# Patient Record
Sex: FEMALE | Race: WHITE | Employment: OTHER | URBAN - METROPOLITAN AREA
[De-identification: names, ages, dates, MRNs, and addresses within clinical notes are randomized per-mention and may not be internally consistent; named-entity substitution may affect disease eponyms.]

---

## 2017-01-03 ENCOUNTER — ANTI-COAG VISIT (OUTPATIENT)
Dept: PHARMACY | Facility: CLINIC | Age: 72
End: 2017-01-03

## 2017-01-03 DIAGNOSIS — I48.91 ATRIAL FIBRILLATION, UNSPECIFIED TYPE (HCC): ICD-10-CM

## 2017-01-03 LAB — INR BLD: 2.7

## 2017-01-09 ENCOUNTER — TELEPHONE (OUTPATIENT)
Dept: PHARMACY | Age: 72
End: 2017-01-09

## 2017-01-19 ENCOUNTER — HOSPITAL ENCOUNTER (OUTPATIENT)
Dept: CT IMAGING | Age: 72
Discharge: HOME | End: 2017-01-19
Attending: ORTHOPAEDIC SURGERY | Admitting: ORTHOPAEDIC SURGERY

## 2017-01-19 DIAGNOSIS — M19.071 PRIMARY OSTEOARTHRITIS, RIGHT ANKLE AND FOOT: ICD-10-CM

## 2017-01-19 DIAGNOSIS — M19.071 OSTEOARTHRITIS OF RIGHT ANKLE, UNSPECIFIED OSTEOARTHRITIS TYPE: ICD-10-CM

## 2017-01-26 ENCOUNTER — TELEPHONE (OUTPATIENT)
Dept: INTERNAL MEDICINE CLINIC | Age: 72
End: 2017-01-26

## 2017-01-27 ENCOUNTER — OFFICE VISIT (OUTPATIENT)
Dept: CARDIOLOGY CLINIC | Age: 72
End: 2017-01-27

## 2017-01-27 VITALS
OXYGEN SATURATION: 93 % | HEIGHT: 64 IN | BODY MASS INDEX: 45.43 KG/M2 | DIASTOLIC BLOOD PRESSURE: 68 MMHG | HEART RATE: 88 BPM | SYSTOLIC BLOOD PRESSURE: 114 MMHG | WEIGHT: 266.12 LBS

## 2017-01-27 DIAGNOSIS — I10 ESSENTIAL HYPERTENSION: ICD-10-CM

## 2017-01-27 DIAGNOSIS — I25.10 CORONARY ARTERY DISEASE INVOLVING NATIVE CORONARY ARTERY OF NATIVE HEART WITHOUT ANGINA PECTORIS: ICD-10-CM

## 2017-01-27 DIAGNOSIS — I48.91 ATRIAL FIBRILLATION, UNSPECIFIED TYPE (HCC): Primary | ICD-10-CM

## 2017-01-27 PROCEDURE — 99214 OFFICE O/P EST MOD 30 MIN: CPT | Performed by: INTERNAL MEDICINE

## 2017-01-27 PROCEDURE — 93000 ELECTROCARDIOGRAM COMPLETE: CPT | Performed by: INTERNAL MEDICINE

## 2017-02-02 ENCOUNTER — OFFICE VISIT (OUTPATIENT)
Dept: INTERNAL MEDICINE CLINIC | Age: 72
End: 2017-02-02

## 2017-02-02 VITALS
HEIGHT: 64 IN | SYSTOLIC BLOOD PRESSURE: 136 MMHG | WEIGHT: 266.8 LBS | OXYGEN SATURATION: 96 % | BODY MASS INDEX: 45.55 KG/M2 | HEART RATE: 84 BPM | DIASTOLIC BLOOD PRESSURE: 72 MMHG

## 2017-02-02 DIAGNOSIS — Z23 NEED FOR TDAP VACCINATION: ICD-10-CM

## 2017-02-02 DIAGNOSIS — J45.40 MODERATE PERSISTENT ASTHMA WITHOUT COMPLICATION: ICD-10-CM

## 2017-02-02 DIAGNOSIS — I10 ESSENTIAL HYPERTENSION: Primary | ICD-10-CM

## 2017-02-02 DIAGNOSIS — F41.9 ANXIETY: ICD-10-CM

## 2017-02-02 DIAGNOSIS — M19.90 ARTHRITIS: ICD-10-CM

## 2017-02-02 PROCEDURE — 99214 OFFICE O/P EST MOD 30 MIN: CPT | Performed by: INTERNAL MEDICINE

## 2017-02-02 PROCEDURE — 90715 TDAP VACCINE 7 YRS/> IM: CPT | Performed by: INTERNAL MEDICINE

## 2017-02-02 PROCEDURE — 90471 IMMUNIZATION ADMIN: CPT | Performed by: INTERNAL MEDICINE

## 2017-02-02 RX ORDER — TRAMADOL HYDROCHLORIDE 50 MG/1
50 TABLET ORAL 2 TIMES DAILY PRN
Qty: 60 TABLET | Refills: 0 | Status: SHIPPED | OUTPATIENT
Start: 2017-02-02 | End: 2017-05-04 | Stop reason: SDUPTHER

## 2017-02-02 RX ORDER — CLONAZEPAM 1 MG/1
1 TABLET ORAL EVERY EVENING
Qty: 90 TABLET | Refills: 0 | Status: SHIPPED | OUTPATIENT
Start: 2017-02-02 | End: 2017-05-04 | Stop reason: SDUPTHER

## 2017-02-09 ENCOUNTER — ANTI-COAG VISIT (OUTPATIENT)
Dept: PHARMACY | Facility: CLINIC | Age: 72
End: 2017-02-09

## 2017-02-09 DIAGNOSIS — I48.91 ATRIAL FIBRILLATION, UNSPECIFIED TYPE (HCC): ICD-10-CM

## 2017-02-09 LAB — INR BLD: 2.7

## 2017-03-01 ENCOUNTER — TELEPHONE (OUTPATIENT)
Dept: INTERNAL MEDICINE CLINIC | Age: 72
End: 2017-03-01

## 2017-03-23 ENCOUNTER — ANTI-COAG VISIT (OUTPATIENT)
Dept: PHARMACY | Facility: CLINIC | Age: 72
End: 2017-03-23

## 2017-03-23 DIAGNOSIS — I48.91 ATRIAL FIBRILLATION, UNSPECIFIED TYPE (HCC): ICD-10-CM

## 2017-03-23 LAB — INR BLD: 2.6

## 2017-03-27 ENCOUNTER — TELEPHONE (OUTPATIENT)
Dept: PHARMACY | Age: 72
End: 2017-03-27

## 2017-03-30 ENCOUNTER — OFFICE VISIT (OUTPATIENT)
Dept: INTERNAL MEDICINE CLINIC | Age: 72
End: 2017-03-30

## 2017-03-30 VITALS
OXYGEN SATURATION: 97 % | HEIGHT: 64 IN | HEART RATE: 72 BPM | WEIGHT: 269.4 LBS | SYSTOLIC BLOOD PRESSURE: 124 MMHG | BODY MASS INDEX: 45.99 KG/M2 | DIASTOLIC BLOOD PRESSURE: 84 MMHG | TEMPERATURE: 98.4 F

## 2017-03-30 DIAGNOSIS — J31.0 RHINOSINUSITIS: Primary | ICD-10-CM

## 2017-03-30 DIAGNOSIS — J32.9 RHINOSINUSITIS: Primary | ICD-10-CM

## 2017-03-30 PROCEDURE — 99213 OFFICE O/P EST LOW 20 MIN: CPT | Performed by: INTERNAL MEDICINE

## 2017-04-20 RX ORDER — MONTELUKAST SODIUM 10 MG/1
TABLET ORAL
Qty: 90 TABLET | Refills: 1 | Status: SHIPPED | OUTPATIENT
Start: 2017-04-20 | End: 2017-10-12 | Stop reason: SDUPTHER

## 2017-04-20 RX ORDER — INDAPAMIDE 1.25 MG/1
TABLET, FILM COATED ORAL
Qty: 90 TABLET | Refills: 1 | Status: SHIPPED | OUTPATIENT
Start: 2017-04-20 | End: 2017-10-12 | Stop reason: SDUPTHER

## 2017-04-20 RX ORDER — GABAPENTIN 300 MG/1
CAPSULE ORAL
Qty: 540 CAPSULE | Refills: 1 | Status: SHIPPED | OUTPATIENT
Start: 2017-04-20 | End: 2017-10-12 | Stop reason: SDUPTHER

## 2017-04-20 RX ORDER — CITALOPRAM 40 MG/1
TABLET ORAL
Qty: 90 TABLET | Refills: 1 | Status: SHIPPED | OUTPATIENT
Start: 2017-04-20 | End: 2018-01-04 | Stop reason: SDUPTHER

## 2017-04-20 RX ORDER — VERAPAMIL HYDROCHLORIDE 240 MG/1
240 TABLET, FILM COATED, EXTENDED RELEASE ORAL NIGHTLY
Qty: 90 TABLET | Refills: 1 | Status: SHIPPED | OUTPATIENT
Start: 2017-04-20 | End: 2017-07-06 | Stop reason: SDUPTHER

## 2017-04-20 RX ORDER — ALLOPURINOL 300 MG/1
TABLET ORAL
Qty: 90 TABLET | Refills: 1 | Status: SHIPPED | OUTPATIENT
Start: 2017-04-20 | End: 2017-10-12 | Stop reason: SDUPTHER

## 2017-05-01 ENCOUNTER — ANTI-COAG VISIT (OUTPATIENT)
Dept: PHARMACY | Facility: CLINIC | Age: 72
End: 2017-05-01

## 2017-05-01 DIAGNOSIS — I48.91 ATRIAL FIBRILLATION, UNSPECIFIED TYPE (HCC): ICD-10-CM

## 2017-05-01 LAB — INR BLD: 2.5

## 2017-05-04 ENCOUNTER — OFFICE VISIT (OUTPATIENT)
Dept: INTERNAL MEDICINE CLINIC | Age: 72
End: 2017-05-04

## 2017-05-04 VITALS
SYSTOLIC BLOOD PRESSURE: 120 MMHG | BODY MASS INDEX: 45.38 KG/M2 | WEIGHT: 265.8 LBS | HEART RATE: 64 BPM | HEIGHT: 64 IN | DIASTOLIC BLOOD PRESSURE: 70 MMHG

## 2017-05-04 DIAGNOSIS — F41.9 ANXIETY: ICD-10-CM

## 2017-05-04 DIAGNOSIS — R82.90 CLOUDY URINE: ICD-10-CM

## 2017-05-04 DIAGNOSIS — J45.40 MODERATE PERSISTENT ASTHMA WITHOUT COMPLICATION: ICD-10-CM

## 2017-05-04 DIAGNOSIS — M1A.0710 CHRONIC IDIOPATHIC GOUT INVOLVING TOE OF RIGHT FOOT WITHOUT TOPHUS: ICD-10-CM

## 2017-05-04 DIAGNOSIS — M19.90 ARTHRITIS: ICD-10-CM

## 2017-05-04 DIAGNOSIS — R30.0 BURNING WITH URINATION: Primary | ICD-10-CM

## 2017-05-04 DIAGNOSIS — I10 ESSENTIAL HYPERTENSION: ICD-10-CM

## 2017-05-04 LAB
ALBUMIN SERPL-MCNC: 4.4 G/DL (ref 3.4–5)
ANION GAP SERPL CALCULATED.3IONS-SCNC: 15 MMOL/L (ref 3–16)
BILIRUBIN, POC: ABNORMAL
BLOOD URINE, POC: ABNORMAL
BUN BLDV-MCNC: 12 MG/DL (ref 7–20)
CALCIUM SERPL-MCNC: 10.3 MG/DL (ref 8.3–10.6)
CHLORIDE BLD-SCNC: 98 MMOL/L (ref 99–110)
CHOLESTEROL, TOTAL: 172 MG/DL (ref 0–199)
CLARITY, POC: ABNORMAL
CO2: 29 MMOL/L (ref 21–32)
COLOR, POC: ABNORMAL
CREAT SERPL-MCNC: 0.8 MG/DL (ref 0.6–1.2)
GFR AFRICAN AMERICAN: >60
GFR NON-AFRICAN AMERICAN: >60
GLUCOSE BLD-MCNC: 81 MG/DL (ref 70–99)
GLUCOSE URINE, POC: ABNORMAL
HDLC SERPL-MCNC: 74 MG/DL (ref 40–60)
KETONES, POC: ABNORMAL
LDL CHOLESTEROL CALCULATED: 77 MG/DL
LEUKOCYTE EST, POC: ABNORMAL
NITRITE, POC: POSITIVE
PH, POC: 7
PHOSPHORUS: 3 MG/DL (ref 2.5–4.9)
POTASSIUM SERPL-SCNC: 4 MMOL/L (ref 3.5–5.1)
PROTEIN, POC: ABNORMAL
SODIUM BLD-SCNC: 142 MMOL/L (ref 136–145)
SPECIFIC GRAVITY, POC: 1.02
TRIGL SERPL-MCNC: 106 MG/DL (ref 0–150)
URIC ACID, SERUM: 4.1 MG/DL (ref 2.6–6)
UROBILINOGEN, POC: 0.2
VLDLC SERPL CALC-MCNC: 21 MG/DL

## 2017-05-04 PROCEDURE — 81002 URINALYSIS NONAUTO W/O SCOPE: CPT | Performed by: INTERNAL MEDICINE

## 2017-05-04 PROCEDURE — 99214 OFFICE O/P EST MOD 30 MIN: CPT | Performed by: INTERNAL MEDICINE

## 2017-05-04 RX ORDER — TRAMADOL HYDROCHLORIDE 50 MG/1
50 TABLET ORAL 2 TIMES DAILY PRN
Qty: 60 TABLET | Refills: 0 | Status: SHIPPED | OUTPATIENT
Start: 2017-05-04 | End: 2017-08-10 | Stop reason: ALTCHOICE

## 2017-05-04 RX ORDER — NITROFURANTOIN 25; 75 MG/1; MG/1
100 CAPSULE ORAL 2 TIMES DAILY
Qty: 10 CAPSULE | Refills: 0 | Status: SHIPPED | OUTPATIENT
Start: 2017-05-04 | End: 2017-05-09

## 2017-05-04 RX ORDER — CLONAZEPAM 1 MG/1
1 TABLET ORAL EVERY EVENING
Qty: 90 TABLET | Refills: 0 | Status: SHIPPED | OUTPATIENT
Start: 2017-05-04 | End: 2017-08-10 | Stop reason: SDUPTHER

## 2017-05-07 LAB
6-ACETYLMORPHINE: NOT DETECTED
7-AMINOCLONAZEPAM: PRESENT
ALPHA-OH-ALPRAZOLAM: NOT DETECTED
ALPRAZOLAM: NOT DETECTED
AMPHETAMINE: NOT DETECTED
BARBITURATES: NOT DETECTED
BENZOYLECGONINE: NOT DETECTED
BUPRENORPHINE: NOT DETECTED
CARISOPRODOL: NOT DETECTED
CLONAZEPAM: NOT DETECTED
CODEINE: NOT DETECTED
CREATININE URINE: 91.3 MG/DL (ref 20–400)
DIAZEPAM: NOT DETECTED
DRUGS EXPECTED: NORMAL
EER PAIN MGT DRUG PANEL, HIGH RES/EMIT U: NORMAL
ETHYL GLUCURONIDE: NOT DETECTED
FENTANYL: NOT DETECTED
HYDROCODONE: NOT DETECTED
HYDROMORPHONE: NOT DETECTED
LORAZEPAM: NOT DETECTED
MARIJUANA METABOLITE: NOT DETECTED
MDA: NOT DETECTED
MDEA: NOT DETECTED
MDMA URINE: NOT DETECTED
MEPERIDINE: NOT DETECTED
METHADONE: NOT DETECTED
METHAMPHETAMINE: NOT DETECTED
METHYLPHENIDATE: NOT DETECTED
MIDAZOLAM: NOT DETECTED
MORPHINE: NOT DETECTED
NORBUPRENORPHINE, FREE: NOT DETECTED
NORDIAZEPAM: NOT DETECTED
NORFENTANYL: NOT DETECTED
NORHYDROCODONE, URINE: NOT DETECTED
NOROXYCODONE: NOT DETECTED
NOROXYMORPHONE, URINE: NOT DETECTED
OXAZEPAM: NOT DETECTED
OXYCODONE: NOT DETECTED
OXYMORPHONE: NOT DETECTED
PAIN MANAGEMENT DRUG PANEL: NORMAL
PAIN MANAGEMENT DRUG PANEL: NORMAL
PCP: NOT DETECTED
PHENTERMINE: NOT DETECTED
PROPOXYPHENE: NOT DETECTED
TAPENTADOL, URINE: NOT DETECTED
TAPENTADOL-O-SULFATE, URINE: NOT DETECTED
TEMAZEPAM: NOT DETECTED
TRAMADOL: PRESENT
ZOLPIDEM: NOT DETECTED

## 2017-05-18 ENCOUNTER — TELEPHONE (OUTPATIENT)
Dept: INTERNAL MEDICINE CLINIC | Age: 72
End: 2017-05-18

## 2017-05-18 RX ORDER — ATORVASTATIN CALCIUM 80 MG/1
80 TABLET, FILM COATED ORAL DAILY
Qty: 30 TABLET | Refills: 5 | Status: SHIPPED | OUTPATIENT
Start: 2017-05-18 | End: 2017-11-16 | Stop reason: SDUPTHER

## 2017-06-02 ENCOUNTER — TELEPHONE (OUTPATIENT)
Dept: INTERNAL MEDICINE CLINIC | Age: 72
End: 2017-06-02

## 2017-06-12 ENCOUNTER — HOSPITAL ENCOUNTER (OUTPATIENT)
Dept: OTHER | Age: 72
Discharge: HOME | End: 2017-06-30
Attending: INTERNAL MEDICINE | Admitting: INTERNAL MEDICINE

## 2017-06-12 ENCOUNTER — ANTI-COAG VISIT (OUTPATIENT)
Dept: PHARMACY | Facility: CLINIC | Age: 72
End: 2017-06-12

## 2017-06-12 DIAGNOSIS — I48.91 ATRIAL FIBRILLATION, UNSPECIFIED TYPE (HCC): ICD-10-CM

## 2017-06-12 LAB — INR BLD: 2.8

## 2017-06-26 ENCOUNTER — OFFICE VISIT (OUTPATIENT)
Dept: BARIATRICS/WEIGHT MGMT | Age: 72
End: 2017-06-26

## 2017-06-26 VITALS
BODY MASS INDEX: 44.39 KG/M2 | HEIGHT: 64 IN | DIASTOLIC BLOOD PRESSURE: 78 MMHG | HEART RATE: 69 BPM | SYSTOLIC BLOOD PRESSURE: 126 MMHG | WEIGHT: 260 LBS

## 2017-06-26 DIAGNOSIS — E78.5 HYPERLIPIDEMIA, UNSPECIFIED: ICD-10-CM

## 2017-06-26 DIAGNOSIS — E66.01 MORBID OBESITY WITH BMI OF 40.0-44.9, ADULT (HCC): Primary | ICD-10-CM

## 2017-06-26 DIAGNOSIS — Z98.84 STATUS POST GASTRIC BANDING: ICD-10-CM

## 2017-06-26 DIAGNOSIS — I10 ESSENTIAL HYPERTENSION: ICD-10-CM

## 2017-06-26 PROCEDURE — 99213 OFFICE O/P EST LOW 20 MIN: CPT | Performed by: NURSE PRACTITIONER

## 2017-06-26 ASSESSMENT — ENCOUNTER SYMPTOMS
ALLERGIC/IMMUNOLOGIC NEGATIVE: 1
GASTROINTESTINAL NEGATIVE: 1
EYES NEGATIVE: 1
RESPIRATORY NEGATIVE: 1

## 2017-07-06 RX ORDER — VERAPAMIL HYDROCHLORIDE 240 MG/1
TABLET, FILM COATED, EXTENDED RELEASE ORAL
Qty: 90 TABLET | Refills: 1 | Status: SHIPPED | OUTPATIENT
Start: 2017-07-06 | End: 2018-01-04 | Stop reason: SDUPTHER

## 2017-07-24 ENCOUNTER — ANTI-COAG VISIT (OUTPATIENT)
Dept: PHARMACY | Facility: CLINIC | Age: 72
End: 2017-07-24

## 2017-07-24 DIAGNOSIS — I48.91 ATRIAL FIBRILLATION, UNSPECIFIED TYPE (HCC): ICD-10-CM

## 2017-07-24 LAB — INR BLD: 3.5

## 2017-08-10 ENCOUNTER — OFFICE VISIT (OUTPATIENT)
Dept: INTERNAL MEDICINE CLINIC | Age: 72
End: 2017-08-10

## 2017-08-10 VITALS
SYSTOLIC BLOOD PRESSURE: 124 MMHG | HEART RATE: 68 BPM | WEIGHT: 258.8 LBS | DIASTOLIC BLOOD PRESSURE: 82 MMHG | HEIGHT: 64 IN | BODY MASS INDEX: 44.18 KG/M2

## 2017-08-10 DIAGNOSIS — J45.40 MODERATE PERSISTENT ASTHMA WITHOUT COMPLICATION: ICD-10-CM

## 2017-08-10 DIAGNOSIS — I10 ESSENTIAL HYPERTENSION: Primary | ICD-10-CM

## 2017-08-10 DIAGNOSIS — M19.90 ARTHRITIS: ICD-10-CM

## 2017-08-10 DIAGNOSIS — F41.9 ANXIETY: ICD-10-CM

## 2017-08-10 PROCEDURE — 99214 OFFICE O/P EST MOD 30 MIN: CPT | Performed by: INTERNAL MEDICINE

## 2017-08-10 RX ORDER — CLONAZEPAM 1 MG/1
1 TABLET ORAL EVERY EVENING
Qty: 90 TABLET | Refills: 0 | Status: SHIPPED | OUTPATIENT
Start: 2017-08-10 | End: 2017-11-03 | Stop reason: SDUPTHER

## 2017-08-10 RX ORDER — CLONAZEPAM 1 MG/1
1 TABLET ORAL EVERY EVENING
Qty: 90 TABLET | Refills: 0 | Status: CANCELLED | OUTPATIENT
Start: 2017-08-10

## 2017-08-10 RX ORDER — TRAMADOL HYDROCHLORIDE 50 MG/1
50 TABLET ORAL 2 TIMES DAILY PRN
Qty: 60 TABLET | Refills: 0 | Status: CANCELLED | OUTPATIENT
Start: 2017-08-10

## 2017-08-10 ASSESSMENT — PATIENT HEALTH QUESTIONNAIRE - PHQ9
1. LITTLE INTEREST OR PLEASURE IN DOING THINGS: 0
SUM OF ALL RESPONSES TO PHQ QUESTIONS 1-9: 0
2. FEELING DOWN, DEPRESSED OR HOPELESS: 0
SUM OF ALL RESPONSES TO PHQ9 QUESTIONS 1 & 2: 0

## 2017-09-07 ENCOUNTER — ANTI-COAG VISIT (OUTPATIENT)
Dept: PHARMACY | Age: 72
End: 2017-09-07

## 2017-09-07 DIAGNOSIS — I48.91 ATRIAL FIBRILLATION, UNSPECIFIED TYPE (HCC): ICD-10-CM

## 2017-09-07 LAB
INR BLD: 4.2
PROTIME: 50.7 SECONDS

## 2017-09-28 RX ORDER — WARFARIN SODIUM 5 MG/1
TABLET ORAL
Qty: 156 TABLET | Refills: 3 | Status: SHIPPED | OUTPATIENT
Start: 2017-09-28 | End: 2018-09-12 | Stop reason: SDUPTHER

## 2017-10-05 ENCOUNTER — TELEPHONE (OUTPATIENT)
Dept: INTERNAL MEDICINE CLINIC | Age: 72
End: 2017-10-05

## 2017-10-05 NOTE — TELEPHONE ENCOUNTER
Pt [facility] to [facility].   [address]      [facility] phn#[phone]

## 2017-10-12 RX ORDER — MONTELUKAST SODIUM 10 MG/1
10 TABLET ORAL NIGHTLY
Qty: 90 TABLET | Refills: 3 | Status: SHIPPED | OUTPATIENT
Start: 2017-10-12 | End: 2018-09-12 | Stop reason: SDUPTHER

## 2017-10-12 RX ORDER — ALLOPURINOL 300 MG/1
TABLET ORAL
Qty: 90 TABLET | Refills: 3 | Status: SHIPPED | OUTPATIENT
Start: 2017-10-12 | End: 2018-12-04 | Stop reason: SDUPTHER

## 2017-10-12 RX ORDER — GABAPENTIN 300 MG/1
CAPSULE ORAL
Qty: 540 CAPSULE | Refills: 3 | Status: SHIPPED | OUTPATIENT
Start: 2017-10-12 | End: 2018-10-31 | Stop reason: SDUPTHER

## 2017-10-12 RX ORDER — TRIMETHOPRIM 100 MG/1
TABLET ORAL
Qty: 45 TABLET | Refills: 3 | Status: SHIPPED | OUTPATIENT
Start: 2017-10-12 | End: 2018-09-12 | Stop reason: SDUPTHER

## 2017-10-12 RX ORDER — INDAPAMIDE 1.25 MG/1
TABLET, FILM COATED ORAL
Qty: 90 TABLET | Refills: 3 | Status: SHIPPED | OUTPATIENT
Start: 2017-10-12 | End: 2018-09-12 | Stop reason: SDUPTHER

## 2017-10-19 ENCOUNTER — ANTI-COAG VISIT (OUTPATIENT)
Dept: PHARMACY | Age: 72
End: 2017-10-19

## 2017-10-19 DIAGNOSIS — I48.91 ATRIAL FIBRILLATION, UNSPECIFIED TYPE (HCC): ICD-10-CM

## 2017-10-19 LAB
INR BLD: 4.3
PROTIME: 51.4 SECONDS

## 2017-10-19 NOTE — PROGRESS NOTES
Ms. Sondra Elder is a 71 y.o. y/o female with history of DVT, PE, Afib who presents today for anticoagulation monitoring and adjustment. Pertinent PMH: had a diskectomy with an artifical spinal disk implant in September 2012. Patient Reported Findings:  Yes     No  [x]   []       Patient verifies current dosing regimen as listed  []   [x]       S/S bleeding/bruising/swelling/SOB  []   [x]       Blood in urine or stool  []   [x]       Procedures scheduled in the future at this time  []   [x]       Missed Dose  []   [x]       Extra Dose  []   [x]       Change in medications  []   [x]       Change in health/diet/appetite- has not had any vit k in the past few weeks. States that she normally eats spinach, kale, broccoli, cabbage 2x/week. Discussed returning to that vegetable intake.   []   [x]       Change in alcohol use  []   [x]       Change in activity  []   [x]       Hospital admission  []   [x]       Emergency department visit  []   [x]       Other complaints    Clinical Outcomes:  Yes     No  []   [x]       Major bleeding event  []   [x]       Thromboembolic event    Duration of warfarin Therapy: indefinite  INR Range:  2.0-3.0    INR is 4.3 today. D/t no vit k intake recently  Hold dose tonight only then take 7.5 mg tomorrow then continue same weekly of 10mg Mon, Wed & Fri and 7.5mg on all other days.    Encouraged to return to eating vit k vegetables 2x/week  Recheck INR in 2 weeks before md appt, 11/3  Return to 6 week appt when appropriate    Referring PCP is Kelly Villanueva  INR (no units)   Date Value   10/19/2017 4.3   09/07/2017 4.2   07/24/2017 3.5

## 2017-11-01 ENCOUNTER — HOSPITAL ENCOUNTER (OUTPATIENT)
Dept: OTHER | Age: 72
Discharge: HOME | End: 2017-11-30
Attending: INTERNAL MEDICINE | Admitting: INTERNAL MEDICINE

## 2017-11-03 ENCOUNTER — OFFICE VISIT (OUTPATIENT)
Dept: INTERNAL MEDICINE CLINIC | Age: 72
End: 2017-11-03

## 2017-11-03 ENCOUNTER — TELEPHONE (OUTPATIENT)
Dept: INTERNAL MEDICINE CLINIC | Age: 72
End: 2017-11-03

## 2017-11-03 ENCOUNTER — ANTI-COAG VISIT (OUTPATIENT)
Dept: PHARMACY | Age: 72
End: 2017-11-03

## 2017-11-03 VITALS
HEART RATE: 72 BPM | WEIGHT: 269 LBS | BODY MASS INDEX: 45.93 KG/M2 | SYSTOLIC BLOOD PRESSURE: 128 MMHG | HEIGHT: 64 IN | DIASTOLIC BLOOD PRESSURE: 86 MMHG

## 2017-11-03 DIAGNOSIS — M19.90 ARTHRITIS: ICD-10-CM

## 2017-11-03 DIAGNOSIS — J45.40 MODERATE PERSISTENT ASTHMA WITHOUT COMPLICATION: ICD-10-CM

## 2017-11-03 DIAGNOSIS — I48.91 ATRIAL FIBRILLATION, UNSPECIFIED TYPE (HCC): ICD-10-CM

## 2017-11-03 DIAGNOSIS — F41.9 ANXIETY: ICD-10-CM

## 2017-11-03 DIAGNOSIS — I10 ESSENTIAL HYPERTENSION: Primary | ICD-10-CM

## 2017-11-03 LAB
INR BLD: 4.4
PROTIME: 52.8 SECONDS

## 2017-11-03 PROCEDURE — 1036F TOBACCO NON-USER: CPT | Performed by: INTERNAL MEDICINE

## 2017-11-03 PROCEDURE — 1090F PRES/ABSN URINE INCON ASSESS: CPT | Performed by: INTERNAL MEDICINE

## 2017-11-03 PROCEDURE — G8399 PT W/DXA RESULTS DOCUMENT: HCPCS | Performed by: INTERNAL MEDICINE

## 2017-11-03 PROCEDURE — G8598 ASA/ANTIPLAT THER USED: HCPCS | Performed by: INTERNAL MEDICINE

## 2017-11-03 PROCEDURE — 3017F COLORECTAL CA SCREEN DOC REV: CPT | Performed by: INTERNAL MEDICINE

## 2017-11-03 PROCEDURE — G8427 DOCREV CUR MEDS BY ELIG CLIN: HCPCS | Performed by: INTERNAL MEDICINE

## 2017-11-03 PROCEDURE — 3014F SCREEN MAMMO DOC REV: CPT | Performed by: INTERNAL MEDICINE

## 2017-11-03 PROCEDURE — G8484 FLU IMMUNIZE NO ADMIN: HCPCS | Performed by: INTERNAL MEDICINE

## 2017-11-03 PROCEDURE — G8417 CALC BMI ABV UP PARAM F/U: HCPCS | Performed by: INTERNAL MEDICINE

## 2017-11-03 PROCEDURE — 99214 OFFICE O/P EST MOD 30 MIN: CPT | Performed by: INTERNAL MEDICINE

## 2017-11-03 PROCEDURE — 1123F ACP DISCUSS/DSCN MKR DOCD: CPT | Performed by: INTERNAL MEDICINE

## 2017-11-03 PROCEDURE — 4040F PNEUMOC VAC/ADMIN/RCVD: CPT | Performed by: INTERNAL MEDICINE

## 2017-11-03 RX ORDER — BUDESONIDE AND FORMOTEROL FUMARATE DIHYDRATE 160; 4.5 UG/1; UG/1
2 AEROSOL RESPIRATORY (INHALATION) 2 TIMES DAILY
Qty: 1 INHALER | Refills: 0 | Status: SHIPPED | OUTPATIENT
Start: 2017-11-03 | End: 2018-02-02 | Stop reason: SDUPTHER

## 2017-11-03 RX ORDER — CLONAZEPAM 1 MG/1
1 TABLET ORAL EVERY EVENING
Qty: 90 TABLET | Refills: 0 | Status: SHIPPED | OUTPATIENT
Start: 2017-11-07 | End: 2018-02-02 | Stop reason: SDUPTHER

## 2017-11-03 RX ORDER — BENZONATATE 100 MG/1
100 CAPSULE ORAL 3 TIMES DAILY PRN
Qty: 30 CAPSULE | Refills: 0 | Status: SHIPPED | OUTPATIENT
Start: 2017-11-03 | End: 2017-11-10

## 2017-11-03 RX ORDER — ALBUTEROL SULFATE 90 UG/1
2 AEROSOL, METERED RESPIRATORY (INHALATION) EVERY 4 HOURS PRN
Qty: 1 INHALER | Refills: 3 | Status: SHIPPED | OUTPATIENT
Start: 2017-11-03 | End: 2018-08-03 | Stop reason: SDUPTHER

## 2017-11-03 ASSESSMENT — ENCOUNTER SYMPTOMS: COUGH: 1

## 2017-11-03 NOTE — PROGRESS NOTES
Subjective:      Patient ID: Ryan Shelton is a 71 y.o. female. HPI  The patient is presenting for management of hypertension, asthma, arthritis, and anxiety. HTN: Tolerating medications well and taking them as directed. Does not check BP at home. No symptoms concerning for end organ damage are present. Asthma: She reports her symptoms are under fair control. She reports that dyspnea is provoked by mold and after it rains. She takes Symbicort as directed.       She has arthritis with associated pain located in the lower back in the bilateral knees. Aggravating factors include standing and walking. She gets relief with Tylenol and gabapentin. She tolerates this without any known side effects. Since her last visit she reports that her knees are doing well. She has occasional low back pain.       She also has chronic generalized anxiety. She uses clonazepam nightly. This provides effective relief of her symptoms. Social History   Substance Use Topics    Smoking status: Former Smoker     Packs/day: 0.50     Years: 4.00     Types: Cigarettes     Quit date: 1/1/1965    Smokeless tobacco: Never Used    Alcohol use Yes      Comment: 4 t imes a year      Review of Systems   ENT: +rhinorrhea, cough X 3 weeks  No known fevers  CV: Neg for chest pain  RESP: mild dyspnea  MSK: Occasional back pain    Objective:   Physical Exam  /86 (Site: Left Arm, Position: Sitting, Cuff Size: Large Adult)   Pulse 72   Ht 5' 4.25\" (1.632 m)   Wt 269 lb (122 kg)   BMI 45.82 kg/m²    GEN: WN/WD, NAD  ENT: mild posterior OP erythema  NECK: no palpable LAD  CV: irregular rhythm, 2/6 early systolic murmur  Resp: normal effort, clear auscultation bilaterally  No peripheral edema   Abd: soft, nontender to palpation.        Lab Results   Component Value Date    CREATININE 0.8 05/04/2017    BUN 12 05/04/2017     05/04/2017    K 4.0 05/04/2017    CL 98 (L) 05/04/2017    CO2 29 05/04/2017      Lab Results

## 2017-11-03 NOTE — TELEPHONE ENCOUNTER
Patient states that she forgot to ask Steven Green to do a referral to Dr. Moi Graf, dermatologist. She has an appointment with him on 11/14/17

## 2017-11-16 RX ORDER — ATORVASTATIN CALCIUM 80 MG/1
TABLET, FILM COATED ORAL
Qty: 60 TABLET | Refills: 5 | Status: SHIPPED | OUTPATIENT
Start: 2017-11-16 | End: 2018-01-26 | Stop reason: SDUPTHER

## 2017-11-17 ENCOUNTER — ANTI-COAG VISIT (OUTPATIENT)
Dept: PHARMACY | Age: 72
End: 2017-11-17

## 2017-11-17 DIAGNOSIS — I48.91 ATRIAL FIBRILLATION, UNSPECIFIED TYPE (HCC): ICD-10-CM

## 2017-11-17 LAB
INR BLD: 2.4
PROTIME: 28.7 SECONDS

## 2017-12-01 ENCOUNTER — HOSPITAL ENCOUNTER (OUTPATIENT)
Dept: OTHER | Age: 72
Discharge: HOME | End: 2017-12-31
Attending: INTERNAL MEDICINE | Admitting: INTERNAL MEDICINE

## 2017-12-04 ENCOUNTER — TELEPHONE (OUTPATIENT)
Dept: INTERNAL MEDICINE CLINIC | Age: 72
End: 2017-12-04

## 2017-12-04 NOTE — TELEPHONE ENCOUNTER
Called pt to advise. She is having pain under her shoulder blade. She thinks it is a pulled muscle and she may stop into the office if she is in the area. She will continue to monitor her BP and the pain at home.

## 2017-12-15 ENCOUNTER — ANTI-COAG VISIT (OUTPATIENT)
Dept: PHARMACY | Age: 72
End: 2017-12-15

## 2017-12-15 DIAGNOSIS — I48.91 ATRIAL FIBRILLATION, UNSPECIFIED TYPE (HCC): ICD-10-CM

## 2017-12-15 LAB
INR BLD: 2.6
PROTIME: 31.1 SECONDS

## 2018-01-01 ENCOUNTER — HOSPITAL ENCOUNTER (OUTPATIENT)
Dept: OTHER | Age: 73
Discharge: HOME | End: 2018-01-31
Attending: INTERNAL MEDICINE | Admitting: INTERNAL MEDICINE

## 2018-01-04 RX ORDER — CITALOPRAM 40 MG/1
TABLET ORAL
Qty: 90 TABLET | Refills: 1 | Status: SHIPPED | OUTPATIENT
Start: 2018-01-04 | End: 2018-08-16 | Stop reason: SDUPTHER

## 2018-01-04 RX ORDER — VERAPAMIL HYDROCHLORIDE 240 MG/1
TABLET, FILM COATED, EXTENDED RELEASE ORAL
Qty: 90 TABLET | Refills: 1 | Status: SHIPPED | OUTPATIENT
Start: 2018-01-04 | End: 2018-08-16 | Stop reason: SDUPTHER

## 2018-01-26 ENCOUNTER — TELEPHONE (OUTPATIENT)
Dept: INTERNAL MEDICINE CLINIC | Age: 73
End: 2018-01-26

## 2018-01-26 ENCOUNTER — ANTI-COAG VISIT (OUTPATIENT)
Dept: PHARMACY | Age: 73
End: 2018-01-26

## 2018-01-26 DIAGNOSIS — I48.91 ATRIAL FIBRILLATION, UNSPECIFIED TYPE (HCC): ICD-10-CM

## 2018-01-26 DIAGNOSIS — E78.5 HYPERLIPIDEMIA, UNSPECIFIED HYPERLIPIDEMIA TYPE: Primary | ICD-10-CM

## 2018-01-26 LAB
INR BLD: 2.2
PROTIME: 26.9 SECONDS

## 2018-01-26 RX ORDER — ATORVASTATIN CALCIUM 80 MG/1
TABLET, FILM COATED ORAL
Qty: 90 TABLET | Refills: 1 | Status: SHIPPED | OUTPATIENT
Start: 2018-01-26 | End: 2018-05-03 | Stop reason: SDUPTHER

## 2018-01-26 NOTE — TELEPHONE ENCOUNTER
Dr. Lucas Wilson: This patient has an upcoming appointment with you for Hyperlipidemia. In planning for that visit I have completed the following pre-visit planning:     Pre-Visit Planning Checklist:  Patient contacted: yes  Verified patient by name and date of birth: yes    Health Maintenance items reviewed:    Pneumonia Vaccination:  patient would like to discuss at next visit. Labs and procedures pended:     Labs and procedures discussed with patient:   Reminded patient to check with their insurance company about coverage for lab tests and lab location:     Preliminary Medication Reconciliation: was performed. Reminded patient to bring medications to appointment: no    Reminded patient to arrive early: yes    Other notes: Patient is requesting a refill of Atorvastatin to be sent to [address] [place] [address]- medication pended    Please complete the med-reconciliation and sign the appropriate labs as soon as possible.       Alix Soriano  Pre-Services Specialist

## 2018-01-26 NOTE — TELEPHONE ENCOUNTER
Attempted to contact patient on 1/26/2018. Result: left message on the patient's voicemail asking patient to return my call. Pre-Visit planning not completed.        Cristiano Ashley  Patient Services Specialist  [phone]

## 2018-02-01 ENCOUNTER — HOSPITAL ENCOUNTER (OUTPATIENT)
Dept: OTHER | Age: 73
Discharge: HOME | End: 2018-02-28
Attending: INTERNAL MEDICINE | Admitting: INTERNAL MEDICINE

## 2018-02-02 ENCOUNTER — OFFICE VISIT (OUTPATIENT)
Dept: INTERNAL MEDICINE CLINIC | Age: 73
End: 2018-02-02

## 2018-02-02 VITALS
OXYGEN SATURATION: 95 % | DIASTOLIC BLOOD PRESSURE: 80 MMHG | HEART RATE: 68 BPM | SYSTOLIC BLOOD PRESSURE: 130 MMHG | WEIGHT: 275.6 LBS | TEMPERATURE: 98.1 F | HEIGHT: 64 IN | BODY MASS INDEX: 47.05 KG/M2

## 2018-02-02 DIAGNOSIS — I10 ESSENTIAL HYPERTENSION: Primary | ICD-10-CM

## 2018-02-02 DIAGNOSIS — J45.40 MODERATE PERSISTENT ASTHMA WITHOUT COMPLICATION: ICD-10-CM

## 2018-02-02 DIAGNOSIS — I48.20 CHRONIC ATRIAL FIBRILLATION (HCC): ICD-10-CM

## 2018-02-02 DIAGNOSIS — F41.9 ANXIETY: ICD-10-CM

## 2018-02-02 DIAGNOSIS — R35.0 URINARY FREQUENCY: ICD-10-CM

## 2018-02-02 DIAGNOSIS — Z23 NEED FOR PNEUMOCOCCAL VACCINATION: ICD-10-CM

## 2018-02-02 LAB
BILIRUBIN, POC: NORMAL
BLOOD URINE, POC: NORMAL
CLARITY, POC: NORMAL
COLOR, POC: NORMAL
GLUCOSE URINE, POC: NORMAL
KETONES, POC: NORMAL
LEUKOCYTE EST, POC: NORMAL
NITRITE, POC: NORMAL
PH, POC: 6.5
PROTEIN, POC: NORMAL
SPECIFIC GRAVITY, POC: 1.01
UROBILINOGEN, POC: 0.2

## 2018-02-02 PROCEDURE — G8399 PT W/DXA RESULTS DOCUMENT: HCPCS | Performed by: INTERNAL MEDICINE

## 2018-02-02 PROCEDURE — 81002 URINALYSIS NONAUTO W/O SCOPE: CPT | Performed by: INTERNAL MEDICINE

## 2018-02-02 PROCEDURE — G8484 FLU IMMUNIZE NO ADMIN: HCPCS | Performed by: INTERNAL MEDICINE

## 2018-02-02 PROCEDURE — 3017F COLORECTAL CA SCREEN DOC REV: CPT | Performed by: INTERNAL MEDICINE

## 2018-02-02 PROCEDURE — G8427 DOCREV CUR MEDS BY ELIG CLIN: HCPCS | Performed by: INTERNAL MEDICINE

## 2018-02-02 PROCEDURE — 1123F ACP DISCUSS/DSCN MKR DOCD: CPT | Performed by: INTERNAL MEDICINE

## 2018-02-02 PROCEDURE — G8417 CALC BMI ABV UP PARAM F/U: HCPCS | Performed by: INTERNAL MEDICINE

## 2018-02-02 PROCEDURE — 3014F SCREEN MAMMO DOC REV: CPT | Performed by: INTERNAL MEDICINE

## 2018-02-02 PROCEDURE — G8598 ASA/ANTIPLAT THER USED: HCPCS | Performed by: INTERNAL MEDICINE

## 2018-02-02 PROCEDURE — 1090F PRES/ABSN URINE INCON ASSESS: CPT | Performed by: INTERNAL MEDICINE

## 2018-02-02 PROCEDURE — 1036F TOBACCO NON-USER: CPT | Performed by: INTERNAL MEDICINE

## 2018-02-02 PROCEDURE — 90732 PPSV23 VACC 2 YRS+ SUBQ/IM: CPT | Performed by: INTERNAL MEDICINE

## 2018-02-02 PROCEDURE — 99214 OFFICE O/P EST MOD 30 MIN: CPT | Performed by: INTERNAL MEDICINE

## 2018-02-02 PROCEDURE — G0009 ADMIN PNEUMOCOCCAL VACCINE: HCPCS | Performed by: INTERNAL MEDICINE

## 2018-02-02 PROCEDURE — 4040F PNEUMOC VAC/ADMIN/RCVD: CPT | Performed by: INTERNAL MEDICINE

## 2018-02-02 RX ORDER — CLONAZEPAM 1 MG/1
1 TABLET ORAL EVERY EVENING
Qty: 90 TABLET | Refills: 0 | Status: SHIPPED | OUTPATIENT
Start: 2018-02-02 | End: 2018-05-07 | Stop reason: SDUPTHER

## 2018-02-02 RX ORDER — BUDESONIDE AND FORMOTEROL FUMARATE DIHYDRATE 160; 4.5 UG/1; UG/1
2 AEROSOL RESPIRATORY (INHALATION) 2 TIMES DAILY
Qty: 1 INHALER | Refills: 2 | Status: SHIPPED | OUTPATIENT
Start: 2018-02-02 | End: 2018-06-17 | Stop reason: SDUPTHER

## 2018-02-02 RX ORDER — PREDNISONE 20 MG/1
40 TABLET ORAL DAILY
Qty: 10 TABLET | Refills: 0 | Status: SHIPPED | OUTPATIENT
Start: 2018-02-02 | End: 2018-02-07

## 2018-02-02 NOTE — PROGRESS NOTES
Chief Complaint   Patient presents with    Hypertension    Asthma    Anxiety       HPI:  Cade Gary is a 71 y.o. (: 1945) here today for routine follow up for hypertension, asthma and anxiety. Also c/o urinary frequency, dysuria  Also has occasional palpitations. HTN: Home blood pressure checks are not performed. Feels medications are working well. Asthma: She reports her symptoms are under fair control. She takes Symbicort once daily because of cost. C/O dry cough with wheezing that comes and goes and SOB since October. Anxiety: She uses Clonazepam nightly. This provides effective relief of her symptoms. Urinary frequency: Started a couple weeks ago. She tried taking trimethoprim consecutively for 4 days with mild relief. She has noticed it is malodorous. A fib: She has occasional palpitations often aggravated by walking. They are located in her neck. She takes Coumadin as directed as well as verapamil. She is also followed by EP. PFSH:    Social History   Substance Use Topics    Smoking status: Former Smoker     Packs/day: 0.50     Years: 4.00     Types: Cigarettes     Quit date: 1965    Smokeless tobacco: Never Used    Alcohol use Yes      Comment: 4 t imes a year        ROS:  CV: Neg for chest pain  RESP: Positive for dyspnea   GI: Neg for constipation or diarrhea   +Dysuria and frequency      OBJECTIVE:    /80   Pulse 68   Temp 98.1 °F (36.7 °C) (Oral)   Ht 5' 4.25\" (1.632 m)   Wt 275 lb 9.6 oz (125 kg)   SpO2 95%   BMI 46.94 kg/m²   BP Readings from Last 2 Encounters:   18 130/80   17 128/86     Wt Readings from Last 3 Encounters:   18 275 lb 9.6 oz (125 kg)   17 269 lb (122 kg)   08/10/17 258 lb 12.8 oz (117.4 kg)       GEN: WN/WD, NAD  ENT: OP pink and moist  CV: irregular rate and rhythm, 2/6 systolic murmur  Resp: normal effort, clear auscultation bilaterally  No peripheral edema   Abd: soft, nontender to palpation.

## 2018-02-05 ENCOUNTER — OFFICE VISIT (OUTPATIENT)
Dept: DERMATOLOGY | Age: 73
End: 2018-02-05

## 2018-02-05 DIAGNOSIS — L82.1 SK (SEBORRHEIC KERATOSIS): ICD-10-CM

## 2018-02-05 DIAGNOSIS — L81.4 SOLAR LENTIGO: Primary | ICD-10-CM

## 2018-02-05 DIAGNOSIS — Q82.8 KERATODERMA: ICD-10-CM

## 2018-02-05 DIAGNOSIS — L73.8 SEBACEOUS GLAND HYPERPLASIA OF FACE: ICD-10-CM

## 2018-02-05 PROCEDURE — 1090F PRES/ABSN URINE INCON ASSESS: CPT | Performed by: DERMATOLOGY

## 2018-02-05 PROCEDURE — G8598 ASA/ANTIPLAT THER USED: HCPCS | Performed by: DERMATOLOGY

## 2018-02-05 PROCEDURE — 1123F ACP DISCUSS/DSCN MKR DOCD: CPT | Performed by: DERMATOLOGY

## 2018-02-05 PROCEDURE — 3017F COLORECTAL CA SCREEN DOC REV: CPT | Performed by: DERMATOLOGY

## 2018-02-05 PROCEDURE — G8399 PT W/DXA RESULTS DOCUMENT: HCPCS | Performed by: DERMATOLOGY

## 2018-02-05 PROCEDURE — 99202 OFFICE O/P NEW SF 15 MIN: CPT | Performed by: DERMATOLOGY

## 2018-02-05 PROCEDURE — 1036F TOBACCO NON-USER: CPT | Performed by: DERMATOLOGY

## 2018-02-05 PROCEDURE — G8427 DOCREV CUR MEDS BY ELIG CLIN: HCPCS | Performed by: DERMATOLOGY

## 2018-02-05 PROCEDURE — G8484 FLU IMMUNIZE NO ADMIN: HCPCS | Performed by: DERMATOLOGY

## 2018-02-05 PROCEDURE — 3014F SCREEN MAMMO DOC REV: CPT | Performed by: DERMATOLOGY

## 2018-02-05 PROCEDURE — 4040F PNEUMOC VAC/ADMIN/RCVD: CPT | Performed by: DERMATOLOGY

## 2018-02-05 PROCEDURE — G8417 CALC BMI ABV UP PARAM F/U: HCPCS | Performed by: DERMATOLOGY

## 2018-02-05 NOTE — PROGRESS NOTES
 Docusate Sodium 100 MG TABS Take 100 mg by mouth Daily       Calcium Carbonate-Vitamin D (CALCIUM + D PO) Take 650 mg by mouth daily.  therapeutic multivitamin-minerals (THERAGRAN-M) tablet Take 1 tablet by mouth daily.  Cranberry 500 MG CAPS Take 1,000 mg by mouth daily       fluticasone (FLONASE) 50 MCG/ACT nasal spray 1 spray by Nasal route daily. Physical Examination       The following were examined and determined to be normal: Psych/Neuro, Scalp/hair, Head/face, Conjunctivae/eyelids, Gums/teeth/lips, Neck, Breast/axilla/chest and Back. The following were examined and determined to be abnormal: RUE, LUE and Nails/digits. Well-appearing. 1.  Right lateral cheek with 2 well-defined round smooth brown patches. 2.  Right upper arm with a stuck on appearing brown papule. Left elbow with a stuck on appearing slightly verrucous pink brown plaque. 3.  Forehead with 2 umbilicated yellowish pink papules. 4.  Palms with multiple pinpoint spiny hyperkeratotic projections. Assessment and Plan     1. Solar lentigines -     Continue sun protective behaviors. Consider laser to remove. 2. SK (seborrheic keratosis)     Reassurance . 3. Sebaceous gland hyperplasia of face - few, small and asymptomatic    Consider fine needle electrodesiccation. 4. Spiny keratoderma     Moisturize regularly. Can pare down with pumice stone. Discussed that some cases have been associated with malignancy. Make sure up to date on mammogram and colonoscopy due to potential malignancy risk.

## 2018-03-01 ENCOUNTER — HOSPITAL ENCOUNTER (OUTPATIENT)
Dept: OTHER | Age: 73
Discharge: HOME | End: 2018-03-31
Attending: INTERNAL MEDICINE | Admitting: INTERNAL MEDICINE

## 2018-03-08 ENCOUNTER — OFFICE VISIT (OUTPATIENT)
Dept: INTERNAL MEDICINE CLINIC | Age: 73
End: 2018-03-08

## 2018-03-08 VITALS
HEART RATE: 96 BPM | OXYGEN SATURATION: 98 % | HEIGHT: 64 IN | DIASTOLIC BLOOD PRESSURE: 76 MMHG | WEIGHT: 276 LBS | SYSTOLIC BLOOD PRESSURE: 136 MMHG | BODY MASS INDEX: 47.12 KG/M2

## 2018-03-08 DIAGNOSIS — E66.01 MORBID OBESITY WITH BMI OF 45.0-49.9, ADULT (HCC): ICD-10-CM

## 2018-03-08 DIAGNOSIS — R06.09 DOE (DYSPNEA ON EXERTION): Primary | ICD-10-CM

## 2018-03-08 LAB
A/G RATIO: 1.7 (ref 1.1–2.2)
ALBUMIN SERPL-MCNC: 4.3 G/DL (ref 3.4–5)
ALP BLD-CCNC: 72 U/L (ref 40–129)
ALT SERPL-CCNC: 22 U/L (ref 10–40)
ANION GAP SERPL CALCULATED.3IONS-SCNC: 19 MMOL/L (ref 3–16)
AST SERPL-CCNC: 23 U/L (ref 15–37)
BILIRUB SERPL-MCNC: 0.5 MG/DL (ref 0–1)
BUN BLDV-MCNC: 9 MG/DL (ref 7–20)
CALCIUM SERPL-MCNC: 9.4 MG/DL (ref 8.3–10.6)
CHLORIDE BLD-SCNC: 96 MMOL/L (ref 99–110)
CO2: 26 MMOL/L (ref 21–32)
CREAT SERPL-MCNC: 0.7 MG/DL (ref 0.6–1.2)
GFR AFRICAN AMERICAN: >60
GFR NON-AFRICAN AMERICAN: >60
GLOBULIN: 2.6 G/DL
GLUCOSE BLD-MCNC: 116 MG/DL (ref 70–99)
HCT VFR BLD CALC: 42.4 % (ref 36–48)
HEMOGLOBIN: 14.8 G/DL (ref 12–16)
MCH RBC QN AUTO: 32.3 PG (ref 26–34)
MCHC RBC AUTO-ENTMCNC: 34.8 G/DL (ref 31–36)
MCV RBC AUTO: 92.8 FL (ref 80–100)
PDW BLD-RTO: 14.5 % (ref 12.4–15.4)
PLATELET # BLD: 328 K/UL (ref 135–450)
PMV BLD AUTO: 7.4 FL (ref 5–10.5)
POTASSIUM SERPL-SCNC: 3.7 MMOL/L (ref 3.5–5.1)
RBC # BLD: 4.57 M/UL (ref 4–5.2)
SODIUM BLD-SCNC: 141 MMOL/L (ref 136–145)
TOTAL PROTEIN: 6.9 G/DL (ref 6.4–8.2)
WBC # BLD: 8.5 K/UL (ref 4–11)

## 2018-03-08 PROCEDURE — 3014F SCREEN MAMMO DOC REV: CPT | Performed by: INTERNAL MEDICINE

## 2018-03-08 PROCEDURE — 1036F TOBACCO NON-USER: CPT | Performed by: INTERNAL MEDICINE

## 2018-03-08 PROCEDURE — 99214 OFFICE O/P EST MOD 30 MIN: CPT | Performed by: INTERNAL MEDICINE

## 2018-03-08 PROCEDURE — 1090F PRES/ABSN URINE INCON ASSESS: CPT | Performed by: INTERNAL MEDICINE

## 2018-03-08 PROCEDURE — G8427 DOCREV CUR MEDS BY ELIG CLIN: HCPCS | Performed by: INTERNAL MEDICINE

## 2018-03-08 PROCEDURE — G8484 FLU IMMUNIZE NO ADMIN: HCPCS | Performed by: INTERNAL MEDICINE

## 2018-03-08 PROCEDURE — 3017F COLORECTAL CA SCREEN DOC REV: CPT | Performed by: INTERNAL MEDICINE

## 2018-03-08 PROCEDURE — G8598 ASA/ANTIPLAT THER USED: HCPCS | Performed by: INTERNAL MEDICINE

## 2018-03-08 PROCEDURE — G8399 PT W/DXA RESULTS DOCUMENT: HCPCS | Performed by: INTERNAL MEDICINE

## 2018-03-08 PROCEDURE — 1123F ACP DISCUSS/DSCN MKR DOCD: CPT | Performed by: INTERNAL MEDICINE

## 2018-03-08 PROCEDURE — G8417 CALC BMI ABV UP PARAM F/U: HCPCS | Performed by: INTERNAL MEDICINE

## 2018-03-08 PROCEDURE — 4040F PNEUMOC VAC/ADMIN/RCVD: CPT | Performed by: INTERNAL MEDICINE

## 2018-03-09 ENCOUNTER — ANTI-COAG VISIT (OUTPATIENT)
Dept: PHARMACY | Age: 73
End: 2018-03-09

## 2018-03-09 ENCOUNTER — HOSPITAL ENCOUNTER (OUTPATIENT)
Dept: OTHER | Age: 73
Discharge: HOME | End: 2018-03-09
Attending: INTERNAL MEDICINE | Admitting: INTERNAL MEDICINE

## 2018-03-09 DIAGNOSIS — R06.09 DOE (DYSPNEA ON EXERTION): ICD-10-CM

## 2018-03-09 DIAGNOSIS — I48.20 CHRONIC ATRIAL FIBRILLATION (HCC): ICD-10-CM

## 2018-03-09 LAB
INR BLD: 2.9
INR BLD: 3.9
PRO-BNP: 471 PG/ML (ref 0–124)
PROTIME: 34.8 SECONDS

## 2018-03-13 ENCOUNTER — HOSPITAL ENCOUNTER (OUTPATIENT)
Dept: NON INVASIVE DIAGNOSTICS | Age: 73
Discharge: HOME | End: 2018-03-13
Attending: INTERNAL MEDICINE | Admitting: INTERNAL MEDICINE

## 2018-03-13 DIAGNOSIS — R06.09 OTHER FORMS OF DYSPNEA: ICD-10-CM

## 2018-03-13 LAB
LEFT VENTRICULAR EJECTION FRACTION HIGH VALUE: 60 %
LEFT VENTRICULAR EJECTION FRACTION MODE: NORMAL
LV EF: 60 %
LVEF MODALITY: NORMAL

## 2018-04-01 ENCOUNTER — HOSPITAL ENCOUNTER (OUTPATIENT)
Dept: OTHER | Age: 73
Discharge: HOME | End: 2018-04-30
Attending: INTERNAL MEDICINE | Admitting: INTERNAL MEDICINE

## 2018-04-10 ENCOUNTER — OFFICE VISIT (OUTPATIENT)
Dept: INTERNAL MEDICINE CLINIC | Age: 73
End: 2018-04-10

## 2018-04-10 VITALS
WEIGHT: 280 LBS | BODY MASS INDEX: 47.8 KG/M2 | SYSTOLIC BLOOD PRESSURE: 136 MMHG | DIASTOLIC BLOOD PRESSURE: 84 MMHG | HEART RATE: 72 BPM | OXYGEN SATURATION: 96 % | HEIGHT: 64 IN

## 2018-04-10 DIAGNOSIS — R05.3 CHRONIC COUGH: ICD-10-CM

## 2018-04-10 DIAGNOSIS — R06.09 DOE (DYSPNEA ON EXERTION): Primary | ICD-10-CM

## 2018-04-10 PROCEDURE — 1123F ACP DISCUSS/DSCN MKR DOCD: CPT | Performed by: INTERNAL MEDICINE

## 2018-04-10 PROCEDURE — 99214 OFFICE O/P EST MOD 30 MIN: CPT | Performed by: INTERNAL MEDICINE

## 2018-04-10 PROCEDURE — G8427 DOCREV CUR MEDS BY ELIG CLIN: HCPCS | Performed by: INTERNAL MEDICINE

## 2018-04-10 PROCEDURE — 1090F PRES/ABSN URINE INCON ASSESS: CPT | Performed by: INTERNAL MEDICINE

## 2018-04-10 PROCEDURE — G8598 ASA/ANTIPLAT THER USED: HCPCS | Performed by: INTERNAL MEDICINE

## 2018-04-10 PROCEDURE — 3014F SCREEN MAMMO DOC REV: CPT | Performed by: INTERNAL MEDICINE

## 2018-04-10 PROCEDURE — 4040F PNEUMOC VAC/ADMIN/RCVD: CPT | Performed by: INTERNAL MEDICINE

## 2018-04-10 PROCEDURE — 1036F TOBACCO NON-USER: CPT | Performed by: INTERNAL MEDICINE

## 2018-04-10 PROCEDURE — G8417 CALC BMI ABV UP PARAM F/U: HCPCS | Performed by: INTERNAL MEDICINE

## 2018-04-10 PROCEDURE — G8399 PT W/DXA RESULTS DOCUMENT: HCPCS | Performed by: INTERNAL MEDICINE

## 2018-04-10 PROCEDURE — 3017F COLORECTAL CA SCREEN DOC REV: CPT | Performed by: INTERNAL MEDICINE

## 2018-04-10 RX ORDER — OMEPRAZOLE 20 MG/1
20 CAPSULE, DELAYED RELEASE ORAL DAILY
Qty: 30 CAPSULE | Refills: 3 | Status: SHIPPED | OUTPATIENT
Start: 2018-04-10 | End: 2018-05-07 | Stop reason: SDUPTHER

## 2018-04-19 ENCOUNTER — HOSPITAL ENCOUNTER (OUTPATIENT)
Dept: NON INVASIVE DIAGNOSTICS | Age: 73
Discharge: HOME | End: 2018-04-19
Attending: INTERNAL MEDICINE | Admitting: INTERNAL MEDICINE

## 2018-04-19 ENCOUNTER — TELEPHONE (OUTPATIENT)
Dept: CARDIOLOGY CLINIC | Age: 73
End: 2018-04-19

## 2018-04-19 DIAGNOSIS — R06.09 OTHER FORMS OF DYSPNEA: ICD-10-CM

## 2018-04-19 DIAGNOSIS — R94.39 ABNORMAL CARDIOVASCULAR STRESS TEST: Primary | ICD-10-CM

## 2018-04-19 LAB
LV EF: 69 %
LVEF MODALITY: NORMAL

## 2018-04-20 ENCOUNTER — ANTI-COAG VISIT (OUTPATIENT)
Dept: PHARMACY | Age: 73
End: 2018-04-20

## 2018-04-20 DIAGNOSIS — I48.20 CHRONIC ATRIAL FIBRILLATION (HCC): ICD-10-CM

## 2018-04-20 LAB
INR BLD: 2.5
PROTIME: 30.4 SECONDS

## 2018-05-01 ENCOUNTER — HOSPITAL ENCOUNTER (OUTPATIENT)
Dept: OTHER | Age: 73
Discharge: HOME | End: 2018-05-31
Attending: INTERNAL MEDICINE | Admitting: INTERNAL MEDICINE

## 2018-05-03 RX ORDER — ATORVASTATIN CALCIUM 80 MG/1
TABLET, FILM COATED ORAL
Qty: 90 TABLET | Refills: 1 | Status: SHIPPED | OUTPATIENT
Start: 2018-05-03 | End: 2018-12-25 | Stop reason: SDUPTHER

## 2018-05-07 ENCOUNTER — OFFICE VISIT (OUTPATIENT)
Dept: INTERNAL MEDICINE CLINIC | Age: 73
End: 2018-05-07

## 2018-05-07 ENCOUNTER — ANTI-COAG VISIT (OUTPATIENT)
Dept: PHARMACY | Age: 73
End: 2018-05-07

## 2018-05-07 VITALS
DIASTOLIC BLOOD PRESSURE: 82 MMHG | HEIGHT: 64 IN | WEIGHT: 279.8 LBS | BODY MASS INDEX: 47.77 KG/M2 | SYSTOLIC BLOOD PRESSURE: 132 MMHG | HEART RATE: 68 BPM

## 2018-05-07 DIAGNOSIS — E66.01 MORBID OBESITY WITH BMI OF 45.0-49.9, ADULT (HCC): ICD-10-CM

## 2018-05-07 DIAGNOSIS — R05.3 CHRONIC COUGH: ICD-10-CM

## 2018-05-07 DIAGNOSIS — I48.20 CHRONIC ATRIAL FIBRILLATION (HCC): ICD-10-CM

## 2018-05-07 DIAGNOSIS — J45.40 MODERATE PERSISTENT ASTHMA WITHOUT COMPLICATION: ICD-10-CM

## 2018-05-07 DIAGNOSIS — R06.09 DOE (DYSPNEA ON EXERTION): Primary | ICD-10-CM

## 2018-05-07 DIAGNOSIS — F41.9 ANXIETY: ICD-10-CM

## 2018-05-07 DIAGNOSIS — Z98.84 STATUS POST GASTRIC BANDING: ICD-10-CM

## 2018-05-07 LAB
INR BLD: 1.5
PROTIME: 18.4 SECONDS

## 2018-05-07 PROCEDURE — G8428 CUR MEDS NOT DOCUMENT: HCPCS | Performed by: INTERNAL MEDICINE

## 2018-05-07 PROCEDURE — 99214 OFFICE O/P EST MOD 30 MIN: CPT | Performed by: INTERNAL MEDICINE

## 2018-05-07 PROCEDURE — G8598 ASA/ANTIPLAT THER USED: HCPCS | Performed by: INTERNAL MEDICINE

## 2018-05-07 PROCEDURE — 4040F PNEUMOC VAC/ADMIN/RCVD: CPT | Performed by: INTERNAL MEDICINE

## 2018-05-07 PROCEDURE — 1123F ACP DISCUSS/DSCN MKR DOCD: CPT | Performed by: INTERNAL MEDICINE

## 2018-05-07 PROCEDURE — 1090F PRES/ABSN URINE INCON ASSESS: CPT | Performed by: INTERNAL MEDICINE

## 2018-05-07 PROCEDURE — 1036F TOBACCO NON-USER: CPT | Performed by: INTERNAL MEDICINE

## 2018-05-07 PROCEDURE — 3017F COLORECTAL CA SCREEN DOC REV: CPT | Performed by: INTERNAL MEDICINE

## 2018-05-07 PROCEDURE — G8417 CALC BMI ABV UP PARAM F/U: HCPCS | Performed by: INTERNAL MEDICINE

## 2018-05-07 PROCEDURE — G8399 PT W/DXA RESULTS DOCUMENT: HCPCS | Performed by: INTERNAL MEDICINE

## 2018-05-07 RX ORDER — OMEPRAZOLE 20 MG/1
20 CAPSULE, DELAYED RELEASE ORAL DAILY
Qty: 90 CAPSULE | Refills: 1 | Status: SHIPPED | OUTPATIENT
Start: 2018-05-07 | End: 2018-05-08 | Stop reason: CLARIF

## 2018-05-07 RX ORDER — CLONAZEPAM 1 MG/1
1 TABLET ORAL EVERY EVENING
Qty: 90 TABLET | Refills: 0 | Status: SHIPPED | OUTPATIENT
Start: 2018-05-07 | End: 2018-08-03 | Stop reason: SDUPTHER

## 2018-05-08 RX ORDER — PANTOPRAZOLE SODIUM 40 MG/1
40 TABLET, DELAYED RELEASE ORAL DAILY
COMMUNITY
End: 2018-05-08 | Stop reason: SDUPTHER

## 2018-05-08 RX ORDER — PANTOPRAZOLE SODIUM 40 MG/1
40 TABLET, DELAYED RELEASE ORAL DAILY
Qty: 90 TABLET | Refills: 1 | Status: SHIPPED | OUTPATIENT
Start: 2018-05-08 | End: 2018-09-12 | Stop reason: SDUPTHER

## 2018-05-10 LAB
6-ACETYLMORPHINE: NOT DETECTED
7-AMINOCLONAZEPAM: PRESENT
ALPHA-OH-ALPRAZOLAM: NOT DETECTED
ALPRAZOLAM: NOT DETECTED
AMPHETAMINE: NOT DETECTED
BARBITURATES: NOT DETECTED
BENZOYLECGONINE: NOT DETECTED
BUPRENORPHINE: NOT DETECTED
CARISOPRODOL: NOT DETECTED
CLONAZEPAM: NOT DETECTED
CODEINE: NOT DETECTED
CREATININE URINE: 126.2 MG/DL (ref 20–400)
DIAZEPAM: NOT DETECTED
DRUGS EXPECTED: NORMAL
EER PAIN MGT DRUG PANEL, HIGH RES/EMIT U: NORMAL
ETHYL GLUCURONIDE: NOT DETECTED
FENTANYL: NOT DETECTED
HYDROCODONE: NOT DETECTED
HYDROMORPHONE: NOT DETECTED
LORAZEPAM: NOT DETECTED
MARIJUANA METABOLITE: NOT DETECTED
MDA: NOT DETECTED
MDEA: NOT DETECTED
MDMA URINE: NOT DETECTED
MEPERIDINE: NOT DETECTED
METHADONE: NOT DETECTED
METHAMPHETAMINE: NOT DETECTED
METHYLPHENIDATE: NOT DETECTED
MIDAZOLAM: NOT DETECTED
MORPHINE: NOT DETECTED
NORBUPRENORPHINE, FREE: NOT DETECTED
NORDIAZEPAM: NOT DETECTED
NORFENTANYL: NOT DETECTED
NORHYDROCODONE, URINE: NOT DETECTED
NOROXYCODONE: NOT DETECTED
NOROXYMORPHONE, URINE: NOT DETECTED
OXAZEPAM: NOT DETECTED
OXYCODONE: NOT DETECTED
OXYMORPHONE: NOT DETECTED
PAIN MANAGEMENT DRUG PANEL: NORMAL
PAIN MANAGEMENT DRUG PANEL: NORMAL
PCP: NOT DETECTED
PHENTERMINE: NOT DETECTED
PROPOXYPHENE: NOT DETECTED
TAPENTADOL, URINE: NOT DETECTED
TAPENTADOL-O-SULFATE, URINE: NOT DETECTED
TEMAZEPAM: NOT DETECTED
TRAMADOL: NOT DETECTED
ZOLPIDEM: NOT DETECTED

## 2018-05-21 ENCOUNTER — ANTI-COAG VISIT (OUTPATIENT)
Dept: PHARMACY | Age: 73
End: 2018-05-21

## 2018-05-21 DIAGNOSIS — I48.20 CHRONIC ATRIAL FIBRILLATION (HCC): ICD-10-CM

## 2018-05-21 LAB
INR BLD: 2.8
PROTIME: 33.2 SECONDS

## 2018-06-01 ENCOUNTER — HOSPITAL ENCOUNTER (OUTPATIENT)
Dept: OTHER | Age: 73
Discharge: HOME | End: 2018-06-30
Attending: INTERNAL MEDICINE | Admitting: INTERNAL MEDICINE

## 2018-06-05 ENCOUNTER — HOSPITAL ENCOUNTER (OUTPATIENT)
Dept: CARDIAC REHAB | Age: 73
Discharge: HOME | End: 2018-06-08
Attending: INTERNAL MEDICINE | Admitting: INTERNAL MEDICINE

## 2018-06-18 RX ORDER — BUDESONIDE AND FORMOTEROL FUMARATE DIHYDRATE 160; 4.5 UG/1; UG/1
AEROSOL RESPIRATORY (INHALATION)
Qty: 1 INHALER | Refills: 2 | Status: SHIPPED | OUTPATIENT
Start: 2018-06-18 | End: 2019-03-15 | Stop reason: SDUPTHER

## 2018-06-21 ENCOUNTER — OFFICE VISIT (OUTPATIENT)
Dept: BARIATRICS/WEIGHT MGMT | Age: 73
End: 2018-06-21

## 2018-06-21 VITALS
BODY MASS INDEX: 48.06 KG/M2 | WEIGHT: 281.5 LBS | HEART RATE: 73 BPM | SYSTOLIC BLOOD PRESSURE: 132 MMHG | HEIGHT: 64 IN | DIASTOLIC BLOOD PRESSURE: 84 MMHG

## 2018-06-21 DIAGNOSIS — E66.01 MORBID OBESITY WITH BMI OF 45.0-49.9, ADULT (HCC): Primary | ICD-10-CM

## 2018-06-21 DIAGNOSIS — G47.33 OBSTRUCTIVE SLEEP APNEA: ICD-10-CM

## 2018-06-21 DIAGNOSIS — Z98.84 STATUS POST GASTRIC BANDING: ICD-10-CM

## 2018-06-21 DIAGNOSIS — E78.2 MIXED HYPERLIPIDEMIA: ICD-10-CM

## 2018-06-21 DIAGNOSIS — I10 ESSENTIAL HYPERTENSION: ICD-10-CM

## 2018-06-21 DIAGNOSIS — Z71.3 DIETARY COUNSELING AND SURVEILLANCE: ICD-10-CM

## 2018-06-21 PROCEDURE — 99204 OFFICE O/P NEW MOD 45 MIN: CPT | Performed by: FAMILY MEDICINE

## 2018-06-21 PROCEDURE — G8598 ASA/ANTIPLAT THER USED: HCPCS | Performed by: FAMILY MEDICINE

## 2018-06-21 PROCEDURE — G8417 CALC BMI ABV UP PARAM F/U: HCPCS | Performed by: FAMILY MEDICINE

## 2018-06-21 PROCEDURE — 3017F COLORECTAL CA SCREEN DOC REV: CPT | Performed by: FAMILY MEDICINE

## 2018-06-21 PROCEDURE — G8427 DOCREV CUR MEDS BY ELIG CLIN: HCPCS | Performed by: FAMILY MEDICINE

## 2018-06-21 PROCEDURE — 1036F TOBACCO NON-USER: CPT | Performed by: FAMILY MEDICINE

## 2018-06-21 PROCEDURE — 4040F PNEUMOC VAC/ADMIN/RCVD: CPT | Performed by: FAMILY MEDICINE

## 2018-06-21 PROCEDURE — G8399 PT W/DXA RESULTS DOCUMENT: HCPCS | Performed by: FAMILY MEDICINE

## 2018-06-21 PROCEDURE — 1090F PRES/ABSN URINE INCON ASSESS: CPT | Performed by: FAMILY MEDICINE

## 2018-06-21 PROCEDURE — 1123F ACP DISCUSS/DSCN MKR DOCD: CPT | Performed by: FAMILY MEDICINE

## 2018-06-21 ASSESSMENT — ENCOUNTER SYMPTOMS
EYES NEGATIVE: 1
RESPIRATORY NEGATIVE: 1
GASTROINTESTINAL NEGATIVE: 1

## 2018-06-27 ENCOUNTER — TELEPHONE (OUTPATIENT)
Dept: INTERNAL MEDICINE CLINIC | Age: 73
End: 2018-06-27

## 2018-06-27 DIAGNOSIS — I48.20 CHRONIC ATRIAL FIBRILLATION (HCC): Primary | ICD-10-CM

## 2018-07-01 ENCOUNTER — HOSPITAL ENCOUNTER (OUTPATIENT)
Dept: OTHER | Age: 73
Discharge: HOME | End: 2018-07-31
Attending: INTERNAL MEDICINE | Admitting: INTERNAL MEDICINE

## 2018-07-02 ENCOUNTER — OFFICE VISIT (OUTPATIENT)
Dept: CARDIOLOGY CLINIC | Age: 73
End: 2018-07-02

## 2018-07-02 VITALS
SYSTOLIC BLOOD PRESSURE: 126 MMHG | DIASTOLIC BLOOD PRESSURE: 80 MMHG | OXYGEN SATURATION: 95 % | BODY MASS INDEX: 47.34 KG/M2 | WEIGHT: 277.3 LBS | HEART RATE: 63 BPM | HEIGHT: 64 IN

## 2018-07-02 DIAGNOSIS — I10 ESSENTIAL HYPERTENSION: ICD-10-CM

## 2018-07-02 DIAGNOSIS — I48.91 ATRIAL FIBRILLATION, UNSPECIFIED TYPE (HCC): Primary | ICD-10-CM

## 2018-07-02 DIAGNOSIS — E78.2 MIXED HYPERLIPIDEMIA: ICD-10-CM

## 2018-07-02 DIAGNOSIS — Z86.711 HISTORY OF PULMONARY EMBOLISM: Chronic | ICD-10-CM

## 2018-07-02 PROCEDURE — 1036F TOBACCO NON-USER: CPT | Performed by: NURSE PRACTITIONER

## 2018-07-02 PROCEDURE — 4040F PNEUMOC VAC/ADMIN/RCVD: CPT | Performed by: NURSE PRACTITIONER

## 2018-07-02 PROCEDURE — 93000 ELECTROCARDIOGRAM COMPLETE: CPT | Performed by: NURSE PRACTITIONER

## 2018-07-02 PROCEDURE — 3017F COLORECTAL CA SCREEN DOC REV: CPT | Performed by: NURSE PRACTITIONER

## 2018-07-02 PROCEDURE — G8417 CALC BMI ABV UP PARAM F/U: HCPCS | Performed by: NURSE PRACTITIONER

## 2018-07-02 PROCEDURE — 99214 OFFICE O/P EST MOD 30 MIN: CPT | Performed by: NURSE PRACTITIONER

## 2018-07-02 PROCEDURE — G8427 DOCREV CUR MEDS BY ELIG CLIN: HCPCS | Performed by: NURSE PRACTITIONER

## 2018-07-02 PROCEDURE — 1123F ACP DISCUSS/DSCN MKR DOCD: CPT | Performed by: NURSE PRACTITIONER

## 2018-07-02 PROCEDURE — G8598 ASA/ANTIPLAT THER USED: HCPCS | Performed by: NURSE PRACTITIONER

## 2018-07-02 PROCEDURE — G8399 PT W/DXA RESULTS DOCUMENT: HCPCS | Performed by: NURSE PRACTITIONER

## 2018-07-02 PROCEDURE — 1090F PRES/ABSN URINE INCON ASSESS: CPT | Performed by: NURSE PRACTITIONER

## 2018-07-03 ENCOUNTER — ANTI-COAG VISIT (OUTPATIENT)
Dept: PHARMACY | Age: 73
End: 2018-07-03

## 2018-07-03 DIAGNOSIS — I48.91 ATRIAL FIBRILLATION, UNSPECIFIED TYPE (HCC): ICD-10-CM

## 2018-07-03 LAB
INR BLD: 2.5
PROTIME: 29.5 SECONDS

## 2018-07-03 NOTE — PROGRESS NOTES
[address]     Outpatient Follow Up Note    CHIEF COMPLAINT / HPI: Follow Up secondary to Atrial Fibrillation/ PE/ DVT/ and hyperlipidemia     Jeramy Yo is 68 y.o. female who presents today for a routine follow up  related to the above mentioned issues. Subjective:   Since the time of last office visit, the patient admits their symptoms have not changed. Jeramy Yo is 68 y.o. female who is here for routine follow up. She has a history of atrial fibrillation, hypertension, CAD, PE/DVT, and hyperlipidemia. She was originally seen by EP while in the hospital in December of 2011 after she was found to have atrial fibrillation post- lap band surgery. She states she wore a monitor years ago due to a fluttering sensation in her chest which did not show any abnormalities. CT cardiac angiography showed minimal stenosis. She is on Coumadin for her history of pulmonary emboli (INR monitored at [facility]) which was not associated with any reversible cause. She also has a [employer] filter. Cardionet recorder from 10/15/14-11/17/14 showed only 1% AF burden with average HR in AF 70 bpm (range  bpm). The longest episode was 1 hour 39 minutes, but most of the episodes lasted less than 1 minute. At today's office visit patient is doing well. Patient complains of one episode of fast heart rate during pulmonary rehab. Today's EKG atrial fibrillation, rate controlled 90. She denies any chest pain, palpitations, SOB, dizziness, or edema. With regard to medication therapy the patient has been compliant with prescribed regimen. They have tolerated therapy to date.      Past Medical History:   Diagnosis Date    Allergic     Anxiety 2/2/2017    Arthritis     Asthma     Back pain     Blood circulation, collateral     legs    Depression     GERD (gastroesophageal reflux disease)     Gout     Hypercholesteremia     Hypertension     Movement disorder     7 Calcium Carbonate-Vitamin D (CALCIUM + D PO) Take 650 mg by mouth daily.  therapeutic multivitamin-minerals (THERAGRAN-M) tablet Take 1 tablet by mouth daily.  Cranberry 500 MG CAPS Take 1,000 mg by mouth daily       fluticasone (FLONASE) 50 MCG/ACT nasal spray 1 spray by Nasal route daily. No current facility-administered medications for this visit. Facility-Administered Medications Ordered in Other Visits   Medication Dose Route Frequency Provider Last Rate Last Dose    magnesium hydroxide (MILK OF MAGNESIA) 400 MG/5ML suspension 30 mL  30 mL Oral Daily PRN Hermann Shelton MD        ondansetron [facility]) injection 4 mg  4 mg Intravenous Q6H PRN Hermann Shelton MD        acetaminophen (TYLENOL) tablet 650 mg  650 mg Oral Q4H PRN Hermann Shelton MD         REVIEW OF SYSTEMS:   CONSTITUTIONAL: No major weight gain or loss, fatigue, weakness, night sweats or fever. There's been no change in energy level, sleep pattern, or activity level. HEENT: No new vision difficulties or ringing in the ears. RESPIRATORY: No new SOB, PND, orthopnea or cough. CARDIOVASCULAR: See HPI  GI: No nausea, vomiting, diarrhea, constipation, abdominal pain or changes in bowel habits. : No urinary frequency, urgency, incontinence hematuria or dysuria. SKIN: No cyanosis or skin lesions. MUSCULOSKELETAL: No new muscle or joint pain. NEUROLOGICAL: No syncope or TIA-like symptoms. PSYCHIATRIC: No anxiety, pain, insomnia or depression    Objective:   PHYSICAL EXAM:        VITALS:  /80   Pulse 63   Ht 5' 4\" (1.626 m)   Wt 277 lb 4.8 oz (125.8 kg)   SpO2 95%   BMI 47.60 kg/m²     CONSTITUTIONAL: Cooperative, no apparent distress, and appears well nourished / developed  NEUROLOGIC:  Awake and orientated to person, place and time. PSYCH: Calm affect. SKIN: Warm and dry.   HEENT: Sclera non-icteric, normocephalic, neck supple, no elevation of JVP, normal carotid pulses with no Normal              LAD     Normal              Cx        Normal              RCA     Normal              LVG     Not performed - aorta very tortuous              EDP     Not obtained - aorta very tortuous  Intervention:  None  Recs:              Continued aggressive medical tx and risk factor modification    Last stress test: 18  Myocardial perfusion imaging with small area of decreased uptake in the  anteroapical wall with stress with redistribution at rest consistent with  ischemia. Last ECHO: 3/13/18   Normal left ventricle size, wall thickness and systolic function with an   estimated ejection fraction of 60%. Last EC18 atrial fibrillation heart rate 90- reviewed by me     Assessment:     Assessment:   1. Atrial Fibrillation:  Onset . Event Monitor from 10/2014> Afib for 1% of the monitoring period. Longest period 1 hr 39 minutes. Average HR 70 bpm ( bpm)  18:EKG:  atrial fibrillation, rate controlled   on Verapamil and Coumadin  Plan: continue current medications    2. Hypertension        Today's B/P- 126/80         Stable, continue current medications    3. Coronary Artery Disease      18 Last cardiac angiogram showed normal coronaries and normal LV function       Patient denies any anginal symptoms      Plan: continue treatment plan of care     4. History of DVT/ PE:       Has IVC filter, on coumadin     5. Hyperlipidemia      On Lipitor 80 mg daily      17: HDL: 74, LDL: 77    Patient is stable since office visit. Plan:   Continue current medications  Follow up in three months     I have addressed the patient's cardiac risk factors and adjusted pharmacologic treatment as needed. In addition, I have reinforced the need for patient directed risk factor modification. Further evaluation will be based upon the patient's clinical course and testing results. All questions and concerns were addressed to the patient/family.  Alternatives to  treatment were

## 2018-07-19 ENCOUNTER — TELEPHONE (OUTPATIENT)
Dept: INTERNAL MEDICINE CLINIC | Age: 73
End: 2018-07-19

## 2018-07-19 ENCOUNTER — OFFICE VISIT (OUTPATIENT)
Dept: BARIATRICS/WEIGHT MGMT | Age: 73
End: 2018-07-19

## 2018-07-19 VITALS
HEART RATE: 68 BPM | RESPIRATION RATE: 16 BRPM | DIASTOLIC BLOOD PRESSURE: 70 MMHG | HEIGHT: 64 IN | BODY MASS INDEX: 46.95 KG/M2 | SYSTOLIC BLOOD PRESSURE: 120 MMHG | WEIGHT: 275 LBS

## 2018-07-19 DIAGNOSIS — E78.00 HYPERCHOLESTEROLEMIA: ICD-10-CM

## 2018-07-19 DIAGNOSIS — I48.91 ATRIAL FIBRILLATION, UNSPECIFIED TYPE (HCC): ICD-10-CM

## 2018-07-19 DIAGNOSIS — Z12.39 SCREENING FOR BREAST CANCER: ICD-10-CM

## 2018-07-19 DIAGNOSIS — I10 ESSENTIAL HYPERTENSION: Primary | ICD-10-CM

## 2018-07-19 DIAGNOSIS — Z98.84 H/O LAPAROSCOPIC ADJUSTABLE GASTRIC BANDING: ICD-10-CM

## 2018-07-19 DIAGNOSIS — E66.01 MORBID OBESITY WITH BMI OF 45.0-49.9, ADULT (HCC): Primary | ICD-10-CM

## 2018-07-19 PROCEDURE — G8399 PT W/DXA RESULTS DOCUMENT: HCPCS | Performed by: NURSE PRACTITIONER

## 2018-07-19 PROCEDURE — 1123F ACP DISCUSS/DSCN MKR DOCD: CPT | Performed by: NURSE PRACTITIONER

## 2018-07-19 PROCEDURE — G8427 DOCREV CUR MEDS BY ELIG CLIN: HCPCS | Performed by: NURSE PRACTITIONER

## 2018-07-19 PROCEDURE — 1101F PT FALLS ASSESS-DOCD LE1/YR: CPT | Performed by: NURSE PRACTITIONER

## 2018-07-19 PROCEDURE — 99213 OFFICE O/P EST LOW 20 MIN: CPT | Performed by: NURSE PRACTITIONER

## 2018-07-19 PROCEDURE — G8417 CALC BMI ABV UP PARAM F/U: HCPCS | Performed by: NURSE PRACTITIONER

## 2018-07-19 PROCEDURE — G8598 ASA/ANTIPLAT THER USED: HCPCS | Performed by: NURSE PRACTITIONER

## 2018-07-19 PROCEDURE — 1090F PRES/ABSN URINE INCON ASSESS: CPT | Performed by: NURSE PRACTITIONER

## 2018-07-19 PROCEDURE — 4040F PNEUMOC VAC/ADMIN/RCVD: CPT | Performed by: NURSE PRACTITIONER

## 2018-07-19 PROCEDURE — 3017F COLORECTAL CA SCREEN DOC REV: CPT | Performed by: NURSE PRACTITIONER

## 2018-07-19 PROCEDURE — 1036F TOBACCO NON-USER: CPT | Performed by: NURSE PRACTITIONER

## 2018-07-19 ASSESSMENT — ENCOUNTER SYMPTOMS
RESPIRATORY NEGATIVE: 1
EYES NEGATIVE: 1
GASTROINTESTINAL NEGATIVE: 1

## 2018-07-19 NOTE — PROGRESS NOTES
Linnette Loss lost 6.5 lbs over 1 month    Current treatment plan:   Patient is not on medication. Negative side effects from medications? no  Patient is not on Optifast.   Patient is  on diet and exercise only plan. Treatment plan details: 1200 calorie, low carb    Is patient adhering to diet/meal plan: Mostly    Carbohydrate consumption: Eating 5115-3763 calories/day not tracking carbs specifically     Breakfast: 1 egg + 2 sausage links + 1 cup cantaloupe + coffee w/ 4 T creamer + 2 oz OJ     Lunch: 2 slices turkey bologna + cheese cube     Snack: Outshine fruit bar (60 calories)    Dinner: Spaghetti squash + [place] chili w/ 1/4 cup cheese + 2 T onion    Snack: Cantaloupe   Snack: When has leftover calories - 1/2 cup peanuts + NV protein bar     Amount per meal: ~1 cup    Following 30/30/30 rule: Eating in 20 minutes. Will take sips during meals      Supplements: MVI + Calcium + Vit C + cranberry capsule     Consuming at least 64oz of calorie free fluids? Yes water + decaf tea + crystal light - occasionally diet caffeine free pepsi     Participating in intentional exercise?  Yes Details: Pulmonary rehab 3 days/week for 60 minutes (has about 6-8 weeks left)    Plan/Goals:   - Continue current eating patterns including protein with all meals and snacks  - Continue tracking and measuring portions  - Continue current exercise routine and increase as able    Handouts: none    [employer]

## 2018-07-19 NOTE — PATIENT INSTRUCTIONS
Key Low Carb, High Healthy Fat Dietary Points:    - Meats (preferably organic or grass fed) are great sources of protein and are low in carbohydrates. Vonita Mao with coconut, olive, avocado, or almond oils. - When buying dairy, choose regular or full fat options. - Choose vegetables that grow above ground as they are generally lower in carbohydrates. - Avoid bread, rice, potatoes, pasta and all sources of simple sugars (desserts, soda, breakfast cereals). - Choose beverages that are calorie and sugar free, such as water or flavored buitrago. - When eating fruit, choose berries as a snack option a few times a week. Patient received dietary handouts and education.     Plan/Goals:   - Continue current eating patterns including protein with all meals and snacks  - Continue tracking and measuring portions  - Continue current exercise routine and increase as able

## 2018-07-19 NOTE — PROGRESS NOTES
[facility]    Patient: Madhavi Altamirano                      Encounter Date: 7/19/2018    YOB: 1945               Age: 68 y.o. Chief Complaint   Patient presents with    Obesity     2nd MWM, Diet Only, s/p band 12/28/11       /70   Pulse 68   Resp 16   Ht 5' 4.25\" (1.632 m)   Wt 275 lb (124.7 kg)   BMI 46.84 kg/m²     Body mass index is 46.84 kg/m². HPI: 68 y.o. female with a long-standing history of obesity presents today for follow-up. she has lost 6.5 pounds since her last visit . Current treatment includes 1200 calorie LCHF diet and exercise. Met with the dietitian today. Plan reviewed in detail with the patient. She is doing very well tracking her calories, staying around 1220/day. Reports she tracked before starting the meal plan and was only getting in around 500 calories/day. Not tracking carbs specifically but being mindful of lower carb food choices. She is exercising with pulmonary rehab. She would like to wait to start the [facility] wellness program until after she completes pulmonary rehab. She denies any current issues with her band. She does report drinking a soda a few times a week, she will work to eliminate this. Diet: []LCHF/Ketogenic   [x]Modified low-calorie/low carb diet  []Low-calorie diet          []Maintenance       []Other:         Adherent? [x]Yes     []No     Side effects?  [] Yes   [x] No        Exercise: [x]Cardio     []Resistance/strength training     []Other: No intentional exercise, but trying to be physically active     Allergies   Allergen Reactions    Belsomra [Suvorexant]      Nightmares      Avelox [Moxifloxacin Hcl In Nacl] Rash    Levofloxacin Rash    Norco  [Hydrocodone-Acetaminophen] Rash    Sulfa Antibiotics Rash         Current Outpatient Prescriptions:     SYMBICORT 160-4.5 MCG/ACT AERO, INHALE TWO PUFFS BY MOUTH TWICE A DAY, Disp: 1 Inhaler, Rfl: 2    pantoprazole (PROTONIX) 40 MG tablet, Take 1 tablet by mouth daily, Disp: 90 tablet, Rfl: 1    clonazePAM (KLONOPIN) 1 MG tablet, Take 1 tablet by mouth every evening for 90 days. ., Disp: 90 tablet, Rfl: 0    atorvastatin (LIPITOR) 80 MG tablet, TAKE 1 TABLET BY MOUTH ONCE A DAY, Disp: 90 tablet, Rfl: 1    verapamil (CALAN SR) 240 MG extended release tablet, TAKE 1 TABLET BY MOUTH  NIGHTLY, Disp: 90 tablet, Rfl: 1    citalopram (CELEXA) 40 MG tablet, TAKE 1 TABLET BY MOUTH  DAILY, Disp: 90 tablet, Rfl: 1    albuterol sulfate  (90 Base) MCG/ACT inhaler, Inhale 2 puffs into the lungs every 4 hours as needed for Shortness of Breath, Disp: 1 Inhaler, Rfl: 3    montelukast (SINGULAIR) 10 MG tablet, Take 1 tablet by mouth nightly, Disp: 90 tablet, Rfl: 3    gabapentin (NEURONTIN) 300 MG capsule, TAKE 2 CAPSULES BY MOUTH 3  TIMES DAILY, Disp: 540 capsule, Rfl: 3    indapamide (LOZOL) 1.25 MG tablet, TAKE 1 TABLET BY MOUTH  EVERY MORNING, Disp: 90 tablet, Rfl: 3    allopurinol (ZYLOPRIM) 300 MG tablet, TAKE 1 TABLET BY MOUTH  DAILY, Disp: 90 tablet, Rfl: 3    trimethoprim (TRIMPEX) 100 MG tablet, TAKE 1 TABLET BY MOUTH  EVERY 48 HOURS, Disp: 45 tablet, Rfl: 3    warfarin (COUMADIN) 5 MG tablet, TAKE 2 TABLETS BY MOUTH ON  MONDAY, WEDNESDAY, AND  FRIDAY; TAKE 1 AND 1/2  TABLETS ALL OTHER DAYS OF  THE WEEK, Disp: 156 tablet, Rfl: 3    albuterol (PROVENTIL) (2.5 MG/3ML) 0.083% nebulizer solution, Inhale 2.5 mg into the lungs as needed , Disp: , Rfl:     Docusate Sodium 100 MG TABS, Take 100 mg by mouth Daily , Disp: , Rfl:     Calcium Carbonate-Vitamin D (CALCIUM + D PO), Take 650 mg by mouth daily. , Disp: , Rfl:     therapeutic multivitamin-minerals (THERAGRAN-M) tablet, Take 1 tablet by mouth daily. , Disp: , Rfl:     Cranberry 500 MG CAPS, Take 1,000 mg by mouth daily , Disp: , Rfl:     fluticasone (FLONASE) 50 MCG/ACT nasal spray, 1 spray by Nasal route daily. , Disp: , Rfl:     Patient Active Problem List   Diagnosis    Essential hypertension    History of pulmonary embolism    Osteoarthritis of knee    PAULA (obstructive sleep apnea)    Asthma    Atrial fibrillation (HCC)    Status post gastric banding    Coronary artery disease    Palpitations    Morbid obesity with BMI of 40.0-44.9, adult (HCC)    Pulmonary embolism without acute cor pulmonale (HCC)    Hyperlipidemia, unspecified    Arthritis    Anxiety    ODELL (dyspnea on exertion)    Chronic cough       Review of Systems   Constitutional: Negative. HENT: Negative. Eyes: Negative. Respiratory: Negative. Cardiovascular: Negative. Gastrointestinal: Negative. Skin: Negative. Neurological: Negative. Physical Exam   Constitutional: She is oriented to person, place, and time. She appears well-developed and well-nourished. HENT:   Head: Normocephalic and atraumatic. Eyes: Pupils are equal, round, and reactive to light. Neck: Normal range of motion. Cardiovascular: Normal rate. Pulmonary/Chest: Effort normal.   Musculoskeletal: Normal range of motion. Neurological: She is alert and oriented to person, place, and time. Psychiatric: She has a normal mood and affect. Her behavior is normal. Judgment and thought content normal.       Anti-coag visit on 07/03/2018   Component Date Value Ref Range Status    INR 07/03/2018 2.5   Final    Protime 07/03/2018 29.5  seconds Final         Assessment and Plan:    ICD-10-CM ICD-9-CM    1. Morbid obesity with BMI of 45.0-49.9, adult [facility] E66.01 278.01 Improving. Reinforced meal plan and dietician's plan. Work on eliminating soda. Plan to start [facility] after completion of pulmonary rehab. Continue tracking intake. Follow up in 4-6 weeks    Z68.42 V85.42    2. H/O laparoscopic adjustable gastric banding Z98.84 V45.86 Avoid all carbonated drinks. Choose foods that are sugar free.  Eat small, but frequent meals including a protein source with each meal. Eat meals over 30 minutes, taking small bites and chewing

## 2018-07-31 ENCOUNTER — TELEPHONE (OUTPATIENT)
Dept: INTERNAL MEDICINE CLINIC | Age: 73
End: 2018-07-31

## 2018-07-31 DIAGNOSIS — R92.8 ABNORMAL MAMMOGRAM: Primary | ICD-10-CM

## 2018-07-31 NOTE — TELEPHONE ENCOUNTER
Patient will need follow up imaging of the right breast pain son her mammogram findings. I have placed orders, and I will notify her via [facility].

## 2018-08-01 ENCOUNTER — HOSPITAL ENCOUNTER (OUTPATIENT)
Dept: OTHER | Age: 73
Discharge: HOME | End: 2018-08-31
Attending: INTERNAL MEDICINE | Admitting: INTERNAL MEDICINE

## 2018-08-01 ENCOUNTER — TELEPHONE (OUTPATIENT)
Dept: CARDIOLOGY CLINIC | Age: 73
End: 2018-08-01

## 2018-08-01 NOTE — LETTER
[address] [place] [address] Cardiology [facility]  [address]  [address] [address]  Phone: [phone]  Fax: [phone]    Ritchie Parks MD        August 2, 2018     Patient: France Powers   YOB: 1945   Date of Visit: 8/1/2018       To Whom It May Concern: It is my medical opinion that Ruma Lai, may hold the Coumadin,  for no more than three days. If you have any questions or concerns, please don't hesitate to call.     Sincerely,      Ritchie Parks MD

## 2018-08-03 ENCOUNTER — ANTI-COAG VISIT (OUTPATIENT)
Dept: PHARMACY | Age: 73
End: 2018-08-03

## 2018-08-03 ENCOUNTER — OFFICE VISIT (OUTPATIENT)
Dept: INTERNAL MEDICINE CLINIC | Age: 73
End: 2018-08-03

## 2018-08-03 VITALS
BODY MASS INDEX: 46.61 KG/M2 | WEIGHT: 273 LBS | DIASTOLIC BLOOD PRESSURE: 84 MMHG | HEIGHT: 64 IN | HEART RATE: 92 BPM | OXYGEN SATURATION: 96 % | SYSTOLIC BLOOD PRESSURE: 130 MMHG

## 2018-08-03 DIAGNOSIS — I48.20 CHRONIC ATRIAL FIBRILLATION (HCC): Primary | ICD-10-CM

## 2018-08-03 DIAGNOSIS — I10 ESSENTIAL HYPERTENSION: ICD-10-CM

## 2018-08-03 DIAGNOSIS — I48.91 ATRIAL FIBRILLATION, UNSPECIFIED TYPE (HCC): ICD-10-CM

## 2018-08-03 DIAGNOSIS — F41.9 ANXIETY: ICD-10-CM

## 2018-08-03 DIAGNOSIS — J45.40 MODERATE PERSISTENT ASTHMA WITHOUT COMPLICATION: ICD-10-CM

## 2018-08-03 DIAGNOSIS — E78.00 HYPERCHOLESTEROLEMIA: ICD-10-CM

## 2018-08-03 LAB
ALBUMIN SERPL-MCNC: 4.1 G/DL (ref 3.4–5)
ANION GAP SERPL CALCULATED.3IONS-SCNC: 14 MMOL/L (ref 3–16)
BUN BLDV-MCNC: 13 MG/DL (ref 7–20)
CALCIUM SERPL-MCNC: 10 MG/DL (ref 8.3–10.6)
CHLORIDE BLD-SCNC: 98 MMOL/L (ref 99–110)
CHOLESTEROL, FASTING: 157 MG/DL (ref 0–199)
CO2: 27 MMOL/L (ref 21–32)
CREAT SERPL-MCNC: 0.8 MG/DL (ref 0.6–1.2)
GFR AFRICAN AMERICAN: >60
GFR NON-AFRICAN AMERICAN: >60
GLUCOSE BLD-MCNC: 97 MG/DL (ref 70–99)
HCT VFR BLD CALC: 42.9 % (ref 36–48)
HDLC SERPL-MCNC: 65 MG/DL (ref 40–60)
HEMOGLOBIN: 14.5 G/DL (ref 12–16)
INR BLD: 2.6
LDL CHOLESTEROL CALCULATED: 77 MG/DL
MCH RBC QN AUTO: 32.1 PG (ref 26–34)
MCHC RBC AUTO-ENTMCNC: 33.7 G/DL (ref 31–36)
MCV RBC AUTO: 95.1 FL (ref 80–100)
PDW BLD-RTO: 15.2 % (ref 12.4–15.4)
PHOSPHORUS: 4 MG/DL (ref 2.5–4.9)
PLATELET # BLD: 281 K/UL (ref 135–450)
PMV BLD AUTO: 7.6 FL (ref 5–10.5)
POTASSIUM SERPL-SCNC: 3.9 MMOL/L (ref 3.5–5.1)
PROTIME: 31 SECONDS
RBC # BLD: 4.52 M/UL (ref 4–5.2)
SODIUM BLD-SCNC: 139 MMOL/L (ref 136–145)
TRIGLYCERIDE, FASTING: 76 MG/DL (ref 0–150)
VLDLC SERPL CALC-MCNC: 15 MG/DL
WBC # BLD: 6.3 K/UL (ref 4–11)

## 2018-08-03 PROCEDURE — G8598 ASA/ANTIPLAT THER USED: HCPCS | Performed by: INTERNAL MEDICINE

## 2018-08-03 PROCEDURE — 3017F COLORECTAL CA SCREEN DOC REV: CPT | Performed by: INTERNAL MEDICINE

## 2018-08-03 PROCEDURE — G8427 DOCREV CUR MEDS BY ELIG CLIN: HCPCS | Performed by: INTERNAL MEDICINE

## 2018-08-03 PROCEDURE — 4040F PNEUMOC VAC/ADMIN/RCVD: CPT | Performed by: INTERNAL MEDICINE

## 2018-08-03 PROCEDURE — 1101F PT FALLS ASSESS-DOCD LE1/YR: CPT | Performed by: INTERNAL MEDICINE

## 2018-08-03 PROCEDURE — 99214 OFFICE O/P EST MOD 30 MIN: CPT | Performed by: INTERNAL MEDICINE

## 2018-08-03 PROCEDURE — G8399 PT W/DXA RESULTS DOCUMENT: HCPCS | Performed by: INTERNAL MEDICINE

## 2018-08-03 PROCEDURE — 1036F TOBACCO NON-USER: CPT | Performed by: INTERNAL MEDICINE

## 2018-08-03 PROCEDURE — 1090F PRES/ABSN URINE INCON ASSESS: CPT | Performed by: INTERNAL MEDICINE

## 2018-08-03 PROCEDURE — G8417 CALC BMI ABV UP PARAM F/U: HCPCS | Performed by: INTERNAL MEDICINE

## 2018-08-03 PROCEDURE — 1123F ACP DISCUSS/DSCN MKR DOCD: CPT | Performed by: INTERNAL MEDICINE

## 2018-08-03 RX ORDER — ALBUTEROL SULFATE 90 UG/1
2 AEROSOL, METERED RESPIRATORY (INHALATION) EVERY 4 HOURS PRN
Qty: 1 INHALER | Refills: 3 | Status: SHIPPED | OUTPATIENT
Start: 2018-08-03 | End: 2020-02-10

## 2018-08-03 RX ORDER — CLONAZEPAM 1 MG/1
1 TABLET ORAL EVERY EVENING
Qty: 90 TABLET | Refills: 0 | Status: SHIPPED | OUTPATIENT
Start: 2018-08-07 | End: 2018-11-06 | Stop reason: SDUPTHER

## 2018-08-03 NOTE — PROGRESS NOTES
and Past Histories independently gathered by the clinical support staff and the remaining scribed note accurately describes my personal service to the patient.     Deena Melchor MD   8/3/18   11:22 AM

## 2018-08-16 RX ORDER — CITALOPRAM 40 MG/1
TABLET ORAL
Qty: 90 TABLET | Refills: 1 | Status: SHIPPED | OUTPATIENT
Start: 2018-08-16 | End: 2019-01-22 | Stop reason: SDUPTHER

## 2018-08-16 RX ORDER — VERAPAMIL HYDROCHLORIDE 240 MG/1
TABLET, FILM COATED, EXTENDED RELEASE ORAL
Qty: 90 TABLET | Refills: 1 | Status: SHIPPED | OUTPATIENT
Start: 2018-08-16 | End: 2019-01-22 | Stop reason: SDUPTHER

## 2018-08-17 ENCOUNTER — ANTI-COAG VISIT (OUTPATIENT)
Dept: PHARMACY | Age: 73
End: 2018-08-17

## 2018-08-17 DIAGNOSIS — I48.20 CHRONIC ATRIAL FIBRILLATION (HCC): ICD-10-CM

## 2018-08-17 LAB
INR BLD: 1.8
PROTIME: 22 SECONDS

## 2018-08-24 ENCOUNTER — OFFICE VISIT (OUTPATIENT)
Dept: BARIATRICS/WEIGHT MGMT | Age: 73
End: 2018-08-24

## 2018-08-24 VITALS
SYSTOLIC BLOOD PRESSURE: 136 MMHG | DIASTOLIC BLOOD PRESSURE: 89 MMHG | HEIGHT: 64 IN | HEART RATE: 83 BPM | WEIGHT: 270 LBS | BODY MASS INDEX: 46.1 KG/M2

## 2018-08-24 DIAGNOSIS — E66.01 MORBID OBESITY WITH BMI OF 45.0-49.9, ADULT (HCC): Primary | ICD-10-CM

## 2018-08-24 DIAGNOSIS — Z98.84 STATUS POST GASTRIC BANDING: ICD-10-CM

## 2018-08-24 PROCEDURE — 1101F PT FALLS ASSESS-DOCD LE1/YR: CPT | Performed by: NURSE PRACTITIONER

## 2018-08-24 PROCEDURE — 99213 OFFICE O/P EST LOW 20 MIN: CPT | Performed by: NURSE PRACTITIONER

## 2018-08-24 PROCEDURE — 4040F PNEUMOC VAC/ADMIN/RCVD: CPT | Performed by: NURSE PRACTITIONER

## 2018-08-24 PROCEDURE — G8417 CALC BMI ABV UP PARAM F/U: HCPCS | Performed by: NURSE PRACTITIONER

## 2018-08-24 PROCEDURE — 1036F TOBACCO NON-USER: CPT | Performed by: NURSE PRACTITIONER

## 2018-08-24 PROCEDURE — 3017F COLORECTAL CA SCREEN DOC REV: CPT | Performed by: NURSE PRACTITIONER

## 2018-08-24 PROCEDURE — G8598 ASA/ANTIPLAT THER USED: HCPCS | Performed by: NURSE PRACTITIONER

## 2018-08-24 PROCEDURE — 1090F PRES/ABSN URINE INCON ASSESS: CPT | Performed by: NURSE PRACTITIONER

## 2018-08-24 PROCEDURE — 1123F ACP DISCUSS/DSCN MKR DOCD: CPT | Performed by: NURSE PRACTITIONER

## 2018-08-24 PROCEDURE — G8399 PT W/DXA RESULTS DOCUMENT: HCPCS | Performed by: NURSE PRACTITIONER

## 2018-08-24 PROCEDURE — G8427 DOCREV CUR MEDS BY ELIG CLIN: HCPCS | Performed by: NURSE PRACTITIONER

## 2018-08-24 ASSESSMENT — ENCOUNTER SYMPTOMS
EYES NEGATIVE: 1
RESPIRATORY NEGATIVE: 1
GASTROINTESTINAL NEGATIVE: 1

## 2018-08-24 NOTE — PROGRESS NOTES
Willa Deutsch lost 5 lbs over past 5 weeks. Pt is s/p band 12/28/11. Current treatment plan:   Patient is not on medication. Negative side effects from medications? N/A  Patient is not on Optifast.   Patient is  on diet and exercise only plan. Treatment plan details: 1200 kcal LC     Is patient adhering to diet/meal plan?: mostly     Carbohydrate consumption:     Breakfast: perez, an egg, some fruit (canteloupe/watermelon) with small glass of juice (2-3 oz) and coffee    Snack: none    Lunch: 2 slices of turkey bologna and slice of cheese with fruit     Snack: none    Dinner: boneless skinless chicken breast with broccoli     Snack: 1/4 c peanuts and KIND bar     Fluids: juice, coffee with creamer, water, minute maid light lemonade (5 calorie), iced tea with sweetener, sometimes she will have 4 oz diet caffeine free pepsi    Consuming at least 64oz of calorie free fluids? 16 oz/water/day, drinking 96 oz total     Amount able to eat at a time? 1 cup/sitting    Following 30-30-30 Rule? Eats in 20 minutes, does not eat and drink at the same time    Intolerances: bread     Taking supplementation: Vit C, Calcium, MVI, cranberry capsule     Participating in intentional exercise?  Yes Details: pulmonary rehab 3 x week (today is the last day)     Plan/Goals: switch KIND bar to NV protein bar, [facility] at [facility], increase water intake and decrease liquid calories    Handouts: none     Beckie Pretty
embolism without acute cor pulmonale (HCC)    Hyperlipidemia, unspecified    Arthritis    Anxiety    ODELL (dyspnea on exertion)    Chronic cough       Review of Systems   Constitutional: Negative. HENT: Negative. Eyes: Negative. Respiratory: Negative. Cardiovascular: Negative. Gastrointestinal: Negative. Skin: Negative. Neurological: Negative. Physical Exam   Constitutional: She is oriented to person, place, and time. She appears well-developed and well-nourished. HENT:   Head: Normocephalic and atraumatic. Eyes: Pupils are equal, round, and reactive to light. Neck: Normal range of motion. Cardiovascular: Normal rate. Pulmonary/Chest: Effort normal.   Musculoskeletal: Normal range of motion. Neurological: She is alert and oriented to person, place, and time. Psychiatric: She has a normal mood and affect. Her behavior is normal. Judgment and thought content normal.       Anti-coag visit on 08/17/2018   Component Date Value Ref Range Status    INR 08/17/2018 1.8   Final    Protime 08/17/2018 22  seconds Final         Assessment and Plan:    ICD-10-CM ICD-9-CM    1. Morbid obesity with BMI of 45.0-49.9, adult ([facility] E66.01 278.01 Improving. Reinforced meal plan and dietician's plan. Eliminate liquid calories. Access will be provided today to the [facility] program.    Continue 1200 calorie LCHF meal plan    Follow up in 4-6 weeks    Z68.42 V85.42    2. Status post gastric banding Z98.84 V45.86 Avoid all carbonated drinks. Choose foods that are sugar free. Eat small, but frequent meals including a protein source with each meal. Eat meals over 30 minutes, taking small bites and chewing thoroughly. Take vitamins as directed.          Nutrition plan: [] LCHF/Ketogenic   [x] Modified low-calorie diet (low carb/low-jad)               [] Low-calorie diet    []Maintenance       []Other    Exercise: [x]Cardio     [x]Resistance/strength training                       []ACSM

## 2018-09-01 ENCOUNTER — HOSPITAL ENCOUNTER (OUTPATIENT)
Dept: OTHER | Age: 73
Discharge: HOME | End: 2018-09-02
Attending: INTERNAL MEDICINE | Admitting: INTERNAL MEDICINE

## 2018-09-01 ENCOUNTER — HOSPITAL ENCOUNTER (OUTPATIENT)
Dept: OTHER | Age: 73
Discharge: HOME | End: 2018-09-01
Attending: INTERNAL MEDICINE | Admitting: INTERNAL MEDICINE

## 2018-09-01 ENCOUNTER — TELEPHONE (OUTPATIENT)
Dept: INTERNAL MEDICINE CLINIC | Age: 73
End: 2018-09-01

## 2018-09-04 ENCOUNTER — TELEPHONE (OUTPATIENT)
Dept: PHARMACY | Age: 73
End: 2018-09-04

## 2018-09-07 ENCOUNTER — OFFICE VISIT (OUTPATIENT)
Dept: CARDIOLOGY CLINIC | Age: 73
End: 2018-09-07

## 2018-09-07 VITALS
WEIGHT: 269.12 LBS | DIASTOLIC BLOOD PRESSURE: 76 MMHG | HEIGHT: 64 IN | HEART RATE: 100 BPM | BODY MASS INDEX: 45.94 KG/M2 | SYSTOLIC BLOOD PRESSURE: 130 MMHG

## 2018-09-07 DIAGNOSIS — I48.0 PAROXYSMAL ATRIAL FIBRILLATION (HCC): Primary | ICD-10-CM

## 2018-09-07 DIAGNOSIS — I25.10 CORONARY ARTERY DISEASE INVOLVING NATIVE CORONARY ARTERY OF NATIVE HEART WITHOUT ANGINA PECTORIS: ICD-10-CM

## 2018-09-07 DIAGNOSIS — Z86.711 HISTORY OF PULMONARY EMBOLISM: Chronic | ICD-10-CM

## 2018-09-07 DIAGNOSIS — J45.40 MODERATE PERSISTENT ASTHMA WITHOUT COMPLICATION: ICD-10-CM

## 2018-09-07 DIAGNOSIS — I10 ESSENTIAL HYPERTENSION: ICD-10-CM

## 2018-09-07 LAB
INR BLD: 3.31 (ref 0.86–1.14)
PROTHROMBIN TIME: 37.7 SEC (ref 9.8–13)

## 2018-09-07 PROCEDURE — 93000 ELECTROCARDIOGRAM COMPLETE: CPT | Performed by: INTERNAL MEDICINE

## 2018-09-07 PROCEDURE — 1101F PT FALLS ASSESS-DOCD LE1/YR: CPT | Performed by: INTERNAL MEDICINE

## 2018-09-07 PROCEDURE — G8399 PT W/DXA RESULTS DOCUMENT: HCPCS | Performed by: INTERNAL MEDICINE

## 2018-09-07 PROCEDURE — G8417 CALC BMI ABV UP PARAM F/U: HCPCS | Performed by: INTERNAL MEDICINE

## 2018-09-07 PROCEDURE — 99214 OFFICE O/P EST MOD 30 MIN: CPT | Performed by: INTERNAL MEDICINE

## 2018-09-07 PROCEDURE — 1036F TOBACCO NON-USER: CPT | Performed by: INTERNAL MEDICINE

## 2018-09-07 PROCEDURE — 3017F COLORECTAL CA SCREEN DOC REV: CPT | Performed by: INTERNAL MEDICINE

## 2018-09-07 PROCEDURE — 1123F ACP DISCUSS/DSCN MKR DOCD: CPT | Performed by: INTERNAL MEDICINE

## 2018-09-07 PROCEDURE — 1090F PRES/ABSN URINE INCON ASSESS: CPT | Performed by: INTERNAL MEDICINE

## 2018-09-07 PROCEDURE — 4040F PNEUMOC VAC/ADMIN/RCVD: CPT | Performed by: INTERNAL MEDICINE

## 2018-09-07 PROCEDURE — G8427 DOCREV CUR MEDS BY ELIG CLIN: HCPCS | Performed by: INTERNAL MEDICINE

## 2018-09-07 PROCEDURE — G8598 ASA/ANTIPLAT THER USED: HCPCS | Performed by: INTERNAL MEDICINE

## 2018-09-07 NOTE — PROGRESS NOTES
[facility] ([facility]   Electrophysiology Follow up        Chief Complaint   Patient presents with    Atrial Fibrillation    Irregular Heart Beat      History of Present Illness:    Kristina Brennan is 68 y.o. female who is here for routine follow up. She has a history of paroxysmal atrial fibrillation, hypertension, CAD, PE/DVT, and hyperlipidemia. She was originally seen by EP while in the hospital in December of 2011 after she was found to have atrial fibrillation post- lap band surgery. She states she wore a monitor years ago due to a fluttering sensation in her chest which did not show any abnormalities. CT cardiac angiography showed minimal stenosis. She is on Coumadin for her history of pulmonary emboli ([facility]) which was not associated with any reversible cause. She also has a Gavin filter. Cardionet recorder from 10/15/14-11/17/14 showed only 1% AF burden with average HR in AF 70 bpm (range  bpm). The longest episode was 1 hour 39 minutes, but most of the episodes lasted less than 1 minute. Today, she states she has been short of breath for a long time. She went through pulmonary rehab and was seen by Hannah Jones NP. She underwent angiogram 5/1/18 which was negative for myocardial ischemia. She is in chronic atrial fib and states she is unaware of palpitations or irregular heartbeat but she does notice increased SOB now. She continues taking Verapamil  mg daily and Coumadin. Most recent INR 1.8 (8/2018). Will check INR today. She denies chest discomfort, dizziness, or syncope. Past Medical History:   has a past medical history of Allergic; Anxiety; Arthritis; Asthma; Back pain; Blood circulation, collateral; Depression; GERD (gastroesophageal reflux disease); Gout; Hypercholesteremia; Hypertension; Movement disorder; Movement disorder; Obesity; Pulmonary emboli ([address]  Unspecified sleep apnea; Urinary tract infection, chronic; and UTI gabapentin (NEURONTIN) 300 MG capsule TAKE 2 CAPSULES BY MOUTH 3  TIMES DAILY 540 capsule 3    indapamide (LOZOL) 1.25 MG tablet TAKE 1 TABLET BY MOUTH  EVERY MORNING 90 tablet 3    allopurinol (ZYLOPRIM) 300 MG tablet TAKE 1 TABLET BY MOUTH  DAILY 90 tablet 3    trimethoprim (TRIMPEX) 100 MG tablet TAKE 1 TABLET BY MOUTH  EVERY 48 HOURS 45 tablet 3    warfarin (COUMADIN) 5 MG tablet TAKE 2 TABLETS BY MOUTH ON  MONDAY, WEDNESDAY, AND  FRIDAY; TAKE 1 AND 1/2  TABLETS ALL OTHER DAYS OF  THE WEEK 156 tablet 3    albuterol (PROVENTIL) (2.5 MG/3ML) 0.083% nebulizer solution Inhale 2.5 mg into the lungs as needed       Docusate Sodium 100 MG TABS Take 100 mg by mouth Daily       Calcium Carbonate-Vitamin D (CALCIUM + D PO) Take 650 mg by mouth daily.  therapeutic multivitamin-minerals (THERAGRAN-M) tablet Take 1 tablet by mouth daily.  Cranberry 500 MG CAPS Take 1,000 mg by mouth daily       fluticasone (FLONASE) 50 MCG/ACT nasal spray 1 spray by Nasal route daily. Facility-Administered Encounter Medications as of 9/7/2018   Medication Dose Route Frequency Provider Last Rate Last Dose    magnesium hydroxide (MILK OF MAGNESIA) 400 MG/5ML suspension 30 mL  30 mL Oral Daily PRN Liz Santizo MD        ondansetron [facility]) injection 4 mg  4 mg Intravenous Q6H PRN Liz Santizo MD        acetaminophen (TYLENOL) tablet 650 mg  650 mg Oral Q4H PRN Liz Santizo MD         Allergies:  Belsomra [suvorexant]; Avelox [moxifloxacin hcl in nacl];  Levofloxacin; Norco  [hydrocodone-acetaminophen]; and Sulfa antibiotics     DATA:  Labs reviewed  Lab Results   Component Value Date    ALT 22 03/08/2018    AST 23 03/08/2018    ALKPHOS 72 03/08/2018    BILITOT 0.5 03/08/2018     Lab Results   Component Value Date    CREATININE 0.8 08/03/2018    BUN 13 08/03/2018     08/03/2018    K 3.9 08/03/2018    CL 98 (L) 08/03/2018    CO2 27 08/03/2018     Lab Results   Component Value

## 2018-09-07 NOTE — PATIENT INSTRUCTIONS
· You have symptoms of a stroke. These may include:  ¨ Sudden numbness, tingling, weakness, or loss of movement in your face, arm, or leg, especially on only one side of your body. ¨ Sudden vision changes. ¨ Sudden trouble speaking. ¨ Sudden confusion or trouble understanding simple statements. ¨ Sudden problems with walking or balance. ¨ A sudden, severe headache that is different from past headaches.     · You passed out (lost consciousness).    Call your doctor now or seek immediate medical care if:    · You have new or increased shortness of breath.     · You feel dizzy or lightheaded, or you feel like you may faint.     · Your heart rate becomes irregular.     · You can feel your heart flutter in your chest or skip heartbeats. Tell your doctor if these symptoms are new or worse.    Watch closely for changes in your health, and be sure to contact your doctor if you have any problems. Where can you learn more? Go to [email or web address] and sign in to your [employer] account. Enter U020 in the theScore box to learn more about \"Atrial Fibrillation: Care Instructions. \"     If you do not have an account, please click on the \"Sign Up Now\" link. Current as of: December 6, 2017  Content Version: 11.7  © 7258-8939 [employer]. Care instructions adapted under license by [facility] ([facility]). If you have questions about a medical condition or this instruction, always ask your healthcare professional. [place] [address] any warranty or liability for your use of this information.

## 2018-09-11 ENCOUNTER — TELEPHONE (OUTPATIENT)
Dept: PHARMACY | Age: 73
End: 2018-09-11

## 2018-09-11 NOTE — TELEPHONE ENCOUNTER
----- Message from Keira Angel sent at 9/11/2018 11:14 AM EDT -----  Contact: Alfredo Deshpande   Pt is having a cardioversion , Dr Sona Ely would like her INR to be 2.5 for 4 weeks in a row. She has an appt scheduled in the [facility] on 9/14. Pt stated MD had her go to lab for INR check on 9/7 since [facility] was booked and could not fit her in that day, INR was 3.31 ( was also just started on an abx at that time -see chart ) . Pt would like to know if warfarin dose should be adjusted , MD did not change dose on 9/7. Please all Alfredo Deshpande at .

## 2018-09-11 NOTE — TELEPHONE ENCOUNTER
Since INR checked last fri, and today Tuesday, and rechecking INR on Fri, will not adjust warfarin dose. Pt finished with augmentin last week (cause for inc INR). Still taking amoxicillin, finishing in 2-3 days. Returned call to patient, instructed to continue weekly dose. Will make adjustments on Friday if needed. Since plans to have CV, do not want to adjust and cause subtherapeutic INR.  Will adjust slowly

## 2018-09-14 ENCOUNTER — ANTI-COAG VISIT (OUTPATIENT)
Dept: PHARMACY | Age: 73
End: 2018-09-14

## 2018-09-14 DIAGNOSIS — I48.0 PAROXYSMAL ATRIAL FIBRILLATION (HCC): ICD-10-CM

## 2018-09-14 LAB
INR BLD: 3.4
PROTIME: 40.7 SECONDS

## 2018-09-14 NOTE — PROGRESS NOTES
Ms. Dariel Campbell is a 68 y.o. y/o female with history of DVT, PE, Afib   She presents today for anticoagulation monitoring and adjustment. Pertinent PMH: HTN, Gastric Banding,CAD, diskectomy with an artifical spinal disk implant in September 2012. Patient Reported Findings:  Yes     No  [x]   []       Patient verifies current dosing regimen as listed   []   [x]       S/S bleeding/bruising/swelling/SOB  []   [x]       Blood in urine or stool  [x]   []       Procedures scheduled in the future at this time Had a breast biopsy 8/9, results were good, no cancer  Is planning to have CV once 4 weeks of therapeutic INR (2/4 today)  []   [x]       Missed Dose  []   [x]       Extra Dose  [x]   []       Change in medications finished abx today, was on augmentin then switched to amoxicillin. Likely contributing to slight inc in INR   []   [x]       Change in health/diet/appetite    []   [x]       Change in alcohol use  []   [x]       Change in activity  []   [x]       Hospital admission  []   [x]       Emergency department visit  []   [x]       Other complaints    Clinical Outcomes:  Yes     No  []   [x]       Major bleeding event  []   [x]       Thromboembolic event    Duration of warfarin Therapy: indefinite  INR Range:  2.0-3.0     INR is 3.4 today   Take 7.5 mg tonight then continue same weekly dose of 10mg Fri and 7.5mg on all other days. Encouraged to maintain a consistency of vegetables/salads.   Recheck INR 1 week on 9/21    Send to Dr. Kirstin dAams     Referring PCP is Keren Jain  INR (no units)   Date Value   09/14/2018 3.4   09/07/2018 3.31 (H)   08/17/2018 1.8

## 2018-09-21 ENCOUNTER — ANTI-COAG VISIT (OUTPATIENT)
Dept: PHARMACY | Age: 73
End: 2018-09-21

## 2018-09-21 DIAGNOSIS — I48.0 PAROXYSMAL ATRIAL FIBRILLATION (HCC): ICD-10-CM

## 2018-09-21 LAB
INR BLD: 2.8
PROTIME: 33 SECONDS

## 2018-09-28 ENCOUNTER — APPOINTMENT (OUTPATIENT)
Dept: PHARMACY | Age: 73
End: 2018-09-28
Payer: MEDICARE

## 2018-09-28 ENCOUNTER — OFFICE VISIT (OUTPATIENT)
Dept: BARIATRICS/WEIGHT MGMT | Age: 73
End: 2018-09-28
Payer: MEDICARE

## 2018-09-28 ENCOUNTER — ANTI-COAG VISIT (OUTPATIENT)
Dept: PHARMACY | Age: 73
End: 2018-09-28
Payer: MEDICARE

## 2018-09-28 VITALS
HEART RATE: 68 BPM | DIASTOLIC BLOOD PRESSURE: 85 MMHG | HEIGHT: 64 IN | BODY MASS INDEX: 45.79 KG/M2 | OXYGEN SATURATION: 97 % | WEIGHT: 268.2 LBS | SYSTOLIC BLOOD PRESSURE: 135 MMHG

## 2018-09-28 DIAGNOSIS — I48.0 PAROXYSMAL ATRIAL FIBRILLATION (HCC): ICD-10-CM

## 2018-09-28 DIAGNOSIS — E66.01 MORBID OBESITY WITH BMI OF 45.0-49.9, ADULT (HCC): Primary | ICD-10-CM

## 2018-09-28 DIAGNOSIS — Z98.84 STATUS POST GASTRIC BANDING: ICD-10-CM

## 2018-09-28 LAB — INTERNATIONAL NORMALIZATION RATIO, POC: 2.7

## 2018-09-28 PROCEDURE — 85610 PROTHROMBIN TIME: CPT

## 2018-09-28 PROCEDURE — 3017F COLORECTAL CA SCREEN DOC REV: CPT | Performed by: NURSE PRACTITIONER

## 2018-09-28 PROCEDURE — 4040F PNEUMOC VAC/ADMIN/RCVD: CPT | Performed by: NURSE PRACTITIONER

## 2018-09-28 PROCEDURE — G8417 CALC BMI ABV UP PARAM F/U: HCPCS | Performed by: NURSE PRACTITIONER

## 2018-09-28 PROCEDURE — 1101F PT FALLS ASSESS-DOCD LE1/YR: CPT | Performed by: NURSE PRACTITIONER

## 2018-09-28 PROCEDURE — 99213 OFFICE O/P EST LOW 20 MIN: CPT | Performed by: NURSE PRACTITIONER

## 2018-09-28 PROCEDURE — 1036F TOBACCO NON-USER: CPT | Performed by: NURSE PRACTITIONER

## 2018-09-28 PROCEDURE — 1090F PRES/ABSN URINE INCON ASSESS: CPT | Performed by: NURSE PRACTITIONER

## 2018-09-28 PROCEDURE — 1123F ACP DISCUSS/DSCN MKR DOCD: CPT | Performed by: NURSE PRACTITIONER

## 2018-09-28 PROCEDURE — G8399 PT W/DXA RESULTS DOCUMENT: HCPCS | Performed by: NURSE PRACTITIONER

## 2018-09-28 PROCEDURE — 99211 OFF/OP EST MAY X REQ PHY/QHP: CPT

## 2018-09-28 PROCEDURE — G8598 ASA/ANTIPLAT THER USED: HCPCS | Performed by: NURSE PRACTITIONER

## 2018-09-28 PROCEDURE — G8427 DOCREV CUR MEDS BY ELIG CLIN: HCPCS | Performed by: NURSE PRACTITIONER

## 2018-09-28 ASSESSMENT — ENCOUNTER SYMPTOMS
EYES NEGATIVE: 1
RESPIRATORY NEGATIVE: 1
GASTROINTESTINAL NEGATIVE: 1

## 2018-09-28 NOTE — PROGRESS NOTES
Preston Weeks lost 1.8 lbs over 1 month. Pt is s/p band 12/28/11. Current treatment plan:   Patient is not on medication. Negative side effects from medications? no  Patient is not on Optifast.   Patient is  on diet and exercise only plan. Treatment plan details: 1200 calorie, LCHF    Is patient adhering to diet/meal plan: Mostly    Carbohydrate consumption: Tracking using [employer] santiago: usually 1200 calories but up to 1500 not tracking  grams Carbs    Breakfast: [employer] Toast w/ butter + egg     Lunch: 2 HB eggs OR egg salad on [employer] bread    Snack: Sometimes orange/peach     Dinner: Tomato soup + 3 crackers    Snack: NV protein bar + HB egg or fruit or just Protein bar    Amount per meal: up to 1 cup    Following 30/30/30 rule: Eating in 20-30 minutes. Avoids eating and drinking at the same time. Supplements: Vit C, Calcium, MVI, cranberry capsule     Consuming at least 64oz of calorie free fluids? 2 cups Coffee w/ creamer + 32 oz decaf iced tea + 1 bottle 15 calorie lemonade    Participating in intentional exercise?  Nothing structured since pulmonary rehab ended - possibly starting cardiac rehab soon    Plan/Goals:   - Decrease CHO to <90 grams/day  - Include protein w/ fruit  - Walk 30 minutes/day as able    Handouts: none    [employer]

## 2018-09-28 NOTE — PROGRESS NOTES
[facility] [employer]    Patient: Dariel Campbell                      Encounter Date: 9/28/2018    YOB: 1945               Age: 68 y.o. Chief Complaint   Patient presents with    Obesity     4th mwm, s/p band 12/28/11       /85   Pulse 68   Ht 5' 4.25\" (1.632 m)   Wt 268 lb 3.2 oz (121.7 kg)   SpO2 97%   Breastfeeding? No   BMI 45.68 kg/m²     Body mass index is 45.68 kg/m². HPI: 68 y.o. female with a long-standing history of obesity, s/p band, presents today for follow-up. she has lost 1.8 pounds since her last visit . Current treatment includes 1200 calorie LCHF meal plan and exercise. Met with the dietitian today. Plan reviewed in detail with the patient. She is tracking her calories to 1926-6173 calories a day, not monitoring carbs specifically. She has not yet started at the [facility]. She had follow up with cardiology, they are planning cardioversion for her a-fibb. Diet: []LCHF/Ketogenic   [x]Modified low-calorie/low carb diet  []Low-calorie diet          []Maintenance       []Other:         Adherent? [x]Yes- For the most part     []No     Side effects?  [] Yes   [x] No      Exercise: []Cardio     []Resistance/strength training     [x]Other: No intentional exercise, but trying to be physically active     Allergies   Allergen Reactions    Belsomra [Suvorexant]      Nightmares      Avelox [Moxifloxacin Hcl In Nacl] Rash    Levofloxacin Rash    Norco  [Hydrocodone-Acetaminophen] Rash    Sulfa Antibiotics Rash         Current Outpatient Prescriptions:     pantoprazole (PROTONIX) 40 MG tablet, TAKE 1 TABLET BY MOUTH  DAILY, Disp: 90 tablet, Rfl: 1    warfarin (COUMADIN) 5 MG tablet, TAKE 2 TABLETS BY MOUTH ON  FRIDAY ONLY THEN 1 AND 1/2  TABLETS BY MOUTH ALL OTHER  DAYS, Disp: 143 tablet, Rfl: 1    indapamide (LOZOL) 1.25 MG tablet, TAKE 1 TABLET BY MOUTH  EVERY MORNING, Disp: 90 tablet, Rfl: 1    trimethoprim (TRIMPEX) 100 MG tablet, TAKE 1 TABLET BY MOUTH

## 2018-09-28 NOTE — PROGRESS NOTES
Ms. Kandy Alonzo is a 68 y.o. y/o female with history of DVT, PE, Afib   She presents today for anticoagulation monitoring and adjustment. Pertinent PMH: HTN, Gastric Banding,CAD, diskectomy with an artifical spinal disk implant in September 2012. Patient Reported Findings:  Yes     No  [x]   []       Patient verifies current dosing regimen as listed   []   [x]       S/S bleeding/bruising/swelling/SOB  []   [x]       Blood in urine or stool  [x]   []       Procedures scheduled in the future at this time  Is planning to have CV once 4 weeks of therapeutic INR (4/4 today)  []   [x]       Missed Dose  []   [x]       Extra Dose  []   [x]       Change in medications   []   [x]       Change in health/diet/appetite    []   [x]       Change in alcohol use  []   [x]       Change in activity  []   [x]       Hospital admission  []   [x]       Emergency department visit  []   [x]       Other complaints    Clinical Outcomes:  Yes     No  []   [x]       Major bleeding event  []   [x]       Thromboembolic event    Duration of warfarin Therapy: indefinite  INR Range:  2.0-3.0     INR is 2.7 today   Continue same weekly dose of 10mg Fri and 7.5mg on all other days. 4/4 for CV. Advised for her to contact cardiology to schedule  Encouraged to maintain a consistency of vegetables/salads.   Recheck INR 1 week on 10/5    Send to Dr. Dennison     Referring PCP is Veronica Hoffmann  INR (no units)   Date Value   09/21/2018 2.8   09/14/2018 3.4   09/07/2018 3.31 (H)

## 2018-10-01 ENCOUNTER — TELEPHONE (OUTPATIENT)
Dept: CARDIOLOGY CLINIC | Age: 73
End: 2018-10-01

## 2018-10-01 NOTE — TELEPHONE ENCOUNTER
Spoke with pt- sts has had her 4 weeks of therapeutic INR -  sts as per LES either he or SIL would do a DC CV. SIL is not in FF until 12/7/18. When shall she be scheduled?   9/7/18  INR 3.31  9/14/18       3.4  9/21/18       2.8  9/28/18       2.7

## 2018-10-05 ENCOUNTER — APPOINTMENT (OUTPATIENT)
Dept: PHARMACY | Age: 73
End: 2018-10-05
Payer: MEDICARE

## 2018-10-05 ENCOUNTER — ANTI-COAG VISIT (OUTPATIENT)
Dept: PHARMACY | Age: 73
End: 2018-10-05
Payer: MEDICARE

## 2018-10-05 DIAGNOSIS — I48.0 PAROXYSMAL ATRIAL FIBRILLATION (HCC): ICD-10-CM

## 2018-10-05 LAB — INTERNATIONAL NORMALIZATION RATIO, POC: 3

## 2018-10-05 PROCEDURE — 85610 PROTHROMBIN TIME: CPT

## 2018-10-05 PROCEDURE — 99211 OFF/OP EST MAY X REQ PHY/QHP: CPT

## 2018-10-10 ENCOUNTER — HOSPITAL ENCOUNTER (OUTPATIENT)
Dept: CARDIAC CATH/INVASIVE PROCEDURES | Age: 73
Setting detail: OUTPATIENT SURGERY
Discharge: HOME | End: 2018-10-12
Admitting: INTERNAL MEDICINE
Payer: MEDICARE

## 2018-10-10 VITALS — TEMPERATURE: 97 F | BODY MASS INDEX: 45.24 KG/M2 | HEIGHT: 64 IN | WEIGHT: 265 LBS

## 2018-10-10 LAB
ANION GAP SERPL CALCULATED.3IONS-SCNC: 15 MMOL/L (ref 3–16)
BUN BLDV-MCNC: 13 MG/DL (ref 7–20)
CALCIUM SERPL-MCNC: 10 MG/DL (ref 8.3–10.6)
CHLORIDE BLD-SCNC: 97 MMOL/L (ref 99–110)
CO2: 26 MMOL/L (ref 21–32)
CREAT SERPL-MCNC: 0.7 MG/DL (ref 0.6–1.2)
EKG ATRIAL RATE: 357 BPM
EKG ATRIAL RATE: 62 BPM
EKG DIAGNOSIS: NORMAL
EKG DIAGNOSIS: NORMAL
EKG P AXIS: 31 DEGREES
EKG P-R INTERVAL: 186 MS
EKG Q-T INTERVAL: 382 MS
EKG Q-T INTERVAL: 416 MS
EKG QRS DURATION: 92 MS
EKG QRS DURATION: 94 MS
EKG QTC CALCULATION (BAZETT): 422 MS
EKG QTC CALCULATION (BAZETT): 467 MS
EKG R AXIS: -6 DEGREES
EKG R AXIS: 2 DEGREES
EKG T AXIS: 14 DEGREES
EKG T AXIS: 41 DEGREES
EKG VENTRICULAR RATE: 62 BPM
EKG VENTRICULAR RATE: 90 BPM
GFR AFRICAN AMERICAN: >60
GFR NON-AFRICAN AMERICAN: >60
GLUCOSE BLD-MCNC: 106 MG/DL (ref 70–99)
INR BLD: 3.11 (ref 0.86–1.14)
MAGNESIUM: 1.7 MG/DL (ref 1.8–2.4)
POTASSIUM REFLEX MAGNESIUM: 3.3 MMOL/L (ref 3.5–5.1)
PROTHROMBIN TIME: 35.5 SEC (ref 9.8–13)
SODIUM BLD-SCNC: 138 MMOL/L (ref 136–145)

## 2018-10-10 PROCEDURE — 80048 BASIC METABOLIC PNL TOTAL CA: CPT

## 2018-10-10 PROCEDURE — 94761 N-INVAS EAR/PLS OXIMETRY MLT: CPT

## 2018-10-10 PROCEDURE — 83735 ASSAY OF MAGNESIUM: CPT

## 2018-10-10 PROCEDURE — 93005 ELECTROCARDIOGRAM TRACING: CPT | Performed by: INTERNAL MEDICINE

## 2018-10-10 PROCEDURE — 94770 HC ETCO2 MONITOR DAILY: CPT

## 2018-10-10 PROCEDURE — 85610 PROTHROMBIN TIME: CPT

## 2018-10-10 PROCEDURE — 94664 DEMO&/EVAL PT USE INHALER: CPT

## 2018-10-10 PROCEDURE — 2700000000 HC OXYGEN THERAPY PER DAY

## 2018-10-10 PROCEDURE — 92960 CARDIOVERSION ELECTRIC EXT: CPT | Performed by: INTERNAL MEDICINE

## 2018-10-10 PROCEDURE — 93010 ELECTROCARDIOGRAM REPORT: CPT | Performed by: INTERNAL MEDICINE

## 2018-10-10 PROCEDURE — 7100000010 HC PHASE II RECOVERY - FIRST 15 MIN

## 2018-10-10 RX ORDER — SODIUM CHLORIDE 0.9 % (FLUSH) 0.9 %
10 SYRINGE (ML) INJECTION PRN
Status: DISCONTINUED | OUTPATIENT
Start: 2018-10-10 | End: 2018-10-13 | Stop reason: HOSPADM

## 2018-10-10 RX ORDER — MIDAZOLAM HYDROCHLORIDE 1 MG/ML
1 INJECTION INTRAMUSCULAR; INTRAVENOUS ONCE
Status: DISCONTINUED | OUTPATIENT
Start: 2018-10-10 | End: 2018-10-13 | Stop reason: HOSPADM

## 2018-10-10 RX ORDER — SODIUM CHLORIDE 0.9 % (FLUSH) 0.9 %
10 SYRINGE (ML) INJECTION EVERY 12 HOURS SCHEDULED
Status: DISCONTINUED | OUTPATIENT
Start: 2018-10-10 | End: 2018-10-13 | Stop reason: HOSPADM

## 2018-10-10 RX ORDER — SODIUM CHLORIDE 9 MG/ML
INJECTION, SOLUTION INTRAVENOUS CONTINUOUS
Status: DISCONTINUED | OUTPATIENT
Start: 2018-10-10 | End: 2018-10-13 | Stop reason: HOSPADM

## 2018-10-10 NOTE — H&P
Component Value Date     WBC 6.3 08/03/2018     HGB 14.5 08/03/2018     HCT 42.9 08/03/2018     MCV 95.1 08/03/2018      08/03/2018      No components found for: CHLPL        Lab Results   Component Value Date     TRIG 106 05/04/2017     TRIG 104 05/27/2016     TRIG 161 (H) 10/26/2012            Lab Results   Component Value Date     HDL 65 (H) 08/03/2018     HDL 74 (H) 05/04/2017     HDL 71 (H) 05/27/2016            Lab Results   Component Value Date     LDLCALC 77 08/03/2018     LDLCALC 77 05/04/2017     LDLCALC 86 05/27/2016            Lab Results   Component Value Date     LABVLDL 15 08/03/2018     LABVLDL 21 05/04/2017     LABVLDL 21 05/27/2016      Diagnostic testing:  Pertinent reports were reviewed as a part of this visit. Last Echo: 3/2018  Summary   Normal left ventricle size, wall thickness and systolic function with an   estimated ejection fraction of 60%.     Mild mitral regurgitation.     LHC: 5/1/18  Findings:                      LM       Normal  Chriss Sanchez              LVG     Not performed - aorta very tortuous              EDP     Not obtained - aorta very tortuous  Intervention:  None     Today's ECG shows atrial fibrillation 101 bpm  I personally reviewed ECG and interpretation as above.     Review of Systems:   · Constitutional: there has been no unanticipated weight loss. There's been no change in energy level, sleep pattern, or activity level. · Eyes: No visual changes or diplopia. No scleral icterus. · ENT: No Headaches, hearing loss or vertigo. No mouth sores or sore throat. · Cardiovascular: Reviewed in HPI    · Respiratory: No cough or wheezing, no sputum production. No hemoptysis. · Gastrointestinal: No abdominal pain, appetite loss, blood in stools. No change in bowel or bladder habits. No hematemesis. · Genitourinary: No dysuria, trouble voiding, or hematuria.   · Musculoskeletal:  No gait disturbance, weakness or joint complaints. · Integumentary: No rash or pruritis. · Neurological: No headache, diplopia, change in muscle strength, numbness or tingling. No change in gait, balance, coordination, mood, affect, memory, mentation, behavior. · Psychiatric: No anxiety, no depression. · Endocrine: No malaise, fatigue or temperature intolerance. No excessive thirst, fluid intake, or urination. No tremor. · Hematologic/Lymphatic: No abnormal bruising or bleeding, blood clots or swollen lymph nodes. · Allergic/Immunologic: No nasal congestion or hives.     Physical Examination:    VITALS:    /76 (Site: Left Upper Arm, Position: Sitting, Cuff Size: Large Adult)   Pulse 100   Ht 5' 4.25\" (1.632 m)   Wt 269 lb 1.9 oz (122.1 kg)   BMI 45.84 kg/m²      CONSTITUTIONAL: Cooperative, no apparent distress, patient is Obese  NEUROLOGIC:  Awake and orientated to person, place and time. PSYCH: Calm affect. SKIN: Warm and dry. HEENT: Sclera non-icteric, normocephalic  NECK:  neck supple, no elevation of JVP, normal carotid pulses with no bruits and thyroid normal size. LUNGS:  No increased work of breathing and clear to auscultation, no crackles or wheezing  CARDIOVASCULAR:  Irregular rate, rapid rhythm. no murmurs, gallops or rubs. Normal PMI. ABDOMEN:  Normal bowel sounds, non-distended and non-tender to palpation  EXT: No edema, no cyanosis     Assessment:   1. PAF:  Onset 2011. Recurrence by EKG 5/1/18. EKG today shows atrial fib. Its likely that she has transitioned to persistent AF at this time. its not clear if this is responsible for any symptoms. Event Monitor from 10/2014> Afib for 1% of the monitoring period. Longest period 1 hr 39 minutes. Average HR 70 bpm ( bpm)  Plan for cardioversion after 4 weeks of therapeutic INR 2.0-3.0  2. HTN: Blood pressure 130/76, pulse 100, height 5' 4.25\" (1.632 m), weight 269 lb 1.9 oz (122.1 kg), not currently breastfeeding. Stable.     3. CAD:

## 2018-10-15 ENCOUNTER — TELEPHONE (OUTPATIENT)
Dept: PHARMACY | Age: 73
End: 2018-10-15

## 2018-10-16 ENCOUNTER — OFFICE VISIT (OUTPATIENT)
Dept: INTERNAL MEDICINE CLINIC | Age: 73
End: 2018-10-16
Payer: MEDICARE

## 2018-10-16 VITALS
WEIGHT: 269.4 LBS | DIASTOLIC BLOOD PRESSURE: 82 MMHG | HEART RATE: 80 BPM | SYSTOLIC BLOOD PRESSURE: 128 MMHG | BODY MASS INDEX: 45.99 KG/M2 | TEMPERATURE: 98 F | OXYGEN SATURATION: 97 % | HEIGHT: 64 IN

## 2018-10-16 DIAGNOSIS — B96.89 BACTERIAL SINUSITIS: ICD-10-CM

## 2018-10-16 DIAGNOSIS — J32.9 BACTERIAL SINUSITIS: ICD-10-CM

## 2018-10-16 DIAGNOSIS — R06.2 WHEEZING: Primary | ICD-10-CM

## 2018-10-16 PROCEDURE — G8510 SCR DEP NEG, NO PLAN REQD: HCPCS | Performed by: NURSE PRACTITIONER

## 2018-10-16 PROCEDURE — 3017F COLORECTAL CA SCREEN DOC REV: CPT | Performed by: NURSE PRACTITIONER

## 2018-10-16 PROCEDURE — G8417 CALC BMI ABV UP PARAM F/U: HCPCS | Performed by: NURSE PRACTITIONER

## 2018-10-16 PROCEDURE — G8427 DOCREV CUR MEDS BY ELIG CLIN: HCPCS | Performed by: NURSE PRACTITIONER

## 2018-10-16 PROCEDURE — 4040F PNEUMOC VAC/ADMIN/RCVD: CPT | Performed by: NURSE PRACTITIONER

## 2018-10-16 PROCEDURE — 99213 OFFICE O/P EST LOW 20 MIN: CPT | Performed by: NURSE PRACTITIONER

## 2018-10-16 PROCEDURE — 1090F PRES/ABSN URINE INCON ASSESS: CPT | Performed by: NURSE PRACTITIONER

## 2018-10-16 PROCEDURE — G8484 FLU IMMUNIZE NO ADMIN: HCPCS | Performed by: NURSE PRACTITIONER

## 2018-10-16 PROCEDURE — 1101F PT FALLS ASSESS-DOCD LE1/YR: CPT | Performed by: NURSE PRACTITIONER

## 2018-10-16 PROCEDURE — G8399 PT W/DXA RESULTS DOCUMENT: HCPCS | Performed by: NURSE PRACTITIONER

## 2018-10-16 PROCEDURE — G8598 ASA/ANTIPLAT THER USED: HCPCS | Performed by: NURSE PRACTITIONER

## 2018-10-16 PROCEDURE — 1036F TOBACCO NON-USER: CPT | Performed by: NURSE PRACTITIONER

## 2018-10-16 PROCEDURE — 1123F ACP DISCUSS/DSCN MKR DOCD: CPT | Performed by: NURSE PRACTITIONER

## 2018-10-16 RX ORDER — AMOXICILLIN AND CLAVULANATE POTASSIUM 875; 125 MG/1; MG/1
1 TABLET, FILM COATED ORAL 2 TIMES DAILY
Refills: 0 | Status: CANCELLED | OUTPATIENT
Start: 2018-10-16 | End: 2018-10-26

## 2018-10-16 RX ORDER — ALBUTEROL SULFATE 2.5 MG/3ML
2.5 SOLUTION RESPIRATORY (INHALATION) ONCE
Status: COMPLETED | OUTPATIENT
Start: 2018-10-16 | End: 2018-10-16

## 2018-10-16 RX ORDER — AMOXICILLIN AND CLAVULANATE POTASSIUM 875; 125 MG/1; MG/1
1 TABLET, FILM COATED ORAL 2 TIMES DAILY
Qty: 10 TABLET | Refills: 0 | Status: SHIPPED | OUTPATIENT
Start: 2018-10-16 | End: 2018-10-21

## 2018-10-16 RX ORDER — METHYLPREDNISOLONE 4 MG/1
TABLET ORAL
Qty: 1 KIT | Refills: 0 | Status: SHIPPED | OUTPATIENT
Start: 2018-10-16 | End: 2018-10-29

## 2018-10-16 RX ADMIN — ALBUTEROL SULFATE 2.5 MG: 2.5 SOLUTION RESPIRATORY (INHALATION) at 16:13

## 2018-10-16 ASSESSMENT — PATIENT HEALTH QUESTIONNAIRE - PHQ9
2. FEELING DOWN, DEPRESSED OR HOPELESS: 0
1. LITTLE INTEREST OR PLEASURE IN DOING THINGS: 0
SUM OF ALL RESPONSES TO PHQ QUESTIONS 1-9: 0
SUM OF ALL RESPONSES TO PHQ QUESTIONS 1-9: 0
SUM OF ALL RESPONSES TO PHQ9 QUESTIONS 1 & 2: 0

## 2018-10-16 ASSESSMENT — ENCOUNTER SYMPTOMS
EYE ITCHING: 0
EYE REDNESS: 0
SINUS PRESSURE: 1
TROUBLE SWALLOWING: 0
COUGH: 1
DIARRHEA: 0
ABDOMINAL PAIN: 0
CONSTIPATION: 0
EYE DISCHARGE: 0
SINUS PAIN: 0
RHINORRHEA: 1
NAUSEA: 0
PHOTOPHOBIA: 0
SORE THROAT: 1
SHORTNESS OF BREATH: 0
EYE PAIN: 0
VOMITING: 0
WHEEZING: 1

## 2018-10-16 NOTE — PROGRESS NOTES
daily for 5 days 10 tablet 0    methylPREDNISolone (MEDROL DOSEPACK) 4 MG tablet Take as directed 1 kit 0    pantoprazole (PROTONIX) 40 MG tablet TAKE 1 TABLET BY MOUTH  DAILY 90 tablet 1    warfarin (COUMADIN) 5 MG tablet TAKE 2 TABLETS BY MOUTH ON  FRIDAY ONLY THEN 1 AND 1/2  TABLETS BY MOUTH ALL OTHER  DAYS 143 tablet 1    indapamide (LOZOL) 1.25 MG tablet TAKE 1 TABLET BY MOUTH  EVERY MORNING 90 tablet 1    trimethoprim (TRIMPEX) 100 MG tablet TAKE 1 TABLET BY MOUTH  EVERY 48 HOURS 45 tablet 1    montelukast (SINGULAIR) 10 MG tablet TAKE 1 TABLET BY MOUTH  NIGHTLY 90 tablet 1    citalopram (CELEXA) 40 MG tablet TAKE 1 TABLET BY MOUTH  DAILY 90 tablet 1    verapamil (CALAN SR) 240 MG extended release tablet TAKE 1 TABLET BY MOUTH  NIGHTLY 90 tablet 1    clonazePAM (KLONOPIN) 1 MG tablet Take 1 tablet by mouth every evening for 90 days. . 90 tablet 0    albuterol sulfate  (90 Base) MCG/ACT inhaler Inhale 2 puffs into the lungs every 4 hours as needed for Shortness of Breath 1 Inhaler 3    Ascorbic Acid (VITAMIN C PO) 1 tablet daily      SYMBICORT 160-4.5 MCG/ACT AERO INHALE TWO PUFFS BY MOUTH TWICE A DAY 1 Inhaler 2    atorvastatin (LIPITOR) 80 MG tablet TAKE 1 TABLET BY MOUTH ONCE A DAY 90 tablet 1    gabapentin (NEURONTIN) 300 MG capsule TAKE 2 CAPSULES BY MOUTH 3  TIMES DAILY 540 capsule 3    allopurinol (ZYLOPRIM) 300 MG tablet TAKE 1 TABLET BY MOUTH  DAILY 90 tablet 3    albuterol (PROVENTIL) (2.5 MG/3ML) 0.083% nebulizer solution Inhale 2.5 mg into the lungs as needed       Docusate Sodium 100 MG TABS Take 100 mg by mouth Daily       therapeutic multivitamin-minerals (THERAGRAN-M) tablet Take 1 tablet by mouth daily.  Cranberry 500 MG CAPS Take 1,000 mg by mouth daily       fluticasone (FLONASE) 50 MCG/ACT nasal spray 1 spray by Nasal route daily. No current facility-administered medications for this visit.       Facility-Administered Medications Ordered in Other Visits Medication Dose Route Frequency Provider Last Rate Last Dose    magnesium hydroxide (MILK OF MAGNESIA) 400 MG/5ML suspension 30 mL  30 mL Oral Daily PRN Chavo Jonas MD        ondansetron [facility]) injection 4 mg  4 mg Intravenous Q6H PRN Chavo Jonas MD        acetaminophen (TYLENOL) tablet 650 mg  650 mg Oral Q4H PRN Chavo Jonas MD          Review of Systems   Constitutional: Positive for chills. Negative for appetite change, diaphoresis, fatigue, fever (99.8) and unexpected weight change. HENT: Positive for ear pain, rhinorrhea, sinus pressure, sneezing and sore throat. Negative for congestion, drooling, ear discharge, hearing loss, postnasal drip, sinus pain and trouble swallowing. Eyes: Negative for photophobia, pain, discharge, redness, itching and visual disturbance. Respiratory: Positive for cough and wheezing. Negative for shortness of breath. Cardiovascular: Negative for chest pain, palpitations and leg swelling. Gastrointestinal: Negative for abdominal pain, constipation, diarrhea, nausea and vomiting. Skin: Negative for pallor. Neurological: Negative for dizziness, weakness and light-headedness. OBJECTIVE:  /82   Pulse 80   Temp 98 °F (36.7 °C) (Oral)   Ht 5' 4\" (1.626 m)   Wt 269 lb 6.4 oz (122.2 kg)   SpO2 97%   BMI 46.24 kg/m²      Physical Exam   Constitutional: She is oriented to person, place, and time. She appears well-developed and well-nourished. No distress. HENT:   Head: Normocephalic and atraumatic. Right Ear: Hearing and external ear normal.   Left Ear: Hearing and external ear normal.   Nose: Nose normal.   Mouth/Throat: Oropharynx is clear and moist and mucous membranes are normal. No oropharyngeal exudate. Tenderness with palpation of the frontal sinuses    Eyes: Pupils are equal, round, and reactive to light. Conjunctivae and EOM are normal. Right eye exhibits no discharge. Left eye exhibits no discharge.  No scleral icterus. Neck: Normal range of motion. Neck supple. No thyromegaly present. Cardiovascular: Normal rate, regular rhythm and normal heart sounds. Exam reveals no gallop and no friction rub. No murmur heard. Pulmonary/Chest: Effort normal. No stridor. No respiratory distress. She has wheezes. She has rales. She exhibits no tenderness. Abdominal: Soft. Bowel sounds are normal. There is no tenderness. Musculoskeletal: Normal range of motion. Lymphadenopathy:     She has no cervical adenopathy. Neurological: She is alert and oriented to person, place, and time. Skin: Skin is warm and dry. She is not diaphoretic. Psychiatric: She has a normal mood and affect. Vitals reviewed. ASSESSMENT/PLAN:  Dolores Yung was seen today for cough. Diagnoses and all orders for this visit:    Wheezing  -     albuterol (PROVENTIL) nebulizer solution 2.5 mg; Take 3 mLs by nebulization once - in office today. I informed the patient to dispose of her albuterol nebulizers that are . She reports that she never takes this medication except when she is sick, and she will not take them. I offered to refill them for her, but the patient states she does not take them often and does not know the dose either. She will call if she needs these refilled. - The patient had wheezing and crackles throughout her lung fields prior to the nebulizer in the office today. The patient reports that her chest was not as tight and she was breathing much better after the treatment, however she still had minor wheezes. -     methylPREDNISolone (MEDROL DOSEPACK) 4 MG tablet; Take as directed- patient education handout provided and reviewed with the patient. Bacterial sinusitis  -    The patient has crackles in her lungs. She is an asthma patient whose symptoms of sinusitis are worsening after 5 days. This is likely no longer viral or allergic rhinitis. I will treat the patient for acute bacterial sinusitis.   The patient

## 2018-10-16 NOTE — PATIENT INSTRUCTIONS
levels in your blood or urine. You may also need to adjust the dose of your diabetes medications. It is not known whether this medicine will harm an unborn baby. Tell your doctor if you are pregnant or plan to become pregnant. It is not known whether methylprednisolone passes into breast milk or if it could affect the nursing baby. Tell your doctor if you are breast-feeding. How should I take methylprednisolone? Follow all directions on your prescription label. Your doctor may occasionally change your dose. Do not use this medicine in larger or smaller amounts or for longer than recommended. Methylprednisolone is sometimes taken every other day. Follow your doctor's dosing instructions very carefully. Your dose needs may change if you have unusual stress such as a serious illness, fever or infection, or if you have surgery or a medical emergency. Tell your doctor about any such situation that affects you. This medicine can cause unusual results with certain medical tests. Tell any doctor who treats you that you are using methylprednisolone. You should not stop using methylprednisolone suddenly. Follow your doctor's instructions about tapering your dose. Wear a medical alert tag or carry an ID card stating that you take methylprednisolone. Any medical care provider who treats you should know that you take steroid medication. If you need surgery, tell the surgeon ahead of time that you are using methylprednisolone. You may need to stop using the medicine for a short time. Store at room temperature away from moisture and heat. What happens if I miss a dose? Call your doctor for instructions if you miss a dose of methylprednisolone. What happens if I overdose? Seek emergency medical attention or call the Poison Help line at 1-476.654.2531. An overdose of methylprednisolone is not expected to produce life threatening symptoms.  However, long term use of high steroid doses can lead to symptoms such as difficult breathing; swelling of your face, lips, tongue, or throat. Call your doctor at once if you have:  · severe stomach pain, diarrhea that is watery or bloody;  · pale or yellowed skin, dark colored urine, fever, confusion or weakness;  · loss of appetite, upper stomach pain, jaundice (yellowing of the skin or eyes);  · easy bruising or bleeding;  · little or no urination; or  · severe skin reaction --fever, sore throat, swelling in your face or tongue, burning in your eyes, skin pain followed by a red or purple skin rash that spreads (especially in the face or upper body) and causes blistering and peeling. Common side effects may include:  · nausea, diarrhea; or  · vaginal itching or discharge; This is not a complete list of side effects and others may occur. Call your doctor for medical advice about side effects. You may report side effects to FDA at 7-985-FDA-9677. What other drugs will affect amoxicillin and clavulanate potassium? Tell your doctor about all your current medicines and any you start or stop using, especially:  · allopurinol;  · probenecid; or  · a blood thinner --warfarin, Coumadin, Jantoven. This list is not complete. Other drugs may interact with amoxicillin and clavulanate potassium, including prescription and over-the-counter medicines, vitamins, and herbal products. Not all possible interactions are listed in this medication guide. Where can I get more information? Your pharmacist can provide more information about amoxicillin and clavulanate potassium. Remember, keep this and all other medicines out of the reach of children, never share your medicines with others, and use this medication only for the indication prescribed. Every effort has been made to ensure that the information provided by [address] is accurate, up-to-date, and complete, but no guarantee is made to that effect. Drug information contained herein may be time sensitive.  Jack

## 2018-10-17 ENCOUNTER — TELEPHONE (OUTPATIENT)
Dept: PHARMACY | Age: 73
End: 2018-10-17

## 2018-10-17 NOTE — TELEPHONE ENCOUNTER
----- Message from Priscila Timmons sent at 10/17/2018  2:02 PM EDT -----  Contact: Celester Alexx   Pt left v/m stating she went the her doctor today , she was prescribed Amox/Clauv 875/125 and medrol dose pack. Would like a pharmacist to call her back with any warfarin dosing instructions needed.  [phone]

## 2018-10-22 ENCOUNTER — ANTI-COAG VISIT (OUTPATIENT)
Dept: PHARMACY | Age: 73
End: 2018-10-22
Payer: MEDICARE

## 2018-10-22 DIAGNOSIS — I48.0 PAROXYSMAL ATRIAL FIBRILLATION (HCC): ICD-10-CM

## 2018-10-22 LAB — INTERNATIONAL NORMALIZATION RATIO, POC: 3.9

## 2018-10-22 PROCEDURE — 85610 PROTHROMBIN TIME: CPT

## 2018-10-22 PROCEDURE — 99211 OFF/OP EST MAY X REQ PHY/QHP: CPT

## 2018-10-29 ENCOUNTER — OFFICE VISIT (OUTPATIENT)
Dept: INTERNAL MEDICINE CLINIC | Age: 73
End: 2018-10-29
Payer: MEDICARE

## 2018-10-29 VITALS
HEART RATE: 76 BPM | WEIGHT: 268 LBS | SYSTOLIC BLOOD PRESSURE: 134 MMHG | BODY MASS INDEX: 45.75 KG/M2 | DIASTOLIC BLOOD PRESSURE: 86 MMHG | HEIGHT: 64 IN

## 2018-10-29 DIAGNOSIS — R82.90 ABNORMAL URINALYSIS: ICD-10-CM

## 2018-10-29 DIAGNOSIS — R73.9 HYPERGLYCEMIA: ICD-10-CM

## 2018-10-29 DIAGNOSIS — R05.9 COUGH: ICD-10-CM

## 2018-10-29 DIAGNOSIS — R30.0 BURNING WITH URINATION: ICD-10-CM

## 2018-10-29 DIAGNOSIS — N39.0 RECURRENT UTI: Primary | ICD-10-CM

## 2018-10-29 DIAGNOSIS — R06.2 WHEEZING: ICD-10-CM

## 2018-10-29 LAB
BASOPHILS ABSOLUTE: 0 K/UL (ref 0–0.2)
BASOPHILS RELATIVE PERCENT: 0.5 %
BILIRUBIN, POC: NORMAL
BLOOD URINE, POC: NORMAL
CLARITY, POC: NORMAL
COLOR, POC: NORMAL
EOSINOPHILS ABSOLUTE: 0.1 K/UL (ref 0–0.6)
EOSINOPHILS RELATIVE PERCENT: 1.4 %
GLUCOSE URINE, POC: NORMAL
HBA1C MFR BLD: 5.7 %
HCT VFR BLD CALC: 41.8 % (ref 36–48)
HEMOGLOBIN: 14.5 G/DL (ref 12–16)
KETONES, POC: NORMAL
LEUKOCYTE EST, POC: NORMAL
LYMPHOCYTES ABSOLUTE: 1.7 K/UL (ref 1–5.1)
LYMPHOCYTES RELATIVE PERCENT: 17.2 %
MCH RBC QN AUTO: 32.8 PG (ref 26–34)
MCHC RBC AUTO-ENTMCNC: 34.7 G/DL (ref 31–36)
MCV RBC AUTO: 94.5 FL (ref 80–100)
MONOCYTES ABSOLUTE: 0.6 K/UL (ref 0–1.3)
MONOCYTES RELATIVE PERCENT: 6 %
NEUTROPHILS ABSOLUTE: 7.4 K/UL (ref 1.7–7.7)
NEUTROPHILS RELATIVE PERCENT: 74.9 %
NITRITE, POC: NORMAL
PDW BLD-RTO: 14.6 % (ref 12.4–15.4)
PH, POC: 7.5
PLATELET # BLD: 291 K/UL (ref 135–450)
PMV BLD AUTO: 7.3 FL (ref 5–10.5)
PROTEIN, POC: NORMAL
RBC # BLD: 4.42 M/UL (ref 4–5.2)
SPECIFIC GRAVITY, POC: 1.02
UROBILINOGEN, POC: >=8
WBC # BLD: 9.9 K/UL (ref 4–11)

## 2018-10-29 PROCEDURE — 81002 URINALYSIS NONAUTO W/O SCOPE: CPT | Performed by: NURSE PRACTITIONER

## 2018-10-29 PROCEDURE — G8399 PT W/DXA RESULTS DOCUMENT: HCPCS | Performed by: NURSE PRACTITIONER

## 2018-10-29 PROCEDURE — 99213 OFFICE O/P EST LOW 20 MIN: CPT | Performed by: NURSE PRACTITIONER

## 2018-10-29 PROCEDURE — 1123F ACP DISCUSS/DSCN MKR DOCD: CPT | Performed by: NURSE PRACTITIONER

## 2018-10-29 PROCEDURE — G8482 FLU IMMUNIZE ORDER/ADMIN: HCPCS | Performed by: NURSE PRACTITIONER

## 2018-10-29 PROCEDURE — 4040F PNEUMOC VAC/ADMIN/RCVD: CPT | Performed by: NURSE PRACTITIONER

## 2018-10-29 PROCEDURE — G8417 CALC BMI ABV UP PARAM F/U: HCPCS | Performed by: NURSE PRACTITIONER

## 2018-10-29 PROCEDURE — 1090F PRES/ABSN URINE INCON ASSESS: CPT | Performed by: NURSE PRACTITIONER

## 2018-10-29 PROCEDURE — 83036 HEMOGLOBIN GLYCOSYLATED A1C: CPT | Performed by: NURSE PRACTITIONER

## 2018-10-29 PROCEDURE — 1036F TOBACCO NON-USER: CPT | Performed by: NURSE PRACTITIONER

## 2018-10-29 PROCEDURE — G8427 DOCREV CUR MEDS BY ELIG CLIN: HCPCS | Performed by: NURSE PRACTITIONER

## 2018-10-29 PROCEDURE — 1101F PT FALLS ASSESS-DOCD LE1/YR: CPT | Performed by: NURSE PRACTITIONER

## 2018-10-29 PROCEDURE — 3017F COLORECTAL CA SCREEN DOC REV: CPT | Performed by: NURSE PRACTITIONER

## 2018-10-29 PROCEDURE — G8598 ASA/ANTIPLAT THER USED: HCPCS | Performed by: NURSE PRACTITIONER

## 2018-10-29 ASSESSMENT — ENCOUNTER SYMPTOMS
COUGH: 1
BACK PAIN: 1

## 2018-10-29 NOTE — PROGRESS NOTES
SUBJECTIVE:    Patient ID: Sofi Price is a 68 y.o. female. CC: Cough, UTI    HPI: The patient presents to the office for an acute visit. Nine days ago she developed burning with urination. She has associated urgency and frequency. She has history of chronic UTI's and is on trimethoprim every 48 hours. She was told by her urology to increase this for UTI symptoms which she did for 5 days without improvement. She then took OTZ Azo twice which seemed to help but she read that UA would be affected by Azo so she quit it. She has right back pain. No fevers. No hematuria. She was recently treated for respiratory infection with antibiotics and steroids. She continues to have acute on chronic cough and wheezing. Current Outpatient Prescriptions   Medication Sig Dispense Refill    pantoprazole (PROTONIX) 40 MG tablet TAKE 1 TABLET BY MOUTH  DAILY 90 tablet 1    warfarin (COUMADIN) 5 MG tablet TAKE 2 TABLETS BY MOUTH ON  FRIDAY ONLY THEN 1 AND 1/2  TABLETS BY MOUTH ALL OTHER  DAYS 143 tablet 1    indapamide (LOZOL) 1.25 MG tablet TAKE 1 TABLET BY MOUTH  EVERY MORNING 90 tablet 1    trimethoprim (TRIMPEX) 100 MG tablet TAKE 1 TABLET BY MOUTH  EVERY 48 HOURS 45 tablet 1    montelukast (SINGULAIR) 10 MG tablet TAKE 1 TABLET BY MOUTH  NIGHTLY 90 tablet 1    citalopram (CELEXA) 40 MG tablet TAKE 1 TABLET BY MOUTH  DAILY 90 tablet 1    verapamil (CALAN SR) 240 MG extended release tablet TAKE 1 TABLET BY MOUTH  NIGHTLY 90 tablet 1    clonazePAM (KLONOPIN) 1 MG tablet Take 1 tablet by mouth every evening for 90 days. . 90 tablet 0    albuterol sulfate  (90 Base) MCG/ACT inhaler Inhale 2 puffs into the lungs every 4 hours as needed for Shortness of Breath 1 Inhaler 3    Ascorbic Acid (VITAMIN C PO) 1 tablet daily      SYMBICORT 160-4.5 MCG/ACT AERO INHALE TWO PUFFS BY MOUTH TWICE A DAY 1 Inhaler 2    atorvastatin (LIPITOR) 80 MG tablet TAKE 1 TABLET BY MOUTH ONCE A DAY 90 tablet 1   

## 2018-10-30 LAB
A/G RATIO: 1.9 (ref 1.1–2.2)
ALBUMIN SERPL-MCNC: 4.3 G/DL (ref 3.4–5)
ALP BLD-CCNC: 70 U/L (ref 40–129)
ALT SERPL-CCNC: 26 U/L (ref 10–40)
ANION GAP SERPL CALCULATED.3IONS-SCNC: 18 MMOL/L (ref 3–16)
AST SERPL-CCNC: 22 U/L (ref 15–37)
BILIRUB SERPL-MCNC: 0.6 MG/DL (ref 0–1)
BILIRUBIN DIRECT: <0.2 MG/DL (ref 0–0.3)
BILIRUBIN, INDIRECT: NORMAL MG/DL (ref 0–1)
BUN BLDV-MCNC: 10 MG/DL (ref 7–20)
CALCIUM SERPL-MCNC: 9.5 MG/DL (ref 8.3–10.6)
CHLORIDE BLD-SCNC: 94 MMOL/L (ref 99–110)
CO2: 26 MMOL/L (ref 21–32)
CREAT SERPL-MCNC: 0.9 MG/DL (ref 0.6–1.2)
GFR AFRICAN AMERICAN: >60
GFR NON-AFRICAN AMERICAN: >60
GLOBULIN: 2.3 G/DL
GLUCOSE BLD-MCNC: 92 MG/DL (ref 70–99)
HAPTOGLOBIN: 171 MG/DL (ref 30–200)
LACTATE DEHYDROGENASE: 190 U/L (ref 100–190)
POTASSIUM SERPL-SCNC: 3.7 MMOL/L (ref 3.5–5.1)
SODIUM BLD-SCNC: 138 MMOL/L (ref 136–145)
TOTAL PROTEIN: 6.6 G/DL (ref 6.4–8.2)

## 2018-10-30 ASSESSMENT — ENCOUNTER SYMPTOMS: WHEEZING: 1

## 2018-10-31 ENCOUNTER — TELEPHONE (OUTPATIENT)
Dept: INTERNAL MEDICINE CLINIC | Age: 73
End: 2018-10-31

## 2018-11-01 LAB
ORGANISM: ABNORMAL
URINE CULTURE, ROUTINE: ABNORMAL
URINE CULTURE, ROUTINE: ABNORMAL

## 2018-11-01 RX ORDER — CIPROFLOXACIN 500 MG/1
500 TABLET, FILM COATED ORAL 2 TIMES DAILY
Qty: 6 TABLET | Refills: 0 | Status: SHIPPED | OUTPATIENT
Start: 2018-11-01 | End: 2018-11-04

## 2018-11-02 ENCOUNTER — ANTI-COAG VISIT (OUTPATIENT)
Dept: PHARMACY | Age: 73
End: 2018-11-02
Payer: MEDICARE

## 2018-11-02 ENCOUNTER — TELEPHONE (OUTPATIENT)
Dept: CARDIOLOGY CLINIC | Age: 73
End: 2018-11-02

## 2018-11-02 ENCOUNTER — OFFICE VISIT (OUTPATIENT)
Dept: BARIATRICS/WEIGHT MGMT | Age: 73
End: 2018-11-02
Payer: MEDICARE

## 2018-11-02 VITALS
WEIGHT: 266 LBS | BODY MASS INDEX: 45.41 KG/M2 | HEIGHT: 64 IN | HEART RATE: 127 BPM | SYSTOLIC BLOOD PRESSURE: 135 MMHG | DIASTOLIC BLOOD PRESSURE: 89 MMHG

## 2018-11-02 DIAGNOSIS — I48.0 PAROXYSMAL ATRIAL FIBRILLATION (HCC): ICD-10-CM

## 2018-11-02 DIAGNOSIS — E66.01 MORBID OBESITY WITH BMI OF 45.0-49.9, ADULT (HCC): Primary | ICD-10-CM

## 2018-11-02 LAB — INTERNATIONAL NORMALIZATION RATIO, POC: 2.6

## 2018-11-02 PROCEDURE — G8482 FLU IMMUNIZE ORDER/ADMIN: HCPCS | Performed by: NURSE PRACTITIONER

## 2018-11-02 PROCEDURE — 99211 OFF/OP EST MAY X REQ PHY/QHP: CPT

## 2018-11-02 PROCEDURE — 85610 PROTHROMBIN TIME: CPT

## 2018-11-02 PROCEDURE — 1123F ACP DISCUSS/DSCN MKR DOCD: CPT | Performed by: NURSE PRACTITIONER

## 2018-11-02 PROCEDURE — 1036F TOBACCO NON-USER: CPT | Performed by: NURSE PRACTITIONER

## 2018-11-02 PROCEDURE — 1101F PT FALLS ASSESS-DOCD LE1/YR: CPT | Performed by: NURSE PRACTITIONER

## 2018-11-02 PROCEDURE — 3017F COLORECTAL CA SCREEN DOC REV: CPT | Performed by: NURSE PRACTITIONER

## 2018-11-02 PROCEDURE — G8427 DOCREV CUR MEDS BY ELIG CLIN: HCPCS | Performed by: NURSE PRACTITIONER

## 2018-11-02 PROCEDURE — G8598 ASA/ANTIPLAT THER USED: HCPCS | Performed by: NURSE PRACTITIONER

## 2018-11-02 PROCEDURE — 99213 OFFICE O/P EST LOW 20 MIN: CPT | Performed by: NURSE PRACTITIONER

## 2018-11-02 PROCEDURE — 1090F PRES/ABSN URINE INCON ASSESS: CPT | Performed by: NURSE PRACTITIONER

## 2018-11-02 PROCEDURE — G8417 CALC BMI ABV UP PARAM F/U: HCPCS | Performed by: NURSE PRACTITIONER

## 2018-11-02 PROCEDURE — G8399 PT W/DXA RESULTS DOCUMENT: HCPCS | Performed by: NURSE PRACTITIONER

## 2018-11-02 PROCEDURE — 4040F PNEUMOC VAC/ADMIN/RCVD: CPT | Performed by: NURSE PRACTITIONER

## 2018-11-02 ASSESSMENT — ENCOUNTER SYMPTOMS
GASTROINTESTINAL NEGATIVE: 1
EYES NEGATIVE: 1
RESPIRATORY NEGATIVE: 1

## 2018-11-02 NOTE — PATIENT INSTRUCTIONS
Plan/Goals: Decrease carbs at meals, Increase veggies, Aim for 10 min walks 3 x week    Patient received dietary handouts and education. Key Low Carb, High Healthy Fat Dietary Points:    - Meats (preferably organic or grass fed) are great sources of protein and are low in carbohydrates. Maggy All with coconut, olive, avocado, or almond oils. - When buying dairy, choose regular or full fat options. - Choose vegetables that grow above ground as they are generally lower in carbohydrates. - Avoid bread, rice, potatoes, pasta and all sources of simple sugars (desserts, soda, breakfast cereals). - Choose beverages that are calorie and sugar free, such as water or flavored buitrago. - When eating fruit, choose berries as a snack option a few times a week.

## 2018-11-06 ENCOUNTER — OFFICE VISIT (OUTPATIENT)
Dept: INTERNAL MEDICINE CLINIC | Age: 73
End: 2018-11-06
Payer: MEDICARE

## 2018-11-06 VITALS
HEART RATE: 92 BPM | OXYGEN SATURATION: 93 % | BODY MASS INDEX: 45.93 KG/M2 | SYSTOLIC BLOOD PRESSURE: 126 MMHG | DIASTOLIC BLOOD PRESSURE: 84 MMHG | WEIGHT: 269 LBS | HEIGHT: 64 IN

## 2018-11-06 DIAGNOSIS — I48.20 CHRONIC ATRIAL FIBRILLATION (HCC): Primary | ICD-10-CM

## 2018-11-06 DIAGNOSIS — I10 ESSENTIAL HYPERTENSION: ICD-10-CM

## 2018-11-06 DIAGNOSIS — F41.9 ANXIETY: ICD-10-CM

## 2018-11-06 DIAGNOSIS — J45.40 MODERATE PERSISTENT ASTHMA WITHOUT COMPLICATION: ICD-10-CM

## 2018-11-06 PROCEDURE — 99214 OFFICE O/P EST MOD 30 MIN: CPT | Performed by: INTERNAL MEDICINE

## 2018-11-06 PROCEDURE — 1123F ACP DISCUSS/DSCN MKR DOCD: CPT | Performed by: INTERNAL MEDICINE

## 2018-11-06 PROCEDURE — 1036F TOBACCO NON-USER: CPT | Performed by: INTERNAL MEDICINE

## 2018-11-06 PROCEDURE — G8482 FLU IMMUNIZE ORDER/ADMIN: HCPCS | Performed by: INTERNAL MEDICINE

## 2018-11-06 PROCEDURE — G8427 DOCREV CUR MEDS BY ELIG CLIN: HCPCS | Performed by: INTERNAL MEDICINE

## 2018-11-06 PROCEDURE — 3017F COLORECTAL CA SCREEN DOC REV: CPT | Performed by: INTERNAL MEDICINE

## 2018-11-06 PROCEDURE — G8399 PT W/DXA RESULTS DOCUMENT: HCPCS | Performed by: INTERNAL MEDICINE

## 2018-11-06 PROCEDURE — G8598 ASA/ANTIPLAT THER USED: HCPCS | Performed by: INTERNAL MEDICINE

## 2018-11-06 PROCEDURE — 4040F PNEUMOC VAC/ADMIN/RCVD: CPT | Performed by: INTERNAL MEDICINE

## 2018-11-06 PROCEDURE — 1101F PT FALLS ASSESS-DOCD LE1/YR: CPT | Performed by: INTERNAL MEDICINE

## 2018-11-06 PROCEDURE — 1090F PRES/ABSN URINE INCON ASSESS: CPT | Performed by: INTERNAL MEDICINE

## 2018-11-06 PROCEDURE — G8417 CALC BMI ABV UP PARAM F/U: HCPCS | Performed by: INTERNAL MEDICINE

## 2018-11-06 RX ORDER — CLONAZEPAM 1 MG/1
1 TABLET ORAL EVERY EVENING
Qty: 90 TABLET | Refills: 0 | Status: SHIPPED | OUTPATIENT
Start: 2018-11-06 | End: 2019-01-31 | Stop reason: SDUPTHER

## 2018-11-06 RX ORDER — PANTOPRAZOLE SODIUM 40 MG/1
40 TABLET, DELAYED RELEASE ORAL DAILY
Qty: 90 TABLET | Refills: 1 | Status: SHIPPED | OUTPATIENT
Start: 2018-11-06 | End: 2019-04-14 | Stop reason: SDUPTHER

## 2018-11-06 NOTE — LETTER
[facility]  [address] [place] [address]  Phone: [phone]  Fax: [phone]    Scott Salas MD        November 6, 2018     Patient: Yessenia Barone   YOB: 1945   Date of Visit: 11/6/2018       To Whom It May Concern: It is my medical opinion that Willaim Frankel requires a disability parking placard for the following reasons:  She cannot walk 200 feet without stopping to rest.  Duration of need: permanent    If you have any questions or concerns, please don't hesitate to call.     Sincerely,        Scott Salas MD

## 2018-11-16 ENCOUNTER — ANTI-COAG VISIT (OUTPATIENT)
Dept: PHARMACY | Age: 73
End: 2018-11-16
Payer: MEDICARE

## 2018-11-16 DIAGNOSIS — I48.20 CHRONIC ATRIAL FIBRILLATION (HCC): ICD-10-CM

## 2018-11-16 LAB — INTERNATIONAL NORMALIZATION RATIO, POC: 3.4

## 2018-11-16 PROCEDURE — 99211 OFF/OP EST MAY X REQ PHY/QHP: CPT

## 2018-11-16 PROCEDURE — 85610 PROTHROMBIN TIME: CPT

## 2018-12-04 RX ORDER — ALLOPURINOL 300 MG/1
300 TABLET ORAL DAILY
Qty: 90 TABLET | Refills: 3 | Status: SHIPPED | OUTPATIENT
Start: 2018-12-04 | End: 2019-10-27 | Stop reason: SDUPTHER

## 2018-12-06 ENCOUNTER — OFFICE VISIT (OUTPATIENT)
Dept: CARDIOLOGY CLINIC | Age: 73
End: 2018-12-06
Payer: MEDICARE

## 2018-12-06 ENCOUNTER — TELEPHONE (OUTPATIENT)
Dept: CARDIOLOGY CLINIC | Age: 73
End: 2018-12-06

## 2018-12-06 ENCOUNTER — ANTI-COAG VISIT (OUTPATIENT)
Dept: PHARMACY | Age: 73
End: 2018-12-06
Payer: MEDICARE

## 2018-12-06 VITALS
WEIGHT: 270 LBS | HEIGHT: 64 IN | SYSTOLIC BLOOD PRESSURE: 108 MMHG | BODY MASS INDEX: 46.1 KG/M2 | OXYGEN SATURATION: 97 % | DIASTOLIC BLOOD PRESSURE: 75 MMHG | HEART RATE: 100 BPM

## 2018-12-06 DIAGNOSIS — I48.91 ATRIAL FIBRILLATION, UNSPECIFIED TYPE (HCC): Primary | ICD-10-CM

## 2018-12-06 DIAGNOSIS — I48.20 CHRONIC ATRIAL FIBRILLATION (HCC): ICD-10-CM

## 2018-12-06 LAB — INTERNATIONAL NORMALIZATION RATIO, POC: 2.9

## 2018-12-06 PROCEDURE — 1090F PRES/ABSN URINE INCON ASSESS: CPT | Performed by: NURSE PRACTITIONER

## 2018-12-06 PROCEDURE — 99211 OFF/OP EST MAY X REQ PHY/QHP: CPT

## 2018-12-06 PROCEDURE — G8399 PT W/DXA RESULTS DOCUMENT: HCPCS | Performed by: NURSE PRACTITIONER

## 2018-12-06 PROCEDURE — G8417 CALC BMI ABV UP PARAM F/U: HCPCS | Performed by: NURSE PRACTITIONER

## 2018-12-06 PROCEDURE — 85610 PROTHROMBIN TIME: CPT

## 2018-12-06 PROCEDURE — 93000 ELECTROCARDIOGRAM COMPLETE: CPT | Performed by: NURSE PRACTITIONER

## 2018-12-06 PROCEDURE — 99214 OFFICE O/P EST MOD 30 MIN: CPT | Performed by: NURSE PRACTITIONER

## 2018-12-06 PROCEDURE — 1123F ACP DISCUSS/DSCN MKR DOCD: CPT | Performed by: NURSE PRACTITIONER

## 2018-12-06 PROCEDURE — G8427 DOCREV CUR MEDS BY ELIG CLIN: HCPCS | Performed by: NURSE PRACTITIONER

## 2018-12-06 PROCEDURE — 3017F COLORECTAL CA SCREEN DOC REV: CPT | Performed by: NURSE PRACTITIONER

## 2018-12-06 PROCEDURE — 1101F PT FALLS ASSESS-DOCD LE1/YR: CPT | Performed by: NURSE PRACTITIONER

## 2018-12-06 PROCEDURE — G8598 ASA/ANTIPLAT THER USED: HCPCS | Performed by: NURSE PRACTITIONER

## 2018-12-06 PROCEDURE — G8482 FLU IMMUNIZE ORDER/ADMIN: HCPCS | Performed by: NURSE PRACTITIONER

## 2018-12-06 PROCEDURE — 4040F PNEUMOC VAC/ADMIN/RCVD: CPT | Performed by: NURSE PRACTITIONER

## 2018-12-06 PROCEDURE — 1036F TOBACCO NON-USER: CPT | Performed by: NURSE PRACTITIONER

## 2018-12-07 RX ORDER — PROPAFENONE HYDROCHLORIDE 225 MG/1
225 TABLET, FILM COATED ORAL EVERY 8 HOURS
COMMUNITY
End: 2018-12-07 | Stop reason: SDUPTHER

## 2018-12-07 RX ORDER — PROPAFENONE HYDROCHLORIDE 225 MG/1
225 TABLET, FILM COATED ORAL EVERY 8 HOURS
Qty: 90 TABLET | Refills: 3 | Status: ON HOLD | OUTPATIENT
Start: 2018-12-07 | End: 2018-12-14 | Stop reason: HOSPADM

## 2018-12-07 NOTE — TELEPHONE ENCOUNTER
Spoke with QUOC personally and she advised me to have the pt take Rythmol 225 mg q8hrs. When QUOC discussed this case with SIL he agreed and advised the pt to only take 5-6 doses of the Rythmol and then come in for an EKG. So pt will take the medication for 2 days prior to the EKG. SIL would also like to do a Cardioversion on this pt in 2-3 weeks. The pt prefers to come to [facility] for the procedure but would agree to also go to [address] [place] [address] if she had to. Per MR RN she informed me that the pt should get weekly INR's checked until her DCCV. Pt verbalized understanding and agreed. She will call the anti-coag clinic on Monday to let them know about the weekly checks. E prescribed Rythmol 225 mg #90 with 3 refills to [address] in [address] [facility]]  Call center: Pt is coming in for an EKG at 11 on Thursday 12/13/13. Pt is aware that she needs to start taking the new medication on Tuesday. Please put the EKG on the schedule. Syl:Can you please schedule the pt for a DCCV with SIL in 2-3 wks.

## 2018-12-10 ENCOUNTER — TELEPHONE (OUTPATIENT)
Dept: PHARMACY | Age: 73
End: 2018-12-10

## 2018-12-11 ENCOUNTER — TELEPHONE (OUTPATIENT)
Dept: RHEUMATOLOGY | Age: 73
End: 2018-12-11
Payer: MEDICARE

## 2018-12-11 DIAGNOSIS — R35.0 URINARY FREQUENCY: Primary | ICD-10-CM

## 2018-12-11 DIAGNOSIS — R30.0 BURNING WITH URINATION: ICD-10-CM

## 2018-12-11 LAB
BILIRUBIN, POC: ABNORMAL
BLOOD URINE, POC: ABNORMAL
CLARITY, POC: ABNORMAL
COLOR, POC: ABNORMAL
GLUCOSE URINE, POC: ABNORMAL
KETONES, POC: ABNORMAL
LEUKOCYTE EST, POC: ABNORMAL
NITRITE, POC: ABNORMAL
PH, POC: 6.5
PROTEIN, POC: ABNORMAL
SPECIFIC GRAVITY, POC: 1.02
UROBILINOGEN, POC: 2

## 2018-12-11 PROCEDURE — 81002 URINALYSIS NONAUTO W/O SCOPE: CPT | Performed by: INTERNAL MEDICINE

## 2018-12-13 ENCOUNTER — NURSE ONLY (OUTPATIENT)
Dept: CARDIOLOGY CLINIC | Age: 73
End: 2018-12-13
Payer: MEDICARE

## 2018-12-13 ENCOUNTER — ANTI-COAG VISIT (OUTPATIENT)
Dept: PHARMACY | Age: 73
End: 2018-12-13
Payer: MEDICARE

## 2018-12-13 DIAGNOSIS — I48.91 ATRIAL FIBRILLATION, UNSPECIFIED TYPE (HCC): Primary | ICD-10-CM

## 2018-12-13 DIAGNOSIS — I10 ESSENTIAL HYPERTENSION: ICD-10-CM

## 2018-12-13 DIAGNOSIS — I48.91 ATRIAL FIBRILLATION, UNSPECIFIED TYPE (HCC): ICD-10-CM

## 2018-12-13 LAB
INTERNATIONAL NORMALIZATION RATIO, POC: 3.6
URINE CULTURE, ROUTINE: NORMAL

## 2018-12-13 PROCEDURE — 99211 OFF/OP EST MAY X REQ PHY/QHP: CPT

## 2018-12-13 PROCEDURE — 85610 PROTHROMBIN TIME: CPT

## 2018-12-13 PROCEDURE — 93000 ELECTROCARDIOGRAM COMPLETE: CPT | Performed by: NURSE PRACTITIONER

## 2018-12-14 ENCOUNTER — TELEPHONE (OUTPATIENT)
Dept: PHARMACY | Age: 73
End: 2018-12-14

## 2018-12-14 ENCOUNTER — HOSPITAL ENCOUNTER (OUTPATIENT)
Dept: CARDIAC CATH/INVASIVE PROCEDURES | Age: 73
Discharge: HOME | End: 2018-12-14
Attending: INTERNAL MEDICINE | Admitting: INTERNAL MEDICINE
Payer: MEDICARE

## 2018-12-14 VITALS — WEIGHT: 265 LBS | HEIGHT: 64 IN | BODY MASS INDEX: 45.24 KG/M2

## 2018-12-14 LAB
EKG ATRIAL RATE: 63 BPM
EKG ATRIAL RATE: 91 BPM
EKG DIAGNOSIS: NORMAL
EKG DIAGNOSIS: NORMAL
EKG P AXIS: 48 DEGREES
EKG P-R INTERVAL: 170 MS
EKG Q-T INTERVAL: 350 MS
EKG Q-T INTERVAL: 424 MS
EKG QRS DURATION: 94 MS
EKG QRS DURATION: 96 MS
EKG QTC CALCULATION (BAZETT): 433 MS
EKG QTC CALCULATION (BAZETT): 444 MS
EKG R AXIS: 2 DEGREES
EKG R AXIS: 6 DEGREES
EKG T AXIS: 35 DEGREES
EKG T AXIS: 42 DEGREES
EKG VENTRICULAR RATE: 63 BPM
EKG VENTRICULAR RATE: 97 BPM
GFR AFRICAN AMERICAN: >60
GFR NON-AFRICAN AMERICAN: >60
GLUCOSE BLD-MCNC: 97 MG/DL (ref 70–99)
INR BLD: 2.9 (ref 0.85–1.15)
PERFORMED ON: ABNORMAL
POC BUN: 13 MG/DL (ref 7–18)
POC CREATININE: 0.9 MG/DL (ref 0.6–1.2)
POC HEMATOCRIT: 42 % (ref 36–48)
POC POTASSIUM: 3.2 MMOL/L (ref 3.5–5.1)
POC SAMPLE TYPE: ABNORMAL
POC SODIUM: 140 MMOL/L (ref 136–145)

## 2018-12-14 PROCEDURE — 92960 CARDIOVERSION ELECTRIC EXT: CPT | Performed by: INTERNAL MEDICINE

## 2018-12-14 PROCEDURE — 82947 ASSAY GLUCOSE BLOOD QUANT: CPT

## 2018-12-14 PROCEDURE — 2500000003 HC RX 250 WO HCPCS

## 2018-12-14 PROCEDURE — 85610 PROTHROMBIN TIME: CPT

## 2018-12-14 PROCEDURE — 82565 ASSAY OF CREATININE: CPT

## 2018-12-14 PROCEDURE — 93010 ELECTROCARDIOGRAM REPORT: CPT | Performed by: INTERNAL MEDICINE

## 2018-12-14 PROCEDURE — 93005 ELECTROCARDIOGRAM TRACING: CPT | Performed by: INTERNAL MEDICINE

## 2018-12-14 PROCEDURE — 6370000000 HC RX 637 (ALT 250 FOR IP)

## 2018-12-14 PROCEDURE — 84132 ASSAY OF SERUM POTASSIUM: CPT

## 2018-12-14 PROCEDURE — 84520 ASSAY OF UREA NITROGEN: CPT

## 2018-12-14 PROCEDURE — 84295 ASSAY OF SERUM SODIUM: CPT

## 2018-12-14 PROCEDURE — 7100000010 HC PHASE II RECOVERY - FIRST 15 MIN

## 2018-12-14 PROCEDURE — 85014 HEMATOCRIT: CPT

## 2018-12-14 RX ORDER — PROPAFENONE HYDROCHLORIDE 225 MG/1
325 CAPSULE, EXTENDED RELEASE ORAL 3 TIMES DAILY
Qty: 90 CAPSULE | Refills: 5 | Status: SHIPPED | OUTPATIENT
Start: 2018-12-14 | End: 2019-02-05 | Stop reason: SDUPTHER

## 2018-12-14 NOTE — H&P
smoking about 53 years ago. Her smoking use included Cigarettes. She has a 2.00 pack-year smoking history. She has never used smokeless tobacco. She reports that she drinks alcohol. She reports that she does not use drugs.      Family History:  family history includes Arthritis in her father; Asthma in her father; Breast Cancer in her sister; Depression in her mother; Heart Disease in her father; High Blood Pressure in her father and mother; Stroke in her father.      Home Medications:     Encounter Medications               Outpatient Encounter Prescriptions as of 9/7/2018   Medication Sig Dispense Refill    citalopram (CELEXA) 40 MG tablet TAKE 1 TABLET BY MOUTH  DAILY 90 tablet 1    verapamil (CALAN SR) 240 MG extended release tablet TAKE 1 TABLET BY MOUTH  NIGHTLY 90 tablet 1    clonazePAM (KLONOPIN) 1 MG tablet Take 1 tablet by mouth every evening for 90 days. . 90 tablet 0    albuterol sulfate  (90 Base) MCG/ACT inhaler Inhale 2 puffs into the lungs every 4 hours as needed for Shortness of Breath 1 Inhaler 3    Ascorbic Acid (VITAMIN C PO) 1 tablet daily        SYMBICORT 160-4.5 MCG/ACT AERO INHALE TWO PUFFS BY MOUTH TWICE A DAY 1 Inhaler 2    pantoprazole (PROTONIX) 40 MG tablet Take 1 tablet by mouth daily 90 tablet 1    atorvastatin (LIPITOR) 80 MG tablet TAKE 1 TABLET BY MOUTH ONCE A DAY 90 tablet 1    montelukast (SINGULAIR) 10 MG tablet Take 1 tablet by mouth nightly 90 tablet 3    gabapentin (NEURONTIN) 300 MG capsule TAKE 2 CAPSULES BY MOUTH 3  TIMES DAILY 540 capsule 3    indapamide (LOZOL) 1.25 MG tablet TAKE 1 TABLET BY MOUTH  EVERY MORNING 90 tablet 3    allopurinol (ZYLOPRIM) 300 MG tablet TAKE 1 TABLET BY MOUTH  DAILY 90 tablet 3    trimethoprim (TRIMPEX) 100 MG tablet TAKE 1 TABLET BY MOUTH  EVERY 48 HOURS 45 tablet 3    warfarin (COUMADIN) 5 MG tablet TAKE 2 TABLETS BY MOUTH ON  MONDAY, WEDNESDAY, AND  FRIDAY; TAKE 1 AND 1/2  TABLETS ALL OTHER DAYS OF  THE WEEK 156 tablet 3

## 2018-12-14 NOTE — OP NOTE
Cardioversion Report:    Mallampati: 3  ASA: 2    afib to afib under brevital anesthesia( total of 100 mg given(but one time dose probably around 45-50 mg ) despite two syn shocks at 200 j without any apparent complications. The brevital 45 mg and 20 mg chaser with 360 j ant/post shock to sinus without apparent complications.

## 2018-12-20 ENCOUNTER — ANTI-COAG VISIT (OUTPATIENT)
Dept: PHARMACY | Age: 73
End: 2018-12-20
Payer: MEDICARE

## 2018-12-20 DIAGNOSIS — I48.0 PAROXYSMAL ATRIAL FIBRILLATION (HCC): ICD-10-CM

## 2018-12-20 LAB — INTERNATIONAL NORMALIZATION RATIO, POC: 3.9

## 2018-12-20 PROCEDURE — 99212 OFFICE O/P EST SF 10 MIN: CPT

## 2018-12-20 PROCEDURE — 85610 PROTHROMBIN TIME: CPT

## 2018-12-24 ENCOUNTER — TELEPHONE (OUTPATIENT)
Dept: CARDIOLOGY CLINIC | Age: 73
End: 2018-12-24

## 2018-12-24 RX ORDER — POTASSIUM CHLORIDE 1500 MG/1
20 TABLET, FILM COATED, EXTENDED RELEASE ORAL
COMMUNITY
End: 2018-12-24 | Stop reason: SDUPTHER

## 2018-12-24 RX ORDER — POTASSIUM CHLORIDE 1500 MG/1
20 TABLET, FILM COATED, EXTENDED RELEASE ORAL
Qty: 30 TABLET | Refills: 3 | Status: SHIPPED | OUTPATIENT
Start: 2018-12-24 | End: 2019-02-05 | Stop reason: SDUPTHER

## 2018-12-27 RX ORDER — ATORVASTATIN CALCIUM 80 MG/1
TABLET, FILM COATED ORAL
Qty: 90 TABLET | Refills: 3 | Status: SHIPPED | OUTPATIENT
Start: 2018-12-27 | End: 2019-12-18 | Stop reason: SDUPTHER

## 2019-01-03 ENCOUNTER — ANTI-COAG VISIT (OUTPATIENT)
Dept: PHARMACY | Age: 74
End: 2019-01-03
Payer: MEDICARE

## 2019-01-03 DIAGNOSIS — I48.0 PAROXYSMAL ATRIAL FIBRILLATION (HCC): ICD-10-CM

## 2019-01-03 LAB — INTERNATIONAL NORMALIZATION RATIO, POC: 2.1

## 2019-01-03 PROCEDURE — 85610 PROTHROMBIN TIME: CPT

## 2019-01-03 PROCEDURE — 99211 OFF/OP EST MAY X REQ PHY/QHP: CPT

## 2019-01-17 ENCOUNTER — OFFICE VISIT (OUTPATIENT)
Dept: BARIATRICS/WEIGHT MGMT | Age: 74
End: 2019-01-17
Payer: MEDICARE

## 2019-01-17 VITALS
DIASTOLIC BLOOD PRESSURE: 76 MMHG | WEIGHT: 268 LBS | HEART RATE: 82 BPM | HEIGHT: 64 IN | BODY MASS INDEX: 45.75 KG/M2 | SYSTOLIC BLOOD PRESSURE: 116 MMHG | RESPIRATION RATE: 16 BRPM

## 2019-01-17 DIAGNOSIS — Z98.84 HISTORY OF ADJUSTABLE GASTRIC BANDING: ICD-10-CM

## 2019-01-17 DIAGNOSIS — E66.01 MORBID OBESITY WITH BMI OF 45.0-49.9, ADULT (HCC): Primary | ICD-10-CM

## 2019-01-17 PROCEDURE — 1036F TOBACCO NON-USER: CPT | Performed by: NURSE PRACTITIONER

## 2019-01-17 PROCEDURE — G8417 CALC BMI ABV UP PARAM F/U: HCPCS | Performed by: NURSE PRACTITIONER

## 2019-01-17 PROCEDURE — 4040F PNEUMOC VAC/ADMIN/RCVD: CPT | Performed by: NURSE PRACTITIONER

## 2019-01-17 PROCEDURE — G8598 ASA/ANTIPLAT THER USED: HCPCS | Performed by: NURSE PRACTITIONER

## 2019-01-17 PROCEDURE — G8482 FLU IMMUNIZE ORDER/ADMIN: HCPCS | Performed by: NURSE PRACTITIONER

## 2019-01-17 PROCEDURE — 3017F COLORECTAL CA SCREEN DOC REV: CPT | Performed by: NURSE PRACTITIONER

## 2019-01-17 PROCEDURE — 1123F ACP DISCUSS/DSCN MKR DOCD: CPT | Performed by: NURSE PRACTITIONER

## 2019-01-17 PROCEDURE — 1090F PRES/ABSN URINE INCON ASSESS: CPT | Performed by: NURSE PRACTITIONER

## 2019-01-17 PROCEDURE — 99213 OFFICE O/P EST LOW 20 MIN: CPT | Performed by: NURSE PRACTITIONER

## 2019-01-17 PROCEDURE — G8399 PT W/DXA RESULTS DOCUMENT: HCPCS | Performed by: NURSE PRACTITIONER

## 2019-01-17 PROCEDURE — G8427 DOCREV CUR MEDS BY ELIG CLIN: HCPCS | Performed by: NURSE PRACTITIONER

## 2019-01-17 PROCEDURE — 1101F PT FALLS ASSESS-DOCD LE1/YR: CPT | Performed by: NURSE PRACTITIONER

## 2019-01-17 ASSESSMENT — ENCOUNTER SYMPTOMS
GASTROINTESTINAL NEGATIVE: 1
RESPIRATORY NEGATIVE: 1
EYES NEGATIVE: 1

## 2019-01-22 RX ORDER — CITALOPRAM 40 MG/1
TABLET ORAL
Qty: 90 TABLET | Refills: 3 | Status: SHIPPED | OUTPATIENT
Start: 2019-01-22 | End: 2019-12-18 | Stop reason: SDUPTHER

## 2019-01-22 RX ORDER — VERAPAMIL HYDROCHLORIDE 240 MG/1
TABLET, FILM COATED, EXTENDED RELEASE ORAL
Qty: 90 TABLET | Refills: 3 | Status: SHIPPED | OUTPATIENT
Start: 2019-01-22 | End: 2019-07-05

## 2019-01-24 ENCOUNTER — ANTI-COAG VISIT (OUTPATIENT)
Dept: PHARMACY | Age: 74
End: 2019-01-24
Payer: MEDICARE

## 2019-01-24 DIAGNOSIS — I48.0 PAROXYSMAL ATRIAL FIBRILLATION (HCC): ICD-10-CM

## 2019-01-24 LAB — INTERNATIONAL NORMALIZATION RATIO, POC: 2.5

## 2019-01-24 PROCEDURE — 85610 PROTHROMBIN TIME: CPT

## 2019-01-24 PROCEDURE — 99211 OFF/OP EST MAY X REQ PHY/QHP: CPT

## 2019-01-30 RX ORDER — WARFARIN SODIUM 5 MG/1
TABLET ORAL
Qty: 143 TABLET | Refills: 1 | Status: SHIPPED | OUTPATIENT
Start: 2019-01-30 | End: 2019-05-26 | Stop reason: SDUPTHER

## 2019-01-31 DIAGNOSIS — F41.9 ANXIETY: ICD-10-CM

## 2019-01-31 RX ORDER — CLONAZEPAM 1 MG/1
1 TABLET ORAL EVERY EVENING
Qty: 90 TABLET | Refills: 0 | Status: SHIPPED | OUTPATIENT
Start: 2019-01-31 | End: 2019-04-24 | Stop reason: SDUPTHER

## 2019-02-05 RX ORDER — PROPAFENONE HYDROCHLORIDE 225 MG/1
225 CAPSULE, EXTENDED RELEASE ORAL 3 TIMES DAILY
Qty: 270 CAPSULE | Refills: 1 | Status: SHIPPED | OUTPATIENT
Start: 2019-02-05 | End: 2019-03-19

## 2019-02-05 RX ORDER — POTASSIUM CHLORIDE 1500 MG/1
20 TABLET, FILM COATED, EXTENDED RELEASE ORAL
Qty: 90 TABLET | Refills: 1 | Status: SHIPPED | OUTPATIENT
Start: 2019-02-05 | End: 2019-04-14 | Stop reason: SDUPTHER

## 2019-02-07 ENCOUNTER — OFFICE VISIT (OUTPATIENT)
Dept: INTERNAL MEDICINE CLINIC | Age: 74
End: 2019-02-07
Payer: MEDICARE

## 2019-02-07 VITALS
BODY MASS INDEX: 46.78 KG/M2 | WEIGHT: 274 LBS | SYSTOLIC BLOOD PRESSURE: 132 MMHG | HEART RATE: 64 BPM | DIASTOLIC BLOOD PRESSURE: 78 MMHG | HEIGHT: 64 IN

## 2019-02-07 DIAGNOSIS — F41.9 ANXIETY: ICD-10-CM

## 2019-02-07 DIAGNOSIS — J45.40 MODERATE PERSISTENT ASTHMA WITHOUT COMPLICATION: ICD-10-CM

## 2019-02-07 DIAGNOSIS — I48.0 PAROXYSMAL ATRIAL FIBRILLATION (HCC): Primary | ICD-10-CM

## 2019-02-07 PROBLEM — R06.09 DOE (DYSPNEA ON EXERTION): Status: RESOLVED | Noted: 2018-04-10 | Resolved: 2019-02-07

## 2019-02-07 LAB
AMPHETAMINE SCREEN, URINE: NORMAL
BARBITURATE SCREEN URINE: NORMAL
BENZODIAZEPINE SCREEN, URINE: NORMAL
CANNABINOID SCREEN URINE: NORMAL
COCAINE METABOLITE SCREEN URINE: NORMAL
Lab: NORMAL
METHADONE SCREEN, URINE: NORMAL
OPIATE SCREEN URINE: NORMAL
OXYCODONE URINE: NORMAL
PH UA: 6
PHENCYCLIDINE SCREEN URINE: NORMAL
PROPOXYPHENE SCREEN: NORMAL

## 2019-02-07 PROCEDURE — G8482 FLU IMMUNIZE ORDER/ADMIN: HCPCS | Performed by: INTERNAL MEDICINE

## 2019-02-07 PROCEDURE — G8427 DOCREV CUR MEDS BY ELIG CLIN: HCPCS | Performed by: INTERNAL MEDICINE

## 2019-02-07 PROCEDURE — G8510 SCR DEP NEG, NO PLAN REQD: HCPCS | Performed by: INTERNAL MEDICINE

## 2019-02-07 PROCEDURE — 1123F ACP DISCUSS/DSCN MKR DOCD: CPT | Performed by: INTERNAL MEDICINE

## 2019-02-07 PROCEDURE — G8399 PT W/DXA RESULTS DOCUMENT: HCPCS | Performed by: INTERNAL MEDICINE

## 2019-02-07 PROCEDURE — G8417 CALC BMI ABV UP PARAM F/U: HCPCS | Performed by: INTERNAL MEDICINE

## 2019-02-07 PROCEDURE — 1036F TOBACCO NON-USER: CPT | Performed by: INTERNAL MEDICINE

## 2019-02-07 PROCEDURE — 1101F PT FALLS ASSESS-DOCD LE1/YR: CPT | Performed by: INTERNAL MEDICINE

## 2019-02-07 PROCEDURE — 3017F COLORECTAL CA SCREEN DOC REV: CPT | Performed by: INTERNAL MEDICINE

## 2019-02-07 PROCEDURE — G8598 ASA/ANTIPLAT THER USED: HCPCS | Performed by: INTERNAL MEDICINE

## 2019-02-07 PROCEDURE — 4040F PNEUMOC VAC/ADMIN/RCVD: CPT | Performed by: INTERNAL MEDICINE

## 2019-02-07 PROCEDURE — 1090F PRES/ABSN URINE INCON ASSESS: CPT | Performed by: INTERNAL MEDICINE

## 2019-02-07 PROCEDURE — 99214 OFFICE O/P EST MOD 30 MIN: CPT | Performed by: INTERNAL MEDICINE

## 2019-02-07 ASSESSMENT — PATIENT HEALTH QUESTIONNAIRE - PHQ9
SUM OF ALL RESPONSES TO PHQ QUESTIONS 1-9: 1
SUM OF ALL RESPONSES TO PHQ9 QUESTIONS 1 & 2: 1
1. LITTLE INTEREST OR PLEASURE IN DOING THINGS: 0
SUM OF ALL RESPONSES TO PHQ QUESTIONS 1-9: 1
2. FEELING DOWN, DEPRESSED OR HOPELESS: 1

## 2019-02-11 ENCOUNTER — TELEPHONE (OUTPATIENT)
Dept: CARDIOLOGY CLINIC | Age: 74
End: 2019-02-11

## 2019-02-21 ENCOUNTER — ANTI-COAG VISIT (OUTPATIENT)
Dept: PHARMACY | Age: 74
End: 2019-02-21
Payer: MEDICARE

## 2019-02-21 DIAGNOSIS — I48.0 PAROXYSMAL ATRIAL FIBRILLATION (HCC): ICD-10-CM

## 2019-02-21 LAB — INTERNATIONAL NORMALIZATION RATIO, POC: 2.2

## 2019-02-21 PROCEDURE — 99211 OFF/OP EST MAY X REQ PHY/QHP: CPT

## 2019-02-21 PROCEDURE — 85610 PROTHROMBIN TIME: CPT

## 2019-03-01 ENCOUNTER — OFFICE VISIT (OUTPATIENT)
Dept: CARDIOLOGY CLINIC | Age: 74
End: 2019-03-01
Payer: MEDICARE

## 2019-03-01 VITALS
OXYGEN SATURATION: 94 % | DIASTOLIC BLOOD PRESSURE: 81 MMHG | BODY MASS INDEX: 46.95 KG/M2 | HEIGHT: 64 IN | SYSTOLIC BLOOD PRESSURE: 128 MMHG | HEART RATE: 73 BPM | WEIGHT: 275 LBS

## 2019-03-01 DIAGNOSIS — I48.0 PAROXYSMAL ATRIAL FIBRILLATION (HCC): Primary | ICD-10-CM

## 2019-03-01 PROCEDURE — 4040F PNEUMOC VAC/ADMIN/RCVD: CPT | Performed by: INTERNAL MEDICINE

## 2019-03-01 PROCEDURE — 3017F COLORECTAL CA SCREEN DOC REV: CPT | Performed by: INTERNAL MEDICINE

## 2019-03-01 PROCEDURE — 3014F SCREEN MAMMO DOC REV: CPT | Performed by: INTERNAL MEDICINE

## 2019-03-01 PROCEDURE — G8482 FLU IMMUNIZE ORDER/ADMIN: HCPCS | Performed by: INTERNAL MEDICINE

## 2019-03-01 PROCEDURE — G8399 PT W/DXA RESULTS DOCUMENT: HCPCS | Performed by: INTERNAL MEDICINE

## 2019-03-01 PROCEDURE — 1090F PRES/ABSN URINE INCON ASSESS: CPT | Performed by: INTERNAL MEDICINE

## 2019-03-01 PROCEDURE — 1101F PT FALLS ASSESS-DOCD LE1/YR: CPT | Performed by: INTERNAL MEDICINE

## 2019-03-01 PROCEDURE — G8427 DOCREV CUR MEDS BY ELIG CLIN: HCPCS | Performed by: INTERNAL MEDICINE

## 2019-03-01 PROCEDURE — 99214 OFFICE O/P EST MOD 30 MIN: CPT | Performed by: INTERNAL MEDICINE

## 2019-03-01 PROCEDURE — 1036F TOBACCO NON-USER: CPT | Performed by: INTERNAL MEDICINE

## 2019-03-01 PROCEDURE — 93000 ELECTROCARDIOGRAM COMPLETE: CPT | Performed by: INTERNAL MEDICINE

## 2019-03-01 PROCEDURE — G8598 ASA/ANTIPLAT THER USED: HCPCS | Performed by: INTERNAL MEDICINE

## 2019-03-01 PROCEDURE — G8417 CALC BMI ABV UP PARAM F/U: HCPCS | Performed by: INTERNAL MEDICINE

## 2019-03-01 PROCEDURE — 1123F ACP DISCUSS/DSCN MKR DOCD: CPT | Performed by: INTERNAL MEDICINE

## 2019-03-11 RX ORDER — TRIMETHOPRIM 100 MG/1
TABLET ORAL
Qty: 45 TABLET | Refills: 3 | Status: SHIPPED | OUTPATIENT
Start: 2019-03-11 | End: 2020-06-29

## 2019-03-11 RX ORDER — GABAPENTIN 300 MG/1
CAPSULE ORAL
Qty: 540 CAPSULE | Refills: 3 | Status: SHIPPED | OUTPATIENT
Start: 2019-03-11 | End: 2020-06-29

## 2019-03-15 RX ORDER — PROPAFENONE HYDROCHLORIDE 225 MG/1
225 TABLET, FILM COATED ORAL EVERY 8 HOURS
Qty: 270 TABLET | Refills: 3 | Status: SHIPPED | OUTPATIENT
Start: 2019-03-15 | End: 2019-08-13 | Stop reason: SDUPTHER

## 2019-03-19 ENCOUNTER — ANTI-COAG VISIT (OUTPATIENT)
Dept: PHARMACY | Age: 74
End: 2019-03-19
Payer: MEDICARE

## 2019-03-19 DIAGNOSIS — I48.0 PAROXYSMAL ATRIAL FIBRILLATION (HCC): ICD-10-CM

## 2019-03-19 LAB — INTERNATIONAL NORMALIZATION RATIO, POC: 2.6

## 2019-03-19 PROCEDURE — 85610 PROTHROMBIN TIME: CPT

## 2019-03-19 PROCEDURE — 99211 OFF/OP EST MAY X REQ PHY/QHP: CPT

## 2019-03-20 ENCOUNTER — TELEPHONE (OUTPATIENT)
Dept: CARDIOLOGY CLINIC | Age: 74
End: 2019-03-20

## 2019-04-15 RX ORDER — PANTOPRAZOLE SODIUM 40 MG/1
40 TABLET, DELAYED RELEASE ORAL
Qty: 90 TABLET | Refills: 3 | Status: SHIPPED | OUTPATIENT
Start: 2019-04-15 | End: 2020-04-27

## 2019-04-15 RX ORDER — INDAPAMIDE 1.25 MG/1
TABLET, FILM COATED ORAL
Qty: 90 TABLET | Refills: 3 | Status: SHIPPED | OUTPATIENT
Start: 2019-04-15 | End: 2020-06-01

## 2019-04-15 RX ORDER — MONTELUKAST SODIUM 10 MG/1
10 TABLET ORAL NIGHTLY
Qty: 90 TABLET | Refills: 3 | Status: SHIPPED | OUTPATIENT
Start: 2019-04-15 | End: 2020-04-27

## 2019-04-16 RX ORDER — POTASSIUM CHLORIDE 20 MEQ/1
TABLET, EXTENDED RELEASE ORAL
Qty: 90 TABLET | Refills: 0 | Status: SHIPPED | OUTPATIENT
Start: 2019-04-16 | End: 2019-10-01 | Stop reason: SDUPTHER

## 2019-04-17 ENCOUNTER — ANTI-COAG VISIT (OUTPATIENT)
Dept: PHARMACY | Age: 74
End: 2019-04-17
Payer: MEDICARE

## 2019-04-17 ENCOUNTER — OFFICE VISIT (OUTPATIENT)
Dept: BARIATRICS/WEIGHT MGMT | Age: 74
End: 2019-04-17

## 2019-04-17 VITALS — HEIGHT: 64 IN | BODY MASS INDEX: 46.61 KG/M2 | WEIGHT: 273 LBS

## 2019-04-17 DIAGNOSIS — I48.0 PAROXYSMAL ATRIAL FIBRILLATION (HCC): ICD-10-CM

## 2019-04-17 DIAGNOSIS — E66.01 MORBID OBESITY WITH BMI OF 40.0-44.9, ADULT (HCC): Primary | ICD-10-CM

## 2019-04-17 LAB — INTERNATIONAL NORMALIZATION RATIO, POC: 2.6

## 2019-04-17 PROCEDURE — 85610 PROTHROMBIN TIME: CPT

## 2019-04-17 PROCEDURE — 99211 OFF/OP EST MAY X REQ PHY/QHP: CPT

## 2019-04-17 PROCEDURE — 99999 PR OFFICE/OUTPT VISIT,PROCEDURE ONLY: CPT | Performed by: NURSE PRACTITIONER

## 2019-04-17 NOTE — PROGRESS NOTES
Jas Vegas gained 5 lbs over past 3 months. Pt reports she is on a medication that causes her to retain fluid which has been on ongoing issue. Pt is s/p lap band in 2011. Current treatment plan:   Patient is not on medication. Negative side effects from medications? N/A  Patient is not on Optifast.   Patient is  on diet and exercise only plan. Treatment plan details: 1200 kcal LC     Is patient adhering to diet/meal plan?: loosely    Carbohydrate consumption: not tracking     *Her  does the cooking. Breakfast: egg and slice of pepe crazy bread with butter sometimes with small sausage marv     Snack: nothing    Lunch: slice of turkey salami and 1/2 slice of cheese with light ordaz and 2 pickle slices (1/2 sandwich) made with pepe killer bread     Snack: nothing    Dinner: chicken leg and vegetable (green beans/peas/occasionally corn)     Snack: wheat thins and cubes of cheese     Fluids: 3 cups of regular coffee with creamer and sweetener, caffeine free iced tea made with sweetener (32 oz glasses/day), diet caffeine free diet pepsi occasionally, water (2-3 glasses)    Consuming at least 64oz of calorie free fluids? At least 48 oz water, can get up to 96 oz/day with all drinks     Participating in intentional exercise? Yes Details: Going to [employer] 2 x week (works with  and does upper body). Amount per meal: 1.5 cups      Following 30/30/30 rule: Eating in 20 minutes.  Avoids eating and drinking at the same time, just takes sips prn.     Supplements: Vit C, Calcium (no longer taking following cardioversion), generic MVI 1 x day, cranberry capsule      Food Intolerances: greasy foods (but tolerates sausage above okay)    Plan/Goals:   Avoid diet pepsi  Take MVI bid   Get back to tracking intakes and discussed goal of 1200 kcals/day  Get back to measuring portions   Discussed decreasing CHO intakes (breads/wheat thins) and increasing non-starchy      vegetables  Increase intentional exercise to 3 x week.      Handouts: none    Jorge Shelton

## 2019-04-24 ENCOUNTER — OFFICE VISIT (OUTPATIENT)
Dept: INTERNAL MEDICINE CLINIC | Age: 74
End: 2019-04-24
Payer: MEDICARE

## 2019-04-24 VITALS
SYSTOLIC BLOOD PRESSURE: 132 MMHG | HEIGHT: 62 IN | WEIGHT: 277 LBS | DIASTOLIC BLOOD PRESSURE: 80 MMHG | HEART RATE: 64 BPM | BODY MASS INDEX: 50.97 KG/M2

## 2019-04-24 DIAGNOSIS — F41.9 ANXIETY: ICD-10-CM

## 2019-04-24 DIAGNOSIS — M25.552 LEFT HIP PAIN: Primary | ICD-10-CM

## 2019-04-24 DIAGNOSIS — R10.32 LEFT GROIN PAIN: ICD-10-CM

## 2019-04-24 PROCEDURE — 1036F TOBACCO NON-USER: CPT | Performed by: NURSE PRACTITIONER

## 2019-04-24 PROCEDURE — 99213 OFFICE O/P EST LOW 20 MIN: CPT | Performed by: NURSE PRACTITIONER

## 2019-04-24 PROCEDURE — G8427 DOCREV CUR MEDS BY ELIG CLIN: HCPCS | Performed by: NURSE PRACTITIONER

## 2019-04-24 PROCEDURE — G8598 ASA/ANTIPLAT THER USED: HCPCS | Performed by: NURSE PRACTITIONER

## 2019-04-24 PROCEDURE — 1090F PRES/ABSN URINE INCON ASSESS: CPT | Performed by: NURSE PRACTITIONER

## 2019-04-24 PROCEDURE — G8399 PT W/DXA RESULTS DOCUMENT: HCPCS | Performed by: NURSE PRACTITIONER

## 2019-04-24 PROCEDURE — G8417 CALC BMI ABV UP PARAM F/U: HCPCS | Performed by: NURSE PRACTITIONER

## 2019-04-24 PROCEDURE — 3017F COLORECTAL CA SCREEN DOC REV: CPT | Performed by: NURSE PRACTITIONER

## 2019-04-24 PROCEDURE — 3014F SCREEN MAMMO DOC REV: CPT | Performed by: NURSE PRACTITIONER

## 2019-04-24 PROCEDURE — 4040F PNEUMOC VAC/ADMIN/RCVD: CPT | Performed by: NURSE PRACTITIONER

## 2019-04-24 PROCEDURE — 1123F ACP DISCUSS/DSCN MKR DOCD: CPT | Performed by: NURSE PRACTITIONER

## 2019-04-24 RX ORDER — CLONAZEPAM 1 MG/1
1 TABLET ORAL EVERY EVENING
Qty: 90 TABLET | Refills: 0 | Status: SHIPPED | OUTPATIENT
Start: 2019-04-24 | End: 2019-07-29 | Stop reason: SDUPTHER

## 2019-04-24 RX ORDER — PREDNISONE 20 MG/1
20 TABLET ORAL DAILY
Qty: 5 TABLET | Refills: 0 | Status: SHIPPED | OUTPATIENT
Start: 2019-04-24 | End: 2019-04-29

## 2019-04-24 NOTE — PROGRESS NOTES
80 MG tablet TAKE 1 TABLET BY MOUTH ONCE A DAY 90 tablet 3    allopurinol (ZYLOPRIM) 300 MG tablet Take 1 tablet by mouth daily 90 tablet 3    albuterol sulfate  (90 Base) MCG/ACT inhaler Inhale 2 puffs into the lungs every 4 hours as needed for Shortness of Breath 1 Inhaler 3    Ascorbic Acid (VITAMIN C PO) 1 tablet daily      therapeutic multivitamin-minerals (THERAGRAN-M) tablet Take 1 tablet by mouth nightly       Cranberry 500 MG CAPS Take 1,000 mg by mouth nightly        No current facility-administered medications for this visit. Facility-Administered Medications Ordered in Other Visits   Medication Dose Route Frequency Provider Last Rate Last Dose    magnesium hydroxide (MILK OF MAGNESIA) 400 MG/5ML suspension 30 mL  30 mL Oral Daily PRN Gail Hendrix MD        ondansetron [facility]) injection 4 mg  4 mg Intravenous Q6H PRN Gail Hendrix MD        acetaminophen (TYLENOL) tablet 650 mg  650 mg Oral Q4H PRN Gail Hendrix MD              Review of Systems   Musculoskeletal: Positive for arthralgias. OBJECTIVE:  Physical Exam   Constitutional: She is oriented to person, place, and time. She appears well-developed and well-nourished. No distress. HENT:   Head: Normocephalic and atraumatic. Musculoskeletal:        Left hip: She exhibits tenderness. She exhibits normal range of motion, no crepitus and no deformity. Neurological: She is alert and oriented to person, place, and time. Skin: Skin is warm and dry. She is not diaphoretic.       /80   Pulse 64   Ht 5' 2\" (1.575 m)   Wt 277 lb (125.6 kg)   BMI 50.66 kg/m²      PHQ Scores 2/7/2019 10/16/2018 8/10/2017   PHQ2 Score 1 0 0   PHQ9 Score 1 0 0     Interpretation of Total Score Depression Severity: 1-4 = Minimal depression, 5-9 = Mild depression, 10-14 = Moderate depression, 15-19 = Moderately severe depression, 20-27 =Severe depression        ASSESSMENT/PLAN:  Nicolas Carrasco was seen today for

## 2019-04-26 ENCOUNTER — TELEPHONE (OUTPATIENT)
Dept: PHARMACY | Age: 74
End: 2019-04-26

## 2019-04-26 NOTE — TELEPHONE ENCOUNTER
S/w patient who reports starting prednisone 20mg x 5 days for hip pain. Instructed patient to continue same warfarin dose since the steroid therapy is short term. Thank you for checking with us.

## 2019-05-16 ENCOUNTER — ANTI-COAG VISIT (OUTPATIENT)
Dept: PHARMACY | Age: 74
End: 2019-05-16
Payer: MEDICARE

## 2019-05-16 DIAGNOSIS — I48.0 PAROXYSMAL ATRIAL FIBRILLATION (HCC): ICD-10-CM

## 2019-05-16 LAB — INTERNATIONAL NORMALIZATION RATIO, POC: 3.1

## 2019-05-16 PROCEDURE — 85610 PROTHROMBIN TIME: CPT

## 2019-05-16 PROCEDURE — 99211 OFF/OP EST MAY X REQ PHY/QHP: CPT

## 2019-05-16 NOTE — PROGRESS NOTES
Ms. Guanaco Lester is a 69 y.o. y/o female with history of DVT, PE, Afib   She presents today for anticoagulation monitoring and adjustment. Pertinent PMH: HTN, Gastric Banding,CAD, diskectomy with an artifical spinal disk implant in September 2012. Patient Reported Findings:  Yes     No  [x]   []       Patient verifies current dosing regimen as listed   []   [x]       S/S bleeding/bruising/swelling/SOB  []   [x]       Blood in urine or stool  []   [x]       Procedures scheduled in the future at this time   []   [x]       Missed Dose  []   [x]       Extra Dose   [x]   []       Change in medications  Was given script for prednisone 20 mg qd x 5 days the end of April   []   [x]       Change in health/diet/appetite  Patient states that she feels like she has returned to normal vit k intake    []   [x]       Change in alcohol use  []   [x]       Change in activity  []   [x]       Hospital admission  []   [x]       Emergency department visit  []   [x]       Other complaints    Clinical Outcomes:  Yes     No  []   [x]       Major bleeding event  []   [x]       Thromboembolic event    Duration of warfarin Therapy: indefinite  INR Range:  2.0-3.0     INR is 3.1 today   Continue weekly dose of 5 mg on Mon and Fri and 7.5 mg all other days of the week  Encouraged to maintain a consistency of vegetables/salads.   Recheck INR 5 weeks, 6/20 d/t pt being out of town week prior     Send to Dr. Morteza Castrejon     Referring PCP is Michael Keenan  INR (no units)   Date Value   05/16/2019 3.1   04/17/2019 2.6   03/19/2019 2.6   12/14/2018 2.90 (H)   10/10/2018 3.11 (H)   09/21/2018 2.8

## 2019-05-21 ENCOUNTER — OFFICE VISIT (OUTPATIENT)
Dept: INTERNAL MEDICINE CLINIC | Age: 74
End: 2019-05-21
Payer: MEDICARE

## 2019-05-21 VITALS
HEART RATE: 60 BPM | SYSTOLIC BLOOD PRESSURE: 124 MMHG | HEIGHT: 62 IN | DIASTOLIC BLOOD PRESSURE: 70 MMHG | BODY MASS INDEX: 49.8 KG/M2 | WEIGHT: 270.6 LBS

## 2019-05-21 DIAGNOSIS — I10 ESSENTIAL HYPERTENSION: ICD-10-CM

## 2019-05-21 DIAGNOSIS — F41.9 ANXIETY: ICD-10-CM

## 2019-05-21 DIAGNOSIS — J45.40 MODERATE PERSISTENT ASTHMA WITHOUT COMPLICATION: ICD-10-CM

## 2019-05-21 DIAGNOSIS — E78.2 MIXED HYPERLIPIDEMIA: ICD-10-CM

## 2019-05-21 DIAGNOSIS — I48.0 PAROXYSMAL ATRIAL FIBRILLATION (HCC): Primary | ICD-10-CM

## 2019-05-21 LAB
ALBUMIN SERPL-MCNC: 4.1 G/DL (ref 3.4–5)
ANION GAP SERPL CALCULATED.3IONS-SCNC: 16 MMOL/L (ref 3–16)
BUN BLDV-MCNC: 13 MG/DL (ref 7–20)
CALCIUM SERPL-MCNC: 9.5 MG/DL (ref 8.3–10.6)
CHLORIDE BLD-SCNC: 99 MMOL/L (ref 99–110)
CHOLESTEROL, TOTAL: 158 MG/DL (ref 0–199)
CO2: 25 MMOL/L (ref 21–32)
CREAT SERPL-MCNC: 0.9 MG/DL (ref 0.6–1.2)
GFR AFRICAN AMERICAN: >60
GFR NON-AFRICAN AMERICAN: >60
GLUCOSE BLD-MCNC: 87 MG/DL (ref 70–99)
HCT VFR BLD CALC: 40.9 % (ref 36–48)
HDLC SERPL-MCNC: 64 MG/DL (ref 40–60)
HEMOGLOBIN: 13.7 G/DL (ref 12–16)
LDL CHOLESTEROL CALCULATED: 79 MG/DL
MCH RBC QN AUTO: 31.9 PG (ref 26–34)
MCHC RBC AUTO-ENTMCNC: 33.5 G/DL (ref 31–36)
MCV RBC AUTO: 95.3 FL (ref 80–100)
PDW BLD-RTO: 14.9 % (ref 12.4–15.4)
PHOSPHORUS: 2.8 MG/DL (ref 2.5–4.9)
PLATELET # BLD: 293 K/UL (ref 135–450)
PMV BLD AUTO: 7.4 FL (ref 5–10.5)
POTASSIUM SERPL-SCNC: 3.8 MMOL/L (ref 3.5–5.1)
RBC # BLD: 4.29 M/UL (ref 4–5.2)
SODIUM BLD-SCNC: 140 MMOL/L (ref 136–145)
TRIGL SERPL-MCNC: 74 MG/DL (ref 0–150)
VLDLC SERPL CALC-MCNC: 15 MG/DL
WBC # BLD: 6.4 K/UL (ref 4–11)

## 2019-05-21 PROCEDURE — 1036F TOBACCO NON-USER: CPT | Performed by: INTERNAL MEDICINE

## 2019-05-21 PROCEDURE — 4040F PNEUMOC VAC/ADMIN/RCVD: CPT | Performed by: INTERNAL MEDICINE

## 2019-05-21 PROCEDURE — 3014F SCREEN MAMMO DOC REV: CPT | Performed by: INTERNAL MEDICINE

## 2019-05-21 PROCEDURE — G8427 DOCREV CUR MEDS BY ELIG CLIN: HCPCS | Performed by: INTERNAL MEDICINE

## 2019-05-21 PROCEDURE — G8417 CALC BMI ABV UP PARAM F/U: HCPCS | Performed by: INTERNAL MEDICINE

## 2019-05-21 PROCEDURE — 1090F PRES/ABSN URINE INCON ASSESS: CPT | Performed by: INTERNAL MEDICINE

## 2019-05-21 PROCEDURE — G8399 PT W/DXA RESULTS DOCUMENT: HCPCS | Performed by: INTERNAL MEDICINE

## 2019-05-21 PROCEDURE — 99214 OFFICE O/P EST MOD 30 MIN: CPT | Performed by: INTERNAL MEDICINE

## 2019-05-21 PROCEDURE — 1123F ACP DISCUSS/DSCN MKR DOCD: CPT | Performed by: INTERNAL MEDICINE

## 2019-05-21 PROCEDURE — G8598 ASA/ANTIPLAT THER USED: HCPCS | Performed by: INTERNAL MEDICINE

## 2019-05-21 PROCEDURE — 3017F COLORECTAL CA SCREEN DOC REV: CPT | Performed by: INTERNAL MEDICINE

## 2019-05-21 NOTE — PROGRESS NOTES
Chief Complaint   Patient presents with    Atrial Fibrillation     paroxysmal      Asthma    Anxiety    Hypertension    Hyperlipidemia      HPI:  A fib: she is taking Rhythmol and warfarin. She has palpitations about 1-2 times a month, which is improved since starting Rhythmol. They last about 30 minutes. There are no known aggravating or alleviating factors. HTN: The patient is tolerating blood pressure medication well and taking them as directed. BP control outside of the office is reported as well controlled. No symptoms concerning for end organ damage are present. HLD: she takes atorvastatin as directed. She denies side effects. Asthma: symptoms are reported as well controlled. She continues to use Symbicort and Singulair. Anxiety: she feels like citalopram and clonazepam are working well. Social History     Tobacco Use    Smoking status: Former Smoker     Packs/day: 0.50     Years: 4.00     Pack years: 2.00     Types: Cigarettes     Last attempt to quit: 1965     Years since quittin.4    Smokeless tobacco: Never Used   Substance Use Topics    Alcohol use: Yes     Comment: 4 t imes a year    Drug use: No        ROS:  GI: Reg BM's  : urine is cloudy in the AM; neg for dysuria  RESP: rare cough, wheezing    EXAM:  /70 (Site: Left Upper Arm, Position: Sitting, Cuff Size: Large Adult)   Pulse 60   Ht 5' 2\" (1.575 m)   Wt 270 lb 9.6 oz (122.7 kg)   BMI 49.49 kg/m²      GEN: WN/WD, NAD  CV: regular rate and rhythm, 2/6 systolic murmur  Resp: normal effort, clear auscultation bilaterally  Abd: soft, nontender to palpation.    No peripheral edema     Lab Results   Component Value Date    INR 3.1 2019    INR 2.6 2019    INR 2.6 2019    PROTIME 35.5 (H) 10/10/2018    PROTIME 33 2018    PROTIME 40.7 2018      Lab Results   Component Value Date    CREATININE 0.9 2018    BUN 10 10/29/2018     10/29/2018    K 3.7 10/29/2018 CL 94 (L) 10/29/2018    CO2 26 10/29/2018     Lab Results   Component Value Date    CHOL 172 05/04/2017    CHOL 178 05/27/2016    CHOL 152 10/26/2012     Lab Results   Component Value Date    TRIG 106 05/04/2017    TRIG 104 05/27/2016    TRIG 161 (H) 10/26/2012     Lab Results   Component Value Date    HDL 65 (H) 08/03/2018    HDL 74 (H) 05/04/2017    HDL 71 (H) 05/27/2016     Lab Results   Component Value Date    LDLCALC 77 08/03/2018    LDLCALC 77 05/04/2017    LDLCALC 86 05/27/2016     Lab Results   Component Value Date    LABVLDL 15 08/03/2018    LABVLDL 21 05/04/2017    LABVLDL 21 05/27/2016     No results found for: CHOLHDLRATIO     A/P  1. Paroxysmal atrial fibrillation ([address]  She is doing well with minimal symptoms. Continue Rythmol and Coumadin.  - CBC    2. Essential hypertension  Blood pressure is well-controlled. Blood work will be obtained. Continue current medications. - Renal Function Panel    3. Mixed hyperlipidemia  Stable and controlled. Continue atorvastatin. - Lipid Panel    4. Moderate persistent asthma without complication  Stable and controlled. Continue Symbicort and Singulair    5. Anxiety  Benefiting from treatment. Controlled Substances Monitoring:     RX Monitoring 5/21/2019   Attestation The Prescription Monitoring Report for this patient was reviewed today. Chronic Pain Routine Monitoring No signs of potential drug abuse or diversion identified: otherwise, see note documentation      The current medical regimen is effective;  continue present plan and medications.          RTO 3 months

## 2019-05-28 RX ORDER — WARFARIN SODIUM 5 MG/1
TABLET ORAL
Qty: 143 TABLET | Refills: 1 | Status: SHIPPED | OUTPATIENT
Start: 2019-05-28 | End: 2020-06-03

## 2019-06-14 ENCOUNTER — TELEPHONE (OUTPATIENT)
Dept: CARDIOLOGY CLINIC | Age: 74
End: 2019-06-14

## 2019-06-14 NOTE — TELEPHONE ENCOUNTER
I spoke to the pt and she states that she went to a weight watchers meeting this morning, came home to change clothes and noticed a lightheaded feeling, no syncope. She states she went to her chair, sat down, and checked her Kardia machine. She states her HR read Bradycardia with HR of 44. The pt has been sitting for a while now so no lightheadedness noticed. She does c/o noticing a headache and a nervous feeling. Please advise. Last OV with SIL 3/1/19  Plan:     1. No med changes, continue Verapamil and Coumadin  2. Better monitoring of symptomatic episodes (patient to purchase [facility] Device)  3. F/u 4 months  4.  Patient may exercise to the extent of her comfort level

## 2019-06-14 NOTE — TELEPHONE ENCOUNTER
Pt was feeling light headed and cloudy mind  like she was having afib. She used her [employer] mobile device and it came back as bradycardia. Pulse average 47. Please call to advise.

## 2019-06-20 ENCOUNTER — ANTI-COAG VISIT (OUTPATIENT)
Dept: PHARMACY | Age: 74
End: 2019-06-20
Payer: MEDICARE

## 2019-06-20 DIAGNOSIS — I48.0 PAROXYSMAL ATRIAL FIBRILLATION (HCC): ICD-10-CM

## 2019-06-20 LAB — INTERNATIONAL NORMALIZATION RATIO, POC: 3.1

## 2019-06-20 PROCEDURE — 99211 OFF/OP EST MAY X REQ PHY/QHP: CPT

## 2019-06-20 PROCEDURE — 85610 PROTHROMBIN TIME: CPT

## 2019-07-05 ENCOUNTER — OFFICE VISIT (OUTPATIENT)
Dept: CARDIOLOGY CLINIC | Age: 74
End: 2019-07-05
Payer: MEDICARE

## 2019-07-05 VITALS
SYSTOLIC BLOOD PRESSURE: 148 MMHG | HEART RATE: 63 BPM | HEIGHT: 65 IN | WEIGHT: 267 LBS | DIASTOLIC BLOOD PRESSURE: 86 MMHG | BODY MASS INDEX: 44.48 KG/M2

## 2019-07-05 DIAGNOSIS — I10 ESSENTIAL HYPERTENSION: ICD-10-CM

## 2019-07-05 DIAGNOSIS — I48.0 PAROXYSMAL ATRIAL FIBRILLATION (HCC): Primary | ICD-10-CM

## 2019-07-05 DIAGNOSIS — I25.10 CORONARY ARTERY DISEASE INVOLVING NATIVE CORONARY ARTERY OF NATIVE HEART WITHOUT ANGINA PECTORIS: ICD-10-CM

## 2019-07-05 PROCEDURE — G8598 ASA/ANTIPLAT THER USED: HCPCS | Performed by: INTERNAL MEDICINE

## 2019-07-05 PROCEDURE — 1123F ACP DISCUSS/DSCN MKR DOCD: CPT | Performed by: INTERNAL MEDICINE

## 2019-07-05 PROCEDURE — 3014F SCREEN MAMMO DOC REV: CPT | Performed by: INTERNAL MEDICINE

## 2019-07-05 PROCEDURE — 1036F TOBACCO NON-USER: CPT | Performed by: INTERNAL MEDICINE

## 2019-07-05 PROCEDURE — 3017F COLORECTAL CA SCREEN DOC REV: CPT | Performed by: INTERNAL MEDICINE

## 2019-07-05 PROCEDURE — G8399 PT W/DXA RESULTS DOCUMENT: HCPCS | Performed by: INTERNAL MEDICINE

## 2019-07-05 PROCEDURE — G8427 DOCREV CUR MEDS BY ELIG CLIN: HCPCS | Performed by: INTERNAL MEDICINE

## 2019-07-05 PROCEDURE — 93000 ELECTROCARDIOGRAM COMPLETE: CPT | Performed by: INTERNAL MEDICINE

## 2019-07-05 PROCEDURE — 99214 OFFICE O/P EST MOD 30 MIN: CPT | Performed by: INTERNAL MEDICINE

## 2019-07-05 PROCEDURE — G8417 CALC BMI ABV UP PARAM F/U: HCPCS | Performed by: INTERNAL MEDICINE

## 2019-07-05 PROCEDURE — 4040F PNEUMOC VAC/ADMIN/RCVD: CPT | Performed by: INTERNAL MEDICINE

## 2019-07-05 PROCEDURE — 1090F PRES/ABSN URINE INCON ASSESS: CPT | Performed by: INTERNAL MEDICINE

## 2019-07-05 RX ORDER — VERAPAMIL HYDROCHLORIDE 240 MG/1
120 TABLET, FILM COATED, EXTENDED RELEASE ORAL NIGHTLY
Qty: 90 TABLET | Refills: 3
Start: 2019-07-05 | End: 2020-06-29

## 2019-07-11 ENCOUNTER — OFFICE VISIT (OUTPATIENT)
Dept: BARIATRICS/WEIGHT MGMT | Age: 74
End: 2019-07-11
Payer: MEDICARE

## 2019-07-11 ENCOUNTER — ANTI-COAG VISIT (OUTPATIENT)
Dept: PHARMACY | Age: 74
End: 2019-07-11
Payer: MEDICARE

## 2019-07-11 VITALS
WEIGHT: 265 LBS | BODY MASS INDEX: 45.24 KG/M2 | SYSTOLIC BLOOD PRESSURE: 125 MMHG | HEART RATE: 60 BPM | DIASTOLIC BLOOD PRESSURE: 71 MMHG | HEIGHT: 64 IN

## 2019-07-11 DIAGNOSIS — I48.0 PAROXYSMAL ATRIAL FIBRILLATION (HCC): ICD-10-CM

## 2019-07-11 DIAGNOSIS — Z98.84 H/O LAPAROSCOPIC ADJUSTABLE GASTRIC BANDING: ICD-10-CM

## 2019-07-11 DIAGNOSIS — E66.01 MORBID OBESITY WITH BMI OF 45.0-49.9, ADULT (HCC): Primary | ICD-10-CM

## 2019-07-11 LAB — INTERNATIONAL NORMALIZATION RATIO, POC: 2.6

## 2019-07-11 PROCEDURE — 4040F PNEUMOC VAC/ADMIN/RCVD: CPT | Performed by: NURSE PRACTITIONER

## 2019-07-11 PROCEDURE — G8598 ASA/ANTIPLAT THER USED: HCPCS | Performed by: NURSE PRACTITIONER

## 2019-07-11 PROCEDURE — 1090F PRES/ABSN URINE INCON ASSESS: CPT | Performed by: NURSE PRACTITIONER

## 2019-07-11 PROCEDURE — G8427 DOCREV CUR MEDS BY ELIG CLIN: HCPCS | Performed by: NURSE PRACTITIONER

## 2019-07-11 PROCEDURE — 3014F SCREEN MAMMO DOC REV: CPT | Performed by: NURSE PRACTITIONER

## 2019-07-11 PROCEDURE — 99213 OFFICE O/P EST LOW 20 MIN: CPT | Performed by: NURSE PRACTITIONER

## 2019-07-11 PROCEDURE — 1036F TOBACCO NON-USER: CPT | Performed by: NURSE PRACTITIONER

## 2019-07-11 PROCEDURE — G8417 CALC BMI ABV UP PARAM F/U: HCPCS | Performed by: NURSE PRACTITIONER

## 2019-07-11 PROCEDURE — 99211 OFF/OP EST MAY X REQ PHY/QHP: CPT

## 2019-07-11 PROCEDURE — G8399 PT W/DXA RESULTS DOCUMENT: HCPCS | Performed by: NURSE PRACTITIONER

## 2019-07-11 PROCEDURE — 1123F ACP DISCUSS/DSCN MKR DOCD: CPT | Performed by: NURSE PRACTITIONER

## 2019-07-11 PROCEDURE — 3017F COLORECTAL CA SCREEN DOC REV: CPT | Performed by: NURSE PRACTITIONER

## 2019-07-11 PROCEDURE — 85610 PROTHROMBIN TIME: CPT

## 2019-07-11 ASSESSMENT — ENCOUNTER SYMPTOMS
GASTROINTESTINAL NEGATIVE: 1
EYES NEGATIVE: 1
RESPIRATORY NEGATIVE: 1

## 2019-07-11 NOTE — PROGRESS NOTES
Ms. Chastity Levi is a 69 y.o. y/o female with history of DVT, PE, Afib   She presents today for anticoagulation monitoring and adjustment. Pertinent PMH: HTN, Gastric Banding,CAD, diskectomy with an artifical spinal disk implant in September 2012. Patient Reported Findings:  Yes     No  [x]   []       Patient verifies current dosing regimen as listed   []   [x]       S/S bleeding/bruising/swelling/SOB  []   [x]       Blood in urine or stool  []   [x]       Procedures scheduled in the future at this time   []   [x]       Missed Dose  []   [x]       Extra Dose   [x]   []       Change in medications dec verapamil    []   [x]       Change in health/diet/appetite  Patient states that she feels like she has returned to normal vit k intake    []   [x]       Change in alcohol use  []   [x]       Change in activity  []   [x]       Hospital admission  []   [x]       Emergency department visit  []   [x]       Other complaints    Clinical Outcomes:  Yes     No  []   [x]       Major bleeding event  []   [x]       Thromboembolic event  Gets warfarin through MD  Duration of warfarin Therapy: indefinite  INR Range:  2.0-3.0     INR is 2.6 today   Continue weekly dose to 5 mg on Mon Wed and Fri and 7.5 mg all other days of the week  Encouraged to maintain a consistency of vegetables/salads.   Recheck INR 4 weeks, 8/8    Referring PCP is oLrie Samano  INR (no units)   Date Value   07/11/2019 2.6   06/20/2019 3.1   05/16/2019 3.1   12/14/2018 2.90 (H)   10/10/2018 3.11 (H)   09/21/2018 2.8

## 2019-07-11 NOTE — PROGRESS NOTES
August Hernandez lost 8 lbs over past 3 months. Pt is s/p lap band in . *Her  does the cooking. She has been going to weight watchers and sticking to ~ 26 points/day. Going there for additional accountability. Current treatment plan:   Patient is not on medication. Negative side effects from medications? N/A  Patient is not on Optifast.   Patient is  on diet and exercise only plan. Treatment plan details: 1200 kcal LC     Is patient adhering to diet/meal plan?: somewhat    Carbohydrate consumption: following weight watchers points system    Breakfast: egg and sausage marv with 1 slice of pepe killer bread with butter with fruit (grapes/canteloupe)    Snack: nothing    Lunch: 1/2 turkey/chicken sandwich with slice of 2% cheese and light ordaz sometimes with watermelon/fruit     Snack: nothing    Dinner: Chicken with vegetables    Snack: sometimes - watermelon OR 8-10 taco chips     Fluids: 3 cups of regular coffee with creamer and sweetener, caffeine free iced tea made with sweetener (32 oz glasses/day), diet caffeine free diet pepsi occasionally, water (2-3 glasses)    Consuming at least 64oz of calorie free fluids? Yes    Participating in intentional exercise? Was going to [employer] x week until last week when her membership . Plans to look into going yo [employer] closer to home. Amount per meal: 1.5 cups/sitting      Following 30/30/30 rule: Eating in 20 minutes.  Avoids eating and drinking at the same time, just takes sips prn.     Supplements: Vit C, (no longer taking Calcium following cardioversion), generic MVI 1 x day (discussed last time about increasing to 2), cranberry capsule      Food Intolerances: greasy foods (but tolerates sausage above okay)    Plan/Goals:   Eliminate soda   Continue with diet   Continue to stay active - encouraged her to go to [employer]    Handouts: none     Anna Awad

## 2019-07-11 NOTE — PROGRESS NOTES
HENT: Negative. Eyes: Negative. Respiratory: Negative. Cardiovascular: Negative. Gastrointestinal: Negative. Skin: Negative. Neurological: Negative. Physical Exam   Constitutional: She is oriented to person, place, and time. She appears well-developed and well-nourished. HENT:   Head: Normocephalic and atraumatic. Eyes: Pupils are equal, round, and reactive to light. Neck: Normal range of motion. Cardiovascular: Normal rate. Pulmonary/Chest: Effort normal.   Musculoskeletal: Normal range of motion. Neurological: She is alert and oriented to person, place, and time. Psychiatric: She has a normal mood and affect. Her behavior is normal. Judgment and thought content normal.          Anti-coag visit on 07/11/2019   Component Date Value Ref Range Status    INR 07/11/2019 2.6   Final         Assessment and Plan:    ICD-10-CM    1. Morbid obesity with BMI of 45.0-49.9, adult ([facility] E66.01 Discussed treatment, she feels Weight Watchers if helping her right now, does not feel she needs to do our program as well. She will continue with weight watchers for now. Follow up in 1 year, or sooner if she decides to switch off of [employer]. Follow up in 1 year, sooner if needed    Vitamin D 25 Hydroxy    Z68.42 Vitamin B12 & Folate     Iron and TIBC   2. H/O laparoscopic adjustable gastric banding Z98.84 Vitamin D 25 Hydroxy     Vitamin B12 & Folate     Iron and TIBC    Avoid all carbonated drinks. Choose foods that are sugar free. Eat small, but frequent meals including a protein source with each meal. Eat meals over 30 minutes, taking small bites and chewing thoroughly. Take vitamins as directed. Labs were ordered at this visit. Patient understands it is their responsibility to have these completed and to obtain results from our office. Labs from May reviewed.            Nutrition plan: [] LCHF/Ketogenic   [] Modified low-calorie diet (low carb/low-jad)               [] Low-calorie diet []Maintenance       [x]Other-- She is following [employer]    Exercise: [x]Cardio     []Resistance/strength training      []ACSM recommendations (150 minutes/week in active weight loss)                              Behavior: [x]Motivational interviewing performed    [] Referralfor counseling                         []Discussed strategies to overcome habits/challenges for focus         [] Stress management   [] Stimulus control                    [] Sleep hygiene    Reviewed:  [x] Nutrition and the importance of regularprotein intake  [x] Hidden carbohydrate sources  [x] Alcohol use  [x] Tobacco use   [x] Importance of exercise and reducing sedentary time          No orders of the defined types were placed in this encounter.       Follow up in 1 year, sooner if needed

## 2019-07-29 ENCOUNTER — TELEPHONE (OUTPATIENT)
Dept: INTERNAL MEDICINE CLINIC | Age: 74
End: 2019-07-29

## 2019-07-29 ENCOUNTER — TELEPHONE (OUTPATIENT)
Dept: PHARMACY | Age: 74
End: 2019-07-29

## 2019-07-29 DIAGNOSIS — F41.9 ANXIETY: ICD-10-CM

## 2019-07-29 RX ORDER — CLONAZEPAM 1 MG/1
1 TABLET ORAL EVERY EVENING
Qty: 90 TABLET | Refills: 0 | Status: SHIPPED | OUTPATIENT
Start: 2019-07-29 | End: 2019-10-03 | Stop reason: SDUPTHER

## 2019-07-29 NOTE — TELEPHONE ENCOUNTER
Patient comes to clinic for follow up anticoagulation visit.   Diagnosis: Inferior Mesenteric Vein Thrombosis  Goal is 2.0-3.0    Last INR was: 2.3 on 1/19/18  and dose was maintianed  Today's INR is 2.2. Dose maintained per protocol.   Next INR 1 week after patient starts Liquid Diet and MVI.        See Ambulatory Anticoagulation Flow Sheet.    Teaching: dose , return date, conditions requiring contact with Coumadin Clinic.   AVS provided to patient.           Patient verbalized understanding of instructions and is aware of need to call with any questions or concerns and to report any changes in medications, health, diet and / or lifestyle.      Dr. Rojas  is in office today supervising treatment. Note forwarded to physician for review.      Pt called for refill on Clonazepam.  She states only got a week left of medication  3 mths supply. [facility] maill order

## 2019-08-08 ENCOUNTER — ANTI-COAG VISIT (OUTPATIENT)
Dept: PHARMACY | Age: 74
End: 2019-08-08
Payer: MEDICARE

## 2019-08-08 DIAGNOSIS — I48.0 PAROXYSMAL ATRIAL FIBRILLATION (HCC): ICD-10-CM

## 2019-08-08 LAB — INTERNATIONAL NORMALIZATION RATIO, POC: 1.8

## 2019-08-08 PROCEDURE — 99211 OFF/OP EST MAY X REQ PHY/QHP: CPT

## 2019-08-08 PROCEDURE — 85610 PROTHROMBIN TIME: CPT

## 2019-08-08 NOTE — PROGRESS NOTES
Ms. Yuliya Castro is a 69 y.o. y/o female with history of DVT, PE, Afib   She presents today for anticoagulation monitoring and adjustment. Pertinent PMH: HTN, Gastric Banding,CAD, diskectomy with an artifical spinal disk implant in September 2012. Patient Reported Findings:  Yes     No  []   [x]       Patient verifies current dosing regimen as listed patient is unclear on her dosing. Confused on maintenance dose as well as adjusted dose. States that she is sure that she went to pillbox and adjusted dose after discussing with pharmacist. But states that she thought her weekly dose was 5 mg on Mon and Fri and 7.5 mg all other days of the week. This was old weekly dose   []   [x]       S/S bleeding/bruising/swelling/SOB  []   [x]       Blood in urine or stool  []   [x]       Procedures scheduled in the future at this time   []   [x]       Missed Dose  []   [x]       Extra Dose   [x]   []       Change in medications on Augmentin for 7 days (Sat 7/27- Sat 8/3). had her take Warfarin 5mg daily until Saturday then resume normal dose of 5mg MWF and 7.5mg AOD  Took delsym cough syrup   []   [x]       Change in health/diet/appetite  Patient states that she feels like she has returned to normal vit k intake    []   [x]       Change in alcohol use  []   [x]       Change in activity  []   [x]       Hospital admission  []   [x]       Emergency department visit  []   [x]       Other complaints    Clinical Outcomes:  Yes     No  []   [x]       Major bleeding event  []   [x]       Thromboembolic event  Gets warfarin through MD  Duration of warfarin Therapy: indefinite  INR Range:  2.0-3.0     INR is 1.8 today d/t adjustment for abx last week   Since patient unsure what she had been taking as weekly dose, return to weekly dose of 5 mg on Mon Wed and Fri and 7.5 mg all other days of the week  Encouraged to maintain a consistency of vegetables/salads.   Recheck INR 3 weeks, 8/27    Referring PCP is Trang Ellison  INR (no

## 2019-08-13 RX ORDER — PROPAFENONE HYDROCHLORIDE 225 MG/1
225 TABLET, FILM COATED ORAL EVERY 8 HOURS
Qty: 270 TABLET | Refills: 3 | Status: SHIPPED | OUTPATIENT
Start: 2019-08-13 | End: 2019-11-11 | Stop reason: SDUPTHER

## 2019-08-13 RX ORDER — PROPAFENONE HYDROCHLORIDE 225 MG/1
225 TABLET, FILM COATED ORAL EVERY 8 HOURS
Qty: 270 TABLET | Refills: 3 | Status: CANCELLED | OUTPATIENT
Start: 2019-08-13

## 2019-08-13 NOTE — TELEPHONE ENCOUNTER
[facility] pharmacy from [facility], for Propafenone 225 mg, #270, one TID.   Last OV 7/5/19  Last EKG 7/5/19

## 2019-08-15 ENCOUNTER — TELEPHONE (OUTPATIENT)
Dept: INTERNAL MEDICINE CLINIC | Age: 74
End: 2019-08-15

## 2019-08-27 ENCOUNTER — ANTI-COAG VISIT (OUTPATIENT)
Dept: PHARMACY | Age: 74
End: 2019-08-27
Payer: MEDICARE

## 2019-08-27 ENCOUNTER — OFFICE VISIT (OUTPATIENT)
Dept: INTERNAL MEDICINE CLINIC | Age: 74
End: 2019-08-27
Payer: MEDICARE

## 2019-08-27 VITALS
WEIGHT: 265 LBS | DIASTOLIC BLOOD PRESSURE: 80 MMHG | HEIGHT: 64 IN | BODY MASS INDEX: 45.24 KG/M2 | HEART RATE: 56 BPM | SYSTOLIC BLOOD PRESSURE: 106 MMHG

## 2019-08-27 DIAGNOSIS — F41.9 ANXIETY: ICD-10-CM

## 2019-08-27 DIAGNOSIS — Z98.84 H/O LAPAROSCOPIC ADJUSTABLE GASTRIC BANDING: ICD-10-CM

## 2019-08-27 DIAGNOSIS — J45.40 MODERATE PERSISTENT ASTHMA WITHOUT COMPLICATION: ICD-10-CM

## 2019-08-27 DIAGNOSIS — I48.0 PAROXYSMAL ATRIAL FIBRILLATION (HCC): ICD-10-CM

## 2019-08-27 DIAGNOSIS — I10 ESSENTIAL HYPERTENSION: ICD-10-CM

## 2019-08-27 DIAGNOSIS — E78.2 MIXED HYPERLIPIDEMIA: ICD-10-CM

## 2019-08-27 DIAGNOSIS — E66.01 MORBID OBESITY WITH BMI OF 45.0-49.9, ADULT (HCC): ICD-10-CM

## 2019-08-27 DIAGNOSIS — I48.0 PAROXYSMAL ATRIAL FIBRILLATION (HCC): Primary | ICD-10-CM

## 2019-08-27 LAB
FOLATE: >20 NG/ML (ref 4.78–24.2)
INTERNATIONAL NORMALIZATION RATIO, POC: 2.8
IRON SATURATION: 25 % (ref 15–50)
IRON: 72 UG/DL (ref 37–145)
TOTAL IRON BINDING CAPACITY: 292 UG/DL (ref 260–445)
VITAMIN B-12: 1059 PG/ML (ref 211–911)
VITAMIN D 25-HYDROXY: 50.2 NG/ML

## 2019-08-27 PROCEDURE — 3014F SCREEN MAMMO DOC REV: CPT | Performed by: INTERNAL MEDICINE

## 2019-08-27 PROCEDURE — 1090F PRES/ABSN URINE INCON ASSESS: CPT | Performed by: INTERNAL MEDICINE

## 2019-08-27 PROCEDURE — 1036F TOBACCO NON-USER: CPT | Performed by: INTERNAL MEDICINE

## 2019-08-27 PROCEDURE — 4040F PNEUMOC VAC/ADMIN/RCVD: CPT | Performed by: INTERNAL MEDICINE

## 2019-08-27 PROCEDURE — 1123F ACP DISCUSS/DSCN MKR DOCD: CPT | Performed by: INTERNAL MEDICINE

## 2019-08-27 PROCEDURE — 99214 OFFICE O/P EST MOD 30 MIN: CPT | Performed by: INTERNAL MEDICINE

## 2019-08-27 PROCEDURE — G8417 CALC BMI ABV UP PARAM F/U: HCPCS | Performed by: INTERNAL MEDICINE

## 2019-08-27 PROCEDURE — 99211 OFF/OP EST MAY X REQ PHY/QHP: CPT

## 2019-08-27 PROCEDURE — 85610 PROTHROMBIN TIME: CPT

## 2019-08-27 PROCEDURE — G8399 PT W/DXA RESULTS DOCUMENT: HCPCS | Performed by: INTERNAL MEDICINE

## 2019-08-27 PROCEDURE — 3017F COLORECTAL CA SCREEN DOC REV: CPT | Performed by: INTERNAL MEDICINE

## 2019-08-27 PROCEDURE — G8427 DOCREV CUR MEDS BY ELIG CLIN: HCPCS | Performed by: INTERNAL MEDICINE

## 2019-08-27 PROCEDURE — G8598 ASA/ANTIPLAT THER USED: HCPCS | Performed by: INTERNAL MEDICINE

## 2019-08-29 ENCOUNTER — TELEPHONE (OUTPATIENT)
Dept: BARIATRICS/WEIGHT MGMT | Age: 74
End: 2019-08-29

## 2019-09-17 RX ORDER — BUDESONIDE AND FORMOTEROL FUMARATE DIHYDRATE 160; 4.5 UG/1; UG/1
AEROSOL RESPIRATORY (INHALATION)
Qty: 1 INHALER | Refills: 1 | Status: SHIPPED | OUTPATIENT
Start: 2019-09-17 | End: 2019-09-20

## 2019-09-20 ENCOUNTER — TELEPHONE (OUTPATIENT)
Dept: INTERNAL MEDICINE CLINIC | Age: 74
End: 2019-09-20

## 2019-09-20 DIAGNOSIS — J45.40 MODERATE PERSISTENT ASTHMA WITHOUT COMPLICATION: Primary | ICD-10-CM

## 2019-09-24 ENCOUNTER — ANTI-COAG VISIT (OUTPATIENT)
Dept: PHARMACY | Age: 74
End: 2019-09-24
Payer: MEDICARE

## 2019-09-24 DIAGNOSIS — I48.0 PAROXYSMAL ATRIAL FIBRILLATION (HCC): ICD-10-CM

## 2019-09-24 LAB — INTERNATIONAL NORMALIZATION RATIO, POC: 3.3

## 2019-09-24 PROCEDURE — 99211 OFF/OP EST MAY X REQ PHY/QHP: CPT

## 2019-09-24 PROCEDURE — 85610 PROTHROMBIN TIME: CPT

## 2019-10-01 RX ORDER — POTASSIUM CHLORIDE 20 MEQ/1
TABLET, EXTENDED RELEASE ORAL
Qty: 90 TABLET | Refills: 1 | Status: SHIPPED | OUTPATIENT
Start: 2019-10-01 | End: 2020-06-01

## 2019-10-02 DIAGNOSIS — F41.9 ANXIETY: ICD-10-CM

## 2019-10-03 RX ORDER — CLONAZEPAM 1 MG/1
1 TABLET ORAL EVERY EVENING
Qty: 90 TABLET | Refills: 0 | Status: SHIPPED | OUTPATIENT
Start: 2019-10-03 | End: 2019-12-23 | Stop reason: SDUPTHER

## 2019-10-15 ENCOUNTER — ANTI-COAG VISIT (OUTPATIENT)
Dept: PHARMACY | Age: 74
End: 2019-10-15
Payer: MEDICARE

## 2019-10-15 DIAGNOSIS — I48.0 PAROXYSMAL ATRIAL FIBRILLATION (HCC): ICD-10-CM

## 2019-10-15 LAB — INTERNATIONAL NORMALIZATION RATIO, POC: 3.1

## 2019-10-15 PROCEDURE — 99211 OFF/OP EST MAY X REQ PHY/QHP: CPT

## 2019-10-15 PROCEDURE — 85610 PROTHROMBIN TIME: CPT

## 2019-10-28 RX ORDER — ALLOPURINOL 300 MG/1
300 TABLET ORAL DAILY
Qty: 90 TABLET | Refills: 3 | Status: SHIPPED | OUTPATIENT
Start: 2019-10-28 | End: 2020-12-07

## 2019-11-05 ENCOUNTER — ANTI-COAG VISIT (OUTPATIENT)
Dept: PHARMACY | Age: 74
End: 2019-11-05
Payer: MEDICARE

## 2019-11-05 DIAGNOSIS — I48.0 PAROXYSMAL ATRIAL FIBRILLATION (HCC): ICD-10-CM

## 2019-11-05 LAB — INTERNATIONAL NORMALIZATION RATIO, POC: 2.4

## 2019-11-05 PROCEDURE — 85610 PROTHROMBIN TIME: CPT

## 2019-11-05 PROCEDURE — 99211 OFF/OP EST MAY X REQ PHY/QHP: CPT

## 2019-11-11 ENCOUNTER — TELEPHONE (OUTPATIENT)
Dept: CARDIOLOGY CLINIC | Age: 74
End: 2019-11-11

## 2019-11-11 RX ORDER — PROPAFENONE HYDROCHLORIDE 225 MG/1
225 TABLET, FILM COATED ORAL EVERY 8 HOURS
Qty: 270 TABLET | Refills: 3 | Status: SHIPPED | OUTPATIENT
Start: 2019-11-11 | End: 2020-07-27 | Stop reason: SDUPTHER

## 2019-11-15 ENCOUNTER — APPOINTMENT (OUTPATIENT)
Dept: CT IMAGING | Age: 74
End: 2019-11-15
Payer: MEDICARE

## 2019-11-15 ENCOUNTER — HOSPITAL ENCOUNTER (EMERGENCY)
Age: 74
Discharge: HOME | End: 2019-11-15
Attending: EMERGENCY MEDICINE
Payer: MEDICARE

## 2019-11-15 ENCOUNTER — APPOINTMENT (OUTPATIENT)
Dept: GENERAL RADIOLOGY | Age: 74
End: 2019-11-15
Payer: MEDICARE

## 2019-11-15 VITALS
SYSTOLIC BLOOD PRESSURE: 145 MMHG | DIASTOLIC BLOOD PRESSURE: 77 MMHG | OXYGEN SATURATION: 97 % | TEMPERATURE: 98.2 F | RESPIRATION RATE: 16 BRPM | HEART RATE: 55 BPM

## 2019-11-15 DIAGNOSIS — S42.401A ELBOW FRACTURE, RIGHT, CLOSED, INITIAL ENCOUNTER: ICD-10-CM

## 2019-11-15 DIAGNOSIS — S00.81XA ABRASION OF FOREHEAD, INITIAL ENCOUNTER: ICD-10-CM

## 2019-11-15 DIAGNOSIS — S00.83XA CONTUSION OF FOREHEAD, INITIAL ENCOUNTER: ICD-10-CM

## 2019-11-15 DIAGNOSIS — S69.91XA INJURY OF RIGHT WRIST, INITIAL ENCOUNTER: Primary | ICD-10-CM

## 2019-11-15 DIAGNOSIS — S01.81XA LACERATION OF FOREHEAD, INITIAL ENCOUNTER: ICD-10-CM

## 2019-11-15 LAB
INR BLD: 1.72 (ref 0.86–1.14)
PROTHROMBIN TIME: 19.6 SEC (ref 9.8–13)

## 2019-11-15 PROCEDURE — 70450 CT HEAD/BRAIN W/O DYE: CPT

## 2019-11-15 PROCEDURE — 96374 THER/PROPH/DIAG INJ IV PUSH: CPT

## 2019-11-15 PROCEDURE — 36415 COLL VENOUS BLD VENIPUNCTURE: CPT

## 2019-11-15 PROCEDURE — 99284 EMERGENCY DEPT VISIT MOD MDM: CPT

## 2019-11-15 PROCEDURE — 96375 TX/PRO/DX INJ NEW DRUG ADDON: CPT

## 2019-11-15 PROCEDURE — 85610 PROTHROMBIN TIME: CPT

## 2019-11-15 PROCEDURE — 73070 X-RAY EXAM OF ELBOW: CPT

## 2019-11-15 PROCEDURE — 4500000021 HC ED LEVEL 1 PROCEDURE

## 2019-11-15 PROCEDURE — 72125 CT NECK SPINE W/O DYE: CPT

## 2019-11-15 PROCEDURE — 73130 X-RAY EXAM OF HAND: CPT

## 2019-11-15 PROCEDURE — 6370000000 HC RX 637 (ALT 250 FOR IP): Performed by: EMERGENCY MEDICINE

## 2019-11-15 PROCEDURE — 6370000000 HC RX 637 (ALT 250 FOR IP): Performed by: PHYSICIAN ASSISTANT

## 2019-11-15 PROCEDURE — 6360000002 HC RX W HCPCS: Performed by: PHYSICIAN ASSISTANT

## 2019-11-15 PROCEDURE — 4500000022 HC ED LEVEL 2 PROCEDURE

## 2019-11-15 PROCEDURE — 73100 X-RAY EXAM OF WRIST: CPT

## 2019-11-15 RX ORDER — HYDROCODONE BITARTRATE AND ACETAMINOPHEN 5; 325 MG/1; MG/1
1 TABLET ORAL EVERY 6 HOURS PRN
Qty: 10 TABLET | Refills: 0 | Status: SHIPPED | OUTPATIENT
Start: 2019-11-15 | End: 2019-11-18

## 2019-11-15 RX ORDER — ACETAMINOPHEN 325 MG/1
650 TABLET ORAL ONCE
Status: COMPLETED | OUTPATIENT
Start: 2019-11-15 | End: 2019-11-15

## 2019-11-15 RX ORDER — OXYCODONE HYDROCHLORIDE AND ACETAMINOPHEN 5; 325 MG/1; MG/1
1 TABLET ORAL ONCE
Status: COMPLETED | OUTPATIENT
Start: 2019-11-15 | End: 2019-11-15

## 2019-11-15 RX ORDER — METOCLOPRAMIDE HYDROCHLORIDE 5 MG/ML
10 INJECTION INTRAMUSCULAR; INTRAVENOUS ONCE
Status: COMPLETED | OUTPATIENT
Start: 2019-11-15 | End: 2019-11-15

## 2019-11-15 RX ORDER — DIPHENHYDRAMINE HYDROCHLORIDE 50 MG/ML
25 INJECTION INTRAMUSCULAR; INTRAVENOUS ONCE
Status: COMPLETED | OUTPATIENT
Start: 2019-11-15 | End: 2019-11-15

## 2019-11-15 RX ADMIN — METOCLOPRAMIDE 10 MG: 5 INJECTION, SOLUTION INTRAMUSCULAR; INTRAVENOUS at 12:09

## 2019-11-15 RX ADMIN — ACETAMINOPHEN 650 MG: 325 TABLET, FILM COATED ORAL at 12:23

## 2019-11-15 RX ADMIN — OXYCODONE HYDROCHLORIDE AND ACETAMINOPHEN 1 TABLET: 5; 325 TABLET ORAL at 10:19

## 2019-11-15 RX ADMIN — DIPHENHYDRAMINE HYDROCHLORIDE 25 MG: 50 INJECTION, SOLUTION INTRAMUSCULAR; INTRAVENOUS at 12:09

## 2019-11-15 ASSESSMENT — ENCOUNTER SYMPTOMS
NAUSEA: 0
BACK PAIN: 0
STRIDOR: 0
DIARRHEA: 0
ABDOMINAL DISTENTION: 0
SHORTNESS OF BREATH: 0
VOMITING: 0
CONSTIPATION: 0
COLOR CHANGE: 0
WHEEZING: 0
COUGH: 0
ABDOMINAL PAIN: 0

## 2019-11-15 ASSESSMENT — PAIN DESCRIPTION - DESCRIPTORS
DESCRIPTORS_2: SHARP
DESCRIPTORS: POUNDING

## 2019-11-15 ASSESSMENT — PAIN SCALES - GENERAL
PAINLEVEL_OUTOF10: 9
PAINLEVEL_OUTOF10: 8
PAINLEVEL_OUTOF10: 8

## 2019-11-15 ASSESSMENT — PAIN DESCRIPTION - LOCATION
LOCATION: HAND
LOCATION_2: ARM

## 2019-11-15 ASSESSMENT — PAIN DESCRIPTION - INTENSITY: RATING_2: 9

## 2019-11-15 ASSESSMENT — PAIN DESCRIPTION - PAIN TYPE: TYPE: ACUTE PAIN

## 2019-11-17 ENCOUNTER — TELEPHONE (OUTPATIENT)
Dept: INTERNAL MEDICINE CLINIC | Age: 74
End: 2019-11-17

## 2019-11-18 ENCOUNTER — OFFICE VISIT (OUTPATIENT)
Dept: ORTHOPEDIC SURGERY | Age: 74
End: 2019-11-18
Payer: MEDICARE

## 2019-11-18 VITALS
SYSTOLIC BLOOD PRESSURE: 129 MMHG | WEIGHT: 260 LBS | DIASTOLIC BLOOD PRESSURE: 73 MMHG | HEIGHT: 64 IN | BODY MASS INDEX: 44.39 KG/M2 | HEART RATE: 65 BPM

## 2019-11-18 DIAGNOSIS — S63.501A SPRAIN OF RIGHT WRIST, INITIAL ENCOUNTER: ICD-10-CM

## 2019-11-18 DIAGNOSIS — S52.134A CLOSED NONDISPLACED FRACTURE OF NECK OF RIGHT RADIUS, INITIAL ENCOUNTER: Primary | ICD-10-CM

## 2019-11-18 DIAGNOSIS — M25.511 RIGHT SHOULDER PAIN, UNSPECIFIED CHRONICITY: ICD-10-CM

## 2019-11-18 DIAGNOSIS — S52.514A: ICD-10-CM

## 2019-11-18 PROCEDURE — 1036F TOBACCO NON-USER: CPT | Performed by: PHYSICIAN ASSISTANT

## 2019-11-18 PROCEDURE — G9899 SCRN MAM PERF RSLTS DOC: HCPCS | Performed by: PHYSICIAN ASSISTANT

## 2019-11-18 PROCEDURE — L3908 WHO COCK-UP NONMOLDE PRE OTS: HCPCS | Performed by: PHYSICIAN ASSISTANT

## 2019-11-18 PROCEDURE — 3017F COLORECTAL CA SCREEN DOC REV: CPT | Performed by: PHYSICIAN ASSISTANT

## 2019-11-18 PROCEDURE — G8427 DOCREV CUR MEDS BY ELIG CLIN: HCPCS | Performed by: PHYSICIAN ASSISTANT

## 2019-11-18 PROCEDURE — 1123F ACP DISCUSS/DSCN MKR DOCD: CPT | Performed by: PHYSICIAN ASSISTANT

## 2019-11-18 PROCEDURE — G8598 ASA/ANTIPLAT THER USED: HCPCS | Performed by: PHYSICIAN ASSISTANT

## 2019-11-18 PROCEDURE — G8399 PT W/DXA RESULTS DOCUMENT: HCPCS | Performed by: PHYSICIAN ASSISTANT

## 2019-11-18 PROCEDURE — G8417 CALC BMI ABV UP PARAM F/U: HCPCS | Performed by: PHYSICIAN ASSISTANT

## 2019-11-18 PROCEDURE — 1090F PRES/ABSN URINE INCON ASSESS: CPT | Performed by: PHYSICIAN ASSISTANT

## 2019-11-18 PROCEDURE — G8484 FLU IMMUNIZE NO ADMIN: HCPCS | Performed by: PHYSICIAN ASSISTANT

## 2019-11-18 PROCEDURE — 4040F PNEUMOC VAC/ADMIN/RCVD: CPT | Performed by: PHYSICIAN ASSISTANT

## 2019-11-18 PROCEDURE — 99203 OFFICE O/P NEW LOW 30 MIN: CPT | Performed by: PHYSICIAN ASSISTANT

## 2019-11-21 ENCOUNTER — TELEPHONE (OUTPATIENT)
Dept: INTERNAL MEDICINE CLINIC | Age: 74
End: 2019-11-21

## 2019-11-22 ENCOUNTER — OFFICE VISIT (OUTPATIENT)
Dept: INTERNAL MEDICINE CLINIC | Age: 74
End: 2019-11-22
Payer: MEDICARE

## 2019-11-22 VITALS
HEIGHT: 64 IN | HEART RATE: 115 BPM | BODY MASS INDEX: 44.39 KG/M2 | WEIGHT: 260 LBS | DIASTOLIC BLOOD PRESSURE: 84 MMHG | SYSTOLIC BLOOD PRESSURE: 128 MMHG

## 2019-11-22 DIAGNOSIS — I48.0 PAROXYSMAL ATRIAL FIBRILLATION (HCC): ICD-10-CM

## 2019-11-22 DIAGNOSIS — G44.319 ACUTE POST-TRAUMATIC HEADACHE, NOT INTRACTABLE: ICD-10-CM

## 2019-11-22 DIAGNOSIS — R73.9 HYPERGLYCEMIA: ICD-10-CM
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
## 2019-11-22 DIAGNOSIS — I10 ESSENTIAL HYPERTENSION: Primary | ICD-10-CM

## 2019-11-22 LAB — HBA1C MFR BLD: 5.6 %

## 2019-11-22 PROCEDURE — 4040F PNEUMOC VAC/ADMIN/RCVD: CPT | Performed by: NURSE PRACTITIONER

## 2019-11-22 PROCEDURE — 83036 HEMOGLOBIN GLYCOSYLATED A1C: CPT | Performed by: NURSE PRACTITIONER

## 2019-11-22 PROCEDURE — 1090F PRES/ABSN URINE INCON ASSESS: CPT | Performed by: NURSE PRACTITIONER

## 2019-11-22 PROCEDURE — 1123F ACP DISCUSS/DSCN MKR DOCD: CPT | Performed by: NURSE PRACTITIONER

## 2019-11-22 PROCEDURE — 1111F DSCHRG MED/CURRENT MED MERGE: CPT | Performed by: NURSE PRACTITIONER

## 2019-11-22 PROCEDURE — G9899 SCRN MAM PERF RSLTS DOC: HCPCS | Performed by: NURSE PRACTITIONER

## 2019-11-22 PROCEDURE — G8598 ASA/ANTIPLAT THER USED: HCPCS | Performed by: NURSE PRACTITIONER

## 2019-11-22 PROCEDURE — 3017F COLORECTAL CA SCREEN DOC REV: CPT | Performed by: NURSE PRACTITIONER

## 2019-11-22 PROCEDURE — G8427 DOCREV CUR MEDS BY ELIG CLIN: HCPCS | Performed by: NURSE PRACTITIONER

## 2019-11-22 PROCEDURE — 1036F TOBACCO NON-USER: CPT | Performed by: NURSE PRACTITIONER

## 2019-11-22 PROCEDURE — G8484 FLU IMMUNIZE NO ADMIN: HCPCS | Performed by: NURSE PRACTITIONER

## 2019-11-22 PROCEDURE — G8417 CALC BMI ABV UP PARAM F/U: HCPCS | Performed by: NURSE PRACTITIONER

## 2019-11-22 PROCEDURE — G8399 PT W/DXA RESULTS DOCUMENT: HCPCS | Performed by: NURSE PRACTITIONER

## 2019-11-22 PROCEDURE — 99214 OFFICE O/P EST MOD 30 MIN: CPT | Performed by: NURSE PRACTITIONER

## 2019-11-22 ASSESSMENT — ENCOUNTER SYMPTOMS
SORE THROAT: 0
CHOKING: 0
RHINORRHEA: 0
WHEEZING: 0
FACIAL SWELLING: 1
TROUBLE SWALLOWING: 0
CHEST TIGHTNESS: 0
SHORTNESS OF BREATH: 0

## 2019-11-29 ENCOUNTER — HOSPITAL ENCOUNTER (OUTPATIENT)
Age: 74
Discharge: HOME | End: 2019-11-29
Payer: MEDICARE

## 2019-11-29 ENCOUNTER — TELEPHONE (OUTPATIENT)
Dept: INTERNAL MEDICINE CLINIC | Age: 74
End: 2019-11-29

## 2019-11-29 ENCOUNTER — HOSPITAL ENCOUNTER (OUTPATIENT)
Dept: MRI IMAGING | Age: 74
Discharge: HOME | End: 2019-11-29
Payer: MEDICARE

## 2019-11-29 DIAGNOSIS — G44.319 ACUTE POST-TRAUMATIC HEADACHE, NOT INTRACTABLE: ICD-10-CM

## 2019-11-29 LAB
BUN BLDV-MCNC: 17 MG/DL (ref 7–20)
CREAT SERPL-MCNC: 0.8 MG/DL (ref 0.6–1.2)
GFR AFRICAN AMERICAN: >60
GFR NON-AFRICAN AMERICAN: >60

## 2019-11-29 PROCEDURE — A9577 INJ MULTIHANCE: HCPCS | Performed by: NURSE PRACTITIONER

## 2019-11-29 PROCEDURE — 70553 MRI BRAIN STEM W/O & W/DYE: CPT

## 2019-11-29 PROCEDURE — 84520 ASSAY OF UREA NITROGEN: CPT

## 2019-11-29 PROCEDURE — 2580000003 HC RX 258: Performed by: NURSE PRACTITIONER

## 2019-11-29 PROCEDURE — 36415 COLL VENOUS BLD VENIPUNCTURE: CPT

## 2019-11-29 PROCEDURE — 82565 ASSAY OF CREATININE: CPT

## 2019-11-29 PROCEDURE — 6360000004 HC RX CONTRAST MEDICATION: Performed by: NURSE PRACTITIONER

## 2019-11-29 RX ORDER — SODIUM CHLORIDE 0.9 % (FLUSH) 0.9 %
10 SYRINGE (ML) INJECTION ONCE
Status: COMPLETED | OUTPATIENT
Start: 2019-11-29 | End: 2019-11-29

## 2019-11-29 RX ADMIN — Medication 10 ML: at 08:41

## 2019-11-29 RX ADMIN — GADOBENATE DIMEGLUMINE 23 ML: 529 INJECTION, SOLUTION INTRAVENOUS at 08:41

## 2019-12-02 ENCOUNTER — TELEPHONE (OUTPATIENT)
Dept: INTERNAL MEDICINE CLINIC | Age: 74
End: 2019-12-02

## 2019-12-02 DIAGNOSIS — R93.89 ABNORMAL MRI: ICD-10-CM

## 2019-12-02 DIAGNOSIS — G44.319 ACUTE POST-TRAUMATIC HEADACHE, NOT INTRACTABLE: Primary | ICD-10-CM

## 2019-12-03 ENCOUNTER — ANTI-COAG VISIT (OUTPATIENT)
Dept: PHARMACY | Age: 74
End: 2019-12-03
Payer: MEDICARE

## 2019-12-03 ENCOUNTER — TELEPHONE (OUTPATIENT)
Dept: INTERNAL MEDICINE CLINIC | Age: 74
End: 2019-12-03

## 2019-12-03 DIAGNOSIS — I48.0 PAROXYSMAL ATRIAL FIBRILLATION (HCC): ICD-10-CM

## 2019-12-03 LAB — INTERNATIONAL NORMALIZATION RATIO, POC: 1.9

## 2019-12-03 PROCEDURE — 85610 PROTHROMBIN TIME: CPT

## 2019-12-03 PROCEDURE — 99211 OFF/OP EST MAY X REQ PHY/QHP: CPT

## 2019-12-11 ENCOUNTER — OFFICE VISIT (OUTPATIENT)
Dept: ORTHOPEDIC SURGERY | Age: 74
End: 2019-12-11
Payer: MEDICARE

## 2019-12-11 VITALS
BODY MASS INDEX: 44.12 KG/M2 | WEIGHT: 258.4 LBS | HEIGHT: 64 IN | SYSTOLIC BLOOD PRESSURE: 144 MMHG | HEART RATE: 57 BPM | DIASTOLIC BLOOD PRESSURE: 83 MMHG

## 2019-12-11 DIAGNOSIS — S52.134D CLOSED NONDISPLACED FRACTURE OF NECK OF RIGHT RADIUS WITH ROUTINE HEALING, SUBSEQUENT ENCOUNTER: Primary | ICD-10-CM

## 2019-12-11 DIAGNOSIS — S63.501D SPRAIN OF RIGHT WRIST, SUBSEQUENT ENCOUNTER: ICD-10-CM

## 2019-12-11 PROCEDURE — 4040F PNEUMOC VAC/ADMIN/RCVD: CPT | Performed by: PHYSICIAN ASSISTANT

## 2019-12-11 PROCEDURE — 1123F ACP DISCUSS/DSCN MKR DOCD: CPT | Performed by: PHYSICIAN ASSISTANT

## 2019-12-11 PROCEDURE — G8399 PT W/DXA RESULTS DOCUMENT: HCPCS | Performed by: PHYSICIAN ASSISTANT

## 2019-12-11 PROCEDURE — G8417 CALC BMI ABV UP PARAM F/U: HCPCS | Performed by: PHYSICIAN ASSISTANT

## 2019-12-11 PROCEDURE — G8427 DOCREV CUR MEDS BY ELIG CLIN: HCPCS | Performed by: PHYSICIAN ASSISTANT

## 2019-12-11 PROCEDURE — 1036F TOBACCO NON-USER: CPT | Performed by: PHYSICIAN ASSISTANT

## 2019-12-11 PROCEDURE — 99213 OFFICE O/P EST LOW 20 MIN: CPT | Performed by: PHYSICIAN ASSISTANT

## 2019-12-11 PROCEDURE — 1090F PRES/ABSN URINE INCON ASSESS: CPT | Performed by: PHYSICIAN ASSISTANT

## 2019-12-11 PROCEDURE — G8598 ASA/ANTIPLAT THER USED: HCPCS | Performed by: PHYSICIAN ASSISTANT

## 2019-12-11 PROCEDURE — G9899 SCRN MAM PERF RSLTS DOC: HCPCS | Performed by: PHYSICIAN ASSISTANT

## 2019-12-11 PROCEDURE — G8484 FLU IMMUNIZE NO ADMIN: HCPCS | Performed by: PHYSICIAN ASSISTANT

## 2019-12-11 PROCEDURE — 3017F COLORECTAL CA SCREEN DOC REV: CPT | Performed by: PHYSICIAN ASSISTANT

## 2019-12-18 ENCOUNTER — TELEPHONE (OUTPATIENT)
Dept: CARDIOLOGY CLINIC | Age: 74
End: 2019-12-18

## 2019-12-18 RX ORDER — CITALOPRAM 40 MG/1
40 TABLET ORAL DAILY
Qty: 90 TABLET | Refills: 3 | Status: SHIPPED | OUTPATIENT
Start: 2019-12-18 | End: 2020-12-11

## 2019-12-18 RX ORDER — ATORVASTATIN CALCIUM 80 MG/1
80 TABLET, FILM COATED ORAL DAILY
Qty: 90 TABLET | Refills: 3 | Status: SHIPPED | OUTPATIENT
Start: 2019-12-18 | End: 2020-12-07

## 2019-12-23 ENCOUNTER — OFFICE VISIT (OUTPATIENT)
Dept: INTERNAL MEDICINE CLINIC | Age: 74
End: 2019-12-23
Payer: MEDICARE

## 2019-12-23 VITALS
WEIGHT: 256 LBS | SYSTOLIC BLOOD PRESSURE: 138 MMHG | DIASTOLIC BLOOD PRESSURE: 70 MMHG | HEIGHT: 64 IN | BODY MASS INDEX: 43.71 KG/M2 | HEART RATE: 68 BPM

## 2019-12-23 DIAGNOSIS — I48.0 PAROXYSMAL ATRIAL FIBRILLATION (HCC): Primary | ICD-10-CM

## 2019-12-23 DIAGNOSIS — E78.2 MIXED HYPERLIPIDEMIA: ICD-10-CM

## 2019-12-23 DIAGNOSIS — J45.40 MODERATE PERSISTENT ASTHMA WITHOUT COMPLICATION: ICD-10-CM

## 2019-12-23 DIAGNOSIS — Z91.81 AT HIGH RISK FOR FALLS: ICD-10-CM

## 2019-12-23 DIAGNOSIS — F41.9 ANXIETY: ICD-10-CM

## 2019-12-23 DIAGNOSIS — I10 ESSENTIAL HYPERTENSION: ICD-10-CM

## 2019-12-23 PROCEDURE — G8484 FLU IMMUNIZE NO ADMIN: HCPCS | Performed by: INTERNAL MEDICINE

## 2019-12-23 PROCEDURE — G9899 SCRN MAM PERF RSLTS DOC: HCPCS | Performed by: INTERNAL MEDICINE

## 2019-12-23 PROCEDURE — 99214 OFFICE O/P EST MOD 30 MIN: CPT | Performed by: INTERNAL MEDICINE

## 2019-12-23 PROCEDURE — G8427 DOCREV CUR MEDS BY ELIG CLIN: HCPCS | Performed by: INTERNAL MEDICINE

## 2019-12-23 PROCEDURE — 1123F ACP DISCUSS/DSCN MKR DOCD: CPT | Performed by: INTERNAL MEDICINE

## 2019-12-23 PROCEDURE — 1036F TOBACCO NON-USER: CPT | Performed by: INTERNAL MEDICINE

## 2019-12-23 PROCEDURE — 4040F PNEUMOC VAC/ADMIN/RCVD: CPT | Performed by: INTERNAL MEDICINE

## 2019-12-23 PROCEDURE — 3017F COLORECTAL CA SCREEN DOC REV: CPT | Performed by: INTERNAL MEDICINE

## 2019-12-23 PROCEDURE — 1090F PRES/ABSN URINE INCON ASSESS: CPT | Performed by: INTERNAL MEDICINE

## 2019-12-23 PROCEDURE — G8598 ASA/ANTIPLAT THER USED: HCPCS | Performed by: INTERNAL MEDICINE

## 2019-12-23 PROCEDURE — G8399 PT W/DXA RESULTS DOCUMENT: HCPCS | Performed by: INTERNAL MEDICINE

## 2019-12-23 PROCEDURE — G8417 CALC BMI ABV UP PARAM F/U: HCPCS | Performed by: INTERNAL MEDICINE

## 2019-12-23 RX ORDER — CYCLOBENZAPRINE HCL 5 MG
5 TABLET ORAL
COMMUNITY
Start: 2019-12-13 | End: 2020-02-19 | Stop reason: ALTCHOICE

## 2019-12-23 RX ORDER — CLONAZEPAM 1 MG/1
1 TABLET ORAL EVERY EVENING
Qty: 90 TABLET | Refills: 0 | Status: SHIPPED | OUTPATIENT
Start: 2019-12-23 | End: 2020-04-10 | Stop reason: SDUPTHER

## 2019-12-31 ENCOUNTER — ANTI-COAG VISIT (OUTPATIENT)
Dept: PHARMACY | Age: 74
End: 2019-12-31
Payer: MEDICARE

## 2019-12-31 DIAGNOSIS — I48.0 PAROXYSMAL ATRIAL FIBRILLATION (HCC): ICD-10-CM

## 2019-12-31 LAB — INTERNATIONAL NORMALIZATION RATIO, POC: 4.9

## 2019-12-31 PROCEDURE — 85610 PROTHROMBIN TIME: CPT

## 2019-12-31 PROCEDURE — 99212 OFFICE O/P EST SF 10 MIN: CPT

## 2020-01-09 NOTE — PROGRESS NOTES
[address]   Electrophysiology Follow up        Chief Complaint   Patient presents with    6 Month Follow-Up    Atrial Fibrillation      History of Present Illness:    Anjali Nolasco is 69 y.o. female who is here for routine follow up. She has a history of paroxysmal atrial fibrillation, hypertension, CAD, PE/DVT, and hyperlipidemia. She was originally seen by EP while in the hospital in December of 2011 after she was found to have atrial fibrillation post- lap band surgery. She states she wore a monitor years ago due to a fluttering sensation in her chest which did not show any abnormalities. CT cardiac angiography showed minimal stenosis. She is on Coumadin for her history of pulmonary emboli ([place] [facility] which was not associated with any reversible cause. She also has a [place] filter. She underwent coronary angiogram 5/1/18 which was negative for myocardial ischemia. 12/14/18 unsuccessful cardioversion    Kizzy Calderón had a fall on 11/15/19 when at a restaurant as she was walking into the door. She turned her head to the left, straightened her head and then went down. She felt lightheaded. She tried to break the fall with her hands. She had a radial neck fracture and forehead hematoma. Since her fall she has a hard time getting her words out. She called 12/18/19 with heart rate of 130, she stated she took an additional Rythmol and later converted. She captured her atrial fib episode from 12/18/19 on her [employer]uVein santiago. It lasted 8 hours. She has had short episodes since then. She currently takes coumadin, managed by the coumadin clinic. She has her INR checked every 4 weeks. She feels a pounding in her neck. She has been on Rythmol 225 every 8 hours. She does not miss doses. She estimates AF about 1X/month. She has symptoms and confirms the AF with her [employer]     Past Medical History:   has a past medical history of Allergic, (KLOR-CON M) 20 MEQ extended release tablet TAKE 1 TABLET BY MOUTH  DAILY WITH BREAKFAST 90 tablet 1    fluticasone-salmeterol (ADVAIR DISKUS) 250-50 MCG/DOSE AEPB Inhale 1 puff into the lungs every 12 hours 60 each 3    verapamil (CALAN SR) 240 MG extended release tablet Take 0.5 tablets by mouth nightly 90 tablet 3    warfarin (COUMADIN) 5 MG tablet TAKE 1 TABLETS BY MOUTH ON  Monday FRIDAY ONLY THEN 1 AND 1/2  TABLETS BY MOUTH ALL OTHER  DAYS 143 tablet 1    indapamide (LOZOL) 1.25 MG tablet TAKE 1 TABLET BY MOUTH  EVERY MORNING 90 tablet 3    pantoprazole (PROTONIX) 40 MG tablet Take 1 tablet by mouth every morning (before breakfast) 90 tablet 3    montelukast (SINGULAIR) 10 MG tablet TAKE 1 TABLET BY MOUTH  NIGHTLY 90 tablet 3    trimethoprim (TRIMPEX) 100 MG tablet TAKE 1 TABLET BY MOUTH  EVERY 48 HOURS 45 tablet 3    gabapentin (NEURONTIN) 300 MG capsule TAKE 2 CAPSULES BY MOUTH 3  TIMES DAILY 540 capsule 3    albuterol sulfate  (90 Base) MCG/ACT inhaler Inhale 2 puffs into the lungs every 4 hours as needed for Shortness of Breath 1 Inhaler 3    Ascorbic Acid (VITAMIN C PO) 1 tablet daily      therapeutic multivitamin-minerals (THERAGRAN-M) tablet Take 1 tablet by mouth nightly       Cranberry 500 MG CAPS Take 1,000 mg by mouth nightly       cyclobenzaprine (FLEXERIL) 5 MG tablet Take 5 mg by mouth       Facility-Administered Encounter Medications as of 1/10/2020   Medication Dose Route Frequency Provider Last Rate Last Dose    magnesium hydroxide (MILK OF MAGNESIA) 400 MG/5ML suspension 30 mL  30 mL Oral Daily PRN Piotr Centeno MD        ondansetron [facility]) injection 4 mg  4 mg Intravenous Q6H PRN Piotr Centeno MD        acetaminophen (TYLENOL) tablet 650 mg  650 mg Oral Q4H PRN Piotr Centeno MD         Allergies:  Belsomra [suvorexant]; Avelox [moxifloxacin hcl in nacl];  Levofloxacin; and Sulfa antibiotics     DATA:  Labs reviewed  Lab Results Component Value Date    ALT 26 10/29/2018    AST 22 10/29/2018    ALKPHOS 70 10/29/2018    BILITOT 0.6 10/29/2018     Lab Results   Component Value Date    CREATININE 0.8 2019    BUN 17 2019     2019    K 3.8 2019    CL 99 2019    CO2 25 2019       Lab Results   Component Value Date    WBC 6.4 2019    HGB 13.7 2019    HCT 40.9 2019    MCV 95.3 2019     2019     No components found for: CHLPL  Lab Results   Component Value Date    TRIG 74 2019    TRIG 106 2017    TRIG 104 2016     Lab Results   Component Value Date    HDL 64 (H) 2019    HDL 65 (H) 2018    HDL 74 (H) 2017     Lab Results   Component Value Date    LDLCALC 79 2019    LDLCALC 77 2018    LDLCALC 77 2017     Lab Results   Component Value Date    LABVLDL 15 2019    LABVLDL 15 2018    LABVLDL 21 2017     Diagnostic testing:  Pertinent reports were reviewed as a part of this visit. EC/10/20 sinus omer with PAC's rate 57  QTcH 427    I personally reviewed ECG and interpretation as above. Last Echo: 3/13/2018  Summary   Normal left ventricle size, wall thickness and systolic function with an   estimated ejection fraction of 60%.     Mild mitral regurgitation.     LHC: 18  Findings:                      LM       Normal              LAD     Normal              Cx        Normal              RCA     Normal              LVG     Not performed - aorta very tortuous              EDP     Not obtained - aorta very tortuous  Intervention:  None      Review of Systems:   · Constitutional: there has been no unanticipated weight loss. There's been no change in energy level, sleep pattern, or activity level. · Eyes: No visual changes or diplopia. No scleral icterus. · ENT: No Headaches, hearing loss or vertigo. No mouth sores or sore throat.   · Cardiovascular: Reviewed in HPI    · Respiratory: No cough 127/84, pulse 61, resp. rate 18, height 5' 4\" (1.626 m), weight 256 lb (116.1 kg), not currently breastfeeding. Stable. 3. CAD: [facility] 5/1/18 no stenosis. No current complaints of anginal symptoms. 4. History of DVT/ PE: Has IVC filter, on coumadin. 5. COPD/asthma:  Continued bronchospasm after respiratory illness; avoid beta blockers. Plan:    Keep INR greater than 2  Continue CPAP  Follow up in 6 months  Consider change in meds or catheter ablation. Hoa Meza MD    Scribe attestation: This note was scribed in the presence of Marie Stone MD by Batool Nunez, MAYNOR    I, Dr. Marie Stone, personally performed the services described in this documentation as scribed by Batool Nnuez RN  in my presence, and it is both accurate and complete.

## 2020-01-10 ENCOUNTER — OFFICE VISIT (OUTPATIENT)
Dept: CARDIOLOGY CLINIC | Age: 75
End: 2020-01-10
Payer: MEDICARE

## 2020-01-10 VITALS
SYSTOLIC BLOOD PRESSURE: 127 MMHG | RESPIRATION RATE: 18 BRPM | HEIGHT: 64 IN | WEIGHT: 256 LBS | BODY MASS INDEX: 43.71 KG/M2 | DIASTOLIC BLOOD PRESSURE: 84 MMHG | HEART RATE: 61 BPM

## 2020-01-10 PROCEDURE — G8427 DOCREV CUR MEDS BY ELIG CLIN: HCPCS | Performed by: INTERNAL MEDICINE

## 2020-01-10 PROCEDURE — 99214 OFFICE O/P EST MOD 30 MIN: CPT | Performed by: INTERNAL MEDICINE

## 2020-01-10 PROCEDURE — 3017F COLORECTAL CA SCREEN DOC REV: CPT | Performed by: INTERNAL MEDICINE

## 2020-01-10 PROCEDURE — G8484 FLU IMMUNIZE NO ADMIN: HCPCS | Performed by: INTERNAL MEDICINE

## 2020-01-10 PROCEDURE — 1036F TOBACCO NON-USER: CPT | Performed by: INTERNAL MEDICINE

## 2020-01-10 PROCEDURE — 1123F ACP DISCUSS/DSCN MKR DOCD: CPT | Performed by: INTERNAL MEDICINE

## 2020-01-10 PROCEDURE — G9899 SCRN MAM PERF RSLTS DOC: HCPCS | Performed by: INTERNAL MEDICINE

## 2020-01-10 PROCEDURE — G8399 PT W/DXA RESULTS DOCUMENT: HCPCS | Performed by: INTERNAL MEDICINE

## 2020-01-10 PROCEDURE — 4040F PNEUMOC VAC/ADMIN/RCVD: CPT | Performed by: INTERNAL MEDICINE

## 2020-01-10 PROCEDURE — 1090F PRES/ABSN URINE INCON ASSESS: CPT | Performed by: INTERNAL MEDICINE

## 2020-01-10 PROCEDURE — 93000 ELECTROCARDIOGRAM COMPLETE: CPT | Performed by: INTERNAL MEDICINE

## 2020-01-10 PROCEDURE — G8417 CALC BMI ABV UP PARAM F/U: HCPCS | Performed by: INTERNAL MEDICINE

## 2020-01-10 RX ORDER — VITAMIN B COMPLEX
1 CAPSULE ORAL DAILY
COMMUNITY
End: 2022-08-15 | Stop reason: ALTCHOICE

## 2020-01-13 ENCOUNTER — OFFICE VISIT (OUTPATIENT)
Dept: ORTHOPEDIC SURGERY | Age: 75
End: 2020-01-13
Payer: MEDICARE

## 2020-01-13 ENCOUNTER — ANTI-COAG VISIT (OUTPATIENT)
Dept: PHARMACY | Age: 75
End: 2020-01-13
Payer: MEDICARE

## 2020-01-13 VITALS
HEART RATE: 74 BPM | DIASTOLIC BLOOD PRESSURE: 76 MMHG | SYSTOLIC BLOOD PRESSURE: 136 MMHG | BODY MASS INDEX: 44.22 KG/M2 | HEIGHT: 64 IN | WEIGHT: 259 LBS

## 2020-01-13 LAB — INTERNATIONAL NORMALIZATION RATIO, POC: 2.2

## 2020-01-13 PROCEDURE — 85610 PROTHROMBIN TIME: CPT

## 2020-01-13 PROCEDURE — 99213 OFFICE O/P EST LOW 20 MIN: CPT | Performed by: PHYSICIAN ASSISTANT

## 2020-01-13 PROCEDURE — 3017F COLORECTAL CA SCREEN DOC REV: CPT | Performed by: PHYSICIAN ASSISTANT

## 2020-01-13 PROCEDURE — G8417 CALC BMI ABV UP PARAM F/U: HCPCS | Performed by: PHYSICIAN ASSISTANT

## 2020-01-13 PROCEDURE — 1090F PRES/ABSN URINE INCON ASSESS: CPT | Performed by: PHYSICIAN ASSISTANT

## 2020-01-13 PROCEDURE — G8399 PT W/DXA RESULTS DOCUMENT: HCPCS | Performed by: PHYSICIAN ASSISTANT

## 2020-01-13 PROCEDURE — G9899 SCRN MAM PERF RSLTS DOC: HCPCS | Performed by: PHYSICIAN ASSISTANT

## 2020-01-13 PROCEDURE — G8427 DOCREV CUR MEDS BY ELIG CLIN: HCPCS | Performed by: PHYSICIAN ASSISTANT

## 2020-01-13 PROCEDURE — G8484 FLU IMMUNIZE NO ADMIN: HCPCS | Performed by: PHYSICIAN ASSISTANT

## 2020-01-13 PROCEDURE — 99211 OFF/OP EST MAY X REQ PHY/QHP: CPT

## 2020-01-13 PROCEDURE — 1123F ACP DISCUSS/DSCN MKR DOCD: CPT | Performed by: PHYSICIAN ASSISTANT

## 2020-01-13 PROCEDURE — 1036F TOBACCO NON-USER: CPT | Performed by: PHYSICIAN ASSISTANT

## 2020-01-13 PROCEDURE — 4040F PNEUMOC VAC/ADMIN/RCVD: CPT | Performed by: PHYSICIAN ASSISTANT

## 2020-01-13 NOTE — PROGRESS NOTES
Patient Name: Jennifer Lovell  Medical Record Number: [id number]  YOB: 1945  Date of Encounter: 1/13/2020     Chief Complaint   Patient presents with    Elbow Pain     f/u for RT elbow, radial head fx. History of Present Illness:   Ms. Jennifer Lovell is here in 8+ week follow up regarding her right radial neck fracture and right wrist sprain she sustained during a mechanical fall on 11/15/2019. She presents today stating her right elbow pain is a 1/10. She states she has also had some occasional aching along the radial aspect of her right wrist.  She admits she has been doing much more work around her house. She denies any new injuries. She denies swelling of her right elbow or wrist.  She does not feel she lacks range of motion of either joint. The patient's past medical history, medications, allergies, family history, social history, and review of systems have been reviewed, and dated and are recorded in the chart under the 'MEDIA\" tab. Physical Exam:    Ms. Jennifer Lovell appears well, she is in no apparent distress, she demonstrates appropriate mood & affect. She is alert and oriented to person, place and time. /76   Pulse 74   Ht 5' 4\" (1.626 m)   Wt 259 lb (117.5 kg)   BMI 44.46 kg/m²     On examination of patient's right elbow there is no swelling or joint effusion. She does have mild tenderness on palpation of the radial head and neck. She really has full range of motion of the right elbow with 4-/5 motor strength. She denies having pain with range of motion exercises. On examination of patient's right wrist there is no swelling or joint effusion. She does have mild tenderness on palpation of the distal radius and palpation up into the base of the thumb. She has full range of motion of the right wrist and all fingers without any expressed pain.     Radiology:  X-rays obtained and reviewed in office:   Views: 3 view right elbow including AP, lateral and radial head  Impression: There is a healing radial neck fracture. There is no interval displacement. Orders:  Orders Placed This Encounter   Procedures    XR ELBOW RIGHT (MIN 3 VIEWS)       Impression:   Diagnosis Orders   1. Closed nondisplaced fracture of neck of right radius with routine healing, subsequent encounter  XR ELBOW RIGHT (MIN 3 VIEWS)   2. Sprain of right wrist, subsequent encounter         Treatment Plan:    Patient is a little over 8 weeks out from her initial injury. She is having very minimal right elbow or wrist pain. She admits she has been doing more around the house which has caused more aching. She is no longer immobilizing the elbow or the wrist.  X-rays of her elbow today show a healing radial neck fracture. Patient can increase her lifting, pushing and pulling to 10 pounds. She will plan on following back in 5 weeks at which time we will reimage the right elbow and if she is still having wrist pain we will reimage the right wrist also. She is advised to follow-up before that time with any concerns. Dwain Ordaz was informed of the results of any imaging. We discussed treatment options and a time was given to answer questions. A plan was proposed and Dwain Ordaz understand and accepts this course of care. Electronically signed by Sherin Villanueva PA-C on 5/71/6725  Board Certified [facility]    Please note that portions of this note were completed with a voice recognition program.  Efforts were made to edit the dictations but occasionally words are mis-transcribed.

## 2020-01-13 NOTE — PROGRESS NOTES
mg on Sun, Tues and Thurs and 5 mg all other days of the week   Encouraged to maintain a consistency of vegetables/salads.   Recheck INR 3 weeks, 2/3    Referring PCP is Jose Nguyen  INR (no units)   Date Value   01/13/2020 2.2   12/31/2019 4.9   12/03/2019 1.9   11/15/2019 1.72 (H)   12/14/2018 2.90 (H)   10/10/2018 3.11 (H)

## 2020-01-20 ENCOUNTER — TELEPHONE (OUTPATIENT)
Dept: INTERNAL MEDICINE CLINIC | Age: 75
End: 2020-01-20

## 2020-01-20 NOTE — TELEPHONE ENCOUNTER
Patient is asking if Dr. Stacy Haider is ok with patient taking Nortriptyline HCL 10mg. Instead of taking Tylenol PM. Please advise to patient at [phone].

## 2020-02-03 ENCOUNTER — ANTI-COAG VISIT (OUTPATIENT)
Dept: PHARMACY | Age: 75
End: 2020-02-03
Payer: MEDICARE

## 2020-02-03 LAB — INTERNATIONAL NORMALIZATION RATIO, POC: 1.8

## 2020-02-03 PROCEDURE — 85610 PROTHROMBIN TIME: CPT

## 2020-02-03 PROCEDURE — 99211 OFF/OP EST MAY X REQ PHY/QHP: CPT

## 2020-02-03 NOTE — PROGRESS NOTES
Ms. Ryan Hearn is a 69 y.o. y/o female with history of DVT, PE, Afib   She presents today for anticoagulation monitoring and adjustment. Pertinent PMH: HTN, Gastric Banding,CAD, diskectomy with an artifical spinal disk implant in September 2012. Patient Reported Findings:  Yes     No  [x]   []       Patient verifies current dosing regimen as listed  []   [x]       S/S bleeding/bruising/swelling/SOB   []   [x]       Blood in urine or stool  []   [x]       Procedures scheduled in the future at this time   []   [x]       Missed Dose   []   [x]       Extra Dose   [x]   []       Change in medications Started taking tylenol pm, 2 capsules every night. States that she has been taking 4 tablets in addition to the 2 at night for headaches recently --> decreased tylenol intake to 1-2 times a day   [x]   []       Change in health/diet/appetite  Patient states that she feels like she has returned to normal vit k intake --> diet fluctuates causing INR to fluctuate. Discussed ways to improve consistency with vit k intake  --> states that she has cut back spinach and cabbage intake to twice a week    []   [x]       Change in alcohol use    []   [x]       Change in activity  []   [x]       Hospital admission  []   [x]       Emergency department visit   []   [x]       Other complaints    Clinical Outcomes:  Yes     No  []   [x]       Major bleeding event  []   [x]       Thromboembolic event  Gets warfarin through MD  Duration of warfarin Therapy: indefinite  INR Range:  2.0-3.0     INR is 1.8 today    Since INR fell again, increase weekly dose to 5 mg Mon, Wed and Fri and 7.5 mg all other days of the week (5% inc)  Encouraged to maintain a consistency of vegetables/salads.   Recheck INR 2 weeks, 2/17    Referring PCP is Hayder Dodd  INR (no units)   Date Value   02/03/2020 1.8   01/13/2020 2.2   12/31/2019 4.9   11/15/2019 1.72 (H)   12/14/2018 2.90 (H)   10/10/2018 3.11 (H)

## 2020-02-17 ENCOUNTER — OFFICE VISIT (OUTPATIENT)
Dept: ORTHOPEDIC SURGERY | Age: 75
End: 2020-02-17
Payer: MEDICARE

## 2020-02-17 ENCOUNTER — ANTI-COAG VISIT (OUTPATIENT)
Dept: PHARMACY | Age: 75
End: 2020-02-17
Payer: MEDICARE

## 2020-02-17 VITALS
HEART RATE: 75 BPM | HEIGHT: 64 IN | DIASTOLIC BLOOD PRESSURE: 83 MMHG | WEIGHT: 259 LBS | SYSTOLIC BLOOD PRESSURE: 164 MMHG | BODY MASS INDEX: 44.22 KG/M2

## 2020-02-17 LAB — INTERNATIONAL NORMALIZATION RATIO, POC: 2

## 2020-02-17 PROCEDURE — 85610 PROTHROMBIN TIME: CPT

## 2020-02-17 PROCEDURE — G8484 FLU IMMUNIZE NO ADMIN: HCPCS | Performed by: PHYSICIAN ASSISTANT

## 2020-02-17 PROCEDURE — 1123F ACP DISCUSS/DSCN MKR DOCD: CPT | Performed by: PHYSICIAN ASSISTANT

## 2020-02-17 PROCEDURE — G9899 SCRN MAM PERF RSLTS DOC: HCPCS | Performed by: PHYSICIAN ASSISTANT

## 2020-02-17 PROCEDURE — 4040F PNEUMOC VAC/ADMIN/RCVD: CPT | Performed by: PHYSICIAN ASSISTANT

## 2020-02-17 PROCEDURE — G8417 CALC BMI ABV UP PARAM F/U: HCPCS | Performed by: PHYSICIAN ASSISTANT

## 2020-02-17 PROCEDURE — 1090F PRES/ABSN URINE INCON ASSESS: CPT | Performed by: PHYSICIAN ASSISTANT

## 2020-02-17 PROCEDURE — G8427 DOCREV CUR MEDS BY ELIG CLIN: HCPCS | Performed by: PHYSICIAN ASSISTANT

## 2020-02-17 PROCEDURE — 99213 OFFICE O/P EST LOW 20 MIN: CPT | Performed by: PHYSICIAN ASSISTANT

## 2020-02-17 PROCEDURE — 3017F COLORECTAL CA SCREEN DOC REV: CPT | Performed by: PHYSICIAN ASSISTANT

## 2020-02-17 PROCEDURE — 1036F TOBACCO NON-USER: CPT | Performed by: PHYSICIAN ASSISTANT

## 2020-02-17 PROCEDURE — G8399 PT W/DXA RESULTS DOCUMENT: HCPCS | Performed by: PHYSICIAN ASSISTANT

## 2020-02-17 PROCEDURE — 99211 OFF/OP EST MAY X REQ PHY/QHP: CPT

## 2020-02-17 RX ORDER — ACETAMINOPHEN,DIPHENHYDRAMINE HCL 500; 25 MG/1; MG/1
1 TABLET, FILM COATED ORAL NIGHTLY PRN
COMMUNITY

## 2020-02-17 RX ORDER — NORTRIPTYLINE HYDROCHLORIDE 10 MG/1
10 CAPSULE ORAL
COMMUNITY
Start: 2020-01-14 | End: 2020-02-19 | Stop reason: ALTCHOICE

## 2020-02-17 RX ORDER — ACETAMINOPHEN 500 MG
500 TABLET ORAL EVERY 6 HOURS PRN
COMMUNITY

## 2020-02-17 NOTE — PROGRESS NOTES
Patient Name: Kirby Valentin  Medical Record Number: [id number]  YOB: 1945  Date of Encounter: 2/17/2020     Chief Complaint   Patient presents with    Follow-up     Right elbow, closed nondisplaced fracture of neck of radius, and right wrist sprain; doi 11/15/19. History of Present Illness:   Ms. Kirby Valentin is here in 13+ week follow up regarding her right radial neck fracture and right wrist sprain she sustained during a mechanical fall on 11/15/2019. Patient rated her right elbow pain a 1/10 at her last visit. She presents today stating her right elbow pain is up to a 4/10 with activity. She denies any new injury. She denies swelling of the right elbow. She states she is still no longer having any right wrist pain. The patient's past medical history, medications, allergies, family history, social history, and review of systems have been reviewed, and dated and are recorded in the chart under the 'MEDIA\" tab. Physical Exam:    Ms. Kirby Valentin appears well, she is in no apparent distress, she demonstrates appropriate mood & affect. She is alert and oriented to person, place and time. BP (!) 164/83   Pulse 75   Ht 5' 4\" (1.626 m)   Wt 259 lb (117.5 kg)   BMI 44.46 kg/m²     On examination of patient's right elbow there is no swelling or joint effusion. She still has tenderness on palpation of the radial head and neck. She has full range of motion of the right elbow with 4+/5 motor strength. She has full range of motion of the right wrist as well as 4+/5  strength. Radiology:  X-rays obtained and reviewed in office:   Views: 3 view right elbow including AP, lateral and radial head view  Impression: There is a healing radial neck fracture. Orders:  Orders Placed This Encounter   Procedures    XR ELBOW RIGHT (MIN 3 VIEWS)   Bharat Crow MD, Hand Surgery (Hand, Wrist, Elbow), [facility]       Impression:   Diagnosis Orders   1.

## 2020-02-17 NOTE — PROGRESS NOTES
Ms. Edy Mccullough is a 69 y.o. y/o female with history of DVT, PE, Afib   She presents today for anticoagulation monitoring and adjustment. Pertinent PMH: HTN, Gastric Banding,CAD, diskectomy with an artifical spinal disk implant in September 2012. Patient Reported Findings:  Yes     No  [x]   []       Patient verifies current dosing regimen as listed  []   [x]       S/S bleeding/bruising/swelling/SOB   []   [x]       Blood in urine or stool  []   [x]       Procedures scheduled in the future at this time   []   [x]       Missed Dose   []   [x]       Extra Dose   []   [x]       Change in medications Started taking tylenol pm, 2 capsules every night. States that she has been taking 4 tablets in addition to the 2 at night for headaches recently --> decreased tylenol intake to 1-2 times a day   nortriptyline on med list, need to ask patient about   [x]   []       Change in health/diet/appetite  Patient states that she feels like she has returned to normal vit k intake --> diet fluctuates causing INR to fluctuate. Discussed ways to improve consistency with vit k intake  --> states that she has cut back spinach and cabbage intake to twice a week --> little to no vit k recently, a bit of lettuce though    []   [x]       Change in alcohol use    []   [x]       Change in activity  []   [x]       Hospital admission  []   [x]       Emergency department visit   []   [x]       Other complaints    Clinical Outcomes:  Yes     No  []   [x]       Major bleeding event  []   [x]       Thromboembolic event  Gets warfarin through MD  Duration of warfarin Therapy: indefinite  INR Range:  2.0-3.0     INR is 2 today    Continue weekly dose of 5 mg Mon, Wed and Fri and 7.5 mg all other days of the week   Encouraged to maintain a consistency of vegetables/salads.   Recheck INR 3 weeks, 3/9    Referring PCP is Cat Roman  INR (no units)   Date Value   02/17/2020 2   02/03/2020 1.8   01/13/2020 2.2   11/15/2019 1.72 (H)

## 2020-02-19 ENCOUNTER — OFFICE VISIT (OUTPATIENT)
Dept: ORTHOPEDIC SURGERY | Age: 75
End: 2020-02-19
Payer: MEDICARE

## 2020-02-19 VITALS — HEIGHT: 64 IN | WEIGHT: 259.04 LBS | BODY MASS INDEX: 44.22 KG/M2

## 2020-02-19 PROCEDURE — 1036F TOBACCO NON-USER: CPT | Performed by: ORTHOPAEDIC SURGERY

## 2020-02-19 PROCEDURE — 3017F COLORECTAL CA SCREEN DOC REV: CPT | Performed by: ORTHOPAEDIC SURGERY

## 2020-02-19 PROCEDURE — G8427 DOCREV CUR MEDS BY ELIG CLIN: HCPCS | Performed by: ORTHOPAEDIC SURGERY

## 2020-02-19 PROCEDURE — 99203 OFFICE O/P NEW LOW 30 MIN: CPT | Performed by: ORTHOPAEDIC SURGERY

## 2020-02-19 PROCEDURE — G8417 CALC BMI ABV UP PARAM F/U: HCPCS | Performed by: ORTHOPAEDIC SURGERY

## 2020-02-19 PROCEDURE — G8484 FLU IMMUNIZE NO ADMIN: HCPCS | Performed by: ORTHOPAEDIC SURGERY

## 2020-02-19 PROCEDURE — 4040F PNEUMOC VAC/ADMIN/RCVD: CPT | Performed by: ORTHOPAEDIC SURGERY

## 2020-02-19 PROCEDURE — 1123F ACP DISCUSS/DSCN MKR DOCD: CPT | Performed by: ORTHOPAEDIC SURGERY

## 2020-02-19 PROCEDURE — G8399 PT W/DXA RESULTS DOCUMENT: HCPCS | Performed by: ORTHOPAEDIC SURGERY

## 2020-02-19 PROCEDURE — 1090F PRES/ABSN URINE INCON ASSESS: CPT | Performed by: ORTHOPAEDIC SURGERY

## 2020-02-19 PROCEDURE — G9899 SCRN MAM PERF RSLTS DOC: HCPCS | Performed by: ORTHOPAEDIC SURGERY

## 2020-02-21 ENCOUNTER — TELEPHONE (OUTPATIENT)
Dept: ORTHOPEDIC SURGERY | Age: 75
End: 2020-02-21

## 2020-02-21 NOTE — TELEPHONE ENCOUNTER
Called and left message for patient that CT was authorized by insurance. Gave her number for [facility] and told her to call office back to schedule follow up appointment once she has CT.    CT RIGHT ELBOW W/O CONTRAST DOES NOT REQUIRE PREAUTHORIZATION

## 2020-02-27 ENCOUNTER — HOSPITAL ENCOUNTER (OUTPATIENT)
Dept: CT IMAGING | Age: 75
Discharge: HOME | End: 2020-02-27
Payer: MEDICARE

## 2020-02-27 PROCEDURE — 73200 CT UPPER EXTREMITY W/O DYE: CPT

## 2020-03-03 ENCOUNTER — OFFICE VISIT (OUTPATIENT)
Dept: ORTHOPEDIC SURGERY | Age: 75
End: 2020-03-03
Payer: MEDICARE

## 2020-03-03 VITALS — WEIGHT: 259.04 LBS | HEIGHT: 64 IN | BODY MASS INDEX: 44.22 KG/M2

## 2020-03-03 PROCEDURE — 1036F TOBACCO NON-USER: CPT | Performed by: ORTHOPAEDIC SURGERY

## 2020-03-03 PROCEDURE — G9899 SCRN MAM PERF RSLTS DOC: HCPCS | Performed by: ORTHOPAEDIC SURGERY

## 2020-03-03 PROCEDURE — G8484 FLU IMMUNIZE NO ADMIN: HCPCS | Performed by: ORTHOPAEDIC SURGERY

## 2020-03-03 PROCEDURE — 4040F PNEUMOC VAC/ADMIN/RCVD: CPT | Performed by: ORTHOPAEDIC SURGERY

## 2020-03-03 PROCEDURE — 99213 OFFICE O/P EST LOW 20 MIN: CPT | Performed by: ORTHOPAEDIC SURGERY

## 2020-03-03 PROCEDURE — G8399 PT W/DXA RESULTS DOCUMENT: HCPCS | Performed by: ORTHOPAEDIC SURGERY

## 2020-03-03 PROCEDURE — G8427 DOCREV CUR MEDS BY ELIG CLIN: HCPCS | Performed by: ORTHOPAEDIC SURGERY

## 2020-03-03 PROCEDURE — G8417 CALC BMI ABV UP PARAM F/U: HCPCS | Performed by: ORTHOPAEDIC SURGERY

## 2020-03-03 PROCEDURE — 1090F PRES/ABSN URINE INCON ASSESS: CPT | Performed by: ORTHOPAEDIC SURGERY

## 2020-03-03 PROCEDURE — 3017F COLORECTAL CA SCREEN DOC REV: CPT | Performed by: ORTHOPAEDIC SURGERY

## 2020-03-03 PROCEDURE — 1123F ACP DISCUSS/DSCN MKR DOCD: CPT | Performed by: ORTHOPAEDIC SURGERY

## 2020-03-03 NOTE — PROGRESS NOTES
Assessment: 78-year-old female presenting with history of right lateral elbow pain after fall now nearly 3 and a half months ago  1. 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 I do think that her symptoms can be attributed to delayed union in the forearm and elbow. She may also have component of tendinopathy or lateral epicondylitis also contributing to her symptoms. I did recommend modifying some of her current stretches and also referral to therapy to see if we can help with some modalities particularly with the tenderness pathology that may be associated with her pain. With regards to her healing I am unsure of the exact cause of her delayed union. She is a non-smoker but I did discuss obtaining a nonunion laboratory panel work-up which would be beneficial.  I will communicate with her primary care physician Dr. Shelbie Patel as well to coordinate and see if any other suggestions with regards to this work-up    For now would recommend continued observation and nonoperative treatment. In the case of radial neck fractures with delayed union sometimes these can take multiple months and even up to 7 or 8 months before they demonstrate signs of healing. She does have partial healing at this point and she feels that her symptoms on a day-to-day basis have not limited any of her activities of daily living  She may continue with range of motion of the elbow as tolerated but keep her lifting to approximately 10 pounds at this time  I will see her back again in a approximately 6 weeks for repeat evaluation and imaging    No follow-ups on file.          History of Present Illness  Demetria Dunbar is a 69 y.o. female   , right-hand-dominant, retired, presenting with history of right elbow injury and pain  She has a history of a fall onto her right elbow with subsequent radial neck fracture  Date of injury: 11/19/2019  She was subsequently followed up by JOSEPHINE Spears and treated with conservative management for her injury. She had been having improvement in her elbow pain and function as well as range of motion until her last visit several days ago. She stated that over the last several weeks she has had more pain in her right elbow particularly with certain gripping and rotational activities. Interval history: CT scan reviewed obtained of the right elbow since her last visit. CT scan does demonstrate delayed union with approximately 20% healing of radial head/neck fracture and sclerosis of the posterior fracture margins. The patient has continued to have intermittent discomfort in her elbow which she describes mainly as laterally and also radiating into her forearm. She has not found much benefit from some of the stretching exercises that we provided to her at her last visit. No additional new symptoms reported today      Review of Systems  Pertinent items are noted in HPI  Review of systems reviewed from Patient History Form dated on 11/18/19 and available in the patient's chart under the Media tab. Vital Signs  There were no vitals filed for this visit. Physical Exam  Constitutional:  Patient is well-nourished and demonstrates normal hygiene. Mental Status:  Patient is alert and oriented to person, place and time. Skin:  Intact, no rashes or lesions.      Right Elbow/right upper extremity examination  Inspection: No obvious swelling or effusion throughout the right forearm or elbow including proximal forearm  No obvious deformity of the right elbow or forearm or wrist with appropriate finger tenodesis and cascade     Palpation:  Mild tenderness to palpation near lateral epicondyle with minimal tenderness at radiocapitellar joint laterally  No palpable crepitus or gross instability  No tenderness globally throughout the remainder of the elbow and minimal tenderness at radial tunnel and proximal forearm     Range of Motion: Elbow range of motion 0 to 135 degrees of flexion with 70 degrees of pronation and supination with mild crepitus and no mechanical block  Mild discomfort with terminal pronation supination  No pain with full composite fist and full extension of all fingers  Mild discomfort with resisted finger extension and wrist extension     Strength: 5 out of 5 strength AIN/PIN/interossei  5 out of 5 elbow flexion and extension without increased pain  5/5 wrist flexion extension     Special Tests: Grossly stable elbow to varus and valgus stress  No evidence of posterior lateral rotatory instability of the elbow  Stable distal radial ulnar joint pronation supination neutral position without increased pain  Gross sensation intact light touch median ulnar and radial nerve without deficit  Skin: There are no additional worrisome rashes, ulcerations or lesions. Capillary refill brisk all fingers, symmetric  Gross sensation intact to light touch median/ulnar/radial nerves  Sensation intact to radial/ulnar aspect of fingertip        Radiology:    X-rays obtained and reviewed in office:  No new images obtained today    Additional Diagnostic Test Findings:  CT scan of the right elbow  Findings demonstrate partial healing of radial neck fracture with approximately 20 to 30% healing images personally reviewed by me, no evidence of obvious intra-articular step-off or gap  Please see media tab and imaging tab for full detailed report    Office Procedures:  No orders of the defined types were placed in this encounter. Saundra Rodriguez MD  Orthopaedic Surgeon, Hand & Upper Extremity   [facility]    Contact Information:  Coleen Colleen: [phone] Clinical )    This dictation was performed with a verbal recognition program [facility]) and it was checked for errors. It is possible that there are still dictated errors within this office note.   If so, please

## 2020-03-03 NOTE — Clinical Note
Amish Landeros, This patient has evidence of a delayed union of radial neck elbow fracture. I was going to order a nonunion laboratory panel. Let me know if you have any specific tests that you might suggest as well.

## 2020-03-04 ENCOUNTER — HOSPITAL ENCOUNTER (OUTPATIENT)
Dept: OCCUPATIONAL THERAPY | Age: 75
Setting detail: THERAPIES SERIES
Discharge: HOME | End: 2020-03-04
Payer: MEDICARE

## 2020-03-04 PROCEDURE — 97140 MANUAL THERAPY 1/> REGIONS: CPT | Performed by: OCCUPATIONAL THERAPIST

## 2020-03-04 PROCEDURE — 97165 OT EVAL LOW COMPLEX 30 MIN: CPT | Performed by: OCCUPATIONAL THERAPIST

## 2020-03-04 PROCEDURE — 97110 THERAPEUTIC EXERCISES: CPT | Performed by: OCCUPATIONAL THERAPIST

## 2020-03-04 NOTE — FLOWSHEET NOTE
[facility], [facility] [place] [address]  Phone [phone]      Fax [phone]    Occupational Therapy Treatment Note/ Progress Report:     Date:  3/4/2020    Patient Name:  Edy Mccullough   \"[place]\"  :  1945  MRN: [id number]    Medical/Treatment Diagnosis Information:  · Diagnosis: Q12.990R (ICD-10-CM) - Closed nondisplaced fracture of neck of right radius with delayed healing, subsequent encounter    M77.11 (ICD-10-CM) - Lateral epicondylitis of right elbow  · Treatment Diagnosis: R Forearm pain J23.190     Insurance/Certification information:  OT Insurance Information: [facility] Medicare     Physician Information:  Referring Practitioner: Reagan Rosas  Has the plan of care been signed (Y/N):        []  Yes  [x]  No       Visit # Insurance Allowable Auth Required   1 Med necess [x]  Yes []  No        Is this a Progress Report:     []  Yes  [x]  No      If Yes:  Date Range for reporting period:  Beginning  Ending    Progress report will be due (10 Rx or 30 days whichever is less): 63     Recertification will be due (POC Duration  / 90 days whichever is less): 20     Date of Injury: 19  Date of Surgery: NA    Date of Patient follow up with Physician:     RESTRICTIONS/PRECAUTIONS:     Latex Allergy:  [x]No      []Yes  Pacemaker:  [x] No       [] Yes     Preferred Language for Healthcare:   [x]English       []other:     Functional Scale: 64% (Quick DASH)   Date assessed:  3/4/2020      SUBJECTIVE: Patient reported deficits/history of current problem: Jennifer Keep while going up curb- fell forward onto face and arm.   Fractured elbow    Pain Scale: 4-5/10      OBJECTIVE:       Date:  3/4/2020     Objective Measures/Tests:      ROM: WFL    Pain when reaching behind back at waist level                       Strength:      MMT:   Wrist ext  Wrist flex  RD  UD  Sup  Pron  Shoulder flex, abd  ER R  4/5  5-/5  5/5  5-/5  4+/5  4+/5  4+/5  4-4+/5     Observations: Other:                  MODALITIES:      Fluidotherapy (40672)      Estim (27105/61631)      Paraffin (82036)      US (50615)      Iontophoresis (03839)      Hot Pack      Cold Pack 10' end           INTERVENTIONS:      Therapeutic Exercise (99158)      FA stretches 5x 15 sec hold ea      Strengthening  2#   Wrist ext  Wrist flex  RD/UD  Elbow flex /ext at side  10 x2    1#   elbow ext/flex over head    Sup/pron   No wt  10 x 2         scapula \" NO money\"  20 x    Educated pt on how to perform ER while on side at home             Therapeutic Activity (06375)                              Manual Therapy (54936) STM- FA & wrist      tubigrip D for FA- for edema and pain    Edema glove  sm            Neuromuscular Reeducation (25525)                  ADL Training (63353)                  HEP Training/Review See sheet(s)                 Splinting      Lcode:      Orthotic Mgmt, Subsequent Enc (90313)      Orthotic Mgmt & Training (62578)            Other:                                Therapeutic Exercise & NMR:  [x] (61350) Provided verbal/tactile cueing for activities related to strengthening, flexibility, endurance, ROM  for improvements in scapular, scapulothoracic and UE control with self care, reaching, carrying, lifting, house/yardwork, driving/computer work. [x] (78699) Provided verbal/tactile cueing for activities related to improving balance, coordination, kinesthetic sense, posture, motor skill, proprioception  to assist with  scapular, scapulothoracic and UE control with self care, reaching, carrying, lifting, house/yardwork, driving/computer work.     Therapeutic Activities & NMR:    [] (25132 or 29671) Provided verbal/tactile cueing for activities related to improving balance, coordination, kinesthetic sense, posture, motor skill, proprioception and motor activation to allow for proper function of scapular, scapulothoracic and UE

## 2020-03-04 NOTE — PLAN OF CARE
as noted:  [] Other:    Palpation: Pain with palpation in ext musculature    Functional Mobility/Transfers/Gait:  [x] Independent - no significant gait deviations  [] Assistance needed   [] Assistive device used: Falls Risk Assessment (30 days):   [x] Falls Risk assessed and no intervention required. [] Falls Risk assessed and Patient requires intervention due to being higher risk   TUG score (>12s at risk):     [] Falls education provided, including      Review Of Systems (ROS): [x]Performed Review of systems (Integumentary, CardioPulmonary, Neurological) by intake and observation. Intake form has been scanned into medical record. Patient has been instructed to contact their primary care physician regarding ROS issues if not already being addressed at this time. ASSESSMENT:   This patient presents with signs and symptoms consistent with the medical diagnosis provided by the referring physician. Impairments (physical, cognitive and/or psychosocial):  [] Decreased mobility  [x] Weakness    [] Hypersensitivity   [x] Pain/tenderness   [] Edema/swelling   [] Decreased coordination (fine/gross motor)   [] Impaired body mechanics  [] Sensory loss  [] Loss of balance   [] Other:      Performance Deficits (to be addressed in plan of care):   [x] Bathing- reaching neck and upper back    [x] Household Tasks  - dusting with swifter [] Dressing    [x] Self Feeding - cutting food   [] Grooming    [] Work/Education   [] Functional Mobility  [] Sleeping/Rest   [] Toileting/Hygiene   [] Recreational Activities   [] Driving    [] Community/Social Participation   [x] Other: Lifting - such as a glass    Rehab Potential:   [] Excellent [x] Good [] Fair  [] Poor     Barriers affecting rehab potential:  []Age    []Lack of Motivation   [x]Co-Morbidities  []Cognitive Function  []Environmental/home/work barriers  []Other:     Tolerance of evaluation/treatment:    [] Excellent [x] Good [] Fair  [] Poor    PLAN OF CARE:  Interventions:   [x] Therapeutic Exercise [x] Therapeutic Activity    [x] Activities of Daily Living [x] Neuromuscular Re-education      [x] Patient Education  [x] Manual Therapy      [x] Modalities as needed, and not otherwise contraindicated, including: ultrasound,paraffin,moist heat/cold pack, electrical stimulation, contrast bath, iontophoresis  [] Splinting    Frequency/Duration:  1 days per week for 6-8 weeks (4/29/20)      GOALS:  Patient stated goal: Decrease pain to allow her to return to the [facility]    [] Progressing: [] Met: [] Not Met: [] Adjusted    Therapist goals for Patient:   Short Term Goals: To be achieved in: 2 weeks  1. Independent in HEP and progression per patient tolerance, in order to prevent re-injury. [] Progressing: [] Met: [] Not Met: [] Adjusted   2. Patient will have a decrease in pain to facilitate improvement in movement, function, and ADLs as indicated by Functional Deficits. [] Progressing: [] Met: [] Not Met: [] Adjusted    Long Term Goals to be achieved in 6-8 weeks (through 4/29/20), including patient directed goals to address patient identified performance deficits:  1) Pt to be independent in graded HEP progression with a good level of effort and compliance. [] Progressing: [] Met: [] Not Met: [] Adjusted   2) Pt to report a score of </= 40% on the Quick DASH disability questionnaire for increased performance with carrying, moving, and handling objects. [] Progressing: [] Met: [] Not Met: [] Adjusted   3) Pt will demonstrate increased   II strength to  R  within 10# of L and wrist ext,sup/pron 5-/5 for improved independence with cutting food, lifting glass,etc; and dusting. [] Progressing: [] Met: [] Not Met: [] Adjusted   4) Pt will have a decrease in pain to 2-3/10 to facilitate bathing neck and upper back and dusting.   [] Progressing: [] Met: [] Not Met: [] Adjusted        OCCUPATIONAL THERAPY EVALUATION COMPLEXITY JUSTIFICATION:    [x] An occupational

## 2020-03-05 ENCOUNTER — TELEPHONE (OUTPATIENT)
Dept: ORTHOPEDIC SURGERY | Age: 75
End: 2020-03-05

## 2020-03-09 ENCOUNTER — HOSPITAL ENCOUNTER (OUTPATIENT)
Age: 75
Discharge: HOME | End: 2020-03-09
Payer: MEDICARE

## 2020-03-09 ENCOUNTER — ANTI-COAG VISIT (OUTPATIENT)
Dept: PHARMACY | Age: 75
End: 2020-03-09
Payer: MEDICARE

## 2020-03-09 LAB
C-REACTIVE PROTEIN: 2.8 MG/L (ref 0–5.1)
CALCIUM SERPL-MCNC: 9.7 MG/DL (ref 8.3–10.6)
INTERNATIONAL NORMALIZATION RATIO, POC: 3
PARATHYROID HORMONE INTACT: 75.3 PG/ML (ref 14–72)
PREALBUMIN: 21.3 MG/DL (ref 20–40)
SEDIMENTATION RATE, ERYTHROCYTE: 30 MM/HR (ref 0–30)

## 2020-03-09 PROCEDURE — 85652 RBC SED RATE AUTOMATED: CPT

## 2020-03-09 PROCEDURE — 82310 ASSAY OF CALCIUM: CPT

## 2020-03-09 PROCEDURE — G0480 DRUG TEST DEF 1-7 CLASSES: HCPCS

## 2020-03-09 PROCEDURE — 84403 ASSAY OF TOTAL TESTOSTERONE: CPT

## 2020-03-09 PROCEDURE — 84134 ASSAY OF PREALBUMIN: CPT

## 2020-03-09 PROCEDURE — 82652 VIT D 1 25-DIHYDROXY: CPT

## 2020-03-09 PROCEDURE — 86140 C-REACTIVE PROTEIN: CPT

## 2020-03-09 PROCEDURE — 85610 PROTHROMBIN TIME: CPT

## 2020-03-09 PROCEDURE — 36415 COLL VENOUS BLD VENIPUNCTURE: CPT

## 2020-03-09 PROCEDURE — 83970 ASSAY OF PARATHORMONE: CPT

## 2020-03-09 PROCEDURE — 99211 OFF/OP EST MAY X REQ PHY/QHP: CPT

## 2020-03-10 LAB — VITAMIN D 1,25-DIHYDROXY: 44.4 PG/ML (ref 19.9–79.3)

## 2020-03-11 ENCOUNTER — HOSPITAL ENCOUNTER (OUTPATIENT)
Dept: OCCUPATIONAL THERAPY | Age: 75
Setting detail: THERAPIES SERIES
Discharge: HOME | End: 2020-03-11
Payer: MEDICARE

## 2020-03-11 LAB — TESTOSTERONE TOTAL: <3 NG/DL (ref 20–70)

## 2020-03-11 PROCEDURE — 97140 MANUAL THERAPY 1/> REGIONS: CPT | Performed by: OCCUPATIONAL THERAPIST

## 2020-03-11 PROCEDURE — 97110 THERAPEUTIC EXERCISES: CPT | Performed by: OCCUPATIONAL THERAPIST

## 2020-03-11 NOTE — FLOWSHEET NOTE
[facility] [employer]  [facility] [place] [address]  Phone [phone]      Fax [phone]    Occupational Therapy Treatment Note/ Progress Report:     Date:  3/11/2020    Patient Name:  Catalina Barnes   \"Libia\"  :  1945  MRN: [id number]    Medical/Treatment Diagnosis Information:  · Diagnosis: K00.606M (ICD-10-CM) - Closed nondisplaced fracture of neck of right radius with delayed healing, subsequent encounter    M77.11 (ICD-10-CM) - Lateral epicondylitis of right elbow  · Treatment Diagnosis: R Forearm pain V52.481     Insurance/Certification information:  OT Insurance Information: [facility] Medicare     Physician Information:  Referring Practitioner: Antonette Lin  Has the plan of care been signed (Y/N):        []  Yes  [x]  No       Visit # Insurance Allowable Auth Required   2 Med necess [x]  Yes []  No        Is this a Progress Report:     []  Yes  [x]  No      If Yes:  Date Range for reporting period:  Beginning  Ending    Progress report will be due (10 Rx or 30 days whichever is less): 92     Recertification will be due (POC Duration  / 90 days whichever is less): 20     Date of Injury: 19  Date of Surgery: NA    Date of Patient follow up with Physician:     RESTRICTIONS/PRECAUTIONS:     Latex Allergy:  [x]No      []Yes  Pacemaker:  [x] No       [] Yes     Preferred Language for Healthcare:   [x]English       []other:     Functional Scale: 64% (Quick DASH)   Date assessed:  3/4/2020      SUBJECTIVE: Patient reported deficits/history of current problem: Cleatus Sables while going up curb- fell forward onto face and arm.   Fractured elbow    Pain Scale: 4-5/10   Current: 3-4/10      OBJECTIVE:       Date:  3/4/2020     Objective Measures/Tests:      ROM: WFL    Pain when reaching behind back at waist level                       Strength:      MMT:   Wrist ext  Wrist flex  RD  UD  Sup  Pron  Shoulder flex, abd  ER R  4/5  5-/5  5/5  5-/5  4+/5  4+/5  4+/5  4-4+/5     Observations: Other:                  MODALITIES:      Fluidotherapy (26613)      Estim (21937/76261)      Paraffin (34500)      US (36836)      Iontophoresis (24016)      Hot Pack      Cold Pack 10' end           INTERVENTIONS:      Therapeutic Exercise (69887)      FA stretches 5x 15 sec hold ea      Strengthening  2#   Wrist ext  Wrist flex  RD/UD  Elbow flex /ext at side  10 x2    1#   elbow ext/flex over head    Sup/pron   No wt  10 x 2         scapula \" NO money\"  20 x    Educated pt on how to perform ER while on side at home             Therapeutic Activity (98661)                              Manual Therapy (22735) STM- FA & wrist      tubigrip D for FA- for edema and pain    Edema glove  sm            Neuromuscular Reeducation (95909)                  ADL Training (62276)                  HEP Training/Review See sheet(s)                 Splinting      Lcode:      Orthotic Mgmt, Subsequent Enc (13474)      Orthotic Mgmt & Training (53586)            Other:                                Therapeutic Exercise & NMR:  [x] (87509) Provided verbal/tactile cueing for activities related to strengthening, flexibility, endurance, ROM  for improvements in scapular, scapulothoracic and UE control with self care, reaching, carrying, lifting, house/yardwork, driving/computer work. [x] (71461) Provided verbal/tactile cueing for activities related to improving balance, coordination, kinesthetic sense, posture, motor skill, proprioception  to assist with  scapular, scapulothoracic and UE control with self care, reaching, carrying, lifting, house/yardwork, driving/computer work.     Therapeutic Activities & NMR:    [] (49015 or 04445) Provided verbal/tactile cueing for activities related to improving balance, coordination, kinesthetic sense, posture, motor skill, proprioception and motor activation to allow for proper function of scapular, scapulothoracic and UE

## 2020-03-11 NOTE — FLOWSHEET NOTE
[facility] [employer]  [facility] [place] [address]  Phone [phone]      Fax [phone]    Occupational Therapy Treatment Note/ Progress Report:     Date:  3/11/2020    Patient Name:  Jack Eddy   \"Libia\"  :  1945  MRN: [id number]    Medical/Treatment Diagnosis Information:  · Diagnosis: C25.226I (ICD-10-CM) - Closed nondisplaced fracture of neck of right radius with delayed healing, subsequent encounter    M77.11 (ICD-10-CM) - Lateral epicondylitis of right elbow  · Treatment Diagnosis: R Forearm pain F23.595     Insurance/Certification information:  OT Insurance Information: [facility] Medicare     Physician Information:  Referring Practitioner: Arya Burgos  Has the plan of care been signed (Y/N):        []  Yes  [x]  No       Visit # Insurance Allowable Auth Required   2 Med necess [x]  Yes []  No        Is this a Progress Report:     []  Yes  [x]  No      If Yes:  Date Range for reporting period:  Beginning  Ending    Progress report will be due (10 Rx or 30 days whichever is less): 98     Recertification will be due (POC Duration  / 90 days whichever is less): 20     Date of Injury: 19  Date of Surgery: NA    Date of Patient follow up with Physician:     RESTRICTIONS/PRECAUTIONS:     Latex Allergy:  [x]No      []Yes  Pacemaker:  [x] No       [] Yes     Preferred Language for Healthcare:   [x]English       []other:     Functional Scale: 64% (Quick DASH)   Date assessed:  3/4/2020      SUBJECTIVE: Reports she is more sore with light strengthening exercises. Pain doug with supination. Patient reported deficits/history of current problem: Vinetta Chock while going up curb- fell forward onto face and arm.   Fractured elbow    Pain Scale: -5/10   Current: 3-4/10      OBJECTIVE:       Date:  3/4/2020 3/11/20    Objective Measures/Tests:      ROM: WFL    Pain when reaching behind back at waist level [facility](68310)  [] NMR (78785) x      [] Estim (attended) (01586)   [x] Manual ([id number]) x  2    [] US (12357)  [] TA (18019) x      [] Paraffin (91865)  [] ADL  (73391) x     [] Splint/L code:    [] Estim (unattended) 33 93 31)  [] Fluidotherapy (30249)  [] Other:      ASSESSMENT:  Pt demonstrated increased sup/pron with decreased pain with K taping to forearm. Frequency/Duration:  1 days per week for 6-8 weeks (4/29/20)        GOALS:  Patient stated goal: Decrease pain to allow her to return to the [facility]    []? Progressing: []? Met: []? Not Met: []? Adjusted     Therapist goals for Patient:   Short Term Goals: To be achieved in: 2 weeks  1. Independent in HEP and progression per patient tolerance, in order to prevent re-injury. [x]? Progressing: []? Met: []? Not Met: []? Adjusted   2. Patient will have a decrease in pain to facilitate improvement in movement, function, and ADLs as indicated by Functional Deficits. []? Progressing: []? Met: [x]? Not Met: []? Adjusted     Long Term Goals to be achieved in 6-8 weeks (through 4/29/20), including patient directed goals to address patient identified performance deficits:  1) Pt to be independent in graded HEP progression with a good level of effort and compliance. [x]? Progressing: []? Met: []? Not Met: []? Adjusted   2) Pt to report a score of </= 40% on the Quick DASH disability questionnaire for increased performance with carrying, moving, and handling objects. []? Progressing: []? Met: [x]? Not Met: []? Adjusted   3) Pt will demonstrate increased   II strength to  R  within 10# of L and wrist ext,sup/pron 5-/5 for improved independence with cutting food, lifting glass,etc; and dusting.  []? Progressing: []? Met: [x]? Not Met: []? Adjusted   4) Pt will have a decrease in pain to 2-3/10 to facilitate bathing neck and upper back and dusting.  []? Progressing: []? Met: [x]? Not Met: []?  Adjusted       Overall Progression Towards Functional Goals/Treatment

## 2020-03-11 NOTE — FLOWSHEET NOTE
abd  ER R  4/5  5-/5  5/5  5-/5  4+/5  4+/5  4+/5  4-4+/5     Observations: Other:                  MODALITIES:      Fluidotherapy (95650)      Estim (53300/13551)      Paraffin (79444)      US (09057)      Iontophoresis (66902)      Hot Pack  10'    Cold Pack 10' end           INTERVENTIONS:      Therapeutic Exercise (80156)      FA stretches 5x 15 sec hold ea      Strengthening  2#   Wrist ext  Wrist flex  RD/UD  Elbow flex /ext at side  10 x2    1#   elbow ext/flex over head    Sup/pron   No wt  10 x 2         scapula \" NO money\"  20 x    Educated pt on how to perform ER while on side at home             Therapeutic Activity (76586)                              Manual Therapy (88431) STM- FA & wrist      tubigrip D for FA- for edema and pain    Edema glove  sm            Neuromuscular Reeducation (91285)                  ADL Training ([phone])                  HEP Training/Review See sheet(s)                 Splinting      Lcode:      Orthotic Mgmt, Subsequent Enc (06278)      Orthotic Mgmt & Training (06424)            Other:                                Therapeutic Exercise & NMR:  [x] (58751) Provided verbal/tactile cueing for activities related to strengthening, flexibility, endurance, ROM  for improvements in scapular, scapulothoracic and UE control with self care, reaching, carrying, lifting, house/yardwork, driving/computer work. [x] (80953) Provided verbal/tactile cueing for activities related to improving balance, coordination, kinesthetic sense, posture, motor skill, proprioception  to assist with  scapular, scapulothoracic and UE control with self care, reaching, carrying, lifting, house/yardwork, driving/computer work.     Therapeutic Activities & NMR:    [] (48699 or 45597) Provided verbal/tactile cueing for activities related to improving balance, coordination, kinesthetic sense, posture, motor skill, proprioception and motor activation to allow for proper function of scapular, (51279)  [] Other:      ASSESSMENT:  See eval    Frequency/Duration:  1 days per week for 6-8 weeks (4/29/20)        GOALS:  Patient stated goal: Decrease pain to allow her to return to the [facility]    []? Progressing: []? Met: []? Not Met: []? Adjusted     Therapist goals for Patient:   Short Term Goals: To be achieved in: 2 weeks  1. Independent in HEP and progression per patient tolerance, in order to prevent re-injury. []? Progressing: []? Met: []? Not Met: []? Adjusted   2. Patient will have a decrease in pain to facilitate improvement in movement, function, and ADLs as indicated by Functional Deficits. []? Progressing: []? Met: []? Not Met: []? Adjusted     Long Term Goals to be achieved in 6-8 weeks (through 4/29/20), including patient directed goals to address patient identified performance deficits:  1) Pt to be independent in graded HEP progression with a good level of effort and compliance. []? Progressing: []? Met: []? Not Met: []? Adjusted   2) Pt to report a score of </= 40% on the Quick DASH disability questionnaire for increased performance with carrying, moving, and handling objects. []? Progressing: []? Met: []? Not Met: []? Adjusted   3) Pt will demonstrate increased   II strength to  R  within 10# of L and wrist ext,sup/pron 5-/5 for improved independence with cutting food, lifting glass,etc; and dusting.  []? Progressing: []? Met: []? Not Met: []? Adjusted   4) Pt will have a decrease in pain to 2-3/10 to facilitate bathing neck and upper back and dusting.  []? Progressing: []? Met: []? Not Met: []? Adjusted       Overall Progression Towards Functional Goals/Treatment Progress Update:  [] Patient is progressing as expected towards functional goals listed. [] Progression is slowed due to complexities/impairments listed. [] Progression has been slowed due to co-morbidities.   [x] Plan just implemented, too soon to assess goals progression <30 days  [] Goals require adjustment due

## 2020-03-13 LAB
3-OH-COTININE: <2 NG/ML
COTININE: <2 NG/ML
NICOTINE: <2 NG/ML

## 2020-03-18 ENCOUNTER — APPOINTMENT (OUTPATIENT)
Dept: OCCUPATIONAL THERAPY | Age: 75
End: 2020-03-18
Payer: MEDICARE

## 2020-03-25 ENCOUNTER — TELEPHONE (OUTPATIENT)
Dept: PHARMACY | Age: 75
End: 2020-03-25

## 2020-03-25 NOTE — TELEPHONE ENCOUNTER
Called patient to perform telephone screen for COVID-19 and to offer reschedule of upcoming anticoagulation appointment. No answer. LVM for patient to call back.     Valentin AlvaD  PGY1 Pharmacy Resident

## 2020-03-26 ENCOUNTER — TELEPHONE (OUTPATIENT)
Dept: PHARMACY | Age: 75
End: 2020-03-26

## 2020-04-10 ENCOUNTER — VIRTUAL VISIT (OUTPATIENT)
Dept: INTERNAL MEDICINE CLINIC | Age: 75
End: 2020-04-10
Payer: MEDICARE

## 2020-04-10 PROCEDURE — G9899 SCRN MAM PERF RSLTS DOC: HCPCS | Performed by: NURSE PRACTITIONER

## 2020-04-10 PROCEDURE — 3017F COLORECTAL CA SCREEN DOC REV: CPT | Performed by: NURSE PRACTITIONER

## 2020-04-10 PROCEDURE — 4040F PNEUMOC VAC/ADMIN/RCVD: CPT | Performed by: NURSE PRACTITIONER

## 2020-04-10 PROCEDURE — G8427 DOCREV CUR MEDS BY ELIG CLIN: HCPCS | Performed by: NURSE PRACTITIONER

## 2020-04-10 PROCEDURE — 1090F PRES/ABSN URINE INCON ASSESS: CPT | Performed by: NURSE PRACTITIONER

## 2020-04-10 PROCEDURE — 99213 OFFICE O/P EST LOW 20 MIN: CPT | Performed by: NURSE PRACTITIONER

## 2020-04-10 PROCEDURE — G8399 PT W/DXA RESULTS DOCUMENT: HCPCS | Performed by: NURSE PRACTITIONER

## 2020-04-10 PROCEDURE — 1123F ACP DISCUSS/DSCN MKR DOCD: CPT | Performed by: NURSE PRACTITIONER

## 2020-04-10 RX ORDER — CLONAZEPAM 1 MG/1
1 TABLET ORAL EVERY EVENING
Qty: 90 TABLET | Refills: 0 | Status: SHIPPED | OUTPATIENT
Start: 2020-04-10 | End: 2020-06-29

## 2020-04-10 NOTE — PROGRESS NOTES
4/10/2020    TELEHEALTH EVALUATION -- Audio/Visual (During ITSYG-75 public health emergency)    HPI:    Marion Butcher (:  1945) has requested an audio/video evaluation for the following concern(s):    Medication check - on clonazepam nightly for anxiety and sleep   Doing well on it. Denies any s/e     Staying in - she is getting everything she needs     HTN - checking at home   Usually around 120/76, HR 60s  Spell of Afib yesterday - usually about weekly  Seeing cardiology   On anticoagulation with warfarin   Previously discussed with cardiology doing an ablation     Review of Systems   Negative other than HPI     Prior to Visit Medications    Medication Sig Taking? Authorizing Provider   clonazePAM (KLONOPIN) 1 MG tablet Take 1 tablet by mouth every evening for 90 days.   AIME Ruff CNP   acetaminophen (TYLENOL) 500 MG tablet Take 500 mg by mouth every 6 hours as needed for Pain  Historical Provider, MD   diphenhydrAMINE-APAP, sleep, (TYLENOL PM EXTRA STRENGTH)  MG tablet Take 1 tablet by mouth nightly as needed for Sleep  Historical Provider, MD   PROAIR  (90 Base) MCG/ACT inhaler INHALE TWO PUFFS BY MOUTH EVERY 4 HOURS AS NEEDED FOR SHORTNESS OF BREATH  Trang Farias MD   b complex vitamins capsule Take 1 capsule by mouth daily  Historical Provider, MD   citalopram (CELEXA) 40 MG tablet Take 1 tablet by mouth daily  AIME Fink CNP   atorvastatin (LIPITOR) 80 MG tablet Take 1 tablet by mouth daily  AIME Fink CNP   propafenone (RYTHMOL) 225 MG tablet Take 1 tablet by mouth every 8 hours  AIME Goodman CNP   allopurinol (ZYLOPRIM) 300 MG tablet TAKE 1 TABLET BY MOUTH  DAILY  Trang Farias MD   potassium chloride (KLOR-CON M) 20 MEQ extended release tablet TAKE 1 TABLET BY MOUTH  DAILY WITH [facility] Renay Beckett MD   fluticasone-salmeterol (ADVAIR DISKUS) 250-50 MCG/DOSE AEPB Inhale 1 puff into the lungs every 12 hours  Trey Dubin, APRN - CNP   verapamil (CALAN SR) 240 MG extended release tablet Take 0.5 tablets by mouth nightly  Aiyana Fischer MD   warfarin (COUMADIN) 5 MG tablet TAKE 1 TABLETS BY MOUTH ON   ONLY THEN 1 AND 1/2  TABLETS BY MOUTH ALL OTHER  DAYS  Karlene Smiley MD   indapamide (LOZOL) 1.25 MG tablet TAKE 1 TABLET BY MOUTH  EVERY MORNING  Karlene Smiley MD   pantoprazole (PROTONIX) 40 MG tablet Take 1 tablet by mouth every morning (before breakfast)  Karlene Smiley MD   montelukast (SINGULAIR) 10 MG tablet TAKE 1 TABLET BY MOUTH  NIGHTLY  Karlene Smiley MD   trimethoprim (TRIMPEX) 100 MG tablet TAKE 1 TABLET BY MOUTH  EVERY  High   Karlene Smiley MD   gabapentin (NEURONTIN) 300 MG capsule TAKE 2 CAPSULES BY MOUTH 3  TIMES DAILY  Karlene Smiley MD   Ascorbic Acid (VITAMIN C PO) 1 tablet daily  Historical Provider, MD   therapeutic multivitamin-minerals (THERAGRAN-M) tablet Take 1 tablet by mouth nightly   Historical Provider, MD   Cranberry 500 MG CAPS Take 1,000 mg by mouth nightly   Historical Provider, MD       Social History     Tobacco Use    Smoking status: Former Smoker     Packs/day: 0.50     Years: 4.00     Pack years: 2.00     Types: Cigarettes     Last attempt to quit: 1965     Years since quittin.3    Smokeless tobacco: Never Used   Substance Use Topics    Alcohol use: Not Currently     Comment: 4 t imes a year    Drug use: No        PHYSICAL EXAMINATION:  [ INSTRUCTIONS:  \"[x]\" Indicates a positive item  \"[]\" Indicates a negative item  -- DELETE ALL ITEMS NOT EXAMINED]  Vital Signs: (As obtained by patient/caregiver or practitioner observation)    Blood pressure-  Heart rate-    Respiratory rate-    Temperature-  Pulse oximetry-     Constitutional: [x] Appears well-developed and well-nourished [x] No apparent distress      [] Abnormal-   Mental status  [x] Alert and awake  [x] Oriented to person/place/time

## 2020-04-24 ENCOUNTER — TELEPHONE (OUTPATIENT)
Dept: PHARMACY | Age: 75
End: 2020-04-24

## 2020-04-27 RX ORDER — MONTELUKAST SODIUM 10 MG/1
10 TABLET ORAL NIGHTLY
Qty: 90 TABLET | Refills: 3 | Status: SHIPPED | OUTPATIENT
Start: 2020-04-27 | End: 2021-03-01

## 2020-04-27 RX ORDER — PANTOPRAZOLE SODIUM 40 MG/1
TABLET, DELAYED RELEASE ORAL
Qty: 90 TABLET | Refills: 3 | Status: SHIPPED | OUTPATIENT
Start: 2020-04-27 | End: 2021-03-26

## 2020-05-04 ENCOUNTER — ANTI-COAG VISIT (OUTPATIENT)
Dept: PHARMACY | Age: 75
End: 2020-05-04

## 2020-05-04 LAB
INR BLD: 2.5 (ref 0.8–1.2)
PROTHROMBIN TIME: 27.5 SECONDS (ref 11.7–14.2)

## 2020-05-04 NOTE — PROGRESS NOTES
Ms. Marcy Hutchison is a 77 y.o. y/o female with history of DVT, PE, Afib   She presents today for anticoagulation monitoring and adjustment. Pertinent PMH: HTN, Gastric Banding,CAD, diskectomy with an artifical spinal disk implant in September 2012. Patient Reported Findings:  Yes     No  [x]   []       Patient verifies current dosing regimen as listed  []   [x]       S/S bleeding/bruising/swelling/SOB   []   [x]       Blood in urine or stool  []   [x]       Procedures scheduled in the future at this time   []   [x]       Missed Dose   []   [x]       Extra Dose   []   [x]       Change in medications Started taking tylenol pm, 2 capsules every night. States that she has been taking 4 tablets in addition to the 2 at night for headaches recently --> decreased tylenol intake to 1-2 times a day   [x]   []       Change in health/diet/appetite  Patient states that she feels like she has returned to normal vit k intake --> diet fluctuates causing INR to fluctuate. Discussed ways to improve consistency with vit k intake  --> states that she has cut back spinach and cabbage intake to twice a week --> no changes   []   [x]       Change in alcohol use    []   [x]       Change in activity  []   [x]       Hospital admission  []   [x]       Emergency department visit   []   [x]       Other complaints    Clinical Outcomes:  Yes     No  []   [x]       Major bleeding event  []   [x]       Thromboembolic event  Gets warfarin through MD  Duration of warfarin Therapy: indefinite  INR Range:  2.0-3.0     [facility]   Continue weekly dose of 5 mg Mon, Wed and Fri and 7.5 mg all other days of the week   Encouraged to maintain a consistency of vegetables/salads.   Recheck INR 6 weeks, 6/15    Referring PCP is Felicitas Alcala  INR (no units)   Date Value   05/04/2020 2.5 (H)   03/09/2020 3   02/17/2020 2   02/03/2020 1.8   11/15/2019 1.72 (H)   12/14/2018 2.90 (H)      CLINICAL PHARMACY

## 2020-06-01 RX ORDER — INDAPAMIDE 1.25 MG/1
TABLET, FILM COATED ORAL
Qty: 90 TABLET | Refills: 3 | Status: SHIPPED | OUTPATIENT
Start: 2020-06-01 | End: 2021-03-29

## 2020-06-01 RX ORDER — POTASSIUM CHLORIDE 20 MEQ/1
TABLET, EXTENDED RELEASE ORAL
Qty: 90 TABLET | Refills: 1 | Status: SHIPPED | OUTPATIENT
Start: 2020-06-01 | End: 2020-10-13

## 2020-06-03 ENCOUNTER — TELEPHONE (OUTPATIENT)
Dept: INTERNAL MEDICINE CLINIC | Age: 75
End: 2020-06-03

## 2020-06-03 RX ORDER — WARFARIN SODIUM 5 MG/1
TABLET ORAL
Qty: 143 TABLET | Refills: 1 | Status: SHIPPED | OUTPATIENT
Start: 2020-06-03 | End: 2021-01-25 | Stop reason: SDUPTHER

## 2020-06-15 ENCOUNTER — ANTI-COAG VISIT (OUTPATIENT)
Dept: PHARMACY | Age: 75
End: 2020-06-15
Payer: MEDICARE

## 2020-06-15 VITALS — TEMPERATURE: 97.7 F

## 2020-06-15 LAB — INTERNATIONAL NORMALIZATION RATIO, POC: 2.7

## 2020-06-15 PROCEDURE — 85610 PROTHROMBIN TIME: CPT

## 2020-06-15 PROCEDURE — 99211 OFF/OP EST MAY X REQ PHY/QHP: CPT

## 2020-06-29 RX ORDER — VERAPAMIL HYDROCHLORIDE 240 MG/1
TABLET, FILM COATED, EXTENDED RELEASE ORAL
Qty: 90 TABLET | Refills: 3 | Status: SHIPPED | OUTPATIENT
Start: 2020-06-29 | End: 2020-07-01 | Stop reason: SDUPTHER

## 2020-06-29 RX ORDER — TRIMETHOPRIM 100 MG/1
TABLET ORAL
Qty: 45 TABLET | Refills: 3 | Status: SHIPPED | OUTPATIENT
Start: 2020-06-29 | End: 2021-04-05

## 2020-06-29 RX ORDER — CLONAZEPAM 1 MG/1
TABLET ORAL
Qty: 90 TABLET | Refills: 0 | Status: SHIPPED | OUTPATIENT
Start: 2020-06-29 | End: 2020-10-12 | Stop reason: SDUPTHER

## 2020-06-29 RX ORDER — GABAPENTIN 300 MG/1
CAPSULE ORAL
Qty: 540 CAPSULE | Refills: 3 | Status: SHIPPED | OUTPATIENT
Start: 2020-06-29 | End: 2021-03-29

## 2020-07-01 ENCOUNTER — TELEPHONE (OUTPATIENT)
Dept: CARDIOLOGY CLINIC | Age: 75
End: 2020-07-01

## 2020-07-01 RX ORDER — VERAPAMIL HYDROCHLORIDE 240 MG/1
120 TABLET, FILM COATED, EXTENDED RELEASE ORAL NIGHTLY
Qty: 90 TABLET | Refills: 3
Start: 2020-07-01 | End: 2021-09-23

## 2020-07-01 NOTE — TELEPHONE ENCOUNTER
Called pt. She was bradycardic in the mid 40s on her [employer]btack santiago. She felt slightly dizzy/lightheaded and SOB. Patient denies chest pain, palpitations, orthopnea, edema, presyncope or syncope. She reports her HR is now 62 and she feels better. She is currently on verapamil 120 mg QPM and propafenone 225 mg TID    Will continue current treatment plan. Encouraged pt to monitor HR/BP daily, particularly with symptoms. If she has recurrent bradycardia with HR <50, she will let us know and we will stop verapamil.     Diana Hewitt, APRN-CNP

## 2020-07-01 NOTE — TELEPHONE ENCOUNTER
Pt states she was having some sob today and when she tested on her cell phone it showed bradycardia Hr 46-49.  Please call to advise

## 2020-07-08 ENCOUNTER — TELEMEDICINE (OUTPATIENT)
Dept: BARIATRICS/WEIGHT MGMT | Age: 75
End: 2020-07-08
Payer: MEDICARE

## 2020-07-08 PROCEDURE — 1090F PRES/ABSN URINE INCON ASSESS: CPT | Performed by: NURSE PRACTITIONER

## 2020-07-08 PROCEDURE — G8427 DOCREV CUR MEDS BY ELIG CLIN: HCPCS | Performed by: NURSE PRACTITIONER

## 2020-07-08 PROCEDURE — 4040F PNEUMOC VAC/ADMIN/RCVD: CPT | Performed by: NURSE PRACTITIONER

## 2020-07-08 PROCEDURE — G9899 SCRN MAM PERF RSLTS DOC: HCPCS | Performed by: NURSE PRACTITIONER

## 2020-07-08 PROCEDURE — 3017F COLORECTAL CA SCREEN DOC REV: CPT | Performed by: NURSE PRACTITIONER

## 2020-07-08 PROCEDURE — 1123F ACP DISCUSS/DSCN MKR DOCD: CPT | Performed by: NURSE PRACTITIONER

## 2020-07-08 PROCEDURE — 99213 OFFICE O/P EST LOW 20 MIN: CPT | Performed by: NURSE PRACTITIONER

## 2020-07-08 PROCEDURE — G8399 PT W/DXA RESULTS DOCUMENT: HCPCS | Performed by: NURSE PRACTITIONER

## 2020-07-08 ASSESSMENT — ENCOUNTER SYMPTOMS
GASTROINTESTINAL NEGATIVE: 1
RESPIRATORY NEGATIVE: 1
EYES NEGATIVE: 1

## 2020-07-08 NOTE — PROGRESS NOTES
[address] [facility] [place]   [employer]    7/8/2020    TELEHEALTH EVALUATION -- Audio/Visual (During SEOEG-03 public health emergency)    Medical Weight Loss    HPI: Wild Link is a 77 y.o. female with current BMI of 44.8 and current weight of 263.2 pounds. Due to the COVID-19 restrictions on close contact interactions the patient's monthly visit was conducted via audio/video in joo of a face to face visit. Patient has consented to have this visit conducted via audio/video. The patient is here through telemedicine for their medical weight loss visit. Her weight has fluctuated since her last visit with us last year. She was as low as 254 pounds and as high as 270 pounds. She has lost 7 pounds in the past 3 weeks following the 1200 calorie low carb meal plan as a guide and tracking with [employer] points. Past Medical History:   Diagnosis Date    Allergic     Anxiety 2/2/2017    Arthritis     Asthma     Back pain     Blood circulation, collateral     legs    Depression     GERD (gastroesophageal reflux disease)     Gout     Hypercholesteremia     Hypertension     Movement disorder     7 discs ruptured    Movement disorder     knee replacements    Obesity     Pulmonary emboli (HCC)     Unspecified sleep apnea     uses cpap    Urinary tract infection, chronic     UTI (urinary tract infection)      Past Surgical History:   Procedure Laterality Date    [facility]KLE [facility]ION  2010    [facility]    BACK SURGERY  09/14/2012    lumbar fusion L-4, L-5 atrificial disc  Philip Neeta Platt  5-7-11    incision, drainage and debridement of left knee and application of wound vac.     BREAST BIOPSY  08/2018    neg    EYE SURGERY      JOINT REPLACEMENT      bilat knees    JOINT REPLACEMENT  2003    left, [facility]    JOINT REPLACEMENT  2005    right, Georgina OTHER SURGICAL HISTORY  12- laparscopic adjustable gastric restrictive procedure    REVISION TOTAL KNEE ARTHROPLASTY      Sp Grow FF    SKIN F[facility] GRAFT      right, [facility]    [facility]UB[facility] L[facility]ATION  [facility]    [facility]    UPPER GASTROINTESTINAL ENDOSCOPY  10-    VENA CAVA FILTER PLACEMENT       Family History   Problem Relation Age of Onset    High Blood Pressure Mother     Depression Mother     Stroke Father     High Blood Pressure Father     Asthma Father     Arthritis Father     Heart Disease Father     Breast Cancer Sister     High Cholesterol Neg Hx      Social History     Tobacco Use    Smoking status: Former Smoker     Packs/day: 0.50     Years: 4.00     Pack years: 2.00     Types: Cigarettes     Last attempt to quit: 1965     Years since quittin.5    Smokeless tobacco: Never Used   Substance Use Topics    Alcohol use: Not Currently     Comment: 4 t imes a year     I counseled the patient on the importance of not smoking and risks of ETOH. Allergies   Allergen Reactions    Belsomra [Suvorexant] Other (See Comments)     Nightmares      Avelox [Moxifloxacin Hcl In Nacl] Rash    Levofloxacin Rash    Sulfa Antibiotics Rash     There were no vitals filed for this visit. There is no height or weight on file to calculate BMI.     Current Outpatient Medications:     verapamil (CALAN SR) 240 MG extended release tablet, Take 0.5 tablets by mouth nightly, Disp: 90 tablet, Rfl: 3    trimethoprim (TRIMPEX) 100 MG tablet, TAKE 1 TABLET BY MOUTH  EVERY 48 HOURS, Disp: 45 tablet, Rfl: 3    gabapentin (NEURONTIN) 300 MG capsule, TAKE 2 CAPSULES BY MOUTH 3  TIMES DAILY, Disp: 540 capsule, Rfl: 3    clonazePAM (KLONOPIN) 1 MG tablet, TAKE 1 TABLET BY MOUTH IN  THE EVENING, Disp: 90 tablet, Rfl: 0    fluticasone-salmeterol (ADVAIR) 250-50 MCG/DOSE AEPB, INHALE ONE PUFF BY MOUTH EVERY 12 HOURS, Disp: 1 Inhaler, Rfl: 5    warfarin (COUMADIN) 5 MG tablet, TAKE 1 TABLETS BY MOUTH ON  MONDAY AND  FRIDAY ONLY EOSRELPCT 1.4 10/29/2018    BASOPCT 0.5 10/29/2018    NEUTROABS 7.4 10/29/2018    LYMPHSABS 1.7 10/29/2018    MONOSABS 0.6 10/29/2018    EOSABS 0.1 10/29/2018     Lab Results   Component Value Date     05/21/2019    K 3.8 05/21/2019    K 3.3 10/10/2018    CL 99 05/21/2019    CO2 25 05/21/2019    ANIONGAP 16 05/21/2019    GLUCOSE 87 05/21/2019    BUN 17 11/29/2019    CREATININE 0.8 11/29/2019    LABGLOM >60 11/29/2019    GFRAA >60 11/29/2019    GFRAA >60 01/04/2013    CALCIUM 9.7 03/09/2020    PROT 6.6 10/29/2018    PROT 7.6 01/04/2013    LABALBU 4.1 05/21/2019    AGRATIO 1.9 10/29/2018    BILITOT 0.6 10/29/2018    ALKPHOS 70 10/29/2018    ALT 26 10/29/2018    AST 22 10/29/2018    GLOB 2.3 10/29/2018     Lab Results   Component Value Date    CHOL 158 05/21/2019    TRIG 74 05/21/2019    HDL 64 05/21/2019    HDL 58 02/01/2012    LDLCALC 79 05/21/2019    LABVLDL 15 05/21/2019     No results found for: [facility]  Lab Results   Component Value Date    IRON 72 08/27/2019    TIBC 292 08/27/2019    LABIRON 25 08/27/2019     Lab Results   Component Value Date    VVJLLYTJ61 5866 08/27/2019    FOLATE >20.00 08/27/2019     Lab Results   Component Value Date    VITD25 50.2 08/27/2019     Lab Results   Component Value Date    LABA1C 5.6 11/22/2019    .9 12/28/2011       Patient Active Problem List   Diagnosis    Essential hypertension    History of pulmonary embolism    Osteoarthritis of knee    PAULA (obstructive sleep apnea)    Asthma    Atrial fibrillation (HCC)    Status post gastric banding    Coronary artery disease    Morbid obesity with BMI of 40.0-44.9, adult ([address]    Pulmonary embolism without acute cor pulmonale (HCC)    Hyperlipidemia, unspecified    Arthritis    Anxiety    Chronic cough       Review of Systems   Constitutional: Negative. HENT: Negative. Eyes: Negative. Respiratory: Negative. Cardiovascular: Negative. Gastrointestinal: Negative. Skin: Negative. limited with exercise due to shortness of breath. Encouraged continued physical activity as she tolerates. Discussed taking short 5 minute walks inside her house a few times a week to a few times a day, whatever she tolerates. We will see her back in 1 month for continued follow up via telemedicine. A total of 15 minutes was spent conversing with the patient and over half of that time was spent counseling the patient on proper dietary behaviors and exercise. An electronic signature was used to authenticate this note. Pursuant to the emergency declaration under the [facility], [facility] waiver authority and the [employer] and [employer] Act, this Virtual  Visit was conducted, with patient's consent, to reduce the patient's risk of exposure to COVID-19 and provide continuity of care for an established patient. Services were provided through a video synchronous discussion virtually to substitute for in-person clinic visit.

## 2020-07-16 ENCOUNTER — OFFICE VISIT (OUTPATIENT)
Dept: CARDIOLOGY CLINIC | Age: 75
End: 2020-07-16
Payer: MEDICARE

## 2020-07-16 VITALS
SYSTOLIC BLOOD PRESSURE: 153 MMHG | HEIGHT: 64 IN | RESPIRATION RATE: 20 BRPM | HEART RATE: 102 BPM | WEIGHT: 266 LBS | BODY MASS INDEX: 45.41 KG/M2 | DIASTOLIC BLOOD PRESSURE: 96 MMHG

## 2020-07-16 PROCEDURE — 3017F COLORECTAL CA SCREEN DOC REV: CPT | Performed by: INTERNAL MEDICINE

## 2020-07-16 PROCEDURE — 4040F PNEUMOC VAC/ADMIN/RCVD: CPT | Performed by: INTERNAL MEDICINE

## 2020-07-16 PROCEDURE — 1123F ACP DISCUSS/DSCN MKR DOCD: CPT | Performed by: INTERNAL MEDICINE

## 2020-07-16 PROCEDURE — G8399 PT W/DXA RESULTS DOCUMENT: HCPCS | Performed by: INTERNAL MEDICINE

## 2020-07-16 PROCEDURE — G9899 SCRN MAM PERF RSLTS DOC: HCPCS | Performed by: INTERNAL MEDICINE

## 2020-07-16 PROCEDURE — 1036F TOBACCO NON-USER: CPT | Performed by: INTERNAL MEDICINE

## 2020-07-16 PROCEDURE — G8417 CALC BMI ABV UP PARAM F/U: HCPCS | Performed by: INTERNAL MEDICINE

## 2020-07-16 PROCEDURE — 99215 OFFICE O/P EST HI 40 MIN: CPT | Performed by: INTERNAL MEDICINE

## 2020-07-16 PROCEDURE — G8427 DOCREV CUR MEDS BY ELIG CLIN: HCPCS | Performed by: INTERNAL MEDICINE

## 2020-07-16 PROCEDURE — 93000 ELECTROCARDIOGRAM COMPLETE: CPT | Performed by: INTERNAL MEDICINE

## 2020-07-16 PROCEDURE — 1090F PRES/ABSN URINE INCON ASSESS: CPT | Performed by: INTERNAL MEDICINE

## 2020-07-16 RX ORDER — PROPAFENONE HYDROCHLORIDE 150 MG/1
TABLET, FILM COATED ORAL
Qty: 45 TABLET | Refills: 2 | Status: SHIPPED | OUTPATIENT
Start: 2020-07-16 | End: 2020-07-27 | Stop reason: SDUPTHER

## 2020-07-16 NOTE — LETTER
[facility]  [facility] Procedure Sheet    7/16/20  Re Vee  1945  EP Procedures   Pacemaker implant (single/dual)  EP Study    ICD implant (single/dual)  Atrial flutter ablation (MAXWELL Y/N)    Biv implant ICD  Tilt Table    Biv implant PPM xxx Atrial fibrillation ablation (MAXWELL Yes)    Generator Change (PPM/ICD/BiV)  SVT ablation    Lead revision (RV/LA/RA) (<1 month)  VT ablation      Lead extraction +/- upgrade (BiV/PPM/ICD)  VT Ischemic/ non-ischemic    Loop implant/ removal  VT RVOT    Cardioversion  VT Left sided    MAXWELL  AVN ablation     Equipment     Medtronic   JOCELYNN Mapping System    [place] xxxx Carto Mapping System    [place] [employer]  CryoAblation    Biotronik  Laser Lead Extraction     EP Procedures Scheduling Request    # hours Requested   Scheduled  Date:   Specific Day  Completed    Anesthesia Yes F/u Date:   CT surgery backup  COVID +/-    Overnight stay Yes     Location [facility]       Pre-Procedure Labs / Imaging     PT/INR  Type & cross    CBC  Units PRBC    BMP/Mg  Units FFP    Venogram  CXR    Echo  Cardiac CTA for Pulmonary vein mapping     RN INITIALS: RA    Patient Instructions  Do not eat or drink after midnight the night prior to procedure  Dx:PAF  Hold coumadin for 1 day/s prior

## 2020-07-16 NOTE — PROGRESS NOTES
[address]   Electrophysiology Follow Up  Date: 7/16/2020      Chief Complaint   Patient presents with    6 Month Follow-Up    Atrial Fibrillation        HPI: Caryl Carty is a 77 y.o. female with a history of paroxysmal atrial fibrillation, hypertension, CAD, PE/DVT, and hyperlipidemia. She was originally seen by EP while in the hospital in December of 2011 after she was found to have atrial fibrillation post- lap band surgery. She is on Coumadin for her history of pulmonary emboli ([place] [facility] which was not associated with any reversible cause. She also has a [place] filter. She had a fall on 11/15/19 when at a restaurant as she was walking into the door. She turned her head to the left, straightened her head and then went down. She felt lightheaded. She tried to break the fall with her hands. She had a radial neck fracture and forehead hematoma. Since her fall she has a hard time getting her words out.     S/p coronary angiogram 5/1/18 which was negative for myocardial ischemia. S/p 12/14/18 unsuccessful cardioversion     Cam Cordon presents to the office in follow up. She states she jumps between afib and bradycardia. She monitors her pulse at home with the [address]. Her ecg today shows atrial fibrillation.      Past Medical History:   Diagnosis Date    Allergic     Anxiety 2/2/2017    Arthritis     Asthma     Back pain     Blood circulation, collateral     legs    Depression     GERD (gastroesophageal reflux disease)     Gout     Hypercholesteremia     Hypertension     Movement disorder     7 discs ruptured    Movement disorder     knee replacements    Obesity     Pulmonary emboli (HCC)     Unspecified sleep apnea     uses cpap    Urinary tract infection, chronic     UTI (urinary tract infection)         Past Surgical History:   Procedure Laterality Date    ANKLE FUSION  2010    right, [facility] BACK SURGERY 09/14/2012    lumbar fusion L-4, L-5 atrificial disc  Floyde Mckaylaer Dr. Jay Potter BONE INCISION AND DRAINAGE  5-7-11    incision, drainage and debridement of left knee and application of wound vac.  BREAST BIOPSY  08/2018    neg    EYE SURGERY      JOINT REPLACEMENT      bilat knees    JOINT REPLACEMENT  2003    left, [facility]    JOINT REPLACEMENT  2005    right, [facility]    OTHER SURGICAL HISTORY  12-    laparscopic adjustable gastric restrictive procedure    REVISION TOTAL KNEE ARTHROPLASTY  2006    [facility]     SKIN FULL GRAFT  2011    right, [address]    [facility]    UPPER GASTROINTESTINAL ENDOSCOPY  10-    VENA CAVA FILTER PLACEMENT         Allergies: Allergies   Allergen Reactions    Belsomra [Suvorexant] Other (See Comments)     Nightmares      Avelox [Moxifloxacin Hcl In Nacl] Rash    Levofloxacin Rash    Sulfa Antibiotics Rash       Social History:   reports that she quit smoking about 55 years ago. Her smoking use included cigarettes. She has a 2.00 pack-year smoking history. She has never used smokeless tobacco. She reports previous alcohol use. She reports that she does not use drugs. Family History:  family history includes Arthritis in her father; Asthma in her father; Breast Cancer in her sister; Depression in her mother; Heart Disease in her father; High Blood Pressure in her father and mother; Stroke in her father. Reviewed. Denies family history of sudden cardiac death, arrhythmia, premature CAD    Review of System:  All other systems reviewed and are negative except for that noted above.  Pertinent negatives are:   General: negative for fever, chills   Ophthalmic ROS: negative for - eye pain or loss of vision  ENT ROS: negative for - headaches, sore throat   Respiratory: negative for - cough, sputum  Cardiovascular: Reviewed in HPI  Gastrointestinal: negative for - abdominal pain, diarrhea, with stress with redistribution at rest consistent with     ischemia.            Medication:  Current Outpatient Medications   Medication Sig Dispense Refill    propafenone (RYTHMOL) 150 MG tablet Take one half tablet (75mg) in addition to 225 mg Tablet every 8 hours 45 tablet 2    verapamil (CALAN SR) 240 MG extended release tablet Take 0.5 tablets by mouth nightly 90 tablet 3    trimethoprim (TRIMPEX) 100 MG tablet TAKE 1 TABLET BY MOUTH  EVERY 48 HOURS 45 tablet 3    gabapentin (NEURONTIN) 300 MG capsule TAKE 2 CAPSULES BY MOUTH 3  TIMES DAILY 540 capsule 3    clonazePAM (KLONOPIN) 1 MG tablet TAKE 1 TABLET BY MOUTH IN  THE EVENING 90 tablet 0    fluticasone-salmeterol (ADVAIR) 250-50 MCG/DOSE AEPB INHALE ONE PUFF BY MOUTH EVERY 12 HOURS 1 Inhaler 5    warfarin (COUMADIN) 5 MG tablet TAKE 1 TABLETS BY MOUTH ON  MONDAY AND  FRIDAY ONLY AND TAKE 1.5 TABLETS BY MOUTH  ALL OTHER DAYS 143 tablet 1    indapamide (LOZOL) 1.25 MG tablet TAKE 1 TABLET BY MOUTH  EVERY MORNING 90 tablet 3    potassium chloride (KLOR-CON M) 20 MEQ extended release tablet TAKE 1 TABLET BY MOUTH  DAILY WITH BREAKFAST 90 tablet 1    montelukast (SINGULAIR) 10 MG tablet TAKE 1 TABLET BY MOUTH  NIGHTLY 90 tablet 3    pantoprazole (PROTONIX) 40 MG tablet TAKE 1 TABLET BY MOUTH  EVERY MORNING BEFORE  BREAKFAST 90 tablet 3    acetaminophen (TYLENOL) 500 MG tablet Take 500 mg by mouth every 6 hours as needed for Pain      diphenhydrAMINE-APAP, sleep, (TYLENOL PM EXTRA STRENGTH)  MG tablet Take 1 tablet by mouth nightly as needed for Sleep      PROAIR  (90 Base) MCG/ACT inhaler INHALE TWO PUFFS BY MOUTH EVERY 4 HOURS AS NEEDED FOR SHORTNESS OF BREATH 8.5 g 2    b complex vitamins capsule Take 1 capsule by mouth daily      citalopram (CELEXA) 40 MG tablet Take 1 tablet by mouth daily 90 tablet 3    atorvastatin (LIPITOR) 80 MG tablet Take 1 tablet by mouth daily 90 tablet 3    propafenone (RYTHMOL) 225 MG tablet Take 1 infection, nerve injury, injury to diaphragm(breathing muscle), pulmonary embolus(blood clot in lungs), deep vein blood clot, pneumothorax, hemothorax, acute renal failure, cardiac perforation,  tamponade, need for emergent surgery (open heart), permanent pacemaker, pulmonary vein stenosis, left atrial to esophageal fistula, stroke, myocardial infarction and death. Difference between atrial fibrillation and atrial flutter discussed and treatment discussed. She is agreeable to ablation  For now will increase propafenone to 300 mg tid    HTN  Vitals:    07/16/20 1423   BP: (!) 153/96   Pulse: 102   Resp:      -Home BP monitoring encourage with a BP goal <130/80  BP is normal at home    Obesity  Body mass index is 45.66 kg/m². - Excessive weight is complicating assessment and treatment. It is placing patient at risk for multiple co-morbidities as well as early death and contributing to the patient's presentation. - discussed weight management with diet and exercise      PAULA  -encouraged compliance with CPAP    Hx of PE/DVT  -Ac with coumadin  -s/p abena filter    Plan   Increase propafenone to 300 mg TID  Will schedule afib ablation. Has IVC filter    Thank you for allowing me to participate in the care of Rosana Moses. Further evaluation will be based upon the patient's clinical course and testing results. All questions and concerns were addressed to the patient/family. Alternatives to my treatment were discussed. I have discussed the above stated plan and the patient verbalized understanding and agreed with the plan. NOTE: This report was transcribed using voice recognition software. Every effort was made to ensure accuracy, however, inadvertent computerized transcription errors may be present. Aby Morris MD, Leatha Perales [address] [facility]   Office: [phone]     Scribe attestation:  This note was scribed in the presence of Aby Morris MD by MAYNOR Stuart Dr. Kael Roca personally performed the services described in this documentation as scribed by RN in my presence, and it is both accurate and complete.

## 2020-07-16 NOTE — PATIENT INSTRUCTIONS
ATRIAL FIB ABLATION INFORMATION    Our  will be contacting you from [phone] to schedule your procedure. The morning of your ablation you will need to check in at the registration desk in the main lobby. PRE-PROCEDURE INSTRUCTIONS -   -You will be called with a time to arrive for you ablation.  -Do not eat or drink anything after midnight the night before your ablation.  -You may take all of your other medications with a sip of water.  -You will be staying overnight in CVU after you ablation.  -You will need to have a responsible adult to drive you home the next morning. -CVU will provide you with discharge instructions.  -You will need to hold your coumadin the night before    You will be scheduled for a 6-8 week follow up with cardiology. This will be done prior to your discharge instructions. If you have any questions regarding the procedure itself or medications, please call [phone] and ask to speak to an EP nurse.

## 2020-07-27 ENCOUNTER — TELEPHONE (OUTPATIENT)
Dept: CARDIOLOGY CLINIC | Age: 75
End: 2020-07-27

## 2020-07-27 ENCOUNTER — ANTI-COAG VISIT (OUTPATIENT)
Dept: PHARMACY | Age: 75
End: 2020-07-27
Payer: MEDICARE

## 2020-07-27 VITALS — TEMPERATURE: 97.3 F

## 2020-07-27 LAB — INTERNATIONAL NORMALIZATION RATIO, POC: 2.6

## 2020-07-27 PROCEDURE — 99211 OFF/OP EST MAY X REQ PHY/QHP: CPT

## 2020-07-27 PROCEDURE — 85610 PROTHROMBIN TIME: CPT

## 2020-07-27 RX ORDER — PROPAFENONE HYDROCHLORIDE 225 MG/1
225 TABLET, FILM COATED ORAL EVERY 8 HOURS
Qty: 90 TABLET | Refills: 0
Start: 2020-07-27 | End: 2020-11-23 | Stop reason: SDUPTHER

## 2020-07-27 NOTE — PROGRESS NOTES
Ms. Luca aRgsdale is a 77 y.o. y/o female with history of DVT, PE, Afib   She presents today for anticoagulation monitoring and adjustment. Pertinent PMH: HTN, Gastric Banding,CAD, diskectomy with an artifical spinal disk implant in September 2012. Patient Reported Findings:  Yes     No  [x]   []       Patient verifies current dosing regimen as listed  [x]   []       S/S bleeding/bruising/swelling/SOB states that she has been wheezing more and had more SOB d/t allergies   []   [x]       Blood in urine or stool  [x]   []       Procedures scheduled in the future at this time ablation 10/2  []   [x]       Missed Dose   []   [x]       Extra Dose   [x]   []       Change in medications Started taking tylenol pm, 2 capsules every night. States that she has been taking 4 tablets in addition to the 2 at night for headaches recently --> decreased tylenol intake to 1-2 times a day --> continues tylenol, propafenone increased   []   [x]       Change in health/diet/appetite  Patient states that she feels like she has returned to normal vit k intake --> diet fluctuates causing INR to fluctuate. Discussed ways to improve consistency with vit k intake  --> states that she has cut back spinach and cabbage intake to twice a week --> no changes   []   [x]       Change in alcohol use    []   [x]       Change in activity  []   [x]       Hospital admission  []   [x]       Emergency department visit   []   [x]       Other complaints    Clinical Outcomes:  Yes     No  []   [x]       Major bleeding event  []   [x]       Thromboembolic event  Gets warfarin through MD    Duration of warfarin Therapy: indefinite  INR Range:  2.0-3.0     INR is 2.6 today   Continue weekly dose of 5 mg Mon, Wed and Fri and 7.5 mg all other days of the week   Encouraged to maintain a consistency of vegetables/salads.   Recheck INR 7 weeks, 9/14, d/t labor day     Referring PCP is Deneen Mohr  INR (no units)   Date Value   07/27/2020 2.6   06/15/2020

## 2020-07-27 NOTE — TELEPHONE ENCOUNTER
Pt feels worse since recent increase in propafenone to 300 mg TID. Will reduce dose back to 225 mg TID.  Pt will call if symptoms persist.    AIME Moran-CNP

## 2020-07-27 NOTE — TELEPHONE ENCOUNTER
Recently her rythmol was increased . She thinks she is worse with the increased dose than before . Her HR is all over the place . What should she do ?

## 2020-08-18 NOTE — PROGRESS NOTES
[facility]) Physicians   [employer]    8/19/2020    TELEHEALTH EVALUATION -- Audio/Visual (During RYVHA-10 public health emergency)    Medical Weight Loss    HPI: Armando Mahajan is a 77 y.o. female with current BMI of 45.4 and current weight of 266.3 pounds. Due to the COVID-19 restrictions on close contact interactions the patient's monthly visit was conducted via audio/video in joo of a face to face visit. Patient has consented to have this visit conducted via audio/video. The patient is here through telemedicine for their medical weight loss visit. She reports some emotional eating patterns over the past few weeks due to her son being sick. She reports he is now feeling better, so her stress is starting to decrease. Past Medical History:   Diagnosis Date    Allergic     Anxiety 2/2/2017    Arthritis     Asthma     Back pain     Blood circulation, collateral     legs    Depression     GERD (gastroesophageal reflux disease)     Gout     Hypercholesteremia     Hypertension     Movement disorder     7 discs ruptured    Movement disorder     knee replacements    Obesity     Pulmonary emboli (HCC)     Unspecified sleep apnea     uses cpap    Urinary tract infection, chronic     UTI (urinary tract infection)      Past Surgical History:   Procedure Laterality Date    ANKLE FUSION  2010    right, [facility]    BACK SURGERY  09/14/2012    lumbar fusion L-4, L-5 atrificial disc  [facility]  5-7-11    incision, drainage and debridement of left knee and application of wound vac.     BREAST BIOPSY  08/2018    neg    EYE SURGERY      JOINT REPLACEMENT      bilat knees    JOINT REPLACEMENT  2003    left, [facility]    JOINT REPLACEMENT  2005    right, [facility]    OTHER SURGICAL HISTORY  12-    laparscopic adjustable gastric restrictive procedure    REVISION TOTAL KNEE ARTHROPLASTY  2006 [facility]    SKIN FULL GRAFT      right, [facility] [address]    [facility]    UPPER GASTROINTESTINAL ENDOSCOPY  10-    VENA CAVA FILTER PLACEMENT       Family History   Problem Relation Age of Onset    High Blood Pressure Mother     Depression Mother     Stroke Father     High Blood Pressure Father     Asthma Father     Arthritis Father     Heart Disease Father     Breast Cancer Sister     High Cholesterol Neg Hx      Social History     Tobacco Use    Smoking status: Former Smoker     Packs/day: 0.50     Years: 4.00     Pack years: 2.00     Types: Cigarettes     Last attempt to quit: 1965     Years since quittin.6    Smokeless tobacco: Never Used   Substance Use Topics    Alcohol use: Not Currently     Comment: 4 t imes a year     I counseled the patient on the importance of not smoking and risks of ETOH. Allergies   Allergen Reactions    Belsomra [Suvorexant] Other (See Comments)     Nightmares      Avelox [Moxifloxacin Hcl In Nacl] Rash    Levofloxacin Rash    Sulfa Antibiotics Rash     There were no vitals filed for this visit. There is no height or weight on file to calculate BMI.     Current Outpatient Medications:     propafenone (RYTHMOL) 225 MG tablet, Take 1 tablet by mouth every 8 hours, Disp: 90 tablet, Rfl: 0    verapamil (CALAN SR) 240 MG extended release tablet, Take 0.5 tablets by mouth nightly, Disp: 90 tablet, Rfl: 3    trimethoprim (TRIMPEX) 100 MG tablet, TAKE 1 TABLET BY MOUTH  EVERY 48 HOURS, Disp: 45 tablet, Rfl: 3    gabapentin (NEURONTIN) 300 MG capsule, TAKE 2 CAPSULES BY MOUTH 3  TIMES DAILY, Disp: 540 capsule, Rfl: 3    clonazePAM (KLONOPIN) 1 MG tablet, TAKE 1 TABLET BY MOUTH IN  THE EVENING, Disp: 90 tablet, Rfl: 0    fluticasone-salmeterol (ADVAIR) 250-50 MCG/DOSE AEPB, INHALE ONE PUFF BY MOUTH EVERY 12 HOURS, Disp: 1 Inhaler, Rfl: 5    warfarin (COUMADIN) 5 MG tablet, TAKE 1 TABLETS BY MOUTH ON  MONDAY AND FRIDAY ONLY AND TAKE 1.5 TABLETS BY MOUTH  ALL OTHER DAYS, Disp: 143 tablet, Rfl: 1    indapamide (LOZOL) 1.25 MG tablet, TAKE 1 TABLET BY MOUTH  EVERY MORNING, Disp: 90 tablet, Rfl: 3    potassium chloride (KLOR-CON M) 20 MEQ extended release tablet, TAKE 1 TABLET BY MOUTH  DAILY WITH BREAKFAST, Disp: 90 tablet, Rfl: 1    montelukast (SINGULAIR) 10 MG tablet, TAKE 1 TABLET BY MOUTH  NIGHTLY, Disp: 90 tablet, Rfl: 3    pantoprazole (PROTONIX) 40 MG tablet, TAKE 1 TABLET BY MOUTH  EVERY MORNING BEFORE  BREAKFAST, Disp: 90 tablet, Rfl: 3    acetaminophen (TYLENOL) 500 MG tablet, Take 500 mg by mouth every 6 hours as needed for Pain, Disp: , Rfl:     diphenhydrAMINE-APAP, sleep, (TYLENOL PM EXTRA STRENGTH)  MG tablet, Take 1 tablet by mouth nightly as needed for Sleep, Disp: , Rfl:     PROAIR  (90 Base) MCG/ACT inhaler, INHALE TWO PUFFS BY MOUTH EVERY 4 HOURS AS NEEDED FOR SHORTNESS OF BREATH, Disp: 8.5 g, Rfl: 2    b complex vitamins capsule, Take 1 capsule by mouth daily, Disp: , Rfl:     citalopram (CELEXA) 40 MG tablet, Take 1 tablet by mouth daily, Disp: 90 tablet, Rfl: 3    atorvastatin (LIPITOR) 80 MG tablet, Take 1 tablet by mouth daily, Disp: 90 tablet, Rfl: 3    allopurinol (ZYLOPRIM) 300 MG tablet, TAKE 1 TABLET BY MOUTH  DAILY, Disp: 90 tablet, Rfl: 3    Ascorbic Acid (VITAMIN C PO), 1 tablet daily, Disp: , Rfl:     therapeutic multivitamin-minerals (THERAGRAN-M) tablet, Take 1 tablet by mouth nightly , Disp: , Rfl:     Cranberry 500 MG CAPS, Take 1,000 mg by mouth nightly , Disp: , Rfl:     Lab Results   Component Value Date    WBC 6.4 05/21/2019    RBC 4.29 05/21/2019    HGB 13.7 05/21/2019    HCT 40.9 05/21/2019    MCV 95.3 05/21/2019    MCH 31.9 05/21/2019    MCHC 33.5 05/21/2019    MPV 7.4 05/21/2019    NEUTOPHILPCT 74.9 10/29/2018    LYMPHOPCT 17.2 10/29/2018    MONOPCT 6.0 10/29/2018    EOSRELPCT 1.4 10/29/2018    BASOPCT 0.5 10/29/2018    NEUTROABS 7.4 10/29/2018 LYMPHSABS 1.7 10/29/2018    MONOSABS 0.6 10/29/2018    EOSABS 0.1 10/29/2018     Lab Results   Component Value Date     05/21/2019    K 3.8 05/21/2019    K 3.3 10/10/2018    CL 99 05/21/2019    CO2 25 05/21/2019    ANIONGAP 16 05/21/2019    GLUCOSE 87 05/21/2019    BUN 17 11/29/2019    CREATININE 0.8 11/29/2019    LABGLOM >60 11/29/2019    GFRAA >60 11/29/2019    GFRAA >60 01/04/2013    CALCIUM 9.7 03/09/2020    PROT 6.6 10/29/2018    PROT 7.6 01/04/2013    LABALBU 4.1 05/21/2019    AGRATIO 1.9 10/29/2018    BILITOT 0.6 10/29/2018    ALKPHOS 70 10/29/2018    ALT 26 10/29/2018    AST 22 10/29/2018    GLOB 2.3 10/29/2018     Lab Results   Component Value Date    CHOL 158 05/21/2019    TRIG 74 05/21/2019    HDL 64 05/21/2019    HDL 58 02/01/2012    LDLCALC 79 05/21/2019    LABVLDL 15 05/21/2019     No results found for: [facility]  Lab Results   Component Value Date    IRON 72 08/27/2019    TIBC 292 08/27/2019    LABIRON 25 08/27/2019     Lab Results   Component Value Date    ORYGTKIL44 2082 08/27/2019    FOLATE >20.00 08/27/2019     Lab Results   Component Value Date    VITD25 50.2 08/27/2019     Lab Results   Component Value Date    LABA1C 5.6 11/22/2019    .9 12/28/2011       Patient Active Problem List   Diagnosis    Essential hypertension    History of pulmonary embolism    Osteoarthritis of knee    PAULA (obstructive sleep apnea)    Asthma    Atrial fibrillation (HCC)    Status post gastric banding    Coronary artery disease    Morbid obesity with BMI of 40.0-44.9, adult ([address]    Pulmonary embolism without acute cor pulmonale (HCC)    Hyperlipidemia, unspecified    Arthritis    Anxiety    Chronic cough       Review of Systems   Constitutional: Negative. HENT: Negative. Eyes: Negative. Respiratory: Negative. Cardiovascular: Negative. Gastrointestinal: Negative. Skin: Negative. Neurological: Negative.       PHYSICAL EXAMINATION:    Constitutional: [x] Appears well-developed and well-nourished [x] No apparent distress      [] Abnormal-   Mental status  [x] Alert and awake  [x] Oriented to person/place/time [x]Able to follow commands      Eyes:  EOM    [x]  Normal  [] Abnormal-  Sclera  [x]  Normal  [] Abnormal -         Discharge [x]  None visible  [] Abnormal -    HENT:   [x] Normocephalic, atraumatic. [] Abnormal   [x] Mouth/Throat: Mucous membranes are moist.     External Ears [] Normal  [] Abnormal-     Neck: [x] No visualized mass     Pulmonary/Chest: [x] Respiratory effort normal.  [x] No visualized signs of difficulty breathing or respiratory distress        [] Abnormal-      Musculoskeletal:   [] Normal gait with no signs of ataxia         [x] Normal range of motion of neck        [] Abnormal-     Neurological:        [x] No Facial Asymmetry (Cranial nerve 7 motor function) (limited exam to video visit)          [x] No gaze palsy        [] Abnormal-         Skin:        [x] No significant exanthematous lesions or discoloration noted on facial skin         [] Abnormal-            Psychiatric:       [x] Normal Affect [x] No Hallucinations        [] Abnormal-     Other pertinent observable physical exam findings-     Due to this being a TeleHealth encounter, evaluation of the following organ systems is limited: Vitals/Constitutional/EENT/Resp/CV/GI//MS/Neuro/Skin/Heme-Lymph-Imm. Assessment and Plan:    Patient is here via telemedicine for their 10th medical weight loss visit, up 3.1 lbs and a total weight loss of 15.2 lbs. She is doing pretty well, making dietary and behavior modifications. She is following dietary recommendations utilizing the 1200 calorie low carb meal plan as a guide. She has been off track somewhat over the past few weeks. We discussed ways to identify and work through emotional eating habits. She did speak with the registered dietitian for continued follow up. I agree with recommendations and plan.  She is limited with exercising due to becoming SOB easily. We discussed focusing on being more active throughout the day, getting up to walk to a new room at least once an hour. Encouraged continued physical activity as she tolerates. We will see her back in 6 weeks for continued follow up via telemedicine. A total of 15 minutes was spent conversing with the patient and over half of that time was spent counseling the patient on proper dietary behaviors and exercise. An electronic signature was used to authenticate this note. Pursuant to the emergency declaration under the [facility] [address] waiver authority and the [employer] Act, this Virtual  Visit was conducted, with patient's consent, to reduce the patient's risk of exposure to COVID-19 and provide continuity of care for an established patient. Services were provided through a video synchronous discussion virtually to substitute for in-person clinic visit.

## 2020-08-18 NOTE — PATIENT INSTRUCTIONS
Key Low Carb Dietary Points:    - Meats (preferably organic or grass fed) are great sources of protein and are low in carbohydrates. Lore Bumpers with coconut, olive, avocado, or almond oils. - When buying dairy, choose regular or full fat options. - Choose vegetables that grow above ground as they are generally lower in carbohydrates. - Avoid bread, rice, potatoes, pasta and all sources of simple sugars (desserts, soda, breakfast cereals). - Choose beverages that are calorie and sugar free, such as water or flavored buitrago. - When eating fruit, choose berries as a snack option a few times a week. Patient received dietary handouts and education.

## 2020-08-19 ENCOUNTER — VIRTUAL VISIT (OUTPATIENT)
Dept: BARIATRICS/WEIGHT MGMT | Age: 75
End: 2020-08-19
Payer: MEDICARE

## 2020-08-19 PROCEDURE — 3017F COLORECTAL CA SCREEN DOC REV: CPT | Performed by: NURSE PRACTITIONER

## 2020-08-19 PROCEDURE — 1090F PRES/ABSN URINE INCON ASSESS: CPT | Performed by: NURSE PRACTITIONER

## 2020-08-19 PROCEDURE — 99213 OFFICE O/P EST LOW 20 MIN: CPT | Performed by: NURSE PRACTITIONER

## 2020-08-19 PROCEDURE — G9899 SCRN MAM PERF RSLTS DOC: HCPCS | Performed by: NURSE PRACTITIONER

## 2020-08-19 PROCEDURE — 1123F ACP DISCUSS/DSCN MKR DOCD: CPT | Performed by: NURSE PRACTITIONER

## 2020-08-19 PROCEDURE — G8427 DOCREV CUR MEDS BY ELIG CLIN: HCPCS | Performed by: NURSE PRACTITIONER

## 2020-08-19 PROCEDURE — 4040F PNEUMOC VAC/ADMIN/RCVD: CPT | Performed by: NURSE PRACTITIONER

## 2020-08-19 PROCEDURE — G8399 PT W/DXA RESULTS DOCUMENT: HCPCS | Performed by: NURSE PRACTITIONER

## 2020-08-19 ASSESSMENT — ENCOUNTER SYMPTOMS
EYES NEGATIVE: 1
GASTROINTESTINAL NEGATIVE: 1
RESPIRATORY NEGATIVE: 1

## 2020-08-19 NOTE — PROGRESS NOTES
Woody Charles gained 3.1 lbs over past 6 weeks. Pt is s/p band in 2011, currently following weight watchers for additional accountability. Doing some emotional eating over the past month and feels it is related to stress/boredom. Due to the COVID-19 restrictions on close contact interactions the patient's visit was conducted via telephone in joo of a face to face visit. The patient is here through telemedicine for their 10th MWM. Treatment plan details: 1200 kcal LC     Breakfast: egg with goetta/sausage and english muffin with SAMEER jelly + 2 cups of coffee with SF creamer    Snack: nothing     Lunch: 1/2 turkey/ham sandwich and 6 tortilla chips and orange - still hungry after     Snack: nothing     Dinner: rotisserie chicken/beef with veggies OR chili OR chicken noodle soup     Snack: piece of cheese/cheese cubes OR watermelon OR NV bar OR glazed pecans     Is patient adhering to treatment plan: Having some extra carb options -     Is pt eating 4-5 times each day? Eats 4 x day     Is pt including lean protein with all meals and snacks? Usually     Is pt avoiding added sugars and starchy foods? Off track with some foods     Consuming at least 64oz of calorie free fluids? 54 oz - coffee with SF creamer, iced tea, diet soda     Participating in intentional exercise?  Limited d/t heat and asthma/afib    Plan/Goals:   Get back on track with healthy food choices   Reduce CHO at lunch and include veggies   Avoid bored/stress eating and focus on hobbies   Avoid carbonation     Handouts: None     Majo Mo

## 2020-09-14 ENCOUNTER — ANTI-COAG VISIT (OUTPATIENT)
Dept: PHARMACY | Age: 75
End: 2020-09-14
Payer: MEDICARE

## 2020-09-14 VITALS — TEMPERATURE: 97.5 F

## 2020-09-14 LAB — INTERNATIONAL NORMALIZATION RATIO, POC: 3.2

## 2020-09-14 PROCEDURE — 99211 OFF/OP EST MAY X REQ PHY/QHP: CPT

## 2020-09-14 PROCEDURE — 85610 PROTHROMBIN TIME: CPT

## 2020-09-14 NOTE — PROGRESS NOTES
Encouraged to maintain a consistency of vegetables/salads.   Recheck INR 5 weeks, 10/19     Referring PCP is Susan Briceno  INR (no units)   Date Value   07/27/2020 2.6   06/15/2020 2.7   05/04/2020 2.5 (H)   03/09/2020 3   11/15/2019 1.72 (H)   12/14/2018 2.90 (H)      CLINICAL PHARMACY CONSULT: MED RECONCILIATION/REVIEW ADDENDUM    For Pharmacy Admin Tracking Only    PHSO: Yes  Total # of Interventions Recommended: 1  - Decreased Dose #: 1  - Maintenance Safety Lab Monitoring #: 1  Total Interventions Accepted: 1  Time Spent (min): 15    Fredricka Severance, NidaD

## 2020-09-21 ENCOUNTER — TELEPHONE (OUTPATIENT)
Dept: CARDIOLOGY CLINIC | Age: 75
End: 2020-09-21

## 2020-09-21 NOTE — TELEPHONE ENCOUNTER
Pt called is [facility] for a procedure on 10/02 and has COVID-19 test artie for 09/25 pt wants to know if that's too far out?  Flu clinic 5 - 6 days prior and was told to call [facility] Pls call to advise Thank you

## 2020-09-28 ENCOUNTER — OFFICE VISIT (OUTPATIENT)
Dept: PRIMARY CARE CLINIC | Age: 75
End: 2020-09-28
Payer: MEDICARE

## 2020-09-28 PROCEDURE — G8428 CUR MEDS NOT DOCUMENT: HCPCS | Performed by: NURSE PRACTITIONER

## 2020-09-28 PROCEDURE — G8417 CALC BMI ABV UP PARAM F/U: HCPCS | Performed by: NURSE PRACTITIONER

## 2020-09-28 PROCEDURE — 99211 OFF/OP EST MAY X REQ PHY/QHP: CPT | Performed by: NURSE PRACTITIONER

## 2020-09-28 NOTE — PATIENT INSTRUCTIONS

## 2020-09-28 NOTE — PROGRESS NOTES
Jung Shelby received a viral test for COVID-19. They were educated on isolation and quarantine as appropriate. For any symptoms, they were directed to seek care from their PCP, given contact information to establish with a doctor, directed to an urgent care or the emergency room.

## 2020-09-30 ENCOUNTER — TELEPHONE (OUTPATIENT)
Dept: INTERNAL MEDICINE CLINIC | Age: 75
End: 2020-09-30

## 2020-09-30 ENCOUNTER — VIRTUAL VISIT (OUTPATIENT)
Dept: BARIATRICS/WEIGHT MGMT | Age: 75
End: 2020-09-30
Payer: MEDICARE

## 2020-09-30 LAB — SARS-COV-2, NAA: NOT DETECTED

## 2020-09-30 PROCEDURE — G9899 SCRN MAM PERF RSLTS DOC: HCPCS | Performed by: NURSE PRACTITIONER

## 2020-09-30 PROCEDURE — 99213 OFFICE O/P EST LOW 20 MIN: CPT | Performed by: NURSE PRACTITIONER

## 2020-09-30 PROCEDURE — 1123F ACP DISCUSS/DSCN MKR DOCD: CPT | Performed by: NURSE PRACTITIONER

## 2020-09-30 PROCEDURE — 1090F PRES/ABSN URINE INCON ASSESS: CPT | Performed by: NURSE PRACTITIONER

## 2020-09-30 PROCEDURE — G8399 PT W/DXA RESULTS DOCUMENT: HCPCS | Performed by: NURSE PRACTITIONER

## 2020-09-30 PROCEDURE — 3017F COLORECTAL CA SCREEN DOC REV: CPT | Performed by: NURSE PRACTITIONER

## 2020-09-30 PROCEDURE — 4040F PNEUMOC VAC/ADMIN/RCVD: CPT | Performed by: NURSE PRACTITIONER

## 2020-09-30 PROCEDURE — G8427 DOCREV CUR MEDS BY ELIG CLIN: HCPCS | Performed by: NURSE PRACTITIONER

## 2020-09-30 ASSESSMENT — ENCOUNTER SYMPTOMS
EYES NEGATIVE: 1
RESPIRATORY NEGATIVE: 1
GASTROINTESTINAL NEGATIVE: 1

## 2020-09-30 NOTE — TELEPHONE ENCOUNTER
----- Message from Vahid Ritter sent at 9/30/2020 10:46 AM EDT -----  Subject: Message to Provider    QUESTIONS  Information for Provider? Patient called because he had a notification on   her mychart from Cassy Baig I tried calling the office and no answer also the   patient is having a surgery done on Friday and would like her blood work   done while she is in the hospital.   ---------------------------------------------------------------------------  --------------  [facility]  What is the best way for the office to contact you? OK to leave message on   voicemail  Preferred Call Back Phone Number? [phone]  ---------------------------------------------------------------------------  --------------  SCRIPT ANSWERS  Relationship to Patient?  Self

## 2020-09-30 NOTE — PROGRESS NOTES
Jessica Greer gained 0.1 lbs over past 6 weeks. Pt is s/p band in 2011, currently following weight watchers for additional accountability. Doing some emotional eating over the past month and feels it is related to stress/boredom/COVID. Due to the COVID-19 restrictions on close contact interactions the patient's visit was conducted via telephone in joo of a face to face visit. The patient is here through telemedicine for their 11th MWM visit. Treatment plan details: 1200 kcal LC   Is patient adhering to treatment plan: Not really    Is pt eating 4-5 times each day? Yes - 4 x day     Is pt including lean protein with all meals and snacks? Yes    Is pt avoiding added sugars and starchy foods? breakfast - frozen batool zaidi sausage and egg with bagel with light cream cheese with fruit OR oatmeal/grits made with water, lunch - 1/2 turkey sandwich with orange, dinner - rotisserie chicken breast with 1/4-1/2 c peas, snack - unsalted peanuts     Consuming at least 64oz of calorie free fluids? 60 oz - coffee with SF creamer, iced tea, water, diet soda occasionally    Participating in intentional exercise? Limited d/t asthma/afib - having cardiac ablasion on Friday    Amount per meal: 1.5 cups/sitting      Following 30/30/30 rule: Eating in 20 minutes.  Avoids eating and drinking at the same time, just takes sips prn.     Supplements: Vit C, (no longer taking Calcium following cardioversion), generic MVI 1 x day, cranberry capsule, B complex 1 x day     Food Intolerances: greasy foods     Plan/Goals:   Avoid soda   Increase nonstarchy veggies   Decrease CHO at breakfast/lunch and choose one carb option (bread/oatmeal vs fruit)   Focus on protein with breakfast  Increase activity as tolerated     Handouts: none     Kingston Gleason

## 2020-09-30 NOTE — PROGRESS NOTES
[facility]) Physicians   [employer]    9/30/2020    TELEHEALTH EVALUATION -- Audio/Visual (During FLHZL-55 public health emergency)    Medical Weight Loss    HPI: Christi Brice is a 77 y.o. female with current BMI of 45.4 and current weight of 266.4 pounds. Due to the COVID-19 restrictions on close contact interactions the patient's monthly visit was conducted via audio/video in joo of a face to face visit. Patient has consented to have this visit conducted via audio/video. The patient is here through telemedicine for their medical weight loss visit. She has noted some fluctuation from day to day of her weight, likely fluid retention. She has ablation on Friday and plans to discuss with cardiologist. She is hopeful she will feel better after her ablation, she reports she is currently in a-fibb. Past Medical History:   Diagnosis Date    Allergic     Anxiety 2/2/2017    Arthritis     Asthma     Back pain     Blood circulation, collateral     legs    Depression     GERD (gastroesophageal reflux disease)     Gout     Hypercholesteremia     Hypertension     Movement disorder     7 discs ruptured    Movement disorder     knee replacements    Obesity     Pulmonary emboli (HCC)     Unspecified sleep apnea     uses cpap    Urinary tract infection, chronic     UTI (urinary tract infection)      Past Surgical History:   Procedure Laterality Date    ANKLE FUSION  2010    right, [facility]    BACK SURGERY  09/14/2012    lumbar fusion L-4, L-5 atrificial disc  [facility] Dr. Santos Walker  5-7-11    incision, drainage and debridement of left knee and application of wound vac.     BREAST BIOPSY  08/2018    neg    EYE SURGERY      JOINT REPLACEMENT      bilat knees    JOINT REPLACEMENT  2003    left, [facility]    JOINT REPLACEMENT  2005    right, Georgina OTHER SURGICAL HISTORY  12-    laparscopic adjustable gastric restrictive procedure    REVISION TOTAL KNEE ARTHROPLASTY      [address] [facility]    SKIN FULL GRAFT  2011    right, [facility]    TUBAL LIGATION  1975    [facility]    UPPER GASTROINTESTINAL ENDOSCOPY  10-    VENA CAVA FILTER PLACEMENT       Family History   Problem Relation Age of Onset    High Blood Pressure Mother     Depression Mother     Stroke Father     High Blood Pressure Father     Asthma Father     Arthritis Father     Heart Disease Father     Breast Cancer Sister     High Cholesterol Neg Hx      Social History     Tobacco Use    Smoking status: Former Smoker     Packs/day: 0.50     Years: 4.00     Pack years: 2.00     Types: Cigarettes     Last attempt to quit: 1965     Years since quittin.7    Smokeless tobacco: Never Used   Substance Use Topics    Alcohol use: Not Currently     Comment: 4 t imes a year     I counseled the patient on the importance of not smoking and risks of ETOH. Allergies   Allergen Reactions    Belsomra [Suvorexant] Other (See Comments)     Nightmares      Avelox [Moxifloxacin Hcl In Nacl] Rash    Levofloxacin Rash    Sulfa Antibiotics Rash     There were no vitals filed for this visit. There is no height or weight on file to calculate BMI.     Current Outpatient Medications:     propafenone (RYTHMOL) 225 MG tablet, Take 1 tablet by mouth every 8 hours, Disp: 90 tablet, Rfl: 0    verapamil (CALAN SR) 240 MG extended release tablet, Take 0.5 tablets by mouth nightly, Disp: 90 tablet, Rfl: 3    trimethoprim (TRIMPEX) 100 MG tablet, TAKE 1 TABLET BY MOUTH  EVERY 48 HOURS, Disp: 45 tablet, Rfl: 3    gabapentin (NEURONTIN) 300 MG capsule, TAKE 2 CAPSULES BY MOUTH 3  TIMES DAILY, Disp: 540 capsule, Rfl: 3    clonazePAM (KLONOPIN) 1 MG tablet, TAKE 1 TABLET BY MOUTH IN  THE EVENING, Disp: 90 tablet, Rfl: 0    fluticasone-salmeterol (ADVAIR) 250-50 MCG/DOSE AEPB, INHALE ONE PUFF BY MOUTH EVERY 12 HOURS, Disp: 1 Inhaler, Rfl: 5    warfarin (COUMADIN) 5 MG tablet, TAKE 1 TABLETS BY MOUTH ON  MONDAY AND  FRIDAY ONLY AND TAKE 1.5 TABLETS BY MOUTH  ALL OTHER DAYS, Disp: 143 tablet, Rfl: 1    indapamide (LOZOL) 1.25 MG tablet, TAKE 1 TABLET BY MOUTH  EVERY MORNING, Disp: 90 tablet, Rfl: 3    potassium chloride (KLOR-CON M) 20 MEQ extended release tablet, TAKE 1 TABLET BY MOUTH  DAILY WITH BREAKFAST, Disp: 90 tablet, Rfl: 1    montelukast (SINGULAIR) 10 MG tablet, TAKE 1 TABLET BY MOUTH  NIGHTLY, Disp: 90 tablet, Rfl: 3    pantoprazole (PROTONIX) 40 MG tablet, TAKE 1 TABLET BY MOUTH  EVERY MORNING BEFORE  BREAKFAST, Disp: 90 tablet, Rfl: 3    acetaminophen (TYLENOL) 500 MG tablet, Take 500 mg by mouth every 6 hours as needed for Pain, Disp: , Rfl:     diphenhydrAMINE-APAP, sleep, (TYLENOL PM EXTRA STRENGTH)  MG tablet, Take 1 tablet by mouth nightly as needed for Sleep, Disp: , Rfl:     PROAIR  (90 Base) MCG/ACT inhaler, INHALE TWO PUFFS BY MOUTH EVERY 4 HOURS AS NEEDED FOR SHORTNESS OF BREATH, Disp: 8.5 g, Rfl: 2    b complex vitamins capsule, Take 1 capsule by mouth daily, Disp: , Rfl:     citalopram (CELEXA) 40 MG tablet, Take 1 tablet by mouth daily, Disp: 90 tablet, Rfl: 3    atorvastatin (LIPITOR) 80 MG tablet, Take 1 tablet by mouth daily, Disp: 90 tablet, Rfl: 3    allopurinol (ZYLOPRIM) 300 MG tablet, TAKE 1 TABLET BY MOUTH  DAILY, Disp: 90 tablet, Rfl: 3    Ascorbic Acid (VITAMIN C PO), 1 tablet daily, Disp: , Rfl:     therapeutic multivitamin-minerals (THERAGRAN-M) tablet, Take 1 tablet by mouth nightly , Disp: , Rfl:     Cranberry 500 MG CAPS, Take 1,000 mg by mouth nightly , Disp: , Rfl:     Lab Results   Component Value Date    WBC 6.4 05/21/2019    RBC 4.29 05/21/2019    HGB 13.7 05/21/2019    HCT 40.9 05/21/2019    MCV 95.3 05/21/2019    MCH 31.9 05/21/2019    MCHC 33.5 05/21/2019    MPV 7.4 05/21/2019    NEUTOPHILPCT 74.9 10/29/2018    LYMPHOPCT 17.2 10/29/2018    MONOPCT 6.0 10/29/2018    EOSRELPCT 1.4 10/29/2018    BASOPCT 0.5 10/29/2018    NEUTROABS 7.4 10/29/2018    LYMPHSABS 1.7 10/29/2018    MONOSABS 0.6 10/29/2018    EOSABS 0.1 10/29/2018     Lab Results   Component Value Date     05/21/2019    K 3.8 05/21/2019    K 3.3 10/10/2018    CL 99 05/21/2019    CO2 25 05/21/2019    ANIONGAP 16 05/21/2019    GLUCOSE 87 05/21/2019    BUN 17 11/29/2019    CREATININE 0.8 11/29/2019    LABGLOM >60 11/29/2019    GFRAA >60 11/29/2019    GFRAA >60 01/04/2013    CALCIUM 9.7 03/09/2020    PROT 6.6 10/29/2018    PROT 7.6 01/04/2013    LABALBU 4.1 05/21/2019    AGRATIO 1.9 10/29/2018    BILITOT 0.6 10/29/2018    ALKPHOS 70 10/29/2018    ALT 26 10/29/2018    AST 22 10/29/2018    GLOB 2.3 10/29/2018     Lab Results   Component Value Date    CHOL 158 05/21/2019    TRIG 74 05/21/2019    HDL 64 05/21/2019    HDL 58 02/01/2012    LDLCALC 79 05/21/2019    LABVLDL 15 05/21/2019     No results found for: [facility]  Lab Results   Component Value Date    IRON 72 08/27/2019    TIBC 292 08/27/2019    LABIRON 25 08/27/2019     Lab Results   Component Value Date    YQZDPKCQ05 9488 08/27/2019    FOLATE >20.00 08/27/2019     Lab Results   Component Value Date    VITD25 50.2 08/27/2019     Lab Results   Component Value Date    LABA1C 5.6 11/22/2019    .9 12/28/2011       Patient Active Problem List   Diagnosis    Essential hypertension    History of pulmonary embolism    Osteoarthritis of knee    PAULA (obstructive sleep apnea)    Asthma    Atrial fibrillation (HCC)    Status post gastric banding    Coronary artery disease    Morbid obesity with BMI of 40.0-44.9, adult ([address]    Pulmonary embolism without acute cor pulmonale (HCC)    Hyperlipidemia, unspecified    Arthritis    Anxiety    Chronic cough       Review of Systems   Constitutional: Negative. HENT: Negative. Eyes: Negative. Respiratory: Negative. Cardiovascular: Negative. Gastrointestinal: Negative. Skin: Negative.     Neurological: plan. She is not able to exerce due to shortness of breath but does try to move about more frequently throughout the day. Encouraged continued physical activity as she tolerates. We will see her back in 1 month for continued follow up via telemedicine. A total of 15 minutes was spent conversing with the patient and over half of that time was spent counseling the patient on proper dietary behaviors and exercise. An electronic signature was used to authenticate this note. Pursuant to the emergency declaration under the [facility], [facility] waiver authority and the [employer] Act, this Virtual  Visit was conducted, with patient's consent, to reduce the patient's risk of exposure to COVID-19 and provide continuity of care for an established patient. Services were provided through a video synchronous discussion virtually to substitute for in-person clinic visit.

## 2020-09-30 NOTE — TELEPHONE ENCOUNTER
Forwarded patient to Taryn to do AWV. Patient would lke to have labs drawn suggested from health maintenance which include Lipid and renal panel while she is in the hospital for procedure this Friday.

## 2020-09-30 NOTE — PATIENT INSTRUCTIONS
Key Low Carb Dietary Points:    - Meats (preferably organic or grass fed) are great sources of protein and are low in carbohydrates. Wilma Art with coconut, olive, avocado, or almond oils. - When buying dairy, choose regular or full fat options. - Choose vegetables that grow above ground as they are generally lower in carbohydrates. - Avoid bread, rice, potatoes, pasta and all sources of simple sugars (desserts, soda, breakfast cereals). - Choose beverages that are calorie and sugar free, such as water or flavored buitrago. - When eating fruit, choose berries as a snack option a few times a week. Patient received dietary handouts and education.

## 2020-10-02 ENCOUNTER — ANESTHESIA EVENT (OUTPATIENT)
Dept: CARDIAC CATH/INVASIVE PROCEDURES | Age: 75
End: 2020-10-02
Payer: MEDICARE

## 2020-10-02 ENCOUNTER — HOSPITAL ENCOUNTER (OUTPATIENT)
Dept: CARDIAC CATH/INVASIVE PROCEDURES | Age: 75
Discharge: HOME | End: 2020-10-03
Attending: INTERNAL MEDICINE | Admitting: INTERNAL MEDICINE
Payer: MEDICARE

## 2020-10-02 ENCOUNTER — ANESTHESIA (OUTPATIENT)
Dept: CARDIAC CATH/INVASIVE PROCEDURES | Age: 75
End: 2020-10-02
Payer: MEDICARE

## 2020-10-02 VITALS
DIASTOLIC BLOOD PRESSURE: 85 MMHG | RESPIRATION RATE: 10 BRPM | OXYGEN SATURATION: 97 % | SYSTOLIC BLOOD PRESSURE: 161 MMHG | TEMPERATURE: 96.4 F

## 2020-10-02 PROBLEM — I48.0 PAF (PAROXYSMAL ATRIAL FIBRILLATION) (HCC): Status: ACTIVE | Noted: 2020-10-02

## 2020-10-02 LAB
A/G RATIO: 1.5 (ref 1.1–2.2)
ABO/RH: NORMAL
ALBUMIN SERPL-MCNC: 4.3 G/DL (ref 3.4–5)
ALP BLD-CCNC: 78 U/L (ref 40–129)
ALT SERPL-CCNC: 26 U/L (ref 10–40)
ANION GAP SERPL CALCULATED.3IONS-SCNC: 11 MMOL/L (ref 3–16)
ANTIBODY SCREEN: NORMAL
AST SERPL-CCNC: 32 U/L (ref 15–37)
BILIRUB SERPL-MCNC: 0.7 MG/DL (ref 0–1)
BUN BLDV-MCNC: 17 MG/DL (ref 7–20)
CALCIUM SERPL-MCNC: 9.5 MG/DL (ref 8.3–10.6)
CHLORIDE BLD-SCNC: 102 MMOL/L (ref 99–110)
CHOLESTEROL, TOTAL: 150 MG/DL (ref 0–199)
CO2: 26 MMOL/L (ref 21–32)
CREAT SERPL-MCNC: 0.8 MG/DL (ref 0.6–1.2)
EKG ATRIAL RATE: 72 BPM
EKG DIAGNOSIS: NORMAL
EKG P AXIS: 33 DEGREES
EKG P-R INTERVAL: 206 MS
EKG Q-T INTERVAL: 374 MS
EKG QRS DURATION: 100 MS
EKG QTC CALCULATION (BAZETT): 409 MS
EKG R AXIS: -7 DEGREES
EKG T AXIS: 64 DEGREES
EKG VENTRICULAR RATE: 72 BPM
GFR AFRICAN AMERICAN: >60
GFR NON-AFRICAN AMERICAN: >60
GLOBULIN: 2.9 G/DL
GLUCOSE BLD-MCNC: 91 MG/DL (ref 70–99)
HCT VFR BLD CALC: 40 % (ref 36–48)
HDLC SERPL-MCNC: 61 MG/DL (ref 40–60)
HEMOGLOBIN: 13.7 G/DL (ref 12–16)
INR BLD: 2.3 (ref 0.86–1.14)
LDL CHOLESTEROL CALCULATED: 73 MG/DL
MAGNESIUM: 1.7 MG/DL (ref 1.8–2.4)
MCH RBC QN AUTO: 32.4 PG (ref 26–34)
MCHC RBC AUTO-ENTMCNC: 34.4 G/DL (ref 31–36)
MCV RBC AUTO: 94.3 FL (ref 80–100)
PDW BLD-RTO: 14.5 % (ref 12.4–15.4)
PLATELET # BLD: 261 K/UL (ref 135–450)
PMV BLD AUTO: 6.8 FL (ref 5–10.5)
POC ACT LR: >400 SEC
POTASSIUM SERPL-SCNC: 3.6 MMOL/L (ref 3.5–5.1)
PROTHROMBIN TIME: 26.9 SEC (ref 10–13.2)
RBC # BLD: 4.24 M/UL (ref 4–5.2)
SODIUM BLD-SCNC: 139 MMOL/L (ref 136–145)
TOTAL PROTEIN: 7.2 G/DL (ref 6.4–8.2)
TRIGL SERPL-MCNC: 80 MG/DL (ref 0–150)
TSH REFLEX: 2.44 UIU/ML (ref 0.27–4.2)
VLDLC SERPL CALC-MCNC: 16 MG/DL
WBC # BLD: 6.5 K/UL (ref 4–11)

## 2020-10-02 PROCEDURE — 93662 INTRACARDIAC ECG (ICE): CPT

## 2020-10-02 PROCEDURE — 3700000000 HC ANESTHESIA ATTENDED CARE

## 2020-10-02 PROCEDURE — 86901 BLOOD TYPING SEROLOGIC RH(D): CPT

## 2020-10-02 PROCEDURE — 80061 LIPID PANEL: CPT

## 2020-10-02 PROCEDURE — C1732 CATH, EP, DIAG/ABL, 3D/VECT: HCPCS

## 2020-10-02 PROCEDURE — 2500000003 HC RX 250 WO HCPCS: Performed by: NURSE ANESTHETIST, CERTIFIED REGISTERED

## 2020-10-02 PROCEDURE — 7100000001 HC PACU RECOVERY - ADDTL 15 MIN

## 2020-10-02 PROCEDURE — 85027 COMPLETE CBC AUTOMATED: CPT

## 2020-10-02 PROCEDURE — 80053 COMPREHEN METABOLIC PANEL: CPT

## 2020-10-02 PROCEDURE — 2500000003 HC RX 250 WO HCPCS

## 2020-10-02 PROCEDURE — 2580000003 HC RX 258: Performed by: NURSE ANESTHETIST, CERTIFIED REGISTERED

## 2020-10-02 PROCEDURE — 2580000003 HC RX 258

## 2020-10-02 PROCEDURE — 94640 AIRWAY INHALATION TREATMENT: CPT

## 2020-10-02 PROCEDURE — 6360000002 HC RX W HCPCS

## 2020-10-02 PROCEDURE — 2720000010 HC SURG SUPPLY STERILE

## 2020-10-02 PROCEDURE — 93623 PRGRMD STIMJ&PACG IV RX NFS: CPT

## 2020-10-02 PROCEDURE — C1769 GUIDE WIRE: HCPCS

## 2020-10-02 PROCEDURE — 6370000000 HC RX 637 (ALT 250 FOR IP): Performed by: INTERNAL MEDICINE

## 2020-10-02 PROCEDURE — 93010 ELECTROCARDIOGRAM REPORT: CPT | Performed by: INTERNAL MEDICINE

## 2020-10-02 PROCEDURE — 6360000002 HC RX W HCPCS: Performed by: INTERNAL MEDICINE

## 2020-10-02 PROCEDURE — 85347 COAGULATION TIME ACTIVATED: CPT

## 2020-10-02 PROCEDURE — C1730 CATH, EP, 19 OR FEW ELECT: HCPCS

## 2020-10-02 PROCEDURE — 83735 ASSAY OF MAGNESIUM: CPT

## 2020-10-02 PROCEDURE — C1894 INTRO/SHEATH, NON-LASER: HCPCS

## 2020-10-02 PROCEDURE — 93656 COMPRE EP EVAL ABLTJ ATR FIB: CPT

## 2020-10-02 PROCEDURE — 6370000000 HC RX 637 (ALT 250 FOR IP): Performed by: NURSE ANESTHETIST, CERTIFIED REGISTERED

## 2020-10-02 PROCEDURE — 2580000003 HC RX 258: Performed by: INTERNAL MEDICINE

## 2020-10-02 PROCEDURE — 93005 ELECTROCARDIOGRAM TRACING: CPT | Performed by: INTERNAL MEDICINE

## 2020-10-02 PROCEDURE — 36415 COLL VENOUS BLD VENIPUNCTURE: CPT

## 2020-10-02 PROCEDURE — 93622 COMP EP EVAL L VENTR PAC&REC: CPT | Performed by: INTERNAL MEDICINE

## 2020-10-02 PROCEDURE — 7100000000 HC PACU RECOVERY - FIRST 15 MIN

## 2020-10-02 PROCEDURE — 3700000001 HC ADD 15 MINUTES (ANESTHESIA)

## 2020-10-02 PROCEDURE — 86850 RBC ANTIBODY SCREEN: CPT

## 2020-10-02 PROCEDURE — 93662 INTRACARDIAC ECG (ICE): CPT | Performed by: INTERNAL MEDICINE

## 2020-10-02 PROCEDURE — 85610 PROTHROMBIN TIME: CPT

## 2020-10-02 PROCEDURE — 93613 INTRACARDIAC EPHYS 3D MAPG: CPT

## 2020-10-02 PROCEDURE — 2709999900 HC NON-CHARGEABLE SUPPLY

## 2020-10-02 PROCEDURE — 86900 BLOOD TYPING SEROLOGIC ABO: CPT

## 2020-10-02 PROCEDURE — 93656 COMPRE EP EVAL ABLTJ ATR FIB: CPT | Performed by: INTERNAL MEDICINE

## 2020-10-02 PROCEDURE — 94761 N-INVAS EAR/PLS OXIMETRY MLT: CPT

## 2020-10-02 PROCEDURE — 84443 ASSAY THYROID STIM HORMONE: CPT

## 2020-10-02 PROCEDURE — 93623 PRGRMD STIMJ&PACG IV RX NFS: CPT | Performed by: INTERNAL MEDICINE

## 2020-10-02 PROCEDURE — 6360000002 HC RX W HCPCS: Performed by: NURSE ANESTHETIST, CERTIFIED REGISTERED

## 2020-10-02 PROCEDURE — C1759 CATH, INTRA ECHOCARDIOGRAPHY: HCPCS

## 2020-10-02 PROCEDURE — 93622 COMP EP EVAL L VENTR PAC&REC: CPT

## 2020-10-02 PROCEDURE — 93613 INTRACARDIAC EPHYS 3D MAPG: CPT | Performed by: INTERNAL MEDICINE

## 2020-10-02 RX ORDER — PROPAFENONE HYDROCHLORIDE 150 MG/1
225 TABLET, FILM COATED ORAL 3 TIMES DAILY
Status: DISCONTINUED | OUTPATIENT
Start: 2020-10-02 | End: 2020-10-03 | Stop reason: HOSPADM

## 2020-10-02 RX ORDER — WARFARIN SODIUM 5 MG/1
5 TABLET ORAL
Status: DISCONTINUED | OUTPATIENT
Start: 2020-10-02 | End: 2020-10-03 | Stop reason: HOSPADM

## 2020-10-02 RX ORDER — ACETAMINOPHEN 500 MG
500 TABLET ORAL EVERY 6 HOURS PRN
Status: DISCONTINUED | OUTPATIENT
Start: 2020-10-02 | End: 2020-10-02 | Stop reason: SDUPTHER

## 2020-10-02 RX ORDER — SODIUM CHLORIDE 9 MG/ML
INJECTION, SOLUTION INTRAVENOUS CONTINUOUS PRN
Status: DISCONTINUED | OUTPATIENT
Start: 2020-10-02 | End: 2020-10-02 | Stop reason: SDUPTHER

## 2020-10-02 RX ORDER — SUCCINYLCHOLINE/SOD CL,ISO/PF 200MG/10ML
SYRINGE (ML) INTRAVENOUS PRN
Status: DISCONTINUED | OUTPATIENT
Start: 2020-10-02 | End: 2020-10-02 | Stop reason: SDUPTHER

## 2020-10-02 RX ORDER — HYDROCHLOROTHIAZIDE 25 MG/1
25 TABLET ORAL DAILY
Status: DISCONTINUED | OUTPATIENT
Start: 2020-10-02 | End: 2020-10-03 | Stop reason: HOSPADM

## 2020-10-02 RX ORDER — MONTELUKAST SODIUM 10 MG/1
10 TABLET ORAL NIGHTLY
Status: DISCONTINUED | OUTPATIENT
Start: 2020-10-02 | End: 2020-10-03 | Stop reason: HOSPADM

## 2020-10-02 RX ORDER — PROPOFOL 10 MG/ML
INJECTION, EMULSION INTRAVENOUS PRN
Status: DISCONTINUED | OUTPATIENT
Start: 2020-10-02 | End: 2020-10-02 | Stop reason: SDUPTHER

## 2020-10-02 RX ORDER — GABAPENTIN 100 MG/1
600 CAPSULE ORAL 3 TIMES DAILY
Status: DISCONTINUED | OUTPATIENT
Start: 2020-10-02 | End: 2020-10-03 | Stop reason: HOSPADM

## 2020-10-02 RX ORDER — PYRIDOXINE HCL (VITAMIN B6) 100 MG
1000 TABLET ORAL NIGHTLY
Status: DISCONTINUED | OUTPATIENT
Start: 2020-10-02 | End: 2020-10-02 | Stop reason: CLARIF

## 2020-10-02 RX ORDER — ACETAMINOPHEN 325 MG/1
TABLET ORAL
Status: DISPENSED
Start: 2020-10-02 | End: 2020-10-03

## 2020-10-02 RX ORDER — POTASSIUM CHLORIDE 20 MEQ/1
20 TABLET, EXTENDED RELEASE ORAL DAILY
Status: DISCONTINUED | OUTPATIENT
Start: 2020-10-03 | End: 2020-10-03 | Stop reason: HOSPADM

## 2020-10-02 RX ORDER — SODIUM CHLORIDE 0.9 % (FLUSH) 0.9 %
10 SYRINGE (ML) INJECTION EVERY 12 HOURS SCHEDULED
Status: DISCONTINUED | OUTPATIENT
Start: 2020-10-02 | End: 2020-10-03 | Stop reason: HOSPADM

## 2020-10-02 RX ORDER — M-VIT,TX,IRON,MINS/CALC/FOLIC 27MG-0.4MG
1 TABLET ORAL NIGHTLY
Status: DISCONTINUED | OUTPATIENT
Start: 2020-10-03 | End: 2020-10-03 | Stop reason: HOSPADM

## 2020-10-02 RX ORDER — ROCURONIUM BROMIDE 10 MG/ML
INJECTION, SOLUTION INTRAVENOUS PRN
Status: DISCONTINUED | OUTPATIENT
Start: 2020-10-02 | End: 2020-10-02 | Stop reason: SDUPTHER

## 2020-10-02 RX ORDER — FENTANYL CITRATE 50 UG/ML
INJECTION, SOLUTION INTRAMUSCULAR; INTRAVENOUS PRN
Status: DISCONTINUED | OUTPATIENT
Start: 2020-10-02 | End: 2020-10-02 | Stop reason: SDUPTHER

## 2020-10-02 RX ORDER — ALBUTEROL SULFATE 90 UG/1
AEROSOL, METERED RESPIRATORY (INHALATION) PRN
Status: DISCONTINUED | OUTPATIENT
Start: 2020-10-02 | End: 2020-10-02 | Stop reason: SDUPTHER

## 2020-10-02 RX ORDER — HYDROMORPHONE HCL 110MG/55ML
0.25 PATIENT CONTROLLED ANALGESIA SYRINGE INTRAVENOUS EVERY 5 MIN PRN
Status: DISCONTINUED | OUTPATIENT
Start: 2020-10-02 | End: 2020-10-02

## 2020-10-02 RX ORDER — CLONAZEPAM 1 MG/1
1 TABLET ORAL NIGHTLY
Status: DISCONTINUED | OUTPATIENT
Start: 2020-10-02 | End: 2020-10-02

## 2020-10-02 RX ORDER — OXYCODONE HYDROCHLORIDE 5 MG/1
10 TABLET ORAL PRN
Status: DISCONTINUED | OUTPATIENT
Start: 2020-10-02 | End: 2020-10-02

## 2020-10-02 RX ORDER — PROTAMINE SULFATE 10 MG/ML
INJECTION, SOLUTION INTRAVENOUS PRN
Status: DISCONTINUED | OUTPATIENT
Start: 2020-10-02 | End: 2020-10-02 | Stop reason: SDUPTHER

## 2020-10-02 RX ORDER — ATORVASTATIN CALCIUM 80 MG/1
80 TABLET, FILM COATED ORAL DAILY
Status: DISCONTINUED | OUTPATIENT
Start: 2020-10-02 | End: 2020-10-03 | Stop reason: HOSPADM

## 2020-10-02 RX ORDER — LIDOCAINE HYDROCHLORIDE 20 MG/ML
INJECTION, SOLUTION EPIDURAL; INFILTRATION; INTRACAUDAL; PERINEURAL PRN
Status: DISCONTINUED | OUTPATIENT
Start: 2020-10-02 | End: 2020-10-02 | Stop reason: SDUPTHER

## 2020-10-02 RX ORDER — ALBUTEROL SULFATE 2.5 MG/3ML
2.5 SOLUTION RESPIRATORY (INHALATION) EVERY 4 HOURS PRN
Status: DISCONTINUED | OUTPATIENT
Start: 2020-10-02 | End: 2020-10-03 | Stop reason: HOSPADM

## 2020-10-02 RX ORDER — VITAMIN B COMPLEX
1 CAPSULE ORAL DAILY
Status: DISCONTINUED | OUTPATIENT
Start: 2020-10-02 | End: 2020-10-02 | Stop reason: CLARIF

## 2020-10-02 RX ORDER — PANTOPRAZOLE SODIUM 40 MG/1
40 TABLET, DELAYED RELEASE ORAL
Status: DISCONTINUED | OUTPATIENT
Start: 2020-10-02 | End: 2020-10-03 | Stop reason: HOSPADM

## 2020-10-02 RX ORDER — BUDESONIDE AND FORMOTEROL FUMARATE DIHYDRATE 80; 4.5 UG/1; UG/1
2 AEROSOL RESPIRATORY (INHALATION) 2 TIMES DAILY
Status: DISCONTINUED | OUTPATIENT
Start: 2020-10-02 | End: 2020-10-03 | Stop reason: HOSPADM

## 2020-10-02 RX ORDER — ONDANSETRON 2 MG/ML
INJECTION INTRAMUSCULAR; INTRAVENOUS PRN
Status: DISCONTINUED | OUTPATIENT
Start: 2020-10-02 | End: 2020-10-02 | Stop reason: SDUPTHER

## 2020-10-02 RX ORDER — DIPHENHYDRAMINE HCL 25 MG
25 TABLET ORAL NIGHTLY PRN
Status: DISCONTINUED | OUTPATIENT
Start: 2020-10-02 | End: 2020-10-03 | Stop reason: HOSPADM

## 2020-10-02 RX ORDER — OXYCODONE HYDROCHLORIDE 5 MG/1
5 TABLET ORAL PRN
Status: DISCONTINUED | OUTPATIENT
Start: 2020-10-02 | End: 2020-10-02

## 2020-10-02 RX ORDER — ACETAMINOPHEN 325 MG/1
650 TABLET ORAL EVERY 4 HOURS PRN
Status: DISCONTINUED | OUTPATIENT
Start: 2020-10-02 | End: 2020-10-03 | Stop reason: HOSPADM

## 2020-10-02 RX ORDER — PROMETHAZINE HYDROCHLORIDE 25 MG/ML
6.25 INJECTION, SOLUTION INTRAMUSCULAR; INTRAVENOUS PRN
Status: DISCONTINUED | OUTPATIENT
Start: 2020-10-02 | End: 2020-10-02

## 2020-10-02 RX ORDER — ALLOPURINOL 300 MG/1
300 TABLET ORAL DAILY
Status: DISCONTINUED | OUTPATIENT
Start: 2020-10-02 | End: 2020-10-03 | Stop reason: HOSPADM

## 2020-10-02 RX ORDER — DIPHENHYDRAMINE HYDROCHLORIDE 50 MG/ML
12.5 INJECTION INTRAMUSCULAR; INTRAVENOUS
Status: DISCONTINUED | OUTPATIENT
Start: 2020-10-02 | End: 2020-10-02

## 2020-10-02 RX ORDER — FENTANYL CITRATE 50 UG/ML
50 INJECTION, SOLUTION INTRAMUSCULAR; INTRAVENOUS EVERY 5 MIN PRN
Status: DISCONTINUED | OUTPATIENT
Start: 2020-10-02 | End: 2020-10-02

## 2020-10-02 RX ORDER — MEPERIDINE HYDROCHLORIDE 25 MG/ML
12.5 INJECTION INTRAMUSCULAR; INTRAVENOUS; SUBCUTANEOUS EVERY 5 MIN PRN
Status: DISCONTINUED | OUTPATIENT
Start: 2020-10-02 | End: 2020-10-02

## 2020-10-02 RX ORDER — HEPARIN SODIUM 1000 [USP'U]/ML
INJECTION, SOLUTION INTRAVENOUS; SUBCUTANEOUS PRN
Status: DISCONTINUED | OUTPATIENT
Start: 2020-10-02 | End: 2020-10-02 | Stop reason: SDUPTHER

## 2020-10-02 RX ORDER — LABETALOL HYDROCHLORIDE 5 MG/ML
5 INJECTION, SOLUTION INTRAVENOUS EVERY 10 MIN PRN
Status: DISCONTINUED | OUTPATIENT
Start: 2020-10-02 | End: 2020-10-02

## 2020-10-02 RX ORDER — WARFARIN SODIUM 7.5 MG/1
7.5 TABLET ORAL
Status: DISCONTINUED | OUTPATIENT
Start: 2020-10-03 | End: 2020-10-03 | Stop reason: HOSPADM

## 2020-10-02 RX ORDER — SODIUM CHLORIDE 0.9 % (FLUSH) 0.9 %
10 SYRINGE (ML) INJECTION PRN
Status: DISCONTINUED | OUTPATIENT
Start: 2020-10-02 | End: 2020-10-03 | Stop reason: HOSPADM

## 2020-10-02 RX ORDER — VERAPAMIL HYDROCHLORIDE 240 MG/1
120 TABLET, FILM COATED, EXTENDED RELEASE ORAL DAILY
Status: DISCONTINUED | OUTPATIENT
Start: 2020-10-03 | End: 2020-10-03 | Stop reason: HOSPADM

## 2020-10-02 RX ORDER — HYDROMORPHONE HCL 110MG/55ML
0.5 PATIENT CONTROLLED ANALGESIA SYRINGE INTRAVENOUS EVERY 5 MIN PRN
Status: DISCONTINUED | OUTPATIENT
Start: 2020-10-02 | End: 2020-10-02

## 2020-10-02 RX ORDER — CITALOPRAM 20 MG/1
40 TABLET ORAL DAILY
Status: DISCONTINUED | OUTPATIENT
Start: 2020-10-03 | End: 2020-10-03 | Stop reason: HOSPADM

## 2020-10-02 RX ADMIN — PROPOFOL 30 MG: 10 INJECTION, EMULSION INTRAVENOUS at 12:19

## 2020-10-02 RX ADMIN — ROCURONIUM BROMIDE 20 MG: 10 INJECTION, SOLUTION INTRAVENOUS at 11:00

## 2020-10-02 RX ADMIN — ATORVASTATIN CALCIUM 80 MG: 80 TABLET, FILM COATED ORAL at 20:30

## 2020-10-02 RX ADMIN — Medication 10 ML: at 20:43

## 2020-10-02 RX ADMIN — ACETAMINOPHEN 650 MG: 325 TABLET ORAL at 15:23

## 2020-10-02 RX ADMIN — ONDANSETRON 4 MG: 2 INJECTION INTRAMUSCULAR; INTRAVENOUS at 10:52

## 2020-10-02 RX ADMIN — PHENYLEPHRINE HYDROCHLORIDE 50 MCG/MIN: 10 INJECTION INTRAVENOUS at 11:31

## 2020-10-02 RX ADMIN — ROCURONIUM BROMIDE 10 MG: 10 INJECTION, SOLUTION INTRAVENOUS at 11:49

## 2020-10-02 RX ADMIN — PROPAFENONE HYDROCHLORIDE 225 MG: 150 TABLET, FILM COATED ORAL at 20:30

## 2020-10-02 RX ADMIN — Medication 10 ML: at 22:07

## 2020-10-02 RX ADMIN — PROPOFOL 100 MG: 10 INJECTION, EMULSION INTRAVENOUS at 10:46

## 2020-10-02 RX ADMIN — ALBUTEROL SULFATE 6 PUFF: 90 AEROSOL, METERED RESPIRATORY (INHALATION) at 11:00

## 2020-10-02 RX ADMIN — Medication 2 PUFF: at 19:54

## 2020-10-02 RX ADMIN — ACETAMINOPHEN 650 MG: 325 TABLET ORAL at 20:20

## 2020-10-02 RX ADMIN — ALBUTEROL SULFATE 2.5 MG: 2.5 SOLUTION RESPIRATORY (INHALATION) at 19:46

## 2020-10-02 RX ADMIN — PROTAMINE SULFATE 40 MG: 10 INJECTION, SOLUTION INTRAVENOUS at 13:29

## 2020-10-02 RX ADMIN — ROCURONIUM BROMIDE 10 MG: 10 INJECTION, SOLUTION INTRAVENOUS at 11:26

## 2020-10-02 RX ADMIN — GABAPENTIN 600 MG: 100 CAPSULE ORAL at 20:30

## 2020-10-02 RX ADMIN — SUGAMMADEX 200 MG: 100 INJECTION, SOLUTION INTRAVENOUS at 13:29

## 2020-10-02 RX ADMIN — ROCURONIUM BROMIDE 5 MG: 10 INJECTION, SOLUTION INTRAVENOUS at 12:57

## 2020-10-02 RX ADMIN — ROCURONIUM BROMIDE 10 MG: 10 INJECTION, SOLUTION INTRAVENOUS at 10:46

## 2020-10-02 RX ADMIN — MONTELUKAST SODIUM 10 MG: 10 TABLET, COATED ORAL at 20:30

## 2020-10-02 RX ADMIN — SODIUM CHLORIDE: 9 INJECTION, SOLUTION INTRAVENOUS at 10:40

## 2020-10-02 RX ADMIN — ISOPROTERENOL HYDROCHLORIDE 10 MCG/MIN: 0.2 INJECTION, SOLUTION INTRAMUSCULAR; INTRAVENOUS at 13:13

## 2020-10-02 RX ADMIN — SODIUM CHLORIDE: 9 INJECTION, SOLUTION INTRAVENOUS at 11:30

## 2020-10-02 RX ADMIN — LIDOCAINE HYDROCHLORIDE 100 MG: 20 INJECTION, SOLUTION EPIDURAL; INFILTRATION; INTRACAUDAL; PERINEURAL at 10:46

## 2020-10-02 RX ADMIN — FENTANYL CITRATE 50 MCG: 50 INJECTION INTRAMUSCULAR; INTRAVENOUS at 10:46

## 2020-10-02 RX ADMIN — WARFARIN SODIUM 5 MG: 5 TABLET ORAL at 20:30

## 2020-10-02 RX ADMIN — Medication 200 MG: at 10:46

## 2020-10-02 RX ADMIN — ROCURONIUM BROMIDE 15 MG: 10 INJECTION, SOLUTION INTRAVENOUS at 12:19

## 2020-10-02 RX ADMIN — HEPARIN SODIUM 14000 UNITS: 1000 INJECTION INTRAVENOUS; SUBCUTANEOUS at 11:24

## 2020-10-02 ASSESSMENT — PULMONARY FUNCTION TESTS
PIF_VALUE: 17
PIF_VALUE: 24
PIF_VALUE: 3
PIF_VALUE: 24
PIF_VALUE: 23
PIF_VALUE: 24
PIF_VALUE: 23
PIF_VALUE: 21
PIF_VALUE: 22
PIF_VALUE: 20
PIF_VALUE: 3
PIF_VALUE: 24
PIF_VALUE: 23
PIF_VALUE: 1
PIF_VALUE: 24
PIF_VALUE: 24
PIF_VALUE: 23
PIF_VALUE: 1
PIF_VALUE: 24
PIF_VALUE: 23
PIF_VALUE: 23
PIF_VALUE: 22
PIF_VALUE: 19
PIF_VALUE: 23
PIF_VALUE: 23
PIF_VALUE: 1
PIF_VALUE: 20
PIF_VALUE: 24
PIF_VALUE: 24
PIF_VALUE: 20
PIF_VALUE: 19
PIF_VALUE: 24
PIF_VALUE: 19
PIF_VALUE: 19
PIF_VALUE: 24
PIF_VALUE: 22
PIF_VALUE: 22
PIF_VALUE: 23
PIF_VALUE: 19
PIF_VALUE: 1
PIF_VALUE: 23
PIF_VALUE: 24
PIF_VALUE: 24
PIF_VALUE: 22
PIF_VALUE: 21
PIF_VALUE: 23
PIF_VALUE: 23
PIF_VALUE: 24
PIF_VALUE: 25
PIF_VALUE: 3
PIF_VALUE: 17
PIF_VALUE: 24
PIF_VALUE: 24
PIF_VALUE: 23
PIF_VALUE: 20
PIF_VALUE: 23
PIF_VALUE: 19
PIF_VALUE: 23
PIF_VALUE: 24
PIF_VALUE: 23
PIF_VALUE: 23
PIF_VALUE: 22
PIF_VALUE: 17
PIF_VALUE: 1
PIF_VALUE: 20
PIF_VALUE: 23
PIF_VALUE: 24
PIF_VALUE: 23
PIF_VALUE: 23
PIF_VALUE: 24
PIF_VALUE: 26
PIF_VALUE: 23
PIF_VALUE: 24
PIF_VALUE: 20
PIF_VALUE: 23
PIF_VALUE: 24
PIF_VALUE: 23
PIF_VALUE: 17
PIF_VALUE: 20
PIF_VALUE: 19
PIF_VALUE: 23
PIF_VALUE: 16
PIF_VALUE: 1
PIF_VALUE: 1
PIF_VALUE: 2
PIF_VALUE: 25
PIF_VALUE: 1
PIF_VALUE: 23
PIF_VALUE: 19
PIF_VALUE: 20
PIF_VALUE: 23
PIF_VALUE: 23
PIF_VALUE: 22
PIF_VALUE: 23
PIF_VALUE: 23
PIF_VALUE: 26
PIF_VALUE: 23
PIF_VALUE: 24
PIF_VALUE: 23
PIF_VALUE: 24
PIF_VALUE: 23
PIF_VALUE: 23
PIF_VALUE: 24
PIF_VALUE: 16
PIF_VALUE: 24
PIF_VALUE: 24
PIF_VALUE: 2
PIF_VALUE: 23
PIF_VALUE: 2
PIF_VALUE: 22
PIF_VALUE: 22
PIF_VALUE: 23
PIF_VALUE: 24
PIF_VALUE: 22
PIF_VALUE: 24
PIF_VALUE: 23
PIF_VALUE: 24
PIF_VALUE: 22
PIF_VALUE: 1
PIF_VALUE: 23
PIF_VALUE: 24
PIF_VALUE: 24
PIF_VALUE: 19
PIF_VALUE: 23
PIF_VALUE: 24
PIF_VALUE: 23
PIF_VALUE: 22
PIF_VALUE: 1
PIF_VALUE: 19
PIF_VALUE: 23
PIF_VALUE: 23
PIF_VALUE: 7
PIF_VALUE: 23
PIF_VALUE: 1
PIF_VALUE: 23
PIF_VALUE: 23
PIF_VALUE: 26
PIF_VALUE: 9
PIF_VALUE: 21
PIF_VALUE: 23
PIF_VALUE: 17
PIF_VALUE: 22
PIF_VALUE: 19
PIF_VALUE: 24
PIF_VALUE: 23
PIF_VALUE: 18
PIF_VALUE: 20
PIF_VALUE: 24
PIF_VALUE: 23
PIF_VALUE: 22
PIF_VALUE: 25
PIF_VALUE: 24
PIF_VALUE: 19
PIF_VALUE: 19
PIF_VALUE: 24
PIF_VALUE: 2
PIF_VALUE: 23
PIF_VALUE: 24
PIF_VALUE: 24

## 2020-10-02 ASSESSMENT — PAIN SCALES - GENERAL
PAINLEVEL_OUTOF10: 2
PAINLEVEL_OUTOF10: 6
PAINLEVEL_OUTOF10: 1
PAINLEVEL_OUTOF10: 6
PAINLEVEL_OUTOF10: 0

## 2020-10-02 ASSESSMENT — PAIN DESCRIPTION - LOCATION: LOCATION: HEAD

## 2020-10-02 ASSESSMENT — PAIN DESCRIPTION - DESCRIPTORS: DESCRIPTORS: ACHING

## 2020-10-02 ASSESSMENT — PAIN DESCRIPTION - PAIN TYPE
TYPE: ACUTE PAIN
TYPE: ACUTE PAIN

## 2020-10-02 ASSESSMENT — PAIN DESCRIPTION - ORIENTATION: ORIENTATION: MID

## 2020-10-02 ASSESSMENT — PAIN DESCRIPTION - FREQUENCY: FREQUENCY: INTERMITTENT

## 2020-10-02 NOTE — DISCHARGE SUMMARY
soft, no hematoma/swelling/oozing noted. Significant Diagnostic Studies:     Labs  CBC:   Lab Results   Component Value Date    WBC 6.5 10/02/2020    HGB 13.7 10/02/2020    HCT 40.0 10/02/2020    MCV 94.3 10/02/2020     10/02/2020     BMP:   Lab Results   Component Value Date     10/02/2020    K 3.6 10/02/2020    K 3.3 10/10/2018     10/02/2020    CO2 26 10/02/2020    PHOS 2.8 05/21/2019    BUN 17 10/02/2020    CREATININE 0.8 10/02/2020    GFRAA >60 10/02/2020    GFRAA >60 01/04/2013    MG 1.70 10/02/2020      Estimated Creatinine Clearance: 80 mL/min (based on SCr of 0.8 mg/dL). Thyroid:   Lab Results   Component Value Date    TSH 3.53 09/26/2011     Lipids:  Lab Results   Component Value Date    CHOL 150 10/02/2020    HDL 61 10/02/2020    HDL 58 02/01/2012    TRIG 80 10/02/2020     LFTS:   Lab Results   Component Value Date    ALT 26 10/02/2020    AST 32 10/02/2020    ALKPHOS 78 10/02/2020    PROT 7.2 10/02/2020    PROT 7.6 01/04/2013    AGRATIO 1.5 10/02/2020    BILITOT 0.7 10/02/2020     Cardiac Enzymes:   Lab Results   Component Value Date    CKTOTAL 67 12/28/2011    CKMB 1.14 12/28/2011    TROPONINI <0.01 12/28/2011     Coags:   Lab Results   Component Value Date    PROTIME 24.9 10/03/2020    PROTIME 33 09/21/2018    INR 2.13 10/03/2020       Disposition: home    Home Medications:    Chaneta Mess \"Libia\"   Home Medication Instructions [facility]:[id number]    Printed on:10/03/20 3302   Medication Information                      acetaminophen (TYLENOL) 500 MG tablet  Take 500 mg by mouth every 6 hours as needed for Pain             allopurinol (ZYLOPRIM) 300 MG tablet  TAKE 1 TABLET BY MOUTH  DAILY             Ascorbic Acid (VITAMIN C PO)  1 tablet daily             atorvastatin (LIPITOR) 80 MG tablet  Take 1 tablet by mouth daily             b complex vitamins capsule  Take 1 capsule by mouth daily             citalopram (CELEXA) 40 MG tablet  Take 1 tablet by mouth daily clonazePAM (KLONOPIN) 1 MG tablet  TAKE 1 TABLET BY MOUTH IN  THE EVENING             Cranberry 500 MG CAPS  Take 1,000 mg by mouth nightly              diphenhydrAMINE-APAP, sleep, (TYLENOL PM EXTRA STRENGTH)  MG tablet  Take 1 tablet by mouth nightly as needed for Sleep             fluticasone-salmeterol (ADVAIR) 250-50 MCG/DOSE AEPB  INHALE ONE PUFF BY MOUTH EVERY 12 HOURS             gabapentin (NEURONTIN) 300 MG capsule  TAKE 2 CAPSULES BY MOUTH 3  TIMES DAILY             indapamide (LOZOL) 1.25 MG tablet  TAKE 1 TABLET BY MOUTH  EVERY MORNING             montelukast (SINGULAIR) 10 MG tablet  TAKE 1 TABLET BY MOUTH  NIGHTLY             pantoprazole (PROTONIX) 40 MG tablet  TAKE 1 TABLET BY MOUTH  EVERY MORNING BEFORE  BREAKFAST             potassium chloride (KLOR-CON M) 20 MEQ extended release tablet  TAKE 1 TABLET BY MOUTH  DAILY WITH BREAKFAST             PROAIR  (90 Base) MCG/ACT inhaler  INHALE TWO PUFFS BY MOUTH EVERY 4 HOURS AS NEEDED FOR SHORTNESS OF BREATH             propafenone (RYTHMOL) 225 MG tablet  Take 1 tablet by mouth every 8 hours             therapeutic multivitamin-minerals (THERAGRAN-M) tablet  Take 1 tablet by mouth nightly              trimethoprim (TRIMPEX) 100 MG tablet  TAKE 1 TABLET BY MOUTH  EVERY 48 HOURS             verapamil (CALAN SR) 240 MG extended release tablet  Take 0.5 tablets by mouth nightly             warfarin (COUMADIN) 5 MG tablet  TAKE 1 TABLETS BY MOUTH ON  MONDAY AND  FRIDAY ONLY AND TAKE 1.5 TABLETS BY MOUTH  ALL OTHER DAYS                 Problem List:  Patient Active Problem List   Diagnosis    Essential hypertension    History of pulmonary embolism    Osteoarthritis of knee    PAULA (obstructive sleep apnea)    Asthma    Atrial fibrillation (HCC)    Status post gastric banding    Coronary artery disease    Morbid obesity with BMI of 40.0-44.9, adult ([facility] [address]    Pulmonary embolism without acute cor pulmonale (HCC)    Hyperlipidemia, unspecified    Arthritis    Anxiety    Chronic cough    PAF (paroxysmal atrial fibrillation) ([facility])        Assessment & Plan:    1. Paroxysmal Atrial Fibrillation  - S/p RFCA with PVI  - Right groin stable, no hematoma/swelling/oozing  - Will give one time dose of IV lasix  - Continue PPI s/p ablation (On daily at home)  - Educated on post ablation discharge instructions/restrictions, pt VU    - Currently in NSR  - Continue propafenone 225 mg TID, verapamil 120 mg QD    - [id number] score high ; [id number] Vasc score and anticoagulation discussed. High risk for stroke and thromboembolism. Anticoagulation is recommended. Risk of bleeding was discussed.  ~ On Coumadin; INRs monitored by [facility] anti-coagulation clinic    - Afib risk factors including age, HTN, obesity, inactivity and PAULA were discussed with patient. Risk factor modification recommended   ~ TSH 2.44 (10/20)       - Treatment options including cardioversion, rate control strategy, antiarrhythmics, anticoagulation and possible ablation were discussed with patient. Risks, benefits and alternative of each treatment options were explained. All questions answered    2. HTN  - Controlled: Goal <130/80  - Continue current medications    3. PAULA  - Stable: Uses CPAP  - Non-compliant with CPAP therapy. Encourage to use CPAP to prevent long term effects of untreated PAULA    4. Hx of DVT/PE   - S/p [place] filter   - On coumadin    5. Hyperlipidemia  - Controlled. Goal: LDL <100   ~ LDL 73 (10/20)  - On atorvastatin 80 mg QD  - Discussed diet, weight loss, and exercise needs  - Followed per PCP    6. Obesity  - Body mass index is 46.32 kg/m². - Complicating assessment and treatment.  Placing patient at risk for multiple co-morbidities as well as early death and contributing to the patient's presentation  - Discussed weight loss and patient was encouraged to reduce calorie intake and increase exercise    Overall the patient is stable for discharge from a CV standpoint    Diet & Exercise:   The patient is counseled to follow a low salt diet to assure blood pressure remains controlled for cardiovascular risk factor modification   The patient is counseled to avoid excess caffeine, and energy drinks as this may exacerbated ectopy and arrhythmia   The patient is counseled to lose weight to control cardiovascular risk factors   Exercise program discussed: To improve overall cardiovascular health, the patient is instructed to increase cardiovascular related activities with a goal of 150 min/week of moderate level activity or 10,000 steps per day.  Encouraged to perform as much activity as tolerated    EF of 69%  ACEi for systolic HF: N/A  ASA and Statin for CAD: N/A  Anticoagulation for AF and heart failure: Yes  Tobacco use was discussed with the patient and education provided: N/A  Documentation sent to PCP: Note sent to PCP office    Activity: See discharge instructions  Diet: cardiac diet  Wound Care: See discharge instructions    F/U:   1. Follow-up with EP NP in 6 weeks  -Call Rylan at [phone] with any questions    Signed:  Germán Hood CNP  10/3/2020  7:16 AM    Total time spent on day of discharge including face-to-face visit, examination, documentation, counseling, preparation of discharge plans and follow-up, and discharge medicine reconciliation and prescriptions is >31 minutes

## 2020-10-02 NOTE — PLAN OF CARE
Problem: Falls - Risk of:  Goal: Will remain free from falls  Outcome: Ongoing  Note: Fall Risk = medium; Up as tolerated; Patient has steady gait; Ambulation Equipment: None; Call-light within reach and patient uses call-light for assistance; Bed alarm: No; Non-skid socks worn when out of bed; Side rails up 2/4; Educated patient on fall risk status and need to call for assistance;  Patient verbalizes understanding of fall risk status and fall education; Pt experienced no falls/injuries this shift      Problem: Bleeding:  Goal: Will show no signs and symptoms of excessive bleeding  Note: Pt showing no S/S of active bleeding      Problem: Infection - Surgical Site:  Goal: Will show no infection signs and symptoms  Note: Pt showing no S/S of infection; afebrile this shift

## 2020-10-02 NOTE — H&P
[facility]   Electrophysiology         Chief Complaint   Patient presents with    6 Month Follow-Up    Atrial Fibrillation        HPI: Jackelyn Cisse is a 77 y.o. female with a history of paroxysmal atrial fibrillation, hypertension, CAD, PE/DVT, and hyperlipidemia. She was originally seen by EP while in the hospital in December of 2011 after she was found to have atrial fibrillation post- lap band surgery. She is on Coumadin for her history of pulmonary emboli ([place] [facility] which was not associated with any reversible cause. She also has a Gavin filter. She had a fall on 11/15/19 when at a restaurant as she was walking into the door. She turned her head to the left, straightened her head and then went down. She felt lightheaded. She tried to break the fall with her hands. She had a radial neck fracture and forehead hematoma. Since her fall she has a hard time getting her words out.     S/p coronary angiogram 5/1/18 which was negative for myocardial ischemia. S/p 12/14/18 unsuccessful cardioversion     Kalpana Bernabe presents to the office in follow up. She states she jumps between afib and bradycardia. She monitors her pulse at home with the [address]. Her ecg today shows atrial fibrillation.      Past Medical History:   Diagnosis Date    Allergic     Anxiety 2/2/2017    Arthritis     Asthma     Back pain     Blood circulation, collateral     legs    Depression     GERD (gastroesophageal reflux disease)     Gout     Hypercholesteremia     Hypertension     Movement disorder     7 discs ruptured    Movement disorder     knee replacements    Obesity     Pulmonary emboli (HCC)     Unspecified sleep apnea     uses cpap    Urinary tract infection, chronic     UTI (urinary tract infection)         Past Surgical History:   Procedure Laterality Date    ANKLE FUSION  2010    right, [facility]    BACK SURGERY  09/14/2012    lumbar fusion L-4, L-5 atrificial disc  [facility] Dr. Tano Mccarthy BONE INCISION AND DRAINAGE  5-7-11    incision, drainage and debridement of left knee and application of wound vac.  BREAST BIOPSY  08/2018    neg    EYE SURGERY      JOINT REPLACEMENT      bilat knees    JOINT REPLACEMENT  2003    left, [facility]    JOINT REPLACEMENT  [facility]    [facility]    OTHER SURGICAL HISTORY  12-    laparscopic adjustable gastric restrictive procedure    REVISION TOTAL KNEE ARTHROPLASTY  2006    Nataly SCHNEIDER    SKIN FULL GRAFT  2011    right, [address]    [facility]    UPPER GASTROINTESTINAL ENDOSCOPY  10-    VENA CAVA FILTER PLACEMENT         Allergies: Allergies   Allergen Reactions    Belsomra [Suvorexant] Other (See Comments)     Nightmares      Avelox [Moxifloxacin Hcl In Nacl] Rash    Levofloxacin Rash    Sulfa Antibiotics Rash       Social History:   reports that she quit smoking about 55 years ago. Her smoking use included cigarettes. She has a 2.00 pack-year smoking history. She has never used smokeless tobacco. She reports previous alcohol use. She reports that she does not use drugs. Family History:  family history includes Arthritis in her father; Asthma in her father; Breast Cancer in her sister; Depression in her mother; Heart Disease in her father; High Blood Pressure in her father and mother; Stroke in her father. Reviewed. Denies family history of sudden cardiac death, arrhythmia, premature CAD    Review of System:  All other systems reviewed and are negative except for that noted above.  Pertinent negatives are:   General: negative for fever, chills   Ophthalmic ROS: negative for - eye pain or loss of vision  ENT ROS: negative for - headaches, sore throat   Respiratory: negative for - cough, sputum  Cardiovascular: Reviewed in HPI  Gastrointestinal: negative for - abdominal pain, diarrhea, N/V  Hematology: negative for - rest consistent with     ischemia.            Medication:  Current Outpatient Medications   Medication Sig Dispense Refill    propafenone (RYTHMOL) 150 MG tablet Take one half tablet (75mg) in addition to 225 mg Tablet every 8 hours 45 tablet 2    verapamil (CALAN SR) 240 MG extended release tablet Take 0.5 tablets by mouth nightly 90 tablet 3    trimethoprim (TRIMPEX) 100 MG tablet TAKE 1 TABLET BY MOUTH  EVERY 48 HOURS 45 tablet 3    gabapentin (NEURONTIN) 300 MG capsule TAKE 2 CAPSULES BY MOUTH 3  TIMES DAILY 540 capsule 3    clonazePAM (KLONOPIN) 1 MG tablet TAKE 1 TABLET BY MOUTH IN  THE EVENING 90 tablet 0    fluticasone-salmeterol (ADVAIR) 250-50 MCG/DOSE AEPB INHALE ONE PUFF BY MOUTH EVERY 12 HOURS 1 Inhaler 5    warfarin (COUMADIN) 5 MG tablet TAKE 1 TABLETS BY MOUTH ON  MONDAY AND  FRIDAY ONLY AND TAKE 1.5 TABLETS BY MOUTH  ALL OTHER DAYS 143 tablet 1    indapamide (LOZOL) 1.25 MG tablet TAKE 1 TABLET BY MOUTH  EVERY MORNING 90 tablet 3    potassium chloride (KLOR-CON M) 20 MEQ extended release tablet TAKE 1 TABLET BY MOUTH  DAILY WITH BREAKFAST 90 tablet 1    montelukast (SINGULAIR) 10 MG tablet TAKE 1 TABLET BY MOUTH  NIGHTLY 90 tablet 3    pantoprazole (PROTONIX) 40 MG tablet TAKE 1 TABLET BY MOUTH  EVERY MORNING BEFORE  BREAKFAST 90 tablet 3    acetaminophen (TYLENOL) 500 MG tablet Take 500 mg by mouth every 6 hours as needed for Pain      diphenhydrAMINE-APAP, sleep, (TYLENOL PM EXTRA STRENGTH)  MG tablet Take 1 tablet by mouth nightly as needed for Sleep      PROAIR  (90 Base) MCG/ACT inhaler INHALE TWO PUFFS BY MOUTH EVERY 4 HOURS AS NEEDED FOR SHORTNESS OF BREATH 8.5 g 2    b complex vitamins capsule Take 1 capsule by mouth daily      citalopram (CELEXA) 40 MG tablet Take 1 tablet by mouth daily 90 tablet 3    atorvastatin (LIPITOR) 80 MG tablet Take 1 tablet by mouth daily 90 tablet 3    propafenone (RYTHMOL) 225 MG tablet Take 1 tablet by mouth every 8 hours 270 tablet 3    allopurinol (ZYLOPRIM) 300 MG tablet TAKE 1 TABLET BY MOUTH  DAILY 90 tablet 3    Ascorbic Acid (VITAMIN C PO) 1 tablet daily      therapeutic multivitamin-minerals (THERAGRAN-M) tablet Take 1 tablet by mouth nightly       Cranberry 500 MG CAPS Take 1,000 mg by mouth nightly        No current facility-administered medications for this visit. Facility-Administered Medications Ordered in Other Visits   Medication Dose Route Frequency Provider Last Rate Last Dose    magnesium hydroxide (MILK OF MAGNESIA) 400 MG/5ML suspension 30 mL  30 mL Oral Daily PRN Taylor Jones MD        ondansetron [facility]) injection 4 mg  4 mg Intravenous Q6H PRN Taylor Jones MD        acetaminophen (TYLENOL) tablet 650 mg  650 mg Oral Q4H PRN Taylor Jones MD           Assessment and plan:     PAF  -ECG today shows afib  -She brought her [facility] mobile ECG readings and she goes between afib and bradycardia. -[id number] Vasc score and anticoagulation discussed. High risk for stroke and thromboembolism. Anticoagulation is recommended. I discussed anticoagulation to decrease the risk of thromboembolic events including stroke. Benefits and alternatives were discussed with patient. Risk of bleeding was discussed. Patient verbalized understanding.   -On coumadin both for PE hx and Afib  -Verapamil 240 mg daily  -Propafenone 225 mg TID  -We discussed treatment options including antiarrhythmics, rate control with anticoagulation, and ablation.   -We discussed progressive nature of atrial fibrillation. Treatment success decreases when AF becomes persistent and last more than 6 months.   -Atrial fibrillation ablation procedure was discussed. We discussed the need for repeat procedure.  On average patients may need more than one ablation procedure.    -Risks associated with ablation include but not limited to allergic reaction to the medications, pain, bleeding, infection, nerve injury, injury to

## 2020-10-02 NOTE — ANESTHESIA PRE PROCEDURE
Department of Anesthesiology  Preprocedure Note       [facility]   Age:  77 y.o.  :  1945                                          MRN:  [id number]         Date:  10/2/2020      Surgeon: * No surgeons listed *    Procedure: * No procedures listed *    Medications prior to admission:   Prior to Admission medications    Medication Sig Start Date End Date Taking?  Authorizing Provider   propafenone (RYTHMOL) 225 MG tablet Take 1 tablet by mouth every 8 hours 20   AIME Quinonez CNP   verapamil (CALAN SR) 240 MG extended release tablet Take 0.5 tablets by mouth nightly 20   AIME Quinonez CNP   trimethoprim (TRIMPEX) 100 MG tablet TAKE 1 TABLET BY MOUTH  EVERY 48 HOURS 20   Chestine Apley, MD   gabapentin (NEURONTIN) 300 MG capsule TAKE 2 CAPSULES BY MOUTH 3  TIMES DAILY 20  Chestine Apley, MD   clonazePAM (KLONOPIN) 1 MG tablet TAKE 1 TABLET BY MOUTH IN  THE EVENING 20  Chestine Apley, MD   fluticasone-salmeterol (ADVAIR) 250-50 MCG/DOSE AEPB INHALE ONE PUFF BY MOUTH EVERY 12 HOURS 20   Chestine Apley, MD   warfarin (COUMADIN) 5 MG tablet TAKE 1 TABLETS BY MOUTH ON  MONDAY AND  FRIDAY ONLY AND TAKE 1.5 TABLETS BY MOUTH  ALL OTHER DAYS 6/3/20   AIME Lozada CNP   indapamide (LOZOL) 1.25 MG tablet TAKE 1 TABLET BY MOUTH  EVERY MORNING 20   Chestine Apley, MD   potassium chloride (KLOR-CON M) 20 MEQ extended release tablet TAKE 1 TABLET BY MOUTH  DAILY WITH BREAKFAST 20   Trever Avila MD   montelukast (SINGULAIR) 10 MG tablet TAKE 1 TABLET BY MOUTH  NIGHTLY 20   Chestine Apley, MD   pantoprazole (PROTONIX) 40 MG tablet TAKE 1 TABLET BY MOUTH  EVERY MORNING BEFORE  BREAKFAST 20   Chestine Apley, MD   acetaminophen (TYLENOL) 500 MG tablet Take 500 mg by mouth every 6 hours as needed for Pain    Historical Provider, MD   diphenhydrAMINE-APAP, sleep, (TYLENOL PM EXTRA STRENGTH)  MG tablet Take 1 tablet by mouth nightly as needed for Sleep    Historical Provider, MD   PROAIR  (90 Base) MCG/ACT inhaler INHALE TWO PUFFS BY MOUTH EVERY 4 HOURS AS NEEDED FOR SHORTNESS OF BREATH 2/10/20   Ct Mckeon MD   b complex vitamins capsule Take 1 capsule by mouth daily    Historical Provider, MD   citalopram (CELEXA) 40 MG tablet Take 1 tablet by mouth daily 12/18/19   AIME Ruvalcaba CNP   atorvastatin (LIPITOR) 80 MG tablet Take 1 tablet by mouth daily 12/18/19   AIME Ruvalcaba CNP   allopurinol (ZYLOPRIM) 300 MG tablet TAKE 1 TABLET BY MOUTH  DAILY 10/28/19   Ct Mckeon MD   Ascorbic Acid (VITAMIN C PO) 1 tablet daily    Historical Provider, MD   therapeutic multivitamin-minerals (THERAGRAN-M) tablet Take 1 tablet by mouth nightly     Historical Provider, MD   Cranberry 500 MG CAPS Take 1,000 mg by mouth nightly     Historical Provider, MD       Current medications:    Current Facility-Administered Medications   Medication Dose Route Frequency Provider Last Rate Last Dose    HYDROmorphone (DILAUDID) injection 0.25 mg  0.25 mg Intravenous Q5 Min PRN Yuli Cary MD        fentaNYL (SUBLIMAZE) injection 50 mcg  50 mcg Intravenous Q5 Min PRN Yuli Cary MD        HYDROmorphone (DILAUDID) injection 0.25 mg  0.25 mg Intravenous Q5 Min PRBECKY Cary MD        HYDROmorphone (DILAUDID) injection 0.5 mg  0.5 mg Intravenous Q5 Min PRBECKY Cary MD        oxyCODONE (ROXICODONE) immediate release tablet 5 mg  5 mg Oral PRN Yuli Cary MD        Or    oxyCODONE (ROXICODONE) immediate release tablet 10 mg  10 mg Oral PRBECKY Cary MD        diphenhydrAMINE (BENADRYL) injection 12.5 mg  12.5 mg Intravenous Once PRN Yuli Cary MD        promethazine (PHENERGAN) injection 6.25 mg  6.25 mg Intravenous PRN Yuli Cary MD        labetalol (NORMODYNE;TRANDATE) injection 5 mg  5 mg Intravenous Q10 Min PRN Nick Martinez MD        meperidine (DEMEROL) injection 12.5 mg  12.5 mg Intravenous Q5 Min PRN Nick Martinez MD         Facility-Administered Medications Ordered in Other Encounters   Medication Dose Route Frequency Provider Last Rate Last Dose    magnesium hydroxide (MILK OF MAGNESIA) 400 MG/5ML suspension 30 mL  30 mL Oral Daily PRN Martin Echols MD        ondansetron [facility]) injection 4 mg  4 mg Intravenous Q6H PRN Martin Echols MD        acetaminophen (TYLENOL) tablet 650 mg  650 mg Oral Q4H PRN Martin Echols MD           Allergies:     Allergies   Allergen Reactions    Belsomra [Suvorexant] Other (See Comments)     Nightmares      Avelox [Moxifloxacin Hcl In Nacl] Rash    Levofloxacin Rash    Sulfa Antibiotics Rash       Problem List:    Patient Active Problem List   Diagnosis Code    Essential hypertension I10    History of pulmonary embolism Z86.711    Osteoarthritis of knee M17.10    PAULA (obstructive sleep apnea) G47.33    Asthma J45.909    Atrial fibrillation (HCC) I48.91    Status post gastric banding Z98.84    Coronary artery disease I25.10    Morbid obesity with BMI of 40.0-44.9, adult ([facility] [address] E66.01, Z68.41    Pulmonary embolism without acute cor pulmonale (HCC) I26.99    Hyperlipidemia, unspecified E78.5    Arthritis M19.90    Anxiety F41.9    Chronic cough R05       Past Medical History:        Diagnosis Date    Allergic     Anxiety 2/2/2017    Arthritis     Asthma     Back pain     Blood circulation, collateral     legs    Depression     GERD (gastroesophageal reflux disease)     Gout     Hypercholesteremia     Hypertension     Movement disorder     7 discs ruptured    Movement disorder     knee replacements    Obesity     Pulmonary emboli (HCC)     Unspecified sleep apnea     uses cpap    Urinary tract infection, chronic     UTI (urinary tract infection)        Past Surgical History:        Procedure Laterality Date  [facility]ION  2010    [facility], [facility] [address] [facility]     BACK SURGERY  2012    lumbar fusion L-4, L-5 atrificial disc  [facility] Dr. Fadi Zepeda  11    incision, drainage and debridement of left knee and application of wound vac.  BREAST BIOPSY  2018    neg    EYE SURGERY      JOINT REPLACEMENT      bilat knees    JOINT REPLACEMENT  2003    left, [facility]    JOINT REPLACEMENT      right, [facility] [address] [facility]     OTHER SURGICAL HISTORY  2011    laparscopic adjustable gastric restrictive procedure    REVISION TOTAL KNEE [facility]THROPLASTY      [facility]    SKIN FULL GRAFT  2011    right, [facility] [address]    [facility]    UPPER GASTROINTESTINAL ENDOSCOPY  10-    VENA CAVA FILTER PLACEMENT         Social History:    Social History     Tobacco Use    Smoking status: Former Smoker     Packs/day: 0.50     Years: 4.00     Pack years: 2.00     Types: Cigarettes     Last attempt to quit: 1965     Years since quittin.7    Smokeless tobacco: Never Used   Substance Use Topics    Alcohol use: Not Currently     Comment: 4 t imes a year                                Counseling given: Not Answered      Vital Signs (Current): There were no vitals filed for this visit.                                            BP Readings from Last 3 Encounters:   20 (!) 153/96   20 (!) 164/83   20 136/76       NPO Status:                                                                                 BMI:   Wt Readings from Last 3 Encounters:   20 266 lb (120.7 kg)   20 259 lb 0.7 oz (117.5 kg)   20 259 lb 0.7 oz (117.5 kg)     There is no height or weight on file to calculate BMI.    CBC:   Lab Results   Component Value Date    WBC 6.4 2019    RBC 4.29 2019    HGB 13.7 2019    HCT 40.9 2019    MCV 95.3 2019    RDW 14.9 2019     2019       CMP: Lab Results   Component Value Date     05/21/2019    K 3.8 05/21/2019    K 3.3 10/10/2018    CL 99 05/21/2019    CO2 25 05/21/2019    BUN 17 11/29/2019    CREATININE 0.8 11/29/2019    GFRAA >60 11/29/2019    GFRAA >60 01/04/2013    AGRATIO 1.9 10/29/2018    LABGLOM >60 11/29/2019    GLUCOSE 87 05/21/2019    PROT 6.6 10/29/2018    PROT 7.6 01/04/2013    CALCIUM 9.7 03/09/2020    BILITOT 0.6 10/29/2018    ALKPHOS 70 10/29/2018    AST 22 10/29/2018    ALT 26 10/29/2018       POC Tests: No results for input(s): POCGLU, POCNA, POCK, POCCL, POCBUN, POCHEMO, POCHCT in the last 72 hours. Coags:   Lab Results   Component Value Date    PROTIME 27.5 05/04/2020    PROTIME 33 09/21/2018    INR 3.2 09/14/2020    INR 2.5 05/04/2020    APTT 27.4 12/28/2011       HCG (If Applicable): No results found for: PREGTESTUR, PREGSERUM, HCG, HCGQUANT     ABGs: No results found for: PHART, PO2ART, FZC1GKR, FYH1XHN, BEART, S8XQBCVA     Type & Screen (If Applicable):  Lab Results   Component Value Date    LABABO A 05/06/2011    [address] Negative 05/06/2011       Drug/Infectious Status (If Applicable):  No results found for: HIV, HEPCAB    COVID-19 Screening (If Applicable):   Lab Results   Component Value Date    COVID19 NOT DETECTED 09/28/2020         Anesthesia Evaluation    Airway: Mallampati: II  TM distance: >3 FB   Neck ROM: full  Mouth opening: > = 3 FB Dental:          Pulmonary:   (+) sleep apnea:  asthma:                            Cardiovascular:    (+) hypertension:, CAD:, dysrhythmias:,         Rhythm: regular  Rate: normal                    Neuro/Psych:   (+) psychiatric history:            GI/Hepatic/Renal:   (+) GERD:,           Endo/Other:                     Abdominal:           Vascular:                                        Anesthesia Plan      general     ASA 3       Induction: intravenous. Anesthetic plan and risks discussed with patient. Plan discussed with CRNA.                   Cisco Nazario MD

## 2020-10-02 NOTE — PROCEDURES
catheter    Both groins were prepped in a sterile fashion. We gained access in Right femoral vein. A 9-French sheath for ICE and 6F sheath for CS catheter and an Sr0 sheath for ablation and mapping catheters were  placed in the right femoral vein using modified seldinger technique. Ultrasound was used for femoral venous access. We gained access   modified Seldinger technique and ultrasound guidance. The femoral vein was identified with real time visualization of needle passage to the venous lumen. Using 3-D mapping system Carto the CS cathter was placed inside the coronary sinus  for recording and mapping of the left atrium. Using ICE we delineated the left pulmonary vein, left atrial appendage,  mitral valve, and right superior and right inferior pulmonary veins. Patient received a bolus of heparin prior transseptal puncture followed by continuous monitoring of the ACT and boluses of heparin during the procedure to keep the ACT more than 350. Also an esophageal temperature probe in addition to Esophastar catheter was advanced into the esophagus for mapping of the esophagus and careful monitoring of the esophageal temperature during ablation. A transseptal puncture through intact septum was done using ICE and  pressure monitoring . We placed a SR0 Sheaths inside the left atrium. Then a Circular catheter with deflectable curve and an irrigated ablation catheter was advanced into the left atrium sequentially and alternatively as needed. Using penta ray  cathter and  Carto navigation system a three dimensional electro anatomical mapping of the left atrium, in addition to right and left sided pulmonary vein was created. Using Penta ray catheter voltage mapping of the left atrium was created. Also an esophageal temperature probe Circa was advanced into the esophagus for mapping of the esophagus and careful monitoring of the esophageal temperature during ablation.   The ICE was used to monitor location of temperature probe and move it close to ablation area. We stopped ablation when  temperature  increased 1 degree. Then wide area circumferential ablation for right and left sided pulmonary veins were performed. Linear RF lesions was placed around both superior and inferior pulmonary vein in right and left side well outside the pulmonary vein ostium till all four pulmonary veins were isolated. The power was limited to 28  W on the posterior wall and careful monitoring of esophageal temperature was done to prevent injury to esophagus and lesions near esophagus were given only for 10 seconds. Elsewhere power was 36 W as needed. (posterior)-500(anterior)      Both entrance and exit block was confirmed using Pentaarray catheter and pacing maneuvers after 30 minutes waiting time. Following confirmation of pulmonary veins isolation by circular catheter, isuprel IV was started and increased to 10 mcg to check for pulmonary veins reconnection and induction of any arrhythmia. No sustained arrhythmia was induced      Pacing from LV apex, 1:1 retrograde conduction over AV node (VA wenckebach) was 740 msec  Pacing from LV apex, showed VERP of 500/280 msec. After ablation we removed the catheters and sheaths from left atrium and performed EP study and programmed stimulation using our CS catheter and ablation cathter to assess for other arrhythmias. The deca polar/ablation catheter were moved from CS to right atrium, RV apex, and His bundle position.  His bundle potentials was recorded and pacing was performed from right atrium, coronary sinus and RV apex with the following results:     AH interval was 97 msec  HV interval was 40 msec  Pacing from atrium, using CS catheter which was moved, showed 1:1 conduction over AV node with (AV wenckebach) was 460 msec  Pacing from atrium, AV christian ERP was 600/390 msec , /420     At the end of the procedure, using ICE we confirmed the lack of any pericardial effusion. Figure of 8 suture was used and sheaths were removed using manual compression. Protamine 40 mg     The patient tolerated the procedure well and there were no complications. Patient was extubated and transferred to the floor in stable condition. Blood loss <98 cc  No complication  Conclusion:     - Pulmonary vein isolations using wide area circumferential radiofrequency ablation   ·        Plan:   The patient will be admitted overnight to CVU. He will receive usual post ablation care.      Emperatriz Fields MD,   [place] [address]   Office: [phone]

## 2020-10-02 NOTE — PROGRESS NOTES
Pt awake and oriented, vss, NSR on tele, R femoral site CD&I- soft around site, pt remains flat per orders, neurovascular status intact, ok to transfer to CVU, belongings with family

## 2020-10-02 NOTE — ANESTHESIA POSTPROCEDURE EVALUATION
Department of Anesthesiology  Postprocedure Note    Patient: Sherwin Thurman  MRN: [id number]  YOB: 1945  Date of evaluation: 10/2/2020  Time:  1:53 PM     Procedure Summary     Date:  10/02/20 Room / Location:  [facility] Cath Lab; [facility] E[facility]    Anesthesia Start:  1041 Anesthesia Stop:  4665    Procedure:  ECHO MAXWELL IN CARDIAC CATH Diagnosis:       Paroxysmal atrial fibrillation      PAF (paroxysmal atrial fibrillation) ([facility] [address]    Scheduled Providers:   Responsible Provider:  Marta Krause MD    Anesthesia Type:  general ASA Status:  3          Anesthesia Type: general    Jac Phase I:      Jac Phase II:      Last vitals: Reviewed and per EMR flowsheets.        Anesthesia Post Evaluation    Level of consciousness: awake  Complications: no

## 2020-10-03 VITALS
RESPIRATION RATE: 14 BRPM | HEART RATE: 68 BPM | SYSTOLIC BLOOD PRESSURE: 116 MMHG | BODY MASS INDEX: 46.07 KG/M2 | HEIGHT: 64 IN | OXYGEN SATURATION: 95 % | WEIGHT: 269.84 LBS | DIASTOLIC BLOOD PRESSURE: 53 MMHG | TEMPERATURE: 98 F

## 2020-10-03 LAB
INR BLD: 2.13 (ref 0.86–1.14)
PROTHROMBIN TIME: 24.9 SEC (ref 10–13.2)

## 2020-10-03 PROCEDURE — 94761 N-INVAS EAR/PLS OXIMETRY MLT: CPT

## 2020-10-03 PROCEDURE — 94640 AIRWAY INHALATION TREATMENT: CPT

## 2020-10-03 PROCEDURE — 99217 PR OBSERVATION CARE DISCHARGE MANAGEMENT: CPT | Performed by: NURSE PRACTITIONER

## 2020-10-03 PROCEDURE — 6370000000 HC RX 637 (ALT 250 FOR IP): Performed by: INTERNAL MEDICINE

## 2020-10-03 PROCEDURE — 85610 PROTHROMBIN TIME: CPT

## 2020-10-03 PROCEDURE — 6360000002 HC RX W HCPCS: Performed by: NURSE PRACTITIONER

## 2020-10-03 RX ORDER — FUROSEMIDE 10 MG/ML
40 INJECTION INTRAMUSCULAR; INTRAVENOUS ONCE
Status: COMPLETED | OUTPATIENT
Start: 2020-10-03 | End: 2020-10-03

## 2020-10-03 RX ORDER — FUROSEMIDE 10 MG/ML
INJECTION INTRAMUSCULAR; INTRAVENOUS
Status: DISCONTINUED
Start: 2020-10-03 | End: 2020-10-03 | Stop reason: HOSPADM

## 2020-10-03 RX ADMIN — POTASSIUM CHLORIDE 20 MEQ: 1500 TABLET, EXTENDED RELEASE ORAL at 07:53

## 2020-10-03 RX ADMIN — ACETAMINOPHEN 650 MG: 325 TABLET ORAL at 07:38

## 2020-10-03 RX ADMIN — VERAPAMIL HYDROCHLORIDE 120 MG: 240 TABLET, FILM COATED, EXTENDED RELEASE ORAL at 07:53

## 2020-10-03 RX ADMIN — PANTOPRAZOLE SODIUM 40 MG: 40 TABLET, DELAYED RELEASE ORAL at 07:52

## 2020-10-03 RX ADMIN — CITALOPRAM HYDROBROMIDE 40 MG: 20 TABLET ORAL at 07:52

## 2020-10-03 RX ADMIN — Medication 2 PUFF: at 08:51

## 2020-10-03 RX ADMIN — GABAPENTIN 600 MG: 100 CAPSULE ORAL at 07:52

## 2020-10-03 RX ADMIN — PROPAFENONE HYDROCHLORIDE 225 MG: 150 TABLET, FILM COATED ORAL at 07:52

## 2020-10-03 RX ADMIN — ALLOPURINOL 300 MG: 300 TABLET ORAL at 07:53

## 2020-10-03 RX ADMIN — FUROSEMIDE 40 MG: 10 INJECTION, SOLUTION INTRAMUSCULAR; INTRAVENOUS at 07:51

## 2020-10-03 ASSESSMENT — PAIN SCALES - GENERAL
PAINLEVEL_OUTOF10: 1
PAINLEVEL_OUTOF10: 5

## 2020-10-03 NOTE — PROGRESS NOTES
Pt sitting up in bed, groin soft and stable. Pt  instructed she must call for assistance, call light in reach, will continue to monitor.

## 2020-10-03 NOTE — PROGRESS NOTES
Pharmacy to Dose Warfarin    Pharmacy consulted to dose warfarin for Afib. INR Goal: 2-3    INR today: 2.13    Assessment/Plan:  - INR therapeutic today. - Will continue home warfarin dose 7.5 mg today. - Daily INR ordered. Pharmacy will continue to follow.     Darvin Koehler, Alysa  PGY1 Pharmacy Resident  [id number]

## 2020-10-03 NOTE — PROGRESS NOTES
Discharge instructions reviewed with patient and family member. Patient and family verbalized understanding. All home medications have been reviewed, questions answered and patient voiced understanding. All medication side effects reviewed and patient and family verbalized understanding. Patient given prescriptions, discharge instructions, and appointment times. Patient discharged to home with family via private car. Taken to lobby via wheelchair. Follow up appointment(s) reviewed with patient and all attempts made to schedule within 7 days of discharge. [facility]  Depression Screen    1. During the past month, have you often been bothered by feeling down, depressed, or hopeless? NO    2. During the past month, have you often been bothered by little interest or pleasure in       doing things?    NO

## 2020-10-05 ENCOUNTER — TELEPHONE (OUTPATIENT)
Dept: INTERNAL MEDICINE CLINIC | Age: 75
End: 2020-10-05

## 2020-10-05 ENCOUNTER — TELEPHONE (OUTPATIENT)
Dept: CARDIOLOGY CLINIC | Age: 75
End: 2020-10-05

## 2020-10-05 LAB — POC ACT LR: >400 SEC

## 2020-10-05 NOTE — TELEPHONE ENCOUNTER
She had an ablation on Friday . When she got home she went back into afib and her HR was higher than it had been 100-120. She is still in afib now , has SOB and productive cough . She had a fever of 100.3 on Saturday but no fever now. What should she do ?

## 2020-10-05 NOTE — TELEPHONE ENCOUNTER
[facility] Transitions Initial Follow Up Call    Outreach made within 2 business days of discharge: Yes    Patient: Devin Gayle Patient : 1945   MRN: [id number]  Reason for Admission: There are no discharge diagnoses documented for the most recent discharge. Discharge Date: 10/3/20       Spoke with: Patient in contact with office with questions and concerns.   Patient has HFU 10/6    Discharge department/facility: [facility]    Scheduled appointment with PCP within 7-14 days    Follow Up  Future Appointments   Date Time Provider Diane Warren   10/6/2020  9:30 AM SCHEDULE, FF IM AWV LPN [facility]   10/12/2020  2:40 PM Angeles Hughes MD [facility]   10/19/2020 11:00 AM F ANTICOAGULATION CLINIC FZ [facility]   2020  8:00 AM AIME Denton CNP [facility]   2020  3:00 PM AIME Dawkins CNP FF Cardio [place], [place]

## 2020-10-06 ENCOUNTER — TELEPHONE (OUTPATIENT)
Dept: CARDIOLOGY CLINIC | Age: 75
End: 2020-10-06

## 2020-10-06 NOTE — TELEPHONE ENCOUNTER
Pt calling [facility]PSR back she is still in AFib today. Needs to know what to do?  Pls call to advise Thank you

## 2020-10-06 NOTE — TELEPHONE ENCOUNTER
Spoke to patient and patient will be here tomorrow morning at 8am and understood instructions on NOP after midnight

## 2020-10-07 ENCOUNTER — NURSE ONLY (OUTPATIENT)
Dept: CARDIOLOGY CLINIC | Age: 75
End: 2020-10-07
Payer: MEDICARE

## 2020-10-07 PROCEDURE — 93000 ELECTROCARDIOGRAM COMPLETE: CPT | Performed by: NURSE PRACTITIONER

## 2020-10-07 NOTE — PROGRESS NOTES
Per MXA increase patient to full tablet of verapamil 240 mg and she will call next week fro a possible DCCV if not improved on increased dosage.  Patient VU

## 2020-10-12 ENCOUNTER — OFFICE VISIT (OUTPATIENT)
Dept: INTERNAL MEDICINE CLINIC | Age: 75
End: 2020-10-12
Payer: MEDICARE

## 2020-10-12 VITALS
OXYGEN SATURATION: 98 % | TEMPERATURE: 96.9 F | SYSTOLIC BLOOD PRESSURE: 118 MMHG | WEIGHT: 265 LBS | DIASTOLIC BLOOD PRESSURE: 84 MMHG | HEART RATE: 97 BPM | HEIGHT: 64 IN | BODY MASS INDEX: 45.24 KG/M2

## 2020-10-12 PROCEDURE — G9899 SCRN MAM PERF RSLTS DOC: HCPCS | Performed by: INTERNAL MEDICINE

## 2020-10-12 PROCEDURE — G8427 DOCREV CUR MEDS BY ELIG CLIN: HCPCS | Performed by: INTERNAL MEDICINE

## 2020-10-12 PROCEDURE — 4040F PNEUMOC VAC/ADMIN/RCVD: CPT | Performed by: INTERNAL MEDICINE

## 2020-10-12 PROCEDURE — 1036F TOBACCO NON-USER: CPT | Performed by: INTERNAL MEDICINE

## 2020-10-12 PROCEDURE — G8510 SCR DEP NEG, NO PLAN REQD: HCPCS | Performed by: INTERNAL MEDICINE

## 2020-10-12 PROCEDURE — 3017F COLORECTAL CA SCREEN DOC REV: CPT | Performed by: INTERNAL MEDICINE

## 2020-10-12 PROCEDURE — 99214 OFFICE O/P EST MOD 30 MIN: CPT | Performed by: INTERNAL MEDICINE

## 2020-10-12 PROCEDURE — 1123F ACP DISCUSS/DSCN MKR DOCD: CPT | Performed by: INTERNAL MEDICINE

## 2020-10-12 PROCEDURE — 1090F PRES/ABSN URINE INCON ASSESS: CPT | Performed by: INTERNAL MEDICINE

## 2020-10-12 PROCEDURE — G8417 CALC BMI ABV UP PARAM F/U: HCPCS | Performed by: INTERNAL MEDICINE

## 2020-10-12 PROCEDURE — G8484 FLU IMMUNIZE NO ADMIN: HCPCS | Performed by: INTERNAL MEDICINE

## 2020-10-12 PROCEDURE — G8399 PT W/DXA RESULTS DOCUMENT: HCPCS | Performed by: INTERNAL MEDICINE

## 2020-10-12 RX ORDER — PREDNISONE 20 MG/1
40 TABLET ORAL DAILY
Qty: 10 TABLET | Refills: 0 | Status: SHIPPED | OUTPATIENT
Start: 2020-10-12 | End: 2020-10-17

## 2020-10-12 RX ORDER — CLONAZEPAM 1 MG/1
TABLET ORAL
Qty: 90 TABLET | Refills: 0 | Status: SHIPPED | OUTPATIENT
Start: 2020-10-12 | End: 2021-01-12 | Stop reason: SDUPTHER

## 2020-10-12 ASSESSMENT — PATIENT HEALTH QUESTIONNAIRE - PHQ9
SUM OF ALL RESPONSES TO PHQ9 QUESTIONS 1 & 2: 0
1. LITTLE INTEREST OR PLEASURE IN DOING THINGS: 0
SUM OF ALL RESPONSES TO PHQ QUESTIONS 1-9: 0
SUM OF ALL RESPONSES TO PHQ QUESTIONS 1-9: 0
2. FEELING DOWN, DEPRESSED OR HOPELESS: 0

## 2020-10-12 NOTE — PROGRESS NOTES
Chief Complaint   Patient presents with    Anxiety    Hypertension    Atrial Fibrillation     Not sure if the ablation worked. was nml for a few days but HR is running     Cough     Pt reports having a very moist cough and feeling more sob with very little exertion x 10 days      HPI:  A fib: she is taking Rhythmol and warfarin. She had an ablation last week but her a fib symptoms have since returned. HTN: The patient is tolerating blood pressure medication well and taking them as directed. BP control outside of the office is reported as 120's/70's. No symptoms concerning for end organ damage are present. HLD: she takes atorvastatin as directed. She denies side effects. Asthma: she reports ongoing cough which described as wet. She continues to use Advair and Singulair. Anxiety: this is chronic. She takes her medication as prescribed. She feels like citalopram and clonazepam are working well. [address] [facility] [address]             Social History     Tobacco Use    Smoking status: Former Smoker     Packs/day: 0.50     Years: 4.00     Pack years: 2.00     Types: Cigarettes     Last attempt to quit: 1965     Years since quittin.8    Smokeless tobacco: Never Used   Substance Use Topics    Alcohol use: Not Currently     Comment: 4 t imes a year    Drug use: No        ROS:     CV: neg for chest pain   RESP: +cough, non productive, +dyspnea    EXAM:  /84   Pulse 97   Temp 96.9 °F (36.1 °C) (Temporal)   Ht 5' 4\" (1.626 m)   Wt 265 lb (120.2 kg)   SpO2 98%   BMI 45.49 kg/m²      GEN: WN/WD, NAD  CV: Irregular rhythm, normal rate, 2/6 systolic murmur  Resp: normal effort, clear auscultation bilaterally.    No peripheral edema     Lab Results   Component Value Date    INR 2.13 (H) 10/03/2020    INR 2.30 (H) 10/02/2020    INR 3.2 2020    PROTIME 24.9 (H) 10/03/2020    PROTIME 26.9 (H) 10/02/2020    PROTIME 27.5 (H) 2020      Lab Results   Component Value Date    CREATININE 0.8 10/02/2020    BUN 17 10/02/2020     10/02/2020    K 3.6 10/02/2020     10/02/2020    CO2 26 10/02/2020     Lab Results   Component Value Date    CHOL 150 10/02/2020    CHOL 158 05/21/2019    CHOL 172 05/04/2017     Lab Results   Component Value Date    TRIG 80 10/02/2020    TRIG 74 05/21/2019    TRIG 106 05/04/2017     Lab Results   Component Value Date    HDL 61 (H) 10/02/2020    HDL 64 (H) 05/21/2019    HDL 65 (H) 08/03/2018     Lab Results   Component Value Date    LDLCALC 73 10/02/2020    LDLCALC 79 05/21/2019    LDLCALC 77 08/03/2018     Lab Results   Component Value Date    LABVLDL 16 10/02/2020    LABVLDL 15 05/21/2019    LABVLDL 15 08/03/2018     No results found for: CHOLHDLRATIO     A/P  1. PAF (paroxysmal atrial fibrillation) ([facility] [address]  With recent ablation that was apparently not successful as she is in A. fib today. She will continue propafenone and Coumadin. 2. Essential hypertension  Blood pressures well controlled. Continue current medications. 3. Mixed hyperlipidemia  Chronic and stable. Continue atorvastatin. 4. Moderate persistent asthma without complication  Chronic, with active symptoms including cough and dyspnea. Prednisone 40 mg daily for 5 days prescribed. She was advised to contact me if this do not improve her symptoms. 5. Anxiety  Chronic and controlled. PDMP reviewed today. Continue clonazepam.  - clonazePAM (KLONOPIN) 1 MG tablet; TAKE 1 TABLET BY MOUTH IN  THE EVENING  Dispense: 90 tablet;  Refill: 0              RTO 3 months

## 2020-10-13 ENCOUNTER — TELEPHONE (OUTPATIENT)
Dept: PHARMACY | Age: 75
End: 2020-10-13

## 2020-10-13 ENCOUNTER — TELEPHONE (OUTPATIENT)
Dept: CARDIOLOGY CLINIC | Age: 75
End: 2020-10-13

## 2020-10-13 RX ORDER — POTASSIUM CHLORIDE 20 MEQ/1
TABLET, EXTENDED RELEASE ORAL
Qty: 90 TABLET | Refills: 3 | Status: SHIPPED | OUTPATIENT
Start: 2020-10-13 | Stop reason: SDUPTHER

## 2020-10-13 NOTE — TELEPHONE ENCOUNTER
Pt called to stating she has a cardioversion tomorrow and MD wanted INR to be checked prior. Noted on appt desk.

## 2020-10-13 NOTE — TELEPHONE ENCOUNTER
I called to schedule her, she is on coumadin and has not had another INR since she was in the hospital. She will need to get another one today or tomorrow before the CV.  I will see if April Joe can do her tomorrow , he is off on Thursday

## 2020-10-13 NOTE — TELEPHONE ENCOUNTER
RX APPROVAL:      Refill:   Requested Prescriptions     Pending Prescriptions Disp Refills    potassium chloride (KLOR-CON M) 20 MEQ extended release tablet [Pharmacy Med Name: POTASSIUM  20MEQ CONTROLLED RELEASE TABLET  SR CHLORIDE MC TB] 90 tablet 3     Sig: TAKE 1 TABLET BY MOUTH  DAILY WITH BREAKFAST      Last OV: 7/16/2020   Last EKG:   Last Labs:   Lab Results   Component Value Date    GLUCOSE 91 10/02/2020    BUN 17 10/02/2020    CREATININE 0.8 10/02/2020    LABGLOM >60 10/02/2020     10/02/2020    K 3.6 10/02/2020    K 3.3 10/10/2018     10/02/2020    CO2 26 10/02/2020    CALCIUM 9.5 10/02/2020     Lab Results   Component Value Date     10/02/2020     10/02/2020    CO2 26 10/02/2020    ANIONGAP 11 10/02/2020    GLUCOSE 91 10/02/2020    BUN 17 10/02/2020    CREATININE 0.8 10/02/2020    LABGLOM >60 10/02/2020    GFRAA >60 10/02/2020    GFRAA >60 01/04/2013    CALCIUM 9.5 10/02/2020    PROT 7.2 10/02/2020    PROT 7.6 01/04/2013    LABALBU 4.3 10/02/2020    BILITOT 0.7 10/02/2020    ALKPHOS 78 10/02/2020    AST 32 10/02/2020    ALT 26 10/02/2020    GLOB 2.9 10/02/2020     Lab Results   Component Value Date    ALT 26 10/02/2020    AST 32 10/02/2020     Lab Results   Component Value Date    K 3.6 10/02/2020    K 3.3 10/10/2018       Plan and labs reviewed

## 2020-10-13 NOTE — TELEPHONE ENCOUNTER
Pt calling she is back in Afib  pulse 110 average pt wants to know if the cardioversion needs done now?   pls call to advise thank you

## 2020-10-13 NOTE — TELEPHONE ENCOUNTER
Spoke with the patient and she states that she has been back in Afib since Friday. Patient states that she went to her PCP yesterday and he listened to her and confirmed she is in Afib. She states her pulse has been jumping from  bpm .     Please advise.

## 2020-10-13 NOTE — LETTER
[employer]  EP Procedure Sheet    10/13/20  Khanh Padilla  1945  EP Procedures     Pacemaker implant (single/dual)  EP Study    ICD implant (single/dual)  Atrial flutter ablation (MAXWELL Y/N)    Biv implant ICD  Tilt Table    Biv implant PPM  Atrial fibrillation ablation (MAXWELL Yes)    Generator Change (PPM/ICD/BiV)  SVT ablation    Lead revision (RV/LA/RA) (<1 month)  VT ablation      Lead extraction +/- upgrade (BiV/PPM/ICD)  VT Ischemic/ non-ischemic    Loop implant/ removal  VT RVOT   XXX Cardioversion  VT Left sided    MAXWELL  AVN ablation     Equipment     [employer]   JOCELYNN Mapping System    [place]  Carto Mapping System    [employer]  CryoAblation    Biotronik  Laser Lead Extraction     EP Procedures Scheduling Request    # hours Requested   Scheduled  Date:   Specific Day  Completed    Anesthesia  F/u Date:   CT surgery backup  COVID     Overnight stay      Location [facility]       Pre-Procedure Labs / Imaging     PT/INR  Type & cross    CBC  Units PRBC    BMP/Mg  Units FFP    Venogram  Cardiac CTA for Pulmonary vein mapping     RN INITIALS:     Patient Instructions  Do not eat or drink after midnight the night prior to procedure  Dx:Persistent atrial flutter

## 2020-10-14 ENCOUNTER — ANTI-COAG VISIT (OUTPATIENT)
Dept: PHARMACY | Age: 75
End: 2020-10-14
Payer: MEDICARE

## 2020-10-14 ENCOUNTER — HOSPITAL ENCOUNTER (OUTPATIENT)
Dept: CARDIAC CATH/INVASIVE PROCEDURES | Age: 75
Discharge: HOME | End: 2020-10-14
Attending: INTERNAL MEDICINE | Admitting: INTERNAL MEDICINE
Payer: MEDICARE

## 2020-10-14 VITALS
RESPIRATION RATE: 18 BRPM | WEIGHT: 265 LBS | OXYGEN SATURATION: 97 % | TEMPERATURE: 98 F | BODY MASS INDEX: 45.24 KG/M2 | HEIGHT: 64 IN | SYSTOLIC BLOOD PRESSURE: 135 MMHG | HEART RATE: 100 BPM | DIASTOLIC BLOOD PRESSURE: 69 MMHG

## 2020-10-14 LAB — INTERNATIONAL NORMALIZATION RATIO, POC: 2.2

## 2020-10-14 PROCEDURE — 99152 MOD SED SAME PHYS/QHP 5/>YRS: CPT | Performed by: INTERNAL MEDICINE

## 2020-10-14 PROCEDURE — 2500000003 HC RX 250 WO HCPCS

## 2020-10-14 PROCEDURE — 2580000003 HC RX 258

## 2020-10-14 PROCEDURE — 7100000010 HC PHASE II RECOVERY - FIRST 15 MIN

## 2020-10-14 PROCEDURE — 92960 CARDIOVERSION ELECTRIC EXT: CPT | Performed by: INTERNAL MEDICINE

## 2020-10-14 PROCEDURE — 92960 CARDIOVERSION ELECTRIC EXT: CPT

## 2020-10-14 PROCEDURE — 93005 ELECTROCARDIOGRAM TRACING: CPT | Performed by: INTERNAL MEDICINE

## 2020-10-14 PROCEDURE — 85610 PROTHROMBIN TIME: CPT

## 2020-10-14 PROCEDURE — 99211 OFF/OP EST MAY X REQ PHY/QHP: CPT

## 2020-10-14 NOTE — PROGRESS NOTES
Ms. Belen Olvera is a 77 y.o. y/o female with history of DVT, PE, Afib   She presents today for anticoagulation monitoring and adjustment. Pertinent PMH: HTN, Gastric Banding,CAD, diskectomy with an artifical spinal disk implant in September 2012. Patient Reported Findings:  Yes     No  [x]   []       Patient verifies current dosing regimen as listed  []   [x]       S/S bleeding/bruising/swelling/SOB states that she has been wheezing more and had more SOB d/t allergies   []   [x]       Blood in urine or stool  [x]   []       Procedures scheduled in the future at this time cardioversion today at 1300  []   [x]       Missed Dose   []   [x]       Extra Dose   [x]   []       Change in medications Started taking tylenol pm, 2 capsules every night. States that she has been taking 4 tablets in addition to the 2 at night for headaches recently --> decreased tylenol intake to 1-2 times a day --> continues tylenol, propafenone increased --> no changes  --> prednisone 40 mg daily x 5 days started 10/13  []   [x]       Change in health/diet/appetite  Patient states that she feels like she has returned to normal vit k intake --> diet fluctuates causing INR to fluctuate.  Discussed ways to improve consistency with vit k intake  --> states that she has cut back spinach and cabbage intake to twice a week --> has returned to eating more vit k--> no changes, no NVD   []   [x]       Change in alcohol use    []   [x]       Change in activity  []   [x]       Hospital admission  []   [x]       Emergency department visit   []   [x]       Other complaints    Clinical Outcomes:  Yes     No  []   [x]       Major bleeding event  []   [x]       Thromboembolic event  Gets warfarin through MD    Duration of warfarin Therapy: indefinite  INR Range:  2.0-3.0     INR is 2.2 today  Due to high dose steroids, will reduce dose while on prednisone  Take 5 mg tomorrow and Saturday (instead of 7.5 mg), then continue weekly dose of 5 mg Mon, Wed and Fri and 7.5 mg all other days of the week   Encouraged to maintain a consistency of vegetables/salads.   Recheck INR 10 days, 10/22    Referring PCP is Pily Dixon  INR (no units)   Date Value   10/14/2020 2.2   10/03/2020 2.13 (H)   10/02/2020 2.30 (H)   09/14/2020 3.2   07/27/2020 2.6   05/04/2020 2.5 (H)      CLINICAL PHARMACY CONSULT: MED RECONCILIATION/REVIEW ADDENDUM    For Pharmacy Admin Tracking Only    PHSO: Yes  Total # of Interventions Recommended: 1  - Decreased Dose #: 1  - Maintenance Safety Lab Monitoring #: 1  Total Interventions Accepted: 1  Time Spent (min): [address], PharmD

## 2020-10-14 NOTE — H&P
[facility]   Electrophysiology         Chief Complaint   Patient presents with    6 Month Follow-Up    Atrial Fibrillation        HPI: Jazmin Khan is a 77 y.o. female with a history of paroxysmal atrial fibrillation, hypertension, CAD, PE/DVT, and hyperlipidemia. She was originally seen by EP while in the hospital in December of 2011 after she was found to have atrial fibrillation post- lap band surgery. She is on Coumadin for her history of pulmonary emboli ([place] [facility] which was not associated with any reversible cause. She also has a [place] filter. She had a fall on 11/15/19 when at a restaurant as she was walking into the door. She turned her head to the left, straightened her head and then went down. She felt lightheaded. She tried to break the fall with her hands. She had a radial neck fracture and forehead hematoma. Since her fall she has a hard time getting her words out.     S/p coronary angiogram 5/1/18 which was negative for myocardial ischemia. S/p 12/14/18 unsuccessful cardioversion     She again has developed atrial fibrillation and is here for cardioversion.      Past Medical History:   Diagnosis Date    Allergic     Anxiety 2/2/2017    Arthritis     Asthma     Back pain     Blood circulation, collateral     legs    Depression     GERD (gastroesophageal reflux disease)     Gout     Hypercholesteremia     Hypertension     Movement disorder     7 discs ruptured    Movement disorder     knee replacements    Obesity     Pulmonary emboli (HCC)     Unspecified sleep apnea     uses cpap    Urinary tract infection, chronic     UTI (urinary tract infection)         Past Surgical History:   Procedure Laterality Date    ANKLE [facility]USION  2010    [facility], [facility]    BACK SURGERY  09/14/2012    lumbar fusion L-4, L-5 atrificial disc  [facility]  5-7-11 incision, drainage and debridement of left knee and application of wound vac.  BREAST BIOPSY  08/2018    neg    EYE SURGERY      JOINT REPLACEMENT      bilat knees    JOINT REPLACEMENT  2003    left, [facility]    JOINT REPLACEMENT  2005    right, [facility]    OTHER SURGICAL HISTORY  12-    laparscopic adjustable gastric restrictive procedure    REVISION TOTAL KNEE ARTHROPLASTY  2006    [id number]    SKIN FULL GRAFT  2011    right, [address]    [facility]    UPPER GASTROINTESTINAL ENDOSCOPY  10-    VENA CAVA FILTER PLACEMENT         Allergies: Allergies   Allergen Reactions    Belsomra [Suvorexant] Other (See Comments)     Nightmares      Avelox [Moxifloxacin Hcl In Nacl] Rash    Levofloxacin Rash    Sulfa Antibiotics Rash       Social History:   reports that she quit smoking about 55 years ago. Her smoking use included cigarettes. She has a 2.00 pack-year smoking history. She has never used smokeless tobacco. She reports previous alcohol use. She reports that she does not use drugs. Family History:  family history includes Arthritis in her father; Asthma in her father; Breast Cancer in her sister; Depression in her mother; Heart Disease in her father; High Blood Pressure in her father and mother; Stroke in her father. Reviewed. Denies family history of sudden cardiac death, arrhythmia, premature CAD    Review of System:  All other systems reviewed and are negative except for that noted above.  Pertinent negatives are:   General: negative for fever, chills   Ophthalmic ROS: negative for - eye pain or loss of vision  ENT ROS: negative for - headaches, sore throat   Respiratory: negative for - cough, sputum  Cardiovascular: Reviewed in HPI  Gastrointestinal: negative for - abdominal pain, diarrhea, N/V  Hematology: negative for - bleeding, blood clots, bruising or jaundice  Genito-Urinary:  negative for - Dysuria or incontinence  Musculoskeletal: negative for - Joint swelling, muscle pain  Neurological: negative for - confusion, dizziness, headaches   Psychiatric: No anxiety, no depression. Dermatological: negative for - rash    Physical Examination:  Vitals:    20 1423   BP: (!) 153/96   Pulse: 102   Resp:       Wt Readings from Last 3 Encounters:   20 266 lb (120.7 kg)   20 259 lb 0.7 oz (117.5 kg)   20 259 lb 0.7 oz (117.5 kg)       · Constitutional: Oriented. No distress. · Head: Normocephalic and atraumatic. · Mouth/Throat: Oropharynx is clear and moist.   · Eyes: Conjunctivae normal. EOM are normal.   · Neck: Neck supple. No rigidity. No JVD present. · Cardiovascular: Normal rate, irregular rhythm, S1&S2. · Pulmonary/Chest: Bilateral respiratory sounds. No wheezes, No rhonchi. · Abdominal: Soft. Bowel sounds present. No distension, No tenderness. · Musculoskeletal: No tenderness. No edema    · Lymphadenopathy: Has no cervical adenopathy. · Neurological: Alert and oriented. Cranial nerve appears intact, No Gross deficit   · Skin: Skin is warm and dry. No rash noted. · Psychiatric: Has a normal behavior       Labs, diagnostic and imaging results reviewed. Reviewed. Lab Results   Component Value Date    TSH 3.53 2011    CREATININE 0.8 2019    CREATININE 0.9 2019    AST 22 10/29/2018    ALT 26 10/29/2018       EC20   Atrial fibrillation    Echo: 3/13/2018   Summary   Normal left ventricle size, wall thickness and systolic function with an   estimated ejection fraction of 60%. Mild mitral regurgitation    Lexiscan: 2018  Summary         No EKG evidence for ischemia with exercise         Normal LV size and systolic function.         Myocardial perfusion imaging with small area of decreased uptake in the     anteroapical wall with stress with redistribution at rest consistent with     ischemia.            Medication:  Current Outpatient Medications   Medication Sig Dispense Refill    propafenone (RYTHMOL) 150 MG tablet Take one half tablet (75mg) in addition to 225 mg Tablet every 8 hours 45 tablet 2    verapamil (CALAN SR) 240 MG extended release tablet Take 0.5 tablets by mouth nightly 90 tablet 3    trimethoprim (TRIMPEX) 100 MG tablet TAKE 1 TABLET BY MOUTH  EVERY 48 HOURS 45 tablet 3    gabapentin (NEURONTIN) 300 MG capsule TAKE 2 CAPSULES BY MOUTH 3  TIMES DAILY 540 capsule 3    clonazePAM (KLONOPIN) 1 MG tablet TAKE 1 TABLET BY MOUTH IN  THE EVENING 90 tablet 0    fluticasone-salmeterol (ADVAIR) 250-50 MCG/DOSE AEPB INHALE ONE PUFF BY MOUTH EVERY 12 HOURS 1 Inhaler 5    warfarin (COUMADIN) 5 MG tablet TAKE 1 TABLETS BY MOUTH ON  MONDAY AND  FRIDAY ONLY AND TAKE 1.5 TABLETS BY MOUTH  ALL OTHER DAYS 143 tablet 1    indapamide (LOZOL) 1.25 MG tablet TAKE 1 TABLET BY MOUTH  EVERY MORNING 90 tablet 3    potassium chloride (KLOR-CON M) 20 MEQ extended release tablet TAKE 1 TABLET BY MOUTH  DAILY WITH BREAKFAST 90 tablet 1    montelukast (SINGULAIR) 10 MG tablet TAKE 1 TABLET BY MOUTH  NIGHTLY 90 tablet 3    pantoprazole (PROTONIX) 40 MG tablet TAKE 1 TABLET BY MOUTH  EVERY MORNING BEFORE  BREAKFAST 90 tablet 3    acetaminophen (TYLENOL) 500 MG tablet Take 500 mg by mouth every 6 hours as needed for Pain      diphenhydrAMINE-APAP, sleep, (TYLENOL PM EXTRA STRENGTH)  MG tablet Take 1 tablet by mouth nightly as needed for Sleep      PROAIR  (90 Base) MCG/ACT inhaler INHALE TWO PUFFS BY MOUTH EVERY 4 HOURS AS NEEDED FOR SHORTNESS OF BREATH 8.5 g 2    b complex vitamins capsule Take 1 capsule by mouth daily      citalopram (CELEXA) 40 MG tablet Take 1 tablet by mouth daily 90 tablet 3    atorvastatin (LIPITOR) 80 MG tablet Take 1 tablet by mouth daily 90 tablet 3    propafenone (RYTHMOL) 225 MG tablet Take 1 tablet by mouth every 8 hours 270 tablet 3    allopurinol (ZYLOPRIM) 300 MG tablet TAKE 1 TABLET BY MOUTH  DAILY 90 tablet 3    Ascorbic Acid (VITAMIN C PO) 1 tablet daily      therapeutic multivitamin-minerals (THERAGRAN-M) tablet Take 1 tablet by mouth nightly       Cranberry 500 MG CAPS Take 1,000 mg by mouth nightly        No current facility-administered medications for this visit. Facility-Administered Medications Ordered in Other Visits   Medication Dose Route Frequency Provider Last Rate Last Dose    magnesium hydroxide (MILK OF MAGNESIA) 400 MG/5ML suspension 30 mL  30 mL Oral Daily PRN Aruna Robles MD        ondansetron [facility] injection 4 mg  4 mg Intravenous Q6H PRN Aruna Robles MD        acetaminophen (TYLENOL) tablet 650 mg  650 mg Oral Q4H PRN Aruna Robles MD           Assessment and plan:     PAF  -ECG today shows afib  -She brought her [employer] mobile ECG readings and she goes between afib and bradycardia. -[id number] Vasc score and anticoagulation discussed. High risk for stroke and thromboembolism. Anticoagulation is recommended. I discussed anticoagulation to decrease the risk of thromboembolic events including stroke. Benefits and alternatives were discussed with patient. Risk of bleeding was discussed. Patient verbalized understanding.   -On coumadin both for PE hx and Afib  -Verapamil 240 mg daily  -Propafenone 225 mg TID  -We discussed treatment options including antiarrhythmics, rate control with anticoagulation, and ablation.   -We discussed progressive nature of atrial fibrillation. Treatment success decreases when AF becomes persistent and last more than 6 months. Recurrent afib. Treatment options including cardioversion and/or ablation were discussed with patient. Risks, benefits and alternative of each treatment options were explained. All questions answered. Opted for cardioversion. HTN  Vitals:    07/16/20 1423   BP: (!) 153/96   Pulse: 102   Resp:      -Home BP monitoring encourage with a BP goal <130/80  BP is normal at home    Obesity  Body mass index is 45.66 kg/m².   - Gayla Mckeon, AIME - CNP   allopurinol (ZYLOPRIM) 300 MG tablet TAKE 1 TABLET BY MOUTH  DAILY 10/28/19  Yes Harrison Chris MD   therapeutic multivitamin-minerals [facility]) tablet Take 1 tablet by mouth nightly    Yes Historical Provider, MD   Cranberry 500 MG CAPS Take 1,000 mg by mouth nightly    Yes Historical Provider, MD   clonazePAM (KLONOPIN) 1 MG tablet TAKE 1 TABLET BY MOUTH IN  THE EVENING 10/12/20 1/10/21  Harrison Chris MD   propafenone (RYTHMOL) 225 MG tablet Take 1 tablet by mouth every 8 hours 7/27/20   AIME Baeza CNP   verapamil (CALAN SR) 240 MG extended release tablet Take 0.5 tablets by mouth nightly 7/1/20   AIME Baeza CNP   trimethoprim (TRIMPEX) 100 MG tablet TAKE 1 TABLET BY MOUTH  EVERY 48 HOURS 6/29/20   Harrison Chris MD   fluticasone-salmeterol (ADVAIR) 250-50 MCG/DOSE AEPB INHALE ONE PUFF BY MOUTH EVERY 12 HOURS 6/22/20   Harrison Chris MD   indapamide (LOZOL) 1.25 MG tablet TAKE 1 TABLET BY MOUTH  EVERY MORNING 6/1/20   Harrison Chris MD   montelukast (SINGULAIR) 10 MG tablet TAKE 1 TABLET BY MOUTH  NIGHTLY 4/27/20   Harrison Chris MD   acetaminophen (TYLENOL) 500 MG tablet Take 500 mg by mouth every 6 hours as needed for Pain    Historical Provider, MD   diphenhydrAMINE-APAP, sleep, (TYLENOL PM EXTRA STRENGTH)  MG tablet Take 1 tablet by mouth nightly as needed for Sleep    Historical Provider, MD   PROAIR  (90 Base) MCG/ACT inhaler INHALE TWO PUFFS BY MOUTH EVERY 4 HOURS AS NEEDED FOR SHORTNESS OF BREATH 2/10/20   Harrison Chris MD   Ascorbic Acid (VITAMIN C PO) 1 tablet daily    Historical Provider, MD     Past Medical History:   Diagnosis Date    Allergic     Anxiety 2/2/2017    Arthritis     Asthma     Back pain     Blood circulation, collateral     legs    Depression     GERD (gastroesophageal reflux disease)     Gout     Hypercholesteremia     Hypertension     Movement disorder     7 discs ruptured    Movement disorder     knee replacements    Obesity     Pulmonary emboli (HCC)     Unspecified sleep apnea     uses cpap    Urinary tract infection, chronic     UTI (urinary tract infection)      Past Surgical History:   Procedure Laterality Date    ANKLE FUSION  2010    right, [facility]    BACK SURGERY  09/14/2012    lumbar fusion L-4, L-5 atrificial disc  [facility]  5-7-11    incision, drainage and debridement of left knee and application of wound vac.  BREAST BIOPSY  08/2018    neg    EYE SURGERY      JOINT REPLACEMENT      bilat knees    JOINT REPLACEMENT  [facility]    [facility]    JOINT REPLACEMENT  2005    right, [facility]    OTHER SURGICAL HISTORY  12-    laparscopic adjustable gastric restrictive procedure    REVISION [facility]LASTY  [facility]    [facility]    SKIN FULL GRAFT  2011    [facility]    TUBAL [facility]ATION  [facility]    UPPER GASTROINTESTINAL ENDOSCOPY  10-    VENA CAVA FILTER PLACEMENT       Allergies   Allergen Reactions    Belsomra [Suvorexant] Other (See Comments)     Nightmares      Avelox [Moxifloxacin Hcl In Nacl] Rash    Levofloxacin Rash    Sulfa Antibiotics Rash       Pre-Sedation Documentation and Exam:   I have personally completed a history, physical exam & review of systems for this patient (see notes).     Mallampati Airway Assessment:  Class II     Prior History of Anesthesia Complications:   None    ASA Classification:  Class 3 - A patient with severe systemic disease that limits activity but is not incapacitating    Sedation/ Anesthesia Plan:   Intravenous sedation    Medications Planned:   Brevital intravenously     Patient is an appropriate candidate for plan of sedation:   Yes    Electronically signed by Lukasz Leonard MD on 10/14/2020 at 1:00 PM

## 2020-10-14 NOTE — PROCEDURES
[address]     Electrophysiology Procedure Note       Date of Procedure: 10/14/2020  Patient's Name: Mary Stuart  YOB: 1945   Medical Record Number: [id number]  Procedure Performed by: Yayo Felder MD    Procedures performed:  IV sedation. External Electrical cardioversion     Indication of the procedure: Persistent atrial fibrillation     Details of procedure: The patient was brought to the cath lab area in a fasting and non-sedated state. The risks, benefits and alternatives of the procedure were discussed with the patient. The patient opted to proceed with the procedure. Written informed consent was signed and placed in the chart. A timeout protocol was completed to identify the patient and the procedure being performed. Patient is on chronic anticoagulation therapy. Then we used brevital for sedation and electrical DC cardioversion was perfomred using 200J, synchronized shock. Patient was converted to sinus rhythm. The patient tolerated the procedure well and there were no complications.      Conclusion:   Successful external DC cardioversion of atrial fibrillation

## 2020-10-15 LAB
EKG ATRIAL RATE: 264 BPM
EKG DIAGNOSIS: NORMAL
EKG Q-T INTERVAL: 332 MS
EKG QRS DURATION: 98 MS
EKG QTC CALCULATION (BAZETT): 428 MS
EKG R AXIS: 1 DEGREES
EKG T AXIS: 55 DEGREES
EKG VENTRICULAR RATE: 100 BPM

## 2020-10-15 PROCEDURE — 93010 ELECTROCARDIOGRAM REPORT: CPT | Performed by: INTERNAL MEDICINE

## 2020-10-22 ENCOUNTER — ANTI-COAG VISIT (OUTPATIENT)
Dept: PHARMACY | Age: 75
End: 2020-10-22
Payer: MEDICARE

## 2020-10-22 VITALS — TEMPERATURE: 97.5 F

## 2020-10-22 LAB — INTERNATIONAL NORMALIZATION RATIO, POC: 1.8

## 2020-10-22 PROCEDURE — 99211 OFF/OP EST MAY X REQ PHY/QHP: CPT

## 2020-10-22 PROCEDURE — 85610 PROTHROMBIN TIME: CPT

## 2020-10-22 NOTE — PROGRESS NOTES
all other days of the week   Encouraged to maintain a consistency of vegetables/salads.   Recheck INR in 4 weeks, 11/19    Referring PCP is Mira Dhillon  INR (no units)   Date Value   10/14/2020 2.2   10/03/2020 2.13 (H)   10/02/2020 2.30 (H)   09/14/2020 3.2   07/27/2020 2.6   05/04/2020 2.5 (H)      CLINICAL PHARMACY CONSULT: MED RECONCILIATION/REVIEW ADDENDUM    For Pharmacy Admin Tracking Only    PHSO: Yes  Total # of Interventions Recommended: 0  - Maintenance Safety Lab Monitoring #: 1  Total Interventions Accepted: 0  Time Spent (min): [address], PharmD

## 2020-10-26 ENCOUNTER — HOSPITAL ENCOUNTER (OUTPATIENT)
Dept: CARDIAC CATH/INVASIVE PROCEDURES | Age: 75
Setting detail: OUTPATIENT SURGERY
Discharge: HOME | End: 2020-10-26
Attending: INTERNAL MEDICINE | Admitting: INTERNAL MEDICINE
Payer: MEDICARE

## 2020-10-26 ENCOUNTER — TELEPHONE (OUTPATIENT)
Dept: CARDIOLOGY CLINIC | Age: 75
End: 2020-10-26

## 2020-10-26 ENCOUNTER — NURSE ONLY (OUTPATIENT)
Dept: CARDIOLOGY CLINIC | Age: 75
End: 2020-10-26
Payer: MEDICARE

## 2020-10-26 VITALS
HEART RATE: 99 BPM | BODY MASS INDEX: 44.9 KG/M2 | RESPIRATION RATE: 16 BRPM | WEIGHT: 263 LBS | SYSTOLIC BLOOD PRESSURE: 163 MMHG | TEMPERATURE: 97.5 F | DIASTOLIC BLOOD PRESSURE: 81 MMHG | HEIGHT: 64 IN

## 2020-10-26 PROCEDURE — 93005 ELECTROCARDIOGRAM TRACING: CPT | Performed by: INTERNAL MEDICINE

## 2020-10-26 PROCEDURE — 7100000010 HC PHASE II RECOVERY - FIRST 15 MIN

## 2020-10-26 PROCEDURE — 99152 MOD SED SAME PHYS/QHP 5/>YRS: CPT | Performed by: INTERNAL MEDICINE

## 2020-10-26 PROCEDURE — 85610 PROTHROMBIN TIME: CPT

## 2020-10-26 PROCEDURE — 2500000003 HC RX 250 WO HCPCS

## 2020-10-26 PROCEDURE — 93000 ELECTROCARDIOGRAM COMPLETE: CPT | Performed by: INTERNAL MEDICINE

## 2020-10-26 PROCEDURE — 92960 CARDIOVERSION ELECTRIC EXT: CPT

## 2020-10-26 PROCEDURE — 92960 CARDIOVERSION ELECTRIC EXT: CPT | Performed by: INTERNAL MEDICINE

## 2020-10-26 NOTE — TELEPHONE ENCOUNTER
Spoke to the pt-she has taken the Propafenone 225 mg at 7:30, and has not eaten. Getting short of breath just walking to the bathroom.

## 2020-10-26 NOTE — TELEPHONE ENCOUNTER
Spoke to the pt-gave instructions. Stated she can be here by 10:30. Please add to the cardio schedule. Show EKG to EP.

## 2020-10-26 NOTE — H&P
[place] [address]   Electrophysiology         Chief Complaint   Patient presents with    6 Month Follow-Up    Atrial Fibrillation        HPI: Dionne Baca is a 77 y.o. female with a history of paroxysmal atrial fibrillation, hypertension, CAD, PE/DVT, and hyperlipidemia. She was originally seen by EP while in the hospital in December of 2011 after she was found to have atrial fibrillation post- lap band surgery. She is on Coumadin for her history of pulmonary emboli ([place]R monitored at [facility]) which was not associated with any reversible cause. She also has a [place] filter. She had a fall on 11/15/19 when at a restaurant as she was walking into the door. She turned her head to the left, straightened her head and then went down. She felt lightheaded. She tried to break the fall with her hands. She had a radial neck fracture and forehead hematoma. Since her fall she has a hard time getting her words out.     S/p coronary angiogram 5/1/18 which was negative for myocardial ischemia. S/p 12/14/18 unsuccessful cardioversion     Julio Jackson presents to the office in follow up. She states she jumps between afib and bradycardia. She monitors her pulse at home with the [address]. Her ecg today shows atrial fibrillation.      Past Medical History:   Diagnosis Date    Allergic     Anxiety 2/2/2017    Arthritis     Asthma     Back pain     Blood circulation, collateral     legs    Depression     GERD (gastroesophageal reflux disease)     Gout     Hypercholesteremia     Hypertension     Movement disorder     7 discs ruptured    Movement disorder     knee replacements    Obesity     Pulmonary emboli (HCC)     Unspecified sleep apnea     uses cpap    Urinary tract infection, chronic     UTI (urinary tract infection)         Past Surgical History:   Procedure Laterality Date    ANKLE FUSION  2010    right, [facility]    BACK SURGERY  09/14/2012    lumbar fusion L-4, L-5 atrificial disc  Nena Seay BONE INCISION AND DRAINAGE  5-7-11    incision, drainage and debridement of left knee and application of wound vac.  BREAST BIOPSY  08/2018    neg    EYE SURGERY      JOINT REPLACEMENT      bilat knees    JOINT REPLACEMENT  2003    [facility], [facility]    [facility]INT [facility]  [facility]    OTHER SURGICAL HISTORY  12-    laparscopic adjustable gastric restrictive procedure    [facility]    SKIN FULL GRAFT  2011    right, [address]    [facility]    UPPER GASTROINTESTINAL ENDOSCOPY  10-    VENA CAVA FILTER PLACEMENT         Allergies: Allergies   Allergen Reactions    Belsomra [Suvorexant] Other (See Comments)     Nightmares      Avelox [Moxifloxacin Hcl In Nacl] Rash    Levofloxacin Rash    Sulfa Antibiotics Rash       Social History:   reports that she quit smoking about 55 years ago. Her smoking use included cigarettes. She has a 2.00 pack-year smoking history. She has never used smokeless tobacco. She reports previous alcohol use. She reports that she does not use drugs. Family History:  family history includes Arthritis in her father; Asthma in her father; Breast Cancer in her sister; Depression in her mother; Heart Disease in her father; High Blood Pressure in her father and mother; Stroke in her father. Reviewed. Denies family history of sudden cardiac death, arrhythmia, premature CAD    Review of System:  All other systems reviewed and are negative except for that noted above.  Pertinent negatives are:   General: negative for fever, chills   Ophthalmic ROS: negative for - eye pain or loss of vision  ENT ROS: negative for - headaches, sore throat   Respiratory: negative for - cough, sputum  Cardiovascular: Reviewed in HPI  Gastrointestinal: negative for - abdominal pain, diarrhea, N/V  Hematology: negative for - bleeding, blood clots, bruising or jaundice  Genito-Urinary:  negative for - Dysuria or incontinence  Musculoskeletal: negative for - Joint swelling, muscle pain  Neurological: negative for - confusion, dizziness, headaches   Psychiatric: No anxiety, no depression. Dermatological: negative for - rash    Physical Examination:  Vitals:    20 1423   BP: (!) 153/96   Pulse: 102   Resp:       Wt Readings from Last 3 Encounters:   20 266 lb (120.7 kg)   20 259 lb 0.7 oz (117.5 kg)   20 259 lb 0.7 oz (117.5 kg)       · Constitutional: Oriented. No distress. · Head: Normocephalic and atraumatic. · Mouth/Throat: Oropharynx is clear and moist.   · Eyes: Conjunctivae normal. EOM are normal.   · Neck: Neck supple. No rigidity. No JVD present. · Cardiovascular: Normal rate, irregular rhythm, S1&S2. · Pulmonary/Chest: Bilateral respiratory sounds. No wheezes, No rhonchi. · Abdominal: Soft. Bowel sounds present. No distension, No tenderness. · Musculoskeletal: No tenderness. No edema    · Lymphadenopathy: Has no cervical adenopathy. · Neurological: Alert and oriented. Cranial nerve appears intact, No Gross deficit   · Skin: Skin is warm and dry. No rash noted. · Psychiatric: Has a normal behavior       Labs, diagnostic and imaging results reviewed. Reviewed. Lab Results   Component Value Date    TSH 3.53 2011    CREATININE 0.8 2019    CREATININE 0.9 2019    AST 22 10/29/2018    ALT 26 10/29/2018       EC20   Atrial fibrillation    Echo: 3/13/2018   Summary   Normal left ventricle size, wall thickness and systolic function with an   estimated ejection fraction of 60%.       Mild mitral regurgitation    Lexiscan: 2018  Summary         No EKG evidence for ischemia with exercise         Normal LV size and systolic function.         Myocardial perfusion imaging with small area of decreased uptake in the     anteroapical wall with stress with redistribution at tablet 3    allopurinol (ZYLOPRIM) 300 MG tablet TAKE 1 TABLET BY MOUTH  DAILY 90 tablet 3    Ascorbic Acid (VITAMIN C PO) 1 tablet daily      therapeutic multivitamin-minerals (THERAGRAN-M) tablet Take 1 tablet by mouth nightly       Cranberry 500 MG CAPS Take 1,000 mg by mouth nightly        No current facility-administered medications for this visit. Facility-Administered Medications Ordered in Other Visits   Medication Dose Route Frequency Provider Last Rate Last Dose    magnesium hydroxide (MILK OF MAGNESIA) 400 MG/5ML suspension 30 mL  30 mL Oral Daily PRN Inés Galeano MD        ondansetron [facility]) injection 4 mg  4 mg Intravenous Q6H PRN Inés Galeano MD        acetaminophen (TYLENOL) tablet 650 mg  650 mg Oral Q4H PRN Inés Galeano MD           Assessment and plan:     PAF  -ECG today shows afib  -She brought her [employer] mobile ECG readings and she goes between afib and bradycardia. -[id number] Vasc score and anticoagulation discussed. High risk for stroke and thromboembolism. Anticoagulation is recommended. I discussed anticoagulation to decrease the risk of thromboembolic events including stroke. Benefits and alternatives were discussed with patient. Risk of bleeding was discussed. Patient verbalized understanding.   -On coumadin both for PE hx and Afib  -Verapamil 240 mg daily  S/p ablation  Has recurrence    HTN  Vitals:    07/16/20 1423   BP: (!) 153/96   Pulse: 102   Resp:      -Home BP monitoring encourage with a BP goal <130/80  BP is normal at home    Obesity  Body mass index is 45.66 kg/m². - Excessive weight is complicating assessment and treatment. It is placing patient at risk for multiple co-morbidities as well as early death and contributing to the patient's presentation.   - discussed weight management with diet and exercise      PAULA  -encouraged compliance with CPAP    Hx of PE/DVT  -Ac with coumadin  -s/p abena filter    Plan   cardioversion

## 2020-10-26 NOTE — TELEPHONE ENCOUNTER
Pt called she had echo deidra on 10/2 and on 10/14 she had cardioversion. On Friday evening she thinks she has gone into AFIB , 125 it normally runs 60. Her BP is running 154/80.  pls advise thank you

## 2020-10-26 NOTE — PROCEDURES
[address]     Electrophysiology Procedure Note       Date of Procedure: 10/26/2020  Patient's Name: Scotty Kellogg  YOB: 1945   Medical Record Number: [id number]  Procedure Performed by: Daphne Cohen MD    Procedures performed:  IV sedation. External Electrical cardioversion   Mallampati3  ASA 3    Indication of the procedure: Persistent *atrial flutter      Details of procedure: The patient was brought to the cath lab area in a fasting and non-sedated state. The risks, benefits and alternatives of the procedure were discussed with the patient. The patient opted to proceed with the procedure. Written informed consent was signed and placed in the chart. A timeout protocol was completed to identify the patient and the procedure being performed. I pushed 45 mg Brevital. .   We monitored the patient's level of consciousness and vital signs/physiologic status throughout the procedure duration (see start and stop times below). Sedation:    Sedation start: 1514  Sedation stop: 1530     Patient is on chronic anticoagulation therapy including since in flutter. Then we used Brevital for sedation and electrical DC cardioversion was perfomred using 200J, synchronized shock. Patient was converted to sinus rhythm at 80 bpm. The patient tolerated the procedure well and there were no complications. Conclusion:   Successful external DC cardioversion of atrial  flutter  . Plan:   The patient can be discharged if remains stable. Will continue with medical therapy.      Will consider amiodarone if recurrence

## 2020-10-26 NOTE — PROGRESS NOTES
Patient in for an ECG was found to be in atrial flutter. Symptomatic with ODELL. Scheduled for DCCV today at 2pm per MXA.  NPO and  will drive her home

## 2020-10-27 PROCEDURE — 93010 ELECTROCARDIOGRAM REPORT: CPT | Performed by: INTERNAL MEDICINE

## 2020-10-28 LAB
EKG ATRIAL RATE: 73 BPM
EKG DIAGNOSIS: NORMAL
EKG P AXIS: 65 DEGREES
EKG P-R INTERVAL: 170 MS
EKG Q-T INTERVAL: 408 MS
EKG QRS DURATION: 88 MS
EKG QTC CALCULATION (BAZETT): 449 MS
EKG R AXIS: -28 DEGREES
EKG T AXIS: 51 DEGREES
EKG VENTRICULAR RATE: 73 BPM
INR BLD: 2.9 (ref 0.86–1.14)

## 2020-11-11 ENCOUNTER — VIRTUAL VISIT (OUTPATIENT)
Dept: BARIATRICS/WEIGHT MGMT | Age: 75
End: 2020-11-11
Payer: MEDICARE

## 2020-11-11 PROCEDURE — 1123F ACP DISCUSS/DSCN MKR DOCD: CPT | Performed by: NURSE PRACTITIONER

## 2020-11-11 PROCEDURE — 4040F PNEUMOC VAC/ADMIN/RCVD: CPT | Performed by: NURSE PRACTITIONER

## 2020-11-11 PROCEDURE — 1090F PRES/ABSN URINE INCON ASSESS: CPT | Performed by: NURSE PRACTITIONER

## 2020-11-11 PROCEDURE — G9899 SCRN MAM PERF RSLTS DOC: HCPCS | Performed by: NURSE PRACTITIONER

## 2020-11-11 PROCEDURE — 3017F COLORECTAL CA SCREEN DOC REV: CPT | Performed by: NURSE PRACTITIONER

## 2020-11-11 PROCEDURE — G8427 DOCREV CUR MEDS BY ELIG CLIN: HCPCS | Performed by: NURSE PRACTITIONER

## 2020-11-11 PROCEDURE — 99213 OFFICE O/P EST LOW 20 MIN: CPT | Performed by: NURSE PRACTITIONER

## 2020-11-11 PROCEDURE — G8399 PT W/DXA RESULTS DOCUMENT: HCPCS | Performed by: NURSE PRACTITIONER

## 2020-11-11 ASSESSMENT — ENCOUNTER SYMPTOMS
GASTROINTESTINAL NEGATIVE: 1
RESPIRATORY NEGATIVE: 1
EYES NEGATIVE: 1

## 2020-11-11 NOTE — PROGRESS NOTES
Chapis Fitch gained 0.7 lbs over past 6 weeks. Pt is s/p band in 2011, currently following weight watchers for additional accountability. Doing some emotional eating over the past month. Due to the COVID-19 restrictions on close contact interactions the patient's visit was conducted via telephone in joo of a face to face visit. The patient is here through telemedicine for their 12th MWM. Treatment plan details: 1200 kcal LC   Is patient adhering to treatment plan: Not really     Is pt eating 4-5 times each day? Yes    Is pt including lean protein with all meals and snacks? Mostly but not all    Breakfast - oatmeal but mostly egg and english muffin with 8 grapes   Lunch - 1/2 turkey/ham sandwich on [employer] bread and an orange  Dinner - ( cooks dinner) - frozen shrimp and tater tots and lima beans   Snack - fruit (1 orange) OR 1/2 c. cashews     Is pt avoiding added sugars and starchy foods? No     Consuming at least 64oz of calorie free fluids? 60 oz - drinking coffee    Participating in intentional exercise? Limited d/t asthma/afib - more active steps     Amount per meal: 1.5 cups/sitting      Following 30/30/30 rule: Eating in 20 minutes. Avoids eating and drinking at the same time, just takes sips prn.     Supplements: Vit C, (no longer taking Calcium following cardioversion), generic MVI 1 x day, cranberry capsule, B complex 1 x day     Food Intolerances: greasy foods      Plan/Goals:   Limit nuts to 1/4 cup  Decrease CHO intake and increase non-starchy veggies  Increase protein    Pt to see [place] per Provider.      Handouts: elvi Jones

## 2020-11-11 NOTE — PROGRESS NOTES
[facility]) Physicians   [employer]    11/11/2020    TELEHEALTH EVALUATION -- Audio/Visual (During QHPSJ-51 public health emergency)    Medical Weight Loss    HPI: Lana Luciano is a 77 y.o. female with current BMI of 45.5 and current weight of 267.1 pounds. Due to the COVID-19 restrictions on close contact interactions the patient's monthly visit was conducted via audio/video in joo of a face to face visit. Patient has consented to have this visit conducted via audio/video. The patient is here through telemedicine for their medical weight loss visit. She has been experiencing some emotional eating due to feeling isolated from her family during the pandemic. She did have a successful cardioversion and is feeling less short of breath now. Past Medical History:   Diagnosis Date    Allergic     Anxiety 2/2/2017    Arthritis     Asthma     Back pain     Blood circulation, collateral     legs    Depression     GERD (gastroesophageal reflux disease)     Gout     Hypercholesteremia     Hypertension     Movement disorder     7 discs ruptured    Movement disorder     knee replacements    Obesity     Pulmonary emboli (HCC)     Unspecified sleep apnea     uses cpap    Urinary tract infection, chronic     UTI (urinary tract infection)      Past Surgical History:   Procedure Laterality Date    ANKLE FUSION  2010    right, [facility]    BACK SURGERY  09/14/2012    lumbar fusion L-4, L-5 atrificial disc  Berneda Angles Dr. Lacie June  5-7-11    incision, drainage and debridement of left knee and application of wound vac.     BREAST BIOPSY  08/2018    neg    EYE SURGERY      JOINT REPLACEMENT      bilat knees    JOINT REPLACEMENT  2003    left, [facility]    JOINT REPLACEMENT  2005    right, [facility]    OTHER SURGICAL HISTORY  12-    laparscopic adjustable gastric restrictive procedure    [facility]      [facility]     SKIN F[facility]RAFT      right, [facility]    TUBAL LIGATION  1975    [facility]    UPPER GASTROINTESTINAL ENDOSCOPY  10-    VENA CAVA FILTER PLACEMENT       Family History   Problem Relation Age of Onset    High Blood Pressure Mother     Depression Mother     Stroke Father     High Blood Pressure Father     Asthma Father     Arthritis Father     Heart Disease Father     Breast Cancer Sister     High Cholesterol Neg Hx      Social History     Tobacco Use    Smoking status: Former Smoker     Packs/day: 0.50     Years: 4.00     Pack years: 2.00     Types: Cigarettes     Last attempt to quit: 1965     Years since quittin.8    Smokeless tobacco: Never Used   Substance Use Topics    Alcohol use: Not Currently     Comment: 4 t imes a year     I counseled the patient on the importance of not smoking and risks of ETOH. Allergies   Allergen Reactions    Belsomra [Suvorexant] Other (See Comments)     Nightmares      Avelox [Moxifloxacin Hcl In Nacl] Rash    Levofloxacin Rash    Sulfa Antibiotics Rash     There were no vitals filed for this visit. There is no height or weight on file to calculate BMI.     Current Outpatient Medications:     potassium chloride (KLOR-CON M) 20 MEQ extended release tablet, TAKE 1 TABLET BY MOUTH  DAILY WITH BREAKFAST, Disp: 90 tablet, Rfl: 3    clonazePAM (KLONOPIN) 1 MG tablet, TAKE 1 TABLET BY MOUTH IN  THE EVENING, Disp: 90 tablet, Rfl: 0    propafenone (RYTHMOL) 225 MG tablet, Take 1 tablet by mouth every 8 hours, Disp: 90 tablet, Rfl: 0    verapamil (CALAN SR) 240 MG extended release tablet, Take 0.5 tablets by mouth nightly, Disp: 90 tablet, Rfl: 3    trimethoprim (TRIMPEX) 100 MG tablet, TAKE 1 TABLET BY MOUTH  EVERY 48 HOURS, Disp: 45 tablet, Rfl: 3    gabapentin (NEURONTIN) 300 MG capsule, TAKE 2 CAPSULES BY MOUTH 3  TIMES DAILY, Disp: 540 capsule, Rfl: 3    fluticasone-salmeterol (ADVAIR) 250-50 MCG/DOSE AEPB, INHALE ONE PUFF BY MOUTH EVERY 12 HOURS, Disp: 1 Inhaler, Rfl: 5    warfarin (COUMADIN) 5 MG tablet, TAKE 1 TABLETS BY MOUTH ON  MONDAY AND  FRIDAY ONLY AND TAKE 1.5 TABLETS BY MOUTH  ALL OTHER DAYS, Disp: 143 tablet, Rfl: 1    indapamide (LOZOL) 1.25 MG tablet, TAKE 1 TABLET BY MOUTH  EVERY MORNING, Disp: 90 tablet, Rfl: 3    montelukast (SINGULAIR) 10 MG tablet, TAKE 1 TABLET BY MOUTH  NIGHTLY, Disp: 90 tablet, Rfl: 3    pantoprazole (PROTONIX) 40 MG tablet, TAKE 1 TABLET BY MOUTH  EVERY MORNING BEFORE  BREAKFAST, Disp: 90 tablet, Rfl: 3    acetaminophen (TYLENOL) 500 MG tablet, Take 500 mg by mouth every 6 hours as needed for Pain, Disp: , Rfl:     diphenhydrAMINE-APAP, sleep, (TYLENOL PM EXTRA STRENGTH)  MG tablet, Take 1 tablet by mouth nightly as needed for Sleep, Disp: , Rfl:     PROAIR  (90 Base) MCG/ACT inhaler, INHALE TWO PUFFS BY MOUTH EVERY 4 HOURS AS NEEDED FOR SHORTNESS OF BREATH, Disp: 8.5 g, Rfl: 2    b complex vitamins capsule, Take 1 capsule by mouth daily, Disp: , Rfl:     citalopram (CELEXA) 40 MG tablet, Take 1 tablet by mouth daily, Disp: 90 tablet, Rfl: 3    atorvastatin (LIPITOR) 80 MG tablet, Take 1 tablet by mouth daily, Disp: 90 tablet, Rfl: 3    allopurinol (ZYLOPRIM) 300 MG tablet, TAKE 1 TABLET BY MOUTH  DAILY, Disp: 90 tablet, Rfl: 3    Ascorbic Acid (VITAMIN C PO), 1 tablet daily, Disp: , Rfl:     therapeutic multivitamin-minerals (THERAGRAN-M) tablet, Take 1 tablet by mouth nightly , Disp: , Rfl:     Cranberry 500 MG CAPS, Take 1,000 mg by mouth nightly , Disp: , Rfl:     Lab Results   Component Value Date    WBC 6.5 10/02/2020    RBC 4.24 10/02/2020    HGB 13.7 10/02/2020    HCT 40.0 10/02/2020    MCV 94.3 10/02/2020    MCH 32.4 10/02/2020    MCHC 34.4 10/02/2020    MPV 6.8 10/02/2020    NEUTOPHILPCT 74.9 10/29/2018    LYMPHOPCT 17.2 10/29/2018    MONOPCT 6.0 10/29/2018    EOSRELPCT 1.4 10/29/2018    BASOPCT 0.5 10/29/2018    NEUTROABS 7.4 10/29/2018    LYMPHSABS 1.7 10/29/2018    MONOSABS 0.6 10/29/2018    EOSABS 0.1 10/29/2018     Lab Results   Component Value Date     10/02/2020    K 3.6 10/02/2020    K 3.3 10/10/2018     10/02/2020    CO2 26 10/02/2020    ANIONGAP 11 10/02/2020    GLUCOSE 91 10/02/2020    BUN 17 10/02/2020    CREATININE 0.8 10/02/2020    LABGLOM >60 10/02/2020    GFRAA >60 10/02/2020    GFRAA >60 01/04/2013    CALCIUM 9.5 10/02/2020    PROT 7.2 10/02/2020    PROT 7.6 01/04/2013    LABALBU 4.3 10/02/2020    AGRATIO 1.5 10/02/2020    BILITOT 0.7 10/02/2020    ALKPHOS 78 10/02/2020    ALT 26 10/02/2020    AST 32 10/02/2020    GLOB 2.9 10/02/2020     Lab Results   Component Value Date    CHOL 150 10/02/2020    TRIG 80 10/02/2020    HDL 61 10/02/2020    HDL 58 02/01/2012    LDLCALC 73 10/02/2020    LABVLDL 16 10/02/2020     Lab Results   Component Value Date    TSHREFLEX 2.44 10/02/2020     Lab Results   Component Value Date    IRON 72 08/27/2019    TIBC 292 08/27/2019    LABIRON 25 08/27/2019     Lab Results   Component Value Date    BMLFMOFE30 6864 08/27/2019    FOLATE >20.00 08/27/2019     Lab Results   Component Value Date    VITD25 50.2 08/27/2019     Lab Results   Component Value Date    LABA1C 5.6 11/22/2019    .9 12/28/2011       Patient Active Problem List   Diagnosis    Essential hypertension    History of pulmonary embolism    Osteoarthritis of knee    PAULA (obstructive sleep apnea)    Asthma    Status post gastric banding    Coronary artery disease    Morbid obesity with BMI of 40.0-44.9, adult ([facility] [address]    Hyperlipidemia, unspecified    Arthritis    Anxiety    Chronic cough    PAF (paroxysmal atrial fibrillation) ([facility] [address]    Atrial flutter ([facility] [address]       Review of Systems   Constitutional: Negative. HENT: Negative. Eyes: Negative. Respiratory: Negative. Cardiovascular: Negative. Gastrointestinal: Negative. Skin: Negative. Neurological: Negative.       PHYSICAL the registered dietitian for continued follow up. I agree with recommendations and plan. She is not exercising, but is trying to move around the house more. Encouraged continued physical activity as she tolerates. I also encouraged her to reach out to her PCP if she feels she needs more help managing her mood. She reports she does not feel she is \"at that point\" yet. We will see her back in 6 weeks for continued follow up via telemedicine. A total of 15 minutes was spent conversing with the patient and over half of that time was spent counseling the patient on proper dietary behaviors and exercise. An electronic signature was used to authenticate this note. Pursuant to the emergency declaration under the [facility], [facility]5 waiver authority and the [employer] Act, this Virtual  Visit was conducted, with patient's consent, to reduce the patient's risk of exposure to COVID-19 and provide continuity of care for an established patient. Services were provided through a video synchronous discussion virtually to substitute for in-person clinic visit.

## 2020-11-18 ENCOUNTER — TELEMEDICINE (OUTPATIENT)
Dept: BARIATRICS/WEIGHT MGMT | Age: 75
End: 2020-11-18
Payer: MEDICARE

## 2020-11-18 ENCOUNTER — ANTI-COAG VISIT (OUTPATIENT)
Dept: PHARMACY | Age: 75
End: 2020-11-18
Payer: MEDICARE

## 2020-11-18 ENCOUNTER — OFFICE VISIT (OUTPATIENT)
Dept: CARDIOLOGY CLINIC | Age: 75
End: 2020-11-18
Payer: MEDICARE

## 2020-11-18 VITALS
WEIGHT: 269.2 LBS | OXYGEN SATURATION: 96 % | HEIGHT: 64 IN | DIASTOLIC BLOOD PRESSURE: 70 MMHG | HEART RATE: 73 BPM | BODY MASS INDEX: 45.96 KG/M2 | SYSTOLIC BLOOD PRESSURE: 118 MMHG

## 2020-11-18 VITALS — TEMPERATURE: 97 F

## 2020-11-18 LAB — INTERNATIONAL NORMALIZATION RATIO, POC: 2.7

## 2020-11-18 PROCEDURE — G8417 CALC BMI ABV UP PARAM F/U: HCPCS | Performed by: NURSE PRACTITIONER

## 2020-11-18 PROCEDURE — 96156 HLTH BHV ASSMT/REASSESSMENT: CPT | Performed by: SOCIAL WORKER

## 2020-11-18 PROCEDURE — 85610 PROTHROMBIN TIME: CPT

## 2020-11-18 PROCEDURE — G8484 FLU IMMUNIZE NO ADMIN: HCPCS | Performed by: NURSE PRACTITIONER

## 2020-11-18 PROCEDURE — 1036F TOBACCO NON-USER: CPT | Performed by: NURSE PRACTITIONER

## 2020-11-18 PROCEDURE — 4040F PNEUMOC VAC/ADMIN/RCVD: CPT | Performed by: NURSE PRACTITIONER

## 2020-11-18 PROCEDURE — 3017F COLORECTAL CA SCREEN DOC REV: CPT | Performed by: NURSE PRACTITIONER

## 2020-11-18 PROCEDURE — G9899 SCRN MAM PERF RSLTS DOC: HCPCS | Performed by: NURSE PRACTITIONER

## 2020-11-18 PROCEDURE — G8399 PT W/DXA RESULTS DOCUMENT: HCPCS | Performed by: NURSE PRACTITIONER

## 2020-11-18 PROCEDURE — 99214 OFFICE O/P EST MOD 30 MIN: CPT | Performed by: NURSE PRACTITIONER

## 2020-11-18 PROCEDURE — 1090F PRES/ABSN URINE INCON ASSESS: CPT | Performed by: NURSE PRACTITIONER

## 2020-11-18 PROCEDURE — G8427 DOCREV CUR MEDS BY ELIG CLIN: HCPCS | Performed by: NURSE PRACTITIONER

## 2020-11-18 PROCEDURE — 1123F ACP DISCUSS/DSCN MKR DOCD: CPT | Performed by: NURSE PRACTITIONER

## 2020-11-18 PROCEDURE — 93000 ELECTROCARDIOGRAM COMPLETE: CPT | Performed by: NURSE PRACTITIONER

## 2020-11-18 PROCEDURE — 99211 OFF/OP EST MAY X REQ PHY/QHP: CPT

## 2020-11-18 RX ORDER — FUROSEMIDE 20 MG/1
20 TABLET ORAL DAILY
Qty: 30 TABLET | Refills: 1 | Status: SHIPPED | OUTPATIENT
Start: 2020-11-18 | End: 2022-08-15 | Stop reason: ALTCHOICE

## 2020-11-18 NOTE — PROGRESS NOTES
[address]   Electrophysiology  AIME Kinsey-CNP  Attending EP: Dr. Krupa Valerio   Date: 11/18/2020  I had the privilege of visiting Kyle Lizama in the office. Chief Complaint:   Chief Complaint   Patient presents with    Atrial Fibrillation     History of Present Illness: History obtained from patient and medical record. Kyle Lizama is 77 y.o. female with a past medical history of HTN, HLD, PAULA, PE, obesity, and atrial fibrillation. She was found to be in afib 2011 after lap-band surgery. She is warfarin for hx of PE and follows with [facility] clinic and s/p [place] filter. She had a fall that was precipitated with dizziness and was found to have neck fracture and forehead hematoma. Since then she has had issues with her speech. S/p LHC 12/14/2018 showed normal coronaries. S/p unsuccessful cardioversion 12/14/2018. She uses [employer] device at home. Interval history: Today, Kyle Lizama is being seen for routine follow up. HR is 50's today and her normal HR was 60's prior to having afib. Denies lightheadedness, dizziness or syncope. On 11/11 she had one episode of SOB while sitting that lasted a few minutes. She took her ECG with [employer] [facility] and she brought it today and is shows SR with PACs. Denies CP. She has chronic intermittent BLE edema. She monitors her diet and sodium intake. Since the ablation she has noticed more swelling and has been more strict on sodium intake without improvement. She has been on indapamide for 15 years or more. She required lasix while IP. Denies having chest pain, palpitations, shortness of breath, orthopnea/PND, cough, or dizziness at the time of this visit. With regard to medication therapy the patient has been compliant with prescribed regimen. She has tolerated therapy to date. Allergies:   Allergies   Allergen Reactions    Belsomra [Suvorexant] Other (See Comments)     Nightmares      Avelox [Moxifloxacin Hcl In Nacl] Rash    Levofloxacin Rash    Sulfa Antibiotics Rash     Home Medications:  Prior to Visit Medications    Medication Sig Taking?  Authorizing Provider   potassium chloride (KLOR-CON M) 20 MEQ extended release tablet TAKE 1 TABLET BY MOUTH  DAILY WITH BREAKFAST  AIME Jose CNP   clonazePAM (KLONOPIN) 1 MG tablet TAKE 1 TABLET BY MOUTH IN  THE EVENING  Sree Bashir MD   propafenone (RYTHMOL) 225 MG tablet Take 1 tablet by mouth every 8 hours  AIME Jose CNP   verapamil (CALAN SR) 240 MG extended release tablet Take 0.5 tablets by mouth nightly  AIME Jose CNP   trimethoprim (TRIMPEX) 100 MG tablet TAKE 1 TABLET BY MOUTH  EVERY 48 HOURS  Sree Bashir MD   gabapentin (NEURONTIN) 300 MG capsule TAKE 2 CAPSULES BY MOUTH 3  TIMES DAILY  Sree Bashir MD   fluticasone-salmeterol (ADVAIR) 250-50 MCG/DOSE AEPB INHALE ONE PUFF BY MOUTH EVERY 12 HOURS  Sree Bashir MD   warfarin (COUMADIN) 5 MG tablet TAKE 1 TABLETS BY MOUTH ON  MONDAY AND  FRIDAY ONLY AND TAKE 1.5 TABLETS BY MOUTH  ALL OTHER DAYS  [facility] C[facility]   indapamide (LOZOL) 1.25 MG tablet TAKE 1 TABLET BY MOUTH  EVERY MORNING  Sree Bashir MD   montelukast (SINGULAIR) 10 MG tablet TAKE 1 TABLET BY MOUTH  NIGHTLY  Sree Bashir MD   pantoprazole ([address]) 40 MG tablet TAKE 1 TABLET BY MOUTH  EVERY MORNING BEFORE  BREAKFAST  Sree Bashir MD   acetaminophen (TYLENOL) 500 MG tablet Take 500 mg by mouth every 6 hours as needed for Pain  Historical Provider, MD   diphenhydrAMINE-APAP, sleep, (TYLENOL PM EXTRA STRENGTH)  MG tablet Take 1 tablet by mouth nightly as needed for Sleep  Historical Provider, MD   PROAIR  (90 Base) MCG/ACT inhaler INHALE TWO PUFFS BY MOUTH EVERY 4 HOURS AS NEEDED FOR SHORTNESS OF BREATH  Sree Bashir MD   b complex vitamins capsule Take 1 capsule by mouth daily  Historical Provider, MD father; Breast Cancer in her sister; Depression in her mother; Heart Disease in her father; High Blood Pressure in her father and mother; Stroke in her father. Denies family history of sudden cardiac death, arrhythmia, premature CAD    Review of System:  · Constitutional: No fevers, chills,  Weakness + weight gain   · HEENT: No visual changes. No mouth sores or sore throat. · Cardiovascular: denies chest pain, denies dyspnea on exertion, denies palpitations or denies loss of consciousness. No cough, hemoptysis, denies pleuritic pain, or phlebitis. denies dizziness. · Respiratory: denies cough or wheezing. No hematemesis. · Gastrointestinal: No abdominal pain, blood in stools. · Genitourinary: No dysuria, urgency or hematuria. · Musculoskeletal: denies gait disturbance, No focal muscle weakness. · Integumentary: No rash or pruritis. · Neurological: No headache, change in muscle strength, numbness or tingling. · Psychiatric: No confusion, anxiety, or depression. · Endocrine: No temperature intolerance. No excessive thirst, fluid intake, or urination. · Hem/Lymph: Denies abnormal bruising or bleeding. Physical Examination:  There were no vitals filed for this visit. Wt Readings from Last 3 Encounters:   10/26/20 263 lb (119.3 kg)   10/14/20 265 lb (120.2 kg)   10/12/20 265 lb (120.2 kg)      Constitutional: Cooperative and in no apparent distress, and appears well nourished   Skin: Warm and pink; no pallor, cyanosis, bruising, or clubbing   HEENT: Symmetric and normocephalic. Conjunctiva pink with clear sclera. Mucus membranes pink and moist. No visible masses/goiter   Respiratory: Respirations symmetric and unlabored. Lungs clear to auscultation bilaterally, no wheezing, rhonchi, or crackles.  Cardiovascular:  regular rate and rhythm. S1 & S2 present without murmurs, rubs, or gallops. Peripheral pulses 2+, capillary refill < 3 seconds. negative elevation of JVP.  No peripheral edema   Gastrointestinal: Abdomen soft and round.  Musculoskeletal:  No focal weakness.  Neurological/Psych: Awake and orientated to person, place and time. Calm affect, appropriate mood. Pertinent labs, diagnostic, device, and imaging results reviewed as a part of this visit    LABS    CBC:   Lab Results   Component Value Date    WBC 6.5 10/02/2020    HGB 13.7 10/02/2020    HCT 40.0 10/02/2020    MCV 94.3 10/02/2020     10/02/2020     BMP:   Lab Results   Component Value Date    CREATININE 0.8 10/02/2020    BUN 17 10/02/2020     10/02/2020    K 3.6 10/02/2020     10/02/2020    CO2 26 10/02/2020     CrCl cannot be calculated (Unknown ideal weight.). No results found for: BNP    Thyroid:   Lab Results   Component Value Date    TSH 3.53 2011     Lipid Panel:   Lab Results   Component Value Date    CHOL 150 10/02/2020    HDL 61 10/02/2020    HDL 58 2012    TRIG 80 10/02/2020     LFTs:  Lab Results   Component Value Date    ALT 26 10/02/2020    AST 32 10/02/2020    ALKPHOS 78 10/02/2020    BILITOT 0.7 10/02/2020     Coags:   Lab Results   Component Value Date    PROTIME 24.9 (H) 10/03/2020    INR 2.90 (H) 10/26/2020    APTT 27.4 2011     EC2020 SB HR 50, , QRS 88, QTc 481    Echo: 3/13/2018   Summary   Normal left ventricle size, wall thickness and systolic function with an   estimated ejection fraction of 60%. Mild mitral regurgitation.   GXT: 2018  Summary         No EKG evidence for ischemia with exercise         Normal LV size and systolic function.         Myocardial perfusion imaging with small area of decreased uptake in the     anteroapical wall with stress with redistribution at rest consistent with     ischemia.         Scheduled for cath in 2 weeks.           Cath: 2018  Findings:                      LM       Normal              LAD     Normal              Cx        Normal              RCA     Normal              LVG     Not performed - aorta very tortuous              EDP     Not obtained - aorta very tortuous  Intervention:  None  Recs:              Continued aggressive medical tx and risk factor modification    Assessment:  Paroxysmal Atrial Fibrillation  - ECG today shows SB 50  - On propafenone 225 mg tid and verapamil 240 mg daily   - No recurrent episodes, she can feel when she is in afib  - [id number] score: 3 (age, gender, HTN) ; [id number] Vasc score and anticoagulation discussed. High risk for stroke and thromboembolism. Anticoagulation is recommended.   ~ On warfarin, denies s/s of bleeding, follows at [facility]  - Afib risk factors including age, HTN, obesity, inactivity and PAULA were discussed with patient. Risk factor modification recommended   ~ TSH 2.44 (10/2/2020)     - Treatment options including cardioversion, rate control strategy, antiarrhythmics, anticoagulation and possible ablation were discussed with patient. Risks, benefits and alternative of each treatment options were explained. All questions answered    ~ S/p RFCA of afib w/ PVI (10/3/2020)    ~ S/p DCCV 10/14/2020 and 10/26/2020   SOB/BLE Edema/Systolic murmur   - New episode of SOB at rest    - Increased BLE edema since ablation   - No MAXWELL to review from procedure and last Echo 2018 was normal with mild MR   - Repeat Echo and add lasix prn  HTN-goal <130/80   - Controlled   - Continue current medications   - Encouraged patient to check BP at home, log and bring to office visits  - Discussed lifestyle modifications, weight loss, low sodium diet  PAULA   - Adherent to therapy wears CPAP nightly    - Discussed long term effects of unmanaged PAULA on heart   Obesity: Body mass index is 46.21 kg/m². - Discussed weight loss and lifestyle modifications.      Plan  - Echocardiogram  - Add lasix 20 mag daily for 3 days and then PRN  - Add magnesium supplement   - Increase potassium to 2 tablets on days you take the lasix  - Repeat BMP/mag in 2 weeks    F/U: Follow-up with EP in 3 months MXA   -Follow up with device clinic as scheduled  -Call Latoya Wynne at [phone] with any questions    Diet & Exercise:   The patient is counseled to follow a low salt diet to assure blood pressure remains controlled for cardiovascular risk factor modification   The patient is counseled to avoid excess caffeine, and energy drinks as this may exacerbated ectopy and arrhythmia   The patient is counseled to lose weight to control cardiovascular risk factors   Exercise program discussed: To improve overall cardiovascular health, the patient is instructed to increase cardiovascular related activities with a goal of 150 min/week of moderate level activity or 10,000 steps per day. Encouraged to perform as much activity as tolerated     I have addressed the patient's cardiac risk factors and adjusted pharmacologic treatment as needed. In addition, I have reinforced the need for patient directed risk factor modification. I independently reviewed the device check interrogation and ECG    All questions and concerns were addressed with the patient. Alternatives to treatment were discussed. Thank you for allowing to us to participate in the care of Devin Gayle.     AIME Dawkins-CNP  [address]    Office: [phone]   \

## 2020-11-18 NOTE — PROGRESS NOTES
Berhane Stone is a 77 y.o. female evaluated via video virtual visit on 11/18/2020. Berhane Stoen presents today with diagnosis of individual counseling encounter related to behavioral health concerns. Patient is presenting for initial assessment. Patient presented as open throughout virtual appointment. Presenting Problem:   Patient has been experiencing some emotional eating over the past month. States she believes it is due to the holidays and missing her family. Home life / Family relationships / Supports:   Patient lives with her  and dog and cat. Patient has 3 children, 3 grandchildren and 4 grandchildren. States that she misses her family a lot. Hobbies/Positives:   Enjoys reading, crocheting. Employment hx  n/a    Psychiatric hx  \"I have some depression, I take celexa in the mornings. I'm not sure it helps as much as it used to\"     Trauma Hx  n/a    Daily Health Concerns/Limitations  See health history     Substance Use:  n/a    Current needs / Limitations   Struggles with sweet and salty cravings     Additional Questions/Concerns per patient  N/a    Behaviorist overview:   Discussed talking with doctor about medication     Goals      Make Healther Lifestyle choices      Talk with PCP about possible medication changes   Utilize distraction techniques for emotional eating  Engage in new activities when feeling down (games she enjoys,etc)              Consent:  She and/or health care decision maker is aware that that she may receive a bill for this telephone service, depending on her insurance coverage, and has provided verbal consent to proceed: Yes      I affirm this is a Patient Initiated Episode with an Established Patient who has not had a related appointment within my department in the past 7 days or scheduled within the next 24 hours.     Total Time: minutes: 11-20 minutes    Note: not billable if this call serves to triage the patient into an appointment for the relevant concern      VIKTORIYA Qiu

## 2020-11-18 NOTE — PATIENT INSTRUCTIONS
- Echocardiogram  - Add lasix 20 mag daily/PRN  - Increase potassium to 2 tablets on days you take the lasix  - Add magnesium supplement over the counter  - Weigh yourself daily, if you gain more than 3 lbs in a day or 5 lbs in a week call the office  - limit sodium to 2 grams daily and fluid to 64 ounces daily   - Call office if swelling not improved or if you are requiring lasix daily  - Get blood work in 2 weeks if requiring lasix daily   - Follow up in 3 months or sooner if symptoms not improved

## 2020-11-18 NOTE — PROGRESS NOTES
Ms. Mary Stuart is a 77 y.o. y/o female with history of DVT, PE, Afib   She presents today for anticoagulation monitoring and adjustment. Pertinent PMH: HTN, Gastric Banding,CAD, diskectomy with an artifical spinal disk implant in September 2012. Patient Reported Findings:  Yes     No  [x]   []       Patient verifies current dosing regimen as listed  []   [x]       S/S bleeding/bruising/swelling/SOB states that she has been wheezing more and had more SOB d/t allergies   []   [x]       Blood in urine or stool  [x]   []       Procedures scheduled in the future at this time  Had CV 10/26, INR therapeutic   []   [x]       Missed Dose   []   [x]       Extra Dose   []   [x]       Change in medications  decreased tylenol intake to 1-2 times a day --> continues tylenol, propafenone increased --> prednisone 40 mg daily x 5 days started 10/13 --> no changes  []   [x]       Change in health/diet/appetite  Patient states that she feels like she has returned to normal vit k intake --> diet fluctuates causing INR to fluctuate. Discussed ways to improve consistency with vit k intake  --> states that she has cut back spinach and cabbage intake to twice a week --> has returned to eating more vit k--> no changes, no NVD   []   [x]       Change in alcohol use    []   [x]       Change in activity  []   [x]       Hospital admission  []   [x]       Emergency department visit   []   [x]       Other complaints    Clinical Outcomes:  Yes     No  []   [x]       Major bleeding event  []   [x]       Thromboembolic event  Gets warfarin through MD    Duration of warfarin Therapy: indefinite  INR Range:  2.0-3.0     INR is 2.7 today  Continue weekly dose of 5 mg Mon, Wed and Fri and 7.5 mg all other days of the week   Encouraged to maintain a consistency of vegetables/salads.   Recheck INR in 4 weeks, 12/15    Referring PCP is Earney Severs  INR (no units)   Date Value   10/26/2020 2.90 (H)   10/22/2020 1.8   10/14/2020 2.2 10/03/2020 2.13 (H)   10/02/2020 2.30 (H)      CLINICAL PHARMACY CONSULT: MED RECONCILIATION/REVIEW ADDENDUM    For Pharmacy Admin Tracking Only    PHSO: Yes  Total # of Interventions Recommended: 0  - Maintenance Safety Lab Monitoring #: 1  Total Interventions Accepted: 0  Time Spent (min): 15    Dayron Barrios, PharmD

## 2020-11-23 RX ORDER — PROPAFENONE HYDROCHLORIDE 225 MG/1
225 TABLET, FILM COATED ORAL EVERY 8 HOURS
Qty: 90 TABLET | Refills: 0 | Status: SHIPPED | OUTPATIENT
Start: 2020-11-23 | End: 2020-11-24 | Stop reason: SDUPTHER

## 2020-11-24 RX ORDER — PROPAFENONE HYDROCHLORIDE 225 MG/1
225 TABLET, FILM COATED ORAL EVERY 8 HOURS
Qty: 270 TABLET | Refills: 1 | Status: ON HOLD | OUTPATIENT
Start: 2020-11-24 | End: 2021-05-06 | Stop reason: SDUPTHER

## 2020-11-24 NOTE — TELEPHONE ENCOUNTER
Pt calling about her Rx propafenone (RYTHMOL) 225 MG tablet 1 tab 3 times a day needs to go to [facility] for 90 day supply. It got sent to [facility] by mistake (and for wrong quantity).  Pls call to advise Thank you

## 2020-12-07 RX ORDER — ATORVASTATIN CALCIUM 80 MG/1
80 TABLET, FILM COATED ORAL DAILY
Qty: 90 TABLET | Refills: 3 | Status: SHIPPED | OUTPATIENT
Start: 2020-12-07 | End: 2021-12-09

## 2020-12-07 RX ORDER — ALLOPURINOL 300 MG/1
300 TABLET ORAL DAILY
Qty: 90 TABLET | Refills: 3 | Status: SHIPPED | OUTPATIENT
Start: 2020-12-07 | End: 2021-12-09

## 2020-12-11 RX ORDER — CITALOPRAM 40 MG/1
40 TABLET ORAL DAILY
Qty: 90 TABLET | Refills: 3 | Status: SHIPPED | OUTPATIENT
Start: 2020-12-11 | End: 2021-12-09

## 2020-12-15 ENCOUNTER — ANTI-COAG VISIT (OUTPATIENT)
Dept: PHARMACY | Age: 75
End: 2020-12-15
Payer: MEDICARE

## 2020-12-15 VITALS — TEMPERATURE: 97.1 F

## 2020-12-15 LAB — INTERNATIONAL NORMALIZATION RATIO, POC: 2.5

## 2020-12-15 PROCEDURE — 85610 PROTHROMBIN TIME: CPT

## 2020-12-15 PROCEDURE — 99211 OFF/OP EST MAY X REQ PHY/QHP: CPT

## 2020-12-15 NOTE — PROGRESS NOTES
Ms. Lashae Victor is a 77 y.o. y/o female with history of DVT, PE, Afib   She presents today for anticoagulation monitoring and adjustment. Pertinent PMH: HTN, Gastric Banding,CAD, diskectomy with an artifical spinal disk implant in September 2012. Patient Reported Findings:  Yes     No  [x]   []       Patient verifies current dosing regimen as listed  []   [x]       S/S bleeding/bruising/swelling/SOB states that she has been wheezing more and had more SOB d/t allergies   []   [x]       Blood in urine or stool  []   [x]       Procedures scheduled in the future at this time   []   [x]       Missed Dose   []   [x]       Extra Dose   [x]   []       Change in medications  decreased tylenol intake to 1-2 times a day --> continues tylenol, propafenone increased --> prednisone 40 mg daily x 5 days started 10/13 --> started lasix, magnesium, increased potassium   []   [x]       Change in health/diet/appetite  Patient states that she feels like she has returned to normal vit k intake --> diet fluctuates causing INR to fluctuate. Discussed ways to improve consistency with vit k intake  --> states that she has cut back spinach and cabbage intake to twice a week --> has returned to eating more vit k--> no changes, no NVD   []   [x]       Change in alcohol use    []   [x]       Change in activity  []   [x]       Hospital admission  []   [x]       Emergency department visit   []   [x]       Other complaints    Clinical Outcomes:  Yes     No  []   [x]       Major bleeding event  []   [x]       Thromboembolic event  Gets warfarin through MD    Duration of warfarin Therapy: indefinite  INR Range:  2.0-3.0     INR is 2.5 today  Continue weekly dose of 5 mg Mon, Wed and Fri and 7.5 mg all other days of the week   Encouraged to maintain a consistency of vegetables/salads.   Recheck INR in 4 weeks, 1/12/21    Referring PCP is Osmani Reid  INR (no units)   Date Value   11/18/2020 2.7   10/26/2020 2.90 (H)   10/22/2020 1.8 10/14/2020 2.2   10/03/2020 2.13 (H)   10/02/2020 2.30 (H)      CLINICAL PHARMACY CONSULT: MED RECONCILIATION/REVIEW ADDENDUM    For Pharmacy Admin Tracking Only    PHSO: Yes  Total # of Interventions Recommended: 0  - Maintenance Safety Lab Monitoring #: 1  Total Interventions Accepted: 0  Time Spent (min): 15    Nida AlvarezD

## 2020-12-17 ENCOUNTER — TELEMEDICINE (OUTPATIENT)
Dept: BARIATRICS/WEIGHT MGMT | Age: 75
End: 2020-12-17
Payer: MEDICARE

## 2020-12-17 PROCEDURE — 96158 HLTH BHV IVNTJ INDIV 1ST 30: CPT | Performed by: SOCIAL WORKER

## 2020-12-17 NOTE — PROGRESS NOTES
Graciela Syed is a 77 y.o. female evaluated via video virtual visit on 12/17/2020. Graciela Syed presents today with diagnosis of individual counseling encounter related to behavioral health concerns. Patient presented as open and honest throughout virtual appointment. This note will not be viewable in [facility] for the following reason(s). This is a Psychotherapy Note. Consent:  She and/or health care decision maker is aware that that she may receive a bill for this telephone service, depending on her insurance coverage, and has provided verbal consent to proceed: Yes      Patient's Current Goals:  Goals      Make Healther Lifestyle choices      Talk with PCP about possible medication changes   Utilize distraction techniques for emotional eating  Engage in new activities when feeling down (games she enjoys,etc)              Updates since last visit:   States that the distraction techniques discussed last visit have helped her. Patient concerns or questions  Struggling with emotions and missing her family a lot during the holidays. Behaviorist overview:   Overall, patient is doing well with her eating routine. Patient is struggling with mental exhaustion and fatigue related to missing her family, especially during holiday season. Encouraged patient to focus on 1 positive thing each day as a way to get through the day to day. Follow up as needed. I affirm this is a Patient Initiated Episode with an Established Patient who has not had a related appointment within my department in the past 7 days or scheduled within the next 24 hours. 16 minutes was spent in virtual visit counseling with patient. Follow up care has been discussed with patient in relation to goals and concerns addressed today.      Note: not billable if this call serves to triage the patient into an appointment for the relevant concern      VIKTORIYA Rosen

## 2020-12-23 ENCOUNTER — HOSPITAL ENCOUNTER (OUTPATIENT)
Age: 75
Discharge: HOME | End: 2020-12-23
Payer: MEDICARE

## 2020-12-23 ENCOUNTER — HOSPITAL ENCOUNTER (OUTPATIENT)
Dept: NON INVASIVE DIAGNOSTICS | Age: 75
Discharge: HOME | End: 2020-12-23
Payer: MEDICARE

## 2020-12-23 ENCOUNTER — TELEPHONE (OUTPATIENT)
Dept: CARDIOLOGY CLINIC | Age: 75
End: 2020-12-23

## 2020-12-23 LAB
ANION GAP SERPL CALCULATED.3IONS-SCNC: 11 MMOL/L (ref 3–16)
BUN BLDV-MCNC: 19 MG/DL (ref 7–20)
CALCIUM SERPL-MCNC: 9.7 MG/DL (ref 8.3–10.6)
CHLORIDE BLD-SCNC: 102 MMOL/L (ref 99–110)
CO2: 28 MMOL/L (ref 21–32)
CREAT SERPL-MCNC: 0.9 MG/DL (ref 0.6–1.2)
GFR AFRICAN AMERICAN: >60
GFR NON-AFRICAN AMERICAN: >60
GLUCOSE BLD-MCNC: 93 MG/DL (ref 70–99)
LV EF: 58 %
LVEF MODALITY: NORMAL
MAGNESIUM: 2 MG/DL (ref 1.8–2.4)
POTASSIUM SERPL-SCNC: 4.1 MMOL/L (ref 3.5–5.1)
SODIUM BLD-SCNC: 141 MMOL/L (ref 136–145)

## 2020-12-23 PROCEDURE — 80048 BASIC METABOLIC PNL TOTAL CA: CPT

## 2020-12-23 PROCEDURE — 36415 COLL VENOUS BLD VENIPUNCTURE: CPT

## 2020-12-23 PROCEDURE — 93306 TTE W/DOPPLER COMPLETE: CPT

## 2020-12-23 PROCEDURE — 83735 ASSAY OF MAGNESIUM: CPT

## 2020-12-29 ENCOUNTER — TELEPHONE (OUTPATIENT)
Dept: CARDIOLOGY CLINIC | Age: 75
End: 2020-12-29

## 2020-12-29 NOTE — TELEPHONE ENCOUNTER
Called and reviewed echo results. Reassurance provided regarding moderate AS. Discussed need for routine echoes to assess the valve. Pt VU.  No further questions    Nataly Brandon, APRN-CNP

## 2021-01-06 ENCOUNTER — TELEMEDICINE (OUTPATIENT)
Dept: BARIATRICS/WEIGHT MGMT | Age: 76
End: 2021-01-06
Payer: MEDICARE

## 2021-01-06 DIAGNOSIS — E66.01 MORBID OBESITY WITH BMI OF 45.0-49.9, ADULT (HCC): Primary | ICD-10-CM

## 2021-01-06 DIAGNOSIS — Z71.3 ENCOUNTER FOR DIETARY COUNSELING AND SURVEILLANCE: ICD-10-CM

## 2021-01-06 PROCEDURE — 1123F ACP DISCUSS/DSCN MKR DOCD: CPT | Performed by: NURSE PRACTITIONER

## 2021-01-06 PROCEDURE — 1090F PRES/ABSN URINE INCON ASSESS: CPT | Performed by: NURSE PRACTITIONER

## 2021-01-06 PROCEDURE — G8399 PT W/DXA RESULTS DOCUMENT: HCPCS | Performed by: NURSE PRACTITIONER

## 2021-01-06 PROCEDURE — 4040F PNEUMOC VAC/ADMIN/RCVD: CPT | Performed by: NURSE PRACTITIONER

## 2021-01-06 PROCEDURE — 3017F COLORECTAL CA SCREEN DOC REV: CPT | Performed by: NURSE PRACTITIONER

## 2021-01-06 PROCEDURE — G8427 DOCREV CUR MEDS BY ELIG CLIN: HCPCS | Performed by: NURSE PRACTITIONER

## 2021-01-06 PROCEDURE — 99213 OFFICE O/P EST LOW 20 MIN: CPT | Performed by: NURSE PRACTITIONER

## 2021-01-06 ASSESSMENT — ENCOUNTER SYMPTOMS
RESPIRATORY NEGATIVE: 1
EYES NEGATIVE: 1
GASTROINTESTINAL NEGATIVE: 1

## 2021-01-06 NOTE — PROGRESS NOTES
Yobani Ann gained 2.7 lbs over past 7 weeks. Pt is s/p band in 2011, previously following weight watchers for additional accountability - stopped over the holidays, but has re-started this. Pt working with behaviorist Fatou. Has been reducing nut intake d/t recent fluid retention, recently started on lasix. Due to the COVID-19 restrictions on close contact interactions the patient's visit was conducted via telephone in joo of a face to face visit. The patient is here through telemedicine for their MWM visit. Treatment plan details: 1200 kcal LC   Is patient adhering to treatment plan: Having higher CHO options    Is pt eating 4-5 times each day? Eats 4 x day    Is pt including lean protein with all meals and snacks? Yes - egg with perez/sausage and english muffin, salted nuts, 1/2 sandwich with turkey/cheese with miracle whip, ham and bean soup with sliced carrots/onions    Is pt avoiding added sugars and starchy foods? Off track during the holidays - cookies, but no longer eating    Consuming at least 64oz of calorie free fluids? On 60 oz fluid restriction - drinks coffee, iced tea with sweetener    Amount per meal: 1.5 cups/sitting      Following 30/30/30 rule: Eating in 20 minutes. Avoids eating and drinking at the same time, just takes sips prn.     Supplements: Vit C, (no longer taking Calcium following cardioversion), generic MVI 1 x day, cranberry capsule, B complex 1 x day     Food Intolerances: greasy foods     Participating in intentional exercise?  Taking active steps and doing squats every other day at home     Plan/Goals:   Limit CHO  Use unsalted nuts  Continue walking/squats as tolerated     Handouts: none     Hubbard Lino

## 2021-01-06 NOTE — PROGRESS NOTES
[facility]) Physicians   [facility]    1/6/2021    TELEHEALTH EVALUATION -- Audio/Visual (During ONDES-38 public health emergency)    Medical Weight Loss    HPI: Gina Councilman is a 77 y.o. female with current BMI of 45.9 and current weight of 269.8 pounds. Due to the COVID-19 restrictions on close contact interactions the patient's monthly visit was conducted via audio/video in joo of a face to face visit. Patient has consented to have this visit conducted via audio/video. The patient is here through telemedicine for their medical weight loss visit. She is now on PRN Lasix and has a 60 ounce fluids restriction. She also saw our Behaviorist which she found helpful. Past Medical History:   Diagnosis Date    Allergic     Anxiety 2/2/2017    Arthritis     Asthma     Back pain     Blood circulation, collateral     legs    Depression     GERD (gastroesophageal reflux disease)     Gout     Hypercholesteremia     Hypertension     Movement disorder     7 discs ruptured    Movement disorder     knee replacements    Obesity     Pulmonary emboli (HCC)     Unspecified sleep apnea     uses cpap    Urinary tract infection, chronic     UTI (urinary tract infection)      Past Surgical History:   Procedure Laterality Date    ANKLE FUSION  2010    right, [facility]    BACK SURGERY  09/14/2012    lumbar fusion L-4, L-5 atrificial disc  Janice Bouquet Dr. Charolett Brunner  5-7-11    incision, drainage and debridement of left knee and application of wound vac.     BREAST BIOPSY  08/2018    neg    EYE SURGERY      JOINT REPLACEMENT      bilat knees    JOINT REPLACEMENT  2003    left, [facility]    JOINT REPLACEMENT  2005    right, [facility]    OTHER SURGICAL HISTORY  12-    laparscopic adjustable gastric restrictive procedure    REVISION TOTAL KNEE [facility]ROPLASTY  2006    [facility]    SKIN FULL GRAFT  2011    right, [facility]    TUB[facility] LIGATION      [facility]    UPPER GASTROINTESTINAL ENDOSCOPY  10-    VENA CAVA FILTER PLACEMENT       Family History   Problem Relation Age of Onset    High Blood Pressure Mother     Depression Mother     Stroke Father     High Blood Pressure Father     Asthma Father     Arthritis Father     Heart Disease Father     Breast Cancer Sister     High Cholesterol Neg Hx      Social History     Tobacco Use    Smoking status: Former Smoker     Packs/day: 0.50     Years: 4.00     Pack years: 2.00     Types: Cigarettes     Quit date: 1965     Years since quittin.0    Smokeless tobacco: Never Used   Substance Use Topics    Alcohol use: Not Currently     Comment: 4 t imes a year     I counseled the patient on the importance of not smoking and risks of ETOH. Allergies   Allergen Reactions    Belsomra [Suvorexant] Other (See Comments)     Nightmares      Avelox [Moxifloxacin Hcl In Nacl] Rash    Levofloxacin Rash    Sulfa Antibiotics Rash     There were no vitals filed for this visit. There is no height or weight on file to calculate BMI.     Current Outpatient Medications:     citalopram (CELEXA) 40 MG tablet, TAKE 1 TABLET BY MOUTH  DAILY, Disp: 90 tablet, Rfl: 3    allopurinol (ZYLOPRIM) 300 MG tablet, TAKE 1 TABLET BY MOUTH  DAILY, Disp: 90 tablet, Rfl: 3    atorvastatin (LIPITOR) 80 MG tablet, TAKE 1 TABLET BY MOUTH  DAILY, Disp: 90 tablet, Rfl: 3    propafenone (RYTHMOL) 225 MG tablet, Take 1 tablet by mouth every 8 hours, Disp: 270 tablet, Rfl: 1    furosemide (LASIX) 20 MG tablet, Take 1 tablet by mouth daily, Disp: 30 tablet, Rfl: 1    potassium chloride (KLOR-CON M) 20 MEQ extended release tablet, TAKE 1 TABLET BY MOUTH  DAILY WITH BREAKFAST, Disp: 90 tablet, Rfl: 3    clonazePAM (KLONOPIN) 1 MG tablet, TAKE 1 TABLET BY MOUTH IN  THE EVENING, Disp: 90 tablet, Rfl: 0    verapamil (CALAN SR) 240 MG extended release tablet, Take 0.5 tablets by mouth nightly, Disp: 90 tablet, Rfl: 3    trimethoprim (TRIMPEX) 100 MG tablet, TAKE 1 TABLET BY MOUTH  EVERY 48 HOURS, Disp: 45 tablet, Rfl: 3    gabapentin (NEURONTIN) 300 MG capsule, TAKE 2 CAPSULES BY MOUTH 3  TIMES DAILY, Disp: 540 capsule, Rfl: 3    fluticasone-salmeterol (ADVAIR) 250-50 MCG/DOSE AEPB, INHALE ONE PUFF BY MOUTH EVERY 12 HOURS, Disp: 1 Inhaler, Rfl: 5    warfarin (COUMADIN) 5 MG tablet, TAKE 1 TABLETS BY MOUTH ON  MONDAY AND  FRIDAY ONLY AND TAKE 1.5 TABLETS BY MOUTH  ALL OTHER DAYS, Disp: 143 tablet, Rfl: 1    indapamide (LOZOL) 1.25 MG tablet, TAKE 1 TABLET BY MOUTH  EVERY MORNING, Disp: 90 tablet, Rfl: 3    montelukast (SINGULAIR) 10 MG tablet, TAKE 1 TABLET BY MOUTH  NIGHTLY, Disp: 90 tablet, Rfl: 3    pantoprazole (PROTONIX) 40 MG tablet, TAKE 1 TABLET BY MOUTH  EVERY MORNING BEFORE  BREAKFAST, Disp: 90 tablet, Rfl: 3    acetaminophen (TYLENOL) 500 MG tablet, Take 500 mg by mouth every 6 hours as needed for Pain, Disp: , Rfl:     diphenhydrAMINE-APAP, sleep, (TYLENOL PM EXTRA STRENGTH)  MG tablet, Take 1 tablet by mouth nightly as needed for Sleep, Disp: , Rfl:     PROAIR  (90 Base) MCG/ACT inhaler, INHALE TWO PUFFS BY MOUTH EVERY 4 HOURS AS NEEDED FOR SHORTNESS OF BREATH, Disp: 8.5 g, Rfl: 2    b complex vitamins capsule, Take 1 capsule by mouth daily, Disp: , Rfl:     Ascorbic Acid (VITAMIN C PO), 1 tablet daily, Disp: , Rfl:     therapeutic multivitamin-minerals (THERAGRAN-M) tablet, Take 1 tablet by mouth nightly , Disp: , Rfl:     Cranberry 500 MG CAPS, Take 1,000 mg by mouth nightly , Disp: , Rfl:     Lab Results   Component Value Date    WBC 6.5 10/02/2020    RBC 4.24 10/02/2020    HGB 13.7 10/02/2020    HCT 40.0 10/02/2020    MCV 94.3 10/02/2020    MCH 32.4 10/02/2020    MCHC 34.4 10/02/2020    MPV 6.8 10/02/2020    NEUTOPHILPCT 74.9 10/29/2018    LYMPHOPCT 17.2 10/29/2018    MONOPCT 6.0 10/29/2018    EOSRELPCT 1.4 10/29/2018    BASOPCT 0.5 10/29/2018 NEUTROABS 7.4 10/29/2018    LYMPHSABS 1.7 10/29/2018    MONOSABS 0.6 10/29/2018    EOSABS 0.1 10/29/2018     Lab Results   Component Value Date     12/23/2020    K 4.1 12/23/2020    K 3.3 10/10/2018     12/23/2020    CO2 28 12/23/2020    ANIONGAP 11 12/23/2020    GLUCOSE 93 12/23/2020    BUN 19 12/23/2020    CREATININE 0.9 12/23/2020    LABGLOM >60 12/23/2020    GFRAA >60 12/23/2020    GFRAA >60 01/04/2013    CALCIUM 9.7 12/23/2020    PROT 7.2 10/02/2020    PROT 7.6 01/04/2013    LABALBU 4.3 10/02/2020    AGRATIO 1.5 10/02/2020    BILITOT 0.7 10/02/2020    ALKPHOS 78 10/02/2020    ALT 26 10/02/2020    AST 32 10/02/2020    GLOB 2.9 10/02/2020     Lab Results   Component Value Date    CHOL 150 10/02/2020    TRIG 80 10/02/2020    HDL 61 10/02/2020    HDL 58 02/01/2012    LDLCALC 73 10/02/2020    LABVLDL 16 10/02/2020     Lab Results   Component Value Date    TSHREFLEX 2.44 10/02/2020     Lab Results   Component Value Date    IRON 72 08/27/2019    TIBC 292 08/27/2019    LABIRON 25 08/27/2019     Lab Results   Component Value Date    HMCVMHQH37 9194 08/27/2019    FOLATE >20.00 08/27/2019     Lab Results   Component Value Date    VITD25 50.2 08/27/2019     Lab Results   Component Value Date    LABA1C 5.6 11/22/2019    .9 12/28/2011       Patient Active Problem List   Diagnosis    Essential hypertension    History of pulmonary embolism    Osteoarthritis of knee    PAULA (obstructive sleep apnea)    Asthma    Status post gastric banding    Coronary artery disease    Morbid obesity with BMI of 40.0-44.9, adult ([facility]    Hyperlipidemia, unspecified    Arthritis    Anxiety    Chronic cough    PAF (paroxysmal atrial fibrillation) ([facility]    Atrial flutter ([facility]       Review of Systems   Constitutional: Negative. HENT: Negative. Eyes: Negative. Respiratory: Negative. Cardiovascular: Negative. Gastrointestinal: Negative. Skin: Negative. Neurological: Negative.       PHYSICAL EXAMINATION:    Constitutional: [x] Appears well-developed and well-nourished [x] No apparent distress      [] Abnormal-   Mental status  [x] Alert and awake  [x] Oriented to person/place/time [x]Able to follow commands      Eyes:  EOM    [x]  Normal  [] Abnormal-  Sclera  [x]  Normal  [] Abnormal -         Discharge [x]  None visible  [] Abnormal -    HENT:   [x] Normocephalic, atraumatic. [] Abnormal   [x] Mouth/Throat: Mucous membranes are moist.     External Ears [] Normal  [] Abnormal-     Neck: [x] No visualized mass     Pulmonary/Chest: [x] Respiratory effort normal.  [x] No visualized signs of difficulty breathing or respiratory distress        [] Abnormal-      Musculoskeletal:   [] Normal gait with no signs of ataxia         [x] Normal range of motion of neck        [] Abnormal-     Neurological:        [x] No Facial Asymmetry (Cranial nerve 7 motor function) (limited exam to video visit)          [x] No gaze palsy        [] Abnormal-         Skin:        [x] No significant exanthematous lesions or discoloration noted on facial skin         [] Abnormal-            Psychiatric:       [x] Normal Affect [x] No Hallucinations        [] Abnormal-     Other pertinent observable physical exam findings-     Due to this being a TeleHealth encounter, evaluation of the following organ systems is limited: Vitals/Constitutional/EENT/Resp/CV/GI//MS/Neuro/Skin/Heme-Lymph-Imm. Assessment and Plan:    Patient is here via telemedicine for their 13th medical weight loss visit, up 2.7 lbs and a total weight loss of 11.7 lbs. She is doing well, making dietary and behavior modifications. She did pretty well staying on track over the holidays, but did have some sweets. She has not had any sweets since the new year. She is following dietary recommendations utilizing the 1200 calorie low carb meal plan as a food guide. She is limiting her fluids now to <60 ounces per cardiology.  She did speak with the registered dietitian for continued follow up. I agree with recommendations and plan. She is exercising with staying active at home. Encouraged continued physical activity. We will see her back in 1 month for continued follow up via telemedicine. Time spent on the visit was 20 minutes, including but not limited to reviewing chart, reviewing test results and reports (if applicable), dietary counseling, discussing treatment plan with patient and collaboration with dietician (if applicable). An electronic signature was used to authenticate this note. Pursuant to the emergency declaration under the [facility], [facility] waiver authority and the [employer] Act, this Virtual  Visit was conducted, with patient's consent, to reduce the patient's risk of exposure to COVID-19 and provide continuity of care for an established patient. Services were provided through a video synchronous discussion virtually to substitute for in-person clinic visit.

## 2021-01-06 NOTE — PATIENT INSTRUCTIONS
Key Low Carb Dietary Points:    - Meats (preferably organic or grass fed) are great sources of protein and are low in carbohydrates. Kapoor Mooers with coconut, olive, avocado, or almond oils. - When buying dairy, choose regular or full fat options. - Choose vegetables that grow above ground as they are generally lower in carbohydrates. - Avoid bread, rice, potatoes, pasta and all sources of simple sugars (desserts, soda, breakfast cereals). - Choose beverages that are calorie and sugar free, such as water or flavored buitrago. - When eating fruit, choose berries as a snack option a few times a week. Patient received dietary handouts and education.

## 2021-01-12 ENCOUNTER — VIRTUAL VISIT (OUTPATIENT)
Dept: INTERNAL MEDICINE CLINIC | Age: 76
End: 2021-01-12
Payer: MEDICARE

## 2021-01-12 ENCOUNTER — ANTI-COAG VISIT (OUTPATIENT)
Dept: PHARMACY | Age: 76
End: 2021-01-12
Payer: MEDICARE

## 2021-01-12 VITALS — TEMPERATURE: 96.6 F

## 2021-01-12 DIAGNOSIS — F41.9 ANXIETY: ICD-10-CM

## 2021-01-12 DIAGNOSIS — Z00.00 ROUTINE GENERAL MEDICAL EXAMINATION AT A HEALTH CARE FACILITY: Primary | ICD-10-CM

## 2021-01-12 DIAGNOSIS — Z86.711 HISTORY OF PULMONARY EMBOLISM: ICD-10-CM

## 2021-01-12 DIAGNOSIS — I48.0 PAF (PAROXYSMAL ATRIAL FIBRILLATION) (HCC): ICD-10-CM

## 2021-01-12 LAB — INTERNATIONAL NORMALIZATION RATIO, POC: 3.1

## 2021-01-12 PROCEDURE — 1123F ACP DISCUSS/DSCN MKR DOCD: CPT | Performed by: INTERNAL MEDICINE

## 2021-01-12 PROCEDURE — 85610 PROTHROMBIN TIME: CPT

## 2021-01-12 PROCEDURE — 4040F PNEUMOC VAC/ADMIN/RCVD: CPT | Performed by: INTERNAL MEDICINE

## 2021-01-12 PROCEDURE — 99211 OFF/OP EST MAY X REQ PHY/QHP: CPT

## 2021-01-12 PROCEDURE — G8484 FLU IMMUNIZE NO ADMIN: HCPCS | Performed by: INTERNAL MEDICINE

## 2021-01-12 PROCEDURE — G0438 PPPS, INITIAL VISIT: HCPCS | Performed by: INTERNAL MEDICINE

## 2021-01-12 PROCEDURE — 3017F COLORECTAL CA SCREEN DOC REV: CPT | Performed by: INTERNAL MEDICINE

## 2021-01-12 RX ORDER — CLONAZEPAM 1 MG/1
TABLET ORAL
Qty: 90 TABLET | Refills: 0 | Status: SHIPPED | OUTPATIENT
Start: 2021-01-12 | End: 2021-04-05

## 2021-01-12 ASSESSMENT — LIFESTYLE VARIABLES
HOW OFTEN DO YOU HAVE A DRINK CONTAINING ALCOHOL: 0
HOW MANY STANDARD DRINKS CONTAINING ALCOHOL DO YOU HAVE ON A TYPICAL DAY: 0
HAS A RELATIVE, FRIEND, DOCTOR, OR ANOTHER HEALTH PROFESSIONAL EXPRESSED CONCERN ABOUT YOUR DRINKING OR SUGGESTED YOU CUT DOWN: 0
HOW OFTEN DURING THE LAST YEAR HAVE YOU HAD A FEELING OF GUILT OR REMORSE AFTER DRINKING: 0

## 2021-01-12 ASSESSMENT — PATIENT HEALTH QUESTIONNAIRE - PHQ9: 1. LITTLE INTEREST OR PLEASURE IN DOING THINGS: 1

## 2021-01-12 NOTE — PROGRESS NOTES
Medicare Annual Wellness Visit  Name: Laly Adler Date: 2021   MRN: [id number] Sex: Female   Age: 77 y.o. Ethnicity: Non-/Non    : 1945 Race: Sandra Sterling is here for Medicare AWV and Arthritis (Pt reports having more pain in joints in her shoulder and times where she hurts all over. Take gabapentin for neuropathy and tylenol daily. Brings about a small amount of relief.  )    Screenings for behavioral, psychosocial and functional/safety risks, and cognitive dysfunction are all negative except as indicated below. These results, as well as other patient data from the [address] form, are documented in Flowsheets linked to this Encounter. Allergies   Allergen Reactions    Belsomra [Suvorexant] Other (See Comments)     Nightmares      Avelox [Moxifloxacin Hcl In Nacl] Rash    Levofloxacin Rash    Sulfa Antibiotics Rash       Prior to Visit Medications    Medication Sig Taking?  Authorizing Provider   citalopram (CELEXA) 40 MG tablet TAKE 1 TABLET BY MOUTH  DAILY Yes AIME Funk CNP   allopurinol (ZYLOPRIM) 300 MG tablet TAKE 1 TABLET BY MOUTH  DAILY Yes Jenny Blunt MD   atorvastatin (LIPITOR) 80 MG tablet TAKE 1 TABLET BY MOUTH  DAILY Yes Jenny Blunt MD   propafenone (RYTHMOL) 225 MG tablet Take 1 tablet by mouth every 8 hours Yes AIME Medellin CNP   furosemide (LASIX) 20 MG tablet Take 1 tablet by mouth daily  Patient taking differently: Take 20 mg by mouth daily Three times weekly Yes AIME Beard CNP   potassium chloride (KLOR-CON M) 20 MEQ extended release tablet TAKE 1 TABLET BY MOUTH  DAILY WITH BREAKFAST  Patient taking differently: TAKE 1 TABLET BY MOUTH  DAILY WITH BREAKFAST   Take 2 tablets on the days she takes the furosemide Yes AIME Medellin CNP   clonazePAM (KLONOPIN) 1 MG tablet TAKE 1 TABLET BY MOUTH IN  THE EVENING Yes Jenny Blunt MD verapamil (CALAN SR) 240 MG extended release tablet Take 0.5 tablets by mouth nightly Yes Jose Brown APRN - CNP   gabapentin (NEURONTIN) 300 MG capsule TAKE 2 CAPSULES BY MOUTH 3  TIMES DAILY Yes Nancy Maurer MD   fluticasone-salmeterol (ADVAIR) 250-50 MCG/DOSE AEPB INHALE ONE PUFF BY MOUTH EVERY 12 HOURS Yes Nancy Maurer MD   warfarin (COUMADIN) 5 MG tablet TAKE 1 TABLETS BY MOUTH ON  MONDAY AND  FRIDAY ONLY AND TAKE 1.5 TABLETS BY MOUTH  ALL OTHER DAYS Yes AIME Mora - CNP   indapamide (LOZOL) 1.25 MG tablet TAKE 1 TABLET BY MOUTH  EVERY MORNING Yes Nancy Maurer MD   montelukast (SINGULAIR) 10 MG tablet TAKE 1 TABLET BY MOUTH  NIGHTLY Yes Nancy Maurer MD   pantoprazole (PROTONIX) 40 MG tablet TAKE 1 TABLET BY MOUTH  EVERY MORNING BEFORE  BREAKFAST Yes Nancy Maurer MD   acetaminophen (TYLENOL) 500 MG tablet Take 500 mg by mouth every 6 hours as needed for Pain Yes Historical Provider, MD   diphenhydrAMINE-APAP, sleep, (TYLENOL PM EXTRA STRENGTH)  MG tablet Take 1 tablet by mouth nightly as needed for Sleep Yes Historical Provider, MD   PROAIR  (90 Base) MCG/ACT inhaler INHALE TWO PUFFS BY MOUTH EVERY 4 HOURS AS NEEDED FOR SHORTNESS OF BREATH Yes Nancy Maurer MD   b complex vitamins capsule Take 1 capsule by mouth daily Yes Historical Provider, MD   Ascorbic Acid (VITAMIN C PO) 1 tablet daily Yes Historical Provider, MD   therapeutic multivitamin-minerals (THERAGRAN-M) tablet Take 1 tablet by mouth nightly  Yes Historical Provider, MD   Cranberry 500 MG CAPS Take 1,000 mg by mouth nightly  Yes Historical Provider, MD   trimethoprim (TRIMPEX) 100 MG tablet TAKE 1 TABLET BY MOUTH  EVERY 50 HOURS  Patient not taking: Reported on 1/12/2021  Nancy Maurer MD       Past Medical History:   Diagnosis Date    Allergic     Anxiety 2/2/2017    Arthritis     Asthma     Back pain     Blood circulation, collateral     legs  Depression     GERD (gastroesophageal reflux disease)     Gout     Hypercholesteremia     Hypertension     Movement disorder     7 discs ruptured    Movement disorder     knee replacements    Obesity     Pulmonary emboli (HCC)     Unspecified sleep apnea     uses cpap    Urinary tract infection, chronic     UTI (urinary tract infection)        Past Surgical History:   Procedure Laterality Date    [facility]KLE [facility]USION  2010    right, [facility]    BACK SURGERY  09/14/2012    lumbar fusion L-4, L-5 atrificial disc  [facility] Dr. Amaury Hooks  5-7-11    incision, drainage and debridement of left knee and application of wound vac.     BREAST BIOPSY  08/2018    neg    EYE SURGERY      JOINT REPLACEMENT      bilat knees    [facility]    [facility]    JOINT REPLACEMENT  2005    right, [facility]    OTHER SURGICAL HISTORY  12-    laparscopic adjustable gastric restrictive procedure    REVISION TOTAL KNEE ARTHROPLASTY  2006    [id number] [facility]    SKIN FULL GRAFT  2011    [facility].    [facility]    UPPER GASTROINTESTINAL ENDOSCOPY  10-    VENA CAVA FILTER PLACEMENT         Family History   Problem Relation Age of Onset    High Blood Pressure Mother     Depression Mother     Stroke Father     High Blood Pressure Father     Asthma Father     Arthritis Father     Heart Disease Father     Breast Cancer Sister     High Cholesterol Neg Hx        CareTeam (Including outside providers/suppliers regularly involved in providing care):   Patient Care Team:  Hossein Phillips MD as PCP - General (Internal Medicine)  Latanya Burns MD as PCP - Hematology/Oncology (Hematology and Oncology)  Hossein Phillips MD as PCP - [facility] Empaneled Provider  Diamond Copeland MD as Consulting Physician (Cardiology)  AIME Guzmán CNP as Nurse Practitioner (Nurse Practitioner) JOSEPHINE Fabian as Physician Assistant (Physician Assistant)  Arin Thurman MD (Bariatrics)    Wt Readings from Last 3 Encounters:   11/18/20 269 lb 3.2 oz (122.1 kg)   10/26/20 263 lb (119.3 kg)   10/14/20 265 lb (120.2 kg)     Patient-Reported Vitals 1/12/2021   Patient-Reported Weight 269.8lb   Patient-Reported Height -   Patient-Reported Systolic 920   Patient-Reported Diastolic 68   Patient-Reported Pulse 66   Patient-Reported Temperature -   Patient-Reported SpO2 95%      There is no height or weight on file to calculate BMI. Based upon direct observation of the patient, evaluation of cognition reveals recent and remote memory intact. Patient's complete Health Risk Assessment and screening values have been reviewed and are found in Flowsheets. The following problems were reviewed today and where indicated follow up appointments were made and/or referrals ordered. Positive Risk Factor Screenings with Interventions:          General Health and ACP:  General  In general, how would you say your health is?: Good  In the past 7 days, have you experienced any of the following? New or Increased Pain, New or Increased Fatigue, Loneliness, Social Isolation, Stress or Anger?: (!) New or Increased Pain, Loneliness, Social Isolation, Stress, Anger(All covid related)  Do you get the social and emotional support that you need?: Yes  Do you have a Living Will?: Yes  Advance Directives     Power of  Living Will ACP-Advance Directive ACP-Power of     Not on File Filed on 05/03/18 Filed Not on File      General Health Risk Interventions:  · COVID-19 related issues.  Declined additional intevention    Health Habits/Nutrition:  Health Habits/Nutrition  Do you exercise for at least 20 minutes 2-3 times per week?: Yes(laundry)  Have you lost any weight without trying in the past 3 months?: No  Do you eat fewer than 2 meals per day?: No Have you seen a dentist within the past year?: (!) No     Health Habits/Nutrition Interventions:  · Dental exam overdue:  patient encouraged to make appointment with his/her dentist when [place] cases are decreased     Safety:  Safety  Do you have working smoke detectors?: Yes  Have all throw rugs been removed or fastened?: Yes  Do you have non-slip mats or surfaces in all bathtubs/showers?: Yes  Do all of your stairways have a railing or banister?: (!) No(no stairways)  Are your doorways, halls and stairs free of clutter?: Yes  Do you always fasten your seatbelt when you are in a car?: Yes  Safety Interventions:  · N/A     Personalized Preventive Plan   Current Health Maintenance Status  Immunization History   Administered Date(s) Administered    Influenza Virus Vaccine 09/14/2010, 09/12/2014, 09/14/2015, 09/11/2020    Influenza Whole 09/15/2011    Influenza, High Dose (Fluzone 65 yrs and older) 09/19/2016, 09/02/2017, 09/19/2018, 09/06/2019    Pneumococcal Conjugate 13-valent (Pamila Nares) 02/11/2015, 01/01/2016    Pneumococcal Conjugate 7-valent (Prevnar7) 10/01/2003    Pneumococcal Polysaccharide (Cohurtvzt89) 10/01/2003, 02/02/2018    Tdap (Boostrix, Adacel) 02/02/2017    Zoster Recombinant (Shingrix) 03/22/2019, 06/01/2019        Health Maintenance   Topic Date Due    Hepatitis C screen  1945    Annual Wellness Visit (AWV)  05/29/2019    Breast cancer screen  08/25/2021    Lipid screen  10/02/2021    Potassium monitoring  12/23/2021    Creatinine monitoring  12/23/2021    Colon cancer screen colonoscopy  04/25/2023    DTaP/Tdap/Td vaccine (2 - Td) 02/02/2027    DEXA (modify frequency per FRAX score)  Completed    Flu vaccine  Completed    Shingles Vaccine  Completed    Pneumococcal 65+ years Vaccine  Completed    Hepatitis A vaccine  Aged Out    Hepatitis B vaccine  Aged Out    Hib vaccine  Aged Out    Meningococcal (ACWY) vaccine  Aged Out Recommendations for Preventive Services Due: see orders and patient instructions/AVS.  . Recommended screening schedule for the next 5-10 years is provided to the patient in written form: see Patient Instructions/AVS.    Harsh Walsh was seen today for medicare awv and arthritis. Diagnoses and all orders for this visit:    Routine general medical examination at a health care facility    Anxiety  -     clonazePAM (KLONOPIN) 1 MG tablet; TAKE 1 TABLET BY MOUTH IN  THE EVENING    Other orders  -     diclofenac sodium (VOLTAREN) 1 % GEL; Apply 4 g topically 4 times daily as needed for Pain           For arthritis pain, will rx diclofenac gel. Unable to take oral NSAIDs due to interaction with coumadin. Tomi Olszewski is a 77 y.o. female being evaluated by a Virtual Visit (video and audio) encounter to address concerns as mentioned above. A caregiver was present when appropriate. Due to this being a TeleHealth encounter ([facility]), evaluation of the following organ systems was limited: Vitals/Constitutional/EENT/Resp/CV/GI//MS/Neuro/Skin/Heme-Lymph-Imm. Pursuant to the emergency declaration under the [address] [place] [address], [address] [place] [address] authority and the [employer] Act, this Virtual Visit was conducted with patient's (and/or legal guardian's) consent, to reduce the patient's risk of exposure to COVID-19 and provide necessary medical care. The patient (and/or legal guardian) has also been advised to contact this office for worsening conditions or problems, and seek emergency medical treatment and/or call 911 if deemed necessary. Patient identification was verified at the start of the visit: Yes    Services were provided through a video synchronous discussion virtually to substitute for in-person clinic visit. Patient was at home, provider was at office. --Fady Larkin MD on 1/12/2021 at 10:19 AM    An electronic signature was used to authenticate this note.

## 2021-01-12 NOTE — PROGRESS NOTES
Ms. Jayson Arevalo is a 77 y.o. y/o female with history of DVT, PE, Afib   She presents today for anticoagulation monitoring and adjustment. Pertinent PMH: HTN, Gastric Banding,CAD, diskectomy with an artifical spinal disk implant in September 2012. Patient Reported Findings:  Yes     No  [x]   []       Patient verifies current dosing regimen as listed  [x]   []       S/S bleeding/bruising/swelling/SOB states that she has been wheezing more and had more SOB d/t allergies --> has had a few small nose bleeds recently   []   [x]       Blood in urine or stool  []   [x]       Procedures scheduled in the future at this time   []   [x]       Missed Dose   []   [x]       Extra Dose   [x]   []       Change in medications  decreased tylenol intake to 1-2 times a day --> continues tylenol, propafenone increased --> prednisone 40 mg daily x 5 days started 10/13 --> started lasix, magnesium, increased potassium --> will be starting voltaren gel   [x]   []       Change in health/diet/appetite  Patient states that she feels like she has returned to normal vit k intake --> diet fluctuates causing INR to fluctuate. Discussed ways to improve consistency with vit k intake  --> states that she has cut back spinach and cabbage intake to twice a week --> has returned to eating more vit k  []   [x]       Change in alcohol use    []   [x]       Change in activity  []   [x]       Hospital admission  []   [x]       Emergency department visit   []   [x]       Other complaints    Clinical Outcomes:  Yes     No  []   [x]       Major bleeding event  []   [x]       Thromboembolic event  Gets warfarin through MD    Duration of warfarin Therapy: indefinite  INR Range:  2.0-3.0     INR is 3.1 today  Continue weekly dose of 5 mg Mon, Wed and Fri and 7.5 mg all other days of the week   Encouraged to maintain a consistency of vegetables/salads.   Recheck INR in 4 weeks, 2/9    Referring PCP is Latasha Mensah  INR (no units)   Date Value

## 2021-01-12 NOTE — PATIENT INSTRUCTIONS
Personalized Preventive Plan for Nathalia Christianson - 1/12/2021  Medicare offers a range of preventive health benefits. Some of the tests and screenings are paid in full while other may be subject to a deductible, co-insurance, and/or copay. Some of these benefits include a comprehensive review of your medical history including lifestyle, illnesses that may run in your family, and various assessments and screenings as appropriate. After reviewing your medical record and screening and assessments performed today your provider may have ordered immunizations, labs, imaging, and/or referrals for you. A list of these orders (if applicable) as well as your Preventive Care list are included within your After Visit Summary for your review. Other Preventive Recommendations:    · A preventive eye exam performed by an eye specialist is recommended every 1-2 years to screen for glaucoma; cataracts, macular degeneration, and other eye disorders. · A preventive dental visit is recommended every 6 months. · Try to get at least 150 minutes of exercise per week or 10,000 steps per day on a pedometer . · Order or download the FREE \"Exercise & Physical Activity: Your Everyday Guide\" from [employer]. Call 4-917.757.1112 or search [employer] online. · You need 7267-5037 mg of calcium and 7820-7490 IU of vitamin D per day. It is possible to meet your calcium requirement with diet alone, but a vitamin D supplement is usually necessary to meet this goal.  · When exposed to the sun, use a sunscreen that protects against both UVA and UVB radiation with an SPF of 30 or greater. Reapply every 2 to 3 hours or after sweating, drying off with a towel, or swimming. · Always wear a seat belt when traveling in a car. Always wear a helmet when riding a bicycle or motorcycle.

## 2021-01-25 ENCOUNTER — TELEPHONE (OUTPATIENT)
Dept: INTERNAL MEDICINE CLINIC | Age: 76
End: 2021-01-25

## 2021-01-25 RX ORDER — WARFARIN SODIUM 5 MG/1
TABLET ORAL
Qty: 143 TABLET | Refills: 1 | Status: SHIPPED | OUTPATIENT
Start: 2021-01-25 | End: 2021-08-30

## 2021-01-25 NOTE — TELEPHONE ENCOUNTER
Patient is requesting a prescription for warfarin (COUMADIN) 5 MG tablet sent to [facility] (formerly [facility]) for 90 day supply.

## 2021-02-09 ENCOUNTER — TELEPHONE (OUTPATIENT)
Dept: PHARMACY | Age: 76
End: 2021-02-09

## 2021-02-15 NOTE — TELEPHONE ENCOUNTER
Pt called to cancel CC d/t weather condition. R/s on 2/25 will come in before appt w/ Dr William Michele.

## 2021-02-19 ENCOUNTER — TELEPHONE (OUTPATIENT)
Dept: DERMATOLOGY | Age: 76
End: 2021-02-19

## 2021-02-25 ENCOUNTER — ANTI-COAG VISIT (OUTPATIENT)
Dept: PHARMACY | Age: 76
End: 2021-02-25
Payer: MEDICARE

## 2021-02-25 ENCOUNTER — TELEPHONE (OUTPATIENT)
Dept: CARDIOLOGY CLINIC | Age: 76
End: 2021-02-25

## 2021-02-25 ENCOUNTER — OFFICE VISIT (OUTPATIENT)
Dept: CARDIOLOGY CLINIC | Age: 76
End: 2021-02-25
Payer: MEDICARE

## 2021-02-25 VITALS
HEIGHT: 64 IN | OXYGEN SATURATION: 95 % | HEART RATE: 71 BPM | DIASTOLIC BLOOD PRESSURE: 82 MMHG | WEIGHT: 276 LBS | BODY MASS INDEX: 47.12 KG/M2 | SYSTOLIC BLOOD PRESSURE: 144 MMHG

## 2021-02-25 VITALS — TEMPERATURE: 96.9 F

## 2021-02-25 DIAGNOSIS — E66.01 OBESITIES, MORBID (HCC): ICD-10-CM

## 2021-02-25 DIAGNOSIS — G47.33 OSA (OBSTRUCTIVE SLEEP APNEA): ICD-10-CM

## 2021-02-25 DIAGNOSIS — R00.2 PALPITATIONS: ICD-10-CM

## 2021-02-25 DIAGNOSIS — I10 BENIGN ESSENTIAL HTN: ICD-10-CM

## 2021-02-25 DIAGNOSIS — Z86.711 HISTORY OF PULMONARY EMBOLISM: ICD-10-CM

## 2021-02-25 DIAGNOSIS — I48.0 PAF (PAROXYSMAL ATRIAL FIBRILLATION) (HCC): ICD-10-CM

## 2021-02-25 DIAGNOSIS — I48.0 PAF (PAROXYSMAL ATRIAL FIBRILLATION) (HCC): Primary | ICD-10-CM

## 2021-02-25 DIAGNOSIS — I35.0 MODERATE AORTIC STENOSIS: ICD-10-CM

## 2021-02-25 LAB — INTERNATIONAL NORMALIZATION RATIO, POC: 2.5

## 2021-02-25 PROCEDURE — G8427 DOCREV CUR MEDS BY ELIG CLIN: HCPCS | Performed by: INTERNAL MEDICINE

## 2021-02-25 PROCEDURE — 1036F TOBACCO NON-USER: CPT | Performed by: INTERNAL MEDICINE

## 2021-02-25 PROCEDURE — G8399 PT W/DXA RESULTS DOCUMENT: HCPCS | Performed by: INTERNAL MEDICINE

## 2021-02-25 PROCEDURE — G8484 FLU IMMUNIZE NO ADMIN: HCPCS | Performed by: INTERNAL MEDICINE

## 2021-02-25 PROCEDURE — 4040F PNEUMOC VAC/ADMIN/RCVD: CPT | Performed by: INTERNAL MEDICINE

## 2021-02-25 PROCEDURE — 99211 OFF/OP EST MAY X REQ PHY/QHP: CPT

## 2021-02-25 PROCEDURE — 99214 OFFICE O/P EST MOD 30 MIN: CPT | Performed by: INTERNAL MEDICINE

## 2021-02-25 PROCEDURE — 1123F ACP DISCUSS/DSCN MKR DOCD: CPT | Performed by: INTERNAL MEDICINE

## 2021-02-25 PROCEDURE — 3017F COLORECTAL CA SCREEN DOC REV: CPT | Performed by: INTERNAL MEDICINE

## 2021-02-25 PROCEDURE — G8417 CALC BMI ABV UP PARAM F/U: HCPCS | Performed by: INTERNAL MEDICINE

## 2021-02-25 PROCEDURE — 1090F PRES/ABSN URINE INCON ASSESS: CPT | Performed by: INTERNAL MEDICINE

## 2021-02-25 PROCEDURE — 93000 ELECTROCARDIOGRAM COMPLETE: CPT | Performed by: INTERNAL MEDICINE

## 2021-02-25 PROCEDURE — 85610 PROTHROMBIN TIME: CPT

## 2021-02-25 NOTE — PROGRESS NOTES
[facility]   Electrophysiology Follow Up  Date: 2/25/2021    Chief Complaint   Patient presents with    3 Month Follow-Up      HPI: Tato Barton is a 77 y.o. female with a history of paroxysmal atrial fibrillation, hypertension, CAD, PE/DVT, and hyperlipidemia. She was originally seen by EP while in the hospital in December of 2011 after she was found to have atrial fibrillation post- lap band surgery. She is on Coumadin for her history of pulmonary emboli (INR monitored at [facility]) which was not associated with any reversible cause. She also has a Gavin filter. She had a fall on 11/15/19 when at a restaurant as she was walking into the door. She turned her head to the left, straightened her head and then went down. She felt lightheaded. She tried to break the fall with her hands. She had a radial neck fracture and forehead hematoma. Since her fall she has a hard time getting her words out.     S/p coronary angiogram 5/1/18 which was negative for myocardial ischemia. S/p 12/14/18 unsuccessful cardioversion     -S/p RFCA of afib w/ PVI (10/3/2020)  -S/p DCCV 10/14/2020 and 10/26/2020      Lily Lockett presents to the office in follow up. She is doing well from a cardiac standpoint. Denies recurrence of atrial fibrillation since her second DCCV. She is symptomatic with episodes and monitors with [email or web address] and her apple watch.  We will decrease her propafenone to twice a day for two months then stop    Past Medical History:   Diagnosis Date    Allergic     Anxiety 2/2/2017    Arthritis     Asthma     Back pain     Blood circulation, collateral     legs    Depression     GERD (gastroesophageal reflux disease)     Gout     Hypercholesteremia     Hypertension     Movement disorder     7 discs ruptured    Movement disorder     knee replacements    Obesity     Pulmonary emboli (HCC)     Unspecified sleep apnea     uses cpap    Urinary tract infection, chronic  UTI (urinary tract infection)         Past Surgical History:   Procedure Laterality Date    ANKLE FUSION  2010    right, [facility]    BACK SURGERY  09/14/2012    lumbar fusion L-4, L-5 atrificial disc  [facility] Dr. Godwin Rain  5-7-11    incision, drainage and debridement of left knee and application of wound vac.  BREAST BIOPSY  08/2018    neg    EYE SURGERY      JOINT REPLACEMENT      bilat knees    JOINT REPLACEMENT  2003    left, [facility]    JOINT REPLACEMENT  2005    right, [facility]    OTHER SURGICAL HISTORY  12-    laparscopic adjustable gastric restrictive procedure    REVISION TOTAL KNEE ARTHROPLASTY  2006    [facility]    SKIN FULL GRAFT  2011    right, [address]    [facility]    UPPER GASTROINTESTINAL ENDOSCOPY  10-    VENA CAVA FILTER PLACEMENT         Allergies: Allergies   Allergen Reactions    Belsomra [Suvorexant] Other (See Comments)     Nightmares      Avelox [Moxifloxacin Hcl In Nacl] Rash    Levofloxacin Rash    Sulfa Antibiotics Rash       Social History:   reports that she quit smoking about 56 years ago. Her smoking use included cigarettes. She has a 2.00 pack-year smoking history. She has never used smokeless tobacco. She reports previous alcohol use. She reports that she does not use drugs. Family History:  family history includes Arthritis in her father; Asthma in her father; Breast Cancer in her sister; Depression in her mother; Heart Disease in her father; High Blood Pressure in her father and mother; Stroke in her father. Reviewed. Denies family history of sudden cardiac death, arrhythmia, premature CAD    Review of System:  All other systems reviewed and are negative except for that noted above.  Pertinent negatives are:   General: negative for fever, chills   Ophthalmic ROS: negative for - eye pain or loss of vision  ENT ROS: negative for - ischemia with exercise         Normal LV size and systolic function.         Myocardial perfusion imaging with small area of decreased uptake in the     anteroapical wall with stress with redistribution at rest consistent with     ischemia.            Medication:  Current Outpatient Medications   Medication Sig Dispense Refill    warfarin (COUMADIN) 5 MG tablet TAKE 5mg ON M,W.F and 7.5mg all other days 143 tablet 1    clonazePAM (KLONOPIN) 1 MG tablet TAKE 1 TABLET BY MOUTH IN  THE EVENING 90 tablet 0    diclofenac sodium (VOLTAREN) 1 % GEL Apply 4 g topically 4 times daily as needed for Pain 350 g 1    citalopram (CELEXA) 40 MG tablet TAKE 1 TABLET BY MOUTH  DAILY 90 tablet 3    allopurinol (ZYLOPRIM) 300 MG tablet TAKE 1 TABLET BY MOUTH  DAILY 90 tablet 3    atorvastatin (LIPITOR) 80 MG tablet TAKE 1 TABLET BY MOUTH  DAILY 90 tablet 3    propafenone (RYTHMOL) 225 MG tablet Take 1 tablet by mouth every 8 hours 270 tablet 1    furosemide (LASIX) 20 MG tablet Take 1 tablet by mouth daily (Patient taking differently: Take 20 mg by mouth daily Three times weekly) 30 tablet 1    potassium chloride (KLOR-CON M) 20 MEQ extended release tablet TAKE 1 TABLET BY MOUTH  DAILY WITH BREAKFAST (Patient taking differently: TAKE 1 TABLET BY MOUTH  DAILY WITH BREAKFAST   Take 2 tablets on the days she takes the furosemide) 90 tablet 3    verapamil (CALAN SR) 240 MG extended release tablet Take 0.5 tablets by mouth nightly (Patient taking differently: Take 120 mg by mouth every morning ) 90 tablet 3    trimethoprim (TRIMPEX) 100 MG tablet TAKE 1 TABLET BY MOUTH  EVERY 48 HOURS 45 tablet 3    fluticasone-salmeterol (ADVAIR) 250-50 MCG/DOSE AEPB INHALE ONE PUFF BY MOUTH EVERY 12 HOURS 1 Inhaler 5    indapamide (LOZOL) 1.25 MG tablet TAKE 1 TABLET BY MOUTH  EVERY MORNING 90 tablet 3    montelukast (SINGULAIR) 10 MG tablet TAKE 1 TABLET BY MOUTH  NIGHTLY 90 tablet 3    pantoprazole (PROTONIX) 40 MG tablet TAKE 1 TABLET BY MOUTH  EVERY MORNING BEFORE  BREAKFAST 90 tablet 3    acetaminophen (TYLENOL) 500 MG tablet Take 500 mg by mouth every 6 hours as needed for Pain      diphenhydrAMINE-APAP, sleep, (TYLENOL PM EXTRA STRENGTH)  MG tablet Take 1 tablet by mouth nightly as needed for Sleep      PROAIR  (90 Base) MCG/ACT inhaler INHALE TWO PUFFS BY MOUTH EVERY 4 HOURS AS NEEDED FOR SHORTNESS OF BREATH 8.5 g 2    b complex vitamins capsule Take 1 capsule by mouth daily      Ascorbic Acid (VITAMIN C PO) 1 tablet daily      therapeutic multivitamin-minerals (THERAGRAN-M) tablet Take 1 tablet by mouth nightly       Cranberry 500 MG CAPS Take 1,000 mg by mouth nightly       gabapentin (NEURONTIN) 300 MG capsule TAKE 2 CAPSULES BY MOUTH 3  TIMES DAILY 540 capsule 3     No current facility-administered medications for this visit. Facility-Administered Medications Ordered in Other Visits   Medication Dose Route Frequency Provider Last Rate Last Admin    magnesium hydroxide (MILK OF MAGNESIA) 400 MG/5ML suspension 30 mL  30 mL Oral Daily PRN Yvette Dixon MD        ondansetron [facility]) injection 4 mg  4 mg Intravenous Q6H PRN Yvette Dixon MD        acetaminophen (TYLENOL) tablet 650 mg  650 mg Oral Q4H PRN Yvette Dixon MD           Assessment and plan:     PAF  -ECG today shows Sinus rhythm  -S/p RFCA of afib w/ PVI (10/3/2020)  -S/p DCCV 10/14/2020 and 10/26/2020. She has not had any recurrence of symptomatic episodes since then. -[id number] Vasc score 3    -On coumadin both for PE hx and Afib  -Verapamil 240 mg daily  -Reduce Propafenone 225 mg to twice a day for two months then stop. Follow Up With Mamadou Mane CNP in 4 months and a 30 day monitor. If the event monitor does not show any A. fib then she can be followed by NP once every 6-month or once every year depending on her symptoms or other issues.       HTN  Vitals:    02/25/21 1120   BP: (!) 144/82   Pulse: 71   SpO2: 95% Her blood pressure at home is usually 120/60 or lower.  -Home BP monitoring encourage with a BP goal <130/80    Aortic stenosis  -Moderate per recent echo 12/2020  -Will refer to [address] for further evaluation     Obesity  Body mass index is 47.38 kg/m². - Excessive weight is complicating assessment and treatment. It is placing patient at risk for multiple co-morbidities as well as early death and contributing to the patient's presentation. - discussed weight management with diet and exercise      PAULA  -encouraged compliance with CPAP    Hx of PE/DVT  -Ac with coumadin  -s/p abena filter    Plan   -Reduce Propafenone 225 mg to twice a day for two months then stop. Follow Up With Gentry Richardson CNP in 4 months and a 30 day monitor. After that she can be followed every 6 to 12 months depending on the findings of the monitor by nurse practitioner.  -Establish with  for aortic stenosis    Thank you for allowing me to participate in the care of Elena Leslie. Further evaluation will be based upon the patient's clinical course and testing results. All questions and concerns were addressed to the patient/family. Alternatives to my treatment were discussed. I have discussed the above stated plan and the patient verbalized understanding and agreed with the plan. NOTE: This report was transcribed using voice recognition software. Every effort was made to ensure accuracy, however, inadvertent computerized transcription errors may be present. Yuridia Ramon MD, [address]   Office: [phone]     Scribe attestation:  This note was scribed in the presence of Yuridia Ramon MD by Rosario Patahk RN    I, Dr. Yuridia Ramon personally performed the services described in this documentation as scribed by RN in my presence, and it is both accurate and complete.

## 2021-02-25 NOTE — TELEPHONE ENCOUNTER
Dr. Lynette Wong would like patient to see Dr. Liliana Valdovinos for aortic stenosis. Please advise on where patient can be scheduled.

## 2021-02-25 NOTE — PROGRESS NOTES
Ms. Jaxson Sandoval is a 77 y.o. y/o female with history of DVT, PE, Afib   She presents today for anticoagulation monitoring and adjustment. Pertinent PMH: HTN, Gastric Banding,CAD, diskectomy with an artifical spinal disk implant in September 2012. Patient Reported Findings:  Yes     No  [x]   []       Patient verifies current dosing regimen as listed  [x]   []       S/S bleeding/bruising/swelling/SOB states that she has been wheezing more and had more SOB d/t allergies --> has had a few small nose bleeds recently   []   [x]       Blood in urine or stool  []   [x]       Procedures scheduled in the future at this time   []   [x]       Missed Dose   []   [x]       Extra Dose   [x]   []       Change in medications  decreased tylenol intake to 1-2 times a day --> continues tylenol, propafenone increased --> prednisone 40 mg daily x 5 days started 10/13 --> started lasix, magnesium, increased potassium --> will be starting voltaren gel --> started voltaren, no other changes   [x]   []       Change in health/diet/appetite  Patient states that she feels like she has returned to normal vit k intake --> diet fluctuates causing INR to fluctuate. Discussed ways to improve consistency with vit k intake  --> states that she has cut back spinach and cabbage intake to twice a week --> has returned to eating more vit k --> no changes, no NVD   []   [x]       Change in alcohol use    []   [x]       Change in activity  []   [x]       Hospital admission  []   [x]       Emergency department visit   []   [x]       Other complaints    Clinical Outcomes:  Yes     No  []   [x]       Major bleeding event  []   [x]       Thromboembolic event  Gets warfarin through MD    Duration of warfarin Therapy: indefinite  INR Range:  2.0-3.0     INR is 2.5 today  Continue weekly dose of 5 mg Mon, Wed and Fri and 7.5 mg all other days of the week   Encouraged to maintain a consistency of vegetables/salads.   Recheck INR in 4 weeks, 3/25    Referring PCP is Goran Avalos  INR (no units)   Date Value   02/25/2021 2.5   01/12/2021 3.1   12/15/2020 2.5   10/26/2020 2.90 (H)   10/03/2020 2.13 (H)   10/02/2020 2.30 (H)      CLINICAL PHARMACY CONSULT: MED RECONCILIATION/REVIEW Rosibel  22. Tracking Only    PHSO: Yes  Total # of Interventions Recommended: 0  - Maintenance Safety Lab Monitoring #: 1  Total Interventions Accepted: 0  Time Spent (min): 15    Lise Zhao, NidaD

## 2021-03-01 RX ORDER — MONTELUKAST SODIUM 10 MG/1
TABLET ORAL
Qty: 90 TABLET | Refills: 3 | Status: SHIPPED | OUTPATIENT
Start: 2021-03-01 | End: 2022-02-07

## 2021-03-03 ENCOUNTER — TELEMEDICINE (OUTPATIENT)
Dept: BARIATRICS/WEIGHT MGMT | Age: 76
End: 2021-03-03
Payer: MEDICARE

## 2021-03-03 DIAGNOSIS — Z71.3 ENCOUNTER FOR DIETARY COUNSELING AND SURVEILLANCE: ICD-10-CM

## 2021-03-03 DIAGNOSIS — E66.01 MORBID OBESITY WITH BMI OF 45.0-49.9, ADULT (HCC): Primary | ICD-10-CM

## 2021-03-03 PROCEDURE — G8399 PT W/DXA RESULTS DOCUMENT: HCPCS | Performed by: NURSE PRACTITIONER

## 2021-03-03 PROCEDURE — 1090F PRES/ABSN URINE INCON ASSESS: CPT | Performed by: NURSE PRACTITIONER

## 2021-03-03 PROCEDURE — 99213 OFFICE O/P EST LOW 20 MIN: CPT | Performed by: NURSE PRACTITIONER

## 2021-03-03 PROCEDURE — 1123F ACP DISCUSS/DSCN MKR DOCD: CPT | Performed by: NURSE PRACTITIONER

## 2021-03-03 PROCEDURE — 3017F COLORECTAL CA SCREEN DOC REV: CPT | Performed by: NURSE PRACTITIONER

## 2021-03-03 PROCEDURE — 4040F PNEUMOC VAC/ADMIN/RCVD: CPT | Performed by: NURSE PRACTITIONER

## 2021-03-03 PROCEDURE — G8427 DOCREV CUR MEDS BY ELIG CLIN: HCPCS | Performed by: NURSE PRACTITIONER

## 2021-03-03 ASSESSMENT — ENCOUNTER SYMPTOMS
EYES NEGATIVE: 1
RESPIRATORY NEGATIVE: 1
GASTROINTESTINAL NEGATIVE: 1

## 2021-03-03 NOTE — PROGRESS NOTES
GASTROINTESTINAL ENDOSCOPY  10-    VENA CAVA FILTER PLACEMENT       Family History   Problem Relation Age of Onset    High Blood Pressure Mother     Depression Mother     Stroke Father     High Blood Pressure Father     Asthma Father     Arthritis Father     Heart Disease Father     Breast Cancer Sister     High Cholesterol Neg Hx      Social History     Tobacco Use    Smoking status: Former Smoker     Packs/day: 0.50     Years: 4.00     Pack years: 2.00     Types: Cigarettes     Quit date: 1965     Years since quittin.2    Smokeless tobacco: Never Used   Substance Use Topics    Alcohol use: Not Currently     Comment: 4 t imes a year     I counseled the patient on the importance of not smoking and risks of ETOH. Allergies   Allergen Reactions    Belsomra [Suvorexant] Other (See Comments)     Nightmares      Avelox [Moxifloxacin Hcl In Nacl] Rash    Levofloxacin Rash    Sulfa Antibiotics Rash     There were no vitals filed for this visit. There is no height or weight on file to calculate BMI.     Current Outpatient Medications:     montelukast (SINGULAIR) 10 MG tablet, TAKE 1 TABLET BY MOUTH IN  THE EVENING, Disp: 90 tablet, Rfl: 3    warfarin (COUMADIN) 5 MG tablet, TAKE 5mg ON ,. and 7.5mg all other days, Disp: 143 tablet, Rfl: 1    clonazePAM (KLONOPIN) 1 MG tablet, TAKE 1 TABLET BY MOUTH IN  THE EVENING, Disp: 90 tablet, Rfl: 0    diclofenac sodium (VOLTAREN) 1 % GEL, Apply 4 g topically 4 times daily as needed for Pain, Disp: 350 g, Rfl: 1    citalopram (CELEXA) 40 MG tablet, TAKE 1 TABLET BY MOUTH  DAILY, Disp: 90 tablet, Rfl: 3    allopurinol (ZYLOPRIM) 300 MG tablet, TAKE 1 TABLET BY MOUTH  DAILY, Disp: 90 tablet, Rfl: 3    atorvastatin (LIPITOR) 80 MG tablet, TAKE 1 TABLET BY MOUTH  DAILY, Disp: 90 tablet, Rfl: 3    propafenone (RYTHMOL) 225 MG tablet, Take 1 tablet by mouth every 8 hours, Disp: 270 tablet, Rfl: 1    furosemide (LASIX) 20 MG tablet, Take 1 tablet by mouth daily (Patient taking differently: Take 20 mg by mouth daily Three times weekly), Disp: 30 tablet, Rfl: 1    potassium chloride (KLOR-CON M) 20 MEQ extended release tablet, TAKE 1 TABLET BY MOUTH  DAILY WITH BREAKFAST (Patient taking differently: TAKE 1 TABLET BY MOUTH  DAILY WITH BREAKFAST  Take 2 tablets on the days she takes the furosemide), Disp: 90 tablet, Rfl: 3    verapamil (CALAN SR) 240 MG extended release tablet, Take 0.5 tablets by mouth nightly (Patient taking differently: Take 120 mg by mouth every morning ), Disp: 90 tablet, Rfl: 3    trimethoprim (TRIMPEX) 100 MG tablet, TAKE 1 TABLET BY MOUTH  EVERY 48 HOURS, Disp: 45 tablet, Rfl: 3    gabapentin (NEURONTIN) 300 MG capsule, TAKE 2 CAPSULES BY MOUTH 3  TIMES DAILY, Disp: 540 capsule, Rfl: 3    fluticasone-salmeterol (ADVAIR) 250-50 MCG/DOSE AEPB, INHALE ONE PUFF BY MOUTH EVERY 12 HOURS, Disp: 1 Inhaler, Rfl: 5    indapamide (LOZOL) 1.25 MG tablet, TAKE 1 TABLET BY MOUTH  EVERY MORNING, Disp: 90 tablet, Rfl: 3    pantoprazole (PROTONIX) 40 MG tablet, TAKE 1 TABLET BY MOUTH  EVERY MORNING BEFORE  BREAKFAST, Disp: 90 tablet, Rfl: 3    acetaminophen (TYLENOL) 500 MG tablet, Take 500 mg by mouth every 6 hours as needed for Pain, Disp: , Rfl:     diphenhydrAMINE-APAP, sleep, (TYLENOL PM EXTRA STRENGTH)  MG tablet, Take 1 tablet by mouth nightly as needed for Sleep, Disp: , Rfl:     PROAIR  (90 Base) MCG/ACT inhaler, INHALE TWO PUFFS BY MOUTH EVERY 4 HOURS AS NEEDED FOR SHORTNESS OF BREATH, Disp: 8.5 g, Rfl: 2    b complex vitamins capsule, Take 1 capsule by mouth daily, Disp: , Rfl:     Ascorbic Acid (VITAMIN C PO), 1 tablet daily, Disp: , Rfl:     therapeutic multivitamin-minerals (THERAGRAN-M) tablet, Take 1 tablet by mouth nightly , Disp: , Rfl:     Cranberry 500 MG CAPS, Take 1,000 mg by mouth nightly , Disp: , Rfl:     Lab Results   Component Value Date    WBC 6.5 10/02/2020    RBC 4.24 10/02/2020    HGB 13.7 [facility])    Atrial flutter ([facility] [address]       Review of Systems   Constitutional: Negative. HENT: Negative. Eyes: Negative. Respiratory: Negative. Cardiovascular: Negative. Gastrointestinal: Negative. Musculoskeletal:        Shoulder issues   Skin: Negative. Neurological: Negative. PHYSICAL EXAMINATION:    Constitutional: [x] Appears well-developed and well-nourished [x] No apparent distress      [] Abnormal-   Mental status  [x] Alert and awake  [x] Oriented to person/place/time [x]Able to follow commands      Eyes:  EOM    [x]  Normal  [] Abnormal-  Sclera  [x]  Normal  [] Abnormal -         Discharge [x]  None visible  [] Abnormal -    HENT:   [x] Normocephalic, atraumatic. [] Abnormal   [x] Mouth/Throat: Mucous membranes are moist.     External Ears [] Normal  [] Abnormal-     Neck: [x] No visualized mass     Pulmonary/Chest: [x] Respiratory effort normal.  [x] No visualized signs of difficulty breathing or respiratory distress        [] Abnormal-      Musculoskeletal:   [] Normal gait with no signs of ataxia         [x] Normal range of motion of neck        [] Abnormal-     Neurological:        [x] No Facial Asymmetry (Cranial nerve 7 motor function) (limited exam to video visit)          [x] No gaze palsy        [] Abnormal-         Skin:        [x] No significant exanthematous lesions or discoloration noted on facial skin         [] Abnormal-            Psychiatric:       [x] Normal Affect [x] No Hallucinations        [] Abnormal-     Other pertinent observable physical exam findings-     Due to this being a TeleHealth encounter, evaluation of the following organ systems is limited: Vitals/Constitutional/EENT/Resp/CV/GI//MS/Neuro/Skin/Heme-Lymph-Imm. Assessment and Plan:    Patient is here via telemedicine for their 14th medical weight loss visit, up 2.2 lbs and a total weight loss of 9.5 lbs. She is doing well, making dietary and behavior modifications.  She is following dietary

## 2021-03-03 NOTE — PROGRESS NOTES
Hayden Agee gained 2.2 lbs over past 2 months. Pt is s/p band in 2011, following weight watchers for additional accountability. Pt working with behaviorist Fatou. Due to the COVID-19 restrictions on close contact interactions the patient's visit was conducted via telephone in joo of a face to face visit. The patient is here through telemedicine for their MWM visit. Treatment plan details: 1200 kcal LC   Is patient adhering to treatment plan: Loosely    Is pt eating 4-5 times each day? Yes  Breakfast - egg/sausage and english muffin OR grits OR bowl of cereal   Lunch - [employer] lunchmeat shaved thin (roast beef/turkey/chicken) with slice of cheese and 4 ritz crackers with an orange - feels satisfied until dinner time   Dinner - meat and veggie   Hungry in the evening - unsalted peanuts OR PB crackers with pretzel thins     Is pt including lean protein with all meals and snacks? Mostly     Amount per meal: 1.5 cups/sitting      Following 30/30/30 rule: Eating in 20 minutes. Avoids eating and drinking at the same time, just takes sips prn.     Supplements: Vit C, (no longer taking Calcium following cardioversion), generic MVI 1 x day, cranberry capsule, B complex 1 x day    Food Intolerances: greasy foods     Is pt avoiding added sugars and starchy foods? No     Consuming at least 64oz of calorie free fluids? Follows 60 oz/day fluid restriction - drinks 2 cups of coffee, light OJ (3 oz) to take meds with in morning, 3 glasses of iced tea with sweetener, water. Participating in intentional exercise? Was doing squats until shoulder hurt. Currently doing exercise for her shoulders 2 x day for 6 weeks.      Plan/Goals:   Switch pretzel thins to veggie/fruit  Choose CC in the morning vs grits/cereal - discussed exploring frozen breakfast options <600 mg/sodium per serving (egg wraps/crustless quiche, etc.)   Sub veggies for crackers     Handouts: none     Michael Gonzalez

## 2021-03-05 ENCOUNTER — OFFICE VISIT (OUTPATIENT)
Dept: DERMATOLOGY | Age: 76
End: 2021-03-05
Payer: MEDICARE

## 2021-03-05 VITALS — TEMPERATURE: 97.3 F

## 2021-03-05 DIAGNOSIS — Q82.8 KERATODERMA: ICD-10-CM

## 2021-03-05 DIAGNOSIS — L82.1 SK (SEBORRHEIC KERATOSIS): ICD-10-CM

## 2021-03-05 DIAGNOSIS — L81.4 SOLAR LENTIGO: ICD-10-CM

## 2021-03-05 DIAGNOSIS — D48.5 NEOPLASM OF UNCERTAIN BEHAVIOR OF SKIN: Primary | ICD-10-CM

## 2021-03-05 PROCEDURE — 1036F TOBACCO NON-USER: CPT | Performed by: DERMATOLOGY

## 2021-03-05 PROCEDURE — G8399 PT W/DXA RESULTS DOCUMENT: HCPCS | Performed by: DERMATOLOGY

## 2021-03-05 PROCEDURE — 1123F ACP DISCUSS/DSCN MKR DOCD: CPT | Performed by: DERMATOLOGY

## 2021-03-05 PROCEDURE — 11102 TANGNTL BX SKIN SINGLE LES: CPT | Performed by: DERMATOLOGY

## 2021-03-05 PROCEDURE — G8417 CALC BMI ABV UP PARAM F/U: HCPCS | Performed by: DERMATOLOGY

## 2021-03-05 PROCEDURE — 1090F PRES/ABSN URINE INCON ASSESS: CPT | Performed by: DERMATOLOGY

## 2021-03-05 PROCEDURE — 3017F COLORECTAL CA SCREEN DOC REV: CPT | Performed by: DERMATOLOGY

## 2021-03-05 PROCEDURE — G8427 DOCREV CUR MEDS BY ELIG CLIN: HCPCS | Performed by: DERMATOLOGY

## 2021-03-05 PROCEDURE — 99202 OFFICE O/P NEW SF 15 MIN: CPT | Performed by: DERMATOLOGY

## 2021-03-05 PROCEDURE — 4040F PNEUMOC VAC/ADMIN/RCVD: CPT | Performed by: DERMATOLOGY

## 2021-03-05 PROCEDURE — G8484 FLU IMMUNIZE NO ADMIN: HCPCS | Performed by: DERMATOLOGY

## 2021-03-05 NOTE — PROGRESS NOTES
[address] [facility] Dermatology  Lorraine Brown MD  [address]  1945    77 y.o. female     Date of Visit: 3/5/2021    Chief Complaint: eyebrow lesion    History of Present Illness:    1. She complains of a 3 month history of a persistent pink lesion on the right eyebrow. 2.  She has asymptomatic pigmented lesions on the chest and shoulders. 3.  She complains of a persistent raised lesion on the right superior shoulder. 4.  She reports a several year history of asymptomatic spiny projections on the palms of the hands and fingers. She reports that she is up-to-date on cancer screenings including that of colon cancer screening and also mammograms. She reports no unintended weight loss, fevers, chills, night sweats. She has a chronic stable cough. No abdominal pain or change in bowel habits. On Coumadin. Review of Systems:  Gen: Feels well, good sense of health. Past Medical History, Family History, Surgical History, Medications and Allergies reviewed. Past Medical History:   Diagnosis Date    Allergic     Anxiety 2/2/2017    Arthritis     Asthma     Back pain     Blood circulation, collateral     legs    Depression     GERD (gastroesophageal reflux disease)     Gout     Hypercholesteremia     Hypertension     Movement disorder     7 discs ruptured    Movement disorder     knee replacements    Obesity     Pulmonary emboli (HCC)     Unspecified sleep apnea     uses cpap    Urinary tract infection, chronic     UTI (urinary tract infection)      Past Surgical History:   Procedure Laterality Date    ANKLE FUSION  2010    right, [facility]    BACK SURGERY  09/14/2012    lumbar fusion L-4, L-5 atrificial disc  [facility] Dr. Shailesh Marquez  5-7-11    incision, drainage and debridement of left knee and application of wound vac.     BREAST BIOPSY  08/2018    neg    EYE SURGERY      JOINT REPLACEMENT      bilat knees    JOINT REPLACEMENT  2003    left, [facility]     JOINT REPLACEMENT  2005    right, [facility]    OTHER SURGICAL HISTORY  12-    laparscopic adjustable gastric restrictive procedure    REVISION [facility]OTAL [facility]NEE [facility]ROP[facility]Y  2006    [facility]    SKIN FULL GRAFT  2011    right, [facility]    TUBAL LIGATION  1975    [facility]    UPPER GASTROINTESTINAL ENDOSCOPY  10-    VENA CAVA FILTER PLACEMENT         Allergies   Allergen Reactions    Belsomra [Suvorexant] Other (See Comments)     Nightmares      Avelox [Moxifloxacin Hcl In Nacl] Rash    Levofloxacin Rash    Sulfa Antibiotics Rash     Outpatient Medications Marked as Taking for the 3/5/21 encounter (Office Visit) with Ricardo Joe MD   Medication Sig Dispense Refill    montelukast (SINGULAIR) 10 MG tablet TAKE 1 TABLET BY MOUTH IN  THE EVENING 90 tablet 3    warfarin (COUMADIN) 5 MG tablet TAKE 5mg ON M,W.F and 7.5mg all other days 143 tablet 1    clonazePAM (KLONOPIN) 1 MG tablet TAKE 1 TABLET BY MOUTH IN  THE EVENING 90 tablet 0    diclofenac sodium (VOLTAREN) 1 % GEL Apply 4 g topically 4 times daily as needed for Pain 350 g 1    citalopram (CELEXA) 40 MG tablet TAKE 1 TABLET BY MOUTH  DAILY 90 tablet 3    allopurinol (ZYLOPRIM) 300 MG tablet TAKE 1 TABLET BY MOUTH  DAILY 90 tablet 3    atorvastatin (LIPITOR) 80 MG tablet TAKE 1 TABLET BY MOUTH  DAILY 90 tablet 3    propafenone (RYTHMOL) 225 MG tablet Take 1 tablet by mouth every 8 hours 270 tablet 1    furosemide (LASIX) 20 MG tablet Take 1 tablet by mouth daily (Patient taking differently: Take 20 mg by mouth daily Three times weekly) 30 tablet 1    potassium chloride (KLOR-CON M) 20 MEQ extended release tablet TAKE 1 TABLET BY MOUTH  DAILY WITH BREAKFAST (Patient taking differently: TAKE 1 TABLET BY MOUTH  DAILY WITH BREAKFAST   Take 2 tablets on the days she takes the furosemide) 90 tablet 3    verapamil (CALAN SR) 240 MG extended release tablet Take 0.5 tablets by mouth nightly (Patient taking differently: Take 120 mg by mouth every morning ) 90 tablet 3    trimethoprim (TRIMPEX) 100 MG tablet TAKE 1 TABLET BY MOUTH  EVERY 48 HOURS 45 tablet 3    fluticasone-salmeterol (ADVAIR) 250-50 MCG/DOSE AEPB INHALE ONE PUFF BY MOUTH EVERY 12 HOURS 1 Inhaler 5    indapamide (LOZOL) 1.25 MG tablet TAKE 1 TABLET BY MOUTH  EVERY MORNING 90 tablet 3    pantoprazole (PROTONIX) 40 MG tablet TAKE 1 TABLET BY MOUTH  EVERY MORNING BEFORE  BREAKFAST 90 tablet 3    acetaminophen (TYLENOL) 500 MG tablet Take 500 mg by mouth every 6 hours as needed for Pain      diphenhydrAMINE-APAP, sleep, (TYLENOL PM EXTRA STRENGTH)  MG tablet Take 1 tablet by mouth nightly as needed for Sleep      PROAIR  (90 Base) MCG/ACT inhaler INHALE TWO PUFFS BY MOUTH EVERY 4 HOURS AS NEEDED FOR SHORTNESS OF BREATH 8.5 g 2    b complex vitamins capsule Take 1 capsule by mouth daily      Ascorbic Acid (VITAMIN C PO) 1 tablet daily      therapeutic multivitamin-minerals (THERAGRAN-M) tablet Take 1 tablet by mouth nightly       Cranberry 500 MG CAPS Take 1,000 mg by mouth nightly            Physical Examination       The following were examined and determined to be normal: Psych/Neuro, Scalp/hair, Conjunctivae/eyelids, Gums/teeth/lips, Neck, Breast/axilla/chest, Abdomen, Back, RLE, LLE and Nails/digits. The following were examined and determined to be abnormal: Head/face, RUE and LUE. Well appearing. 1.  Right lateral brow - ill defined telangiectatic pink macule. 2.  Upper chest and shoulders with multiple round and slightly irregular shaped smooth brown macules and patches. 3.  Right superior shoulder with a stuck on appearing verrucous brown papule. 4.  Palmar surfaces of the fingers and palms with multiple tiny spiny projections. Assessment and Plan     1.  Neoplasm of uncertain behavior of skin, right lateral brow-rule out BCC    Discussed possible diagnosis; patient agreeable to biopsy (verbal consent obtained). The area(s) to be biopsied were marked with a surgical pen. Alcohol was used to cleanse the site. Local anesthesia was acheived with 1% lidocaine with epinephrine. Shave biopsy was performed using a razor blade. Hemostasis was achieved with aluminum chloride. The wound(s) were dressed with petrolatum and covered with a bandage. Wound care instructions were reviewed. 1 Specimen (s) sent to pathology. The specimen bottles were appropriately labeled. We also reviewed the risks of bleeding, scar, and infection. 2. Solar lentigines    Monitor for change. 3. SK (seborrheic keratosis)     Reassurance. 4. Keratoderma, spiny -     Present for several years. No other concerning systemic symptoms. She reports that cancer screening is up-to-date. Discussed association with malignancy. Encouraged yearly physicals with her primary care physician.          --Emma Cope MD

## 2021-03-05 NOTE — PATIENT INSTRUCTIONS
Biopsy Wound Care Instructions    · Keep the bandage in place for 24 hours. · Cleanse the wound with mild soapy water daily   Gently dry the area.  Apply Vaseline or petroleum jelly to the wound using a cotton tipped applicator.  Cover with a clean bandage.  Repeat this process until the biopsy site is healed.  If you had stitches placed, continue treating the site until the stitches are removed. Remember to make an appointment to return to have your stitches removed by our staff.  You may shower and bathe as usual.       ** Biopsy results generally take around 7 business days to come back. If you have not heard from us by then, please call the office at [phone]. *Please note that biopsy results are released to both the patient and physician at the same time in [facility]. Please allow time for your physician to review the results. One of our staff members will reach out to you with the results and plan.

## 2021-03-09 LAB — DERMATOLOGY PATHOLOGY REPORT: NORMAL

## 2021-03-23 PROBLEM — I35.0 NONRHEUMATIC AORTIC VALVE STENOSIS: Status: ACTIVE | Noted: 2021-03-23

## 2021-03-23 NOTE — PROGRESS NOTES
[facility]    H+P // CONSULT // OUTPATIENT VISIT // Idris Neri     Referring Doctor Jose G Yeager MD   Encounter Type Followup     CHIEF COMPLAINT     Visit Type Chronic   Symptoms None   Problems AS, AFIB, PE/DVT     HISTORY OF PRESENT ILLNESS      GEN - Doing well. No new concerns.  AF - s/p ablation, follows with attari. Denies cp, sob, dizziness, syncope, palpitations.  HTN - Ambulatory BP readings in good range. No HA or dizziness.  CHOL - Last cholesterol reviewed and in good range. Tolerating statin without side effects.  MED - Compliant with CV meds listed below without notable side effects. HISTORY/ALLERGIES/ROS     MedHx:  has a past medical history of Allergic, Anxiety, Arthritis, Asthma, Back pain, Blood circulation, collateral, Depression, GERD (gastroesophageal reflux disease), Gout, Hypercholesteremia, Hypertension, Movement disorder, Movement disorder, Obesity, Pulmonary emboli ([facility] [address] Unspecified sleep apnea, Urinary tract infection, chronic, and UTI (urinary tract infection). SurgHx:  has a past surgical history that includes joint replacement; Vena Cava Filter Placement; bladder suspension; bone incision and drainage (5-7-11); joint replacement (2003); joint replacement (2005); Revision Total Knee Arthroplasty (2006); Ankle Fusion (2010); skin full graft (2011); Tubal ligation (1975); Upper gastrointestinal endoscopy (10-); other surgical history (12-); back surgery (09/14/2012); eye surgery; and Breast biopsy (08/2018). SocHx:  reports that she quit smoking about 56 years ago. Her smoking use included cigarettes. She has a 2.00 pack-year smoking history. She has never used smokeless tobacco. She reports previous alcohol use. She reports that she does not use drugs.    FamHx: family history includes Arthritis in her father; Asthma in her father; Breast Cancer in her sister; Depression in her mother; Heart Disease in her father; High Blood Pressure in her father and mother; Stroke in her father.    Allerg: Belsomra [suvorexant], Avelox [moxifloxacin hcl in nacl], Levofloxacin, and Sulfa antibiotics   ROS: [x]Full ROS obtained and negative except as mentioned in HPI     MEDICATIONS      Current Outpatient Medications   Medication Sig Dispense Refill    montelukast (SINGULAIR) 10 MG tablet TAKE 1 TABLET BY MOUTH IN  THE EVENING 90 tablet 3    warfarin (COUMADIN) 5 MG tablet TAKE 5mg ON M,W.F and 7.5mg all other days 143 tablet 1    clonazePAM (KLONOPIN) 1 MG tablet TAKE 1 TABLET BY MOUTH IN  THE EVENING 90 tablet 0    diclofenac sodium (VOLTAREN) 1 % GEL Apply 4 g topically 4 times daily as needed for Pain 350 g 1    citalopram (CELEXA) 40 MG tablet TAKE 1 TABLET BY MOUTH  DAILY 90 tablet 3    allopurinol (ZYLOPRIM) 300 MG tablet TAKE 1 TABLET BY MOUTH  DAILY 90 tablet 3    atorvastatin (LIPITOR) 80 MG tablet TAKE 1 TABLET BY MOUTH  DAILY 90 tablet 3    propafenone (RYTHMOL) 225 MG tablet Take 1 tablet by mouth every 8 hours 270 tablet 1    furosemide (LASIX) 20 MG tablet Take 1 tablet by mouth daily (Patient taking differently: Take 20 mg by mouth daily Three times weekly) 30 tablet 1    potassium chloride (KLOR-CON M) 20 MEQ extended release tablet TAKE 1 TABLET BY MOUTH  DAILY WITH BREAKFAST (Patient taking differently: TAKE 1 TABLET BY MOUTH  DAILY WITH BREAKFAST   Take 2 tablets on the days she takes the furosemide) 90 tablet 3    verapamil (CALAN SR) 240 MG extended release tablet Take 0.5 tablets by mouth nightly (Patient taking differently: Take 120 mg by mouth every morning ) 90 tablet 3    trimethoprim (TRIMPEX) 100 MG tablet TAKE 1 TABLET BY MOUTH  EVERY 48 HOURS 45 tablet 3    gabapentin (NEURONTIN) 300 MG capsule TAKE 2 CAPSULES BY MOUTH 3  TIMES DAILY 540 capsule 3    fluticasone-salmeterol (ADVAIR) 250-50 MCG/DOSE AEPB INHALE ONE PUFF BY MOUTH EVERY 12 HOURS 1 Inhaler 5    indapamide (LOZOL) 1.25 MG tablet TAKE 1 TABLET BY MOUTH  EVERY MORNING 90 tablet 3    pantoprazole (PROTONIX) 40 MG tablet TAKE 1 TABLET BY MOUTH  EVERY MORNING BEFORE  BREAKFAST 90 tablet 3    acetaminophen (TYLENOL) 500 MG tablet Take 500 mg by mouth every 6 hours as needed for Pain      diphenhydrAMINE-APAP, sleep, (TYLENOL PM EXTRA STRENGTH)  MG tablet Take 1 tablet by mouth nightly as needed for Sleep      PROAIR  (90 Base) MCG/ACT inhaler INHALE TWO PUFFS BY MOUTH EVERY 4 HOURS AS NEEDED FOR SHORTNESS OF BREATH 8.5 g 2    b complex vitamins capsule Take 1 capsule by mouth daily      Ascorbic Acid (VITAMIN C PO) 1 tablet daily      therapeutic multivitamin-minerals (THERAGRAN-M) tablet Take 1 tablet by mouth nightly       Cranberry 500 MG CAPS Take 1,000 mg by mouth nightly        No current facility-administered medications for this visit.       Facility-Administered Medications Ordered in Other Visits   Medication Dose Route Frequency Provider Last Rate Last Admin    magnesium hydroxide (MILK OF MAGNESIA) 400 MG/5ML suspension 30 mL  30 mL Oral Daily PRN Bryon Rashid MD        ondansetron [facility]) injection 4 mg  4 mg Intravenous Q6H PRN Bryon Rashid MD        acetaminophen (TYLENOL) tablet 650 mg  650 mg Oral Q4H PRN Bryon Rashid MD         Reviewed with patient and will remain unchanged except as mentioned in A/P  PHYSICAL EXAM     Vitals:    03/25/21 0929   BP: (!) 148/84   Pulse:    SpO2:       Gen Alert, coop, no distress Heart  Rrr, 2/6   Head NC, AT, no abnorm Abd  Soft, NT, +BS, no mass, no OM   Eyes PER, conj/corn clear Ext  Ext nl, AT, no C/C/E   Nose Nares nl, no drain, NT Pulse 2+ and symmetric   Throat Lips, mucosa, tongue nl Skin Col/text/turg nl, no vis rash/les   Neck S/S, TM, NT, no bruit/JVD Psych Nl mood and affect   Lung CTA-B, unlabored, no DTP Lymph   No cervical or axillary LA   Ch wall NT, no deform Neuro  Nl gross M/S exam     ASSESSMENT AND PLAN     *AS    Date EF Detail   Sx     No concerning   DATA   Most recent TTE, LHC reviewed personally   NYHA   I   TTE 12/11  3/18  12/20 60%  60%   60% Aortic sclerosis, Mild MR, mild TR  Mild MR  Mod AS MG 23, Mild AI   LHC 5/18  Normal cors, Aorta very tortuous Mara De Leon)   MPI 4/18 69% Anteroapical ischemia   Plan     Echo at 1 year in December, sooner with symptom change   *AFIB  Status parox, Afib ablation 10/20, CV 10/20, CV 10/18, most recent TTE, monitors, EP procedures reviewed personally   Plan Coumadin and management per EP  *PE/DVT  S/p Gavin filter  *COMPLIANCE  Status Compliant  Plan Discussed importance of compliance with meds/diet/salt/exercise; avoid tob/alc/drugs; patient verbalized understanding  *FOLLOWUP  6 months with echo    [address] [place] [address] Mariella Mckeon, am scribing for and in the presence of Dawit Lopez MD.   SignedMariella 03/23/21 1:15 PM   Provider Curtis Adam is working as a scribe for and in the presence of me (Dawit Lopez MD). Working as a scribe, Mariella Victor may have prepopulated components of this note with my historical  intellectual property under my direct supervision. Any additions to this intellectual property were performed in my presence and at my direction.   Furthermore, the content and accuracy of this note have been reviewed by me Dawit Lopez MD).  3/25/2021 9:21 AM    CODING     Category Diagnosis   Stable chronic illness  (07938/75625 - 2 or more) AS, AFIB   Chronic illness w/: Exac, progr or SA of Tx  (82827/17774 - 1 or more)    Undiagnosed new problem w/: uncertain prognosis  (81604/72263 - 1 or more)    Acute illness with systemic Sx  (01199/48439 - 1 or more)    Acute, complicated injury  (70747/02000 - 1 or more)    62323 1 or more chronic illness with exacerbation, progression or SA of treatment    Time  30-39 minutes spent preparing to see patient including reviewing patient history/prior tests/prior consults, performing a medical exam, counseling and educating patient/family/caregiver, ordering medications/tests/procedures, referring and communicating with PCPs and other pertinent consultants, documenting information in the EMR, independently interpreting results and communicating to family and coordination of patient care.

## 2021-03-25 ENCOUNTER — OFFICE VISIT (OUTPATIENT)
Dept: CARDIOLOGY CLINIC | Age: 76
End: 2021-03-25
Payer: MEDICARE

## 2021-03-25 ENCOUNTER — ANTI-COAG VISIT (OUTPATIENT)
Dept: PHARMACY | Age: 76
End: 2021-03-25
Payer: MEDICARE

## 2021-03-25 VITALS
WEIGHT: 275.7 LBS | HEART RATE: 68 BPM | DIASTOLIC BLOOD PRESSURE: 84 MMHG | HEIGHT: 64 IN | BODY MASS INDEX: 47.07 KG/M2 | SYSTOLIC BLOOD PRESSURE: 148 MMHG | OXYGEN SATURATION: 95 %

## 2021-03-25 DIAGNOSIS — Z86.711 HISTORY OF PULMONARY EMBOLISM: ICD-10-CM

## 2021-03-25 DIAGNOSIS — I48.0 PAF (PAROXYSMAL ATRIAL FIBRILLATION) (HCC): ICD-10-CM

## 2021-03-25 DIAGNOSIS — I35.0 NONRHEUMATIC AORTIC VALVE STENOSIS: Primary | ICD-10-CM

## 2021-03-25 LAB — INTERNATIONAL NORMALIZATION RATIO, POC: 2.8

## 2021-03-25 PROCEDURE — G8427 DOCREV CUR MEDS BY ELIG CLIN: HCPCS | Performed by: INTERNAL MEDICINE

## 2021-03-25 PROCEDURE — 1090F PRES/ABSN URINE INCON ASSESS: CPT | Performed by: INTERNAL MEDICINE

## 2021-03-25 PROCEDURE — G8484 FLU IMMUNIZE NO ADMIN: HCPCS | Performed by: INTERNAL MEDICINE

## 2021-03-25 PROCEDURE — 85610 PROTHROMBIN TIME: CPT

## 2021-03-25 PROCEDURE — G8399 PT W/DXA RESULTS DOCUMENT: HCPCS | Performed by: INTERNAL MEDICINE

## 2021-03-25 PROCEDURE — 3017F COLORECTAL CA SCREEN DOC REV: CPT | Performed by: INTERNAL MEDICINE

## 2021-03-25 PROCEDURE — 1123F ACP DISCUSS/DSCN MKR DOCD: CPT | Performed by: INTERNAL MEDICINE

## 2021-03-25 PROCEDURE — 99211 OFF/OP EST MAY X REQ PHY/QHP: CPT

## 2021-03-25 PROCEDURE — 1036F TOBACCO NON-USER: CPT | Performed by: INTERNAL MEDICINE

## 2021-03-25 PROCEDURE — 99214 OFFICE O/P EST MOD 30 MIN: CPT | Performed by: INTERNAL MEDICINE

## 2021-03-25 PROCEDURE — 4040F PNEUMOC VAC/ADMIN/RCVD: CPT | Performed by: INTERNAL MEDICINE

## 2021-03-25 PROCEDURE — G8417 CALC BMI ABV UP PARAM F/U: HCPCS | Performed by: INTERNAL MEDICINE

## 2021-03-25 NOTE — LETTER
[address] Cardiology [address] [address]  Phone: [phone]  Fax: [phone]    Vicenta Harrison MD        March 25, 2021     Everardo Taylor MD  [address] [place] [address]    Patient: Jeanie Leyva  MR Number: [id number]  YOB: 1945  Date of Visit: 3/25/2021    Dear Dr. Everardo Taylor:    [address]    H+P // CONSULT // OUTPATIENT VISIT // Santiago Councilman     Referring Doctor Everardo Taylor MD   Encounter Type Followup     CHIEF COMPLAINT     Visit Type Chronic   Symptoms None   Problems AS, AFIB, PE/DVT     HISTORY OF PRESENT ILLNESS      GEN - Doing well. No new concerns.  AF - s/p ablation, follows with attari. Denies cp, sob, dizziness, syncope, palpitations.  HTN - Ambulatory BP readings in good range. No HA or dizziness.  CHOL - Last cholesterol reviewed and in good range. Tolerating statin without side effects.  MED - Compliant with CV meds listed below without notable side effects. HISTORY/ALLERGIES/ROS     MedHx:  has a past medical history of Allergic, Anxiety, Arthritis, Asthma, Back pain, Blood circulation, collateral, Depression, GERD (gastroesophageal reflux disease), Gout, Hypercholesteremia, Hypertension, Movement disorder, Movement disorder, Obesity, Pulmonary emboli ([address] Unspecified sleep apnea, Urinary tract infection, chronic, and UTI (urinary tract infection). SurgHx:  has a past surgical history that includes joint replacement; Vena Cava Filter Placement; bladder suspension; bone incision and drainage (5-7-11); joint replacement (2003); joint replacement (2005); Revision Total Knee Arthroplasty (2006); Ankle Fusion (2010); skin full graft (2011); Tubal ligation (1975); Upper gastrointestinal endoscopy (10-); other surgical history (12-); back surgery (09/14/2012); eye surgery; and Breast biopsy (08/2018).    SocHx:  reports that she quit 120 mg by mouth every morning ) 90 tablet 3    trimethoprim (TRIMPEX) 100 MG tablet TAKE 1 TABLET BY MOUTH  EVERY 48 HOURS 45 tablet 3    gabapentin (NEURONTIN) 300 MG capsule TAKE 2 CAPSULES BY MOUTH 3  TIMES DAILY 540 capsule 3    fluticasone-salmeterol (ADVAIR) 250-50 MCG/DOSE AEPB INHALE ONE PUFF BY MOUTH EVERY 12 HOURS 1 Inhaler 5    indapamide (LOZOL) 1.25 MG tablet TAKE 1 TABLET BY MOUTH  EVERY MORNING 90 tablet 3    pantoprazole (PROTONIX) 40 MG tablet TAKE 1 TABLET BY MOUTH  EVERY MORNING BEFORE  BREAKFAST 90 tablet 3    acetaminophen (TYLENOL) 500 MG tablet Take 500 mg by mouth every 6 hours as needed for Pain      diphenhydrAMINE-APAP, sleep, (TYLENOL PM EXTRA STRENGTH)  MG tablet Take 1 tablet by mouth nightly as needed for Sleep      PROAIR  (90 Base) MCG/ACT inhaler INHALE TWO PUFFS BY MOUTH EVERY 4 HOURS AS NEEDED FOR SHORTNESS OF BREATH 8.5 g 2    b complex vitamins capsule Take 1 capsule by mouth daily      Ascorbic Acid (VITAMIN C PO) 1 tablet daily      therapeutic multivitamin-minerals (THERAGRAN-M) tablet Take 1 tablet by mouth nightly       Cranberry 500 MG CAPS Take 1,000 mg by mouth nightly        No current facility-administered medications for this visit.       Facility-Administered Medications Ordered in Other Visits   Medication Dose Route Frequency Provider Last Rate Last Admin    magnesium hydroxide (MILK OF MAGNESIA) 400 MG/5ML suspension 30 mL  30 mL Oral Daily PRN Jordon Klein MD        ondansetron [facility]) injection 4 mg  4 mg Intravenous Q6H PRN Jordon Klein MD        acetaminophen (TYLENOL) tablet 650 mg  650 mg Oral Q4H PRN Jodron Klein MD         Reviewed with patient and will remain unchanged except as mentioned in A/P  PHYSICAL EXAM     Vitals:    03/25/21 0929   BP: (!) 148/84   Pulse:    SpO2:       Gen Alert, coop, no distress Heart  Rrr, 2/6   Head NC, AT, no abnorm Abd  Soft, NT, +BS, no mass, no OM   Eyes PER, uncertain prognosis  (55360/21100 - 1 or more)    Acute illness with systemic Sx  (94890/14045 - 1 or more)    Acute, complicated injury  (23359/16306 - 1 or more)    92923 1 or more chronic illness with exacerbation, progression or SA of treatment    Time  30-39 minutes spent preparing to see patient including reviewing patient history/prior tests/prior consults, performing a medical exam, counseling and educating patient/family/caregiver, ordering medications/tests/procedures, referring and communicating with PCPs and other pertinent consultants, documenting information in the EMR, independently interpreting results and communicating to family and coordination of patient care. If you have questions, please do not hesitate to call me. I look forward to following Griffin Tello along with you.     Sincerely,        Shannan Louis MD

## 2021-03-25 NOTE — PROGRESS NOTES
Ms. Yuliya Bravo is a 77 y.o. y/o female with history of DVT, PE, Afib   She presents today for anticoagulation monitoring and adjustment. Pertinent PMH: HTN, Gastric Banding,CAD, diskectomy with an artifical spinal disk implant in September 2012. Patient Reported Findings:  Yes     No  [x]   []       Patient verifies current dosing regimen as listed  [x]   []       S/S bleeding/bruising/swelling/SOB states that she has been wheezing more and had more SOB d/t allergies --> has had a few small nose bleeds recently   []   [x]       Blood in urine or stool  []   [x]       Procedures scheduled in the future at this time   []   [x]       Missed Dose   []   [x]       Extra Dose   [x]   []       Change in medications  decreased tylenol intake to 1-2 times a day --> continues tylenol, propafenone increased --> dec propafenone to 225 mg BID x 2 months then d/c (ending 4/25)   [x]   []       Change in health/diet/appetite  Patient states that she feels like she has returned to normal vit k intake --> diet fluctuates causing INR to fluctuate. Discussed ways to improve consistency with vit k intake  --> states that she has cut back spinach and cabbage intake to twice a week --> no vit k recently   []   [x]       Change in alcohol use    []   [x]       Change in activity  []   [x]       Hospital admission  []   [x]       Emergency department visit   []   [x]       Other complaints    Clinical Outcomes:  Yes     No  []   [x]       Major bleeding event  []   [x]       Thromboembolic event  Gets warfarin through MD    Duration of warfarin Therapy: indefinite  INR Range:  2.0-3.0     INR is 2.8 today  Continue weekly dose of 5 mg Mon, Wed and Fri and 7.5 mg all other days of the week   Encouraged to maintain a consistency of vegetables/salads.   Recheck INR in 4 weeks, 4/22    Referring PCP is Sadi Chew  INR (no units)   Date Value   02/25/2021 2.5   01/12/2021 3.1   12/15/2020 2.5   10/26/2020 2.90 (H)   10/03/2020 2.13 (H)   10/02/2020 2.30 (H)      CLINICAL PHARMACY CONSULT: MED RECONCILIATION/REVIEW ADDENDUM    For Pharmacy Admin Tracking Only    PHSO: Yes  Total # of Interventions Recommended: 0  - Maintenance Safety Lab Monitoring #: 1  Total Interventions Accepted: 0  Time Spent (min): 15    Nida EchevarriaD

## 2021-03-26 RX ORDER — PANTOPRAZOLE SODIUM 40 MG/1
TABLET, DELAYED RELEASE ORAL
Qty: 90 TABLET | Refills: 3 | Status: SHIPPED | OUTPATIENT
Start: 2021-03-26 | End: 2022-02-25

## 2021-03-29 RX ORDER — GABAPENTIN 300 MG/1
CAPSULE ORAL
Qty: 540 CAPSULE | Refills: 3 | Status: SHIPPED | OUTPATIENT
Start: 2021-03-29 | End: 2022-02-25

## 2021-03-29 RX ORDER — INDAPAMIDE 1.25 MG/1
TABLET, FILM COATED ORAL
Qty: 90 TABLET | Refills: 3 | Status: SHIPPED | OUTPATIENT
Start: 2021-03-29 | End: 2022-02-25

## 2021-04-01 ENCOUNTER — OFFICE VISIT (OUTPATIENT)
Dept: DERMATOLOGY | Age: 76
End: 2021-04-01
Payer: MEDICARE

## 2021-04-01 VITALS — TEMPERATURE: 97.4 F

## 2021-04-01 DIAGNOSIS — L57.0 ACTINIC KERATOSIS: Primary | ICD-10-CM

## 2021-04-01 PROCEDURE — 17000 DESTRUCT PREMALG LESION: CPT | Performed by: DERMATOLOGY

## 2021-04-01 NOTE — PROGRESS NOTES
[facility]  Justino Courtney MD  [address]  1945    77 y.o. female     Date of Visit: 4/1/2021    Chief Complaint: AK    History of Present Illness:    She returns today to follow-up for a larger biopsy-proven actinic keratosis on the right lateral brow. Review of Systems:  None. Past Medical History, Family History, Surgical History, Medications and Allergies reviewed. Past Medical History:   Diagnosis Date    Allergic     Anxiety 2/2/2017    Arthritis     Asthma     Back pain     Blood circulation, collateral     legs    Depression     GERD (gastroesophageal reflux disease)     Gout     Hypercholesteremia     Hypertension     Movement disorder     7 discs ruptured    Movement disorder     knee replacements    Obesity     Pulmonary emboli (HCC)     Unspecified sleep apnea     uses cpap    Urinary tract infection, chronic     UTI (urinary tract infection)      Past Surgical History:   Procedure Laterality Date    ANKLE FUSION  2010    right, [facility]    BACK SURGERY  09/14/2012    lumbar fusion L-4, L-5 atrificial disc  Wisam Azar Favors  5-7-11    incision, drainage and debridement of left knee and application of wound vac.     BREAST BIOPSY  08/2018    neg    EYE SURGERY      JOINT REPLACEMENT      bilat knees    JO[facility]EMENT  [facility]    [facility], [facility]    JOINT REPLACEMENT  2005    right, [facility]    OTHER SURGICAL HISTORY  12-    laparscopic adjustable gastric restrictive procedure    REVISION [facility]Y  [facility]    SKIN FULL GRAFT  2011    right, [facility]    TUBAL LIGATION  [facility]    [facility]    UPPER GASTROINTESTINAL ENDOSCOPY  10-    VENA CAVA FILTER PLACEMENT         Allergies   Allergen Reactions    Belsomra [Suvorexant] Other (See Comments)     Nightmares      Avelox [Moxifloxacin Hcl In Nacl] Rash    Levofloxacin Rash    Sulfa Antibiotics Rash     Outpatient Medications Marked as Taking for the 4/1/21 encounter (Office Visit) with Henry Mckenna MD   Medication Sig Dispense Refill    gabapentin (NEURONTIN) 300 MG capsule TAKE 2 CAPSULES BY MOUTH 3  TIMES DAILY 540 capsule 3    indapamide (LOZOL) 1.25 MG tablet TAKE 1 TABLET BY MOUTH IN  THE MORNING 90 tablet 3    pantoprazole (PROTONIX) 40 MG tablet TAKE 1 TABLET BY MOUTH IN  THE MORNING BEFORE  BREAKFAST 90 tablet 3    montelukast (SINGULAIR) 10 MG tablet TAKE 1 TABLET BY MOUTH IN  THE EVENING 90 tablet 3    warfarin (COUMADIN) 5 MG tablet TAKE 5mg ON M,W.F and 7.5mg all other days 143 tablet 1    clonazePAM (KLONOPIN) 1 MG tablet TAKE 1 TABLET BY MOUTH IN  THE EVENING 90 tablet 0    diclofenac sodium (VOLTAREN) 1 % GEL Apply 4 g topically 4 times daily as needed for Pain 350 g 1    citalopram (CELEXA) 40 MG tablet TAKE 1 TABLET BY MOUTH  DAILY 90 tablet 3    allopurinol (ZYLOPRIM) 300 MG tablet TAKE 1 TABLET BY MOUTH  DAILY 90 tablet 3    atorvastatin (LIPITOR) 80 MG tablet TAKE 1 TABLET BY MOUTH  DAILY 90 tablet 3    propafenone (RYTHMOL) 225 MG tablet Take 1 tablet by mouth every 8 hours (Patient taking differently: Take 225 mg by mouth 2 times daily WILL STOP AT THE END OF APRIL) 270 tablet 1    furosemide (LASIX) 20 MG tablet Take 1 tablet by mouth daily (Patient taking differently: Take 20 mg by mouth daily PRN) 30 tablet 1    potassium chloride (KLOR-CON M) 20 MEQ extended release tablet TAKE 1 TABLET BY MOUTH  DAILY WITH BREAKFAST (Patient taking differently: TAKE 1 TABLET BY MOUTH  DAILY WITH BREAKFAST   Take 2 tablets on the days she takes the furosemide) 90 tablet 3    verapamil (CALAN SR) 240 MG extended release tablet Take 0.5 tablets by mouth nightly (Patient taking differently: Take 120 mg by mouth every morning ) 90 tablet 3    trimethoprim (TRIMPEX) 100 MG tablet TAKE 1 TABLET BY MOUTH  EVERY 48 HOURS 45 tablet 3    fluticasone-salmeterol (ADVAIR) 250-50 MCG/DOSE AEPB INHALE ONE PUFF BY MOUTH EVERY 12 HOURS 1 Inhaler 5    acetaminophen (TYLENOL) 500 MG tablet Take 500 mg by mouth every 6 hours as needed for Pain      diphenhydrAMINE-APAP, sleep, (TYLENOL PM EXTRA STRENGTH)  MG tablet Take 1 tablet by mouth nightly as needed for Sleep      PROAIR  (90 Base) MCG/ACT inhaler INHALE TWO PUFFS BY MOUTH EVERY 4 HOURS AS NEEDED FOR SHORTNESS OF BREATH 8.5 g 2    b complex vitamins capsule Take 1 capsule by mouth daily      Ascorbic Acid (VITAMIN C PO) 1 tablet daily      therapeutic multivitamin-minerals (THERAGRAN-M) tablet Take 1 tablet by mouth nightly       Cranberry 500 MG CAPS Take 1,000 mg by mouth nightly          Physical Examination     Well appearing. 1.  Right lateral brow - 1 scaly pink patch. Assessment and Plan     1. Actinic keratosis -     2 cycles of liquid nitrogen applied to 1 AK on the right lateral brow. Patient was educated regarding the potential risks of blister formation, discomfort. Wound care was discussed.         --Yari Arthur MD

## 2021-04-02 DIAGNOSIS — F41.9 ANXIETY: ICD-10-CM

## 2021-04-05 RX ORDER — TRIMETHOPRIM 100 MG/1
TABLET ORAL
Qty: 45 TABLET | Refills: 3 | Status: SHIPPED | OUTPATIENT
Start: 2021-04-05 | End: 2021-09-30 | Stop reason: SDUPTHER

## 2021-04-05 RX ORDER — CLONAZEPAM 1 MG/1
TABLET ORAL
Qty: 90 TABLET | Refills: 0 | Status: SHIPPED | OUTPATIENT
Start: 2021-04-05 | End: 2021-07-12 | Stop reason: SDUPTHER

## 2021-04-14 ENCOUNTER — TELEMEDICINE (OUTPATIENT)
Dept: BARIATRICS/WEIGHT MGMT | Age: 76
End: 2021-04-14
Payer: MEDICARE

## 2021-04-14 DIAGNOSIS — E66.01 MORBID OBESITY WITH BMI OF 45.0-49.9, ADULT (HCC): Primary | ICD-10-CM

## 2021-04-14 DIAGNOSIS — Z71.3 ENCOUNTER FOR DIETARY COUNSELING AND SURVEILLANCE: ICD-10-CM

## 2021-04-14 PROCEDURE — 3017F COLORECTAL CA SCREEN DOC REV: CPT | Performed by: NURSE PRACTITIONER

## 2021-04-14 PROCEDURE — G8427 DOCREV CUR MEDS BY ELIG CLIN: HCPCS | Performed by: NURSE PRACTITIONER

## 2021-04-14 PROCEDURE — G8399 PT W/DXA RESULTS DOCUMENT: HCPCS | Performed by: NURSE PRACTITIONER

## 2021-04-14 PROCEDURE — 1123F ACP DISCUSS/DSCN MKR DOCD: CPT | Performed by: NURSE PRACTITIONER

## 2021-04-14 PROCEDURE — 99213 OFFICE O/P EST LOW 20 MIN: CPT | Performed by: NURSE PRACTITIONER

## 2021-04-14 PROCEDURE — 4040F PNEUMOC VAC/ADMIN/RCVD: CPT | Performed by: NURSE PRACTITIONER

## 2021-04-14 PROCEDURE — 1090F PRES/ABSN URINE INCON ASSESS: CPT | Performed by: NURSE PRACTITIONER

## 2021-04-14 ASSESSMENT — ENCOUNTER SYMPTOMS
EYES NEGATIVE: 1
RESPIRATORY NEGATIVE: 1
GASTROINTESTINAL NEGATIVE: 1

## 2021-04-14 NOTE — PATIENT INSTRUCTIONS
Key Low Carb Dietary Points:    - Meats (preferably organic or grass fed) are great sources of protein and are low in carbohydrates. [place] with coconut, olive, avocado, or almond oils. - When buying dairy, choose regular or full fat options. - Choose vegetables that grow above ground as they are generally lower in carbohydrates. - Avoid bread, rice, potatoes, pasta and all sources of simple sugars (desserts, soda, breakfast cereals). - Choose beverages that are calorie and sugar free, such as water or flavored buitrago. - When eating fruit, choose berries as a snack option a few times a week. Patient received dietary handouts and education.

## 2021-04-14 NOTE — PROGRESS NOTES
Shayan Weeks stable / unchanged over past 5 weeks. Pt is s/p band in 2011. Due to the COVID-19 restrictions on close contact interactions the patient's visit was conducted via telephone in joo of a face to face visit. The patient is here through telemedicine for their MWM visit. Treatment plan details: 1200 kcal LC   Is patient adhering to treatment plan: Loosely    Is pt eating 4-5 times each day? 4 x day     Is pt including lean protein with all meals and snacks? Mostly      Breakfast - 1/2 Homemade Bagel (made with yogurt and flour) with cinnamon and 1/4 c chopped pecans - previously ate entire bagel    Lunch - 2 slices of sliced roast beef with 2 slices of cheddar cheese sometimes with orange sometimes with 1 slice of bread/3 triscuits     Dinner - meat and veggie with occasional potatoes     Snack- 1/2 c peanuts OR a few pretzels (6)    Is pt avoiding added sugars and starchy foods? No     Consuming at least 64oz of calorie free fluids? Follows 60 oz/day fluid restriction - drinks 2 cups of coffee, light OJ (3 oz) to take meds with in morning, 3 glasses of iced tea with sweetener, water. Participating in intentional exercise? Was going to therapy 2 x week but this stopped last week. Has been walking more and gets ~ 3K steps/day       Amount per meal: 1.5 cups/sitting      Following 30/30/30 rule: Eating in 20 minutes.  Avoids eating and drinking at the same time, just takes sips prn.     Supplements: Vit C, (no longer taking Calcium following cardioversion), generic MVI 1 x day, cranberry capsule, B complex 1 x day    Food Intolerances: greasy foods     Plan/Goals:   Decrease nuts to 1/4 c   Continue staying active  Switch higher CHO salad option to broccoli salad    Handouts: none     Pan Lobato

## 2021-04-14 NOTE — PROGRESS NOTES
[facility]) Physicians   [employer]    4/14/2021    TELEHEALTH EVALUATION -- Audio/Visual (During [id number] public health emergency)    Medical Weight Loss    HPI: Corin Hoff is a 77 y.o. female with current BMI of 46.3 and current weight of 272 pounds. Due to the COVID-19 restrictions on close contact interactions the patient's monthly visit was conducted via audio/video in joo of a face to face visit. Patient has consented to have this visit conducted via audio/video. The patient is here through telemedicine for their medical weight loss visit. She is following the 1200 calorie low carb meal plan as a guide, tolerating well. Patient denies any nausea, vomiting, fevers, chills, shortness of breath, chest pain, cough, constipation or difficulty urinating. Past Medical History:   Diagnosis Date    Allergic     Anxiety 2/2/2017    Arthritis     Asthma     Back pain     Blood circulation, collateral     legs    Depression     GERD (gastroesophageal reflux disease)     Gout     Hypercholesteremia     Hypertension     Movement disorder     7 discs ruptured    Movement disorder     knee replacements    Obesity     Pulmonary emboli (HCC)     Unspecified sleep apnea     uses cpap    Urinary tract infection, chronic     UTI (urinary tract infection)      Past Surgical History:   Procedure Laterality Date    ANKLE FUSION  2010    right, [facility]    BACK SURGERY  09/14/2012    lumbar fusion L-4, L-5 atrificial disc  [facility] Dr. Townsend Starch  5-7-11    incision, drainage and debridement of left knee and application of wound vac.     BREAST BIOPSY  08/2018    neg    EYE SURGERY      JOINT REPLACEMENT      bilat knees    JOINT [facility]AC[facility]  2003    [facility], [facility]    JOINT REPLACEMENT  2005    right, [facility]    OTHER SURGICAL HISTORY  12-    laparscopic adjustable gastric restrictive procedure  REVISION TOTAL KNEE ARTHROPLASTY      [facility] SCHNEIDER    SKIN FULL GRAFT      [facility], [facility]    TUB[facility] L[facility]ATION  1975    [facility]    UPPER GASTROINTESTINAL ENDOSCOPY  10-    VENA CAVA FILTER PLACEMENT       Family History   Problem Relation Age of Onset    High Blood Pressure Mother     Depression Mother     Stroke Father     High Blood Pressure Father     Asthma Father     Arthritis Father     Heart Disease Father     Breast Cancer Sister     High Cholesterol Neg Hx      Social History     Tobacco Use    Smoking status: Former Smoker     Packs/day: 0.50     Years: 4.00     Pack years: 2.00     Types: Cigarettes     Quit date: 1965     Years since quittin.3    Smokeless tobacco: Never Used   Substance Use Topics    Alcohol use: Not Currently     Comment: 4 t imes a year     I counseled the patient on the importance of not smoking and risks of ETOH. Allergies   Allergen Reactions    Belsomra [Suvorexant] Other (See Comments)     Nightmares      Avelox [Moxifloxacin Hcl In Nacl] Rash    Levofloxacin Rash    Sulfa Antibiotics Rash     There were no vitals filed for this visit. There is no height or weight on file to calculate BMI.     Current Outpatient Medications:     clonazePAM (KLONOPIN) 1 MG tablet, TAKE 1 TABLET BY MOUTH IN  THE EVENING, Disp: 90 tablet, Rfl: 0    trimethoprim (TRIMPEX) 100 MG tablet, TAKE 1 TABLET BY MOUTH  EVERY 48 HOURS, Disp: 45 tablet, Rfl: 3    gabapentin (NEURONTIN) 300 MG capsule, TAKE 2 CAPSULES BY MOUTH 3  TIMES DAILY, Disp: 540 capsule, Rfl: 3    indapamide (LOZOL) 1.25 MG tablet, TAKE 1 TABLET BY MOUTH IN  THE MORNING, Disp: 90 tablet, Rfl: 3    pantoprazole (PROTONIX) 40 MG tablet, TAKE 1 TABLET BY MOUTH IN  THE MORNING BEFORE  BREAKFAST, Disp: 90 tablet, Rfl: 3    montelukast (SINGULAIR) 10 MG tablet, TAKE 1 TABLET BY MOUTH IN  THE EVENING, Disp: 90 tablet, Rfl: 3    warfarin (COUMADIN) 5 MG tablet, TAKE 5mg ON M,W. and 7.5mg all other days, Disp: 143 tablet, Rfl: 1    diclofenac sodium (VOLTAREN) 1 % GEL, Apply 4 g topically 4 times daily as needed for Pain, Disp: 350 g, Rfl: 1    citalopram (CELEXA) 40 MG tablet, TAKE 1 TABLET BY MOUTH  DAILY, Disp: 90 tablet, Rfl: 3    allopurinol (ZYLOPRIM) 300 MG tablet, TAKE 1 TABLET BY MOUTH  DAILY, Disp: 90 tablet, Rfl: 3    atorvastatin (LIPITOR) 80 MG tablet, TAKE 1 TABLET BY MOUTH  DAILY, Disp: 90 tablet, Rfl: 3    propafenone (RYTHMOL) 225 MG tablet, Take 1 tablet by mouth every 8 hours (Patient taking differently: Take 225 mg by mouth 2 times daily WILL STOP AT THE END OF APRIL), Disp: 270 tablet, Rfl: 1    furosemide (LASIX) 20 MG tablet, Take 1 tablet by mouth daily (Patient taking differently: Take 20 mg by mouth daily PRN), Disp: 30 tablet, Rfl: 1    potassium chloride (KLOR-CON M) 20 MEQ extended release tablet, TAKE 1 TABLET BY MOUTH  DAILY WITH BREAKFAST (Patient taking differently: TAKE 1 TABLET BY MOUTH  DAILY WITH BREAKFAST  Take 2 tablets on the days she takes the furosemide), Disp: 90 tablet, Rfl: 3    verapamil (CALAN SR) 240 MG extended release tablet, Take 0.5 tablets by mouth nightly (Patient taking differently: Take 120 mg by mouth every morning ), Disp: 90 tablet, Rfl: 3    fluticasone-salmeterol (ADVAIR) 250-50 MCG/DOSE AEPB, INHALE ONE PUFF BY MOUTH EVERY 12 HOURS, Disp: 1 Inhaler, Rfl: 5    acetaminophen (TYLENOL) 500 MG tablet, Take 500 mg by mouth every 6 hours as needed for Pain, Disp: , Rfl:     diphenhydrAMINE-APAP, sleep, (TYLENOL PM EXTRA STRENGTH)  MG tablet, Take 1 tablet by mouth nightly as needed for Sleep, Disp: , Rfl:     PROAIR  (90 Base) MCG/ACT inhaler, INHALE TWO PUFFS BY MOUTH EVERY 4 HOURS AS NEEDED FOR SHORTNESS OF BREATH, Disp: 8.5 g, Rfl: 2    b complex vitamins capsule, Take 1 capsule by mouth daily, Disp: , Rfl:     Ascorbic Acid (VITAMIN C PO), 1 tablet daily, Disp: , Rfl:     therapeutic multivitamin-minerals (THERAGRAN-M) tablet, Take 1 tablet by mouth nightly , Disp: , Rfl:     Cranberry 500 MG CAPS, Take 1,000 mg by mouth nightly , Disp: , Rfl:     Lab Results   Component Value Date    WBC 6.5 10/02/2020    RBC 4.24 10/02/2020    HGB 13.7 10/02/2020    HCT 40.0 10/02/2020    MCV 94.3 10/02/2020    MCH 32.4 10/02/2020    MCHC 34.4 10/02/2020    MPV 6.8 10/02/2020    NEUTOPHILPCT 74.9 10/29/2018    LYMPHOPCT 17.2 10/29/2018    MONOPCT 6.0 10/29/2018    EOSRELPCT 1.4 10/29/2018    BASOPCT 0.5 10/29/2018    NEUTROABS 7.4 10/29/2018    LYMPHSABS 1.7 10/29/2018    MONOSABS 0.6 10/29/2018    EOSABS 0.1 10/29/2018     Lab Results   Component Value Date     12/23/2020    K 4.1 12/23/2020    K 3.3 10/10/2018     12/23/2020    CO2 28 12/23/2020    ANIONGAP 11 12/23/2020    GLUCOSE 93 12/23/2020    BUN 19 12/23/2020    CREATININE 0.9 12/23/2020    LABGLOM >60 12/23/2020    GFRAA >60 12/23/2020    GFRAA >60 01/04/2013    CALCIUM 9.7 12/23/2020    PROT 7.2 10/02/2020    PROT 7.6 01/04/2013    LABALBU 4.3 10/02/2020    AGRATIO 1.5 10/02/2020    BILITOT 0.7 10/02/2020    ALKPHOS 78 10/02/2020    ALT 26 10/02/2020    AST 32 10/02/2020    GLOB 2.9 10/02/2020     Lab Results   Component Value Date    CHOL 150 10/02/2020    TRIG 80 10/02/2020    HDL 61 10/02/2020    HDL 58 02/01/2012    LDLCALC 73 10/02/2020    LABVLDL 16 10/02/2020     Lab Results   Component Value Date    TSHREFLEX 2.44 10/02/2020     Lab Results   Component Value Date    IRON 72 08/27/2019    TIBC 292 08/27/2019    LABIRON 25 08/27/2019     Lab Results   Component Value Date    Awilda Mata 1059 08/27/2019    FOLATE >20.00 08/27/2019     Lab Results   Component Value Date    VITD25 50.2 08/27/2019     Lab Results   Component Value Date    LABA1C 5.6 11/22/2019    .9 12/28/2011       Patient Active Problem List   Diagnosis    Essential hypertension    History of pulmonary embolism    Osteoarthritis of knee    PAULA (obstructive sleep apnea)    Asthma    Status post gastric banding    Coronary artery disease    Morbid obesity with BMI of 40.0-44.9, adult ([facility])    Hyperlipidemia, unspecified    Arthritis    Anxiety    Chronic cough    PAF (paroxysmal atrial fibrillation) ([facility])    Atrial flutter ([facility])    Nonrheumatic aortic valve stenosis       Review of Systems   Constitutional: Negative. HENT: Negative. Eyes: Negative. Respiratory: Negative. Cardiovascular: Negative. Gastrointestinal: Negative. Skin: Negative. Neurological: Negative. PHYSICAL EXAMINATION:    Constitutional: [x] Appears well-developed and well-nourished [x] No apparent distress      [] Abnormal-   Mental status  [x] Alert and awake  [x] Oriented to person/place/time [x]Able to follow commands      Eyes:  EOM    [x]  Normal  [] Abnormal-  Sclera  [x]  Normal  [] Abnormal -         Discharge [x]  None visible  [] Abnormal -    HENT:   [x] Normocephalic, atraumatic.   [] Abnormal   [x] Mouth/Throat: Mucous membranes are moist.     External Ears [] Normal  [] Abnormal-     Neck: [x] No visualized mass     Pulmonary/Chest: [x] Respiratory effort normal.  [x] No visualized signs of difficulty breathing or respiratory distress        [] Abnormal-      Musculoskeletal:   [] Normal gait with no signs of ataxia         [x] Normal range of motion of neck        [] Abnormal-     Neurological:        [x] No Facial Asymmetry (Cranial nerve 7 motor function) (limited exam to video visit)          [x] No gaze palsy        [] Abnormal-         Skin:        [x] No significant exanthematous lesions or discoloration noted on facial skin         [] Abnormal-            Psychiatric:       [x] Normal Affect [x] No Hallucinations        [] Abnormal-     Other pertinent observable physical exam findings-     Due to this being a TeleHealth encounter, evaluation of the following organ systems is limited: Vitals/Constitutional/EENT/Resp/CV/GI//MS/Neuro/Skin/Heme-Lymph-Imm. Assessment and Plan:    Patient is here via telemedicine for their medical weight loss visit, down 0 lbs and a total weight loss of 9.5 lbs. She is doing well, making dietary and behavior modifications. She is following dietary recommendations utilizing the 1200 calorie low carb meal plan as a guide. She has reduced carb intake in breakfast and lunch by cutting her muffin in half and eliminating the bread from her lunch. We discussed incorporating more vegetables with her snacks and reducing portion of peanuts. She does have a 60 ounce fluid restriction. We discussed switching out some of her tea for regular water. She did speak with the registered dietitian for continued follow up. I agree with recommendations and plan. She is exercising by walking more around the house. She was in PT for her shoulder but that has ended. She is continuing to do the exercises on her own about once a week. She is tracking her steps and has increased daily steps up to almost 4000/day. Encouraged continued physical activity. We will see her back in 3 month for continued follow up via telemedicine. Total encounter time: 20 minutes, including any number of the following:  review of labs, imaging, provider notes, outside hospital records, performing examination/evaluation, counseling patient and/or family, ordering medications/tests, placing referrals and communication with referring physicians, coordination of care; discussing dietary plan/recall with the patient as well with registered dietitian and documentation in the EHR. Of note, the above was done during same day of the actual patient encounter. An electronic signature was used to authenticate this note.      Pursuant to the emergency declaration under the [facility], [facility] authority and the [facility] Act, this Virtual  Visit was conducted, with patient's consent, to reduce the patient's risk of exposure to COVID-19 and provide continuity of care for an established patient. Services were provided through a video synchronous discussion virtually to substitute for in-person clinic visit.

## 2021-04-22 ENCOUNTER — ANTI-COAG VISIT (OUTPATIENT)
Dept: PHARMACY | Age: 76
End: 2021-04-22
Payer: MEDICARE

## 2021-04-22 DIAGNOSIS — Z86.711 HISTORY OF PULMONARY EMBOLISM: ICD-10-CM

## 2021-04-22 DIAGNOSIS — I48.0 PAF (PAROXYSMAL ATRIAL FIBRILLATION) (HCC): ICD-10-CM

## 2021-04-22 LAB — INTERNATIONAL NORMALIZATION RATIO, POC: 2.4

## 2021-04-22 PROCEDURE — 85610 PROTHROMBIN TIME: CPT

## 2021-04-22 PROCEDURE — 99211 OFF/OP EST MAY X REQ PHY/QHP: CPT

## 2021-05-06 ENCOUNTER — NURSE ONLY (OUTPATIENT)
Dept: CARDIOLOGY CLINIC | Age: 76
End: 2021-05-06
Payer: MEDICARE

## 2021-05-06 ENCOUNTER — TELEPHONE (OUTPATIENT)
Dept: CARDIOLOGY CLINIC | Age: 76
End: 2021-05-06

## 2021-05-06 ENCOUNTER — HOSPITAL ENCOUNTER (OUTPATIENT)
Dept: CARDIAC CATH/INVASIVE PROCEDURES | Age: 76
Discharge: HOME | End: 2021-05-06
Attending: INTERNAL MEDICINE | Admitting: INTERNAL MEDICINE
Payer: MEDICARE

## 2021-05-06 VITALS
BODY MASS INDEX: 46.78 KG/M2 | HEIGHT: 64 IN | TEMPERATURE: 98.5 F | HEART RATE: 112 BPM | WEIGHT: 274 LBS | RESPIRATION RATE: 16 BRPM | SYSTOLIC BLOOD PRESSURE: 152 MMHG | DIASTOLIC BLOOD PRESSURE: 82 MMHG

## 2021-05-06 DIAGNOSIS — I48.91 ATRIAL FIBRILLATION, UNSPECIFIED TYPE (HCC): Primary | ICD-10-CM

## 2021-05-06 LAB
EKG ATRIAL RATE: 83 BPM
EKG DIAGNOSIS: NORMAL
EKG P AXIS: 78 DEGREES
EKG P-R INTERVAL: 174 MS
EKG Q-T INTERVAL: 376 MS
EKG QRS DURATION: 88 MS
EKG QTC CALCULATION (BAZETT): 441 MS
EKG R AXIS: -2 DEGREES
EKG T AXIS: 63 DEGREES
EKG VENTRICULAR RATE: 83 BPM

## 2021-05-06 PROCEDURE — 2500000003 HC RX 250 WO HCPCS

## 2021-05-06 PROCEDURE — 92960 CARDIOVERSION ELECTRIC EXT: CPT

## 2021-05-06 PROCEDURE — 99152 MOD SED SAME PHYS/QHP 5/>YRS: CPT | Performed by: INTERNAL MEDICINE

## 2021-05-06 PROCEDURE — 93000 ELECTROCARDIOGRAM COMPLETE: CPT | Performed by: INTERNAL MEDICINE

## 2021-05-06 PROCEDURE — 85610 PROTHROMBIN TIME: CPT

## 2021-05-06 PROCEDURE — 93005 ELECTROCARDIOGRAM TRACING: CPT | Performed by: INTERNAL MEDICINE

## 2021-05-06 PROCEDURE — 7100000010 HC PHASE II RECOVERY - FIRST 15 MIN

## 2021-05-06 PROCEDURE — 92960 CARDIOVERSION ELECTRIC EXT: CPT | Performed by: INTERNAL MEDICINE

## 2021-05-06 PROCEDURE — 93010 ELECTROCARDIOGRAM REPORT: CPT | Performed by: INTERNAL MEDICINE

## 2021-05-06 RX ORDER — PROPAFENONE HYDROCHLORIDE 225 MG/1
225 TABLET, FILM COATED ORAL 2 TIMES DAILY
Qty: 180 TABLET | Refills: 0 | Status: SHIPPED | OUTPATIENT
Start: 2021-05-06 | End: 2021-06-21

## 2021-05-06 NOTE — PROCEDURES
[facility]     Electrophysiology Procedure Note       Date of Procedure: 5/6/2021  Patient's Name: Yani Vieira  YOB: 1945   Medical Record Number: [id number]  Procedure Performed by: Willie Gonzalez MD    Procedures performed:  IV sedation. External Electrical cardioversion   [id number]  ASA 3    Indication of the procedure: Persistent atrial fibrillation    Details of procedure: The patient was brought to the cath lab area in a fasting and non-sedated state. The risks, benefits and alternatives of the procedure were discussed with the patient. The patient opted to proceed with the procedure. Written informed consent was signed and placed in the chart. A timeout protocol was completed to identify the patient and the procedure being performed. I pushed 50+40 mg Brevital.   We monitored the patient's level of consciousness and vital signs/physiologic status throughout the procedure duration (see start and stop times below). Sedation:     Sedation start: 1218  Sedation stop: 1240     Patient is on chronic anticoagulation therapy. Then we used Brevital for sedation and electrical DC cardioversion was perfomred using 200J, synchronized shock. Patient was converted to sinus rhythm at 81 bpm. The patient tolerated the procedure well and there were no complications. Conclusion:   Successful external DC cardioversion of atrial fibrillation . Plan:   The patient can be discharged if remains stable. Will continue with medical therapy. Resume propafenone for the time being until her follow-up. At follow-up we can discontinue the propafenone and at that point if she has recurrence of atrial fibrillation or flutter redo ablation may be considered.

## 2021-05-06 NOTE — PROGRESS NOTES
Patient came in for EKG. Felt like she was in Atrial fib and her [employer] santiago told her she was in  Atrial fib. EKG showed atrial fib rate 115. Reviewed with MXA. PAtient ate breakfast at 0830. Will add on for cardioversion this afternoon.

## 2021-05-06 NOTE — TELEPHONE ENCOUNTER
Pt calling she woke up feeling anxious and SOB, her watch showed HR was 140 and acedia santiago saying that she is back in afib. Her HR is going from 49 to 117 and she is short of breath.  Cardioversion was done 10/26/20  pt wants to know what to do? pls call to advise

## 2021-05-06 NOTE — TELEPHONE ENCOUNTER
Spoke with the patient and she already ate breakfast . She states she had 1/2 bagel, 1 egg, and a sausage marv.  Pt has been added to the CS at 11:30 for an EKG

## 2021-05-06 NOTE — LETTER
[facility]  EP Procedure Sheet    5/6/21  Maricruz Whitehead  1945  EP Procedures     Pacemaker implant (single/dual)  EP Study    ICD implant (single/dual)  Atrial flutter ablation ([facility] Y/N)    Biv implant ICD  Tilt Table    Biv implant PPM  Atrial fibrillation ablation ([facility] Yes)    Generator Change (PPM/ICD/BiV)  SVT ablation    Lead revision (RV/LA/RA) (<1 month)  VT ablation      Lead extraction +/- upgrade (BiV/PPM/ICD)  VT Ischemic/ non-ischemic    Loop implant/ removal  VT RVOT   xxx Cardioversion  VT Left sided    [facility]WELL  AVN ablation     Equipment     [employer]tr[employer]   JOCELYNN Mapping System    [place]  [address] [address]  CryoAblation    [employer]  Laser Lead Extraction     EP Procedures Scheduling Request    # hours Requested   Scheduled  Date:   Specific Day today Completed    Anesthesia  F/u Date:   CT surgery backup  COVID     Overnight stay      Location [facility]        Pre-Procedure Labs / Imaging     PT/INR  Type & cross    CBC  Units PRBC    BMP/Mg  Units FFP    Venogram  Cardiac CTA for Pulmonary vein mapping     RN INITIALS:dw     Patient Instructions  Do not eat or drink after midnight the night prior to procedure  Dx:atrial fibrillatin  INR 04/22/2021 2.4

## 2021-05-06 NOTE — H&P
[facility]   Electrophysiology         Chief Complaint   Patient presents with    3 Month Follow-Up      HPI: Leonidas Tobar is a 77 y.o. female with a history of paroxysmal atrial fibrillation, hypertension, CAD, PE/DVT, and hyperlipidemia. She was originally seen by EP while in the hospital in December of 2011 after she was found to have atrial fibrillation post- lap band surgery. She is on Coumadin for her history of pulmonary emboli (INR monitored at [facility]) which was not associated with any reversible cause. She also has a Harman filter. She had a fall on 11/15/19 when at a restaurant as she was walking into the door. She turned her head to the left, straightened her head and then went down. She felt lightheaded. She tried to break the fall with her hands. She had a radial neck fracture and forehead hematoma. Since her fall she has a hard time getting her words out.     S/p coronary angiogram 5/1/18 which was negative for myocardial ischemia. S/p 12/14/18 unsuccessful cardioversion     -S/p RFCA of afib w/ PVI (10/3/2020)  -S/p DCCV 10/14/2020 and 10/26/2020      Sherif Kohler presents to the office in follow up.  Went into Atrial fibrillation/flutter  yesterday    Past Medical History:   Diagnosis Date    Allergic     Anxiety 2/2/2017    Arthritis     Asthma     Back pain     Blood circulation, collateral     legs    Depression     GERD (gastroesophageal reflux disease)     Gout     Hypercholesteremia     Hypertension     Movement disorder     7 discs ruptured    Movement disorder     knee replacements    Obesity     Pulmonary emboli (HCC)     Unspecified sleep apnea     uses cpap    Urinary tract infection, chronic     UTI (urinary tract infection)         Past Surgical History:   Procedure Laterality Date    ANKLE FUSION  2010    right, [facility]    BACK SURGERY  09/14/2012    lumbar fusion L-4, L-5 atrificial disc  [facility] Dr. Chava Bland  BLADDER SUSPENSION      BONE INCISION AND DRAINAGE  5-7-11    incision, drainage and debridement of left knee and application of wound vac.  BREAST BIOPSY  08/2018    neg    EYE SURGERY      JOINT REPLACEMENT      bilat knees    JOINT REPLACEMENT  2003    left, [facility]     JOINT [facility]EMENT  [facility]    [facility]    OTHER SURGICAL HISTORY  12-    laparscopic adjustable gastric restrictive procedure    REVISION TOTAL KN[facility]    SKIN FULL GRAFT  2011    right, [address]    [facility]    UPPER GASTROINTESTINAL ENDOSCOPY  10-    VENA CAVA FILTER PLACEMENT         Allergies: Allergies   Allergen Reactions    Belsomra [Suvorexant] Other (See Comments)     Nightmares      Avelox [Moxifloxacin Hcl In Nacl] Rash    Levofloxacin Rash    Sulfa Antibiotics Rash       Social History:   reports that she quit smoking about 56 years ago. Her smoking use included cigarettes. She has a 2.00 pack-year smoking history. She has never used smokeless tobacco. She reports previous alcohol use. She reports that she does not use drugs. Family History:  family history includes Arthritis in her father; Asthma in her father; Breast Cancer in her sister; Depression in her mother; Heart Disease in her father; High Blood Pressure in her father and mother; Stroke in her father. Reviewed. Denies family history of sudden cardiac death, arrhythmia, premature CAD    Review of System:  All other systems reviewed and are negative except for that noted above.  Pertinent negatives are:   General: negative for fever, chills   Ophthalmic ROS: negative for - eye pain or loss of vision  ENT ROS: negative for - headaches, sore throat   Respiratory: negative for - cough, sputum  Cardiovascular: Reviewed in HPI  Gastrointestinal: negative for - abdominal pain, diarrhea, N/V  Hematology: negative for - bleeding, blood clots, bruising or jaundice  Genito-Urinary:  negative for - Dysuria or incontinence  Musculoskeletal: negative for - Joint swelling, muscle pain  Neurological: negative for - confusion, dizziness, headaches   Psychiatric: No anxiety, no depression. Dermatological: negative for - rash    Physical Examination:  Vitals:    21 1120   BP: (!) 144/82   Pulse: 71   SpO2: 95%      Wt Readings from Last 3 Encounters:   21 276 lb (125.2 kg)   20 269 lb 3.2 oz (122.1 kg)   10/26/20 263 lb (119.3 kg)       · Constitutional: Oriented. No distress. · Head: Normocephalic and atraumatic. · Mouth/Throat: Oropharynx is clear and moist.   · Eyes: Conjunctivae normal. EOM are normal.   · Neck: Neck supple. No rigidity. No JVD present. · Cardiovascular: Normal rate, irregular rhythm, S1&S2. · Pulmonary/Chest: Bilateral respiratory sounds. No wheezes, No rhonchi. · Abdominal: Soft. Bowel sounds present. No distension, No tenderness. · Musculoskeletal: No tenderness. No edema    · Lymphadenopathy: Has no cervical adenopathy. · Neurological: Alert and oriented. Cranial nerve appears intact, No Gross deficit   · Skin: Skin is warm and dry. No rash noted. · Psychiatric: Has a normal behavior       Labs, diagnostic and imaging results reviewed. Reviewed. Lab Results   Component Value Date    TSHREFLEX 2.44 10/02/2020    TSH 3.53 2011    CREATININE 0.9 2020    CREATININE 0.8 10/02/2020    AST 32 10/02/2020    ALT 26 10/02/2020       EC21  Sinus Rhythm    Echo: 3/13/2018   Summary   Normal left ventricle size, wall thickness and systolic function with an   estimated ejection fraction of 60%.       Mild mitral regurgitation    Lexiscan: 2018  Summary         No EKG evidence for ischemia with exercise         Normal LV size and systolic function.         Myocardial perfusion imaging with small area of decreased uptake in the     anteroapical wall with stress with redistribution at rest consistent with     ischemia.            Medication:  Current Outpatient Medications   Medication Sig Dispense Refill    warfarin (COUMADIN) 5 MG tablet TAKE 5mg ON M,W.F and 7.5mg all other days 143 tablet 1    clonazePAM (KLONOPIN) 1 MG tablet TAKE 1 TABLET BY MOUTH IN  THE EVENING 90 tablet 0    diclofenac sodium (VOLTAREN) 1 % GEL Apply 4 g topically 4 times daily as needed for Pain 350 g 1    citalopram (CELEXA) 40 MG tablet TAKE 1 TABLET BY MOUTH  DAILY 90 tablet 3    allopurinol (ZYLOPRIM) 300 MG tablet TAKE 1 TABLET BY MOUTH  DAILY 90 tablet 3    atorvastatin (LIPITOR) 80 MG tablet TAKE 1 TABLET BY MOUTH  DAILY 90 tablet 3    propafenone (RYTHMOL) 225 MG tablet Take 1 tablet by mouth every 8 hours 270 tablet 1    furosemide (LASIX) 20 MG tablet Take 1 tablet by mouth daily (Patient taking differently: Take 20 mg by mouth daily Three times weekly) 30 tablet 1    potassium chloride (KLOR-CON M) 20 MEQ extended release tablet TAKE 1 TABLET BY MOUTH  DAILY WITH BREAKFAST (Patient taking differently: TAKE 1 TABLET BY MOUTH  DAILY WITH BREAKFAST   Take 2 tablets on the days she takes the furosemide) 90 tablet 3    verapamil (CALAN SR) 240 MG extended release tablet Take 0.5 tablets by mouth nightly (Patient taking differently: Take 120 mg by mouth every morning ) 90 tablet 3    trimethoprim (TRIMPEX) 100 MG tablet TAKE 1 TABLET BY MOUTH  EVERY 48 HOURS 45 tablet 3    fluticasone-salmeterol (ADVAIR) 250-50 MCG/DOSE AEPB INHALE ONE PUFF BY MOUTH EVERY 12 HOURS 1 Inhaler 5    indapamide (LOZOL) 1.25 MG tablet TAKE 1 TABLET BY MOUTH  EVERY MORNING 90 tablet 3    montelukast (SINGULAIR) 10 MG tablet TAKE 1 TABLET BY MOUTH  NIGHTLY 90 tablet 3    pantoprazole (PROTONIX) 40 MG tablet TAKE 1 TABLET BY MOUTH  EVERY MORNING BEFORE  BREAKFAST 90 tablet 3    acetaminophen (TYLENOL) 500 MG tablet Take 500 mg by mouth every 6 hours as needed for Pain      diphenhydrAMINE-APAP, sleep, (TYLENOL PM EXTRA STRENGTH)  MG stenosis  -Moderate per recent echo 12/2020  -Will refer to [address] for further evaluation     Obesity  Body mass index is 47.38 kg/m². - Excessive weight is complicating assessment and treatment. It is placing patient at risk for multiple co-morbidities as well as early death and contributing to the patient's presentation.   - discussed weight management with diet and exercise      PAULA  -encouraged compliance with CPAP    Hx of PE/DVT  -Ac with coumadin  -s/p abena filter    Plan   cardioversion

## 2021-05-10 ENCOUNTER — NURSE ONLY (OUTPATIENT)
Dept: CARDIOLOGY CLINIC | Age: 76
End: 2021-05-10
Payer: MEDICARE

## 2021-05-10 ENCOUNTER — TELEPHONE (OUTPATIENT)
Dept: CARDIOLOGY CLINIC | Age: 76
End: 2021-05-10

## 2021-05-10 DIAGNOSIS — I48.91 ATRIAL FIBRILLATION, UNSPECIFIED TYPE (HCC): Primary | ICD-10-CM

## 2021-05-10 PROCEDURE — 93000 ELECTROCARDIOGRAM COMPLETE: CPT | Performed by: INTERNAL MEDICINE

## 2021-05-10 NOTE — TELEPHONE ENCOUNTER
Patient states she went back into afib Friday. She had some dizziness and HR as high as 135 BPM, felt like she went back into SR yesterday and back in afib this AM. Will come in for an ECG today and determine further treatment from there. Patient NPO besides medications and INR 2-3 last 5 readings.

## 2021-05-10 NOTE — PROGRESS NOTES
Patient in SR with PAT, will increase propafenone to TID and see if she is feeling improved with better control of her rate and rhythm.  Will monitor at home with her [employer] mobile device and send via [facility]

## 2021-05-11 LAB — INR BLD: 2.8 (ref 0.86–1.14)

## 2021-05-12 ENCOUNTER — TELEPHONE (OUTPATIENT)
Dept: CARDIOLOGY CLINIC | Age: 76
End: 2021-05-12

## 2021-05-12 NOTE — TELEPHONE ENCOUNTER
Spoke with Duncan Borges regarding her HR and reviewed her ECG that she sent in. Spoke with NPSR advised her to take an extra half tablet of diltiazem and continue daily increased dose of diltiazem and monitor HR and BP.  Advised patient to monitor her heart rate today and she will call me if she continues to be tachycardic

## 2021-05-18 ENCOUNTER — ANTI-COAG VISIT (OUTPATIENT)
Dept: PHARMACY | Age: 76
End: 2021-05-18
Payer: MEDICARE

## 2021-05-18 DIAGNOSIS — I48.0 PAF (PAROXYSMAL ATRIAL FIBRILLATION) (HCC): Primary | ICD-10-CM

## 2021-05-18 DIAGNOSIS — Z86.711 HISTORY OF PULMONARY EMBOLISM: ICD-10-CM

## 2021-05-18 LAB — INTERNATIONAL NORMALIZATION RATIO, POC: 2.6

## 2021-05-18 PROCEDURE — 99211 OFF/OP EST MAY X REQ PHY/QHP: CPT

## 2021-05-18 PROCEDURE — 85610 PROTHROMBIN TIME: CPT

## 2021-05-18 NOTE — PROGRESS NOTES
Ms. Isael Winston is a 77 y.o. y/o female with history of DVT, PE, Afib   She presents today for anticoagulation monitoring and adjustment. Pertinent PMH: HTN, Gastric Banding,CAD, diskectomy with an artifical spinal disk implant in September 2012. Patient Reported Findings:  Yes     No  [x]   []       Patient verifies current dosing regimen as listed  [x]   []       S/S bleeding/bruising/swelling/SOB states that she has been wheezing more and had more SOB d/t allergies --> has had a few small nose bleeds recently  --> denies   []   [x]       Blood in urine or stool  [x]   []       Procedures scheduled in the future at this time had CV 5/6, INR 2.8    []   [x]       Missed Dose   []   [x]       Extra Dose   [x]   []       Change in medications  decreased tylenol intake to 1-2 times a day --> continues tylenol, propafenone increased --> dec propafenone to 225 mg BID x 2 months then d/c (ending 4/25) --> inc diltiazem, has resumed 3 tabs propafenone    [x]   []       Change in health/diet/appetite  Patient states that she feels like she has returned to normal vit k intake --> diet fluctuates causing INR to fluctuate. Discussed ways to improve consistency with vit k intake  --> states that she has cut back spinach and cabbage intake to twice a week --> no vit k recently   []   [x]       Change in alcohol use    []   [x]       Change in activity  []   [x]       Hospital admission  []   [x]       Emergency department visit   []   [x]       Other complaints    Clinical Outcomes:  Yes     No  []   [x]       Major bleeding event  []   [x]       Thromboembolic event  Gets warfarin through MD    Duration of warfarin Therapy: indefinite  INR Range:  2.0-3.0     INR is 2.6 today  Continue weekly dose of 5 mg Mon, Wed and Fri and 7.5 mg all other days of the week    Encouraged to maintain a consistency of vegetables/salads.   Recheck INR in 4 weeks, 6/15    Referring PCP is Howard Colin  INR (no units)   Date Value 05/06/2021 2.80 (H)   04/22/2021 2.4   03/25/2021 2.8   02/25/2021 2.5   10/26/2020 2.90 (H)   10/03/2020 2.13 (H)      For Pharmacy Admin Tracking Only     Total # of Interventions Recommended: 0   Total # of Interventions Accepted: 0   Time Spent (min): 15

## 2021-05-19 ENCOUNTER — PATIENT MESSAGE (OUTPATIENT)
Dept: CARDIOLOGY CLINIC | Age: 76
End: 2021-05-19

## 2021-05-19 ENCOUNTER — TELEPHONE (OUTPATIENT)
Dept: CARDIOLOGY CLINIC | Age: 76
End: 2021-05-19

## 2021-05-19 NOTE — TELEPHONE ENCOUNTER
EP/Palpitations/Fast Heart Rate:      1) When did your symptoms start? last night    Are your symptoms constant or do they come and go? Constant       2) Can you tell me what your heart rate is? 128    3) Have you taken any medication for this today? Yes    If so what? yes    4) Any recent medication changes?  Rythmol 3 times a day     Pt sent a message in my chart with EKG

## 2021-06-04 DIAGNOSIS — F41.9 ANXIETY: ICD-10-CM

## 2021-06-04 RX ORDER — CLONAZEPAM 1 MG/1
TABLET ORAL
Qty: 90 TABLET | OUTPATIENT
Start: 2021-06-04

## 2021-06-15 ENCOUNTER — ANTI-COAG VISIT (OUTPATIENT)
Dept: PHARMACY | Age: 76
End: 2021-06-15
Payer: MEDICARE

## 2021-06-15 DIAGNOSIS — Z86.711 HISTORY OF PULMONARY EMBOLISM: ICD-10-CM

## 2021-06-15 DIAGNOSIS — I48.0 PAF (PAROXYSMAL ATRIAL FIBRILLATION) (HCC): Primary | ICD-10-CM

## 2021-06-15 LAB — INTERNATIONAL NORMALIZATION RATIO, POC: 2.4

## 2021-06-15 PROCEDURE — 85610 PROTHROMBIN TIME: CPT

## 2021-06-15 PROCEDURE — 99211 OFF/OP EST MAY X REQ PHY/QHP: CPT

## 2021-06-15 NOTE — PROGRESS NOTES
Ms. Liliana Koch is a 77 y.o. y/o female with history of DVT, PE, Afib   She presents today for anticoagulation monitoring and adjustment. Pertinent PMH: HTN, Gastric Banding,CAD, diskectomy with an artifical spinal disk implant in September 2012. Patient Reported Findings:  Yes     No  [x]   []       Patient verifies current dosing regimen as listed  []   [x]       S/S bleeding/bruising/swelling/SOB states that she has been wheezing more and had more SOB d/t allergies --> has had a few small nose bleeds recently  --> denies  []   [x]       Blood in urine or stool  []   [x]       Procedures scheduled in the future at this time had CV 5/6, INR 2.8    []   [x]       Missed Dose   []   [x]       Extra Dose   []   [x]       Change in medications  decreased tylenol intake to 1-2 times a day --> continues tylenol, propafenone increased --> dec propafenone to 225 mg BID x 2 months then d/c (ending 4/25) --> inc diltiazem, has resumed 3 tabs propafenone --> no changes   []   [x]       Change in health/diet/appetite  Patient states that she feels like she has returned to normal vit k intake --> diet fluctuates causing INR to fluctuate. Discussed ways to improve consistency with vit k intake  --> states that she has cut back spinach and cabbage intake to twice a week --> no vit k recently   []   [x]       Change in alcohol use    []   [x]       Change in activity  []   [x]       Hospital admission  []   [x]       Emergency department visit   []   [x]       Other complaints    Clinical Outcomes:  Yes     No  []   [x]       Major bleeding event  []   [x]       Thromboembolic event  Gets warfarin through MD    Duration of warfarin Therapy: indefinite  INR Range:  2.0-3.0     INR is 2.4 today  Continue weekly dose of 5 mg Mon, Wed and Fri and 7.5 mg all other days of the week    Encouraged to maintain a consistency of vegetables/salads.   Recheck INR in 4 weeks, 7/12    Referring PCP is Ofelia Hines  INR (no units)

## 2021-06-21 RX ORDER — PROPAFENONE HYDROCHLORIDE 225 MG/1
TABLET, FILM COATED ORAL
Qty: 270 TABLET | Refills: 3 | Status: SHIPPED | OUTPATIENT
Start: 2021-06-21 | End: 2021-06-28

## 2021-06-21 NOTE — TELEPHONE ENCOUNTER
Prescription refill    Last OV:3-25-21 [facility]    Last Refill:5-6-21    Labs:12-23-20 [facility]    Future Appt:6-28-21

## 2021-06-25 NOTE — PROGRESS NOTES
[facility]   Electrophysiology  Blas Sanchez, APRN-CNP  Attending EP: Dr. Jesse Ferreira   Date: 6/28/2021  I had the privilege of visiting Viry Quintanilla in the office. Chief Complaint:   Chief Complaint   Patient presents with    Atrial Fibrillation     soboe    Follow-up     4 Months      History of Present Illness: History obtained from patient and medical record. Viry Quintanilla is 77 y.o. female with a past medical history of HTN, HLD, PAULA, PE, obesity, and atrial fibrillation. She was found to be in afib 2011 after lap-band surgery. She is warfarin for hx of PE and follows with [facility] clinic and s/p abena filter. She had a fall that was precipitated with dizziness and was found to have neck fracture and forehead hematoma. Since then she has had issues with her speech. S/p LHC 12/14/2018 showed normal coronaries. S/p unsuccessful cardioversion 12/14/2018. She uses [employer] mobile device at home. S/p DCCV 5/6/2021    Interval history: Today, Viry Quintanilla is being seen for atrial fibrillation and hypertension. Reports that she continues to go in and out of atrial fibrillation on her watch with RVR up to 140's at times per her smart watch measurements. No recent episodes of atrial fibrillation noted on [employer] mobile santiago. Denies CP, palpitations, dizziness. She had recently weaned propafenone with recurrence that required cardioversion. Admits that she continues to require lasix 20 mg daily 1-2 times weekly for LE. Admits to chronic SOB that has that is not effected by episodes atrial fibrillation. Denies worsening symptoms of SOB, swelling. No orthopnea or weight gain. Wears CPAP. She has tried to lose weight. Denies having chest pain, palpitations, shortness of breath, orthopnea/PND, cough, or dizziness at the time of this visit. With regard to medication therapy the patient has been compliant with prescribed regimen. She has tolerated therapy to date. Assessment:  Paroxysmal Atrial Fibrillation  - ECG today shows SR  - Continue propafenone 225 mg tid and verapamil 240 mg daily   - No recurrent episodes, she can feel when she is in afib  - [id number] score: 3 (age, gender, HTN) ; [id number] Vasc score and anticoagulation discussed. High risk for stroke and thromboembolism. Anticoagulation is recommended. ~Continue warfarin,  No s/s of bleeding  - Afib risk factors including age, HTN, obesity, inactivity and PAULA were discussed with patient. Risk factor modification recommended   ~ TSH 2.44 (10/2/2020)     - Treatment options including cardioversion, rate control strategy, antiarrhythmics, anticoagulation and possible ablation were discussed with patient. Risks, benefits and alternative of each treatment options were explained. All questions answered    ~ S/p RFCA of afib w/ PVI (10/3/2020)    ~ S/p DCCV 10/14/2020 and 10/26/2020     ~ S/p DCCV 5/2021                          ~ The risks, benefits and alternatives of the ablation procedure were discussed with the patient. The risks including, but not limited to, the risks of bleeding, infection, radiation exposure, injury to vascular, cardiac and surrounding structures (including pneumothorax), stroke, cardiac perforation, tamponade, need for emergent open heart surgery, need for pacemaker implantation, Injury to the phrenic nerve, injury to the esophagus, myocardial infarction and death were discussed in detail.                                       - I explained the success rate considering possible need for a second procedure is about 60-70% and with addition of anti arrhythmics is about 80%.      -----> The patient opted to proceed with the ablation.    SOB   - Chronic    - Likely multifactorial age, deconditioning, valvular disease  HTN-goal <130/80   - Controlled, BP at home 120's/70's   - Continue current mdicaitons   - Encouraged patient to check BP at home, log and bring to office visits  - Discussed pain     Blood circulation, collateral     legs    Depression     GERD (gastroesophageal reflux disease)     Gout     Hypercholesteremia     Hypertension     Movement disorder     7 discs ruptured    Movement disorder     knee replacements    Obesity     Pulmonary emboli (HCC)     Unspecified sleep apnea     uses cpap    Urinary tract infection, chronic     UTI (urinary tract infection)      Past Surgical History:    has a past surgical history that includes joint replacement; Vena Cava Filter Placement; bladder suspension; bone incision and drainage (5-7-11); joint replacement (2003); joint replacement (2005); Revision Total Knee Arthroplasty (2006); Ankle Fusion (2010); skin full graft (2011); Tubal ligation (1975); Upper gastrointestinal endoscopy (10-); other surgical history (12-); back surgery (09/14/2012); eye surgery; and Breast biopsy (08/2018). Social History:  Reviewed. reports that she quit smoking about 56 years ago. Her smoking use included cigarettes. She has a 2.00 pack-year smoking history. She has never used smokeless tobacco. She reports previous alcohol use. She reports that she does not use drugs. Family History:  Reviewed. family history includes Arthritis in her father; Asthma in her father; Breast Cancer in her sister; Depression in her mother; Heart Disease in her father; High Blood Pressure in her father and mother; Stroke in her father. Denies family history of sudden cardiac death, arrhythmia, premature CAD    Review of System:  · Constitutional: No weight changes or weakness  · HEENT: No visual changes. No mouth sores or sore throat. · Cardiovascular: denies chest pain, reports dyspnea on exertion, denies palpitations or denies loss of consciousness. No cough, hemoptysis, denies pleuritic pain, or phlebitis. denies dizziness. · Respiratory: denies cough or wheezing. · Gastrointestinal: Negative, No blood in stools.    · Genitourinary: No hematuria. · Neurological: No focal weakness  · Psychiatric: No confusion, anxiety, or depression. · Hem/Lymph: Denies abnormal bruising or bleeding. Physical Examination:  There were no vitals filed for this visit. Wt Readings from Last 3 Encounters:   05/06/21 274 lb (124.3 kg)   03/25/21 275 lb 11.2 oz (125.1 kg)   02/25/21 276 lb (125.2 kg)      Constitutional: Cooperative and in no apparent distress, and appears well nourished   Skin: Warm and pink; no cyanosis or bruising   HEENT: Symmetric and normocephalic. Conjunctiva pink with clear sclera. Mucus membranes pink and moist. No visible masses/goiter   Respiratory: Respirations symmetric and unlabored. Lungs clear to auscultation bilaterally, no wheezing, rhonchi, or crackles.  Cardiovascular:  regular rate and rhythm. S1 & S2 present, positive for murmur. negative elevation of JVP. Trace LE edema.  Musculoskeletal:  No focal weakness.  Neurological/Psych: Awake and orientated to person, place and time. Calm affect, appropriate mood. Pertinent labs, diagnostic, device, and imaging results reviewed as a part of this visit    LABS    CBC:   Lab Results   Component Value Date    WBC 6.5 10/02/2020    HGB 13.7 10/02/2020    HCT 40.0 10/02/2020    MCV 94.3 10/02/2020     10/02/2020     BMP:   Lab Results   Component Value Date    CREATININE 0.9 12/23/2020    BUN 19 12/23/2020     12/23/2020    K 4.1 12/23/2020     12/23/2020    CO2 28 12/23/2020     CrCl cannot be calculated (Patient's most recent lab result is older than the maximum 120 days allowed. ).    No results found for: BNP    Thyroid:   Lab Results   Component Value Date    TSH 3.53 09/26/2011     Lipid Panel:   Lab Results   Component Value Date    CHOL 150 10/02/2020    HDL 61 10/02/2020    HDL 58 02/01/2012    TRIG 80 10/02/2020     LFTs:  Lab Results   Component Value Date    ALT 26 10/02/2020    AST 32 10/02/2020    ALKPHOS 78 10/02/2020    BILITOT 0.7 interrogation and ECG    All questions and concerns were addressed with the patient. Alternatives to treatment were discussed. Thank you for allowing to us to participate in the care of Debbie Hinojosa.     AIME Dasilva Cera-NELIA MASTERSONWesterly Hospitalsanta    Office: [phone]   \

## 2021-06-28 ENCOUNTER — OFFICE VISIT (OUTPATIENT)
Dept: CARDIOLOGY CLINIC | Age: 76
End: 2021-06-28
Payer: MEDICARE

## 2021-06-28 VITALS
BODY MASS INDEX: 47.36 KG/M2 | HEIGHT: 64 IN | WEIGHT: 277.4 LBS | DIASTOLIC BLOOD PRESSURE: 82 MMHG | SYSTOLIC BLOOD PRESSURE: 136 MMHG | HEART RATE: 70 BPM

## 2021-06-28 DIAGNOSIS — I48.0 PAF (PAROXYSMAL ATRIAL FIBRILLATION) (HCC): Primary | ICD-10-CM

## 2021-06-28 DIAGNOSIS — I10 BENIGN ESSENTIAL HTN: ICD-10-CM

## 2021-06-28 DIAGNOSIS — G47.33 OSA (OBSTRUCTIVE SLEEP APNEA): ICD-10-CM

## 2021-06-28 DIAGNOSIS — R06.02 SOB (SHORTNESS OF BREATH): ICD-10-CM

## 2021-06-28 DIAGNOSIS — E66.01 OBESITIES, MORBID (HCC): ICD-10-CM

## 2021-06-28 PROCEDURE — 93000 ELECTROCARDIOGRAM COMPLETE: CPT | Performed by: NURSE PRACTITIONER

## 2021-06-28 PROCEDURE — 99215 OFFICE O/P EST HI 40 MIN: CPT | Performed by: NURSE PRACTITIONER

## 2021-06-28 PROCEDURE — 1036F TOBACCO NON-USER: CPT | Performed by: NURSE PRACTITIONER

## 2021-06-28 PROCEDURE — G8417 CALC BMI ABV UP PARAM F/U: HCPCS | Performed by: NURSE PRACTITIONER

## 2021-06-28 PROCEDURE — G8427 DOCREV CUR MEDS BY ELIG CLIN: HCPCS | Performed by: NURSE PRACTITIONER

## 2021-06-28 PROCEDURE — 1123F ACP DISCUSS/DSCN MKR DOCD: CPT | Performed by: NURSE PRACTITIONER

## 2021-06-28 PROCEDURE — G8399 PT W/DXA RESULTS DOCUMENT: HCPCS | Performed by: NURSE PRACTITIONER

## 2021-06-28 PROCEDURE — 4040F PNEUMOC VAC/ADMIN/RCVD: CPT | Performed by: NURSE PRACTITIONER

## 2021-06-28 PROCEDURE — 3017F COLORECTAL CA SCREEN DOC REV: CPT | Performed by: NURSE PRACTITIONER

## 2021-06-28 PROCEDURE — 1090F PRES/ABSN URINE INCON ASSESS: CPT | Performed by: NURSE PRACTITIONER

## 2021-06-28 RX ORDER — PROPAFENONE HYDROCHLORIDE 150 MG/1
TABLET, FILM COATED ORAL
Qty: 90 TABLET | Refills: 2 | Status: ON HOLD | OUTPATIENT
Start: 2021-06-28 | End: 2021-10-05 | Stop reason: SDUPTHER

## 2021-06-28 NOTE — PATIENT INSTRUCTIONS
- Reduce propafenone to 225 mg 2 times daily for 3 weeks and if no issues then please stop  - OK to exercise as tolerated, weight loss  - Call office if you have recurrence  - Our  will be contacting you from [phone] to schedule your procedure.   - Follow up in 3 months or sooner if issues

## 2021-07-12 ENCOUNTER — OFFICE VISIT (OUTPATIENT)
Dept: INTERNAL MEDICINE CLINIC | Age: 76
End: 2021-07-12
Payer: MEDICARE

## 2021-07-12 ENCOUNTER — ANTI-COAG VISIT (OUTPATIENT)
Dept: PHARMACY | Age: 76
End: 2021-07-12
Payer: MEDICARE

## 2021-07-12 VITALS
SYSTOLIC BLOOD PRESSURE: 130 MMHG | HEIGHT: 64 IN | WEIGHT: 275 LBS | HEART RATE: 68 BPM | BODY MASS INDEX: 46.95 KG/M2 | DIASTOLIC BLOOD PRESSURE: 84 MMHG

## 2021-07-12 DIAGNOSIS — J45.40 MODERATE PERSISTENT ASTHMA WITHOUT COMPLICATION: ICD-10-CM

## 2021-07-12 DIAGNOSIS — Z86.711 HISTORY OF PULMONARY EMBOLISM: ICD-10-CM

## 2021-07-12 DIAGNOSIS — I48.0 PAF (PAROXYSMAL ATRIAL FIBRILLATION) (HCC): Primary | ICD-10-CM

## 2021-07-12 DIAGNOSIS — E78.2 MIXED HYPERLIPIDEMIA: ICD-10-CM

## 2021-07-12 DIAGNOSIS — F41.9 ANXIETY: ICD-10-CM

## 2021-07-12 DIAGNOSIS — I10 ESSENTIAL HYPERTENSION: Primary | ICD-10-CM

## 2021-07-12 LAB
ALBUMIN SERPL-MCNC: 4.5 G/DL (ref 3.4–5)
ANION GAP SERPL CALCULATED.3IONS-SCNC: 18 MMOL/L (ref 3–16)
BUN BLDV-MCNC: 15 MG/DL (ref 7–20)
CALCIUM SERPL-MCNC: 9.9 MG/DL (ref 8.3–10.6)
CHLORIDE BLD-SCNC: 100 MMOL/L (ref 99–110)
CHOLESTEROL, TOTAL: 184 MG/DL (ref 0–199)
CO2: 23 MMOL/L (ref 21–32)
CREAT SERPL-MCNC: 0.9 MG/DL (ref 0.6–1.2)
GFR AFRICAN AMERICAN: >60
GFR NON-AFRICAN AMERICAN: >60
GLUCOSE BLD-MCNC: 95 MG/DL (ref 70–99)
HDLC SERPL-MCNC: 68 MG/DL (ref 40–60)
INTERNATIONAL NORMALIZATION RATIO, POC: 2.1
LDL CHOLESTEROL CALCULATED: 90 MG/DL
PHOSPHORUS: 3.2 MG/DL (ref 2.5–4.9)
POTASSIUM SERPL-SCNC: 3.9 MMOL/L (ref 3.5–5.1)
SODIUM BLD-SCNC: 141 MMOL/L (ref 136–145)
TRIGL SERPL-MCNC: 132 MG/DL (ref 0–150)
VLDLC SERPL CALC-MCNC: 26 MG/DL

## 2021-07-12 PROCEDURE — 3017F COLORECTAL CA SCREEN DOC REV: CPT | Performed by: INTERNAL MEDICINE

## 2021-07-12 PROCEDURE — 1036F TOBACCO NON-USER: CPT | Performed by: INTERNAL MEDICINE

## 2021-07-12 PROCEDURE — 99214 OFFICE O/P EST MOD 30 MIN: CPT | Performed by: INTERNAL MEDICINE

## 2021-07-12 PROCEDURE — 4040F PNEUMOC VAC/ADMIN/RCVD: CPT | Performed by: INTERNAL MEDICINE

## 2021-07-12 PROCEDURE — G8399 PT W/DXA RESULTS DOCUMENT: HCPCS | Performed by: INTERNAL MEDICINE

## 2021-07-12 PROCEDURE — G8427 DOCREV CUR MEDS BY ELIG CLIN: HCPCS | Performed by: INTERNAL MEDICINE

## 2021-07-12 PROCEDURE — 99211 OFF/OP EST MAY X REQ PHY/QHP: CPT

## 2021-07-12 PROCEDURE — 85610 PROTHROMBIN TIME: CPT

## 2021-07-12 PROCEDURE — 1090F PRES/ABSN URINE INCON ASSESS: CPT | Performed by: INTERNAL MEDICINE

## 2021-07-12 PROCEDURE — 1123F ACP DISCUSS/DSCN MKR DOCD: CPT | Performed by: INTERNAL MEDICINE

## 2021-07-12 PROCEDURE — G8417 CALC BMI ABV UP PARAM F/U: HCPCS | Performed by: INTERNAL MEDICINE

## 2021-07-12 RX ORDER — CLONAZEPAM 1 MG/1
TABLET ORAL
Qty: 90 TABLET | Refills: 0 | Status: SHIPPED | OUTPATIENT
Start: 2021-07-12 | End: 2021-10-18 | Stop reason: SDUPTHER

## 2021-07-12 RX ORDER — ALBUTEROL SULFATE 90 UG/1
2 AEROSOL, METERED RESPIRATORY (INHALATION) EVERY 4 HOURS PRN
Qty: 8.5 G | Refills: 2 | Status: SHIPPED | OUTPATIENT
Start: 2021-07-12 | End: 2022-08-02

## 2021-07-12 SDOH — ECONOMIC STABILITY: FOOD INSECURITY: WITHIN THE PAST 12 MONTHS, THE FOOD YOU BOUGHT JUST DIDN'T LAST AND YOU DIDN'T HAVE MONEY TO GET MORE.: NEVER TRUE

## 2021-07-12 SDOH — ECONOMIC STABILITY: FOOD INSECURITY: WITHIN THE PAST 12 MONTHS, YOU WORRIED THAT YOUR FOOD WOULD RUN OUT BEFORE YOU GOT MONEY TO BUY MORE.: NEVER TRUE

## 2021-07-12 ASSESSMENT — SOCIAL DETERMINANTS OF HEALTH (SDOH): HOW HARD IS IT FOR YOU TO PAY FOR THE VERY BASICS LIKE FOOD, HOUSING, MEDICAL CARE, AND HEATING?: NOT HARD AT ALL

## 2021-07-12 NOTE — PROGRESS NOTES
Ms. Augusto Maciel is a 77 y.o. y/o female with history of DVT, PE, Afib   She presents today for anticoagulation monitoring and adjustment. Pertinent PMH: HTN, Gastric Banding,CAD, diskectomy with an artifical spinal disk implant in September 2012. Patient Reported Findings:  Yes     No  [x]   []       Patient verifies current dosing regimen as listed  []   [x]       S/S bleeding/bruising/swelling/SOB states that she has been wheezing more and had more SOB d/t allergies --> has had a few small nose bleeds recently  --> denies  []   [x]       Blood in urine or stool  []   [x]       Procedures scheduled in the future at this time had CV 5/6, INR 2.8    []   [x]       Missed Dose   []   [x]       Extra Dose   [x]   []       Change in medications  decreased tylenol intake to 1-2 times a day --> continues tylenol, propafenone increased --> dec propafenone to 225 mg BID x 2 months then d/c (ending 4/25) --> inc diltiazem, has resumed 3 tabs propafenone --> dec propafenone to BID 6/28, will be stopping in 1 week     []   [x]       Change in health/diet/appetite  Patient states that she feels like she has returned to normal vit k intake --> diet fluctuates causing INR to fluctuate. Discussed ways to improve consistency with vit k intake  --> states that she has cut back spinach and cabbage intake to twice a week --> no vit k recently   []   [x]       Change in alcohol use    []   [x]       Change in activity  []   [x]       Hospital admission  []   [x]       Emergency department visit   []   [x]       Other complaints    Clinical Outcomes:  Yes     No  []   [x]       Major bleeding event  []   [x]       Thromboembolic event  Gets warfarin through MD    Duration of warfarin Therapy: indefinite  INR Range:  2.0-3.0     INR is 2.1 today  Continue weekly dose of 5 mg Mon, Wed and Fri and 7.5 mg all other days of the week    Encouraged to maintain a consistency of vegetables/salads.   Recheck INR in 4 weeks, 8/9    Referring PCP is Christina Doshi  INR (no units)   Date Value   06/15/2021 2.4   05/18/2021 2.6   05/06/2021 2.80 (H)   04/22/2021 2.4   10/26/2020 2.90 (H)   10/03/2020 2.13 (H)      For Pharmacy Admin Tracking Only     Total # of Interventions Recommended: 0   Total # of Interventions Accepted: 0   Time Spent (min): 15

## 2021-07-12 NOTE — PROGRESS NOTES
Chief Complaint   Patient presents with    Hypertension     Pt is fasting for bloodwork     Hyperlipidemia    Asthma    Anxiety     HPI:  HTN: The patient is tolerating blood pressure medication well and taking them as directed. BP control outside of the office is reported as well controlled. No symptoms concerning for end organ damage are present. Asthma: She reports that she uses Advair intermittently and albuterol as needed. She reports effective relief with the use of albuterol, but she does not see the benefit of taking Advair. Anxiety: She continues to take clonazepam nightly. She is unable to get any sleep without this. She is able to get a good night sleep with the use of clonazepam.  She denies side effects. Hyperlipidemia: She continues to take atorvastatin daily. She tolerates treatment well. ROS:  CV: Neg for chest pain  RESP: neg for dyspnea  GI: Reg BMs    EXAM  /84   Pulse 68   Ht 5' 4\" (1.626 m)   Wt 275 lb (124.7 kg)   BMI 47.20 kg/m²    GEN: WN/WD, NAD  CV: irregular rhythm, systolic murmur  Resp: normal effort, clear auscultation bilaterally  No peripheral edema     Lab Results   Component Value Date    CREATININE 0.9 12/23/2020    BUN 19 12/23/2020     12/23/2020    K 4.1 12/23/2020     12/23/2020    CO2 28 12/23/2020     Lab Results   Component Value Date    CHOL 150 10/02/2020    CHOL 158 05/21/2019    CHOL 172 05/04/2017     Lab Results   Component Value Date    TRIG 80 10/02/2020    TRIG 74 05/21/2019    TRIG 106 05/04/2017     Lab Results   Component Value Date    HDL 61 (H) 10/02/2020    HDL 64 (H) 05/21/2019    HDL 65 (H) 08/03/2018     Lab Results   Component Value Date    LDLCALC 73 10/02/2020    LDLCALC 79 05/21/2019    LDLCALC 77 08/03/2018     Lab Results   Component Value Date    LABVLDL 16 10/02/2020    LABVLDL 15 05/21/2019    LABVLDL 15 08/03/2018     No results found for: CHOLHDLRATIO     A/P  1.  Essential hypertension  Chronic with excellent blood pressure control. Blood work will be obtained today. Continue current medications. - Renal Function Panel    2. Moderate persistent asthma without complication  Chronic and stable. I recommended the use of Advair for at least a week at a time when symptoms are active. She expressed understanding agreement. She will also continue albuterol as needed. - fluticasone-salmeterol (ADVAIR) 250-50 MCG/DOSE AEPB; Inhale 1 puff into the lungs 2 times daily  Dispense: 1 Inhaler; Refill: 5    3. Anxiety  Chronic and stable. She requires the use of clonazepam for symptom relief and there is no evidence of toxicity. PDMP reviewed today. Continue current treatment. - clonazePAM (KLONOPIN) 1 MG tablet; TAKE 1 TABLET BY MOUTH IN  THE EVENING  Dispense: 90 tablet; Refill: 0    4. Mixed hyperlipidemia  Chronic and stable. Continue atorvastatin.   - Lipid Panel    Return in 3 months

## 2021-07-14 ENCOUNTER — TELEMEDICINE (OUTPATIENT)
Dept: BARIATRICS/WEIGHT MGMT | Age: 76
End: 2021-07-14
Payer: MEDICARE

## 2021-07-14 DIAGNOSIS — Z71.3 ENCOUNTER FOR DIETARY COUNSELING AND SURVEILLANCE: ICD-10-CM

## 2021-07-14 DIAGNOSIS — E66.01 MORBID OBESITY WITH BMI OF 40.0-44.9, ADULT (HCC): Primary | ICD-10-CM

## 2021-07-14 PROCEDURE — 1123F ACP DISCUSS/DSCN MKR DOCD: CPT | Performed by: NURSE PRACTITIONER

## 2021-07-14 PROCEDURE — 3017F COLORECTAL CA SCREEN DOC REV: CPT | Performed by: NURSE PRACTITIONER

## 2021-07-14 PROCEDURE — G8399 PT W/DXA RESULTS DOCUMENT: HCPCS | Performed by: NURSE PRACTITIONER

## 2021-07-14 PROCEDURE — G8427 DOCREV CUR MEDS BY ELIG CLIN: HCPCS | Performed by: NURSE PRACTITIONER

## 2021-07-14 PROCEDURE — 1090F PRES/ABSN URINE INCON ASSESS: CPT | Performed by: NURSE PRACTITIONER

## 2021-07-14 PROCEDURE — 4040F PNEUMOC VAC/ADMIN/RCVD: CPT | Performed by: NURSE PRACTITIONER

## 2021-07-14 PROCEDURE — 99213 OFFICE O/P EST LOW 20 MIN: CPT | Performed by: NURSE PRACTITIONER

## 2021-07-14 NOTE — PATIENT INSTRUCTIONS
Key Low Carb Dietary Points:    - Meats (preferably organic or grass fed) are great sources of protein and are low in carbohydrates. Cleven Musella with coconut, olive, avocado, or almond oils. - When buying dairy, choose regular or full fat options. - Choose vegetables that grow above ground as they are generally lower in carbohydrates. - Avoid bread, rice, potatoes, pasta and all sources of simple sugars (desserts, soda, breakfast cereals). - Choose beverages that are calorie and sugar free, such as water or flavored buitrago. - When eating fruit, choose berries as a snack option a few times a week. Patient received dietary handouts and education.     Plan/Goals:   - Start using MFP, aim for 1200 jad / 75g carbs  - Limit fruit to 2 servings per day  - Join [employer]

## 2021-07-14 NOTE — PROGRESS NOTES
[address] [facility]   [facility]    7/14/2021    TELEHEALTH EVALUATION -- Audio/Visual (During [id number] public health emergency)    Medical Weight Loss    HPI: Quan Lima is a 77 y.o. female with current BMI of 47.1 and current weight of 274.4 pounds. Due to the COVID-19 restrictions on close contact interactions the patient's monthly visit was conducted via audio/video in joo of a face to face visit. Patient has consented to have this visit conducted via audio/video. The patient is here through telemedicine for their medical weight loss visit. She does note some fluid retention today. She plans to take her lasix as instructed by her cardiologist. She has recently seen cardiology and was cleared to begin exercise. She does have a fluid restriction of 60 oz a day. Past Medical History:   Diagnosis Date    Allergic     Anxiety 2/2/2017    Arthritis     Asthma     Back pain     Blood circulation, collateral     legs    Depression     GERD (gastroesophageal reflux disease)     Gout     Hypercholesteremia     Hypertension     Movement disorder     7 discs ruptured    Movement disorder     knee replacements    Obesity     Pulmonary emboli (HCC)     Unspecified sleep apnea     uses cpap    Urinary tract infection, chronic     UTI (urinary tract infection)      Past Surgical History:   Procedure Laterality Date    ANKLE [facility]ION  2010    [facility], [facility]    BACK SURGERY  09/14/2012    lumbar fusion L-4, L-5 atrificial disc  Frederic Castañeda  5-7-11    incision, drainage and debridement of left knee and application of wound vac.     BREAST BIOPSY  08/2018    neg    EYE SURGERY      JOINT REPLACEMENT      bilat knees    JOINT REPLACEMENT  2003    left, [facility]    JOINT REPLACEMENT  2005    right, [facility]    OTHER SURGICAL HISTORY  12-    laparscopic adjustable gastric restrictive procedure    REVISION TOTAL KNEE ARTHROPLASTY      [facility]    SKIN FULL GRAFT      right, [facility]    TUBAL [facility]IGATION  1975    [facility]    UPPER GASTROINTESTINAL ENDOSCOPY  10-    VENA CAVA FILTER PLACEMENT       Family History   Problem Relation Age of Onset    High Blood Pressure Mother     Depression Mother     Stroke Father     High Blood Pressure Father     Asthma Father     Arthritis Father     Heart Disease Father     Breast Cancer Sister     High Cholesterol Neg Hx      Social History     Tobacco Use    Smoking status: Former Smoker     Packs/day: 0.50     Years: 4.00     Pack years: 2.00     Types: Cigarettes     Quit date: 1965     Years since quittin.5    Smokeless tobacco: Never Used   Substance Use Topics    Alcohol use: Not Currently     Comment: 4 t imes a year     I counseled the patient on the importance of not smoking and risks of ETOH. Allergies   Allergen Reactions    Belsomra [Suvorexant] Other (See Comments)     Nightmares      Avelox [Moxifloxacin Hcl In Nacl] Rash    Levofloxacin Rash    Sulfa Antibiotics Rash     There were no vitals filed for this visit. There is no height or weight on file to calculate BMI.     Current Outpatient Medications:     albuterol sulfate HFA (PROAIR HFA) 108 (90 Base) MCG/ACT inhaler, Inhale 2 puffs into the lungs every 4 hours as needed for Wheezing, Disp: 8.5 g, Rfl: 2    fluticasone-salmeterol (ADVAIR) 250-50 MCG/DOSE AEPB, Inhale 1 puff into the lungs 2 times daily, Disp: 1 Inhaler, Rfl: 5    clonazePAM (KLONOPIN) 1 MG tablet, TAKE 1 TABLET BY MOUTH IN  THE EVENING, Disp: 90 tablet, Rfl: 0    propafenone (RYTHMOL) 150 MG tablet, TAKE 1 TABLET BY MOUTH&nbsp;&nbsp;EVERY 8 HOURS, Disp: 90 tablet, Rfl: 2    trimethoprim (TRIMPEX) 100 MG tablet, TAKE 1 TABLET BY MOUTH  EVERY 48 HOURS, Disp: 45 tablet, Rfl: 3    gabapentin (NEURONTIN) 300 MG capsule, TAKE 2 CAPSULES BY MOUTH 3  TIMES DAILY, Disp: 540 capsule, Rfl: 3    indapamide (LOZOL) 1.25 MG tablet, TAKE 1 TABLET BY MOUTH IN  THE MORNING, Disp: 90 tablet, Rfl: 3    pantoprazole (PROTONIX) 40 MG tablet, TAKE 1 TABLET BY MOUTH IN  THE MORNING BEFORE  BREAKFAST, Disp: 90 tablet, Rfl: 3    montelukast (SINGULAIR) 10 MG tablet, TAKE 1 TABLET BY MOUTH IN  THE EVENING, Disp: 90 tablet, Rfl: 3    warfarin (COUMADIN) 5 MG tablet, TAKE 5mg ON M,W.F and 7.5mg all other days, Disp: 143 tablet, Rfl: 1    citalopram (CELEXA) 40 MG tablet, TAKE 1 TABLET BY MOUTH  DAILY, Disp: 90 tablet, Rfl: 3    allopurinol (ZYLOPRIM) 300 MG tablet, TAKE 1 TABLET BY MOUTH  DAILY, Disp: 90 tablet, Rfl: 3    atorvastatin (LIPITOR) 80 MG tablet, TAKE 1 TABLET BY MOUTH  DAILY, Disp: 90 tablet, Rfl: 3    furosemide (LASIX) 20 MG tablet, Take 1 tablet by mouth daily (Patient taking differently: Take 20 mg by mouth daily PRN), Disp: 30 tablet, Rfl: 1    potassium chloride (KLOR-CON M) 20 MEQ extended release tablet, TAKE 1 TABLET BY MOUTH  DAILY WITH BREAKFAST (Patient taking differently: TAKE 1 TABLET BY MOUTH  DAILY WITH BREAKFAST  Take 2 tablets on the days she takes the furosemide), Disp: 90 tablet, Rfl: 3    verapamil (CALAN SR) 240 MG extended release tablet, Take 0.5 tablets by mouth nightly (Patient taking differently: Take 120 mg by mouth every morning 0.5 in the morning and 0.5 in the evening), Disp: 90 tablet, Rfl: 3    acetaminophen (TYLENOL) 500 MG tablet, Take 500 mg by mouth every 6 hours as needed for Pain, Disp: , Rfl:     diphenhydrAMINE-APAP, sleep, (TYLENOL PM EXTRA STRENGTH)  MG tablet, Take 1 tablet by mouth nightly as needed for Sleep, Disp: , Rfl:     b complex vitamins capsule, Take 1 capsule by mouth daily, Disp: , Rfl:     Ascorbic Acid (VITAMIN C PO), 1 tablet daily, Disp: , Rfl:     therapeutic multivitamin-minerals (THERAGRAN-M) tablet, Take 1 tablet by mouth nightly , Disp: , Rfl:     Cranberry 500 MG CAPS, Take 1,000 mg by mouth nightly , Disp: , Rfl:     Lab Results   Component Value Date    WBC 6.5 10/02/2020    RBC 4.24 10/02/2020    HGB 13.7 10/02/2020    HCT 40.0 10/02/2020    MCV 94.3 10/02/2020    MCH 32.4 10/02/2020    MCHC 34.4 10/02/2020    MPV 6.8 10/02/2020    NEUTOPHILPCT 74.9 10/29/2018    LYMPHOPCT 17.2 10/29/2018    MONOPCT 6.0 10/29/2018    EOSRELPCT 1.4 10/29/2018    BASOPCT 0.5 10/29/2018    NEUTROABS 7.4 10/29/2018    LYMPHSABS 1.7 10/29/2018    MONOSABS 0.6 10/29/2018    EOSABS 0.1 10/29/2018     Lab Results   Component Value Date     07/12/2021    K 3.9 07/12/2021    K 3.3 10/10/2018     07/12/2021    CO2 23 07/12/2021    ANIONGAP 18 07/12/2021    GLUCOSE 95 07/12/2021    BUN 15 07/12/2021    CREATININE 0.9 07/12/2021    LABGLOM >60 07/12/2021    GFRAA >60 07/12/2021    GFRAA >60 01/04/2013    CALCIUM 9.9 07/12/2021    PROT 7.2 10/02/2020    PROT 7.6 01/04/2013    LABALBU 4.5 07/12/2021    AGRATIO 1.5 10/02/2020    BILITOT 0.7 10/02/2020    ALKPHOS 78 10/02/2020    ALT 26 10/02/2020    AST 32 10/02/2020    GLOB 2.9 10/02/2020     Lab Results   Component Value Date    CHOL 184 07/12/2021    TRIG 132 07/12/2021    HDL 68 07/12/2021    HDL 58 02/01/2012    LDLCALC 90 07/12/2021    LABVLDL 26 07/12/2021     Lab Results   Component Value Date    TSHREFLEX 2.44 10/02/2020     Lab Results   Component Value Date    IRON 72 08/27/2019    TIBC 292 08/27/2019    LABIRON 25 08/27/2019     Lab Results   Component Value Date    Malachi Lala 1059 08/27/2019    FOLATE >20.00 08/27/2019     Lab Results   Component Value Date    VITD25 50.2 08/27/2019     Lab Results   Component Value Date    LABA1C 5.6 11/22/2019    .9 12/28/2011       Patient Active Problem List   Diagnosis    Essential hypertension    History of pulmonary embolism    Osteoarthritis of knee    PAULA (obstructive sleep apnea)    Asthma    Status post gastric banding    Coronary artery disease    Morbid obesity with BMI of 40.0-44.9, adult ([address]    Hyperlipidemia, unspecified    Arthritis    Anxiety    Chronic cough    PAF (paroxysmal atrial fibrillation) (HCC)    Atrial flutter (HCC)    Nonrheumatic aortic valve stenosis       Review of Systems   Constitutional: Negative. HENT: Negative. Eyes: Negative. Respiratory: Negative. Cardiovascular: Negative. Gastrointestinal: Negative. Skin: Negative. Neurological: Negative. PHYSICAL EXAMINATION:    Constitutional: [x] Appears well-developed and well-nourished [x] No apparent distress      [] Abnormal-   Mental status  [x] Alert and awake  [x] Oriented to person/place/time [x]Able to follow commands      Eyes:  EOM    [x]  Normal  [] Abnormal-  Sclera  [x]  Normal  [] Abnormal -         Discharge [x]  None visible  [] Abnormal -    HENT:   [x] Normocephalic, atraumatic. [] Abnormal   [x] Mouth/Throat: Mucous membranes are moist.     External Ears [] Normal  [] Abnormal-     Neck: [x] No visualized mass     Pulmonary/Chest: [x] Respiratory effort normal.  [x] No visualized signs of difficulty breathing or respiratory distress        [] Abnormal-      Musculoskeletal:   [] Normal gait with no signs of ataxia         [x] Normal range of motion of neck        [] Abnormal-     Neurological:        [x] No Facial Asymmetry (Cranial nerve 7 motor function) (limited exam to video visit)          [x] No gaze palsy        [] Abnormal-         Skin:        [x] No significant exanthematous lesions or discoloration noted on facial skin         [] Abnormal-            Psychiatric:       [x] Normal Affect [x] No Hallucinations        [] Abnormal-     Other pertinent observable physical exam findings-     Due to this being a TeleHealth encounter, evaluation of the following organ systems is limited: Vitals/Constitutional/EENT/Resp/CV/GI//MS/Neuro/Skin/Heme-Lymph-Imm.     Assessment and Plan:    Patient is here via telemedicine for their medical weight loss visit, up 2.4 lbs and a total weight loss of 7.1 lbs. She is doing well, making dietary and behavior modifications. She is following dietary recommendations utilizing the 1200 calorie low carb meal plan as a guide. She has been tracking with weight watchers. We discussed tracking total calories with Puneet. . She did speak with the registered dietitian for continued follow up. I agree with recommendations and plan. She is planning to begin exercising at her local gym. Her goal from cardiologist is 150 minutes a week of exercise. Encouraged continued physical activity. We will see her back in 1 month for continued follow up via telemedicine. Total encounter time: 25 minutes, including any number of the following:  review of labs, imaging, provider notes, outside hospital records, performing examination/evaluation, counseling patient and/or family, ordering medications/tests, placing referrals and communication with referring physicians, coordination of care; discussing dietary plan/recall with the patient as well with registered dietitian and documentation in the EHR. Of note, the above was done during same day of the actual patient encounter. An electronic signature was used to authenticate this note. Pursuant to the emergency declaration under the [address] [facility] waiver authority and the [employer] General Act, this Virtual  Visit was conducted, with patient's consent, to reduce the patient's risk of exposure to COVID-19 and provide continuity of care for an established patient. Services were provided through a video synchronous discussion virtually to substitute for in-person clinic visit.

## 2021-07-14 NOTE — PROGRESS NOTES
Mary Stuart gained 2.4 lbs over past ~3 months, per pt report. Pt feels like she is retaining fluid. Pt is s/p band 2011. Treatment plan details: 1200 jad LCMP  Is patient adhering to treatment plan: no, following [employer] plan     Is pt eating 4-5 times each day? yes     B- scrambled egg w/ grapes  S- peach  L- chicken breast & cheese w/ an orange  S- none  D- chicken noodle soup   S- 2 sf cookies  S- 6 cherries    Is pt including lean protein with all meals and snacks? inconsistent    Is pt avoiding added sugars and starchy foods? eating a lot of fruit - free on [employer] plan     Consuming at least 64oz of calorie free fluids? limited to 60oz per cardiologist recommendations    Participating in intentional exercise? no    Plan/Goals:   - Start using MFP, aim for 1200 jad / 75g carbs  - Limit fruit to 2 servings per day  - Join [employer]    MVI- MVI / Vit C / B-complex / cranberry capsule   Volume- ~1.5 cups volume  30-30-30- avoids eating & drinking at the same time    Handouts: none    The patient's visit was conducted via telephone in joo of a face to face visit. The patient is here through telemedicine for their M visit.     Samanta Brandon RD, LD

## 2021-07-16 ASSESSMENT — ENCOUNTER SYMPTOMS
RESPIRATORY NEGATIVE: 1
GASTROINTESTINAL NEGATIVE: 1
EYES NEGATIVE: 1

## 2021-07-21 ENCOUNTER — TELEPHONE (OUTPATIENT)
Dept: CARDIOLOGY CLINIC | Age: 76
End: 2021-07-21

## 2021-07-21 NOTE — TELEPHONE ENCOUNTER
She can take 225 bid and dose until ablation and may increase back to 3 times daily if she is having frequent episodes. Keep ablation appt.     AIME Cox-CNP

## 2021-07-21 NOTE — TELEPHONE ENCOUNTER
Propafenone reduced to 225 BID 06/28 then scheduled to stop in three weeks. She is scheduled for ablation in October. Last dose of propafenone was Sunday morning  She is wondering if she can take half of 225 tablet, rather than get new prescription of 150 mg. Her cardia santiago is telling her she is in afib rate 115 to 130s. She feels a little jittery and a little SOB/ /67.

## 2021-07-21 NOTE — TELEPHONE ENCOUNTER
Pt calling when she was in to see NPAL she reduced her medication and pt states she is in AFib again. Her HR is 136 she's normally 58-60. The last time she took it today it read Tachycardia. Pt needs to know what to do? Should she increase her medication again?  Pls call to advise Thank you

## 2021-07-21 NOTE — TELEPHONE ENCOUNTER
Spoke with the patient and advised her of the message below per NPAL . Pt voiced understanding .  Call complete    MAR updated to reflect this change

## 2021-08-09 ENCOUNTER — ANTI-COAG VISIT (OUTPATIENT)
Dept: PHARMACY | Age: 76
End: 2021-08-09
Payer: MEDICARE

## 2021-08-09 DIAGNOSIS — Z86.711 HISTORY OF PULMONARY EMBOLISM: ICD-10-CM

## 2021-08-09 DIAGNOSIS — I48.0 PAF (PAROXYSMAL ATRIAL FIBRILLATION) (HCC): Primary | ICD-10-CM

## 2021-08-09 LAB — INTERNATIONAL NORMALIZATION RATIO, POC: 1.9

## 2021-08-09 PROCEDURE — 99211 OFF/OP EST MAY X REQ PHY/QHP: CPT

## 2021-08-09 PROCEDURE — 85610 PROTHROMBIN TIME: CPT

## 2021-08-09 NOTE — PROGRESS NOTES
Ms. Shyam Dickerson is a 77 y.o. y/o female with history of DVT, PE, Afib   She presents today for anticoagulation monitoring and adjustment. Pertinent PMH: HTN, Gastric Banding,CAD, diskectomy with an artifical spinal disk implant in September 2012. Patient Reported Findings:  Yes     No  [x]   []       Patient verifies current dosing regimen as listed  []   [x]       S/S bleeding/bruising/swelling/SOB states that she has been wheezing more and had more SOB d/t allergies --> has had a few small nose bleeds recently  --> denies  []   [x]       Blood in urine or stool  [x]   []       Procedures scheduled in the future at this time had CV 5/6, INR 2.8 --> having ablation in October    []   [x]       Missed Dose   []   [x]       Extra Dose   [x]   []       Change in medications  decreased tylenol intake to 1-2 times a day --> continues tylenol, propafenone increased --> dec propafenone to 225 mg BID x 2 months then d/c (ending 4/25) --> inc diltiazem, has resumed 3 tabs propafenone --> dec propafenone to BID 6/28, will be stopping in 1 week --> still taking propafenone 150 BID    [x]   []       Change in health/diet/appetite  Patient states that she feels like she has returned to normal vit k intake --> diet fluctuates causing INR to fluctuate.  Discussed ways to improve consistency with vit k intake  --> states that she has cut back spinach and cabbage intake to twice a week --> no vit k --> had cabbage a few times   []   [x]       Change in alcohol use    []   [x]       Change in activity  []   [x]       Hospital admission  []   [x]       Emergency department visit   []   [x]       Other complaints    Clinical Outcomes:  Yes     No  []   [x]       Major bleeding event  []   [x]       Thromboembolic event  Gets warfarin through MD    Duration of warfarin Therapy: indefinite  INR Range:  2.0-3.0     INR is 1.9 today  Increase weekly dose to 5 mg Mon and Fri and 7.5 mg all other days of the week (5.6% inc)  Encouraged to maintain a consistency of vegetables/salads.   Recheck INR in 2 weeks, 8/23     Referring PCP is Sherwin Ochoa  INR (no units)   Date Value   07/12/2021 2.1   06/15/2021 2.4   05/18/2021 2.6   05/06/2021 2.80 (H)   10/26/2020 2.90 (H)   10/03/2020 2.13 (H)      For Pharmacy Admin Tracking Only     Intervention Detail: Dose Adjustment: 1, reason: Therapy Optimization   Total # of Interventions Recommended: 1   Total # of Interventions Accepted: 1   Time Spent (min): 15

## 2021-08-18 ENCOUNTER — TELEMEDICINE (OUTPATIENT)
Dept: BARIATRICS/WEIGHT MGMT | Age: 76
End: 2021-08-18
Payer: MEDICARE

## 2021-08-18 DIAGNOSIS — Z71.3 ENCOUNTER FOR DIETARY COUNSELING AND SURVEILLANCE: ICD-10-CM

## 2021-08-18 DIAGNOSIS — E66.01 MORBID OBESITY WITH BMI OF 45.0-49.9, ADULT (HCC): Primary | ICD-10-CM

## 2021-08-18 PROCEDURE — 1090F PRES/ABSN URINE INCON ASSESS: CPT | Performed by: NURSE PRACTITIONER

## 2021-08-18 PROCEDURE — 3017F COLORECTAL CA SCREEN DOC REV: CPT | Performed by: NURSE PRACTITIONER

## 2021-08-18 PROCEDURE — G8399 PT W/DXA RESULTS DOCUMENT: HCPCS | Performed by: NURSE PRACTITIONER

## 2021-08-18 PROCEDURE — G8428 CUR MEDS NOT DOCUMENT: HCPCS | Performed by: NURSE PRACTITIONER

## 2021-08-18 PROCEDURE — 1123F ACP DISCUSS/DSCN MKR DOCD: CPT | Performed by: NURSE PRACTITIONER

## 2021-08-18 PROCEDURE — 99213 OFFICE O/P EST LOW 20 MIN: CPT | Performed by: NURSE PRACTITIONER

## 2021-08-18 PROCEDURE — 4040F PNEUMOC VAC/ADMIN/RCVD: CPT | Performed by: NURSE PRACTITIONER

## 2021-08-18 ASSESSMENT — ENCOUNTER SYMPTOMS
GASTROINTESTINAL NEGATIVE: 1
EYES NEGATIVE: 1
RESPIRATORY NEGATIVE: 1

## 2021-08-18 NOTE — PROGRESS NOTES
[facility]) Physicians   [employer]    8/18/2021    TELEHEALTH EVALUATION -- Audio/Visual (During KMGWG-65 public health emergency)    Medical Weight Loss    HPI: Rosario Arrington is a 77 y.o. female with current BMI of 46.3 and current weight of 269.9 pounds. Due to the COVID-19 restrictions on close contact interactions the patient's monthly visit was conducted via audio/video in joo of a face to face visit. Patient has consented to have this visit conducted via audio/video. The patient is here through telemedicine for their medical weight loss visit. She did very well this past month. She tracked her intakes and her exercise. She felt she was much more active this month since she was tracking her steps on [employer]. Patient denies any nausea, vomiting, fevers, chills, shortness of breath, chest pain, cough, constipation or difficulty urinating. Past Medical History:   Diagnosis Date    Allergic     Anxiety 2/2/2017    Arthritis     Asthma     Back pain     Blood circulation, collateral     legs    Depression     GERD (gastroesophageal reflux disease)     Gout     Hypercholesteremia     Hypertension     Movement disorder     7 discs ruptured    Movement disorder     knee replacements    Obesity     Pulmonary emboli (HCC)     Unspecified sleep apnea     uses cpap    Urinary tract infection, chronic     UTI (urinary tract infection)      Past Surgical History:   Procedure Laterality Date    [facility]ION  2010    [facility], [facility]    BACK SURGERY  09/14/2012    lumbar fusion L-4, L-5 atrificial disc  Sandee Velazco  5-7-11    incision, drainage and debridement of left knee and application of wound vac.     BREAST BIOPSY  08/2018    neg    EYE SURGERY      JOINT REPLACEMENT      bilat knees    JOINT REPLACEMENT  2003    [facility], [facility]    JOINT REPLACEMENT  2005    right, [facility] OTHER SURGICAL HISTORY  2011    laparscopic adjustable gastric restrictive procedure    REVISION [facility]ROPLASTY      [facility]    SKIN FULL GRAFT      right, [facility]    TUBAL LIGATION  1975    [facility]    UPPER GASTROINTESTINAL ENDOSCOPY  10-    VENA CAVA FILTER PLACEMENT       Family History   Problem Relation Age of Onset    High Blood Pressure Mother     Depression Mother     Stroke Father     High Blood Pressure Father     Asthma Father     Arthritis Father     Heart Disease Father     Breast Cancer Sister     High Cholesterol Neg Hx      Social History     Tobacco Use    Smoking status: Former Smoker     Packs/day: 0.50     Years: 4.00     Pack years: 2.00     Types: Cigarettes     Quit date: 1965     Years since quittin.7    Smokeless tobacco: Never Used   Substance Use Topics    Alcohol use: Not Currently     Comment: 4 t imes a year     I counseled the patient on the importance of not smoking and risks of ETOH. Allergies   Allergen Reactions    Belsomra [Suvorexant] Other (See Comments)     Nightmares      Avelox [Moxifloxacin Hcl In Nacl] Rash    Levofloxacin Rash    Sulfa Antibiotics Rash     There were no vitals filed for this visit. There is no height or weight on file to calculate BMI.     Current Outpatient Medications:     albuterol sulfate HFA (PROAIR HFA) 108 (90 Base) MCG/ACT inhaler, Inhale 2 puffs into the lungs every 4 hours as needed for Wheezing, Disp: 8.5 g, Rfl: 2    fluticasone-salmeterol (ADVAIR) 250-50 MCG/DOSE AEPB, Inhale 1 puff into the lungs 2 times daily, Disp: 1 Inhaler, Rfl: 5    clonazePAM (KLONOPIN) 1 MG tablet, TAKE 1 TABLET BY MOUTH IN  THE EVENING, Disp: 90 tablet, Rfl: 0    propafenone (RYTHMOL) 150 MG tablet, TAKE 1 TABLET BY MOUTH&nbsp;&nbsp;EVERY 8 HOURS (Patient taking differently: Take 225 mg by mouth 2 times daily See note on 21), Disp: 90 tablet, Rfl: 2    trimethoprim (TRIMPEX) 100 MG tablet, TAKE 1 TABLET BY MOUTH  EVERY 48 HOURS, Disp: 45 tablet, Rfl: 3    gabapentin (NEURONTIN) 300 MG capsule, TAKE 2 CAPSULES BY MOUTH 3  TIMES DAILY, Disp: 540 capsule, Rfl: 3    indapamide (LOZOL) 1.25 MG tablet, TAKE 1 TABLET BY MOUTH IN  THE MORNING, Disp: 90 tablet, Rfl: 3    pantoprazole (PROTONIX) 40 MG tablet, TAKE 1 TABLET BY MOUTH IN  THE MORNING BEFORE  BREAKFAST, Disp: 90 tablet, Rfl: 3    montelukast (SINGULAIR) 10 MG tablet, TAKE 1 TABLET BY MOUTH IN  THE EVENING, Disp: 90 tablet, Rfl: 3    warfarin (COUMADIN) 5 MG tablet, TAKE 5mg ON M,W.F and 7.5mg all other days, Disp: 143 tablet, Rfl: 1    citalopram (CELEXA) 40 MG tablet, TAKE 1 TABLET BY MOUTH  DAILY, Disp: 90 tablet, Rfl: 3    allopurinol (ZYLOPRIM) 300 MG tablet, TAKE 1 TABLET BY MOUTH  DAILY, Disp: 90 tablet, Rfl: 3    atorvastatin (LIPITOR) 80 MG tablet, TAKE 1 TABLET BY MOUTH  DAILY, Disp: 90 tablet, Rfl: 3    furosemide (LASIX) 20 MG tablet, Take 1 tablet by mouth daily (Patient taking differently: Take 20 mg by mouth daily PRN), Disp: 30 tablet, Rfl: 1    potassium chloride (KLOR-CON M) 20 MEQ extended release tablet, TAKE 1 TABLET BY MOUTH  DAILY WITH BREAKFAST (Patient taking differently: TAKE 1 TABLET BY MOUTH  DAILY WITH BREAKFAST  Take 2 tablets on the days she takes the furosemide), Disp: 90 tablet, Rfl: 3    verapamil (CALAN SR) 240 MG extended release tablet, Take 0.5 tablets by mouth nightly (Patient taking differently: Take 120 mg by mouth every morning 0.5 in the morning and 0.5 in the evening), Disp: 90 tablet, Rfl: 3    acetaminophen (TYLENOL) 500 MG tablet, Take 500 mg by mouth every 6 hours as needed for Pain, Disp: , Rfl:     diphenhydrAMINE-APAP, sleep, (TYLENOL PM EXTRA STRENGTH)  MG tablet, Take 1 tablet by mouth nightly as needed for Sleep, Disp: , Rfl:     b complex vitamins capsule, Take 1 capsule by mouth daily, Disp: , Rfl:     Ascorbic Acid (VITAMIN C PO), 1 tablet daily, Disp: , Rfl:   therapeutic multivitamin-minerals (THERAGRAN-M) tablet, Take 1 tablet by mouth nightly , Disp: , Rfl:     Cranberry 500 MG CAPS, Take 1,000 mg by mouth nightly , Disp: , Rfl:     Lab Results   Component Value Date    WBC 6.5 10/02/2020    RBC 4.24 10/02/2020    HGB 13.7 10/02/2020    HCT 40.0 10/02/2020    MCV 94.3 10/02/2020    MCH 32.4 10/02/2020    MCHC 34.4 10/02/2020    MPV 6.8 10/02/2020    NEUTOPHILPCT 74.9 10/29/2018    LYMPHOPCT 17.2 10/29/2018    MONOPCT 6.0 10/29/2018    EOSRELPCT 1.4 10/29/2018    BASOPCT 0.5 10/29/2018    NEUTROABS 7.4 10/29/2018    LYMPHSABS 1.7 10/29/2018    MONOSABS 0.6 10/29/2018    EOSABS 0.1 10/29/2018     Lab Results   Component Value Date     07/12/2021    K 3.9 07/12/2021    K 3.3 10/10/2018     07/12/2021    CO2 23 07/12/2021    ANIONGAP 18 07/12/2021    GLUCOSE 95 07/12/2021    BUN 15 07/12/2021    CREATININE 0.9 07/12/2021    LABGLOM >60 07/12/2021    GFRAA >60 07/12/2021    GFRAA >60 01/04/2013    CALCIUM 9.9 07/12/2021    PROT 7.2 10/02/2020    PROT 7.6 01/04/2013    LABALBU 4.5 07/12/2021    AGRATIO 1.5 10/02/2020    BILITOT 0.7 10/02/2020    ALKPHOS 78 10/02/2020    ALT 26 10/02/2020    AST 32 10/02/2020    GLOB 2.9 10/02/2020     Lab Results   Component Value Date    CHOL 184 07/12/2021    TRIG 132 07/12/2021    HDL 68 07/12/2021    HDL 58 02/01/2012    LDLCALC 90 07/12/2021    LABVLDL 26 07/12/2021     Lab Results   Component Value Date    TSHREFLEX 2.44 10/02/2020     Lab Results   Component Value Date    IRON 72 08/27/2019    TIBC 292 08/27/2019    LABIRON 25 08/27/2019     Lab Results   Component Value Date    [place] Mast 1059 08/27/2019    FOLATE >20.00 08/27/2019     Lab Results   Component Value Date    VITD25 50.2 08/27/2019     Lab Results   Component Value Date    LABA1C 5.6 11/22/2019    .9 12/28/2011       Patient Active Problem List   Diagnosis    Essential hypertension    History of pulmonary embolism    Osteoarthritis of knee

## 2021-08-18 NOTE — PATIENT INSTRUCTIONS
Key Low Carb Dietary Points:    - Meats (preferably organic or grass fed) are great sources of protein and are low in carbohydrates. Dorothye Myriam with coconut, olive, avocado, or almond oils. - When buying dairy, choose regular or full fat options. - Choose vegetables that grow above ground as they are generally lower in carbohydrates. - Avoid bread, rice, potatoes, pasta and all sources of simple sugars (desserts, soda, breakfast cereals). - Choose beverages that are calorie and sugar free, such as water or flavored buitrago. - When eating fruit, choose berries as a snack option a few times a week. Patient received dietary handouts and education.

## 2021-08-26 ENCOUNTER — ANTI-COAG VISIT (OUTPATIENT)
Dept: PHARMACY | Age: 76
End: 2021-08-26
Payer: MEDICARE

## 2021-08-26 DIAGNOSIS — I48.0 PAF (PAROXYSMAL ATRIAL FIBRILLATION) (HCC): Primary | ICD-10-CM

## 2021-08-26 DIAGNOSIS — Z86.711 HISTORY OF PULMONARY EMBOLISM: ICD-10-CM

## 2021-08-26 LAB — INTERNATIONAL NORMALIZATION RATIO, POC: 3.8

## 2021-08-26 PROCEDURE — 85610 PROTHROMBIN TIME: CPT

## 2021-08-26 PROCEDURE — 99212 OFFICE O/P EST SF 10 MIN: CPT

## 2021-08-26 NOTE — PROGRESS NOTES
Ms. Jazmin Khan is a 77 y.o. y/o female with history of DVT, PE, Afib   She presents today for anticoagulation monitoring and adjustment. Pertinent PMH: HTN, Gastric Banding,CAD, diskectomy with an artifical spinal disk implant in September 2012. Patient Reported Findings:  Yes     No  [x]   []       Patient verifies current dosing regimen as listed  []   [x]       S/S bleeding/bruising/swelling/SOB states that she has been wheezing more and had more SOB d/t allergies --> has had a few small nose bleeds recently  --> denies  []   [x]       Blood in urine or stool  [x]   []       Procedures scheduled in the future at this time had CV 5/6, INR 2.8 --> having ablation in October    []   [x]       Missed Dose   []   [x]       Extra Dose   []   [x]       Change in medications  decreased tylenol intake to 1-2 times a day --> continues tylenol, propafenone increased --> dec propafenone to 225 mg BID x 2 months then d/c (ending 4/25) --> inc diltiazem, has resumed 3 tabs propafenone --> dec propafenone to BID 6/28, will be stopping in 1 week --> still taking propafenone 150 BID --> no changes    [x]   []       Change in health/diet/appetite  Patient states that she feels like she has returned to normal vit k intake --> diet fluctuates causing INR to fluctuate.  Discussed ways to improve consistency with vit k intake  --> states that she has cut back spinach and cabbage intake to twice a week --> no vit k --> had cabbage a few times --> no vit k since was on vacation but plans to return   []   [x]       Change in alcohol use    []   [x]       Change in activity  []   [x]       Hospital admission  []   [x]       Emergency department visit   []   [x]       Other complaints    Clinical Outcomes:  Yes     No  []   [x]       Major bleeding event  []   [x]       Thromboembolic event  Gets warfarin through MD    Duration of warfarin Therapy: indefinite  INR Range:  2.0-3.0     INR is 3.8 today d/t increasing dose and not

## 2021-08-30 RX ORDER — WARFARIN SODIUM 5 MG/1
TABLET ORAL
Qty: 135 TABLET | Refills: 5 | Status: SHIPPED | OUTPATIENT
Start: 2021-08-30 | End: 2022-10-18

## 2021-09-09 ENCOUNTER — ANTI-COAG VISIT (OUTPATIENT)
Dept: PHARMACY | Age: 76
End: 2021-09-09
Payer: MEDICARE

## 2021-09-09 DIAGNOSIS — I48.0 PAF (PAROXYSMAL ATRIAL FIBRILLATION) (HCC): Primary | ICD-10-CM

## 2021-09-09 DIAGNOSIS — Z86.711 HISTORY OF PULMONARY EMBOLISM: ICD-10-CM

## 2021-09-09 LAB — INTERNATIONAL NORMALIZATION RATIO, POC: 2.7

## 2021-09-09 PROCEDURE — 85610 PROTHROMBIN TIME: CPT

## 2021-09-09 PROCEDURE — 99211 OFF/OP EST MAY X REQ PHY/QHP: CPT

## 2021-09-09 RX ORDER — POTASSIUM CHLORIDE 20 MEQ/1
TABLET, EXTENDED RELEASE ORAL
Qty: 90 TABLET | Refills: 3 | Status: SHIPPED | OUTPATIENT
Start: 2021-09-09 | End: 2022-08-12

## 2021-09-09 NOTE — PROGRESS NOTES
Ms. Ct Multani is a 77 y.o. y/o female with history of DVT, PE, Afib   She presents today for anticoagulation monitoring and adjustment. Pertinent PMH: HTN, Gastric Banding,CAD, diskectomy with an artifical spinal disk implant in September 2012. Patient Reported Findings:  Yes     No  [x]   []       Patient verifies current dosing regimen as listed  []   [x]       S/S bleeding/bruising/swelling/SOB states that she has been wheezing more and had more SOB d/t allergies --> has had a few small nose bleeds recently  --> denies  []   [x]       Blood in urine or stool  [x]   []       Procedures scheduled in the future at this time had CV 5/6, INR 2.8 --> having ablation in October 5th    []   [x]       Missed Dose   []   [x]       Extra Dose   []   [x]       Change in medications  decreased tylenol intake to 1-2 times a day --> continues tylenol, propafenone increased --> dec propafenone to 225 mg BID x 2 months then d/c (ending 4/25) --> inc diltiazem, has resumed 3 tabs propafenone --> dec propafenone to BID 6/28, will be stopping in 1 week --> still taking propafenone 150 BID --> no changes    [x]   []       Change in health/diet/appetite  Patient states that she feels like she has returned to normal vit k intake --> diet fluctuates causing INR to fluctuate.  Discussed ways to improve consistency with vit k intake  --> states that she has cut back spinach and cabbage intake to twice a week --> no vit k --> had cabbage a few times --> no vit k since was on vacation but plans to return --> has been eating a salad a day  []   [x]       Change in alcohol use    []   [x]       Change in activity  []   [x]       Hospital admission  []   [x]       Emergency department visit   []   [x]       Other complaints    Clinical Outcomes:  Yes     No  []   [x]       Major bleeding event  []   [x]       Thromboembolic event  Gets warfarin through MD    Duration of warfarin Therapy: indefinite  INR Range:  2.0-3.0     INR is 2.7 today   Continue weekly dose of 5 mg Mon and Fri and 7.5 mg all other days of the week   Encouraged to maintain a consistency of vegetables/salads.   Recheck INR in 3 weeks, 9/30     Referring PCP is Vicenta Chatterjee  INR (no units)   Date Value   08/26/2021 3.8   08/09/2021 1.9   07/12/2021 2.1   05/06/2021 2.80 (H)   10/26/2020 2.90 (H)   10/03/2020 2.13 (H)      For Pharmacy Admin Tracking Only     Total # of Interventions Recommended: 0   Total # of Interventions Accepted: 0   Time Spent (min): 15

## 2021-09-14 NOTE — PROGRESS NOTES
[facility]) Physicians   [employer]    9/15/2021    TELEHEALTH EVALUATION -- Audio/Visual (During XGPFT-78 public health emergency)    Medical Weight Loss    HPI: Neelam Dumont is a 77 y.o. female with current BMI of 46.2 and current weight of 271.3  pounds. Due to the COVID-19 restrictions on close contact interactions the patient's monthly visit was conducted via audio/video in joo of a face to face visit. Patient has consented to have this visit conducted via audio/video. The patient is here through telemedicine for their medical weight loss visit. She was down to 3 pounds in 2 weeks after her last appointment, then went on vacation and got off track. She is ready to get back on track with her diet now. She has stopped going to the gym, does not feel comfortable doing so right now due to COVID-19. Past Medical History:   Diagnosis Date    Allergic     Anxiety 2/2/2017    Arthritis     Asthma     Back pain     Blood circulation, collateral     legs    Depression     GERD (gastroesophageal reflux disease)     Gout     Hypercholesteremia     Hypertension     Movement disorder     7 discs ruptured    Movement disorder     knee replacements    Obesity     Pulmonary emboli (HCC)     Unspecified sleep apnea     uses cpap    Urinary tract infection, chronic     UTI (urinary tract infection)      Past Surgical History:   Procedure Laterality Date    ANKLE FUSION  2010    [facility], [facility]    BACK SURGERY  09/14/2012    lumbar fusion L-4, L-5 atrificial disc  [facility] Dr. Delaney Racer  5-7-11    incision, drainage and debridement of left knee and application of wound vac.     BREAST BIOPSY  08/2018    neg    EYE SURGERY      JOINT REPLACEMENT      bilat knees    JOINT REPLACEMENT  2003    left, [facility]    JOINT REPLACEMENT  2005    right, [facility] OTHER SURGICAL HISTORY  12- laparscopic adjustable gastric restrictive procedure    RE[facility]    [facility]    [facility]UBAL [facility]ATION  1975    [facility]    UPPER GASTROINTESTINAL ENDOSCOPY  10-    VENA CAVA FILTER PLACEMENT       Family History   Problem Relation Age of Onset    High Blood Pressure Mother     Depression Mother     Stroke Father     High Blood Pressure Father     Asthma Father     Arthritis Father     Heart Disease Father     Breast Cancer Sister     High Cholesterol Neg Hx      Social History     Tobacco Use    Smoking status: Former Smoker     Packs/day: 0.50     Years: 4.00     Pack years: 2.00     Types: Cigarettes     Quit date: 1965     Years since quittin.7    Smokeless tobacco: Never Used   Substance Use Topics    Alcohol use: Not Currently     Comment: 4 t imes a year     I counseled the patient on the importance of not smoking and risks of ETOH. Allergies   Allergen Reactions    Belsomra [Suvorexant] Other (See Comments)     Nightmares      Avelox [Moxifloxacin Hcl In Nacl] Rash    Levofloxacin Rash    Sulfa Antibiotics Rash     There were no vitals filed for this visit. There is no height or weight on file to calculate BMI.     Current Outpatient Medications:     potassium chloride (KLOR-CON M) 20 MEQ extended release tablet, TAKE 1 TABLET BY MOUTH  DAILY WITH BREAKFAST, Disp: 90 tablet, Rfl: 3    warfarin (COUMADIN) 5 MG tablet, TAKE 1 TABLET BY MOUTH ON  MONDAY AND  FRIDAY AND 1 AND 1/2  TABLETS ALL OTHER DAYS, Disp: 135 tablet, Rfl: 5    albuterol sulfate HFA (PROAIR HFA) 108 (90 Base) MCG/ACT inhaler, Inhale 2 puffs into the lungs every 4 hours as needed for Wheezing, Disp: 8.5 g, Rfl: 2    fluticasone-salmeterol (ADVAIR) 250-50 MCG/DOSE AEPB, Inhale 1 puff into the lungs 2 times daily, Disp: 1 Inhaler, Rfl: 5    clonazePAM (KLONOPIN) 1 MG tablet, TAKE 1 TABLET BY MOUTH IN  THE EVENING, Disp: 90 tablet, Rfl: 0    propafenone (RYTHMOL) 150 MG tablet, TAKE 1 TABLET BY MOUTH&nbsp;&nbsp;EVERY 8 HOURS (Patient taking differently: Take 225 mg by mouth 2 times daily See note on 07/21/21), Disp: 90 tablet, Rfl: 2    trimethoprim (TRIMPEX) 100 MG tablet, TAKE 1 TABLET BY MOUTH  EVERY 48 HOURS, Disp: 45 tablet, Rfl: 3    gabapentin (NEURONTIN) 300 MG capsule, TAKE 2 CAPSULES BY MOUTH 3  TIMES DAILY, Disp: 540 capsule, Rfl: 3    indapamide (LOZOL) 1.25 MG tablet, TAKE 1 TABLET BY MOUTH IN  THE MORNING, Disp: 90 tablet, Rfl: 3    pantoprazole (PROTONIX) 40 MG tablet, TAKE 1 TABLET BY MOUTH IN  THE MORNING BEFORE  BREAKFAST, Disp: 90 tablet, Rfl: 3    montelukast (SINGULAIR) 10 MG tablet, TAKE 1 TABLET BY MOUTH IN  THE EVENING, Disp: 90 tablet, Rfl: 3    citalopram (CELEXA) 40 MG tablet, TAKE 1 TABLET BY MOUTH  DAILY, Disp: 90 tablet, Rfl: 3    allopurinol (ZYLOPRIM) 300 MG tablet, TAKE 1 TABLET BY MOUTH  DAILY, Disp: 90 tablet, Rfl: 3    atorvastatin (LIPITOR) 80 MG tablet, TAKE 1 TABLET BY MOUTH  DAILY, Disp: 90 tablet, Rfl: 3    furosemide (LASIX) 20 MG tablet, Take 1 tablet by mouth daily (Patient taking differently: Take 20 mg by mouth daily PRN), Disp: 30 tablet, Rfl: 1    verapamil (CALAN SR) 240 MG extended release tablet, Take 0.5 tablets by mouth nightly (Patient taking differently: Take 120 mg by mouth every morning 0.5 in the morning and 0.5 in the evening), Disp: 90 tablet, Rfl: 3    acetaminophen (TYLENOL) 500 MG tablet, Take 500 mg by mouth every 6 hours as needed for Pain, Disp: , Rfl:     diphenhydrAMINE-APAP, sleep, (TYLENOL PM EXTRA STRENGTH)  MG tablet, Take 1 tablet by mouth nightly as needed for Sleep, Disp: , Rfl:     b complex vitamins capsule, Take 1 capsule by mouth daily, Disp: , Rfl:     Ascorbic Acid (VITAMIN C PO), 1 tablet daily, Disp: , Rfl:     therapeutic multivitamin-minerals (THERAGRAN-M) tablet, Take 1 tablet by mouth nightly , Disp: , Rfl:     Cranberry 500 MG CAPS, Take 1,000 mg by mouth nightly , Disp: , Rfl:     Lab Results   Component Value Date    WBC 6.5 10/02/2020    RBC 4.24 10/02/2020    HGB 13.7 10/02/2020    HCT 40.0 10/02/2020    MCV 94.3 10/02/2020    MCH 32.4 10/02/2020    MCHC 34.4 10/02/2020    MPV 6.8 10/02/2020    NEUTOPHILPCT 74.9 10/29/2018    LYMPHOPCT 17.2 10/29/2018    MONOPCT 6.0 10/29/2018    EOSRELPCT 1.4 10/29/2018    BASOPCT 0.5 10/29/2018    NEUTROABS 7.4 10/29/2018    LYMPHSABS 1.7 10/29/2018    MONOSABS 0.6 10/29/2018    EOSABS 0.1 10/29/2018     Lab Results   Component Value Date     07/12/2021    K 3.9 07/12/2021    K 3.3 10/10/2018     07/12/2021    CO2 23 07/12/2021    ANIONGAP 18 07/12/2021    GLUCOSE 95 07/12/2021    BUN 15 07/12/2021    CREATININE 0.9 07/12/2021    LABGLOM >60 07/12/2021    GFRAA >60 07/12/2021    GFRAA >60 01/04/2013    CALCIUM 9.9 07/12/2021    PROT 7.2 10/02/2020    PROT 7.6 01/04/2013    LABALBU 4.5 07/12/2021    AGRATIO 1.5 10/02/2020    BILITOT 0.7 10/02/2020    ALKPHOS 78 10/02/2020    ALT 26 10/02/2020    AST 32 10/02/2020    GLOB 2.9 10/02/2020     Lab Results   Component Value Date    CHOL 184 07/12/2021    TRIG 132 07/12/2021    HDL 68 07/12/2021    HDL 58 02/01/2012    LDLCALC 90 07/12/2021    LABVLDL 26 07/12/2021     Lab Results   Component Value Date    TSHREFLEX 2.44 10/02/2020     Lab Results   Component Value Date    IRON 72 08/27/2019    TIBC 292 08/27/2019    LABIRON 25 08/27/2019     Lab Results   Component Value Date    Luis Eduardo Ro 1059 08/27/2019    FOLATE >20.00 08/27/2019     Lab Results   Component Value Date    VITD25 50.2 08/27/2019     Lab Results   Component Value Date    LABA1C 5.6 11/22/2019    .9 12/28/2011       Patient Active Problem List   Diagnosis    Essential hypertension    History of pulmonary embolism    Osteoarthritis of knee    PAULA (obstructive sleep apnea)    Asthma    Status post gastric banding    Coronary artery disease    Morbid obesity with 1.4 lbs and a total weight loss of 9.8 lbs. She is doing well, making dietary and behavior modifications. She is following dietary recommendations utilizing the 1200 calorie low carb meal plan as a guide. We discussed preparing her kitchen to help her stay on track (ensure healthy options, get rid of unhealthy ones). She wants to get back to being more mindful with her food choices. She did speak with the registered dietitian for continued follow up. I agree with recommendations and plan. She is exercising by walking more around her house. Encouraged continued physical activity. We will see her back in 1 month for continued follow up via telemedicine. Total encounter time: 25 minutes, including any number of the following:  review of labs, imaging, provider notes, outside hospital records, performing examination/evaluation, counseling patient and/or family, ordering medications/tests, placing referrals and communication with referring physicians, coordination of care; discussing dietary plan/recall with the patient as well with registered dietitian and documentation in the EHR. Of note, the above was done during same day of the actual patient encounter. An electronic signature was used to authenticate this note. Pursuant to the emergency declaration under the [address] [facility] [address] waiver authority and the [place] and [employer] General Act, this Virtual  Visit was conducted, with patient's consent, to reduce the patient's risk of exposure to COVID-19 and provide continuity of care for an established patient. Services were provided through a video synchronous discussion virtually to substitute for in-person clinic visit.

## 2021-09-15 ENCOUNTER — TELEMEDICINE (OUTPATIENT)
Dept: BARIATRICS/WEIGHT MGMT | Age: 76
End: 2021-09-15
Payer: MEDICARE

## 2021-09-15 DIAGNOSIS — E66.01 MORBID OBESITY WITH BMI OF 45.0-49.9, ADULT (HCC): Primary | ICD-10-CM

## 2021-09-15 DIAGNOSIS — Z71.3 ENCOUNTER FOR DIETARY COUNSELING AND SURVEILLANCE: ICD-10-CM

## 2021-09-15 PROCEDURE — G8427 DOCREV CUR MEDS BY ELIG CLIN: HCPCS | Performed by: NURSE PRACTITIONER

## 2021-09-15 PROCEDURE — 99213 OFFICE O/P EST LOW 20 MIN: CPT | Performed by: NURSE PRACTITIONER

## 2021-09-15 PROCEDURE — G8399 PT W/DXA RESULTS DOCUMENT: HCPCS | Performed by: NURSE PRACTITIONER

## 2021-09-15 PROCEDURE — 3017F COLORECTAL CA SCREEN DOC REV: CPT | Performed by: NURSE PRACTITIONER

## 2021-09-15 PROCEDURE — 1090F PRES/ABSN URINE INCON ASSESS: CPT | Performed by: NURSE PRACTITIONER

## 2021-09-15 PROCEDURE — 1123F ACP DISCUSS/DSCN MKR DOCD: CPT | Performed by: NURSE PRACTITIONER

## 2021-09-15 PROCEDURE — 4040F PNEUMOC VAC/ADMIN/RCVD: CPT | Performed by: NURSE PRACTITIONER

## 2021-09-15 ASSESSMENT — ENCOUNTER SYMPTOMS
GASTROINTESTINAL NEGATIVE: 1
EYES NEGATIVE: 1
RESPIRATORY NEGATIVE: 1

## 2021-09-16 DIAGNOSIS — F41.9 ANXIETY: ICD-10-CM

## 2021-09-16 RX ORDER — VERAPAMIL HYDROCHLORIDE 240 MG/1
TABLET, FILM COATED, EXTENDED RELEASE ORAL
Qty: 90 TABLET | Refills: 3 | OUTPATIENT
Start: 2021-09-16

## 2021-09-17 RX ORDER — CLONAZEPAM 1 MG/1
TABLET ORAL
Qty: 90 TABLET | OUTPATIENT
Start: 2021-09-17

## 2021-09-17 NOTE — TELEPHONE ENCOUNTER
Per review of PDMP, she should have nearly a one month supply left of clonazepam.  We can send it to [facility] closer to when it is due.

## 2021-09-22 DIAGNOSIS — Z01.818 PRE-OP TESTING: Primary | ICD-10-CM

## 2021-09-23 RX ORDER — VERAPAMIL HYDROCHLORIDE 240 MG/1
TABLET, FILM COATED, EXTENDED RELEASE ORAL
Qty: 90 TABLET | Refills: 3 | Status: SHIPPED | OUTPATIENT
Start: 2021-09-23 | End: 2022-07-28

## 2021-09-30 ENCOUNTER — TELEPHONE (OUTPATIENT)
Dept: INTERNAL MEDICINE CLINIC | Age: 76
End: 2021-09-30

## 2021-09-30 ENCOUNTER — ANTI-COAG VISIT (OUTPATIENT)
Dept: PHARMACY | Age: 76
End: 2021-09-30
Payer: MEDICARE

## 2021-09-30 DIAGNOSIS — Z86.711 HISTORY OF PULMONARY EMBOLISM: ICD-10-CM

## 2021-09-30 DIAGNOSIS — I48.0 PAF (PAROXYSMAL ATRIAL FIBRILLATION) (HCC): Primary | ICD-10-CM

## 2021-09-30 LAB — INTERNATIONAL NORMALIZATION RATIO, POC: 2.2

## 2021-09-30 PROCEDURE — 85610 PROTHROMBIN TIME: CPT

## 2021-09-30 PROCEDURE — 99211 OFF/OP EST MAY X REQ PHY/QHP: CPT

## 2021-09-30 RX ORDER — TRIMETHOPRIM 100 MG/1
100 TABLET ORAL
Qty: 45 TABLET | Refills: 3 | Status: SHIPPED | OUTPATIENT
Start: 2021-09-30 | End: 2022-10-31

## 2021-09-30 NOTE — TELEPHONE ENCOUNTER
Patient states [facility] advised her they will no longer carry trimethoprim (TRIMPEX) 100 MG tablet. She is requesting a new prescription sent to [facility] on [address] in [place].

## 2021-09-30 NOTE — PROGRESS NOTES
Ms. Sarah Beth Jordan is a 77 y.o. y/o female with history of DVT, PE, Afib   She presents today for anticoagulation monitoring and adjustment. Pertinent PMH: HTN, Gastric Banding,CAD, diskectomy with an artifical spinal disk implant in September 2012. Patient Reported Findings:  Yes     No  [x]   []       Patient verifies current dosing regimen as listed  []   [x]       S/S bleeding/bruising/swelling/SOB states that she has been wheezing more and had more SOB d/t allergies --> has had a few small nose bleeds recently  --> denies  []   [x]       Blood in urine or stool  [x]   []       Procedures scheduled in the future at this time had CV 5/6, INR 2.8 --> having ablation in October 5th    []   [x]       Missed Dose   []   [x]       Extra Dose   []   [x]       Change in medications  decreased tylenol intake to 1-2 times a day --> continues tylenol, propafenone increased --> dec propafenone to 225 mg BID x 2 months then d/c (ending 4/25) --> inc diltiazem, has resumed 3 tabs propafenone --> dec propafenone to BID 6/28, will be stopping in 1 week --> still taking propafenone 150 BID --> no changes    []   [x]       Change in health/diet/appetite  Patient states that she feels like she has returned to normal vit k intake --> diet fluctuates causing INR to fluctuate.  Discussed ways to improve consistency with vit k intake  --> states that she has cut back spinach and cabbage intake to twice a week --> no vit k --> had cabbage a few times --> no vit k since was on vacation but plans to return --> has been eating a salad a day-->no changes  []   [x]       Change in alcohol use    []   [x]       Change in activity  []   [x]       Hospital admission  []   [x]       Emergency department visit   []   [x]       Other complaints    Clinical Outcomes:  Yes     No  []   [x]       Major bleeding event  []   [x]       Thromboembolic event  Gets warfarin through MD    Duration of warfarin Therapy: indefinite  INR Range: 2.0-3.0     INR is 2.2 today   Continue weekly dose of 5 mg Mon and Fri and 7.5 mg all other days of the week   Encouraged to maintain a consistency of vegetables/salads.   Recheck INR in 4 weeks,  10/28    Referring PCP is Izetta Gaucher  INR (no units)   Date Value   09/30/2021 2.2   09/09/2021 2.7   08/26/2021 3.8   05/06/2021 2.80 (H)   10/26/2020 2.90 (H)   10/03/2020 2.13 (H)      For Pharmacy Admin Tracking Only     Total # of Interventions Recommended: 0   Total # of Interventions Accepted: 0   Time Spent (min): 15

## 2021-10-01 LAB — SARS-COV-2: NOT DETECTED

## 2021-10-05 ENCOUNTER — ANESTHESIA EVENT (OUTPATIENT)
Dept: CARDIAC CATH/INVASIVE PROCEDURES | Age: 76
End: 2021-10-05
Payer: MEDICARE

## 2021-10-05 ENCOUNTER — ANESTHESIA (OUTPATIENT)
Dept: CARDIAC CATH/INVASIVE PROCEDURES | Age: 76
End: 2021-10-05
Payer: MEDICARE

## 2021-10-05 ENCOUNTER — HOSPITAL ENCOUNTER (OUTPATIENT)
Dept: CARDIAC CATH/INVASIVE PROCEDURES | Age: 76
Discharge: HOME | End: 2021-10-05
Attending: INTERNAL MEDICINE | Admitting: INTERNAL MEDICINE
Payer: MEDICARE

## 2021-10-05 VITALS
DIASTOLIC BLOOD PRESSURE: 50 MMHG | WEIGHT: 271.5 LBS | TEMPERATURE: 97 F | HEIGHT: 64 IN | BODY MASS INDEX: 46.35 KG/M2 | HEART RATE: 65 BPM | RESPIRATION RATE: 13 BRPM | SYSTOLIC BLOOD PRESSURE: 124 MMHG | OXYGEN SATURATION: 93 %

## 2021-10-05 VITALS
DIASTOLIC BLOOD PRESSURE: 60 MMHG | RESPIRATION RATE: 24 BRPM | TEMPERATURE: 96.6 F | SYSTOLIC BLOOD PRESSURE: 125 MMHG | OXYGEN SATURATION: 88 %

## 2021-10-05 LAB
ABO/RH: NORMAL
ANION GAP SERPL CALCULATED.3IONS-SCNC: 14 MMOL/L (ref 3–16)
ANTIBODY SCREEN: NORMAL
BUN BLDV-MCNC: 17 MG/DL (ref 7–20)
CALCIUM SERPL-MCNC: 9.8 MG/DL (ref 8.3–10.6)
CHLORIDE BLD-SCNC: 100 MMOL/L (ref 99–110)
CO2: 25 MMOL/L (ref 21–32)
CREAT SERPL-MCNC: 0.9 MG/DL (ref 0.6–1.2)
EKG ATRIAL RATE: 150 BPM
EKG DIAGNOSIS: NORMAL
EKG P AXIS: 33 DEGREES
EKG Q-T INTERVAL: 336 MS
EKG QRS DURATION: 98 MS
EKG QTC CALCULATION (BAZETT): 426 MS
EKG R AXIS: -2 DEGREES
EKG T AXIS: 43 DEGREES
EKG VENTRICULAR RATE: 97 BPM
GFR AFRICAN AMERICAN: >60
GFR NON-AFRICAN AMERICAN: >60
GLUCOSE BLD-MCNC: 105 MG/DL (ref 70–99)
HCT VFR BLD CALC: 40.5 % (ref 36–48)
HEMOGLOBIN: 14.2 G/DL (ref 12–16)
INR BLD: 2.5 (ref 0.86–1.14)
LV EF: 60 %
LVEF MODALITY: NORMAL
MCH RBC QN AUTO: 32.5 PG (ref 26–34)
MCHC RBC AUTO-ENTMCNC: 35 G/DL (ref 31–36)
MCV RBC AUTO: 92.8 FL (ref 80–100)
PDW BLD-RTO: 14.3 % (ref 12.4–15.4)
PLATELET # BLD: 287 K/UL (ref 135–450)
PMV BLD AUTO: 6.8 FL (ref 5–10.5)
POC ACT LR: 325 SEC
POC ACT LR: 366 SEC
POC ACT LR: >400 SEC
POC ACT LR: >400 SEC
POTASSIUM SERPL-SCNC: 3.5 MMOL/L (ref 3.5–5.1)
RBC # BLD: 4.37 M/UL (ref 4–5.2)
SODIUM BLD-SCNC: 139 MMOL/L (ref 136–145)
WBC # BLD: 7.6 K/UL (ref 4–11)

## 2021-10-05 PROCEDURE — 36415 COLL VENOUS BLD VENIPUNCTURE: CPT

## 2021-10-05 PROCEDURE — 86901 BLOOD TYPING SEROLOGIC RH(D): CPT

## 2021-10-05 PROCEDURE — 80048 BASIC METABOLIC PNL TOTAL CA: CPT

## 2021-10-05 PROCEDURE — C1732 CATH, EP, DIAG/ABL, 3D/VECT: HCPCS

## 2021-10-05 PROCEDURE — C1760 CLOSURE DEV, VASC: HCPCS

## 2021-10-05 PROCEDURE — 93656 COMPRE EP EVAL ABLTJ ATR FIB: CPT | Performed by: INTERNAL MEDICINE

## 2021-10-05 PROCEDURE — 7100000001 HC PACU RECOVERY - ADDTL 15 MIN

## 2021-10-05 PROCEDURE — 86900 BLOOD TYPING SEROLOGIC ABO: CPT

## 2021-10-05 PROCEDURE — 6360000002 HC RX W HCPCS: Performed by: NURSE ANESTHETIST, CERTIFIED REGISTERED

## 2021-10-05 PROCEDURE — 93656 COMPRE EP EVAL ABLTJ ATR FIB: CPT

## 2021-10-05 PROCEDURE — 93655 ICAR CATH ABLTJ DSCRT ARRHYT: CPT | Performed by: INTERNAL MEDICINE

## 2021-10-05 PROCEDURE — 6360000002 HC RX W HCPCS

## 2021-10-05 PROCEDURE — 2500000003 HC RX 250 WO HCPCS

## 2021-10-05 PROCEDURE — 93657 TX L/R ATRIAL FIB ADDL: CPT

## 2021-10-05 PROCEDURE — C1894 INTRO/SHEATH, NON-LASER: HCPCS

## 2021-10-05 PROCEDURE — 3700000001 HC ADD 15 MINUTES (ANESTHESIA)

## 2021-10-05 PROCEDURE — 93623 PRGRMD STIMJ&PACG IV RX NFS: CPT

## 2021-10-05 PROCEDURE — 93312 ECHO TRANSESOPHAGEAL: CPT

## 2021-10-05 PROCEDURE — 2580000003 HC RX 258

## 2021-10-05 PROCEDURE — 93622 COMP EP EVAL L VENTR PAC&REC: CPT | Performed by: INTERNAL MEDICINE

## 2021-10-05 PROCEDURE — C1730 CATH, EP, 19 OR FEW ELECT: HCPCS

## 2021-10-05 PROCEDURE — C1759 CATH, INTRA ECHOCARDIOGRAPHY: HCPCS

## 2021-10-05 PROCEDURE — 85347 COAGULATION TIME ACTIVATED: CPT

## 2021-10-05 PROCEDURE — 93613 INTRACARDIAC EPHYS 3D MAPG: CPT | Performed by: INTERNAL MEDICINE

## 2021-10-05 PROCEDURE — 93613 INTRACARDIAC EPHYS 3D MAPG: CPT

## 2021-10-05 PROCEDURE — 7100000000 HC PACU RECOVERY - FIRST 15 MIN

## 2021-10-05 PROCEDURE — 93325 DOPPLER ECHO COLOR FLOW MAPG: CPT

## 2021-10-05 PROCEDURE — 93662 INTRACARDIAC ECG (ICE): CPT | Performed by: INTERNAL MEDICINE

## 2021-10-05 PROCEDURE — 93662 INTRACARDIAC ECG (ICE): CPT

## 2021-10-05 PROCEDURE — 86850 RBC ANTIBODY SCREEN: CPT

## 2021-10-05 PROCEDURE — 3700000000 HC ANESTHESIA ATTENDED CARE

## 2021-10-05 PROCEDURE — 93623 PRGRMD STIMJ&PACG IV RX NFS: CPT | Performed by: INTERNAL MEDICINE

## 2021-10-05 PROCEDURE — 85610 PROTHROMBIN TIME: CPT

## 2021-10-05 PROCEDURE — 2580000003 HC RX 258: Performed by: NURSE ANESTHETIST, CERTIFIED REGISTERED

## 2021-10-05 PROCEDURE — 93005 ELECTROCARDIOGRAM TRACING: CPT | Performed by: INTERNAL MEDICINE

## 2021-10-05 PROCEDURE — 2500000003 HC RX 250 WO HCPCS: Performed by: NURSE ANESTHETIST, CERTIFIED REGISTERED

## 2021-10-05 PROCEDURE — 93655 ICAR CATH ABLTJ DSCRT ARRHYT: CPT

## 2021-10-05 PROCEDURE — 93320 DOPPLER ECHO COMPLETE: CPT

## 2021-10-05 PROCEDURE — C1769 GUIDE WIRE: HCPCS

## 2021-10-05 PROCEDURE — 85027 COMPLETE CBC AUTOMATED: CPT

## 2021-10-05 PROCEDURE — 93010 ELECTROCARDIOGRAM REPORT: CPT | Performed by: INTERNAL MEDICINE

## 2021-10-05 RX ORDER — SODIUM CHLORIDE 9 MG/ML
25 INJECTION, SOLUTION INTRAVENOUS PRN
Status: DISCONTINUED | OUTPATIENT
Start: 2021-10-05 | End: 2021-10-05 | Stop reason: HOSPADM

## 2021-10-05 RX ORDER — FENTANYL CITRATE 50 UG/ML
INJECTION, SOLUTION INTRAMUSCULAR; INTRAVENOUS PRN
Status: DISCONTINUED | OUTPATIENT
Start: 2021-10-05 | End: 2021-10-05 | Stop reason: SDUPTHER

## 2021-10-05 RX ORDER — HEPARIN SODIUM 1000 [USP'U]/ML
INJECTION, SOLUTION INTRAVENOUS; SUBCUTANEOUS PRN
Status: DISCONTINUED | OUTPATIENT
Start: 2021-10-05 | End: 2021-10-05 | Stop reason: SDUPTHER

## 2021-10-05 RX ORDER — FUROSEMIDE 10 MG/ML
INJECTION INTRAMUSCULAR; INTRAVENOUS PRN
Status: DISCONTINUED | OUTPATIENT
Start: 2021-10-05 | End: 2021-10-05 | Stop reason: SDUPTHER

## 2021-10-05 RX ORDER — ACETAMINOPHEN 325 MG/1
650 TABLET ORAL EVERY 4 HOURS PRN
Status: DISCONTINUED | OUTPATIENT
Start: 2021-10-05 | End: 2021-10-05 | Stop reason: HOSPADM

## 2021-10-05 RX ORDER — VECURONIUM BROMIDE 1 MG/ML
INJECTION, POWDER, LYOPHILIZED, FOR SOLUTION INTRAVENOUS PRN
Status: DISCONTINUED | OUTPATIENT
Start: 2021-10-05 | End: 2021-10-05 | Stop reason: SDUPTHER

## 2021-10-05 RX ORDER — DEXAMETHASONE SODIUM PHOSPHATE 4 MG/ML
INJECTION, SOLUTION INTRA-ARTICULAR; INTRALESIONAL; INTRAMUSCULAR; INTRAVENOUS; SOFT TISSUE PRN
Status: DISCONTINUED | OUTPATIENT
Start: 2021-10-05 | End: 2021-10-05 | Stop reason: SDUPTHER

## 2021-10-05 RX ORDER — SODIUM CHLORIDE 0.9 % (FLUSH) 0.9 %
5-40 SYRINGE (ML) INJECTION PRN
Status: DISCONTINUED | OUTPATIENT
Start: 2021-10-05 | End: 2021-10-05 | Stop reason: HOSPADM

## 2021-10-05 RX ORDER — SUCCINYLCHOLINE CHLORIDE 20 MG/ML
INJECTION INTRAMUSCULAR; INTRAVENOUS PRN
Status: DISCONTINUED | OUTPATIENT
Start: 2021-10-05 | End: 2021-10-05 | Stop reason: SDUPTHER

## 2021-10-05 RX ORDER — SODIUM CHLORIDE 9 MG/ML
INJECTION, SOLUTION INTRAVENOUS CONTINUOUS PRN
Status: DISCONTINUED | OUTPATIENT
Start: 2021-10-05 | End: 2021-10-05 | Stop reason: SDUPTHER

## 2021-10-05 RX ORDER — SODIUM CHLORIDE 0.9 % (FLUSH) 0.9 %
5-40 SYRINGE (ML) INJECTION EVERY 12 HOURS SCHEDULED
Status: DISCONTINUED | OUTPATIENT
Start: 2021-10-05 | End: 2021-10-05 | Stop reason: HOSPADM

## 2021-10-05 RX ORDER — PROTAMINE SULFATE 10 MG/ML
INJECTION, SOLUTION INTRAVENOUS PRN
Status: DISCONTINUED | OUTPATIENT
Start: 2021-10-05 | End: 2021-10-05 | Stop reason: SDUPTHER

## 2021-10-05 RX ORDER — PANTOPRAZOLE SODIUM 40 MG/1
40 TABLET, DELAYED RELEASE ORAL
Status: DISCONTINUED | OUTPATIENT
Start: 2021-10-05 | End: 2021-10-05 | Stop reason: HOSPADM

## 2021-10-05 RX ORDER — LIDOCAINE HYDROCHLORIDE 20 MG/ML
INJECTION, SOLUTION EPIDURAL; INFILTRATION; INTRACAUDAL; PERINEURAL PRN
Status: DISCONTINUED | OUTPATIENT
Start: 2021-10-05 | End: 2021-10-05 | Stop reason: SDUPTHER

## 2021-10-05 RX ORDER — GLYCOPYRROLATE 0.2 MG/ML
INJECTION INTRAMUSCULAR; INTRAVENOUS PRN
Status: DISCONTINUED | OUTPATIENT
Start: 2021-10-05 | End: 2021-10-05 | Stop reason: SDUPTHER

## 2021-10-05 RX ORDER — PROPOFOL 10 MG/ML
INJECTION, EMULSION INTRAVENOUS PRN
Status: DISCONTINUED | OUTPATIENT
Start: 2021-10-05 | End: 2021-10-05 | Stop reason: SDUPTHER

## 2021-10-05 RX ORDER — ONDANSETRON 2 MG/ML
INJECTION INTRAMUSCULAR; INTRAVENOUS PRN
Status: DISCONTINUED | OUTPATIENT
Start: 2021-10-05 | End: 2021-10-05 | Stop reason: SDUPTHER

## 2021-10-05 RX ORDER — PROPAFENONE HYDROCHLORIDE 150 MG/1
150 TABLET, FILM COATED ORAL 2 TIMES DAILY
Qty: 60 TABLET | Refills: 1 | Status: SHIPPED | OUTPATIENT
Start: 2021-10-05 | End: 2021-10-28 | Stop reason: SDUPTHER

## 2021-10-05 RX ORDER — VASOPRESSIN 20 U/ML
INJECTION PARENTERAL PRN
Status: DISCONTINUED | OUTPATIENT
Start: 2021-10-05 | End: 2021-10-05 | Stop reason: SDUPTHER

## 2021-10-05 RX ADMIN — SUCCINYLCHOLINE CHLORIDE 200 MG: 20 INJECTION, SOLUTION INTRAMUSCULAR; INTRAVENOUS at 07:50

## 2021-10-05 RX ADMIN — LIDOCAINE HYDROCHLORIDE 100 MG: 20 INJECTION, SOLUTION EPIDURAL; INFILTRATION; INTRACAUDAL; PERINEURAL at 07:50

## 2021-10-05 RX ADMIN — SODIUM CHLORIDE: 9 INJECTION, SOLUTION INTRAVENOUS at 08:37

## 2021-10-05 RX ADMIN — PROPOFOL 180 MG: 10 INJECTION, EMULSION INTRAVENOUS at 07:50

## 2021-10-05 RX ADMIN — GLYCOPYRROLATE 0.2 MG: 0.2 INJECTION, SOLUTION INTRAMUSCULAR; INTRAVENOUS at 07:59

## 2021-10-05 RX ADMIN — PHENYLEPHRINE HYDROCHLORIDE 100 MCG: 10 INJECTION INTRAVENOUS at 08:20

## 2021-10-05 RX ADMIN — PROTAMINE SULFATE 20 MG: 10 INJECTION, SOLUTION INTRAVENOUS at 10:18

## 2021-10-05 RX ADMIN — VASOPRESSIN 1 UNITS: 20 INJECTION INTRAVENOUS at 09:29

## 2021-10-05 RX ADMIN — PHENYLEPHRINE HYDROCHLORIDE 100 MCG: 10 INJECTION INTRAVENOUS at 08:46

## 2021-10-05 RX ADMIN — SUGAMMADEX 200 MG: 100 INJECTION, SOLUTION INTRAVENOUS at 10:12

## 2021-10-05 RX ADMIN — PHENYLEPHRINE HYDROCHLORIDE 20 MCG/MIN: 10 INJECTION INTRAVENOUS at 08:00

## 2021-10-05 RX ADMIN — ISOPROTERENOL HYDROCHLORIDE 10 MCG/MIN: 0.2 INJECTION, SOLUTION INTRAMUSCULAR; INTRAVENOUS at 09:30

## 2021-10-05 RX ADMIN — HEPARIN SODIUM 3000 UNITS: 1000 INJECTION INTRAVENOUS; SUBCUTANEOUS at 08:57

## 2021-10-05 RX ADMIN — FENTANYL CITRATE 50 MCG: 50 INJECTION, SOLUTION INTRAMUSCULAR; INTRAVENOUS at 10:14

## 2021-10-05 RX ADMIN — ONDANSETRON 4 MG: 2 INJECTION INTRAMUSCULAR; INTRAVENOUS at 07:57

## 2021-10-05 RX ADMIN — GLYCOPYRROLATE 0.2 MG: 0.2 INJECTION, SOLUTION INTRAMUSCULAR; INTRAVENOUS at 07:50

## 2021-10-05 RX ADMIN — HEPARIN SODIUM 8000 UNITS: 1000 INJECTION INTRAVENOUS; SUBCUTANEOUS at 07:31

## 2021-10-05 RX ADMIN — FENTANYL CITRATE 50 MCG: 50 INJECTION, SOLUTION INTRAMUSCULAR; INTRAVENOUS at 07:50

## 2021-10-05 RX ADMIN — FUROSEMIDE 20 MG: 10 INJECTION, SOLUTION INTRAMUSCULAR; INTRAVENOUS at 10:12

## 2021-10-05 RX ADMIN — VASOPRESSIN 2 UNITS: 20 INJECTION INTRAVENOUS at 09:13

## 2021-10-05 RX ADMIN — SODIUM CHLORIDE: 9 INJECTION, SOLUTION INTRAVENOUS at 07:41

## 2021-10-05 RX ADMIN — VASOPRESSIN 1 UNITS: 20 INJECTION INTRAVENOUS at 10:03

## 2021-10-05 RX ADMIN — VECURONIUM BROMIDE 10 MG: 1 INJECTION, POWDER, LYOPHILIZED, FOR SOLUTION INTRAVENOUS at 08:51

## 2021-10-05 RX ADMIN — DEXAMETHASONE SODIUM PHOSPHATE 4 MG: 4 INJECTION, SOLUTION INTRAMUSCULAR; INTRAVENOUS at 07:57

## 2021-10-05 ASSESSMENT — PULMONARY FUNCTION TESTS
PIF_VALUE: 23
PIF_VALUE: 23
PIF_VALUE: 26
PIF_VALUE: 1
PIF_VALUE: 23
PIF_VALUE: 1
PIF_VALUE: 25
PIF_VALUE: 26
PIF_VALUE: 22
PIF_VALUE: 23
PIF_VALUE: 8
PIF_VALUE: 23
PIF_VALUE: 22
PIF_VALUE: 25
PIF_VALUE: 22
PIF_VALUE: 22
PIF_VALUE: 24
PIF_VALUE: 22
PIF_VALUE: 23
PIF_VALUE: 23
PIF_VALUE: 28
PIF_VALUE: 22
PIF_VALUE: 1
PIF_VALUE: 23
PIF_VALUE: 24
PIF_VALUE: 22
PIF_VALUE: 0
PIF_VALUE: 27
PIF_VALUE: 23
PIF_VALUE: 22
PIF_VALUE: 0
PIF_VALUE: 4
PIF_VALUE: 22
PIF_VALUE: 1
PIF_VALUE: 23
PIF_VALUE: 23
PIF_VALUE: 24
PIF_VALUE: 8
PIF_VALUE: 25
PIF_VALUE: 24
PIF_VALUE: 28
PIF_VALUE: 25
PIF_VALUE: 23
PIF_VALUE: 24
PIF_VALUE: 22
PIF_VALUE: 23
PIF_VALUE: 1
PIF_VALUE: 22
PIF_VALUE: 22
PIF_VALUE: 23
PIF_VALUE: 22
PIF_VALUE: 23
PIF_VALUE: 1
PIF_VALUE: 0
PIF_VALUE: 24
PIF_VALUE: 24
PIF_VALUE: 1
PIF_VALUE: 24
PIF_VALUE: 23
PIF_VALUE: 22
PIF_VALUE: 23
PIF_VALUE: 22
PIF_VALUE: 22
PIF_VALUE: 25
PIF_VALUE: 25
PIF_VALUE: 37
PIF_VALUE: 22
PIF_VALUE: 26
PIF_VALUE: 24
PIF_VALUE: 24
PIF_VALUE: 22
PIF_VALUE: 2
PIF_VALUE: 33
PIF_VALUE: 0
PIF_VALUE: 23
PIF_VALUE: 25
PIF_VALUE: 22
PIF_VALUE: 24
PIF_VALUE: 23
PIF_VALUE: 24
PIF_VALUE: 22
PIF_VALUE: 22
PIF_VALUE: 23
PIF_VALUE: 0
PIF_VALUE: 22
PIF_VALUE: 1
PIF_VALUE: 22
PIF_VALUE: 3
PIF_VALUE: 22
PIF_VALUE: 19
PIF_VALUE: 26
PIF_VALUE: 21
PIF_VALUE: 22
PIF_VALUE: 22
PIF_VALUE: 23
PIF_VALUE: 23
PIF_VALUE: 18
PIF_VALUE: 23
PIF_VALUE: 21
PIF_VALUE: 0
PIF_VALUE: 22
PIF_VALUE: 23
PIF_VALUE: 25
PIF_VALUE: 24
PIF_VALUE: 23
PIF_VALUE: 1
PIF_VALUE: 24
PIF_VALUE: 24
PIF_VALUE: 1
PIF_VALUE: 23
PIF_VALUE: 23
PIF_VALUE: 0
PIF_VALUE: 24
PIF_VALUE: 24
PIF_VALUE: 0
PIF_VALUE: 22
PIF_VALUE: 22
PIF_VALUE: 10
PIF_VALUE: 24
PIF_VALUE: 3
PIF_VALUE: 22
PIF_VALUE: 23
PIF_VALUE: 25
PIF_VALUE: 26
PIF_VALUE: 23
PIF_VALUE: 1
PIF_VALUE: 1
PIF_VALUE: 23
PIF_VALUE: 25
PIF_VALUE: 22
PIF_VALUE: 23
PIF_VALUE: 0
PIF_VALUE: 22
PIF_VALUE: 1
PIF_VALUE: 27
PIF_VALUE: 23
PIF_VALUE: 22
PIF_VALUE: 26
PIF_VALUE: 22
PIF_VALUE: 24
PIF_VALUE: 1
PIF_VALUE: 23
PIF_VALUE: 1
PIF_VALUE: 22
PIF_VALUE: 25
PIF_VALUE: 22
PIF_VALUE: 27
PIF_VALUE: 35
PIF_VALUE: 23
PIF_VALUE: 0
PIF_VALUE: 23
PIF_VALUE: 22
PIF_VALUE: 25
PIF_VALUE: 25
PIF_VALUE: 1
PIF_VALUE: 23
PIF_VALUE: 22
PIF_VALUE: 23
PIF_VALUE: 24
PIF_VALUE: 1
PIF_VALUE: 23
PIF_VALUE: 28
PIF_VALUE: 25
PIF_VALUE: 23
PIF_VALUE: 23

## 2021-10-05 ASSESSMENT — LIFESTYLE VARIABLES: SMOKING_STATUS: 0

## 2021-10-05 NOTE — PROCEDURES
[address]     Electrophysiology Procedure Note       Date of Procedure: 10/5/2021  Patient's Name: Janine Mukherjee  YOB: 1945   Medical Record Number: [id number]  Referring Physician: Alexandra Kenny MD  Procedure Performed by: Mariana Tyson MD    Procedure performed:     · Electrophysiology study with left atrial recording and mapping   · 3-D electroanatomical mapping of the left atrium and  right atium. · Electrophysiological study(EPS) and induction after IV drug infusion  · Transseptal puncture through an intact septum . · Intracardiac echocardiography. · Radiofrequency ablation of atrial fibrillation and pulmonary veins isolation  ·      · Ablation of CTI dependent atrial flutter as a separate mechanism  · LV pacing and sensing  · Deployment of closure device on all four access sites. Indications for procedure:   Symptomatic atrial fibrillation, failed antiarrhythmic therapy and cardioversion in the past   Janine Mukherjee is a 77 y.o. female   Due to failure of antiarrhythmic therapy in the past, patient has been scheduled to have ablation for symptomatic atrial fibrillation. Details of Procedure: The risks, benefits and alternatives of the ablation procedure were discussed with the patient. The risks including, but not limited to, the risks of bleeding, infection, radiation exposure, injury to vascular, cardiac and surrounding structures (including pneumothorax), stroke, cardiac perforation, tamponade, need for emergent open heart surgery, need for pacemaker implantation, esophageal injury and fistula, myocardial infarction and death were discussed in detail. The patient opted to proceed with the ablation. Written informed consent was signed and placed in the chart. Patient was brought to the EP lab in a fasting non-sedate state.  Patient underwent general anesthesia by anesthesia team. We initially performed a transesophageal echo that showed no left atrial appendage clot/thrombus. Procedure was done without use of fluoroscopy. Both groins were prepped in a sterile fashion. We gained access in Right femoral vein. A 9-French sheath for ICE and 6F sheath for CS catheter and an Sr0 sheath for ablation and mapping catheters were  placed in the right femoral vein using modified seldinger technique. Ultrasound was used for femoral venous access. We gained access   modified Seldinger technique and ultrasound guidance. The femoral vein was identified with real time visualization of needle passage to the venous lumen. Using 3-D mapping system Carto the CS cathter was placed inside the coronary sinus  for recording and mapping of the left atrium. Using ICE we delineated the left pulmonary vein, left atrial appendage,  mitral valve, and right superior and right inferior pulmonary veins. Patient received a bolus of heparin prior transseptal puncture followed by continuous monitoring of the ACT and boluses of heparin during the procedure to keep the ACT more than 350. Also an esophageal temperature probe in addition to Esophastar catheter was advanced into the esophagus for mapping of the esophagus and careful monitoring of the esophageal temperature during ablation. A transseptal puncture through intact septum was done using ICE and  pressure monitoring . We placed a SR0 Sheaths inside the left atrium. Then a Circular catheter with deflectable curve and an irrigated ablation catheter was advanced into the left atrium sequentially and alternatively as needed. Using Penta ray  cathter and  * Huaat navigation system a three dimensional electro anatomical mapping of the left atrium, in addition to right and left sided pulmonary vein was created. Using Penta ray catheter voltage mapping of the left atrium was created.      Also an esophageal temperature probe in addition to Esophastar catheter was advanced into the esophagus for mapping of the esophagus and careful monitoring of the esophageal temperature during ablation. The ICE was used to monitor location of temperature probe and move it close to ablation area. We stopped ablation when  temperature  increased 1 degree. Then wide area circumferential ablation for right and left sided pulmonary veins were performed in the areas that showed conduction. Linear RF lesions was placed around both superior and inferior pulmonary vein in right and left side well outside the pulmonary vein ostium till all four pulmonary veins were isolated. There was posterior inferior area on the left side. On the right side there was an area of conduction that included the posterior inferior and anterior of the right pulmonary vein. The power was limited to 40-50  W on the posterior wall and careful monitoring of esophageal temperature was done to prevent injury to esophagus and lesions near esophagus were given only for 10 seconds. Elsewhere power was 48 W as needed. (posterior)-500(anterior)        Following confirmation of pulmonary veins isolation by Penta ray catheter, isuprel IV was started and increased to 15 mcg to check for pulmonary veins reconnection and induction of any arrhythmia. No arrhythmia was induced. Burst pacing and programmed stimulation with up to three premature atrial stimuli did not induce any sustained arrhythmia. Since patient had clinical atrial flutter on several EKGs we proceeded with ablation of CTI dependent flutter. Radiofrequency lesions were delivered in a linear fashion to Cavotricuspid isthmus. While maintaining pacing from the proximal coronary sinus, additional lesions were delivered to the isthmus until bidirectional block was observed. Bidirectional block was confirmed by noting split potentials along the ablation line (> 100 ms) in addition to trans-isthmus conduction time of >190 ms.  Differential pacing from medial and lateral side of the line also confirmed bidirectional block. Both entrance and exit block was confirmed using Pentaarray catheter and pacing maneuvers after 30 minutes waiting time. Following confirmation of pulmonary veins isolation by circular catheter, isuprel IV was started and increased to 10 mcg to check for pulmonary veins reconnection and induction of any arrhythmia. No arrhythmia was induced    After ablation we removed the catheters and sheaths from left atrium and performed EP study and programmed stimulation using our CS catheter and ablation cathter to assess for other arrhythmias. The deca polar/ablation catheter were moved from CS to right atrium, RV apex, and His bundle position. His bundle potentials was recorded and pacing was performed from right atrium, coronary sinus and RV apex with the following results:   Pacing from LV apex, 1:1 retrograde conduction over AV node (VA wenckebach) was absent at 900 msec      AH interval was 90 msec  HV interval was 44 msec  Pacing from atrium, using CS catheter which was moved, showed 1:1 conduction over AV node with (AV wenckebach) was 540 msec  Pacing from atrium, AV christian ERP was 600/460 msec   Pacing from LV apex, using CS cathter which was moved to the LV apex showed no 1:1 conduction over AV node (VA wenckebach) at 900 mec  Pacing from RV apex, showed VERP of 600/300 msec. At the end of the procedure, using ICE we confirmed the lack of any pericardial effusion. Since patient was on anticoagulation with heparin with high ACT, we used Perclose and Vascade device for closure of access sites on the right and left femoral vein. The patient tolerated the procedure well and there were no complications. Patient was extubated and transferred to the floor in stable condition.      Blood loss <48 cc  No complication  Conclusion:     - Pulmonary vein isolations using wide area circumferential radiofrequency ablation   ·  - Additional ablation of  CTI dependent flutter,       Plan:   The patient will be observed and D/C home if stable. SHe will receive usual post ablation care. I will decrease the dose of propafenone to 150 mg twice a day and then stop it after few months.   Gerry Henderson MD,   [facility]   Office: [phone]

## 2021-10-05 NOTE — ANESTHESIA PRE PROCEDURE
Department of Anesthesiology  Preprocedure Note       Name:  Nena Veliz   Age:  77 y.o.  :  1945                                          MRN:  [id number]         Date:  10/5/2021      Surgeon: * Surgery not found *    Procedure:     Medications prior to admission:   Prior to Admission medications    Medication Sig Start Date End Date Taking?  Authorizing Provider   trimethoprim (TRIMPEX) 100 MG tablet Take 1 tablet by mouth every 48 hours 21   Anson Sandhoff Chesnut, APRN - CNP   verapamil (CALAN SR) 240 MG extended release tablet TAKE 1 TABLET BY MOUTH AT  NIGHT 21   Javier Coronado MD   potassium chloride (KLOR-CON M) 20 MEQ extended release tablet TAKE 1 TABLET BY MOUTH  DAILY WITH BREAKFAST 21   AIME Allen CNP   warfarin (COUMADIN) 5 MG tablet TAKE 1 TABLET BY MOUTH ON  MONDAY AND  FRIDAY AND 1 AND 1/2  TABLETS ALL OTHER DAYS 21   Pennie Hayes MD   albuterol sulfate [facility]) 108 (90 Base) MCG/ACT inhaler Inhale 2 puffs into the lungs every 4 hours as needed for Wheezing 21   Pennie Hayes MD   fluticasone-salmeterol (ADVAIR) 250-50 MCG/DOSE AEPB Inhale 1 puff into the lungs 2 times daily 21   Pennie Hayes MD   clonazePAM (KLONOPIN) 1 MG tablet TAKE 1 TABLET BY MOUTH IN  THE EVENING 7/12/21 10/10/21  Pennie Hayes MD   propafenone (RYTHMOL) 150 MG tablet TAKE 1 TABLET BY MOUTH&nbsp;&nbsp;EVERY 8 HOURS  Patient taking differently: Take 225 mg by mouth 2 times daily See note on 21   Verdis Service, APRN - CNP   gabapentin (NEURONTIN) 300 MG capsule TAKE 2 CAPSULES BY MOUTH 3  TIMES DAILY 3/29/21 9/25/21  Pennie Hayes MD   indapamide (LOZOL) 1.25 MG tablet TAKE 1 TABLET BY MOUTH IN  THE MORNING 3/29/21   Pennie Hayes MD   pantoprazole (PROTONIX) 40 MG tablet TAKE 1 TABLET BY MOUTH IN  THE MORNING BEFORE  BREAKFAST 3/26/21   [place] Sandhoff Elisa, APRN - CNP   montelukast (SINGULAIR) 10 MG tablet TAKE 1 TABLET BY MOUTH IN  THE EVENING 3/1/21   Anai Lane MD   citalopram (CELEXA) 40 MG tablet TAKE 1 TABLET BY MOUTH  DAILY 12/11/20   AIME Bueno CNP   allopurinol (ZYLOPRIM) 300 MG tablet TAKE 1 TABLET BY MOUTH  DAILY 12/7/20   Anai Lane MD   atorvastatin (LIPITOR) 80 MG tablet TAKE 1 TABLET BY MOUTH  DAILY 12/7/20   Anai Lane MD   furosemide (LASIX) 20 MG tablet Take 1 tablet by mouth daily  Patient taking differently: Take 20 mg by mouth daily PRN 11/18/20   AIME Del Rio CNP   potassium chloride (KLOR-CON M) 20 MEQ extended release tablet TAKE 1 TABLET BY MOUTH  DAILY WITH BREAKFAST  Patient taking differently: TAKE 1 TABLET BY MOUTH  DAILY WITH BREAKFAST   Take 2 tablets on the days she takes the furosemide 10/13/20   AIME Harris CNP   acetaminophen (TYLENOL) 500 MG tablet Take 500 mg by mouth every 6 hours as needed for Pain    Historical Provider, MD   diphenhydrAMINE-APAP, sleep, (TYLENOL PM EXTRA STRENGTH)  MG tablet Take 1 tablet by mouth nightly as needed for Sleep    Historical Provider, MD   b complex vitamins capsule Take 1 capsule by mouth daily    Historical Provider, MD   Ascorbic Acid (VITAMIN C PO) 1 tablet daily    Historical Provider, MD   therapeutic multivitamin-minerals (THERAGRAN-M) tablet Take 1 tablet by mouth nightly     Historical Provider, MD   Cranberry 500 MG CAPS Take 1,000 mg by mouth nightly     Historical Provider, MD       Current medications:    No current facility-administered medications for this encounter.      Facility-Administered Medications Ordered in Other Encounters   Medication Dose Route Frequency Provider Last Rate Last Admin    magnesium hydroxide (MILK OF MAGNESIA) 400 MG/5ML suspension 30 mL  30 mL Oral Daily PRN Kelly Lane MD        ondansetron [facility]) injection 4 mg  4 mg IntraVENous Q6H PRN Kelly Lane MD        acetaminophen (TYLENOL) tablet 650 mg  650 mg Oral Q4H PRN Wendy Valdivia MD           Allergies: Allergies   Allergen Reactions    Belsomra [Suvorexant] Other (See Comments)     Nightmares      Avelox [Moxifloxacin Hcl In Nacl] Rash    Levofloxacin Rash    Sulfa Antibiotics Rash       Problem List:    Patient Active Problem List   Diagnosis Code    Essential hypertension I10    History of pulmonary embolism Z86.711    Osteoarthritis of knee M17.10    PAULA (obstructive sleep apnea) G47.33    Asthma J45.909    Status post gastric banding Z98.84    Coronary artery disease I25.10    Morbid obesity with BMI of 40.0-44.9, adult ([facility] [address] E66.01, Z68.41    Hyperlipidemia, unspecified E78.5    Arthritis M19.90    Anxiety F41.9    Chronic cough R05.3    PAF (paroxysmal atrial fibrillation) ([facility]) I48.0    Atrial flutter (HCC) I48.92    Nonrheumatic aortic valve stenosis I35.0       Past Medical History:        Diagnosis Date    Allergic     Anxiety 2/2/2017    Arthritis     Asthma     Back pain     Blood circulation, collateral     legs    Depression     GERD (gastroesophageal reflux disease)     Gout     Hypercholesteremia     Hypertension     Movement disorder     7 discs ruptured    Movement disorder     knee replacements    Obesity     Pulmonary emboli (HCC)     Unspecified sleep apnea     uses cpap    Urinary tract infection, chronic     UTI (urinary tract infection)        Past Surgical History:        Procedure Laterality Date    ANKLE FUSION  2010    right, [facility]    BACK SURGERY  09/14/2012    lumbar fusion L-4, L-5 atrificial disc  [facility]  5-7-11    incision, drainage and debridement of left knee and application of wound vac.     BREAST BIOPSY  08/2018    neg    EYE SURGERY      JOINT REPLACEMENT      bilat knees    JOINT REPLACEMENT  2003    left, [facility]    JOINT REPLACEMENT  2005    right, [place] [employer] OTHER SURGICAL HISTORY  2011    laparscopic adjustable gastric restrictive procedure    REVISION TOTAL KNEE ARTHROPLASTY      [facility]    SKIN FULL GRAFT      right, [address]    [facility]    UPPER GASTROINTESTINAL ENDOSCOPY  10-    VENA CAVA FILTER PLACEMENT         Social History:    Social History     Tobacco Use    Smoking status: Former Smoker     Packs/day: 0.50     Years: 4.00     Pack years: 2.00     Types: Cigarettes     Quit date: 1965     Years since quittin.7    Smokeless tobacco: Never Used   Substance Use Topics    Alcohol use: Not Currently     Comment: 4 t imes a year                                Counseling given: Not Answered      Vital Signs (Current): There were no vitals filed for this visit.                                            BP Readings from Last 3 Encounters:   21 130/84   21 136/82   21 (!) 152/82       NPO Status:                                                                                 BMI:   Wt Readings from Last 3 Encounters:   21 275 lb (124.7 kg)   21 277 lb 6.4 oz (125.8 kg)   21 274 lb (124.3 kg)     There is no height or weight on file to calculate BMI.    CBC:   Lab Results   Component Value Date    WBC 6.5 10/02/2020    RBC 4.24 10/02/2020    HGB 13.7 10/02/2020    HCT 40.0 10/02/2020    MCV 94.3 10/02/2020    RDW 14.5 10/02/2020     10/02/2020       CMP:   Lab Results   Component Value Date     2021    K 3.9 2021    K 3.3 10/10/2018     2021    CO2 23 2021    BUN 15 2021    CREATININE 0.9 2021    GFRAA >60 2021    GFRAA >60 2013    AGRATIO 1.5 10/02/2020    LABGLOM >60 2021    GLUCOSE 95 2021    PROT 7.2 10/02/2020    PROT 7.6 2013    CALCIUM 9.9 2021    BILITOT 0.7 10/02/2020    ALKPHOS 78 10/02/2020    AST 32 10/02/2020    ALT 26 10/02/2020       POC Tests: No results for input(s): POCGLU, POCNA, POCK, POCCL, POCBUN, POCHEMO, POCHCT in the last 72 hours.     Coags:   Lab Results   Component Value Date    PROTIME 24.9 10/03/2020    PROTIME 33 09/21/2018    INR 2.2 09/30/2021    INR 2.80 05/06/2021    APTT 27.4 12/28/2011       HCG (If Applicable): No results found for: PREGTESTUR, PREGSERUM, HCG, HCGQUANT     ABGs: No results found for: PHART, PO2ART, BRK0NFM, IWL1VZS, BEART, C7SUDXKJ     Type & Screen (If Applicable):  Lab Results   Component Value Date    LABABO A 05/06/2011    [address] Negative 05/06/2011       Drug/Infectious Status (If Applicable):  No results found for: HIV, HEPCAB    COVID-19 Screening (If Applicable):   Lab Results   Component Value Date    COVID19 Not Detected 09/30/2021    COVID19 NOT DETECTED 09/28/2020           Anesthesia Evaluation  Patient summary reviewed and Nursing notes reviewed no history of anesthetic complications:   Airway: Mallampati: III  TM distance: >3 FB   Neck ROM: full  Mouth opening: > = 3 FB Dental: normal exam         Pulmonary:normal exam  breath sounds clear to auscultation  (+) sleep apnea: on CPAP,  asthma (daily inh use, fair control, used today, stable):     (-) not a current smoker (former)                           Cardiovascular:    (+) hypertension:, valvular problems/murmurs (echo 2020 EF 55-60 mod AS/mild AI): AS and AI, dysrhythmias (PAF w/ rate/rhythm control and ac, h/o DCCV/RFA): atrial fibrillation, murmur, hyperlipidemia    (-) past MI, CAD (S/p LHC 12/14/2018 showed normal coronaries), CABG/stent,  angina and  CHF    ECG reviewed  Rhythm: regular  Rate: normal  Echocardiogram reviewed                  Neuro/Psych:   (+) depression/anxiety    (-) seizures, TIA and CVA           GI/Hepatic/Renal:   (+) GERD: well controlled, morbid obesity     (-) liver disease and no renal disease       Endo/Other:    (+) : arthritis (Gout and OA): OA., .    (-) diabetes mellitus, hypothyroidism, hyperthyroidism               Abdominal: Vascular:   + PE (s/p IVC filter, on coumadin as outpt). Other Findings:           Anesthesia Plan      general     ASA 3       Induction: intravenous. MIPS: Postoperative opioids intended and Prophylactic antiemetics administered. Anesthetic plan and risks discussed with patient. Plan discussed with CRNA.                   Bladimir Tillman MD   10/5/2021

## 2021-10-05 NOTE — ANESTHESIA POSTPROCEDURE EVALUATION
Department of Anesthesiology  Postprocedure Note    Patient: Brianna Hernandez  MRN: [id number]  YOB: 1945  Date of evaluation: 10/5/2021  Time:  10:58 AM     Procedure Summary     Date: 10/05/21 Room / Location: [facility] Cath Lab; [facility] Echocardiography    Anesthesia Start: 0741 Anesthesia Stop: 1033    Procedure: ECHO MAXWELL IN CARDIAC CATH Diagnosis:       Paroxysmal atrial fibrillation      Shortness of breath    Scheduled Providers:  Responsible Provider: Izabel Radford MD    Anesthesia Type: general ASA Status: 3          Anesthesia Type: general    Jac Phase I: Jac Score: 10    Jac Phase II:      Last vitals: Reviewed and per EMR flowsheets.        Anesthesia Post Evaluation    Patient location during evaluation: PACU  Patient participation: complete - patient participated  Level of consciousness: awake and alert  Airway patency: patent  Nausea & Vomiting: no vomiting and no nausea  Complications: no  Cardiovascular status: hemodynamically stable  Respiratory status: acceptable  Hydration status: stable

## 2021-10-05 NOTE — PROGRESS NOTES
Pt meets d/c criteria for phase 1 in PACU and has been seen by anesthesia. Ok to transfer back to cath lab for phase 2 care. Will alert anyone in waiting room for them and the nursing unit if applicable. Will continue to monitor for safety and comfort.     Luz Maria BERRIOSN, RN, [facility]  Pre-Op/Recovery   Same Day Surgery

## 2021-10-05 NOTE — PROGRESS NOTES
Pt arrived to PACU from Cath Lab in stable condition and is alert. Pt can move extremities to command. Respirations are even on 5L O2 per SM. Skin warm, dry, and with appropriate for ethnicity color. Abd is soft. Pain is tolerable at this time. Right groin site(s) intact with closure/dressing= 3 vein procedure, all with closure devices. CDI, no swelling. Will continue to monitor for safety and comfort. S/P: atrial fibrillation redo ablation with atrial flutter ablation with Dr. Jethro Henry at [facility]. Plan: recover in PACU, then transfer back to cath lab for phase 2 care.       Anya BERRIOSN, RN, [facility]  Pre-Op/Recovery   Same Day Surgery

## 2021-10-05 NOTE — H&P
[facility]   Electrophysiology         Chief Complaint   Patient presents with    3 Month Follow-Up      HPI: Ras Cooney is a 77 y.o. female with a history of paroxysmal atrial fibrillation, hypertension, CAD, PE/DVT, and hyperlipidemia. She was originally seen by EP while in the hospital in December of 2011 after she was found to have atrial fibrillation post- lap band surgery. She is on Coumadin for her history of pulmonary emboli (INR monitored at [facility]) which was not associated with any reversible cause. She also has a [place] filter. She had a fall on 11/15/19 when at a restaurant as she was walking into the door. She turned her head to the left, straightened her head and then went down. She felt lightheaded. She tried to break the fall with her hands. She had a radial neck fracture and forehead hematoma. Since her fall she has a hard time getting her words out.     S/p coronary angiogram 5/1/18 which was negative for myocardial ischemia.      S/p 12/14/18 unsuccessful cardioversion     -S/p RFCA of afib w/ PVI (10/3/2020)  -S/p DCCV 10/14/2020 and 10/26/2020      Several Atrial fibrillation /flutter episodes and cardioversion     Past Medical History:   Diagnosis Date    Allergic     Anxiety 2/2/2017    Arthritis     Asthma     Back pain     Blood circulation, collateral     legs    Depression     GERD (gastroesophageal reflux disease)     Gout     Hypercholesteremia     Hypertension     Movement disorder     7 discs ruptured    Movement disorder     knee replacements    Obesity     Pulmonary emboli (HCC)     Unspecified sleep apnea     uses cpap    Urinary tract infection, chronic     UTI (urinary tract infection)         Past Surgical History:   Procedure Laterality Date    ANKLE FUSION  2010    right, [facility]    BACK SURGERY  09/14/2012    lumbar fusion L-4, L-5 atrificial disc  [facility] Dr. Pradeep Kirby BONE INCISION AND DRAINAGE  5-7-11    incision, drainage and debridement of left knee and application of wound vac.  BREAST BIOPSY  08/2018    neg    EYE SURGERY      JOINT REPLACEMENT      bilat knees    JOINT REPLACEMENT  2003    left, [facility]     JOINT REPLACEMENT  2005    right, [facility]    OTHER SURGICAL HISTORY  12-    laparscopic adjustable gastric restrictive procedure    REVISION TOTAL KNEE [facility]THROPLASTY  2006    [facility]    SKIN FULL GRAFT  2011    right, [address]    [facility]    UPPER GASTROINTESTINAL ENDOSCOPY  10-    VENA CAVA FILTER PLACEMENT         Allergies: Allergies   Allergen Reactions    Belsomra [Suvorexant] Other (See Comments)     Nightmares      Avelox [Moxifloxacin Hcl In Nacl] Rash    Levofloxacin Rash    Sulfa Antibiotics Rash       Social History:   reports that she quit smoking about 56 years ago. Her smoking use included cigarettes. She has a 2.00 pack-year smoking history. She has never used smokeless tobacco. She reports previous alcohol use. She reports that she does not use drugs. Family History:  family history includes Arthritis in her father; Asthma in her father; Breast Cancer in her sister; Depression in her mother; Heart Disease in her father; High Blood Pressure in her father and mother; Stroke in her father. Reviewed. Denies family history of sudden cardiac death, arrhythmia, premature CAD    Review of System:  All other systems reviewed and are negative except for that noted above.  Pertinent negatives are:   General: negative for fever, chills   Ophthalmic ROS: negative for - eye pain or loss of vision  ENT ROS: negative for - headaches, sore throat   Respiratory: negative for - cough, sputum  Cardiovascular: Reviewed in HPI  Gastrointestinal: negative for - abdominal pain, diarrhea, N/V  Hematology: negative for - bleeding, blood clots, bruising or jaundice  Genito-Urinary:  negative for - Dysuria or incontinence  Musculoskeletal: negative for - Joint swelling, muscle pain  Neurological: negative for - confusion, dizziness, headaches   Psychiatric: No anxiety, no depression. Dermatological: negative for - rash    Physical Examination:  Vitals:    21 1120   BP: (!) 144/82   Pulse: 71   SpO2: 95%      Wt Readings from Last 3 Encounters:   21 276 lb (125.2 kg)   20 269 lb 3.2 oz (122.1 kg)   10/26/20 263 lb (119.3 kg)       · Constitutional: Oriented. No distress. · Head: Normocephalic and atraumatic. · Mouth/Throat: Oropharynx is clear and moist.   · Eyes: Conjunctivae normal. EOM are normal.   · Neck: Neck supple. No rigidity. No JVD present. · Cardiovascular: Normal rate, irregular rhythm, S1&S2. · Pulmonary/Chest: Bilateral respiratory sounds. No wheezes, No rhonchi. · Abdominal: Soft. Bowel sounds present. No distension, No tenderness. · Musculoskeletal: No tenderness. No edema    · Lymphadenopathy: Has no cervical adenopathy. · Neurological: Alert and oriented. Cranial nerve appears intact, No Gross deficit   · Skin: Skin is warm and dry. No rash noted. · Psychiatric: Has a normal behavior       Labs, diagnostic and imaging results reviewed. Reviewed. Lab Results   Component Value Date    TSHREFLEX 2.44 10/02/2020    TSH 3.53 2011    CREATININE 0.9 2020    CREATININE 0.8 10/02/2020    AST 32 10/02/2020    ALT 26 10/02/2020       EC21  Sinus Rhythm    Echo: 3/13/2018   Summary   Normal left ventricle size, wall thickness and systolic function with an   estimated ejection fraction of 60%. Mild mitral regurgitation    Lexiscan: 2018  Summary         No EKG evidence for ischemia with exercise         Normal LV size and systolic function.         Myocardial perfusion imaging with small area of decreased uptake in the     anteroapical wall with stress with redistribution at rest consistent with     ischemia. Medication:  Current Outpatient Medications   Medication Sig Dispense Refill    warfarin (COUMADIN) 5 MG tablet TAKE 5mg ON M,W.F and 7.5mg all other days 143 tablet 1    clonazePAM (KLONOPIN) 1 MG tablet TAKE 1 TABLET BY MOUTH IN  THE EVENING 90 tablet 0    diclofenac sodium (VOLTAREN) 1 % GEL Apply 4 g topically 4 times daily as needed for Pain 350 g 1    citalopram (CELEXA) 40 MG tablet TAKE 1 TABLET BY MOUTH  DAILY 90 tablet 3    allopurinol (ZYLOPRIM) 300 MG tablet TAKE 1 TABLET BY MOUTH  DAILY 90 tablet 3    atorvastatin (LIPITOR) 80 MG tablet TAKE 1 TABLET BY MOUTH  DAILY 90 tablet 3    propafenone (RYTHMOL) 225 MG tablet Take 1 tablet by mouth every 8 hours 270 tablet 1    furosemide (LASIX) 20 MG tablet Take 1 tablet by mouth daily (Patient taking differently: Take 20 mg by mouth daily Three times weekly) 30 tablet 1    potassium chloride (KLOR-CON M) 20 MEQ extended release tablet TAKE 1 TABLET BY MOUTH  DAILY WITH BREAKFAST (Patient taking differently: TAKE 1 TABLET BY MOUTH  DAILY WITH BREAKFAST   Take 2 tablets on the days she takes the furosemide) 90 tablet 3    verapamil (CALAN SR) 240 MG extended release tablet Take 0.5 tablets by mouth nightly (Patient taking differently: Take 120 mg by mouth every morning ) 90 tablet 3    trimethoprim (TRIMPEX) 100 MG tablet TAKE 1 TABLET BY MOUTH  EVERY 48 HOURS 45 tablet 3    fluticasone-salmeterol (ADVAIR) 250-50 MCG/DOSE AEPB INHALE ONE PUFF BY MOUTH EVERY 12 HOURS 1 Inhaler 5    indapamide (LOZOL) 1.25 MG tablet TAKE 1 TABLET BY MOUTH  EVERY MORNING 90 tablet 3    montelukast (SINGULAIR) 10 MG tablet TAKE 1 TABLET BY MOUTH  NIGHTLY 90 tablet 3    pantoprazole (PROTONIX) 40 MG tablet TAKE 1 TABLET BY MOUTH  EVERY MORNING BEFORE  BREAKFAST 90 tablet 3    acetaminophen (TYLENOL) 500 MG tablet Take 500 mg by mouth every 6 hours as needed for Pain      diphenhydrAMINE-APAP, sleep, (TYLENOL PM EXTRA STRENGTH)  MG tablet Take 1 tablet by mouth nightly as needed for Sleep      PROAIR  (90 Base) MCG/ACT inhaler INHALE TWO PUFFS BY MOUTH EVERY 4 HOURS AS NEEDED FOR SHORTNESS OF BREATH 8.5 g 2    b complex vitamins capsule Take 1 capsule by mouth daily      Ascorbic Acid (VITAMIN C PO) 1 tablet daily      therapeutic multivitamin-minerals (THERAGRAN-M) tablet Take 1 tablet by mouth nightly       Cranberry 500 MG CAPS Take 1,000 mg by mouth nightly       gabapentin (NEURONTIN) 300 MG capsule TAKE 2 CAPSULES BY MOUTH 3  TIMES DAILY 540 capsule 3     No current facility-administered medications for this visit. Facility-Administered Medications Ordered in Other Visits   Medication Dose Route Frequency Provider Last Rate Last Admin    magnesium hydroxide (MILK OF MAGNESIA) 400 MG/5ML suspension 30 mL  30 mL Oral Daily PRN Earl Segovia MD        ondansetron [facility]) injection 4 mg  4 mg Intravenous Q6H PRN Earl Segovia MD        acetaminophen (TYLENOL) tablet 650 mg  650 mg Oral Q4H PRN Earl Segovia MD           Assessment and plan:     PAF  -ECG today shows Sinus rhythm  -S/p RFCA of afib w/ PVI (10/3/2020)  -S/p DCCV 10/14/2020 and 10/26/2020. She has not had any recurrence of symptomatic episodes since then. -[id number] Vasc score 3    -On coumadin both for PE hx and Afib  -Verapamil 240 mg daily  -Reduce Propafenone 225 mg to twice a day for two months then stop. Follow Up With Susana Nguyen CNP in 4 months and a 30 day monitor. If the event monitor does not show any A. fib then she can be followed by NP once every 6-month or once every year depending on her symptoms or other issues. She stopped propafenone yesterday. She has had several Atrial fibrillation/flutter  Episodes    The risks, benefits and alternatives of the ablation procedure were discussed with the patient.  The risks including, but not limited to, the risks of bleeding, infection, radiation exposure, injury to vascular, cardiac and surrounding structures (including pneumothorax), stroke, cardiac perforation, tamponade, need for emergent open heart surgery, need for pacemaker implantation, Injury to the phrenic nerve, injury to the esophagus, myocardial infarction and death were discussed in detail. The patient opted to proceed with the ablation. HTN  Vitals:    02/25/21 1120   BP: (!) 144/82   Pulse: 71   SpO2: 95%   Her blood pressure at home is usually 120/60 or lower.  -Home BP monitoring encourage with a BP goal <130/80    Aortic stenosis  -Moderate per recent echo 12/2020  -Will refer to [address] for further evaluation     Obesity  Body mass index is 47.38 kg/m². - Excessive weight is complicating assessment and treatment. It is placing patient at risk for multiple co-morbidities as well as early death and contributing to the patient's presentation.   - discussed weight management with diet and exercise      PAULA  -encouraged compliance with CPAP    Hx of PE/DVT  -Ac with coumadin  -s/p abena filter    Plan   Ablation of Atrial fibrillation and flutter

## 2021-10-18 ENCOUNTER — OFFICE VISIT (OUTPATIENT)
Dept: INTERNAL MEDICINE CLINIC | Age: 76
End: 2021-10-18
Payer: MEDICARE

## 2021-10-18 VITALS
OXYGEN SATURATION: 99 % | BODY MASS INDEX: 46.52 KG/M2 | WEIGHT: 271 LBS | HEART RATE: 62 BPM | DIASTOLIC BLOOD PRESSURE: 78 MMHG | SYSTOLIC BLOOD PRESSURE: 120 MMHG

## 2021-10-18 DIAGNOSIS — I48.0 PAF (PAROXYSMAL ATRIAL FIBRILLATION) (HCC): ICD-10-CM

## 2021-10-18 DIAGNOSIS — I35.0 NONRHEUMATIC AORTIC VALVE STENOSIS: ICD-10-CM

## 2021-10-18 DIAGNOSIS — I25.10 CORONARY ARTERY DISEASE INVOLVING NATIVE CORONARY ARTERY OF NATIVE HEART WITHOUT ANGINA PECTORIS: ICD-10-CM

## 2021-10-18 DIAGNOSIS — E66.01 MORBID OBESITY WITH BMI OF 40.0-44.9, ADULT (HCC): ICD-10-CM

## 2021-10-18 DIAGNOSIS — I10 ESSENTIAL HYPERTENSION: ICD-10-CM

## 2021-10-18 DIAGNOSIS — E78.2 MIXED HYPERLIPIDEMIA: ICD-10-CM

## 2021-10-18 DIAGNOSIS — G47.33 OSA (OBSTRUCTIVE SLEEP APNEA): ICD-10-CM

## 2021-10-18 DIAGNOSIS — F41.9 ANXIETY: Primary | ICD-10-CM

## 2021-10-18 PROCEDURE — G8427 DOCREV CUR MEDS BY ELIG CLIN: HCPCS | Performed by: NURSE PRACTITIONER

## 2021-10-18 PROCEDURE — G8417 CALC BMI ABV UP PARAM F/U: HCPCS | Performed by: NURSE PRACTITIONER

## 2021-10-18 PROCEDURE — 3017F COLORECTAL CA SCREEN DOC REV: CPT | Performed by: NURSE PRACTITIONER

## 2021-10-18 PROCEDURE — G8399 PT W/DXA RESULTS DOCUMENT: HCPCS | Performed by: NURSE PRACTITIONER

## 2021-10-18 PROCEDURE — 4040F PNEUMOC VAC/ADMIN/RCVD: CPT | Performed by: NURSE PRACTITIONER

## 2021-10-18 PROCEDURE — 1090F PRES/ABSN URINE INCON ASSESS: CPT | Performed by: NURSE PRACTITIONER

## 2021-10-18 PROCEDURE — 1123F ACP DISCUSS/DSCN MKR DOCD: CPT | Performed by: NURSE PRACTITIONER

## 2021-10-18 PROCEDURE — 1036F TOBACCO NON-USER: CPT | Performed by: NURSE PRACTITIONER

## 2021-10-18 PROCEDURE — G8484 FLU IMMUNIZE NO ADMIN: HCPCS | Performed by: NURSE PRACTITIONER

## 2021-10-18 PROCEDURE — 99214 OFFICE O/P EST MOD 30 MIN: CPT | Performed by: NURSE PRACTITIONER

## 2021-10-18 RX ORDER — CLONAZEPAM 1 MG/1
TABLET ORAL
Qty: 90 TABLET | Refills: 0 | Status: SHIPPED | OUTPATIENT
Start: 2021-10-18 | End: 2022-01-18 | Stop reason: SDUPTHER

## 2021-10-18 NOTE — PROGRESS NOTES
10/18/21     Chief Complaint   Patient presents with    3 Month Follow-Up     HTN, HDL, Afib - no concerns      HPI  Pt is here for 3 month follow up   Pt is seeing Dr. Husam Flores for HTN, HLD, CAD, aortic stenosis. PAULA on cpap  History of PE/DVT - pt is on coumadin and INR managed by [facility] [place] [facility], also has abena filter in place. Pt is seeing Dr. Karly Alberto for several a fib/ flutter episodes and cardioversion. Most recent ablation was 10/5 at [facility].      Allergies   Allergen Reactions    Belsomra [Suvorexant] Other (See Comments)     Nightmares      Avelox [Moxifloxacin Hcl In Nacl] Rash    Levofloxacin Rash    Sulfa Antibiotics Rash       Current Outpatient Medications   Medication Sig Dispense Refill    clonazePAM (KLONOPIN) 1 MG tablet TAKE 1 TABLET BY MOUTH IN  THE EVENING 90 tablet 0    propafenone (RYTHMOL) 150 MG tablet Take 1 tablet by mouth 2 times daily See note on 07/21/21 60 tablet 1    trimethoprim (TRIMPEX) 100 MG tablet Take 1 tablet by mouth every 48 hours 45 tablet 3    verapamil (CALAN SR) 240 MG extended release tablet TAKE 1 TABLET BY MOUTH AT  NIGHT 90 tablet 3    potassium chloride (KLOR-CON M) 20 MEQ extended release tablet TAKE 1 TABLET BY MOUTH  DAILY WITH BREAKFAST 90 tablet 3    warfarin (COUMADIN) 5 MG tablet TAKE 1 TABLET BY MOUTH ON  MONDAY AND  FRIDAY AND 1 AND 1/2  TABLETS ALL OTHER DAYS 135 tablet 5    albuterol sulfate HFA (PROAIR HFA) 108 (90 Base) MCG/ACT inhaler Inhale 2 puffs into the lungs every 4 hours as needed for Wheezing 8.5 g 2    fluticasone-salmeterol (ADVAIR) 250-50 MCG/DOSE AEPB Inhale 1 puff into the lungs 2 times daily 1 Inhaler 5    indapamide (LOZOL) 1.25 MG tablet TAKE 1 TABLET BY MOUTH IN  THE MORNING 90 tablet 3    pantoprazole (PROTONIX) 40 MG tablet TAKE 1 TABLET BY MOUTH IN  THE MORNING BEFORE  BREAKFAST 90 tablet 3    montelukast (SINGULAIR) 10 MG tablet TAKE 1 TABLET BY MOUTH IN  THE EVENING 90 tablet 3    citalopram (CELEXA) 40 MG tablet TAKE 1 TABLET BY MOUTH  DAILY 90 tablet 3    allopurinol (ZYLOPRIM) 300 MG tablet TAKE 1 TABLET BY MOUTH  DAILY 90 tablet 3    atorvastatin (LIPITOR) 80 MG tablet TAKE 1 TABLET BY MOUTH  DAILY 90 tablet 3    furosemide (LASIX) 20 MG tablet Take 1 tablet by mouth daily (Patient taking differently: Take 20 mg by mouth daily PRN) 30 tablet 1    acetaminophen (TYLENOL) 500 MG tablet Take 500 mg by mouth every 6 hours as needed for Pain      diphenhydrAMINE-APAP, sleep, (TYLENOL PM EXTRA STRENGTH)  MG tablet Take 1 tablet by mouth nightly as needed for Sleep      b complex vitamins capsule Take 1 capsule by mouth daily      Ascorbic Acid (VITAMIN C PO) 1 tablet daily      therapeutic multivitamin-minerals (THERAGRAN-M) tablet Take 1 tablet by mouth nightly       Cranberry 500 MG CAPS Take 1,000 mg by mouth nightly       gabapentin (NEURONTIN) 300 MG capsule TAKE 2 CAPSULES BY MOUTH 3  TIMES DAILY 540 capsule 3     No current facility-administered medications for this visit. Facility-Administered Medications Ordered in Other Visits   Medication Dose Route Frequency Provider Last Rate Last Admin    magnesium hydroxide (MILK OF MAGNESIA) 400 MG/5ML suspension 30 mL  30 mL Oral Daily PRN Ana Butterfield MD        ondansetron [facility]) injection 4 mg  4 mg IntraVENous Q6H PRN Ana Butterfield MD        acetaminophen (TYLENOL) tablet 650 mg  650 mg Oral Q4H PRN Ana Butterfield MD         Review of Systems  Negative other than HPI    Vitals:    10/18/21 0908   BP: 120/78   Pulse: 62   SpO2: 99%   Weight: 271 lb (122.9 kg)      Physical Exam  Constitutional:       General: She is not in acute distress. Appearance: Normal appearance. She is obese. She is not ill-appearing. HENT:      Head: Normocephalic and atraumatic. Cardiovascular:      Rate and Rhythm: Normal rate. Heart sounds: Murmur heard.      Pulmonary:      Effort: Pulmonary effort is normal. No respiratory distress. Neurological:      General: No focal deficit present. Mental Status: She is alert and oriented to person, place, and time. Mental status is at baseline. Psychiatric:         Mood and Affect: Mood normal.         Behavior: Behavior normal.       Assessment/Plan:  Anxiety  Well controlled, continue current regimen. - clonazePAM (KLONOPIN) 1 MG tablet; TAKE 1 TABLET BY MOUTH IN  THE EVENING  Dispense: 90 tablet; Refill: 0    Controlled Substance Monitoring:  Acute and Chronic Pain Monitoring:   RX Monitoring 10/18/2021   Attestation -   Periodic Controlled Substance Monitoring Possible medication side effects, risk of tolerance/dependence & alternative treatments discussed. ;No signs of potential drug abuse or diversion identified. Essential hypertension  Well controlled, continue current regimen. PAF (paroxysmal atrial fibrillation) ([address]  Well controlled, continue current regimen. Seeing EP and doing well   Reviewed notes from recent ablation     PAULA (obstructive sleep apnea)  Well controlled, continue current regimen. Continue cpap use     Morbid obesity with BMI of 40.0-44.9, adult ([address]  Uncontrolled   Work on healthy diet and exercise     Mixed hyperlipidemia/ Nonrheumatic aortic valve stenosis/ Coronary artery disease involving native coronary artery of native heart without angina pectoris  Well controlled, continue current regimen. Seeing cardiology     Discussed medications with patient, who voiced understanding of their use and indications. All questions answered. Return in about 3 months (around 1/18/2022), or if symptoms worsen or fail to improve.       Electronically signed by AIME Forbes CNP on 10/18/2021 at 9:33 AM

## 2021-10-22 ENCOUNTER — TELEMEDICINE (OUTPATIENT)
Dept: BARIATRICS/WEIGHT MGMT | Age: 76
End: 2021-10-22
Payer: MEDICARE

## 2021-10-22 DIAGNOSIS — Z71.3 ENCOUNTER FOR DIETARY COUNSELING AND SURVEILLANCE: ICD-10-CM

## 2021-10-22 DIAGNOSIS — E66.01 MORBID OBESITY WITH BMI OF 45.0-49.9, ADULT (HCC): Primary | ICD-10-CM

## 2021-10-22 PROCEDURE — G8427 DOCREV CUR MEDS BY ELIG CLIN: HCPCS | Performed by: NURSE PRACTITIONER

## 2021-10-22 PROCEDURE — 99213 OFFICE O/P EST LOW 20 MIN: CPT | Performed by: NURSE PRACTITIONER

## 2021-10-22 PROCEDURE — 3017F COLORECTAL CA SCREEN DOC REV: CPT | Performed by: NURSE PRACTITIONER

## 2021-10-22 PROCEDURE — 1090F PRES/ABSN URINE INCON ASSESS: CPT | Performed by: NURSE PRACTITIONER

## 2021-10-22 PROCEDURE — G8399 PT W/DXA RESULTS DOCUMENT: HCPCS | Performed by: NURSE PRACTITIONER

## 2021-10-22 PROCEDURE — 1123F ACP DISCUSS/DSCN MKR DOCD: CPT | Performed by: NURSE PRACTITIONER

## 2021-10-22 PROCEDURE — 4040F PNEUMOC VAC/ADMIN/RCVD: CPT | Performed by: NURSE PRACTITIONER

## 2021-10-22 ASSESSMENT — ENCOUNTER SYMPTOMS
GASTROINTESTINAL NEGATIVE: 1
EYES NEGATIVE: 1
RESPIRATORY NEGATIVE: 1

## 2021-10-22 NOTE — PROGRESS NOTES
[facility]) Physicians   [employer] [facility] [employer]    10/22/2021    TELEHEALTH EVALUATION -- Audio/Visual (During BDQIO-57 public health emergency)    Medical Weight Loss    HPI: Cassie Lee is a 77 y.o. female with current BMI of 45.9 and current weight of 269.5 pounds. Due to the COVID-19 restrictions on close contact interactions the patient's monthly visit was conducted via audio/video in joo of a face to face visit. Patient has consented to have this visit conducted via audio/video. The patient is here through telemedicine for their medical weight loss visit. She is following the 1200 calorie low carb meal plan, tracking her intakes in [employer]     Past Medical History:   Diagnosis Date    Allergic     Anxiety 2/2/2017    Arthritis     Asthma     Back pain     Blood circulation, collateral     legs    Depression     GERD (gastroesophageal reflux disease)     Gout     Hypercholesteremia     Hypertension     Movement disorder     7 discs ruptured    Movement disorder     knee replacements    Obesity     Pulmonary emboli (HCC)     Unspecified sleep apnea     uses cpap    Urinary tract infection, chronic     UTI (urinary tract infection)      Past Surgical History:   Procedure Laterality Date    ANKLE FUSION  2010    right, [facility]    BACK SURGERY  09/14/2012    lumbar fusion L-4, L-5 atrificial disc  Yoko Santillan  5-7-11    incision, drainage and debridement of left knee and application of wound vac.     BREAST BIOPSY  08/2018    neg    EYE SURGERY      JOINT REPLACEMENT      bilat knees    [facility]    [facility]    JOINT REPLACEMENT  2005    right, [facility]    OTHER SURGICAL HISTORY  12-    laparscopic adjustable gastric restrictive procedure    REVISION TOTAL KNEE ARTHROPLASTY  2006    [facility]     SKIN FULL GRAFT  2011    right, [facility]  TUBAL LIGATION      [facility]    UPPER GASTROINTESTINAL ENDOSCOPY  10-    VENA CAVA FILTER PLACEMENT       Family History   Problem Relation Age of Onset    High Blood Pressure Mother     Depression Mother     Stroke Father     High Blood Pressure Father     Asthma Father     Arthritis Father     Heart Disease Father     Breast Cancer Sister     High Cholesterol Neg Hx      Social History     Tobacco Use    Smoking status: Former Smoker     Packs/day: 0.50     Years: 4.00     Pack years: 2.00     Types: Cigarettes     Quit date: 1965     Years since quittin.8    Smokeless tobacco: Never Used   Substance Use Topics    Alcohol use: Not Currently     Comment: 4 t imes a year     I counseled the patient on the importance of not smoking and risks of ETOH. Allergies   Allergen Reactions    Belsomra [Suvorexant] Other (See Comments)     Nightmares      Avelox [Moxifloxacin Hcl In Nacl] Rash    Levofloxacin Rash    Sulfa Antibiotics Rash     There were no vitals filed for this visit. There is no height or weight on file to calculate BMI.     Current Outpatient Medications:     clonazePAM (KLONOPIN) 1 MG tablet, TAKE 1 TABLET BY MOUTH IN  THE EVENING, Disp: 90 tablet, Rfl: 0    propafenone (RYTHMOL) 150 MG tablet, Take 1 tablet by mouth 2 times daily See note on 21, Disp: 60 tablet, Rfl: 1    trimethoprim (TRIMPEX) 100 MG tablet, Take 1 tablet by mouth every 48 hours, Disp: 45 tablet, Rfl: 3    verapamil (CALAN SR) 240 MG extended release tablet, TAKE 1 TABLET BY MOUTH AT  NIGHT, Disp: 90 tablet, Rfl: 3    potassium chloride (KLOR-CON M) 20 MEQ extended release tablet, TAKE 1 TABLET BY MOUTH  DAILY WITH BREAKFAST, Disp: 90 tablet, Rfl: 3    warfarin (COUMADIN) 5 MG tablet, TAKE 1 TABLET BY MOUTH ON  MONDAY AND  FRIDAY AND 1 AND 1/2  TABLETS ALL OTHER DAYS, Disp: 135 tablet, Rfl: 5    albuterol sulfate HFA (PROAIR HFA) 108 (90 Base) MCG/ACT inhaler, Inhale 2 puffs into the lungs every 4 hours as needed for Wheezing, Disp: 8.5 g, Rfl: 2    fluticasone-salmeterol (ADVAIR) 250-50 MCG/DOSE AEPB, Inhale 1 puff into the lungs 2 times daily, Disp: 1 Inhaler, Rfl: 5    gabapentin (NEURONTIN) 300 MG capsule, TAKE 2 CAPSULES BY MOUTH 3  TIMES DAILY, Disp: 540 capsule, Rfl: 3    indapamide (LOZOL) 1.25 MG tablet, TAKE 1 TABLET BY MOUTH IN  THE MORNING, Disp: 90 tablet, Rfl: 3    pantoprazole (PROTONIX) 40 MG tablet, TAKE 1 TABLET BY MOUTH IN  THE MORNING BEFORE  BREAKFAST, Disp: 90 tablet, Rfl: 3    montelukast (SINGULAIR) 10 MG tablet, TAKE 1 TABLET BY MOUTH IN  THE EVENING, Disp: 90 tablet, Rfl: 3    citalopram (CELEXA) 40 MG tablet, TAKE 1 TABLET BY MOUTH  DAILY, Disp: 90 tablet, Rfl: 3    allopurinol (ZYLOPRIM) 300 MG tablet, TAKE 1 TABLET BY MOUTH  DAILY, Disp: 90 tablet, Rfl: 3    atorvastatin (LIPITOR) 80 MG tablet, TAKE 1 TABLET BY MOUTH  DAILY, Disp: 90 tablet, Rfl: 3    furosemide (LASIX) 20 MG tablet, Take 1 tablet by mouth daily (Patient taking differently: Take 20 mg by mouth daily PRN), Disp: 30 tablet, Rfl: 1    acetaminophen (TYLENOL) 500 MG tablet, Take 500 mg by mouth every 6 hours as needed for Pain, Disp: , Rfl:     diphenhydrAMINE-APAP, sleep, (TYLENOL PM EXTRA STRENGTH)  MG tablet, Take 1 tablet by mouth nightly as needed for Sleep, Disp: , Rfl:     b complex vitamins capsule, Take 1 capsule by mouth daily, Disp: , Rfl:     Ascorbic Acid (VITAMIN C PO), 1 tablet daily, Disp: , Rfl:     therapeutic multivitamin-minerals (THERAGRAN-M) tablet, Take 1 tablet by mouth nightly , Disp: , Rfl:     Cranberry 500 MG CAPS, Take 1,000 mg by mouth nightly , Disp: , Rfl:     Lab Results   Component Value Date    WBC 7.6 10/05/2021    RBC 4.37 10/05/2021    HGB 14.2 10/05/2021    HCT 40.5 10/05/2021    MCV 92.8 10/05/2021    MCH 32.5 10/05/2021    MCHC 35.0 10/05/2021    MPV 6.8 10/05/2021    NEUTOPHILPCT 74.9 10/29/2018    LYMPHOPCT 17.2 10/29/2018    MONOPCT 6.0 10/29/2018    EOSRELPCT 1.4 10/29/2018    BASOPCT 0.5 10/29/2018    NEUTROABS 7.4 10/29/2018    LYMPHSABS 1.7 10/29/2018    MONOSABS 0.6 10/29/2018    EOSABS 0.1 10/29/2018     Lab Results   Component Value Date     10/05/2021    K 3.5 10/05/2021    K 3.3 10/10/2018     10/05/2021    CO2 25 10/05/2021    ANIONGAP 14 10/05/2021    GLUCOSE 105 10/05/2021    BUN 17 10/05/2021    CREATININE 0.9 10/05/2021    LABGLOM >60 10/05/2021    GFRAA >60 10/05/2021    GFRAA >60 01/04/2013    CALCIUM 9.8 10/05/2021    PROT 7.2 10/02/2020    PROT 7.6 01/04/2013    LABALBU 4.5 07/12/2021    AGRATIO 1.5 10/02/2020    BILITOT 0.7 10/02/2020    ALKPHOS 78 10/02/2020    ALT 26 10/02/2020    AST 32 10/02/2020    GLOB 2.9 10/02/2020     Lab Results   Component Value Date    CHOL 184 07/12/2021    TRIG 132 07/12/2021    HDL 68 07/12/2021    HDL 58 02/01/2012    LDLCALC 90 07/12/2021    LABVLDL 26 07/12/2021     Lab Results   Component Value Date    TSHREFLEX 2.44 10/02/2020     Lab Results   Component Value Date    IRON 72 08/27/2019    TIBC 292 08/27/2019    LABIRON 25 08/27/2019     Lab Results   Component Value Date    GCXBJQQF19 8332 08/27/2019    FOLATE >20.00 08/27/2019     Lab Results   Component Value Date    VITD25 50.2 08/27/2019     Lab Results   Component Value Date    LABA1C 5.6 11/22/2019    .9 12/28/2011       Patient Active Problem List   Diagnosis    Essential hypertension    History of pulmonary embolism    Osteoarthritis of knee    PAULA (obstructive sleep apnea)    Asthma    Status post gastric banding    Coronary artery disease    Morbid obesity with BMI of 40.0-44.9, adult ([address]    Hyperlipidemia, unspecified    Arthritis    Anxiety    Chronic cough    PAF (paroxysmal atrial fibrillation) ([address]    Atrial flutter ([address]    Nonrheumatic aortic valve stenosis       Review of Systems   Constitutional: Negative. HENT: Negative. Eyes: Negative. Respiratory: Negative.     Cardiovascular: Negative. Gastrointestinal: Negative. Skin: Negative. Neurological: Negative. PHYSICAL EXAMINATION:    Constitutional: [x] Appears well-developed and well-nourished [x] No apparent distress      [] Abnormal-   Mental status  [x] Alert and awake  [x] Oriented to person/place/time [x]Able to follow commands      Eyes:  EOM    [x]  Normal  [] Abnormal-  Sclera  [x]  Normal  [] Abnormal -         Discharge [x]  None visible  [] Abnormal -    HENT:   [x] Normocephalic, atraumatic. [] Abnormal   [x] Mouth/Throat: Mucous membranes are moist.     External Ears [] Normal  [] Abnormal-     Neck: [x] No visualized mass     Pulmonary/Chest: [x] Respiratory effort normal.  [x] No visualized signs of difficulty breathing or respiratory distress        [] Abnormal-      Musculoskeletal:   [] Normal gait with no signs of ataxia         [x] Normal range of motion of neck        [] Abnormal-     Neurological:        [x] No Facial Asymmetry (Cranial nerve 7 motor function) (limited exam to video visit)          [x] No gaze palsy        [] Abnormal-         Skin:        [x] No significant exanthematous lesions or discoloration noted on facial skin         [] Abnormal-            Psychiatric:       [x] Normal Affect [x] No Hallucinations        [] Abnormal-     Other pertinent observable physical exam findings-     Due to this being a TeleHealth encounter, evaluation of the following organ systems is limited: Vitals/Constitutional/EENT/Resp/CV/GI//MS/Neuro/Skin/Heme-Lymph-Imm. Assessment and Plan:    Patient is here via telemedicine for their medical weight loss visit, down 1.8 lbs and a total weight loss of 11.6 lbs. She is doing well, making dietary and behavior modifications. She is following dietary recommendations utilizing the 1200 calorie low carb meal plan. She will continue to track her intakes in the [place]. We discussed her macronutrients closely.  She will focus on lower carb options, goal of carbs <100g per day. She was encouraged to reach out to our office if any side effects occur. She did speak with the registered dietitian for continued follow up. I agree with recommendations and plan. She is exercising by being more active at home, no intentional exercise at this time. We will see her back in 2 months for continued follow up via telemedicine. Total encounter time: 28 minutes, including any number of the following:  review of labs, imaging, provider notes, outside hospital records, performing examination/evaluation, counseling patient and/or family, ordering medications/tests, placing referrals and communication with referring physicians, coordination of care; discussing dietary plan/recall with the patient as well with registered dietitian and documentation in the EHR. Of note, the above was done during same day of the actual patient encounter. An electronic signature was used to authenticate this note. Pursuant to the emergency declaration under the [facility], [facility]5 waiver authority and the [employer] Act, this Virtual  Visit was conducted, with patient's consent, to reduce the patient's risk of exposure to COVID-19 and provide continuity of care for an established patient. Services were provided through a video synchronous discussion virtually to substitute for in-person clinic visit.

## 2021-10-22 NOTE — PROGRESS NOTES
Anne Weeks lost 1.8 lbs over past 5 weeks. Tracks intakes with [facility]. Treatment plan details: 1200 kcal LC   Is patient adhering to treatment plan: Loosely     Is pt eating 4-5 times each day? Yes, feels she is doing good with her portions   B - egg with 2 circles of corn meal + slice of raisin toast   L - turkey salami with 1 slice sargento cheese on 1 slice of bread with tsp of miracle whip and 2 slices of tomato   S - orange   D - BBQ with arnoldo slaw     Is pt including lean protein with all meals and snacks? Mostly, not all snacks     Is pt avoiding added sugars and starchy foods? No - breads, OJ    Consuming at least 64oz of calorie free fluids? Yes - 2 cups of coffee with SF creamer, iced tea, SF snapple (not sure if it has calories), decaf iced tea, trop OJ    Participating in intentional exercise? Active steps around the house cleaning  But no intentional exercise - afraid to go out d/t COVID.      Supplements - MVI,  Vit C, B-complex, cranberry capsule   Volume- ~1.5 cups volume  30-30-30- avoids eating & drinking at the same time    Plan/Goals:   Keep tracking intakes and keep CHO under 100 grams per day   Try home workout videos - discussed walking videos  Limit OJ   Increase protein at breakfast and decrease CHO to 1 serving     Handouts: none     Lima Kennedy, RD, LD

## 2021-10-28 ENCOUNTER — TELEPHONE (OUTPATIENT)
Dept: CARDIOLOGY CLINIC | Age: 76
End: 2021-10-28

## 2021-10-28 ENCOUNTER — ANTI-COAG VISIT (OUTPATIENT)
Dept: PHARMACY | Age: 76
End: 2021-10-28
Payer: MEDICARE

## 2021-10-28 VITALS — BODY MASS INDEX: 46.01 KG/M2 | WEIGHT: 269.5 LBS | HEIGHT: 64 IN

## 2021-10-28 DIAGNOSIS — Z86.711 HISTORY OF PULMONARY EMBOLISM: ICD-10-CM

## 2021-10-28 DIAGNOSIS — I48.0 PAF (PAROXYSMAL ATRIAL FIBRILLATION) (HCC): Primary | ICD-10-CM

## 2021-10-28 LAB — INTERNATIONAL NORMALIZATION RATIO, POC: 2.2

## 2021-10-28 PROCEDURE — 85610 PROTHROMBIN TIME: CPT

## 2021-10-28 PROCEDURE — 99211 OFF/OP EST MAY X REQ PHY/QHP: CPT

## 2021-10-28 RX ORDER — PROPAFENONE HYDROCHLORIDE 150 MG/1
150 TABLET, FILM COATED ORAL 2 TIMES DAILY
Qty: 60 TABLET | Refills: 2 | Status: SHIPPED | OUTPATIENT
Start: 2021-10-28 | End: 2022-02-24 | Stop reason: SDUPTHER

## 2021-10-28 NOTE — TELEPHONE ENCOUNTER
Medication Refill    Medication needing refilled:propafenone    Dosage of the medication:    How are you taking this medication (QD, BID, TID, QID, PRN):    30 or 90 day supply called in:    When will you run out of your medication:    Which Pharmacy are we sending the medication to?:   [facility]       IS Shefali Razo [facility] IN January ?

## 2021-10-28 NOTE — TELEPHONE ENCOUNTER
Instructions       Return in about 3 months (around 9/28/2021). - Reduce propafenone to 225 mg 2 times daily for 3 weeks and if no issues then please stop  - OK to exercise as tolerated, weight loss  - Call office if you have recurrence  - Our  will be contacting you from [phone] to schedule your procedure.   - Follow up in 3 months or sooner if issues           Lov 6/28/2021  Ekg 10/5/2021  Labs 10/5/2021  Lrf 10/5/2021 Disp 60+1  Appt 12/9/2021 Dr Gael Kat please advise thanks

## 2021-10-28 NOTE — TELEPHONE ENCOUNTER
Please give her follow up with me at [facility] in late November/December if she would like sooner. She should continue propafenone for at least 3 months.   I have sent her a refill

## 2021-10-28 NOTE — PROGRESS NOTES
Ms. Jonas Pete is a 77 y.o. y/o female with history of DVT, PE, Afib   She presents today for anticoagulation monitoring and adjustment. Pertinent PMH: HTN, Gastric Banding,CAD, diskectomy with an artifical spinal disk implant in September 2012. Patient Reported Findings:  Yes     No  [x]   []       Patient verifies current dosing regimen as listed  []   [x]       S/S bleeding/bruising/swelling/SOB states that she has been wheezing more and had more SOB d/t allergies --> has had a few small nose bleeds recently  --> denies  []   [x]       Blood in urine or stool  []   [x]       Procedures scheduled in the future at this time had CV 5/6, INR 2.8 --> having ablation in October 5th --> had ablation 10/5    []   [x]       Missed Dose   []   [x]       Extra Dose   []   [x]       Change in medications  decreased tylenol intake to 1-2 times a day --> continues tylenol, propafenone increased --> dec propafenone to 225 mg BID x 2 months then d/c (ending 4/25) --> inc diltiazem, has resumed 3 tabs propafenone --> dec propafenone to BID 6/28, will be stopping in 1 week --> still taking propafenone 150 BID --> no changes    []   [x]       Change in health/diet/appetite  Patient states that she feels like she has returned to normal vit k intake --> diet fluctuates causing INR to fluctuate.  Discussed ways to improve consistency with vit k intake  --> states that she has cut back spinach and cabbage intake to twice a week --> no vit k --> had cabbage a few times --> no vit k since was on vacation but plans to return --> has been eating a salad a day-->no changes  []   [x]       Change in alcohol use    []   [x]       Change in activity  []   [x]       Hospital admission  []   [x]       Emergency department visit   []   [x]       Other complaints    Clinical Outcomes:  Yes     No  []   [x]       Major bleeding event  []   [x]       Thromboembolic event  Gets warfarin through MD    Duration of warfarin Therapy:

## 2021-10-28 NOTE — TELEPHONE ENCOUNTER
Called and spoke with patient. She is currently taking this medication due to having another ablation in October 2021. She was given 150 mg at that time. She has not had any occurrences at all. She is asking should she stop taking the medication or should be taking a lower dosage.  She is not sure if she should take this while waiting for her appt in Jan.

## 2021-10-29 NOTE — TELEPHONE ENCOUNTER
No need for abx prior to dental cleaning from EP perspective, however she should wait a couple months for healing.      Dorothea Lizarraga, APRN-CNP

## 2021-10-29 NOTE — TELEPHONE ENCOUNTER
Patient called office back and message relayed below with verbal understanding and encouraged to call office with any other questions.

## 2021-11-18 NOTE — PROGRESS NOTES
[facility]   Electrophysiology  Cirilo InfanteAIME avilez-CNP  Attending EP: Dr. Burt Rodriguez   Date: 11/19/2021  I had the privilege of visiting Jacobo House in the office. Chief Complaint:   Chief Complaint   Patient presents with    Atrial Fibrillation    Follow-up     Post CV     History of Present Illness: History obtained from patient and medical record. Jacobo House is 77 y.o. female with a past medical history of HTN, HLD, PAULA, PE, obesity, and atrial fibrillation. She was found to be in afib 2011 after lap-band surgery. She is warfarin for hx of PE and follows with [facility] clinic and s/p abena filter. She had a fall that was precipitated with dizziness and was found to have neck fracture and forehead hematoma. Since then she has had issues with her speech. S/p LHC 12/14/2018 showed normal coronaries. S/p unsuccessful cardioversion 12/14/2018. She uses [employer] mobile device at home. S/p DCCV 5/6/2021    RFCA of atrial fibrillation with PVI, CTI flutter (10/5/2021)    Interval history: Today, Jacobo House is being seen for PAF and hypertension. Reports that she has not had any known recurrence of her atrial fibrillation. There were a few episodes of palpitations and she checks her rhythm on the [employer] mobile and she is in SR. Chronic SOB that limits her activity. No swelling. Denies CP. Taking the propafenone 150 mg bid. Wears CPAP all night and with naps. Groin healed. No acute complaints at today's visit. With regard to medication therapy the patient has been compliant with prescribed regimen. She has tolerated therapy to date. Assessment:  Paroxysmal Atrial Fibrillation  - ECG today shows SR  - Continue propafenone 150 mg bid and verapamil 240 mg daily   - No recurrent episodes, she can feel when she is in afib  - [id number] score: 3 (age, gender, HTN) ; [id number] Vasc score and anticoagulation discussed. High risk for stroke and thromboembolism. Anticoagulation is recommended. ~Continue warfarin, follows at [facility] clinic, no reports of bleeding  - Afib risk factors including age, HTN, obesity, inactivity and PAULA were discussed with patient. Risk factor modification recommended   ~ TSH 2.44 (10/2/2020)     - Treatment options including cardioversion, rate control strategy, antiarrhythmics, anticoagulation and possible ablation were discussed with patient. Risks, benefits and alternative of each treatment options were explained. All questions answered    ~ S/p RFCA of afib w/ PVI (10/3/2020)    ~ S/p DCCV 10/14/2020 and 10/26/2020     ~ S/p DCCV 5/2021                          ~ RFCA of atrial fibrillation with PVI, CTI flutter (10/5/2021)  HTN-goal <130/80   - Controlled   - Continue current medications   - Encouraged patient to check BP at home, log and bring to office visits  - Discussed lifestyle modifications, weight loss, low sodium diet  PAULA   - Adherent to therapy and Wears device 7-8 hours per night, and with naps    - Discussed long term effects of unmanaged PAULA on heart   Obesity: There is no height or weight on file to calculate BMI. - Discussed exercise and weight loss and its benefits in detail  Plan  - Continue propafenone and diltiazem, consider weaning at follow up  - Call if symptoms worsen  - OK for light-mod exercise as tolerated    F/U: Follow-up with EP in 3 months MXA  Call [address] at [phone] with any questions    Allergies: Allergies   Allergen Reactions    Belsomra [Suvorexant] Other (See Comments)     Nightmares      Avelox [Moxifloxacin Hcl In Nacl] Rash    Levofloxacin Rash    Sulfa Antibiotics Rash     Home Medications:  Prior to Visit Medications    Medication Sig Taking?  Authorizing Provider   propafenone (RYTHMOL) 150 MG tablet Take 1 tablet by mouth 2 times daily See note on 07/21/21  AIME Lee CNP   clonazePAM (KLONOPIN) 1 MG tablet TAKE 1 TABLET BY MOUTH IN  THE EVENING  [facility] AIME Pérez CNP   trimethoprim (TRIMPEX) 100 MG tablet Take 1 tablet by mouth every 48 hours  AIME Fink CNP   verapamil (CALAN SR) 240 MG extended release tablet TAKE 1 TABLET BY MOUTH AT  NIGHT  Zuleima Osman MD   potassium chloride (KLOR-CON M) 20 MEQ extended release tablet TAKE 1 TABLET BY MOUTH  DAILY WITH BREAKFAST  AIME Garrett CNP   warfarin (COUMADIN) 5 MG tablet TAKE 1 TABLET BY MOUTH ON  MONDAY AND  FRIDAY AND 1 AND 1/2  TABLETS ALL OTHER DAYS  Lele Penaloza MD   albuterol sulfate HFA (PROAIR HFA) 108 (90 Base) MCG/ACT inhaler Inhale 2 puffs into the lungs every 4 hours as needed for Wheezing  Lele Penaloza MD   fluticasone-salmeterol (ADVAIR) 250-50 MCG/DOSE AEPB Inhale 1 puff into the lungs 2 times daily  Lele Penaloza MD   gabapentin (NEURONTIN) 300 MG capsule TAKE 2 CAPSULES BY MOUTH 3  TIMES DAILY  Lele Penaloza MD   indapamide (LOZOL) 1.25 MG tablet TAKE 1 TABLET BY MOUTH IN  THE MORNING  Lele Penaloza MD   pantoprazole (PROTONIX) 40 MG tablet TAKE 1 TABLET BY MOUTH IN  THE MORNING BEFORE  BREAKFAST  AIME Fink CNP   montelukast (SINGULAIR) 10 MG tablet TAKE 1 TABLET BY MOUTH IN  THE EVENING  Lele Penaloza MD   citalopram (CELEXA) 40 MG tablet TAKE 1 TABLET BY MOUTH  DAILY  AIME Martinez CNP   allopurinol (ZYLOPRIM) 300 MG tablet TAKE 1 TABLET BY MOUTH  DAILY  Lele Penaloza MD   atorvastatin (LIPITOR) 80 MG tablet TAKE 1 TABLET BY MOUTH  DAILY  Lele Penaloza MD   furosemide (LASIX) 20 MG tablet Take 1 tablet by mouth daily  Patient taking differently: Take 20 mg by mouth daily AIME Herndon CNP   potassium chloride (KLOR-CON M) 20 MEQ extended release tablet TAKE 1 TABLET BY MOUTH  DAILY WITH BREAKFAST  Patient taking differently: TAKE 1 TABLET BY MOUTH  DAILY WITH BREAKFAST   Take 2 tablets on the days she takes the furosemide  AIME Garrett CNP acetaminophen (TYLENOL) 500 MG tablet Take 500 mg by mouth every 6 hours as needed for Pain  Historical Provider, MD   diphenhydrAMINE-APAP, sleep, (TYLENOL PM EXTRA STRENGTH)  MG tablet Take 1 tablet by mouth nightly as needed for Sleep  Historical Provider, MD   b complex vitamins capsule Take 1 capsule by mouth daily  Historical Provider, MD   Ascorbic Acid (VITAMIN C PO) 1 tablet daily  Historical Provider, MD   therapeutic multivitamin-minerals (THERAGRAN-M) tablet Take 1 tablet by mouth nightly   Historical Provider, MD   Cranberry 500 MG CAPS Take 1,000 mg by mouth nightly   Historical Provider, MD      Past Medical History:  Past Medical History:   Diagnosis Date    Allergic     Anxiety 2/2/2017    Arthritis     Asthma     Back pain     Blood circulation, collateral     legs    Depression     GERD (gastroesophageal reflux disease)     Gout     Hypercholesteremia     Hypertension     Movement disorder     7 discs ruptured    Movement disorder     knee replacements    Obesity     Pulmonary emboli ([address]     Unspecified sleep apnea     uses cpap    Urinary tract infection, chronic     UTI (urinary tract infection)      Past Surgical History:    has a past surgical history that includes joint replacement; Vena Cava Filter Placement; bladder suspension; bone incision and drainage (5-7-11); joint replacement (2003); joint replacement (2005); Revision Total Knee Arthroplasty (2006); Ankle Fusion (2010); skin full graft (2011); Tubal ligation (1975); Upper gastrointestinal endoscopy (10-); other surgical history (12-); back surgery (09/14/2012); eye surgery; and Breast biopsy (08/2018). Social History:  Reviewed. reports that she quit smoking about 56 years ago. Her smoking use included cigarettes. She has a 2.00 pack-year smoking history. She has never used smokeless tobacco. She reports previous alcohol use. She reports that she does not use drugs.    Family History:  Reviewed. family history includes Arthritis in her father; Asthma in her father; Breast Cancer in her sister; Depression in her mother; Heart Disease in her father; High Blood Pressure in her father and mother; Stroke in her father. Denies family history of sudden cardiac death, arrhythmia, premature CAD    Review of System:  · Constitutional: No weight changes or weakness  · HEENT: No visual changes. No mouth sores or sore throat. · Cardiovascular: denies chest pain, admits to dyspnea on exertion, admits to palpitations or denies loss of consciousness. No cough, hemoptysis, denies pleuritic pain, or phlebitis. denies dizziness. · Respiratory: denies cough or wheezing. · Gastrointestinal: Negative, No blood in stools. · Genitourinary: No hematuria. · Neurological: No focal weakness  · Psychiatric: No confusion, anxiety, or depression. · Hem/Lymph: Denies abnormal bruising or bleeding. Physical Examination:  There were no vitals filed for this visit. Wt Readings from Last 3 Encounters:   10/22/21 269 lb 8 oz (122.2 kg)   10/18/21 271 lb (122.9 kg)   10/05/21 271 lb 8 oz (123.2 kg)      Constitutional: Cooperative and in no apparent distress, and appears well nourished   Skin: Warm and pink; no cyanosis or bruising   HEENT: Symmetric and normocephalic. Conjunctiva pink with clear sclera. Mucus membranes pink and moist. No visible masses/goiter   Respiratory: Respirations symmetric and unlabored. Lungs clear to auscultation bilaterally, no wheezing, rhonchi, or crackles.  Cardiovascular:  regular rate and rhythm. S1 & S2 present, negative for murmur. negative elevation of JVP. No peripheral edema.  Musculoskeletal:  No focal weakness.  Neurological/Psych: Awake and orientated to person, place and time. Calm affect, appropriate mood.      Pertinent labs, diagnostic, device, and imaging results reviewed as a part of this visit    LABS    CBC:   Lab Results   Component Value Date    WBC 7.6 10/05/2021    HGB 14.2 10/05/2021    HCT 40.5 10/05/2021    MCV 92.8 10/05/2021     10/05/2021     BMP:   Lab Results   Component Value Date    CREATININE 0.9 10/05/2021    BUN 17 10/05/2021     10/05/2021    K 3.5 10/05/2021     10/05/2021    CO2 25 10/05/2021     CrCl cannot be calculated (Unknown ideal weight.). No results found for: BNP    Thyroid:   Lab Results   Component Value Date    TSH 3.53 2011     Lipid Panel:   Lab Results   Component Value Date    CHOL 184 2021    HDL 68 2021    HDL 58 2012    TRIG 132 2021     LFTs:  Lab Results   Component Value Date    ALT 26 10/02/2020    AST 32 10/02/2020    ALKPHOS 78 10/02/2020    BILITOT 0.7 10/02/2020     Coags:   Lab Results   Component Value Date    PROTIME 24.9 (H) 10/03/2020    INR 2.2 10/28/2021    APTT 27.4 2011     EC2021 SR HR 62, , , QTc 429     Echo: 10/5/2021   Summary   Normal left ventricle size, wall thickness, and systolic function with an   estimated ejection fraction of 60%. Mitral valve leaflets appear mildly thickened. Mild mitral regurgitation. There is no evidence of mass or thrombus in the left atrium or appendage. The aortic valve is moderately calcified with decreased leaflet mobility   consistent with at least moderate aortic stenosis. V max 3.03 m/s. Mild aortic regurgitation. The aortic root is normal in size. The thoracic aorta has minimal plaque. 3/13/2018   Summary   Normal left ventricle size, wall thickness and systolic function with an   estimated ejection fraction of 60%. Mild mitral regurgitation. GXT: 2018  Summary         No EKG evidence for ischemia with exercise         Normal LV size and systolic function.         Myocardial perfusion imaging with small area of decreased uptake in the     anteroapical wall with stress with redistribution at rest consistent with     ischemia.         Scheduled for cath in 2 weeks.        Cath: 5/1/2018  Findings:                      LM       Normal              LAD     Normal              Cx        Normal              RCA     Normal              LVG     Not performed - aorta very tortuous              EDP     Not obtained - aorta very tortuous  Intervention:  None  Recs:              Continued aggressive medical tx and risk factor modification    Diet & Exercise:   The patient is counseled to follow a low salt diet to assure blood pressure remains controlled for cardiovascular risk factor modification   The patient is counseled to avoid excess caffeine, and energy drinks as this may exacerbated ectopy and arrhythmia   The patient is counseled to lose weight to control cardiovascular risk factors   Exercise program discussed: To improve overall cardiovascular health, the patient is instructed to increase cardiovascular related activities with a goal of 150 min/week of moderate level activity or 10,000 steps per day. Encouraged to perform as much activity as tolerated     I have addressed the patient's cardiac risk factors and adjusted pharmacologic treatment as needed. In addition, I have reinforced the need for patient directed risk factor modification. I independently reviewed the ECG    All questions and concerns were addressed with the patient. Alternatives to treatment were discussed. Thank you for allowing to us to participate in the care of Olayinka RenAIME Rodriguez-NELIA  [address]   Office: [phone]   \

## 2021-11-19 ENCOUNTER — OFFICE VISIT (OUTPATIENT)
Dept: CARDIOLOGY CLINIC | Age: 76
End: 2021-11-19
Payer: MEDICARE

## 2021-11-19 VITALS
HEART RATE: 63 BPM | WEIGHT: 269.8 LBS | BODY MASS INDEX: 46.06 KG/M2 | SYSTOLIC BLOOD PRESSURE: 120 MMHG | OXYGEN SATURATION: 94 % | HEIGHT: 64 IN | DIASTOLIC BLOOD PRESSURE: 78 MMHG

## 2021-11-19 DIAGNOSIS — I10 BENIGN ESSENTIAL HTN: ICD-10-CM

## 2021-11-19 DIAGNOSIS — G47.33 OSA (OBSTRUCTIVE SLEEP APNEA): ICD-10-CM

## 2021-11-19 DIAGNOSIS — I48.0 PAF (PAROXYSMAL ATRIAL FIBRILLATION) (HCC): Primary | ICD-10-CM

## 2021-11-19 PROCEDURE — 1090F PRES/ABSN URINE INCON ASSESS: CPT | Performed by: NURSE PRACTITIONER

## 2021-11-19 PROCEDURE — G8399 PT W/DXA RESULTS DOCUMENT: HCPCS | Performed by: NURSE PRACTITIONER

## 2021-11-19 PROCEDURE — G8417 CALC BMI ABV UP PARAM F/U: HCPCS | Performed by: NURSE PRACTITIONER

## 2021-11-19 PROCEDURE — 4040F PNEUMOC VAC/ADMIN/RCVD: CPT | Performed by: NURSE PRACTITIONER

## 2021-11-19 PROCEDURE — 3017F COLORECTAL CA SCREEN DOC REV: CPT | Performed by: NURSE PRACTITIONER

## 2021-11-19 PROCEDURE — 1123F ACP DISCUSS/DSCN MKR DOCD: CPT | Performed by: NURSE PRACTITIONER

## 2021-11-19 PROCEDURE — 1036F TOBACCO NON-USER: CPT | Performed by: NURSE PRACTITIONER

## 2021-11-19 PROCEDURE — G8427 DOCREV CUR MEDS BY ELIG CLIN: HCPCS | Performed by: NURSE PRACTITIONER

## 2021-11-19 PROCEDURE — G8484 FLU IMMUNIZE NO ADMIN: HCPCS | Performed by: NURSE PRACTITIONER

## 2021-11-19 PROCEDURE — 93000 ELECTROCARDIOGRAM COMPLETE: CPT | Performed by: NURSE PRACTITIONER

## 2021-11-19 PROCEDURE — 99214 OFFICE O/P EST MOD 30 MIN: CPT | Performed by: NURSE PRACTITIONER

## 2021-11-19 NOTE — PATIENT INSTRUCTIONS
- Continue current medications  - Check your blood pressure at home, if your top number blood pressure is >140/90 or <95/50 please call the office  - Check your heart rate at home, if it is <50 or >120 please call the office   - Call office if symptoms develop  - Follows up in 3 months

## 2021-12-06 ENCOUNTER — TELEPHONE (OUTPATIENT)
Dept: CARDIOLOGY CLINIC | Age: 76
End: 2021-12-06

## 2021-12-06 ENCOUNTER — TELEPHONE (OUTPATIENT)
Dept: INTERNAL MEDICINE CLINIC | Age: 76
End: 2021-12-06

## 2021-12-06 DIAGNOSIS — Z20.822 CLOSE EXPOSURE TO COVID-19 VIRUS: Primary | ICD-10-CM

## 2021-12-06 NOTE — TELEPHONE ENCOUNTER
Patient is requesting order for covid test at [facility] office. She states her sister tested positive for covid and she was in car with patient with no mask. Patient does not have symptoms but she is scheduled for echo on 12/9/21. Patient would like to get tested as soon as possible.

## 2021-12-06 NOTE — TELEPHONE ENCOUNTER
Patient called in and she states that she went with her sister today to have some testing done but her sister was unable to have her test completed because she tested positive for COVID. Pt states her sister is asymptomatic but she was in the car with her and they had no masks on at the time .  She states that she is scheduled for an Echo and OV with [facility] 12/09 but wants to know what she needs to do

## 2021-12-08 ENCOUNTER — TELEPHONE (OUTPATIENT)
Dept: PHARMACY | Age: 76
End: 2021-12-08

## 2021-12-08 NOTE — TELEPHONE ENCOUNTER
Pt called to cancel [facility] appt on 12/9. Pt states her sister who she had been taking to and from appts tested positive for Covid on 12/6. Pt had Covid vaccine and booster, states she does not have any symptoms at this time. Shes going to the [facility] to get tested for Covid on 12/10. wanted to know if she could have INR checked while she was there. Explained that I would have Elizabeth Baptiste call her tomorrow to discuss. Pool message placed.

## 2021-12-09 ENCOUNTER — TELEPHONE (OUTPATIENT)
Dept: PHARMACY | Age: 76
End: 2021-12-09

## 2021-12-09 RX ORDER — CITALOPRAM 40 MG/1
40 TABLET ORAL DAILY
Qty: 90 TABLET | Refills: 3 | Status: SHIPPED | OUTPATIENT
Start: 2021-12-09 | End: 2022-10-31

## 2021-12-09 RX ORDER — ALLOPURINOL 300 MG/1
300 TABLET ORAL DAILY
Qty: 90 TABLET | Refills: 3 | Status: SHIPPED | OUTPATIENT
Start: 2021-12-09 | End: 2022-10-31

## 2021-12-09 RX ORDER — ATORVASTATIN CALCIUM 80 MG/1
80 TABLET, FILM COATED ORAL DAILY
Qty: 90 TABLET | Refills: 3 | Status: SHIPPED | OUTPATIENT
Start: 2021-12-09 | End: 2022-10-31

## 2021-12-09 NOTE — TELEPHONE ENCOUNTER
Returned call to patient. She states that she is getting COVID test tomorrow. Asked for pt to call clinic once receives results of test and will schedule RTC based on results.

## 2021-12-10 DIAGNOSIS — Z20.822 CLOSE EXPOSURE TO COVID-19 VIRUS: ICD-10-CM

## 2021-12-11 LAB — SARS-COV-2: NOT DETECTED

## 2021-12-14 ENCOUNTER — ANTI-COAG VISIT (OUTPATIENT)
Dept: PHARMACY | Age: 76
End: 2021-12-14
Payer: MEDICARE

## 2021-12-14 DIAGNOSIS — I48.0 PAF (PAROXYSMAL ATRIAL FIBRILLATION) (HCC): Primary | ICD-10-CM

## 2021-12-14 DIAGNOSIS — Z86.711 HISTORY OF PULMONARY EMBOLISM: ICD-10-CM

## 2021-12-14 LAB — INTERNATIONAL NORMALIZATION RATIO, POC: 2.4

## 2021-12-14 PROCEDURE — 99211 OFF/OP EST MAY X REQ PHY/QHP: CPT

## 2021-12-14 PROCEDURE — 85610 PROTHROMBIN TIME: CPT

## 2021-12-14 NOTE — PROGRESS NOTES
Ms. Cassie Lee is a 77 y.o. y/o female with history of DVT, PE, Afib   She presents today for anticoagulation monitoring and adjustment. Pertinent PMH: HTN, Gastric Banding,CAD, diskectomy with an artifical spinal disk implant in September 2012. Patient Reported Findings:  Yes     No  [x]   []       Patient verifies current dosing regimen as listed  []   [x]       S/S bleeding/bruising/swelling/SOB states that she has been wheezing more and had more SOB d/t allergies --> has had a few small nose bleeds recently  --> denies  []   [x]       Blood in urine or stool  []   [x]       Procedures scheduled in the future at this time had CV 5/6, INR 2.8 --> having ablation in October 5th --> had ablation 10/5 --> none upcoming    []   [x]       Missed Dose   []   [x]       Extra Dose   []   [x]       Change in medications  decreased tylenol intake to 1-2 times a day --> continues tylenol, propafenone increased --> dec propafenone to 225 mg BID x 2 months then d/c (ending 4/25) --> inc diltiazem, has resumed 3 tabs propafenone --> dec propafenone to BID 6/28, will be stopping in 1 week --> still taking propafenone 150 BID --> no changes    []   [x]       Change in health/diet/appetite  Patient states that she feels like she has returned to normal vit k intake --> diet fluctuates causing INR to fluctuate.  Discussed ways to improve consistency with vit k intake  --> states that she has cut back spinach and cabbage intake to twice a week --> no vit k --> had cabbage a few times --> no vit k since was on vacation but plans to return --> has been eating a salad a day-->no changes  []   [x]       Change in alcohol use    []   [x]       Change in activity  []   [x]       Hospital admission  []   [x]       Emergency department visit   []   [x]       Other complaints     Clinical Outcomes:  Yes     No  []   [x]       Major bleeding event  []   [x]       Thromboembolic event  Gets warfarin through MD    Duration of warfarin Therapy: indefinite  INR Range:  2.0-3.0     INR is 2.4 today   Continue weekly dose of 5 mg Mon and Fri and 7.5 mg all other days of the week   Encouraged to maintain a consistency of vegetables/salads.   Recheck INR in 6 weeks, 1/25/22    Patient consent signed 10/28/21    Referring PCP is Lilliana Daily  INR (no units)   Date Value   10/28/2021 2.2   10/05/2021 2.50 (H)   09/30/2021 2.2   09/09/2021 2.7   05/06/2021 2.80 (H)   10/26/2020 2.90 (H)      For Pharmacy Admin Tracking Only     Total # of Interventions Recommended: 0   Total # of Interventions Accepted: 0   Time Spent (min): 15

## 2021-12-15 ENCOUNTER — TELEMEDICINE (OUTPATIENT)
Dept: BARIATRICS/WEIGHT MGMT | Age: 76
End: 2021-12-15
Payer: MEDICARE

## 2021-12-15 DIAGNOSIS — Z71.3 ENCOUNTER FOR DIETARY COUNSELING AND SURVEILLANCE: ICD-10-CM

## 2021-12-15 DIAGNOSIS — E66.01 MORBID OBESITY WITH BMI OF 45.0-49.9, ADULT (HCC): Primary | ICD-10-CM

## 2021-12-15 PROCEDURE — G8427 DOCREV CUR MEDS BY ELIG CLIN: HCPCS | Performed by: NURSE PRACTITIONER

## 2021-12-15 PROCEDURE — 3017F COLORECTAL CA SCREEN DOC REV: CPT | Performed by: NURSE PRACTITIONER

## 2021-12-15 PROCEDURE — 1123F ACP DISCUSS/DSCN MKR DOCD: CPT | Performed by: NURSE PRACTITIONER

## 2021-12-15 PROCEDURE — 4040F PNEUMOC VAC/ADMIN/RCVD: CPT | Performed by: NURSE PRACTITIONER

## 2021-12-15 PROCEDURE — 99213 OFFICE O/P EST LOW 20 MIN: CPT | Performed by: NURSE PRACTITIONER

## 2021-12-15 PROCEDURE — 1090F PRES/ABSN URINE INCON ASSESS: CPT | Performed by: NURSE PRACTITIONER

## 2021-12-15 PROCEDURE — G8399 PT W/DXA RESULTS DOCUMENT: HCPCS | Performed by: NURSE PRACTITIONER

## 2021-12-15 ASSESSMENT — ENCOUNTER SYMPTOMS
EYES NEGATIVE: 1
GASTROINTESTINAL NEGATIVE: 1
RESPIRATORY NEGATIVE: 1

## 2021-12-15 NOTE — PROGRESS NOTES
[facility]) Physicians   [employer]    12/15/2021    TELEHEALTH EVALUATION -- Audio/Visual (During HZMDE-27 public health emergency)    Medical Weight Loss    HPI: Brianna Hernandez is a 77 y.o. female with current BMI of 45.7 and current weight of 266.5 pounds. Due to the COVID-19 restrictions on close contact interactions the patient's monthly visit was conducted via audio/video in joo of a face to face visit. Patient has consented to have this visit conducted via audio/video. The patient is here through telemedicine for their medical weight loss visit. She is tolerating meal plan well, denies any side effects. Patient denies any nausea, vomiting, fevers, chills, shortness of breath, chest pain, cough, constipation or difficulty urinating. Past Medical History:   Diagnosis Date    Allergic     Anxiety 2/2/2017    Arthritis     Asthma     Back pain     Blood circulation, collateral     legs    Depression     GERD (gastroesophageal reflux disease)     Gout     Hypercholesteremia     Hypertension     Movement disorder     7 discs ruptured    Movement disorder     knee replacements    Obesity     Pulmonary emboli (HCC)     Unspecified sleep apnea     uses cpap    Urinary tract infection, chronic     UTI (urinary tract infection)      Past Surgical History:   Procedure Laterality Date    ANKLE FUSION  2010    right, [facility]    BACK SURGERY  09/14/2012    lumbar fusion L-4, L-5 atrificial disc  [facility]  5-7-11    incision, drainage and debridement of left knee and application of wound vac.     BREAST BIOPSY  08/2018    neg    EYE SURGERY      JOINT REPLACEMENT      bilat knees    JOINT REPLACEMENT  2003    left, [facility]    JOINT REPLACEMENT  2005    right, [facility]    OTHER SURGICAL HISTORY  12-    laparscopic adjustable gastric restrictive procedure    [facility]      [facility]     SKIN FULL GRAFT      right, [facility]    TUBAL LIGATION  1975    [facility]    UPPER GASTROINTESTINAL ENDOSCOPY  10-    VENA CAVA FILTER PLACEMENT       Family History   Problem Relation Age of Onset    High Blood Pressure Mother     Depression Mother     Stroke Father     High Blood Pressure Father     Asthma Father     Arthritis Father     Heart Disease Father     Breast Cancer Sister     High Cholesterol Neg Hx      Social History     Tobacco Use    Smoking status: Former Smoker     Packs/day: 0.50     Years: 4.00     Pack years: 2.00     Types: Cigarettes     Quit date: 1965     Years since quittin.9    Smokeless tobacco: Never Used   Substance Use Topics    Alcohol use: Not Currently     Comment: 4 t imes a year     I counseled the patient on the importance of not smoking and risks of ETOH. Allergies   Allergen Reactions    Belsomra [Suvorexant] Other (See Comments)     Nightmares      Avelox [Moxifloxacin Hcl In Nacl] Rash    Levofloxacin Rash    Sulfa Antibiotics Rash     There were no vitals filed for this visit. There is no height or weight on file to calculate BMI.     Current Outpatient Medications:     citalopram (CELEXA) 40 MG tablet, TAKE 1 TABLET BY MOUTH  DAILY, Disp: 90 tablet, Rfl: 3    atorvastatin (LIPITOR) 80 MG tablet, TAKE 1 TABLET BY MOUTH  DAILY, Disp: 90 tablet, Rfl: 3    allopurinol (ZYLOPRIM) 300 MG tablet, TAKE 1 TABLET BY MOUTH  DAILY, Disp: 90 tablet, Rfl: 3    propafenone (RYTHMOL) 150 MG tablet, Take 1 tablet by mouth 2 times daily See note on 21, Disp: 60 tablet, Rfl: 2    clonazePAM (KLONOPIN) 1 MG tablet, TAKE 1 TABLET BY MOUTH IN  THE EVENING, Disp: 90 tablet, Rfl: 0    trimethoprim (TRIMPEX) 100 MG tablet, Take 1 tablet by mouth every 48 hours, Disp: 45 tablet, Rfl: 3    verapamil (CALAN SR) 240 MG extended release tablet, TAKE 1 TABLET BY MOUTH AT  NIGHT, Disp: 90 tablet, Rfl: 3    potassium chloride (KLOR-CON M) 20 MEQ extended release tablet, TAKE 1 TABLET BY MOUTH  DAILY WITH BREAKFAST, Disp: 90 tablet, Rfl: 3    warfarin (COUMADIN) 5 MG tablet, TAKE 1 TABLET BY MOUTH ON  MONDAY AND  FRIDAY AND 1 AND 1/2  TABLETS ALL OTHER DAYS, Disp: 135 tablet, Rfl: 5    albuterol sulfate HFA (PROAIR HFA) 108 (90 Base) MCG/ACT inhaler, Inhale 2 puffs into the lungs every 4 hours as needed for Wheezing, Disp: 8.5 g, Rfl: 2    fluticasone-salmeterol (ADVAIR) 250-50 MCG/DOSE AEPB, Inhale 1 puff into the lungs 2 times daily, Disp: 1 Inhaler, Rfl: 5    gabapentin (NEURONTIN) 300 MG capsule, TAKE 2 CAPSULES BY MOUTH 3  TIMES DAILY, Disp: 540 capsule, Rfl: 3    indapamide (LOZOL) 1.25 MG tablet, TAKE 1 TABLET BY MOUTH IN  THE MORNING, Disp: 90 tablet, Rfl: 3    pantoprazole (PROTONIX) 40 MG tablet, TAKE 1 TABLET BY MOUTH IN  THE MORNING BEFORE  BREAKFAST, Disp: 90 tablet, Rfl: 3    montelukast (SINGULAIR) 10 MG tablet, TAKE 1 TABLET BY MOUTH IN  THE EVENING, Disp: 90 tablet, Rfl: 3    furosemide (LASIX) 20 MG tablet, Take 1 tablet by mouth daily (Patient taking differently: Take 20 mg by mouth daily PRN), Disp: 30 tablet, Rfl: 1    acetaminophen (TYLENOL) 500 MG tablet, Take 500 mg by mouth every 6 hours as needed for Pain, Disp: , Rfl:     diphenhydrAMINE-APAP, sleep, (TYLENOL PM EXTRA STRENGTH)  MG tablet, Take 1 tablet by mouth nightly as needed for Sleep, Disp: , Rfl:     b complex vitamins capsule, Take 1 capsule by mouth daily, Disp: , Rfl:     Ascorbic Acid (VITAMIN C PO), 1 tablet daily, Disp: , Rfl:     therapeutic multivitamin-minerals (THERAGRAN-M) tablet, Take 1 tablet by mouth nightly , Disp: , Rfl:     Cranberry 500 MG CAPS, Take 1,000 mg by mouth nightly , Disp: , Rfl:     Lab Results   Component Value Date    WBC 7.6 10/05/2021    RBC 4.37 10/05/2021    HGB 14.2 10/05/2021    HCT 40.5 10/05/2021    MCV 92.8 10/05/2021    MCH 32.5 10/05/2021    MCHC 35.0 10/05/2021 MPV 6.8 10/05/2021    NEUTOPHILPCT 74.9 10/29/2018    LYMPHOPCT 17.2 10/29/2018    MONOPCT 6.0 10/29/2018    EOSRELPCT 1.4 10/29/2018    BASOPCT 0.5 10/29/2018    NEUTROABS 7.4 10/29/2018    LYMPHSABS 1.7 10/29/2018    MONOSABS 0.6 10/29/2018    EOSABS 0.1 10/29/2018     Lab Results   Component Value Date     10/05/2021    K 3.5 10/05/2021    K 3.3 10/10/2018     10/05/2021    CO2 25 10/05/2021    ANIONGAP 14 10/05/2021    GLUCOSE 105 10/05/2021    BUN 17 10/05/2021    CREATININE 0.9 10/05/2021    LABGLOM >60 10/05/2021    GFRAA >60 10/05/2021    GFRAA >60 01/04/2013    CALCIUM 9.8 10/05/2021    PROT 7.2 10/02/2020    PROT 7.6 01/04/2013    LABALBU 4.5 07/12/2021    AGRATIO 1.5 10/02/2020    BILITOT 0.7 10/02/2020    ALKPHOS 78 10/02/2020    ALT 26 10/02/2020    AST 32 10/02/2020    GLOB 2.9 10/02/2020     Lab Results   Component Value Date    CHOL 184 07/12/2021    TRIG 132 07/12/2021    HDL 68 07/12/2021    HDL 58 02/01/2012    LDLCALC 90 07/12/2021    LABVLDL 26 07/12/2021     Lab Results   Component Value Date    TSHREFLEX 2.44 10/02/2020     Lab Results   Component Value Date    IRON 72 08/27/2019    TIBC 292 08/27/2019    LABIRON 25 08/27/2019     Lab Results   Component Value Date    WRRZQDQR38 6909 08/27/2019    FOLATE >20.00 08/27/2019     Lab Results   Component Value Date    VITD25 50.2 08/27/2019     Lab Results   Component Value Date    LABA1C 5.6 11/22/2019    .9 12/28/2011       Patient Active Problem List   Diagnosis    Essential hypertension    History of pulmonary embolism    Osteoarthritis of knee    PAULA (obstructive sleep apnea)    Asthma    Status post gastric banding    Coronary artery disease    Morbid [facility] with BMI of 40.0-44.9, [facility]    Hyperlipidemia, unspecified    Arthritis    Anxiety    Chronic cough    PAF (paroxysmal atrial fibrillation) ([facility]    Atrial flutter ([facility]    Nonrheumatic aortic valve stenosis       Review of Systems Constitutional: Negative. HENT: Negative. Eyes: Negative. Respiratory: Negative. Cardiovascular: Negative. Gastrointestinal: Negative. Skin: Negative. Neurological: Negative. PHYSICAL EXAMINATION:    Constitutional: [x] Appears well-developed and well-nourished [x] No apparent distress      [] Abnormal-   Mental status  [x] Alert and awake  [x] Oriented to person/place/time [x]Able to follow commands      Eyes:  EOM    [x]  Normal  [] Abnormal-  Sclera  [x]  Normal  [] Abnormal -         Discharge [x]  None visible  [] Abnormal -    HENT:   [x] Normocephalic, atraumatic. [] Abnormal   [x] Mouth/Throat: Mucous membranes are moist.     External Ears [] Normal  [] Abnormal-     Neck: [x] No visualized mass     Pulmonary/Chest: [x] Respiratory effort normal.  [x] No visualized signs of difficulty breathing or respiratory distress        [] Abnormal-      Musculoskeletal:   [] Normal gait with no signs of ataxia         [x] Normal range of motion of neck        [] Abnormal-     Neurological:        [x] No Facial Asymmetry (Cranial nerve 7 motor function) (limited exam to video visit)          [x] No gaze palsy        [] Abnormal-         Skin:        [x] No significant exanthematous lesions or discoloration noted on facial skin         [] Abnormal-            Psychiatric:       [x] Normal Affect [x] No Hallucinations        [] Abnormal-     Other pertinent observable physical exam findings-     Due to this being a TeleHealth encounter, evaluation of the following organ systems is limited: Vitals/Constitutional/EENT/Resp/CV/GI//MS/Neuro/Skin/Heme-Lymph-Imm. Assessment and Plan:    Patient is here via telemedicine for their medical weight loss visit, down 3 lbs and a total weight loss of 14.6 lbs. She is doing well, making dietary and behavior modifications. She is following dietary recommendations utilizing the 1200 calorie low carb meal plan.  She has been focusing on smaller portions and choosing low fat options. We discussed looking more at carb/sugar content than fat content of foods. She is taking in 60oz of fluid a day, as per her cardiologist instructions. . She did speak with the registered dietitian for continued follow up. I agree with recommendations and plan. She is trying to be more active throughout the day. We discussed ways to increase active minutes throughout the day. She was encouraged to discuss any exercise regimens with her cardiologist first. Encouraged continued physical activity. She would like to space her follow up out more, she feels comfortable with a 6 month follow up. We will see her back in 6 month for continued follow up via telemedicine. Total encounter time: 22 minutes, including any number of the following:  review of labs, imaging, provider notes, outside hospital records, performing examination/evaluation, counseling patient and/or family, ordering medications/tests, placing referrals and communication with referring physicians, coordination of care; discussing dietary plan/recall with the patient as well with registered dietitian and documentation in the EHR. Of note, the above was done during same day of the actual patient encounter. An electronic signature was used to authenticate this note. Pursuant to the emergency declaration under the [facility], [facility]5 waiver authority and the [employer] and [employer] General Act, this Virtual  Visit was conducted, with patient's consent, to reduce the patient's risk of exposure to COVID-19 and provide continuity of care for an established patient. Services were provided through a video synchronous discussion virtually to substitute for in-person clinic visit.

## 2021-12-15 NOTE — PROGRESS NOTES
Janine Mukherjee lost 3 lbs over past ~ 6 weeks. Pt tracks intakes with MFP. Treatment plan details: 1200 kcal LC   Is patient adhering to treatment plan: Loosely    Is pt eating 4-5 times each day? Eats 3-4 x day     Is pt including lean protein with all meals and snacks? Mostly   B - grits OR oatmeal OR waffles OR 1 egg with batool zaidi sausage marv with 1/2 english muffin and handful of grapes   L - 1/2 sandwich turkey/ham with cheese and ~ 6 wheat thins   D - Baked chicken leg / Shrimp with 1/3 sweet potato / green beans   S - popcorn with caramel OR 1/4 c peanuts and raisins with marshmallows     Is pt avoiding added sugars and starchy foods? No - grits/oatmeal/waffles/wheat thins    Consuming at least 64oz of calorie free fluids? 60 oz per cardiologist - drinks 2 cups of coffee with SF creamer, decaf iced tea, water, trop OJ (2 oz)     Participating in intentional exercise? Active steps around the house cleaning  But no intentional exercise - afraid to go out d/t COVID. Limited with SOB.      Supplements - MVI,  Vit C, B-complex, cranberry capsule   Volume- ~1.5 cups volume  30-30-30- avoids eating & drinking at the same time    Plan/Goals:   Re-focus on low carb meal plan - discussed strategies to decrease CHO intake and increase nonstarchy (eliminate marshmallows/caramel popcorn and switch to regular popcorn / sub salad for wheat thins)    Handouts: none     Linda Forman RD, LD

## 2021-12-29 ENCOUNTER — TELEPHONE (OUTPATIENT)
Dept: INTERNAL MEDICINE CLINIC | Age: 76
End: 2021-12-29

## 2021-12-29 NOTE — TELEPHONE ENCOUNTER
Attempted to call - no answer.   Electronically signed by AIME Waterman CNP on 12/29/2021 at 5:21 PM

## 2021-12-29 NOTE — TELEPHONE ENCOUNTER
Pt calling had gone to [id number]  last week and her covid and flu a&b were all negative---but pt was running 103 fever---was put on prednisone she just finished it yesterday but today she is feeling terrible again with a 101 fever---has cough and congestion----can you call her something else in? Please call pt. Thanks.

## 2022-01-18 ENCOUNTER — OFFICE VISIT (OUTPATIENT)
Dept: INTERNAL MEDICINE CLINIC | Age: 77
End: 2022-01-18
Payer: MEDICARE

## 2022-01-18 VITALS
BODY MASS INDEX: 46.26 KG/M2 | HEIGHT: 64 IN | SYSTOLIC BLOOD PRESSURE: 134 MMHG | DIASTOLIC BLOOD PRESSURE: 82 MMHG | HEART RATE: 66 BPM | WEIGHT: 271 LBS | OXYGEN SATURATION: 94 %

## 2022-01-18 DIAGNOSIS — E78.2 MIXED HYPERLIPIDEMIA: ICD-10-CM

## 2022-01-18 DIAGNOSIS — I10 ESSENTIAL HYPERTENSION: Primary | ICD-10-CM

## 2022-01-18 DIAGNOSIS — F41.9 ANXIETY: ICD-10-CM

## 2022-01-18 DIAGNOSIS — I48.0 PAF (PAROXYSMAL ATRIAL FIBRILLATION) (HCC): ICD-10-CM

## 2022-01-18 PROCEDURE — G8427 DOCREV CUR MEDS BY ELIG CLIN: HCPCS | Performed by: INTERNAL MEDICINE

## 2022-01-18 PROCEDURE — G8399 PT W/DXA RESULTS DOCUMENT: HCPCS | Performed by: INTERNAL MEDICINE

## 2022-01-18 PROCEDURE — 4040F PNEUMOC VAC/ADMIN/RCVD: CPT | Performed by: INTERNAL MEDICINE

## 2022-01-18 PROCEDURE — 1090F PRES/ABSN URINE INCON ASSESS: CPT | Performed by: INTERNAL MEDICINE

## 2022-01-18 PROCEDURE — G8484 FLU IMMUNIZE NO ADMIN: HCPCS | Performed by: INTERNAL MEDICINE

## 2022-01-18 PROCEDURE — 99214 OFFICE O/P EST MOD 30 MIN: CPT | Performed by: INTERNAL MEDICINE

## 2022-01-18 PROCEDURE — 3017F COLORECTAL CA SCREEN DOC REV: CPT | Performed by: INTERNAL MEDICINE

## 2022-01-18 PROCEDURE — 1036F TOBACCO NON-USER: CPT | Performed by: INTERNAL MEDICINE

## 2022-01-18 PROCEDURE — G8417 CALC BMI ABV UP PARAM F/U: HCPCS | Performed by: INTERNAL MEDICINE

## 2022-01-18 PROCEDURE — 1123F ACP DISCUSS/DSCN MKR DOCD: CPT | Performed by: INTERNAL MEDICINE

## 2022-01-18 RX ORDER — CLONAZEPAM 1 MG/1
TABLET ORAL
Qty: 90 TABLET | Refills: 0 | Status: SHIPPED | OUTPATIENT
Start: 2022-01-18 | End: 2022-05-02 | Stop reason: SDUPTHER

## 2022-01-18 NOTE — PROGRESS NOTES
Chief Complaint   Patient presents with    Hypertension    Hyperlipidemia    Anxiety    Atrial Fibrillation     HPI:  HTN: The patient is tolerating blood pressure medication well and taking them as directed. BP control outside of the office is reported as well controlled. No symptoms concerning for end organ damage are present. PAF: she is taking warfarin as prescribed. She denies recent symptoms. Anxiety: She continues to take clonazepam nightly. She is unable to get any sleep without this. She is able to get a good night sleep with the use of clonazepam.  She denies side effects. Hyperlipidemia: She continues to take atorvastatin daily. She tolerates treatment well.     Social History     Tobacco Use    Smoking status: Former Smoker     Packs/day: 0.50     Years: 4.00     Pack years: 2.00     Types: Cigarettes     Quit date: 1965     Years since quittin.0    Smokeless tobacco: Never Used   Vaping Use    Vaping Use: Never used   Substance Use Topics    Alcohol use: Not Currently     Comment: 4 t imes a year    Drug use: No         ROS:  CV: Neg for chest pain  RESP: neg for dyspnea, except with exertion  GI: +constipation    EXAM  /82   Pulse 66   Ht 5' 4\" (1.626 m)   Wt 271 lb (122.9 kg)   SpO2 94%   BMI 46.52 kg/m²    GEN: WN/WD, NAD  CV: regular rate and rhythm, +systolic murmur  Resp: normal effort, clear auscultation bilaterally  No peripheral edema     Lab Results   Component Value Date    CREATININE 0.9 10/05/2021    BUN 17 10/05/2021     10/05/2021    K 3.5 10/05/2021     10/05/2021    CO2 25 10/05/2021     Lab Results   Component Value Date    CHOL 184 2021    CHOL 150 10/02/2020    CHOL 158 2019     Lab Results   Component Value Date    TRIG 132 2021    TRIG 80 10/02/2020    TRIG 74 2019     Lab Results   Component Value Date    HDL 68 (H) 2021    HDL 61 (H) 10/02/2020    HDL 64 (H) 2019     Lab Results   Component Value Date    LDLCALC 90 07/12/2021    LDLCALC 73 10/02/2020    LDLCALC 79 05/21/2019     Lab Results   Component Value Date    LABVLDL 26 07/12/2021    LABVLDL 16 10/02/2020    LABVLDL 15 05/21/2019     No results found for: CHOLHDLRATIO     A/P  1. Essential hypertension  Blood pressure is well controlled. Blood work is up-to-date. Continue current antihypertensive regimen. 2. PAF (paroxysmal atrial fibrillation) (HCC)  Chronic and asymptomatic. She appears to be in sinus rhythm today. Continue warfarin and propafenone. 3. Anxiety  Chronic and stable. Clonazepam renewed. PDMP reviewed today without concerning findings. - clonazePAM (KLONOPIN) 1 MG tablet; TAKE 1 TABLET BY MOUTH IN  THE EVENING  Dispense: 90 tablet; Refill: 0    4. Mixed hyperlipidemia  Chronic and stable. Continue atorvastatin. Return in 3 months for AWV.

## 2022-01-25 ENCOUNTER — ANTI-COAG VISIT (OUTPATIENT)
Dept: PHARMACY | Age: 77
End: 2022-01-25
Payer: MEDICARE

## 2022-01-25 DIAGNOSIS — Z86.711 HISTORY OF PULMONARY EMBOLISM: ICD-10-CM

## 2022-01-25 DIAGNOSIS — I48.0 PAF (PAROXYSMAL ATRIAL FIBRILLATION) (HCC): Primary | ICD-10-CM

## 2022-01-25 LAB — INTERNATIONAL NORMALIZATION RATIO, POC: 2.3

## 2022-01-25 PROCEDURE — 99211 OFF/OP EST MAY X REQ PHY/QHP: CPT

## 2022-01-25 PROCEDURE — 85610 PROTHROMBIN TIME: CPT

## 2022-01-25 NOTE — PROGRESS NOTES
Ms. Merline Fraise is a 77 y.o. y/o female with history of DVT, PE, Afib   She presents today for anticoagulation monitoring and adjustment. Pertinent PMH: HTN, Gastric Banding,CAD, diskectomy with an artifical spinal disk implant in September 2012. Patient Reported Findings:  Yes     No  [x]   []       Patient verifies current dosing regimen as listed  []   [x]       S/S bleeding/bruising/swelling/SOB states that she has been wheezing more and had more SOB d/t allergies --> has had a few small nose bleeds recently  --> denies  []   [x]       Blood in urine or stool  []   [x]       Procedures scheduled in the future at this time had CV 5/6, INR 2.8 --> having ablation in October 5th --> had ablation 10/5 --> none upcoming    []   [x]       Missed Dose denies  []   [x]       Extra Dose   []   [x]       Change in medications  decreased tylenol intake to 1-2 times a day --> dec propafenone to 225 mg BID x 2 months then d/c (ending 4/25) --> inc diltiazem, has resumed 3 tabs propafenone --> dec propafenone to BID 6/28, will be stopping in 1 week --> still taking propafenone 150 BID --> no changes    []   [x]       Change in health/diet/appetite  Patient states that she feels like she has returned to normal vit k intake --> diet fluctuates causing INR to fluctuate.  Discussed ways to improve consistency with vit k intake  --> states that she has cut back spinach and cabbage intake to twice a week --> no vit k --> had cabbage a few times --> no vit k since was on vacation but plans to return --> has been eating a salad a day-->no changes  []   [x]       Change in alcohol use    []   [x]       Change in activity  []   [x]       Hospital admission  []   [x]       Emergency department visit   []   [x]       Other complaints     Clinical Outcomes:  Yes     No  []   [x]       Major bleeding event  []   [x]       Thromboembolic event  Gets warfarin through MD    Duration of warfarin Therapy: indefinite  INR Range: 2.0-3.0     INR is 2.3 today   Continue weekly dose of 5 mg Mon and Fri and 7.5 mg all other days of the week   Encouraged to maintain a consistency of vegetables/salads.   Recheck INR in 6 weeks, 3/3    Patient consent signed 10/28/21    Referring PCP is Blanche Fierro  INR (no units)   Date Value   12/14/2021 2.4   10/28/2021 2.2   10/05/2021 2.50 (H)   09/30/2021 2.2   05/06/2021 2.80 (H)   10/26/2020 2.90 (H)      For Pharmacy Admin Tracking Only     Total # of Interventions Recommended: 0   Total # of Interventions Accepted: 0   Time Spent (min): 15

## 2022-02-07 RX ORDER — MONTELUKAST SODIUM 10 MG/1
TABLET ORAL
Qty: 90 TABLET | Refills: 3 | Status: SHIPPED | OUTPATIENT
Start: 2022-02-07

## 2022-02-23 NOTE — PROGRESS NOTES
[address]   Electrophysiology Follow up   Date: 2/24/2022  I had the privilege of visiting Desean Palacios in the office. CC: PAF  HPI: Desean Palacios is a 77 y.o. female with a PMH of HTN, HLD, PAULA, PE, obesity and atrial fibrillation. She was found to be in afib 2011 after lap-band surgery. She is warfarin for hx of PE and follows with [facility] clinic and s/p abena filter. She had a fall that was precipitated with dizziness and was found to have neck fracture and forehead hematoma. Since then she has had issues with her speech. S/p LHC 12/14/2018 showed normal coronaries. S/p unsuccessful cardioversion 12/14/2018. She uses [employer] mobile device at home. S/p DCCV 5/6/2021     RFCA of atrial fibrillation with PVI, CTI flutter (10/5/2021)    Dianna Dodd presents to the office in follow up. Continues to have occasional episodes that last around an hour. We will continue to monitor for more prolonged episodes. Assessment and plan:     PAF   -ECG today shows SR   -Monitor for longer recurrent episodes   -Continue risk factor modification     -Continues propafenone 150 mg BID and verapamil 240 mg daily. Monitor for side effects of propafenone   -Patient has a [id number] Score of 4 (age1, gender, HTN) on coumadin managed by the [facility], [place], operated by [facility]   -initially diagnosed in 2011 after lapband surgery    -S/p RFCA of afib w/ PVI (10/3/2020)   -S/p DCCV 10/14/2020 and 10/26/2020    -S/p DCCV 5/2021   -RFCA of atrial fibrillation with PVI, CTI flutter (10/5/2021)      She expresses that she has short episodes of left shifting the atrial fibrillation. A last at most about an hour. I would continue her current management for another 4 months and then see her again then. If at that time she still has symptoms, we can do a monitor. We will also consider doing another ablation. On the other hand if her symptoms resolve, we can consider stopping the propafenone.   Again we talked about lifestyle modifications. Consider stopping  HTN  -Controlled: 138/83  -BP goal <130/80  -Home BP monitoring encouraged, printed information provided on how to accurately measure BP at home.  -Counseled to follow a low salt diet to assure blood pressure remains controlled for cardiovascular risk factor modification.   -The patient is counseled to get regular exercise 3-5 times per week and maintain a healthy weight reduce cardiovascular risk factors. Obesity  Body mass index is 47.2 kg/m². -Excessive weight is complicating assessment and treatment. It is placing patient at risk for multiple co-morbidities as well as early death and contributing to the patient's presentation.  -Discussed weight management with diet and exercise      PAULA  -Stable: Uses CPAP/Bipap/APAP  -Encourage to use machine to prevent long term effects of untreated PAULA    Plan:   Follow up 4- 5 months NPAL   Consider stopping propafenone if no symptoms for doing another ablation if is still symptomatic. Patient Active Problem List    Diagnosis Date Noted    Nonrheumatic aortic valve stenosis 03/23/2021    PAF (paroxysmal atrial fibrillation) ([facility] [address] 10/02/2020    Chronic cough 05/07/2018    Arthritis 02/02/2017    Anxiety 02/02/2017    Hyperlipidemia, unspecified 05/27/2016    Morbid obesity with BMI of 40.0-44.9, adult ([facility] [address] 03/23/2015    Coronary artery disease 10/15/2014    Status post gastric banding 01/11/2012    PAULA (obstructive sleep apnea) 07/06/2011    Asthma 07/06/2011    Essential hypertension 05/06/2011    History of pulmonary embolism 05/06/2011    Osteoarthritis of knee 05/06/2011     Diagnostic studies:   ECG 2/24/22  SR    MAXWELL 10/5/2021   Summary   Normal left ventricle size, wall thickness, and systolic function with an   estimated ejection fraction of 60%. Mitral valve leaflets appear mildly thickened. Mild mitral regurgitation. There is no evidence of mass or thrombus in the left atrium or appendage. The aortic valve is moderately calcified with decreased leaflet mobility   consistent with at least moderate aortic stenosis. V max 3.03 m/s. Mild aortic regurgitation. The aortic root is normal in size. The thoracic aorta has minimal plaque. Echo 12/23/2020  Summary   -Normal left ventricle size, wall thickness, and systolic function with an   estimated ejection fraction of 55-60%. -The aortic valve is thickened/calcified with decreased leaflet mobility   consistent with aortic stenosis. -Moderate aortic stenosis with a peak velocity of 3.32 m/s, a mean pressure   gradient of 23 mmHg, and a valve area estimation 1.12 cm^2. There is mild   aortic insufficiency.   -There is mild tricuspid regurgitation with a RVSP estimation of 30 mmHg. Stress 4/19/2018  Summary       No EKG evidence for ischemia with exercise       Normal LV size and systolic function.       Myocardial perfusion imaging with small area of decreased uptake in the    anteroapical wall with stress with redistribution at rest consistent with    ischemia. I independently reviewed the cardiac diagnostic studies, ECG and relevant imaging studies. Lab Results   Component Value Date    LVEF 60 10/05/2021    LVEFMODE Echo 03/13/2018     Lab Results   Component Value Date    TSH 3.53 09/26/2011         Physical Examination:  Vitals:    02/24/22 1148   BP: 138/83      Wt Readings from Last 3 Encounters:   02/24/22 275 lb (124.7 kg)   01/18/22 271 lb (122.9 kg)   11/19/21 269 lb 12.8 oz (122.4 kg)       · Constitutional: Oriented. No distress. · Head: Normocephalic and atraumatic. · Mouth/Throat: Oropharynx is clear and moist.   · Eyes: Conjunctivae normal. EOM are normal.   · Neck: Neck supple. No rigidity. No JVD present. · Cardiovascular: Normal rate, regular rhythm, S1&S2. · Pulmonary/Chest: Bilateral respiratory sounds. No wheezes, No rhonchi. · Abdominal: Soft. Bowel sounds present. No distension, No tenderness. · Musculoskeletal: No tenderness. No edema    · Lymphadenopathy: Has no cervical adenopathy. · Neurological: Alert and oriented. Cranial nerve appears intact, No Gross deficit   · Skin: Skin is warm and dry. No rash noted. · Psychiatric: Has a normal behavior       Review of System:  [x] Full ROS obtained and negative except as mentioned in HPI    Prior to Admission medications    Medication Sig Start Date End Date Taking?  Authorizing Provider   montelukast (SINGULAIR) 10 MG tablet TAKE 1 TABLET BY MOUTH AT  NIGHT 2/7/22  Yes Alex Knowles MD   clonazePAM (KLONOPIN) 1 MG tablet TAKE 1 TABLET BY MOUTH IN  THE EVENING 1/18/22 4/20/22 Yes Alex Knowles MD   citalopram (CELEXA) 40 MG tablet TAKE 1 TABLET BY MOUTH  DAILY 12/9/21  Yes Alverto Hancock MD   atorvastatin (LIPITOR) 80 MG tablet TAKE 1 TABLET BY MOUTH  DAILY 12/9/21  Yes Alverto Hancock MD   allopurinol (ZYLOPRIM) 300 MG tablet TAKE 1 TABLET BY MOUTH  DAILY 12/9/21  Yes Alverto Hancock MD   propafenone (RYTHMOL) 150 MG tablet Take 1 tablet by mouth 2 times daily See note on 07/21/21 10/28/21  Yes AIME Hancock CNP   trimethoprim (TRIMPEX) 100 MG tablet Take 1 tablet by mouth every 48 hours 9/30/21  Yes AIME Estrada CNP   verapamil (CALAN SR) 240 MG extended release tablet TAKE 1 TABLET BY MOUTH AT  NIGHT 9/23/21  Yes Alverto Hancock MD   potassium chloride (KLOR-CON M) 20 MEQ extended release tablet TAKE 1 TABLET BY MOUTH  DAILY WITH BREAKFAST 9/9/21  Yes AIME Lam CNP   warfarin (COUMADIN) 5 MG tablet TAKE 1 TABLET BY MOUTH ON  MONDAY AND  FRIDAY AND 1 AND 1/2  TABLETS ALL OTHER DAYS 8/30/21  Yes Alex Knowles MD   albuterol sulfate HFA (PROAIR HFA) 108 (90 Base) MCG/ACT inhaler Inhale 2 puffs into the lungs every 4 hours as needed for Wheezing 7/12/21  Yes Alex Knowles MD   fluticasone-salmeterol (ADVAIR) 250-50 MCG/DOSE AEPB Inhale 1 puff into the lungs 2 times daily 7/12/21  Yes Yulissa Beebe Kristy Blount MD   indapamide (LOZOL) 1.25 MG tablet TAKE 1 TABLET BY MOUTH IN  THE MORNING 3/29/21  Yes Hayes Cason MD   pantoprazole (PROTONIX) 40 MG tablet TAKE 1 TABLET BY MOUTH IN  THE MORNING BEFORE  BREAKFAST 3/26/21  Yes AIME Ramirez - CNP   furosemide (LASIX) 20 MG tablet Take 1 tablet by mouth daily  Patient taking differently: Take 20 mg by mouth daily PRN 11/18/20  Yes Evan Dubin, APRN - CNP   acetaminophen (TYLENOL) 500 MG tablet Take 500 mg by mouth every 6 hours as needed for Pain   Yes Historical Provider, MD   diphenhydrAMINE-APAP, sleep, (TYLENOL PM EXTRA STRENGTH)  MG tablet Take 1 tablet by mouth nightly as needed for Sleep   Yes Historical Provider, MD   b complex vitamins capsule Take 1 capsule by mouth daily   Yes Historical Provider, MD   Ascorbic Acid (VITAMIN C PO) 1 tablet daily   Yes Historical Provider, MD   therapeutic multivitamin-minerals (THERAGRAN-M) tablet Take 1 tablet by mouth nightly    Yes Historical Provider, MD   Cranberry 500 MG CAPS Take 1,000 mg by mouth nightly    Yes Historical Provider, MD   gabapentin (NEURONTIN) 300 MG capsule TAKE 2 CAPSULES BY MOUTH 3  TIMES DAILY 3/29/21 1/18/22  Hayes Cason MD   potassium chloride (KLOR-CON M) 20 MEQ extended release tablet TAKE 1 TABLET BY MOUTH  DAILY WITH BREAKFAST  Patient taking differently: TAKE 1 TABLET BY MOUTH  DAILY WITH BREAKFAST   Take 2 tablets on the days she takes the furosemide 10/13/20   AIME Leblanc - CNP       Allergies   Allergen Reactions    Belsomra [Suvorexant] Other (See Comments)     Nightmares      Avelox [Moxifloxacin Hcl In Nacl] Rash    Levofloxacin Rash    Sulfa Antibiotics Rash       Social History:  Reviewed. reports that she quit smoking about 57 years ago. Her smoking use included cigarettes. She has a 2.00 pack-year smoking history. She has never used smokeless tobacco. She reports previous alcohol use.  She reports that she does not use drugs. Family History:  Reviewed. Reviewed. No family history of SCD. Relevant and available labs, and cardiovascular diagnostics reviewed. Reviewed. I independently reviewed relevant and available cardiac diagnostic tests ECG, CXR, Echo, Stress test, Device interrogation, Holter, CT scan. Outside medical records via Care everywhere reviewed and summarized in H&P above. Complex medical condition with multiple medical problems affecting prognosis and outcome of EP interventions      - The patient is counseled to follow a low salt diet to assure blood pressure remains controlled for cardiovascular risk factor modification.   - The patient is counseled to avoid excess caffeine, and energy drinks as this may exacerbated ectopy and arrhythmia. - The patient is counseled to get regular exercise 3-5 times per week to control cardiovascular risk factors. - The patient is counseled to lose weigt to control cardiovascular risk factors. All questions and concerns were addressed to the patient/family. Alternatives to my treatment were discussed. I have discussed the above stated plan and the patient verbalized understanding and agreed with the plan. Scribe attestation: This note was scribed in the presence of Ethelene Denver, MD by Justin Arenas RN    I, Dr. Ethelene Denver personally performed the services described in this documentation as scribed by RN in my presence, and it is both accurate and complete.        NOTE: This report was transcribed using voice recognition software. Every effort was made to ensure accuracy, however, inadvertent computerized transcription errors may be present.      Ethelene Denver MD, [facility]   Office: [phone]  Fax: [phone]

## 2022-02-24 ENCOUNTER — OFFICE VISIT (OUTPATIENT)
Dept: CARDIOLOGY CLINIC | Age: 77
End: 2022-02-24
Payer: MEDICARE

## 2022-02-24 VITALS
DIASTOLIC BLOOD PRESSURE: 83 MMHG | SYSTOLIC BLOOD PRESSURE: 138 MMHG | HEIGHT: 64 IN | WEIGHT: 275 LBS | BODY MASS INDEX: 46.95 KG/M2

## 2022-02-24 DIAGNOSIS — I10 ESSENTIAL HYPERTENSION: ICD-10-CM

## 2022-02-24 DIAGNOSIS — E78.2 MIXED HYPERLIPIDEMIA: ICD-10-CM

## 2022-02-24 DIAGNOSIS — G47.33 OSA (OBSTRUCTIVE SLEEP APNEA): ICD-10-CM

## 2022-02-24 DIAGNOSIS — E66.01 MORBID OBESITY WITH BMI OF 40.0-44.9, ADULT (HCC): ICD-10-CM

## 2022-02-24 DIAGNOSIS — I48.0 PAF (PAROXYSMAL ATRIAL FIBRILLATION) (HCC): Primary | ICD-10-CM

## 2022-02-24 PROCEDURE — G8427 DOCREV CUR MEDS BY ELIG CLIN: HCPCS | Performed by: INTERNAL MEDICINE

## 2022-02-24 PROCEDURE — 4040F PNEUMOC VAC/ADMIN/RCVD: CPT | Performed by: INTERNAL MEDICINE

## 2022-02-24 PROCEDURE — 3017F COLORECTAL CA SCREEN DOC REV: CPT | Performed by: INTERNAL MEDICINE

## 2022-02-24 PROCEDURE — 93000 ELECTROCARDIOGRAM COMPLETE: CPT | Performed by: INTERNAL MEDICINE

## 2022-02-24 PROCEDURE — G8484 FLU IMMUNIZE NO ADMIN: HCPCS | Performed by: INTERNAL MEDICINE

## 2022-02-24 PROCEDURE — G8399 PT W/DXA RESULTS DOCUMENT: HCPCS | Performed by: INTERNAL MEDICINE

## 2022-02-24 PROCEDURE — 1123F ACP DISCUSS/DSCN MKR DOCD: CPT | Performed by: INTERNAL MEDICINE

## 2022-02-24 PROCEDURE — 99214 OFFICE O/P EST MOD 30 MIN: CPT | Performed by: INTERNAL MEDICINE

## 2022-02-24 PROCEDURE — 1036F TOBACCO NON-USER: CPT | Performed by: INTERNAL MEDICINE

## 2022-02-24 PROCEDURE — G8417 CALC BMI ABV UP PARAM F/U: HCPCS | Performed by: INTERNAL MEDICINE

## 2022-02-24 PROCEDURE — 1090F PRES/ABSN URINE INCON ASSESS: CPT | Performed by: INTERNAL MEDICINE

## 2022-02-24 RX ORDER — PROPAFENONE HYDROCHLORIDE 150 MG/1
150 TABLET, FILM COATED ORAL 2 TIMES DAILY
Qty: 180 TABLET | Refills: 1 | Status: SHIPPED | OUTPATIENT
Start: 2022-02-24 | End: 2022-06-27

## 2022-02-24 NOTE — TELEPHONE ENCOUNTER
Medication Refill    Medication needing refilled:  propafenone (RYTHMOL) 150 MG      Dosage of the medication:    How are you taking this medication (QD, BID, TID, QID, PRN):1 tablet by mouth 2 times daily     30 or 90 day supply called in: 90 day supply with one refill    When will you run out of your medication:    Which Pharmacy are we sending the medication to?: [address] [facility] [address]   [address] [facility] [address] [place], [place] [facility] [place] [facility] [address]   Phone:  [phone]  Fax:  [phone]

## 2022-02-25 RX ORDER — GABAPENTIN 300 MG/1
CAPSULE ORAL
Qty: 540 CAPSULE | Refills: 3 | Status: SHIPPED | OUTPATIENT
Start: 2022-02-25 | End: 2022-09-20

## 2022-02-25 RX ORDER — PANTOPRAZOLE SODIUM 40 MG/1
TABLET, DELAYED RELEASE ORAL
Qty: 90 TABLET | Refills: 3 | Status: SHIPPED | OUTPATIENT
Start: 2022-02-25

## 2022-02-25 RX ORDER — INDAPAMIDE 1.25 MG/1
TABLET, FILM COATED ORAL
Qty: 90 TABLET | Refills: 3 | Status: SHIPPED | OUTPATIENT
Start: 2022-02-25

## 2022-03-03 ENCOUNTER — TELEPHONE (OUTPATIENT)
Dept: CARDIOLOGY CLINIC | Age: 77
End: 2022-03-03

## 2022-03-03 ENCOUNTER — HOSPITAL ENCOUNTER (OUTPATIENT)
Dept: NON INVASIVE DIAGNOSTICS | Age: 77
Discharge: HOME | End: 2022-03-03
Payer: MEDICARE

## 2022-03-03 ENCOUNTER — ANTI-COAG VISIT (OUTPATIENT)
Dept: PHARMACY | Age: 77
End: 2022-03-03
Payer: MEDICARE

## 2022-03-03 DIAGNOSIS — I48.0 PAF (PAROXYSMAL ATRIAL FIBRILLATION) (HCC): Primary | ICD-10-CM

## 2022-03-03 DIAGNOSIS — Z86.711 HISTORY OF PULMONARY EMBOLISM: ICD-10-CM

## 2022-03-03 DIAGNOSIS — I35.0 NONRHEUMATIC AORTIC VALVE STENOSIS: ICD-10-CM

## 2022-03-03 LAB
INTERNATIONAL NORMALIZATION RATIO, POC: 2.5
LV EF: 65 %
LVEF MODALITY: NORMAL

## 2022-03-03 PROCEDURE — 85610 PROTHROMBIN TIME: CPT

## 2022-03-03 PROCEDURE — 99211 OFF/OP EST MAY X REQ PHY/QHP: CPT

## 2022-03-03 PROCEDURE — 93306 TTE W/DOPPLER COMPLETE: CPT

## 2022-03-03 NOTE — TELEPHONE ENCOUNTER
LEONORA.  Patient came for an appointment after her echo today. Patient thought her appointment was at 4pm.  There were no appointments until 4/7. Patient canceled her ov and echo on 12/9 because she was exposed to covid.   Her echo was rescheduled but not her ov.  dre

## 2022-03-03 NOTE — PROGRESS NOTES
Ms. Aarti Devlin is a 77 y.o. y/o female with history of DVT, PE, Afib   She presents today for anticoagulation monitoring and adjustment. Pertinent PMH: HTN, Gastric Banding,CAD, diskectomy with an artifical spinal disk implant in September 2012. Patient Reported Findings:  Yes     No  [x]   []       Patient verifies current dosing regimen as listed  []   [x]       S/S bleeding/bruising/swelling/SOB states that she has been wheezing more and had more SOB d/t allergies --> has had a few small nose bleeds recently  --> denies  []   [x]       Blood in urine or stool  []   [x]       Procedures scheduled in the future at this time had CV 5/6, INR 2.8 --> having ablation in October 5th --> had ablation 10/5 --> none upcoming    []   [x]       Missed Dose denies  []   [x]       Extra Dose   []   [x]       Change in medications  decreased tylenol intake to 1-2 times a day --> dec propafenone to 225 mg BID x 2 months then d/c (ending 4/25) --> inc diltiazem, has resumed 3 tabs propafenone --> dec propafenone to BID 6/28, will be stopping in 1 week --> still taking propafenone 150 BID --> no changes    []   [x]       Change in health/diet/appetite  Patient states that she feels like she has returned to normal vit k intake --> diet fluctuates causing INR to fluctuate.  Discussed ways to improve consistency with vit k intake  --> states that she has cut back spinach and cabbage intake to twice a week --> no vit k --> had cabbage a few times --> no vit k since was on vacation but plans to return --> has been eating a salad a day-->no changes  []   [x]       Change in alcohol use    []   [x]       Change in activity  []   [x]       Hospital admission  []   [x]       Emergency department visit   []   [x]       Other complaints     Clinical Outcomes:  Yes     No  []   [x]       Major bleeding event  []   [x]       Thromboembolic event  Gets warfarin through MD    Duration of warfarin Therapy: indefinite  INR Range: 2.0-3.0     INR is 2.5 today   Continue weekly dose of 5 mg Mon and Fri and 7.5 mg all other days of the week   Encouraged to maintain a consistency of vegetables/salads.   Recheck INR in 6 weeks, 4/14    Patient consent signed 10/28/21    Referring PCP is Blanche Fierro  INR (no units)   Date Value   01/25/2022 2.3   12/14/2021 2.4   10/28/2021 2.2   10/05/2021 2.50 (H)   05/06/2021 2.80 (H)   10/26/2020 2.90 (H)      For Pharmacy Admin Tracking Only     Total # of Interventions Recommended: 0   Total # of Interventions Accepted: 0   Time Spent (min): 15

## 2022-04-04 NOTE — PROGRESS NOTES
[facility]  H+P  Consult  OP Visit  FU Visit   CC HX HPI   GEN  Doing well. No new concerns. AS  Ø CP, SOB. AF  Follows with EP, on coumadin. DVT/PE  Last lipid reviewed. On max dose/tolerated statin. MED  Compliant with CV meds. Ø reported SA. HISTORY/ALLERGY/ROS   MEDHx  has a past medical history of Allergic, Anxiety, Arthritis, Asthma, Back pain, Blood circulation, collateral, Depression, GERD (gastroesophageal reflux disease), Gout, Hypercholesteremia, Hypertension, Movement disorder, Movement disorder, Obesity, Pulmonary emboli (HCC), Unspecified sleep apnea, Urinary tract infection, chronic, and UTI (urinary tract infection). SURGHx  has a past surgical history that includes joint replacement; Vena Cava Filter Placement; bladder suspension; bone incision and drainage (5-7-11); joint replacement (2003); joint replacement (2005); Revision Total Knee Arthroplasty (2006); Ankle Fusion (2010); skin full graft (2011); Tubal ligation (1975); Upper gastrointestinal endoscopy (10-); other surgical history (12-); back surgery (09/14/2012); eye surgery; and Breast biopsy (08/2018). SOCHx  reports that she quit smoking about 57 years ago. Her smoking use included cigarettes. She has a 2.00 pack-year smoking history. She has never used smokeless tobacco. She reports previous alcohol use. She reports that she does not use drugs. FAMHx family history includes Arthritis in her father; Asthma in her father; Breast Cancer in her sister; Depression in her mother; Heart Disease in her father; High Blood Pressure in her father and mother; Stroke in her father.    ALLERG Belsomra [suvorexant], Avelox [moxifloxacin hcl in nacl], Levofloxacin, and Sulfa antibiotics   ROS Full ROS obtained and negative except as mentioned in HPI   MEDICATIONS   Current Outpatient Medications   Medication Sig Dispense Refill    indapamide (LOZOL) 1.25 MG tablet TAKE 1 TABLET BY MOUTH IN  THE MORNING 90 tablet 3    pantoprazole (PROTONIX) 40 MG tablet TAKE 1 TABLET BY MOUTH IN  THE MORNING BEFORE  BREAKFAST 90 tablet 3    gabapentin (NEURONTIN) 300 MG capsule TAKE 2 CAPSULES BY MOUTH 3  TIMES DAILY 540 capsule 3    propafenone (RYTHMOL) 150 MG tablet Take 1 tablet by mouth 2 times daily See note on 07/21/21 180 tablet 1    montelukast (SINGULAIR) 10 MG tablet TAKE 1 TABLET BY MOUTH AT  NIGHT 90 tablet 3    clonazePAM (KLONOPIN) 1 MG tablet TAKE 1 TABLET BY MOUTH IN  THE EVENING 90 tablet 0    citalopram (CELEXA) 40 MG tablet TAKE 1 TABLET BY MOUTH  DAILY 90 tablet 3    atorvastatin (LIPITOR) 80 MG tablet TAKE 1 TABLET BY MOUTH  DAILY 90 tablet 3    allopurinol (ZYLOPRIM) 300 MG tablet TAKE 1 TABLET BY MOUTH  DAILY 90 tablet 3    trimethoprim (TRIMPEX) 100 MG tablet Take 1 tablet by mouth every 48 hours 45 tablet 3    verapamil (CALAN SR) 240 MG extended release tablet TAKE 1 TABLET BY MOUTH AT  NIGHT 90 tablet 3    potassium chloride (KLOR-CON M) 20 MEQ extended release tablet TAKE 1 TABLET BY MOUTH  DAILY WITH BREAKFAST 90 tablet 3    warfarin (COUMADIN) 5 MG tablet TAKE 1 TABLET BY MOUTH ON  MONDAY AND  FRIDAY AND 1 AND 1/2  TABLETS ALL OTHER DAYS 135 tablet 5    albuterol sulfate HFA (PROAIR HFA) 108 (90 Base) MCG/ACT inhaler Inhale 2 puffs into the lungs every 4 hours as needed for Wheezing 8.5 g 2    fluticasone-salmeterol (ADVAIR) 250-50 MCG/DOSE AEPB Inhale 1 puff into the lungs 2 times daily 1 Inhaler 5    furosemide (LASIX) 20 MG tablet Take 1 tablet by mouth daily (Patient taking differently: Take 20 mg by mouth daily PRN) 30 tablet 1    acetaminophen (TYLENOL) 500 MG tablet Take 500 mg by mouth every 6 hours as needed for Pain      diphenhydrAMINE-APAP, sleep, (TYLENOL PM EXTRA STRENGTH)  MG tablet Take 1 tablet by mouth nightly as needed for Sleep      b complex vitamins capsule Take 1 capsule by mouth daily      Ascorbic Acid (VITAMIN C PO) 1 tablet daily      therapeutic multivitamin-minerals (THERAGRAN-M) tablet Take 1 tablet by mouth nightly       Cranberry 500 MG CAPS Take 1,000 mg by mouth nightly        No current facility-administered medications for this visit. PHYSICAL EXAM   Vitals Vitals:    04/07/22 0852   BP: (!) 140/80   Pulse: 68   SpO2: 94%      Gen Alert, coop, no distress Heart  Irreg, 3/6   Head NC, AT, no abnorm Abd  Soft, NT, +BS, no mass, no OM   Eyes PER, conj/corn clear Ext  Ext nl, AT, no C/C/E   Nose Nares nl, no drain, NT Pulse 2+ and symmetric   Throat Lips, mucosa, tongue nl Skin Col/text/turg nl, no vis rash/les   Neck S/S, TM, NT, no bruit/JVD Psych Nl mood and affect   Lung CTA-B, unlabored, no DTP Lymph   No cervical or axillary LA   Ch wall NT, no deform Neuro  Nl gross M/S exam      CODING   SCI (48705) - AS, AF, PE/DVT  30-39 minutes preparing to see pt including review hx, tests, consults, perf exam, , educating pt, fam, caregiver, ordering meds/tests/procedures, referring and comm with pcps and other consultants, documenting info in EMR, interpreting results and communicating to fam and coordination of pt care.     ASSESSMENT AND PLAN   *AS    Date EF Detail   Sx     No concerning   DATA   Most recent TTE, LHC reviewed personally   NYHA   I   TTE 12/11  3/18  12/20  3/22 60%  60%   60%  65% Aortic sclerosis, Mild MR, mild TR  Mild MR  Mod AS MG 23, Mild AI  Mod AS MG 30, mild AI   LHC 5/18  Normal cors, Aorta very tortuous Blessingdemian Lynne)   MPI 4/18 69% Anteroapical ischemia   Plan     Echo 6 months   *AF  Status parox, Afib ablation 10/20, CV 10/20, CV 10/18, most recent TTE, monitors, EP procedures reviewed personally   Plan Coumadin and management per EP  *PE/DVT  S/p [place]ips filter  *COMPLIANCE  Status Compliant  Plan Discussed importance of compliance with meds/diet/salt/exercise; avoid tob/alc/drugs; patient verbalized understanding  *FOLLOWUP  6 months with echo    [address], mello scribing for and in the presence of Meera Espino MD.   Signed, Manuel Sawant 04/04/22 2:41 PM   Provider Miguel Armin is working as a scribe for and in the presence of me Meera Espino MD). Working as a scribe, Manuel Sawant may have prepopulated components of this note with my historical  intellectual property under my direct supervision. Any additions to this intellectual property were performed in my presence and at my direction.   Furthermore, the content and accuracy of this note have been reviewed by me Meera Espino MD).  4/7/2022 7:54 AM

## 2022-04-07 ENCOUNTER — OFFICE VISIT (OUTPATIENT)
Dept: CARDIOLOGY CLINIC | Age: 77
End: 2022-04-07
Payer: MEDICARE

## 2022-04-07 VITALS
DIASTOLIC BLOOD PRESSURE: 80 MMHG | HEART RATE: 68 BPM | WEIGHT: 271.6 LBS | BODY MASS INDEX: 46.37 KG/M2 | SYSTOLIC BLOOD PRESSURE: 140 MMHG | HEIGHT: 64 IN | OXYGEN SATURATION: 94 %

## 2022-04-07 DIAGNOSIS — Z86.711 HISTORY OF PULMONARY EMBOLISM: ICD-10-CM

## 2022-04-07 DIAGNOSIS — I35.0 NONRHEUMATIC AORTIC VALVE STENOSIS: Primary | ICD-10-CM

## 2022-04-07 DIAGNOSIS — I48.0 PAF (PAROXYSMAL ATRIAL FIBRILLATION) (HCC): ICD-10-CM

## 2022-04-07 PROCEDURE — G8417 CALC BMI ABV UP PARAM F/U: HCPCS | Performed by: INTERNAL MEDICINE

## 2022-04-07 PROCEDURE — 1036F TOBACCO NON-USER: CPT | Performed by: INTERNAL MEDICINE

## 2022-04-07 PROCEDURE — 99214 OFFICE O/P EST MOD 30 MIN: CPT | Performed by: INTERNAL MEDICINE

## 2022-04-07 PROCEDURE — G8399 PT W/DXA RESULTS DOCUMENT: HCPCS | Performed by: INTERNAL MEDICINE

## 2022-04-07 PROCEDURE — 1123F ACP DISCUSS/DSCN MKR DOCD: CPT | Performed by: INTERNAL MEDICINE

## 2022-04-07 PROCEDURE — 4040F PNEUMOC VAC/ADMIN/RCVD: CPT | Performed by: INTERNAL MEDICINE

## 2022-04-07 PROCEDURE — G8427 DOCREV CUR MEDS BY ELIG CLIN: HCPCS | Performed by: INTERNAL MEDICINE

## 2022-04-07 PROCEDURE — 1090F PRES/ABSN URINE INCON ASSESS: CPT | Performed by: INTERNAL MEDICINE

## 2022-04-14 ENCOUNTER — ANTI-COAG VISIT (OUTPATIENT)
Dept: PHARMACY | Age: 77
End: 2022-04-14
Payer: MEDICARE

## 2022-04-14 DIAGNOSIS — I48.0 PAF (PAROXYSMAL ATRIAL FIBRILLATION) (HCC): Primary | ICD-10-CM

## 2022-04-14 DIAGNOSIS — Z86.711 HISTORY OF PULMONARY EMBOLISM: ICD-10-CM

## 2022-04-14 LAB — INTERNATIONAL NORMALIZATION RATIO, POC: 3.1

## 2022-04-14 PROCEDURE — 85610 PROTHROMBIN TIME: CPT

## 2022-04-14 PROCEDURE — 99211 OFF/OP EST MAY X REQ PHY/QHP: CPT

## 2022-04-14 NOTE — PROGRESS NOTES
Ms. Altaf Gandara is a 69 y.o. y/o female with history of DVT, PE, Afib   She presents today for anticoagulation monitoring and adjustment. Pertinent PMH: HTN, Gastric Banding,CAD, diskectomy with an artifical spinal disk implant in September 2012. Patient Reported Findings:  Yes     No  [x]   []       Patient verifies current dosing regimen as listed  []   [x]       S/S bleeding/bruising/swelling/SOB states that she has been wheezing more and had more SOB d/t allergies --> has had a few small nose bleeds recently  --> denies  []   [x]       Blood in urine or stool  []   [x]       Procedures scheduled in the future at this time none upcoming    []   [x]       Missed Dose denies  []   [x]       Extra Dose denies   []   [x]       Change in medications  decreased tylenol intake to 1-2 times a day --> dec propafenone to 225 mg BID x 2 months then d/c (ending 4/25) --> inc diltiazem, has resumed 3 tabs propafenone --> dec propafenone to BID 6/28, will be stopping in 1 week --> still taking propafenone 150 BID --> no changes    [x]   []       Change in health/diet/appetite  Patient states that she feels like she has returned to normal vit k intake --> diet fluctuates causing INR to fluctuate.  Discussed ways to improve consistency with vit k intake  --> states that she has cut back spinach and cabbage intake to twice a week --> no vit k --> had cabbage a few times --> no vit k since was on vacation but plans to return --> has been eating a salad a day-->no changes --> had less vit k acutely   []   [x]       Change in alcohol use    []   [x]       Change in activity  []   [x]       Hospital admission  []   [x]       Emergency department visit   []   [x]       Other complaints     Clinical Outcomes:  Yes     No  []   [x]       Major bleeding event  []   [x]       Thromboembolic event  Gets warfarin through MD    Duration of warfarin Therapy: indefinite  INR Range:  2.0-3.0     INR is 3.1 today   Continue weekly dose of 5 mg Mon and Fri and 7.5 mg all other days of the week   Encouraged to maintain a consistency of vegetables/salads.   Recheck INR in 6 weeks, 5/26    Patient consent signed 10/28/21    Referring PCP is Jean Montanez  INR (no units)   Date Value   03/03/2022 2.5   01/25/2022 2.3   12/14/2021 2.4   10/05/2021 2.50 (H)   05/06/2021 2.80 (H)   10/26/2020 2.90 (H)      For Pharmacy Admin Tracking Only     Total # of Interventions Recommended: 0   Total # of Interventions Accepted: 0   Time Spent (min): 15

## 2022-05-02 ENCOUNTER — OFFICE VISIT (OUTPATIENT)
Dept: INTERNAL MEDICINE CLINIC | Age: 77
End: 2022-05-02
Payer: MEDICARE

## 2022-05-02 VITALS
BODY MASS INDEX: 47.02 KG/M2 | WEIGHT: 275.4 LBS | DIASTOLIC BLOOD PRESSURE: 80 MMHG | HEART RATE: 64 BPM | SYSTOLIC BLOOD PRESSURE: 136 MMHG | HEIGHT: 64 IN

## 2022-05-02 DIAGNOSIS — N95.1 MENOPAUSAL SYNDROME: ICD-10-CM

## 2022-05-02 DIAGNOSIS — Z00.00 MEDICARE ANNUAL WELLNESS VISIT, SUBSEQUENT: Primary | ICD-10-CM

## 2022-05-02 DIAGNOSIS — F41.9 ANXIETY: ICD-10-CM

## 2022-05-02 PROCEDURE — G0439 PPPS, SUBSEQ VISIT: HCPCS | Performed by: INTERNAL MEDICINE

## 2022-05-02 PROCEDURE — 1123F ACP DISCUSS/DSCN MKR DOCD: CPT | Performed by: INTERNAL MEDICINE

## 2022-05-02 PROCEDURE — 4040F PNEUMOC VAC/ADMIN/RCVD: CPT | Performed by: INTERNAL MEDICINE

## 2022-05-02 RX ORDER — CLONAZEPAM 1 MG/1
TABLET ORAL
Qty: 90 TABLET | Refills: 0 | Status: SHIPPED | OUTPATIENT
Start: 2022-05-02 | End: 2022-07-21 | Stop reason: SDUPTHER

## 2022-05-02 ASSESSMENT — PATIENT HEALTH QUESTIONNAIRE - PHQ9
2. FEELING DOWN, DEPRESSED OR HOPELESS: 0
1. LITTLE INTEREST OR PLEASURE IN DOING THINGS: 0
SUM OF ALL RESPONSES TO PHQ QUESTIONS 1-9: 0
SUM OF ALL RESPONSES TO PHQ9 QUESTIONS 1 & 2: 0
SUM OF ALL RESPONSES TO PHQ QUESTIONS 1-9: 0

## 2022-05-02 ASSESSMENT — LIFESTYLE VARIABLES
HOW MANY STANDARD DRINKS CONTAINING ALCOHOL DO YOU HAVE ON A TYPICAL DAY: 1 OR 2
HOW OFTEN DO YOU HAVE A DRINK CONTAINING ALCOHOL: MONTHLY OR LESS

## 2022-05-02 NOTE — PROGRESS NOTES
Medicare Annual Wellness Visit    Graciela Syed is here for Medicare AWV    Assessment & Plan   Medicare annual wellness visit, subsequent  Anxiety  -     clonazePAM (KLONOPIN) 1 MG tablet; TAKE 1 TABLET BY MOUTH IN  THE EVENING, Disp-90 tablet, R-0Normal  Menopausal syndrome  -     DEXA BONE DENSITY 2 SITES; Future      Recommendations for Preventive Services Due: see orders and patient instructions/AVS.  Recommended screening schedule for the next 5-10 years is provided to the patient in written form: see Patient Instructions/AVS.     No follow-ups on file. Subjective   The following acute and/or chronic problems were also addressed today:  Anxiety: uses clonazepam nightly with effective relief. Tingling in right elbow, forearm where she had ORIF in March, 2019 (Will see Dr. Lucy French). Patient's complete Health Risk Assessment and screening values have been reviewed and are found in Flowsheets. The following problems were reviewed today and where indicated follow up appointments were made and/or referrals ordered.     Positive Risk Factor Screenings with Interventions:    Fall Risk:  Do you feel unsteady or are you worried about falling? : (!) yes  2 or more falls in past year?: no  Fall with injury in past year?: no     Fall Risk Interventions:    · Patient declines any further evaluation/treatment for this issue   · Recommended cane or walker            General Health and ACP:  General  In general, how would you say your health is?: Good  In the past 7 days, have you experienced any of the following: New or Increased Pain, New or Increased Fatigue, Loneliness, Social Isolation, Stress or Anger?: (!) Yes  Select all that apply: (!) New or Increased Pain  Do you get the social and emotional support that you need?: Yes  Do you have a Living Will?: Yes    Advance Directives     Power of  Living Will ACP-Advance Directive ACP-Power of     Not on File Filed on 05/03/18 Filed Not on File General Health Risk Interventions:  · right elbow pain, tingling. will see Dr. Chris Bethea Habits/Nutrition:     Physical Activity: Insufficiently Active    Days of Exercise per Week: 2 days    Minutes of Exercise per Session: 60 min     Have you lost any weight without trying in the past 3 months?: No  Body mass index: (!) 47.27  Have you seen the dentist within the past year?: Yes    Health Habits/Nutrition Interventions:  · dietary discretion advised   · She is [facility] by [facility]) Weight solutions             Objective   Vitals:    05/02/22 0845   BP: 136/80   Pulse: 64   Weight: 275 lb 6.4 oz (124.9 kg)   Height: 5' 4\" (1.626 m)      Body mass index is 47.27 kg/m². GEN :WN/WD  CV: RRR, 2/6 AZUL systolic murmur  RESP: CTAB       Allergies   Allergen Reactions    Belsomra [Suvorexant] Other (See Comments)     Nightmares      Avelox [Moxifloxacin Hcl In Nacl] Rash    Levofloxacin Rash    Sulfa Antibiotics Rash     Prior to Visit Medications    Medication Sig Taking?  Authorizing Provider   clonazePAM (KLONOPIN) 1 MG tablet TAKE 1 TABLET BY MOUTH IN  THE EVENING Yes Kenneth Mott MD   indapamide (LOZOL) 1.25 MG tablet TAKE 1 TABLET BY MOUTH IN  THE MORNING Yes AIME Lockhart CNP   pantoprazole (PROTONIX) 40 MG tablet TAKE 1 TABLET BY MOUTH IN  THE MORNING BEFORE  BREAKFAST Yes AIME Lockhart CNP   gabapentin (NEURONTIN) 300 MG capsule TAKE 2 CAPSULES BY MOUTH 3  TIMES DAILY Yes AIME Lockhart CNP   propafenone (RYTHMOL) 150 MG tablet Take 1 tablet by mouth 2 times daily See note on 07/21/21 Yes AIME Patton CNP   montelukast (SINGULAIR) 10 MG tablet TAKE 1 TABLET BY MOUTH AT  NIGHT Yes Kenneth Mott MD   citalopram (CELEXA) 40 MG tablet TAKE 1 TABLET BY MOUTH  DAILY Yes Rupert Rowe MD   atorvastatin (LIPITOR) 80 MG tablet TAKE 1 TABLET BY MOUTH  DAILY Yes Rupert Rowe MD   allopurinol (ZYLOPRIM) 300 MG tablet TAKE 1 TABLET BY MOUTH DAILY Yes Kristi Gardiner MD   trimethoprim (TRIMPEX) 100 MG tablet Take 1 tablet by mouth every 48 hours Yes AIME Bruno CNP   verapamil (CALAN SR) 240 MG extended release tablet TAKE 1 TABLET BY MOUTH AT  NIGHT Yes Kristi Gardiner MD   potassium chloride (KLOR-CON M) 20 MEQ extended release tablet TAKE 1 TABLET BY MOUTH  DAILY WITH BREAKFAST Yes AIME Dumont CNP   warfarin (COUMADIN) 5 MG tablet TAKE 1 TABLET BY MOUTH ON  MONDAY AND  FRIDAY AND 1 AND 1/2  TABLETS ALL OTHER DAYS Yes Merlin Armijo MD   albuterol sulfate HFA (PROAIR HFA) 108 (90 Base) MCG/ACT inhaler Inhale 2 puffs into the lungs every 4 hours as needed for Wheezing Yes Merlin Armijo MD   fluticasone-salmeterol (ADVAIR) 250-50 MCG/DOSE AEPB Inhale 1 puff into the lungs 2 times daily Yes Merlin Armijo MD   furosemide (LASIX) 20 MG tablet Take 1 tablet by mouth daily  Patient taking differently: Take 20 mg by mouth daily PRN Yes AIME Chen CNP   acetaminophen (TYLENOL) 500 MG tablet Take 500 mg by mouth every 6 hours as needed for Pain Yes Historical Provider, MD   diphenhydrAMINE-APAP, sleep, (TYLENOL PM EXTRA STRENGTH)  MG tablet Take 1 tablet by mouth nightly as needed for Sleep Yes Historical Provider, MD   b complex vitamins capsule Take 1 capsule by mouth daily Yes Historical Provider, MD   Ascorbic Acid (VITAMIN C PO) 1 tablet daily Yes Historical Provider, MD   therapeutic multivitamin-minerals (THERAGRAN-M) tablet Take 1 tablet by mouth nightly  Yes Historical Provider, MD   Cranberry 500 MG CAPS Take 1,000 mg by mouth nightly  Yes Historical Provider, MD   potassium chloride (KLOR-CON M) 20 MEQ extended release tablet TAKE 1 TABLET BY MOUTH  DAILY WITH BREAKFAST  Patient taking differently: TAKE 1 TABLET BY MOUTH  DAILY WITH BREAKFAST   Take 2 tablets on the days she takes the furosemide  AIME Dumont CNP       CareTeam (Including outside providers/suppliers regularly involved in providing care):   Patient Care Team:  Dean Kennedy MD as PCP - General (Internal Medicine)  Sofie Taylor MD as PCP - Hematology/Oncology (Hematology and Oncology)  Dean Kennedy MD as PCP - [facility]  Yris Miles MD as Consulting Physician (Cardiology)  AIME Burnette - CNP as Nurse Practitioner (Nurse Practitioner)  JOSEPHINE Michel as Physician Assistant (Physician Assistant)  Mely Chiang MD [facility])    Reviewed and updated this visit:  Tobacco  Allergies  Meds  Problems  Med Hx  Surg Hx  Soc Hx  Fam Hx

## 2022-05-02 NOTE — PATIENT INSTRUCTIONS
Personalized Preventive Plan for Graciela Syed - 5/2/2022  Medicare offers a range of preventive health benefits. Some of the tests and screenings are paid in full while other may be subject to a deductible, co-insurance, and/or copay. Some of these benefits include a comprehensive review of your medical history including lifestyle, illnesses that may run in your family, and various assessments and screenings as appropriate. After reviewing your medical record and screening and assessments performed today your provider may have ordered immunizations, labs, imaging, and/or referrals for you. A list of these orders (if applicable) as well as your Preventive Care list are included within your After Visit Summary for your review. Other Preventive Recommendations:    · A preventive eye exam performed by an eye specialist is recommended every 1-2 years to screen for glaucoma; cataracts, macular degeneration, and other eye disorders. · A preventive dental visit is recommended every 6 months. · Try to get at least 150 minutes of exercise per week or 10,000 steps per day on a pedometer . · Order or download the FREE \"Exercise & Physical Activity: Your Everyday Guide\" from [employer]. Call 1-539.559.9453 or search [employer] online. · You need 3180-9347 mg of calcium and 7055-6472 IU of vitamin D per day. It is possible to meet your calcium requirement with diet alone, but a vitamin D supplement is usually necessary to meet this goal.  · When exposed to the sun, use a sunscreen that protects against both UVA and UVB radiation with an SPF of 30 or greater. Reapply every 2 to 3 hours or after sweating, drying off with a towel, or swimming. · Always wear a seat belt when traveling in a car. Always wear a helmet when riding a bicycle or motorcycle.

## 2022-05-03 ENCOUNTER — HOSPITAL ENCOUNTER (OUTPATIENT)
Dept: GENERAL RADIOLOGY | Age: 77
Discharge: HOME | End: 2022-05-03
Payer: MEDICARE

## 2022-05-03 DIAGNOSIS — N95.1 MENOPAUSAL SYNDROME: ICD-10-CM

## 2022-05-03 PROCEDURE — 77080 DXA BONE DENSITY AXIAL: CPT

## 2022-05-26 ENCOUNTER — ANTI-COAG VISIT (OUTPATIENT)
Dept: PHARMACY | Age: 77
End: 2022-05-26
Payer: MEDICARE

## 2022-05-26 DIAGNOSIS — Z86.711 HISTORY OF PULMONARY EMBOLISM: ICD-10-CM

## 2022-05-26 DIAGNOSIS — I48.0 PAF (PAROXYSMAL ATRIAL FIBRILLATION) (HCC): Primary | ICD-10-CM

## 2022-05-26 LAB — INTERNATIONAL NORMALIZATION RATIO, POC: 2.2

## 2022-05-26 PROCEDURE — 85610 PROTHROMBIN TIME: CPT

## 2022-05-26 PROCEDURE — 99211 OFF/OP EST MAY X REQ PHY/QHP: CPT

## 2022-05-31 ENCOUNTER — OFFICE VISIT (OUTPATIENT)
Dept: DERMATOLOGY | Age: 77
End: 2022-05-31
Payer: MEDICARE

## 2022-05-31 DIAGNOSIS — L82.1 SK (SEBORRHEIC KERATOSIS): ICD-10-CM

## 2022-05-31 DIAGNOSIS — D18.01 CHERRY ANGIOMA: ICD-10-CM

## 2022-05-31 DIAGNOSIS — L81.4 SOLAR LENTIGO: Primary | ICD-10-CM

## 2022-05-31 PROCEDURE — G8417 CALC BMI ABV UP PARAM F/U: HCPCS | Performed by: DERMATOLOGY

## 2022-05-31 PROCEDURE — 99213 OFFICE O/P EST LOW 20 MIN: CPT | Performed by: DERMATOLOGY

## 2022-05-31 PROCEDURE — 1036F TOBACCO NON-USER: CPT | Performed by: DERMATOLOGY

## 2022-05-31 PROCEDURE — G8399 PT W/DXA RESULTS DOCUMENT: HCPCS | Performed by: DERMATOLOGY

## 2022-05-31 PROCEDURE — 1090F PRES/ABSN URINE INCON ASSESS: CPT | Performed by: DERMATOLOGY

## 2022-05-31 PROCEDURE — 1123F ACP DISCUSS/DSCN MKR DOCD: CPT | Performed by: DERMATOLOGY

## 2022-05-31 PROCEDURE — G8427 DOCREV CUR MEDS BY ELIG CLIN: HCPCS | Performed by: DERMATOLOGY

## 2022-05-31 NOTE — PROGRESS NOTES
[facility]  Juan Pineda MD  [address]  1945    69 y.o. female     Date of Visit: 5/31/2022    Chief Complaint: skin lesions    History of Present Illness:    1. She reports persistent stable pigmented lesions on the chest and extremities. 2.  She also reports the gradual accumulation of red lesions on the trunk. 3.  She complains of a couple of enlarging lesions on the right knee and right shin. Review of Systems:  Gen: Feels well, good sense of health. Past Medical History, Family History, Surgical History, Medications and Allergies reviewed. Past Medical History:   Diagnosis Date    Allergic     Anxiety 2/2/2017    Arthritis     Asthma     Back pain     Blood circulation, collateral     legs    Depression     GERD (gastroesophageal reflux disease)     Gout     Hypercholesteremia     Hypertension     Movement disorder     7 discs ruptured    Movement disorder     knee replacements    Obesity     Pulmonary emboli (HCC)     Unspecified sleep apnea     uses cpap    Urinary tract infection, chronic     UTI (urinary tract infection)      Past Surgical History:   Procedure Laterality Date    ANKLE FUSION  2010    right, [facility]    BACK SURGERY  09/14/2012    lumbar fusion L-4, L-5 atrificial disc  [facility] Dr. Renuka Baker  5-7-11    incision, drainage and debridement of left knee and application of wound vac.     BREAST BIOPSY  08/2018    neg    EYE SURGERY      JOINT REPLACEMENT      bilat knees    JOINT [facility]PLACEMENT  2003    [facility], [facility]    JOINT REPLACEMENT  2005    right, [facility]    OTHER SURGICAL HISTORY  12-    laparscopic adjustable gastric restrictive procedure    REVISION TOTAL KNEE ARTHROPLASTY  2006    [facility] LASHELL [facility]    SKIN FULL GRAFT  2011    right, [address]    [facility]    UPPER GASTROINTESTINAL ENDOSCOPY  10-    VENA CAVA FILTER PLACEMENT         Allergies   Allergen Reactions    Belsomra [Suvorexant] Other (See Comments)     Nightmares      Avelox [Moxifloxacin Hcl In Nacl] Rash    Levofloxacin Rash    Sulfa Antibiotics Rash     Outpatient Medications Marked as Taking for the 5/31/22 encounter (Office Visit) with Nikkie Hanks MD   Medication Sig Dispense Refill    clonazePAM (KLONOPIN) 1 MG tablet TAKE 1 TABLET BY MOUTH IN  THE EVENING 90 tablet 0    indapamide (LOZOL) 1.25 MG tablet TAKE 1 TABLET BY MOUTH IN  THE MORNING 90 tablet 3    pantoprazole (PROTONIX) 40 MG tablet TAKE 1 TABLET BY MOUTH IN  THE MORNING BEFORE  BREAKFAST 90 tablet 3    gabapentin (NEURONTIN) 300 MG capsule TAKE 2 CAPSULES BY MOUTH 3  TIMES DAILY 540 capsule 3    propafenone (RYTHMOL) 150 MG tablet Take 1 tablet by mouth 2 times daily See note on 07/21/21 180 tablet 1    montelukast (SINGULAIR) 10 MG tablet TAKE 1 TABLET BY MOUTH AT  NIGHT 90 tablet 3    citalopram (CELEXA) 40 MG tablet TAKE 1 TABLET BY MOUTH  DAILY 90 tablet 3    atorvastatin (LIPITOR) 80 MG tablet TAKE 1 TABLET BY MOUTH  DAILY 90 tablet 3    allopurinol (ZYLOPRIM) 300 MG tablet TAKE 1 TABLET BY MOUTH  DAILY 90 tablet 3    trimethoprim (TRIMPEX) 100 MG tablet Take 1 tablet by mouth every 48 hours 45 tablet 3    verapamil (CALAN SR) 240 MG extended release tablet TAKE 1 TABLET BY MOUTH AT  NIGHT 90 tablet 3    potassium chloride (KLOR-CON M) 20 MEQ extended release tablet TAKE 1 TABLET BY MOUTH  DAILY WITH BREAKFAST 90 tablet 3    warfarin (COUMADIN) 5 MG tablet TAKE 1 TABLET BY MOUTH ON  MONDAY AND  FRIDAY AND 1 AND 1/2  TABLETS ALL OTHER DAYS 135 tablet 5    albuterol sulfate HFA (PROAIR HFA) 108 (90 Base) MCG/ACT inhaler Inhale 2 puffs into the lungs every 4 hours as needed for Wheezing 8.5 g 2    fluticasone-salmeterol (ADVAIR) 250-50 MCG/DOSE AEPB Inhale 1 puff into the lungs 2 times daily 1 Inhaler 5    furosemide (LASIX) 20 MG tablet Take 1 tablet by mouth daily 30 tablet 1    acetaminophen (TYLENOL) 500 MG tablet Take 500 mg by mouth every 6 hours as needed for Pain      diphenhydrAMINE-APAP, sleep, (TYLENOL PM EXTRA STRENGTH)  MG tablet Take 1 tablet by mouth nightly as needed for Sleep      b complex vitamins capsule Take 1 capsule by mouth daily      Ascorbic Acid (VITAMIN C PO) 1 tablet daily      therapeutic multivitamin-minerals (THERAGRAN-M) tablet Take 1 tablet by mouth nightly       Cranberry 500 MG CAPS Take 1,000 mg by mouth nightly            Physical Examination       The following were examined and determined to be normal: Psych/Neuro, Scalp/hair, Head/face, Conjunctivae/eyelids, Gums/teeth/lips, Neck, Breast/axilla/chest, Abdomen, Back, RUE, LUE, RLE, LLE and Nails/digits. The following were examined and determined to be abnormal: None. Well-appearing. 1.  Scattered on the chest and extensor extremities are multiple well-defined round smooth light brown macules and patches. 2.  Trunk with scattered round smooth brightly erythematous macules and papules. 3.  Right lateral knee and right shin with 2 stuck on appearing brown papules. Assessment and Plan     1. Solar lentigines    Monitor for change. Encouraged sun protective behaviors including use of at least SPF 30 or more sunscreen. 2. Cherry angiomas    Reassurance. 3. SK (seborrheic keratosis) - few    Reassurance. Return in about 1 year (around 5/31/2023).     --Val Keyes MD

## 2022-06-01 ENCOUNTER — TELEPHONE (OUTPATIENT)
Dept: PHARMACY | Age: 77
End: 2022-06-01

## 2022-06-01 NOTE — TELEPHONE ENCOUNTER
Pt called LVM stating that she is having a carpal tunnel procedure 7/14. Will need to be off warfarin and bridge with lovenox. Wanted to know if should keep appt on 7/7. Returned call to patient. States that surgeon stated pt would need to hold warfarin and likely bridge but is unsure. She has a pcp pre-op clearance appt on 6/16. Advised for pt to talk about clearing to hold, how many days to hold and if need lovenox at that appt. Clinic can take care of lovenox if needed and dosing for procedure, but need to know how long need to be off warfarin. Will maintain appt on 7/7 to provide dosing for procedure.

## 2022-06-16 ENCOUNTER — OFFICE VISIT (OUTPATIENT)
Dept: INTERNAL MEDICINE CLINIC | Age: 77
End: 2022-06-16
Payer: MEDICARE

## 2022-06-16 VITALS
BODY MASS INDEX: 46.86 KG/M2 | WEIGHT: 273 LBS | SYSTOLIC BLOOD PRESSURE: 124 MMHG | OXYGEN SATURATION: 96 % | DIASTOLIC BLOOD PRESSURE: 80 MMHG | HEART RATE: 63 BPM

## 2022-06-16 DIAGNOSIS — E66.01 MORBID OBESITY WITH BMI OF 40.0-44.9, ADULT (HCC): ICD-10-CM

## 2022-06-16 DIAGNOSIS — I10 ESSENTIAL HYPERTENSION: ICD-10-CM

## 2022-06-16 DIAGNOSIS — Z01.818 PRE-OP EXAMINATION: Primary | ICD-10-CM

## 2022-06-16 DIAGNOSIS — I25.10 CORONARY ARTERY DISEASE INVOLVING NATIVE CORONARY ARTERY OF NATIVE HEART WITHOUT ANGINA PECTORIS: ICD-10-CM

## 2022-06-16 DIAGNOSIS — I48.0 PAF (PAROXYSMAL ATRIAL FIBRILLATION) (HCC): ICD-10-CM

## 2022-06-16 DIAGNOSIS — J45.40 MODERATE PERSISTENT ASTHMA WITHOUT COMPLICATION: ICD-10-CM

## 2022-06-16 DIAGNOSIS — G56.01 CARPAL TUNNEL SYNDROME OF RIGHT WRIST: ICD-10-CM

## 2022-06-16 DIAGNOSIS — I35.0 NONRHEUMATIC AORTIC VALVE STENOSIS: ICD-10-CM

## 2022-06-16 DIAGNOSIS — E78.2 MIXED HYPERLIPIDEMIA: ICD-10-CM

## 2022-06-16 DIAGNOSIS — Z86.711 HISTORY OF PULMONARY EMBOLISM: ICD-10-CM

## 2022-06-16 DIAGNOSIS — Z01.818 PRE-OP EXAMINATION: ICD-10-CM

## 2022-06-16 DIAGNOSIS — G56.21 CUBITAL TUNNEL SYNDROME ON RIGHT: ICD-10-CM

## 2022-06-16 DIAGNOSIS — G47.33 OSA (OBSTRUCTIVE SLEEP APNEA): ICD-10-CM

## 2022-06-16 LAB
ANION GAP SERPL CALCULATED.3IONS-SCNC: 15 MMOL/L (ref 3–16)
BUN BLDV-MCNC: 12 MG/DL (ref 7–20)
CALCIUM SERPL-MCNC: 9.8 MG/DL (ref 8.3–10.6)
CHLORIDE BLD-SCNC: 98 MMOL/L (ref 99–110)
CHOLESTEROL, TOTAL: 170 MG/DL (ref 0–199)
CO2: 25 MMOL/L (ref 21–32)
CREAT SERPL-MCNC: 0.9 MG/DL (ref 0.6–1.2)
GFR AFRICAN AMERICAN: >60
GFR NON-AFRICAN AMERICAN: >60
GLUCOSE BLD-MCNC: 94 MG/DL (ref 70–99)
HCT VFR BLD CALC: 40.5 % (ref 36–48)
HDLC SERPL-MCNC: 62 MG/DL (ref 40–60)
HEMOGLOBIN: 13.6 G/DL (ref 12–16)
LDL CHOLESTEROL CALCULATED: 89 MG/DL
MCH RBC QN AUTO: 32 PG (ref 26–34)
MCHC RBC AUTO-ENTMCNC: 33.7 G/DL (ref 31–36)
MCV RBC AUTO: 95.1 FL (ref 80–100)
PDW BLD-RTO: 14.6 % (ref 12.4–15.4)
PLATELET # BLD: 266 K/UL (ref 135–450)
PMV BLD AUTO: 7.2 FL (ref 5–10.5)
POTASSIUM SERPL-SCNC: 4.3 MMOL/L (ref 3.5–5.1)
RBC # BLD: 4.26 M/UL (ref 4–5.2)
SODIUM BLD-SCNC: 138 MMOL/L (ref 136–145)
TRIGL SERPL-MCNC: 97 MG/DL (ref 0–150)
VLDLC SERPL CALC-MCNC: 19 MG/DL
WBC # BLD: 6 K/UL (ref 4–11)

## 2022-06-16 PROCEDURE — G8399 PT W/DXA RESULTS DOCUMENT: HCPCS | Performed by: NURSE PRACTITIONER

## 2022-06-16 PROCEDURE — 1090F PRES/ABSN URINE INCON ASSESS: CPT | Performed by: NURSE PRACTITIONER

## 2022-06-16 PROCEDURE — G8427 DOCREV CUR MEDS BY ELIG CLIN: HCPCS | Performed by: NURSE PRACTITIONER

## 2022-06-16 PROCEDURE — 99214 OFFICE O/P EST MOD 30 MIN: CPT | Performed by: NURSE PRACTITIONER

## 2022-06-16 PROCEDURE — 1123F ACP DISCUSS/DSCN MKR DOCD: CPT | Performed by: NURSE PRACTITIONER

## 2022-06-16 PROCEDURE — G8417 CALC BMI ABV UP PARAM F/U: HCPCS | Performed by: NURSE PRACTITIONER

## 2022-06-16 PROCEDURE — 1036F TOBACCO NON-USER: CPT | Performed by: NURSE PRACTITIONER

## 2022-06-16 RX ORDER — ENOXAPARIN SODIUM 150 MG/ML
1 INJECTION SUBCUTANEOUS 2 TIMES DAILY
Qty: 8 ML | Refills: 0 | Status: SHIPPED | OUTPATIENT
Start: 2022-06-16 | End: 2022-07-13

## 2022-06-16 ASSESSMENT — ENCOUNTER SYMPTOMS
COUGH: 0
CHEST TIGHTNESS: 0
SHORTNESS OF BREATH: 1
WHEEZING: 0

## 2022-06-16 NOTE — PROGRESS NOTES
6/16/2022    This is a 69 y.o. female   Chief Complaint   Patient presents with   Nelly Lassiter- Right carpal tunnel & cubital tunnel release -Has cardiac clearence scheduled for next week 6/27/22     HPI     Berhane Stone is a 69 y.o. female who comes for a preoperative exam.  She  is referred by Dr. Quentin Daugherty for perioperative risk determination for upcoming surgery for right carpal and cubital tunnel release on 7/14/2022. Denies any previous complications with anesthesia. She has appt with Cardiology for surgical clearance on 6/27/2022. Pt is on coumadin for afib and hx of PE s/p abena filter. S/p LHC 12/14/2018 showed normal coronaries. S/p unsuccessful cardioversion 12/14/2018. She uses [employer] mobile device at home. S/p DCCV 5/6/2021. RFCA of atrial fibrillation with PVI, CTI flutter (10/5/2021). INR management via [facility] clinic. Most recent INR was 2.2. PMH: PAULA on cpap, asthma, HTN and HLD which is controlled. Past Medical History:   Diagnosis Date    Allergic     Anxiety 2/2/2017    Arthritis     Asthma     Back pain     Blood circulation, collateral     legs    Depression     GERD (gastroesophageal reflux disease)     Gout     Hypercholesteremia     Hypertension     Movement disorder     7 discs ruptured    Movement disorder     knee replacements    Obesity     Pulmonary emboli (HCC)     Unspecified sleep apnea     uses cpap    Urinary tract infection, chronic     UTI (urinary tract infection)      Past Surgical History:   Procedure Laterality Date    ANKLE FUSION  2010    right, [facility]    BACK SURGERY  09/14/2012    lumbar fusion L-4, L-5 atrificial disc  [facility] Dr. Anjali Rocha  5-7-11    incision, drainage and debridement of left knee and application of wound vac.     BREAST BIOPSY  08/2018    neg    EYE SURGERY      JOINT REPLACEMENT      bilat knees    JOINT REPLACEMENT  2003    left, [facility]     JOINT REPLACEMENT  [facility]    [facility], [facility]    OTHER SURGICAL HISTORY  2011    laparscopic adjustable gastric restrictive procedure    REVISION TOTAL KNEE ARTHROPLASTY      [facility]    SKIN FULL GRAFT      right, [address]    [facility]    UPPER GASTROINTESTINAL ENDOSCOPY  10-    VENA CAVA FILTER PLACEMENT       Social History     Socioeconomic History    Marital status:      Spouse name: Not on file    Number of children: Not on file    Years of education: Not on file    Highest education level: Not on file   Occupational History    Not on file   Tobacco Use    Smoking status: Former Smoker     Packs/day: 0.50     Years: 4.00     Pack years: 2.00     Types: Cigarettes     Quit date: 1965     Years since quittin.4    Smokeless tobacco: Never Used   Vaping Use    Vaping Use: Never used   Substance and Sexual Activity    Alcohol use: Not Currently     Comment: 4 t imes a year    Drug use: No    Sexual activity: Not on file     Comment:     Other Topics Concern    Not on file   Social History Narrative    Not on file     Social Determinants of Health     Financial Resource Strain: Low Risk     Difficulty of Paying Living Expenses: Not hard at all   Food Insecurity: No Food Insecurity    Worried About [address] in the Last Year: Never true    [facility] in the Last Year: Never true   Transportation Needs:     Lack of Transportation (Medical): Not on file    Lack of Transportation (Non-Medical):  Not on file   Physical Activity: Insufficiently Active    Days of Exercise per Week: 2 days    Minutes of Exercise per Session: 60 min   Stress:     Feeling of Stress : Not on file   Social Connections:     Frequency of Communication with Friends and Family: Not on file    Frequency of Social Gatherings with Friends and Family: Not on file    Attends Episcopal Services: Not on file    Active Member of Clubs or Organizations: Not on file    Attends Club or Organization Meetings: Not on file    Marital Status: Not on file   Intimate Partner Violence:     Fear of Current or Ex-Partner: Not on file    Emotionally Abused: Not on file    Physically Abused: Not on file    Sexually Abused: Not on file   Housing Stability:     Unable to Pay for Housing in the Last Year: Not on file    Number of Places Lived in the Last Year: Not on file    Unstable Housing in the Last Year: Not on file     Family History   Problem Relation Age of Onset    High Blood Pressure Mother     Depression Mother     Stroke Father     High Blood Pressure Father     Asthma Father     Arthritis Father     Heart Disease Father     Breast Cancer Sister     High Cholesterol Neg Hx      Current Outpatient Medications   Medication Sig Dispense Refill    enoxaparin (LOVENOX) 150 MG/ML SOSY injection Inject 0.8 mLs into the skin in the morning and at bedtime 8 mL 0    clonazePAM (KLONOPIN) 1 MG tablet TAKE 1 TABLET BY MOUTH IN  THE EVENING 90 tablet 0    indapamide (LOZOL) 1.25 MG tablet TAKE 1 TABLET BY MOUTH IN  THE MORNING 90 tablet 3    pantoprazole (PROTONIX) 40 MG tablet TAKE 1 TABLET BY MOUTH IN  THE MORNING BEFORE  BREAKFAST 90 tablet 3    gabapentin (NEURONTIN) 300 MG capsule TAKE 2 CAPSULES BY MOUTH 3  TIMES DAILY 540 capsule 3    propafenone (RYTHMOL) 150 MG tablet Take 1 tablet by mouth 2 times daily See note on 07/21/21 180 tablet 1    montelukast (SINGULAIR) 10 MG tablet TAKE 1 TABLET BY MOUTH AT  NIGHT 90 tablet 3    citalopram (CELEXA) 40 MG tablet TAKE 1 TABLET BY MOUTH  DAILY 90 tablet 3    atorvastatin (LIPITOR) 80 MG tablet TAKE 1 TABLET BY MOUTH  DAILY 90 tablet 3    allopurinol (ZYLOPRIM) 300 MG tablet TAKE 1 TABLET BY MOUTH  DAILY 90 tablet 3    trimethoprim (TRIMPEX) 100 MG tablet Take 1 tablet by mouth every 48 hours 45 tablet 3    verapamil (CALAN SR) 240 MG extended release tablet TAKE 1 TABLET BY MOUTH AT  NIGHT 90 tablet 3    potassium chloride (KLOR-CON M) 20 MEQ extended release tablet TAKE 1 TABLET BY MOUTH  DAILY WITH BREAKFAST 90 tablet 3    warfarin (COUMADIN) 5 MG tablet TAKE 1 TABLET BY MOUTH ON  MONDAY AND  FRIDAY AND 1 AND 1/2  TABLETS ALL OTHER DAYS 135 tablet 5    albuterol sulfate HFA (PROAIR HFA) 108 (90 Base) MCG/ACT inhaler Inhale 2 puffs into the lungs every 4 hours as needed for Wheezing 8.5 g 2    fluticasone-salmeterol (ADVAIR) 250-50 MCG/DOSE AEPB Inhale 1 puff into the lungs 2 times daily 1 Inhaler 5    furosemide (LASIX) 20 MG tablet Take 1 tablet by mouth daily 30 tablet 1    acetaminophen (TYLENOL) 500 MG tablet Take 500 mg by mouth every 6 hours as needed for Pain      diphenhydrAMINE-APAP, sleep, (TYLENOL PM EXTRA STRENGTH)  MG tablet Take 1 tablet by mouth nightly as needed for Sleep      b complex vitamins capsule Take 1 capsule by mouth daily      Ascorbic Acid (VITAMIN C PO) 1 tablet daily      therapeutic multivitamin-minerals (THERAGRAN-M) tablet Take 1 tablet by mouth nightly       Cranberry 500 MG CAPS Take 1,000 mg by mouth nightly        No current facility-administered medications for this visit. Allergies   Allergen Reactions    Belsomra [Suvorexant] Other (See Comments)     Nightmares      Avelox [Moxifloxacin Hcl In Nacl] Rash    Levofloxacin Rash    Sulfa Antibiotics Rash     Anti-coag visit on 05/26/2022   Component Date Value Ref Range Status    INR,(POC) 05/26/2022 2.2   Final     Echo 3/3/2022:   Summary   *Technically difficult exam due to body habitus. *Left ventricle - normal size, thickness and function with EF of 65%   *Aortic valve - moderate stenosis with MG of 30mmHg, BERTHA 1.12cm2, mild   regurgitation   *Mitral valve - mild regurgitation   *Tricuspid valve - mild regurgitation with RVSP of 38mmHg    Review of Systems   Constitutional: Negative for chills, fatigue and fever. Respiratory: Positive for shortness of breath (chronic and unchanged). Negative for cough, chest tightness and wheezing. Cardiovascular: Negative for chest pain, palpitations and leg swelling. Neurological: Negative for dizziness, tremors, light-headedness and headaches. /80   Pulse 63   Wt 273 lb (123.8 kg)   SpO2 96%   BMI 46.86 kg/m²      Physical Exam  Vitals reviewed. Constitutional:       General: She is not in acute distress. Appearance: She is well-developed. She is obese. She is not ill-appearing or diaphoretic. HENT:      Head: Normocephalic and atraumatic. Cardiovascular:      Rate and Rhythm: Normal rate and regular rhythm. Heart sounds: Murmur heard. Pulmonary:      Effort: Pulmonary effort is normal. No respiratory distress. Breath sounds: Normal breath sounds. No wheezing or rhonchi. Musculoskeletal:      Right lower leg: No edema. Left lower leg: No edema. Skin:     General: Skin is warm and dry. Neurological:      General: No focal deficit present. Mental Status: She is alert and oriented to person, place, and time. Psychiatric:         Mood and Affect: Mood and affect normal.         Behavior: Behavior normal.       Diagnosis  Assessment and Plan  Pre-op examination  Perioperative risk related to the patient's upcoming surgery is considered acceptable. Pt is medically cleared for surgery. DVT prophylaxis per surgery team. Pre-op exam was completed on 6/16/22. Checking bmp and cbc today. Keep appt with cardiology for cardiac clearance. Will hold coumadin for 5 days preop and bridge with lovenox - ordered. Restart coumadin per surgery team and follow up with [facility] clinic for INRs. - Basic Metabolic Panel; Future  - CBC; Future  - enoxaparin (LOVENOX) 150 MG/ML SOSY injection;  Inject 0.8 mLs into the skin in the morning and at bedtime  Dispense: 8 mL; Refill: 0    Carpal tunnel syndrome of right wrist/ Cubital tunnel syndrome on right  Chronic, uncontrolled  Continue with planned surgery     Essential hypertension/ Mixed hyperlipidemia/ Morbid obesity with BMI of 40.0-44.9, adult (HCC)/ Coronary artery disease involving native coronary artery of native heart without angina pectoris  Chronic, stable, controlled. Blood work is ordered   - Lipid Panel; Future    PAF (paroxysmal atrial fibrillation) (HCC)/ Nonrheumatic aortic valve stenosis  See cardiology for clearance as scheduled  RRR today on exam   Continue medications as prescribed   Reviewed most recent echo   - enoxaparin (LOVENOX) 150 MG/ML SOSY injection; Inject 0.8 mLs into the skin in the morning and at bedtime  Dispense: 8 mL; Refill: 0    History of pulmonary embolism  Chronic, has abena filter  See above - bridging with lovenox   - enoxaparin (LOVENOX) 150 MG/ML SOSY injection; Inject 0.8 mLs into the skin in the morning and at bedtime  Dispense: 8 mL; Refill: 0    PAULA (obstructive sleep apnea)  Chronic, stable, controlled. Continue cpap use nightly     Moderate persistent asthma without complication  Chronic, stable, controlled.    Continue advair BID and albuterol prn     FU as scheduled today     Electronically signed by AIME Weiss CNP on 6/16/2022 at 12:41 PM

## 2022-06-20 ENCOUNTER — OFFICE VISIT (OUTPATIENT)
Dept: BARIATRICS/WEIGHT MGMT | Age: 77
End: 2022-06-20

## 2022-06-20 ENCOUNTER — TELEPHONE (OUTPATIENT)
Dept: INTERNAL MEDICINE CLINIC | Age: 77
End: 2022-06-20

## 2022-06-20 VITALS — HEIGHT: 64 IN | WEIGHT: 272 LBS | BODY MASS INDEX: 46.44 KG/M2

## 2022-06-20 DIAGNOSIS — E66.01 MORBID OBESITY WITH BMI OF 45.0-49.9, ADULT (HCC): Primary | ICD-10-CM

## 2022-06-20 PROCEDURE — 99999 PR OFFICE/OUTPT VISIT,PROCEDURE ONLY: CPT | Performed by: DIETITIAN, REGISTERED

## 2022-06-20 NOTE — PROGRESS NOTES
kcal x 1.3 (light activity factor)= 2236 kcal - 1000 (for 2 lb weight loss/week)= 1236 kcal.    Plan  Plan/Recommendations: 1200 LCMP   Optifast:  Open- sample provided  Diet Medications: open  Increasing daily activity as tolerated, consider chair exercises vs walking. Encouraged patient to practice plate method approach by increasing ratio of non-starchy veggies over fruit, and increase protein intake frequency to 4-5 times per day. PES Statement:  Overweight/Obesity related to decreased activity and increased energy intake as evidenced by BMI. Body mass index is 46.69 kg/m². Will follow up as necessary.     Damari Kovacs, MS, RD, LD

## 2022-06-20 NOTE — TELEPHONE ENCOUNTER
Pharmacist with [address] [place] [address] is requesting to speak to provider regarding prescription for enoxaparin (LOVENOX) 150 MG/ML SOSY injection. Phone #[phone].

## 2022-06-22 ENCOUNTER — TELEMEDICINE (OUTPATIENT)
Dept: BARIATRICS/WEIGHT MGMT | Age: 77
End: 2022-06-22
Payer: MEDICARE

## 2022-06-22 DIAGNOSIS — E66.01 MORBID OBESITY WITH BMI OF 45.0-49.9, ADULT (HCC): Primary | ICD-10-CM

## 2022-06-22 DIAGNOSIS — Z71.3 DIETARY COUNSELING AND SURVEILLANCE: ICD-10-CM

## 2022-06-22 PROCEDURE — G8399 PT W/DXA RESULTS DOCUMENT: HCPCS | Performed by: FAMILY MEDICINE

## 2022-06-22 PROCEDURE — G8427 DOCREV CUR MEDS BY ELIG CLIN: HCPCS | Performed by: FAMILY MEDICINE

## 2022-06-22 PROCEDURE — 1090F PRES/ABSN URINE INCON ASSESS: CPT | Performed by: FAMILY MEDICINE

## 2022-06-22 PROCEDURE — 1123F ACP DISCUSS/DSCN MKR DOCD: CPT | Performed by: FAMILY MEDICINE

## 2022-06-22 PROCEDURE — 99203 OFFICE O/P NEW LOW 30 MIN: CPT | Performed by: FAMILY MEDICINE

## 2022-06-22 ASSESSMENT — ENCOUNTER SYMPTOMS
ABDOMINAL DISTENTION: 0
VOMITING: 0
SHORTNESS OF BREATH: 0
WHEEZING: 0
EYE PAIN: 0
COUGH: 0
CHEST TIGHTNESS: 0
CONSTIPATION: 0
NAUSEA: 0
BLOOD IN STOOL: 0
DIARRHEA: 0
ABDOMINAL PAIN: 0
APNEA: 0
PHOTOPHOBIA: 0
CHOKING: 0

## 2022-06-22 NOTE — PROGRESS NOTES
to help you lose weight? [] Yes  [x] No    Have you ever been on a meal replacement program?  [] Yes  [x] No         Dietitian's assessment reviewed and addressed with patient. Reviewed:  [x] Nutrition and the importance of regular protein intake  [x] Hidden CHO/carbohydrate sources  [x] Alcohol use  [x] Tobacco use  [x] Drug use- Denies   [x] Importance of exercise and reducing sedentary time        Controlled Substance Monitoring:     RX Monitoring 10/18/2021   Attestation -   Periodic Controlled Substance Monitoring Possible medication side effects, risk of tolerance/dependence & alternative treatments discussed. ;No signs of potential drug abuse or diversion identified.         Allergies   Allergen Reactions    Belsomra [Suvorexant] Other (See Comments)     Nightmares      Avelox [Moxifloxacin Hcl In Nacl] Rash    Levofloxacin Rash    Sulfa Antibiotics Rash         Current Outpatient Medications:     enoxaparin (LOVENOX) 150 MG/ML SOSY injection, Inject 0.8 mLs into the skin in the morning and at bedtime, Disp: 8 mL, Rfl: 0    clonazePAM (KLONOPIN) 1 MG tablet, TAKE 1 TABLET BY MOUTH IN  THE EVENING, Disp: 90 tablet, Rfl: 0    indapamide (LOZOL) 1.25 MG tablet, TAKE 1 TABLET BY MOUTH IN  THE MORNING, Disp: 90 tablet, Rfl: 3    pantoprazole (PROTONIX) 40 MG tablet, TAKE 1 TABLET BY MOUTH IN  THE MORNING BEFORE  BREAKFAST, Disp: 90 tablet, Rfl: 3    gabapentin (NEURONTIN) 300 MG capsule, TAKE 2 CAPSULES BY MOUTH 3  TIMES DAILY, Disp: 540 capsule, Rfl: 3    propafenone (RYTHMOL) 150 MG tablet, Take 1 tablet by mouth 2 times daily See note on 07/21/21, Disp: 180 tablet, Rfl: 1    montelukast (SINGULAIR) 10 MG tablet, TAKE 1 TABLET BY MOUTH AT  NIGHT, Disp: 90 tablet, Rfl: 3    citalopram (CELEXA) 40 MG tablet, TAKE 1 TABLET BY MOUTH  DAILY, Disp: 90 tablet, Rfl: 3    atorvastatin (LIPITOR) 80 MG tablet, TAKE 1 TABLET BY MOUTH  DAILY, Disp: 90 tablet, Rfl: 3    allopurinol (ZYLOPRIM) 300 MG tablet, TAKE 1 TABLET BY MOUTH  DAILY, Disp: 90 tablet, Rfl: 3    trimethoprim (TRIMPEX) 100 MG tablet, Take 1 tablet by mouth every 48 hours, Disp: 45 tablet, Rfl: 3    verapamil (CALAN SR) 240 MG extended release tablet, TAKE 1 TABLET BY MOUTH AT  NIGHT, Disp: 90 tablet, Rfl: 3    potassium chloride (KLOR-CON M) 20 MEQ extended release tablet, TAKE 1 TABLET BY MOUTH  DAILY WITH BREAKFAST, Disp: 90 tablet, Rfl: 3    warfarin (COUMADIN) 5 MG tablet, TAKE 1 TABLET BY MOUTH ON  MONDAY AND  FRIDAY AND 1 AND 1/2  TABLETS ALL OTHER DAYS, Disp: 135 tablet, Rfl: 5    albuterol sulfate HFA (PROAIR HFA) 108 (90 Base) MCG/ACT inhaler, Inhale 2 puffs into the lungs every 4 hours as needed for Wheezing, Disp: 8.5 g, Rfl: 2    fluticasone-salmeterol (ADVAIR) 250-50 MCG/DOSE AEPB, Inhale 1 puff into the lungs 2 times daily, Disp: 1 Inhaler, Rfl: 5    furosemide (LASIX) 20 MG tablet, Take 1 tablet by mouth daily, Disp: 30 tablet, Rfl: 1    acetaminophen (TYLENOL) 500 MG tablet, Take 500 mg by mouth every 6 hours as needed for Pain, Disp: , Rfl:     diphenhydrAMINE-APAP, sleep, (TYLENOL PM EXTRA STRENGTH)  MG tablet, Take 1 tablet by mouth nightly as needed for Sleep, Disp: , Rfl:     b complex vitamins capsule, Take 1 capsule by mouth daily, Disp: , Rfl:     Ascorbic Acid (VITAMIN C PO), 1 tablet daily, Disp: , Rfl:     therapeutic multivitamin-minerals (THERAGRAN-M) tablet, Take 1 tablet by mouth nightly , Disp: , Rfl:     Cranberry 500 MG CAPS, Take 1,000 mg by mouth nightly , Disp: , Rfl:     Patient Active Problem List   Diagnosis    Essential hypertension    History of pulmonary embolism    Osteoarthritis of knee    PAULA (obstructive sleep apnea)    Asthma    Status post gastric banding    Coronary artery disease    Morbid obesity with BMI of 40.0-44.9, adult ([facility])    Hyperlipidemia, unspecified    Arthritis    Anxiety    Chronic cough    PAF (paroxysmal atrial fibrillation) ([facility])    Nonrheumatic aortic valve stenosis       Past Medical History:   Diagnosis Date    Allergic     Anxiety 2/2/2017    Arthritis     Asthma     Back pain     Blood circulation, collateral     legs    Depression     GERD (gastroesophageal reflux disease)     Gout     Hypercholesteremia     Hypertension     Movement disorder     7 discs ruptured    Movement disorder     knee replacements    Obesity     Pulmonary emboli (HCC)     Unspecified sleep apnea     uses cpap    Urinary tract infection, chronic     UTI (urinary tract infection)        Past Surgical History:   Procedure Laterality Date    ANKLE [facility]USION  2010    right, [facility]    BACK SURGERY  09/14/2012    lumbar fusion L-4, L-5 atrificial disc  [facility] Dr[facility] Rita English  5-7-11    incision, drainage and debridement of left knee and application of wound vac.  BREAST BIOPSY  08/2018    neg    EYE SURGERY      JOINT REPLACEMENT      bilat knees    [facility]    [facility]    JOINT REPLACEMENT  2005    right, [facility]    OTHER SURGICAL HISTORY  12-    laparscopic adjustable gastric restrictive procedure    RE[facility]  [facility]    SK[facility] F[facility]FT  [facility]    [facility]    TUBAL LIGATION  1975    [facility]    UPPER GASTROINTESTINAL ENDOSCOPY  10-    VENA CAVA FILTER PLACEMENT         Family History   Problem Relation Age of Onset    High Blood Pressure Mother     Depression Mother     Stroke Father     High Blood Pressure Father     Asthma Father     Arthritis Father     Heart Disease Father     Breast Cancer Sister     High Cholesterol Neg Hx        Review of Systems   Constitutional: Negative for fatigue. Eyes: Negative for photophobia, pain and visual disturbance. Respiratory: Negative for apnea, cough, choking, chest tightness, shortness of breath and wheezing.     Cardiovascular: Negative for chest pain, palpitations and leg swelling. Gastrointestinal: Negative for abdominal distention, abdominal pain, blood in stool, constipation, diarrhea, nausea and vomiting. Endocrine: Negative for cold intolerance and heat intolerance. Musculoskeletal: Negative for arthralgias and myalgias. Skin: Negative for rash. Neurological: Negative for dizziness, tremors, syncope, weakness, numbness and headaches. Psychiatric/Behavioral: Negative for agitation, confusion, decreased concentration, dysphoric mood, hallucinations, sleep disturbance and suicidal ideas. The patient is not nervous/anxious and is not hyperactive. Physical Exam  Constitutional:       Appearance: She is well-developed. HENT:      Head: Normocephalic. Eyes:      Conjunctiva/sclera: Conjunctivae normal.   Abdominal:      General: Abdomen is protuberant. Musculoskeletal:         General: No swelling. Neurological:      Mental Status: She is alert and oriented to person, place, and time. Psychiatric:         Mood and Affect: Mood normal.         Behavior: Behavior normal.         Thought Content:  Thought content normal.         Judgment: Judgment normal.         Orders Only on 06/16/2022   Component Date Value Ref Range Status    Cholesterol, Total 06/16/2022 170  0 - 199 mg/dL Final    Triglycerides 06/16/2022 97  0 - 150 mg/dL Final    HDL 06/16/2022 62* 40 - 60 mg/dL Final    LDL Calculated 06/16/2022 89  <100 mg/dL Final    VLDL Cholesterol Calculated 06/16/2022 19  Not Established mg/dL Final    WBC 06/16/2022 6.0  4.0 - 11.0 K/uL Final    RBC 06/16/2022 4.26  4.00 - 5.20 M/uL Final    Hemoglobin 06/16/2022 13.6  12.0 - 16.0 g/dL Final    Hematocrit 06/16/2022 40.5  36.0 - 48.0 % Final    MCV 06/16/2022 95.1  80.0 - 100.0 fL Final    MCH 06/16/2022 32.0  26.0 - 34.0 pg Final    MCHC 06/16/2022 33.7  31.0 - 36.0 g/dL Final    RDW 06/16/2022 14.6  12.4 - 15.4 % Final    Platelets 87/26/0596 266 135 - 450 K/uL Final    MPV 06/16/2022 7.2  5.0 - 10.5 fL Final    Sodium 06/16/2022 138  136 - 145 mmol/L Final    Potassium 06/16/2022 4.3  3.5 - 5.1 mmol/L Final    Chloride 06/16/2022 98* 99 - 110 mmol/L Final    CO2 06/16/2022 25  21 - 32 mmol/L Final    Anion Gap 06/16/2022 15  3 - 16 Final    Glucose 06/16/2022 94  70 - 99 mg/dL Final    BUN 06/16/2022 12  7 - 20 mg/dL Final    CREATININE 06/16/2022 0.9  0.6 - 1.2 mg/dL Final    GFR Non- 06/16/2022 >60  >60 Final    Comment: >60 mL/min/1.73m2 EGFR, calc. for ages 25 and older using the  MDRD formula (not corrected for weight), is valid for stable  renal function.  GFR  06/16/2022 >60  >60 Final    Comment: Chronic Kidney Disease: less than 60 ml/min/1.73 sq.m. Kidney Failure: less than 15 ml/min/1.73 sq.m. Results valid for patients 18 years and older.  Calcium 06/16/2022 9.8  8.3 - 10.6 mg/dL Final         Assessment and Plan:    1. Morbid obesity with BMI of 45.0-49.9, adult ([address]  Heavily counseled on the importance of therapeutic lifestyle changes through diet and exercise. The patient understands that the goal of treatment is to reach and stay at a healthy weight. The initial treatment goal is to lose at least 5-10% of her body weight in 12 weeks. This will require changes in eating habits, increased physical activity, and behavior changes. Counseled on low carb/mack diet. Patient handouts and education material provided and reviewed in detail with the patient. All questions answered. Encouraged patient to keep a food journal and to bring it to her next visit. [employer] access on hold until she is ready. 2. Dietary counseling and surveillance  1500-Mack/low carb meal plan.             Nutrition:  [] LCHF/Ketogenic [x] Low carb/low-calorie diet [] Low-calorie diet     []Maintenance        FITTE:   [] Cardio [] Resistance/stength exercises   [x] ACSM recommendations (150 minutes/week)- as able/tolerated            Behavior:   [x] Motivational interviewing performed    [] Referral for counseling  [x] Discussed strategies to overcome habits/challenges for focus      [] Stress management   [] Stimulus control  [] Sleep hygiene      No orders of the defined types were placed in this encounter. No follow-ups on file. Lis Singleton is a 69 y.o. female being evaluated by a Virtual Visit (video visit) encounter to address concerns as mentioned above. A caregiver was present when appropriate. Due to this being a TeleHealth encounter (During GOVSG-21 public health emergency), evaluation of the following organ systems was limited: Vitals/Constitutional/EENT/Resp/CV/GI//MS/Neuro/Skin/Heme-Lymph-Imm. Pursuant to the emergency declaration under the [address] [place] [address] authority and the [employer] Act, this Virtual Visit was conducted with patient's (and/or legal guardian's) consent, to reduce the patient's risk of exposure to COVID-19 and provide necessary medical care. The patient (and/or legal guardian) has also been advised to contact this office for worsening conditions or problems, and seek emergency medical treatment and/or call 911 if deemed necessary. Services were provided through a video synchronous discussion virtually to substitute for in-person clinic visit. Patient and provider were located at their individual homes. --Bertha Grant MD on 6/22/2022 at 1:54 PM    An electronic signature was used to authenticate this note.

## 2022-06-27 ENCOUNTER — OFFICE VISIT (OUTPATIENT)
Dept: CARDIOLOGY CLINIC | Age: 77
End: 2022-06-27
Payer: MEDICARE

## 2022-06-27 VITALS
BODY MASS INDEX: 46.26 KG/M2 | WEIGHT: 271 LBS | HEART RATE: 63 BPM | OXYGEN SATURATION: 96 % | HEIGHT: 64 IN | SYSTOLIC BLOOD PRESSURE: 140 MMHG | DIASTOLIC BLOOD PRESSURE: 76 MMHG

## 2022-06-27 DIAGNOSIS — G47.33 OSA (OBSTRUCTIVE SLEEP APNEA): ICD-10-CM

## 2022-06-27 DIAGNOSIS — E66.01 MORBID OBESITY WITH BMI OF 40.0-44.9, ADULT (HCC): ICD-10-CM

## 2022-06-27 DIAGNOSIS — I48.0 PAF (PAROXYSMAL ATRIAL FIBRILLATION) (HCC): Primary | ICD-10-CM

## 2022-06-27 DIAGNOSIS — I10 BENIGN ESSENTIAL HTN: ICD-10-CM

## 2022-06-27 DIAGNOSIS — I35.0 MODERATE AORTIC STENOSIS: ICD-10-CM

## 2022-06-27 PROCEDURE — G8417 CALC BMI ABV UP PARAM F/U: HCPCS | Performed by: NURSE PRACTITIONER

## 2022-06-27 PROCEDURE — 1036F TOBACCO NON-USER: CPT | Performed by: NURSE PRACTITIONER

## 2022-06-27 PROCEDURE — 1090F PRES/ABSN URINE INCON ASSESS: CPT | Performed by: NURSE PRACTITIONER

## 2022-06-27 PROCEDURE — G8427 DOCREV CUR MEDS BY ELIG CLIN: HCPCS | Performed by: NURSE PRACTITIONER

## 2022-06-27 PROCEDURE — 93000 ELECTROCARDIOGRAM COMPLETE: CPT | Performed by: NURSE PRACTITIONER

## 2022-06-27 PROCEDURE — 1123F ACP DISCUSS/DSCN MKR DOCD: CPT | Performed by: NURSE PRACTITIONER

## 2022-06-27 PROCEDURE — 99214 OFFICE O/P EST MOD 30 MIN: CPT | Performed by: NURSE PRACTITIONER

## 2022-06-27 PROCEDURE — G8399 PT W/DXA RESULTS DOCUMENT: HCPCS | Performed by: NURSE PRACTITIONER

## 2022-06-27 RX ORDER — PROPAFENONE HYDROCHLORIDE 150 MG/1
TABLET, FILM COATED ORAL
Qty: 180 TABLET | Refills: 1 | Status: SHIPPED | OUTPATIENT
Start: 2022-06-27 | End: 2022-09-20 | Stop reason: ALTCHOICE

## 2022-06-27 NOTE — PROGRESS NOTES
[address]   Electrophysiology  [facility], APRN-CNP  Attending EP: Dr. Aydee Owen   Date: 6/27/2022  I had the privilege of visiting Chacha Weinberg in the office. Chief Complaint:   Chief Complaint   Patient presents with    Follow-up     Pt states she has no cardiac concerns.  Atrial Fibrillation     History of Present Illness: History obtained from patient and medical record. Chacha Weinberg is 69 y.o. female with a past medical history of HTN, HLD, PAULA, PE, obesity, and atrial fibrillation. She was found to be in afib 2011 after lap-band surgery. She is warfarin for hx of PE and follows with [facility] clinic and s/p abena filter. She had a fall that was precipitated with dizziness and was found to have neck fracture and forehead hematoma. Since then she has had issues with her speech. S/p LHC 12/14/2018 showed normal coronaries. S/p unsuccessful cardioversion 12/14/2018. She uses [employer] mobile device at home. S/p DCCV 5/6/2021    RFCA of atrial fibrillation with PVI, CTI flutter (10/5/2021)    Interval history: Today, Chacha Weinberg is being seen for routine follow up. Reports that her atrial fibrillation has improved in the last several months. Prior to this she was having episodes a couple times weekly. Fritzi Mcgregor wedding is this weekend. She is also having carpel tunnel and wrist surgery in July. Denies having acute symptoms at the time of visit. With regard to medication therapy the patient has been compliant with prescribed regimen. She has tolerated therapy to date. Assessment:  Paroxysmal Atrial Fibrillation  - ECG today shows SR   - On diltiazem 240 mg daily and propafenone 150 mg tid ()  - [id number] score: 3 (age, gender, HTN) ; [id number] Vasc score and anticoagulation discussed. High risk for stroke and thromboembolism. Anticoagulation is recommended.    ~Continue warfarin, follows at [facility] clinic, no reports of bleeding  - Afib risk factors including age, HTN, obesity, inactivity and PAULA were discussed with patient. Risk factor modification recommended   ~ TSH 2.44 (10/2/2020)     - Treatment options including cardioversion, rate control strategy, antiarrhythmics, anticoagulation and possible ablation were discussed with patient. Risks, benefits and alternative of each treatment options were explained. All questions answered    ~ S/p RFCA of afib w/ PVI (10/3/2020)    ~ S/p DCCV 10/14/2020 and 10/26/2020     ~ S/p DCCV 5/2021                          ~ RFCA of atrial fibrillation with PVI, CTI flutter (10/5/2021)   - Reports palpitations that have improved recently. Discussed monitor today and see what her AF burden is. Since her symptoms are improved and she has upcoming wedding we will defer cardiac monitoring until her next follow up and evaluate need at that time. Aortic Stenosis   - Systolic murmur present  HTN-goal <130/80   - Elevated and she will monitor at home, if remains elevated will add another agent   - Encouraged patient to check BP at home, log and bring to office visits  - Discussed lifestyle modifications, weight loss, low sodium diet  PAULA   - Discussed long term effects of unmanaged PAULA on heart   Obesity: There is no height or weight on file to calculate BMI. - Discussed exercise and weight loss and its benefits in detail  Plan  - Consider MCOT at follow up  - Weight loss  - BP monitoring  - Call office if symptoms worsen    F/U: Follow-up with EP in 3 months   Call [facility] at [phone] with any questions    Allergies: Allergies   Allergen Reactions    Belsomra [Suvorexant] Other (See Comments)     Nightmares      Avelox [Moxifloxacin Hcl In Nacl] Rash    Levofloxacin Rash    Sulfa Antibiotics Rash     Home Medications:  Prior to Visit Medications    Medication Sig Taking?  Authorizing Provider   enoxaparin (LOVENOX) 150 MG/ML SOSY injection Inject 0.8 mLs into the skin in the morning and at bedtime  Sara De Leon Mo Breach, APRN - CNP   clonazePAM (KLONOPIN) 1 MG tablet TAKE 1 TABLET BY MOUTH IN  THE EVENING  Louise Bashir MD   indapamide (LOZOL) 1.25 MG tablet TAKE 1 TABLET BY MOUTH IN  THE MORNING  AIME Aguirre CNP   pantoprazole (PROTONIX) 40 MG tablet TAKE 1 TABLET BY MOUTH IN  THE MORNING BEFORE  BREAKFAST  AIME Aguirre CNP   gabapentin (NEURONTIN) 300 MG capsule TAKE 2 CAPSULES BY MOUTH 3  TIMES DAILY  AIME Aguirre CNP   propafenone (RYTHMOL) 150 MG tablet Take 1 tablet by mouth 2 times daily See note on 07/21/21  AIME Rodriguez CNP   montelukast (SINGULAIR) 10 MG tablet TAKE 1 TABLET BY MOUTH AT  NIGHT  Louise Bashir MD   citalopram (CELEXA) 40 MG tablet TAKE 1 TABLET BY MOUTH  DAILY  Farooq Seo MD   atorvastatin (LIPITOR) 80 MG tablet TAKE 1 TABLET BY MOUTH  DAILY  Farooq Seo MD   allopurinol (ZYLOPRIM) 300 MG tablet TAKE 1 TABLET BY MOUTH  DAILY  Jennifer Edmonds MD   trimethoprim (TRIMPEX) 100 MG tablet Take 1 tablet by mouth every 48 hours  AIME Aguirre CNP   verapamil (CALAN SR) 240 MG extended release tablet TAKE 1 TABLET BY MOUTH AT  NIGHT  Farooq Seo MD   potassium chloride (KLOR-CON M) 20 MEQ extended release tablet TAKE 1 TABLET BY MOUTH  DAILY WITH BREAKFAST  AIME Cortez CNP   warfarin (COUMADIN) 5 MG tablet TAKE 1 TABLET BY MOUTH ON  MONDAY AND  FRIDAY AND 1 AND 1/2  TABLETS ALL OTHER DAYS  Louise Bashir MD   albuterol sulfate HFA (PROAIR HFA) 108 (90 Base) MCG/ACT inhaler Inhale 2 puffs into the lungs every 4 hours as needed for Wheezing  Louise Bashir MD   fluticasone-salmeterol (ADVAIR) 250-50 MCG/DOSE AEPB Inhale 1 puff into the lungs 2 times daily  Louise Bashir MD   furosemide (LASIX) 20 MG tablet Take 1 tablet by mouth daily  AIME Rodriguez CNP   potassium chloride (KLOR-CON M) 20 MEQ extended release tablet TAKE 1 TABLET BY MOUTH  DAILY WITH BREAKFAST  Patient taking differently: TAKE 1 TABLET BY MOUTH  DAILY WITH BREAKFAST   Take 2 tablets on the days she takes the furosemide  Mason Santana APRN - CNP   acetaminophen (TYLENOL) 500 MG tablet Take 500 mg by mouth every 6 hours as needed for Pain  Historical Provider, MD   diphenhydrAMINE-APAP, sleep, (TYLENOL PM EXTRA STRENGTH)  MG tablet Take 1 tablet by mouth nightly as needed for Sleep  Historical Provider, MD   b complex vitamins capsule Take 1 capsule by mouth daily  Historical Provider, MD   Ascorbic Acid (VITAMIN C PO) 1 tablet daily  Historical Provider, MD   therapeutic multivitamin-minerals (THERAGRAN-M) tablet Take 1 tablet by mouth nightly   Historical Provider, MD   Cranberry 500 MG CAPS Take 1,000 mg by mouth nightly   Historical Provider, MD      Past Medical History:  Past Medical History:   Diagnosis Date    Allergic     Anxiety 2/2/2017    Arthritis     Asthma     Back pain     Blood circulation, collateral     legs    Depression     GERD (gastroesophageal reflux disease)     Gout     Hypercholesteremia     Hypertension     Movement disorder     7 discs ruptured    Movement disorder     knee replacements    Obesity     Pulmonary emboli ([address]     Unspecified sleep apnea     uses cpap    Urinary tract infection, chronic     UTI (urinary tract infection)      Past Surgical History:    has a past surgical history that includes joint replacement; Vena Cava Filter Placement; bladder suspension; bone incision and drainage (5-7-11); joint replacement (2003); joint replacement (2005); Revision Total Knee Arthroplasty (2006); Ankle Fusion (2010); Full thickness skin graft (2011); Tubal ligation (1975); Upper gastrointestinal endoscopy (10-); other surgical history (12-); back surgery (09/14/2012); eye surgery; and Breast biopsy (08/2018). Social History:  Reviewed. reports that she quit smoking about 57 years ago. Her smoking use included cigarettes.  She has a 2.00 pack-year smoking history. She has never used smokeless tobacco. She reports previous alcohol use. She reports that she does not use drugs. Family History:  Reviewed. family history includes Arthritis in her father; Asthma in her father; Breast Cancer in her sister; Depression in her mother; Heart Disease in her father; High Blood Pressure in her father and mother; Stroke in her father. Denies family history of sudden cardiac death, arrhythmia, premature CAD    Review of System:  · Constitutional: No weight changes or weakness  · HEENT: No visual changes. No mouth sores or sore throat. · Cardiovascular: denies chest pain, denies dyspnea on exertion, admits to palpitations or denies loss of consciousness. No cough, hemoptysis, denies pleuritic pain, or phlebitis. denies dizziness. · Respiratory: denies cough or wheezing. · Gastrointestinal: Negative, No blood in stools. · Genitourinary: No hematuria. · Neurological: No focal weakness  · Psychiatric: No confusion, anxiety, or depression. · Hem/Lymph: Denies abnormal bruising or bleeding. Physical Examination:  There were no vitals filed for this visit. Wt Readings from Last 3 Encounters:   06/20/22 272 lb (123.4 kg)   06/16/22 273 lb (123.8 kg)   05/02/22 275 lb 6.4 oz (124.9 kg)      Constitutional: Cooperative and in no apparent distress, and appears well nourished   Skin: Warm and pink; no cyanosis or bruising   HEENT: Symmetric and normocephalic. Conjunctiva pink with clear sclera. Mucus membranes pink and moist. No visible masses/goiter   Respiratory: Respirations symmetric and unlabored. Lungs clear to auscultation bilaterally, no wheezing, rhonchi, or crackles.  Cardiovascular:  regular rate and rhythm. S1 & S2 present, positive for murmur. negative elevation of JVP. No peripheral edema.  Musculoskeletal:  No focal weakness.  Neurological/Psych: Awake and orientated to person, place and time. Calm affect, appropriate mood.      Pertinent labs, diagnostic, device, and imaging results reviewed as a part of this visit    LABS    CBC:   Lab Results   Component Value Date    WBC 6.0 2022    HGB 13.6 2022    HCT 40.5 2022    MCV 95.1 2022     2022     BMP:   Lab Results   Component Value Date    CREATININE 0.9 2022    BUN 12 2022     2022    K 4.3 2022    CL 98 (L) 2022    CO2 25 2022     Estimated Creatinine Clearance: 69 mL/min (based on SCr of 0.9 mg/dL). No results found for: BNP    Thyroid:   Lab Results   Component Value Date    TSH 3.53 2011     Lipid Panel:   Lab Results   Component Value Date    CHOL 170 2022    HDL 62 2022    HDL 58 2012    TRIG 97 2022     LFTs:  Lab Results   Component Value Date    ALT 26 10/02/2020    AST 32 10/02/2020    ALKPHOS 78 10/02/2020    BILITOT 0.7 10/02/2020     Coags:   Lab Results   Component Value Date    PROTIME 24.9 (H) 10/03/2020    INR 2.2 2022    APTT 27.4 2011     EC2022 SR HR 61, , , QTc 415     Echo: 3/3/2022   Summary   *Technically difficult exam due to body habitus. *Left ventricle - normal size, thickness and function with EF of 65%   *Aortic valve - moderate stenosis with MG of 30mmHg, BERTHA 1.12cm2, mild   regurgitation   *Mitral valve - mild regurgitation   *Tricuspid valve - mild regurgitation with RVSP of 38mmHg  10/5/2021   Summary   Normal left ventricle size, wall thickness, and systolic function with an   estimated ejection fraction of 60%. Mitral valve leaflets appear mildly thickened. Mild mitral regurgitation. There is no evidence of mass or thrombus in the left atrium or appendage. The aortic valve is moderately calcified with decreased leaflet mobility   consistent with at least moderate aortic stenosis. V max 3.03 m/s. Mild aortic regurgitation. The aortic root is normal in size. The thoracic aorta has minimal plaque.     3/13/2018 Summary   Normal left ventricle size, wall thickness and systolic function with an   estimated ejection fraction of 60%. Mild mitral regurgitation. GXT: 4/19/2018  Summary         No EKG evidence for ischemia with exercise         Normal LV size and systolic function.         Myocardial perfusion imaging with small area of decreased uptake in the     anteroapical wall with stress with redistribution at rest consistent with     ischemia.         Scheduled for cath in 2 weeks.           Cath: 5/1/2018  Findings:                      LM       Normal              LAD     Normal              Cx        Normal              RCA     Normal              LVG     Not performed - aorta very tortuous              EDP     Not obtained - aorta very tortuous  Intervention:  None  Recs:              Continued aggressive medical tx and risk factor modification    Diet & Exercise:   The patient is counseled to follow a low salt diet to assure blood pressure remains controlled for cardiovascular risk factor modification   The patient is counseled to avoid excess caffeine, and energy drinks as this may exacerbated ectopy and arrhythmia   The patient is counseled to lose weight to control cardiovascular risk factors   Exercise program discussed: To improve overall cardiovascular health, the patient is instructed to increase cardiovascular related activities with a goal of 150 min/week of moderate level activity or 10,000 steps per day. Encouraged to perform as much activity as tolerated     I have addressed the patient's cardiac risk factors and adjusted pharmacologic treatment as needed. In addition, I have reinforced the need for patient directed risk factor modification. I independently reviewed the ECG    All questions and concerns were addressed with the patient. Alternatives to treatment were discussed. Thank you for allowing to us to participate in the care of Nena Veliz.     Sridhar Service, APRN-CNP  [facility]   Office: [phone]

## 2022-06-27 NOTE — PATIENT INSTRUCTIONS
- No medication changes  - Call if symptoms develop  - Check your blood pressure at home, if your top number blood pressure is >140/90 or <95/50 please call the office  - Check your heart rate at home, if it is <50 or >120 please call the office   - Weight loss and regular exercise  - Follow up in 3 months

## 2022-06-28 ENCOUNTER — TELEPHONE (OUTPATIENT)
Dept: CARDIOLOGY CLINIC | Age: 77
End: 2022-06-28

## 2022-06-28 NOTE — TELEPHONE ENCOUNTER
Bambi Hogue called to request a copy of the Pt EKG tracing. Please fax the copy to: [phone]. Pt is having surgery on 07/14.    Please advise

## 2022-07-06 ENCOUNTER — TELEPHONE (OUTPATIENT)
Dept: CARDIOLOGY CLINIC | Age: 77
End: 2022-07-06

## 2022-07-06 NOTE — LETTER
[facility] CARDIOLOGY-[address]  Dept: [phone]  Dept Fax: [phone]    Nena Veliz  1945  7/7/22    To Whom It May Concern:    Nena Veliz has been seen in the electrophysiology office for atrial fibrillation. She is considered acceptable risk for proceeding with carpal tunnel surgery from an electrophysiology standpoint. There are no apparent interventions to ameliorate the cardiac risk. This clinical assessment assumes a full anesthesia/pulmonary/internal medicine evaluation for overall risk of airway management, type and route of anesthetic, and other relevant anesthesia-specific considerations. Discussion about risks, benefits and alternative of procedure per surgical team.     Discontinuation of any anticoagulant carries significant risks of morbidity, sometimes with a fatal outcome (e.g. stroke), from thromboembolic complications. Guidelines do not recommend discontinuation of anticoagulation for low risk surgical procedure, such as cataract surgery, dental procedures, and dermatologic surgeries. The risks of hemorrhage or thromboembolism versus the benefit from the operation need to be addressed by attending surgeon. Coumadin:Temporary cessation of warfarin is recommended approximately 5 days prior to surgical procedure. Nena Veliz may hold  Coumadin for 3-5 days prior to his/her scheduled procedure. If more days are desired, it is up to the requesting physician to appropriately bridge the patient with an alternative medication. Please limit time off anticoagulation and resume medication as soon as possible. Please contact my office with any further questions or concerns.      Sincerely,        Aide Brown MD

## 2022-07-07 ENCOUNTER — TELEPHONE (OUTPATIENT)
Dept: CARDIOLOGY CLINIC | Age: 77
End: 2022-07-07

## 2022-07-07 ENCOUNTER — TELEPHONE (OUTPATIENT)
Dept: PHARMACY | Age: 77
End: 2022-07-07

## 2022-07-07 ENCOUNTER — ANTI-COAG VISIT (OUTPATIENT)
Dept: PHARMACY | Age: 77
End: 2022-07-07
Payer: MEDICARE

## 2022-07-07 ENCOUNTER — TELEPHONE (OUTPATIENT)
Dept: INTERNAL MEDICINE CLINIC | Age: 77
End: 2022-07-07

## 2022-07-07 DIAGNOSIS — Z86.711 HISTORY OF PULMONARY EMBOLISM: ICD-10-CM

## 2022-07-07 DIAGNOSIS — I48.0 PAF (PAROXYSMAL ATRIAL FIBRILLATION) (HCC): Primary | ICD-10-CM

## 2022-07-07 LAB — INTERNATIONAL NORMALIZATION RATIO, POC: 2.8

## 2022-07-07 PROCEDURE — 85610 PROTHROMBIN TIME: CPT

## 2022-07-07 PROCEDURE — 99211 OFF/OP EST MAY X REQ PHY/QHP: CPT

## 2022-07-07 NOTE — TELEPHONE ENCOUNTER
CARDIAC CLEARANCE     What type of procedure are you having? Carpal Tunnel Surgery    Which physician is performing your procedure? Dr. Kacy Osuna    When is your procedure scheduled for? July 14    Where are you having this procedure? [facility]    Are you taking Blood Thinners? Yes   If so what? (Name/dose/frequesncy)  warfarin (COUMADIN) 5 MG tablet  TAKE 1 TABLET BY MOUTH ON  MONDAY AND  FRIDAY AND 1 AND 1/2  TABLETS ALL OTHER DAYS     Does the surgeon want you to stop your blood thinner? If so for how long?   TBD By Cardiologist and Surgeon    Phone Number and Contact Name for Physicians office:  Piotr Martínez  [phone]    Fax number to send information:  [phone]

## 2022-07-07 NOTE — TELEPHONE ENCOUNTER
Called and spoke with Soha Jalloh. Messaged sent to covering provider covering for Marcello Lopez MD regarding clarification of bridging instructions (only pre procedure vs. pre/post procedure). Will update patient with response.        Lola Phipps, NidaD

## 2022-07-07 NOTE — PROGRESS NOTES
Ms. Maria Elena Mitchell is a 69 y.o. y/o female with history of DVT, PE, Afib   She presents today for anticoagulation monitoring and adjustment. Pertinent PMH: HTN, Gastric Banding,CAD, diskectomy with an artifical spinal disk implant in September 2012. Patient Reported Findings:  Yes     No  [x]   []       Patient verifies current dosing regimen as listed  []   [x]       S/S bleeding/bruising/swelling/SOB states that she has been wheezing more and had more SOB d/t allergies --> has had a few small nose bleeds recently  --> denies  []   [x]       Blood in urine or stool  []   [x]       Procedures scheduled in the future at this time none upcoming    []   [x]       Missed Dose - denies  []   [x]       Extra Dose - denies   []   [x]       Change in medications  decreased tylenol intake to 1-2 times a day --> still taking propafenone 150 BID --> no changes    [x]   []       Change in health/diet/appetite  Patient states that she feels like she has returned to normal vit k intake --> diet fluctuates causing INR to fluctuate. Discussed ways to improve consistency with vit k intake  --> states that she has cut back spinach and cabbage intake to twice a week --> no vit k --> had cabbage a few times --> no vit k since was on vacation but plans to return --> has been eating a salad a day-->no changes --> had less vit k acutely -->has been trying to eat more vit k, salads, broccoli  []   [x]       Change in alcohol use    []   [x]       Change in activity  []   [x]       Hospital admission  []   [x]       Emergency department visit   []   [x]       Other complaints     Clinical Outcomes:  Yes     No  []   [x]       Major bleeding event  []   [x]       Thromboembolic event  Gets warfarin through MD    Duration of warfarin Therapy: indefinite  INR Range:  2.0-3.0     INR is 2.2 today   Patient has procedure 7/14 and will be holding for 4 days. Bridging instructions are currently unclear.   Per patient, only to bridge prior to procedure and not after procedure. Will clarify with referring MD's office. Patient instructed that bridging instructions may change. Continue weekly dose of 5 mg Mon and Fri and 7.5 mg all other days of the week until you begin holding for your procedure 7/10. Restart warfarin 7/14 with an extra 2.5mg the first two days and then resume normal home dose. Encouraged to maintain a consistency of vegetables/salads. Recheck INR in 2 weeks, 7/18 after procedure.     Does not need PW printed, only card     Patient consent signed 10/28/21    Referring PCP is Sim Arora  INR (no units)   Date Value   10/05/2021 2.50 (H)   05/06/2021 2.80 (H)   10/26/2020 2.90 (H)     INR,(POC) (no units)   Date Value   05/26/2022 2.2   04/14/2022 3.1   03/03/2022 2.5    For Pharmacy Admin Tracking Only  Intervention Detail: Dose Adjustment: 1, reason: Therapy Optimization  Total # of Interventions Recommended: 1  Total # of Interventions Accepted: 1  Time Spent (min): 15

## 2022-07-07 NOTE — TELEPHONE ENCOUNTER
Brandan, Pharmacist with [address] is calling regarding patient will be stopping coumadin 4 days prior to procedure 7/14/22. He states patient was provided with bridging instructions for prior to procedure but no instructions for after procedure and he would like to clarify with provider.   Trang Bedolla states coumadin clinic is closed for lunch from 12 pm -1 pm and best time to call back would be after 1 pm.

## 2022-07-08 ENCOUNTER — TELEPHONE (OUTPATIENT)
Dept: INTERNAL MEDICINE CLINIC | Age: 77
End: 2022-07-08

## 2022-07-08 ENCOUNTER — TELEPHONE (OUTPATIENT)
Dept: PHARMACY | Age: 77
End: 2022-07-08

## 2022-07-08 NOTE — TELEPHONE ENCOUNTER
----- Message from Tai Quin sent at 7/8/2022 12:52 PM EDT -----  Regarding: Please call regarding bridging  Contact: patient  Please call patient regarding lovenox. She said her doctor ordered 120mg lovenox, not 150mg lovenox. Patient offered to call MD, if that would help. [phone]. Called and spoke with patient. Confirmed patient current weight. Instructed patient that 120mg is the correct dose for lovenox bridging. Patient to follow referring provider instructions until clarified with provider. Patient states she will call provider and let us know if she hears anything.       González Leung, PharmD

## 2022-07-08 NOTE — TELEPHONE ENCOUNTER
Juliana Burnette is calling back--needs an answer to instructions--please call him back asap at [phone]---Thanks.

## 2022-07-08 NOTE — TELEPHONE ENCOUNTER
Patient is requesting to speak to John regarding bridging coumadin before and after surgery. She states her surgery is 7/14/22.

## 2022-07-08 NOTE — TELEPHONE ENCOUNTER
Hold coumdain and start lovenox prior to surgery - see other notes. Hold lovenox day of surgery   Restart lovenox on 7/15 day after surgery and restart coumadin - if okay with surgery team.   FU with [facility] clinic for INR 4-5 days after surgery     Attempted to call pt - no answer, busy signal. Sent my chart message.      Electronically signed by AIME Petersen CNP on 7/8/2022 at 5:20 PM

## 2022-07-11 RX ORDER — ENOXAPARIN SODIUM 150 MG/ML
1 INJECTION SUBCUTANEOUS EVERY 12 HOURS
Qty: 6 EACH | Refills: 0 | Status: SHIPPED | OUTPATIENT
Start: 2022-07-11 | End: 2022-08-15 | Stop reason: ALTCHOICE

## 2022-07-11 NOTE — TELEPHONE ENCOUNTER
Pt heard back from PCP office, was given restart instructions for lovenox post op. Was already given restart instructions for warfarin and r/s back to clinic.

## 2022-07-13 ENCOUNTER — ANESTHESIA EVENT (OUTPATIENT)
Dept: OPERATING ROOM | Age: 77
End: 2022-07-13
Payer: MEDICARE

## 2022-07-13 RX ORDER — ENOXAPARIN SODIUM 150 MG/ML
1 INJECTION SUBCUTANEOUS EVERY 12 HOURS
Qty: 8 ML | Refills: 1 | Status: SHIPPED | OUTPATIENT
Start: 2022-07-13 | End: 2022-07-18

## 2022-07-13 NOTE — PROGRESS NOTES
Place patient label inside box (if no patient label, complete below)  Name:  :  MR#:   Denise Cerrato / PROCEDURE  1. I (we), Ayla Jamesonko (Patient Name) authorize Nguyễn Majano (Provider / Latha Romero) and/or such assistants as may be selected by him/her, to perform the following operation/procedure(s): CARPAL TUNNEL RELEASE OF RIGHT WRIST, RIGHT IN SITU CUBITAL TUNNEL RELEASE VERSUS ULNAR NERVE TRANSPOSITION AT THE ELBOW        Note: If unable to obtain consent prior to an emergent procedure, document the emergent reason in the medical record. This procedure has been explained to my (our) satisfaction and included in the explanation was:  A) The intended benefit, nature, and extent of the procedure to be performed;  B) The significant risks involved and the probability of success;  C) Alternative procedures and methods of treatment;  D) The dangers and probable consequences of such alternatives (including no procedure or treatment); E) The expected consequences of the procedure on my future health;  F) Whether other qualified individuals would be performing important surgical tasks and/or whether  would be present to advise or support the procedure. I (we) understand that there are other risks of infection and other serious complications in the pre-operative/procedural and postoperative/procedural stages of my (our) care. I (we) have asked all of the questions which I (we) thought were important in deciding whether or not to undergo treatment or diagnosis. These questions have been answered to my (our) satisfaction. I (we) understand that no assurance can be given that the procedure will be a success, and no guarantee or warranty of success has been given to me (us).     2. It has been explained to me (us) that during the course of the operation/procedure, unforeseen conditions may be revealed that necessitate extension of the original procedure(s) or different procedure(s) than those set forth in Paragraph 1. I (we) authorize and request that the above-named physician, his/her assistants or his/her designees, perform procedures as necessary and desirable if deemed to be in my (our) best interest.     Revised 8/2/2021                                                                          Page 1 of 2         3. I acknowledge that health care personnel may be observing this procedure for the purpose of medical education or other specified purposes as may be necessary as requested and/or approved by my (our) physician. 4. I (we) consent to the disposal by the hospital Pathologist of the removed tissue, parts or organs in accordance with hospital policy. 5. I do ____ do not ____ consent to the use of a local infiltration pain blocking agent that will be used by my provider/surgical provider to help alleviate pain during my procedure. 6. I do ____ do not ____ consent to an emergent blood transfusion in the case of a life-threatening situation that requires blood components to be administered. This consent is valid for 24 hours from the beginning of the procedure. 7. This patient does ____ or does not ____ currently have a DNR status/order. If DNR order is in place, obtain Addendum to the Surgical Consent for ALL Patients with a DNR Order to address selma-operative status for limited intervention or DNR suspension.      8. I have read and fully understand the above Consent for Operation/Procedure and that all blanks were completed before I signed the consent.   _____________________________       _____________________      ____/____am/pm  Signature of Patient or legal representative      Printed Name / Relationship            Date / Time   ____________________________       _____________________      ____/____am/pm  Witness to Signature                                    Printed Name                    Date / Time     If patient is unable to sign or is a minor, complete the following)  Patient is a minor, ____ years of age, or unable to sign because:   ______________________________________________________________________________________________    Taylor If a phone consent is obtained, consent will be documented by using two health care professionals, each affirming that the consenting party has no questions and gives consent for the procedure discussed with the physician/provider.   _____________________          ____________________       _____/_____am/pm   2nd witness to phone consent        Printed name           Date / Time    Informed Consent:  I have provided the explanation described above in section 1 to the patient and/or legal representative.  I have provided the patient and/or legal representative with an opportunity to ask any questions about the proposed operation/procedure.   ___________________________          ____________________         ____/____am/pm  Provider / Proceduralist                            Printed name            Date / Time  Revised 8/2/2021                                                                      Page 2 of 2

## 2022-07-13 NOTE — PROGRESS NOTES
[facility] PRE-SURGICAL TESTING INSTRUCTIONS                                  PRIOR TO PROCEDURE DATE:        1. PLEASE FOLLOW ANY  GUIDELINES/ INSTRUCTIONS PRIOR TO YOUR PROCEDURE AS ADVISED BY YOUR SURGEON. 2. Arrange for someone to drive you home and be with you for the first 24 hours after discharge for your safety after your procedure for which you received sedation. Ensure it is someone we can share information with regarding your discharge. 3. You must contact your surgeon for instructions IF:   You are taking any blood thinners, aspirin, anti-inflammatory or vitamin E.   There is a change in your physical condition such as a cold, fever, rash, cuts, sores or any other infection, especially near your surgical site. 4. Do not drink alcohol the day before or day of your procedure. 5. A Pre-op History and Physical for surgery MUST be completed by your Physician or Urgent Care within 30 days of your procedure date. Please bring a copy with you on the day of your procedure and along with any other testing performed. THE DAY OF YOUR PROCEDURE:  1. Follow instructions for ARRIVAL TIME as DIRECTED BY YOUR SURGEON. 2. Enter the MAIN entrance from [employer] and follow the signs to the free [employer] or [employer] parking (offered free of charge 6am-5pm). 3. Enter the Main Entrance of the hospital (do not enter from the lower level of the parking garage). Upon entrance, check in with the  at the main desk on your left. If no one is available at the desk, proceed into the [facility] Waiting Room and go through the door directly into the [facility]. There is a Check-in desk ACROSS from Room 5 (marked with a sign hanging from the ceiling). The phone number for the surgery center is [phone]. 4. Please call [phone] option #2 option #2 if you have not been preregistered yet.   On the day of your procedure bring your insurance card and want to bring a method of payment, i.e. Check or credit card, if you wish to pay your co-pay the day of surgery. 12. If you are to stay overnight, you may bring a bag with personal items. Please have any large items you may need brought in by your family after your arrival to your hospital room. 15. If you have a Living Will or Durable Power of , please bring a copy on the day of your procedure. 15. With your permission, one family member may accompany you while you are being prepared for surgery. Once you are ready, additional family members may join you. HOW WE KEEP YOU SAFE and WORK TO PREVENT SURGICAL SITE INFECTIONS:  1. Health care workers should always check your ID bracelet to verify your name and birth date. You will be asked many times to state your name, date of birth, and allergies. 2. Health care workers should always clean their hands with soap or alcohol gel before providing care to you. It is okay to ask anyone if they cleaned their hands before they touch you. 3. You will be actively involved in verifying the type of procedure you are having and ensuring the correct surgical site. This will be confirmed multiple times prior to your procedure. Do NOT audrey your surgery site UNLESS instructed to by your surgeon. 4. Do not shave or wax for 72 hours prior to procedure near your operative site. Shaving with a razor can irritate your skin and make it easier to develop an infection. On the day of your procedure, any hair that needs to be removed near the surgical site will be clipped by a healthcare worker using a special clippers designed to avoid skin irritation. 5. When you are in the operating room, your surgical site will be cleansed with a special soap, and in most cases, you will be given an antibiotic before the surgery begins. What to expect AFTER YOUR PROCEDURE:  1.  Immediately following your procedure, your will be taken to the PACU for the first phase of your recovery. Your nurse will help you recover from any potential side effects of anesthesia, such as extreme drowsiness, changes in your vital signs or breathing patterns. Nausea, headache, muscle aches, or sore throat may also occur after anesthesia. Your nurse will help you manage these potential side effects. 2. For comfort and safety, arrange to have someone at home with you for the first 24 hours after discharge. 3. You and your family will be given written instructions about your diet, activity, dressing care, medications, and return visits. 4. Once at home, should issues with nausea, pain, or bleeding occur, or should you notice any signs of infection, you should call your surgeon. 5. Always clean your hands before and after caring for your wound. Do not let your family touch your surgery site without cleaning their hands. 6. Narcotic pain medications can cause significant constipation. You may want to add a stool softener to your postoperative medication schedule or speak to your surgeon on how best to manage this SIDE EFFECT. SPECIAL INSTRUCTIONS     Thank you for allowing us to care for you. We strive to exceed your expectations in the delivery of care and service provided to you and your family. If you need to contact the [place] [address] staff for any reason, please call us at [phone]    Instructions reviewed with patient during preadmission testing phone interview.   Tate Jean RN.7/13/2022 .1:29 PM      ADDITIONAL EDUCATIONAL INFORMATION REVIEWED PER PHONE WITH YOU AND/OR YOUR FAMILY:  No Hibiclens® Bathing Instructions   Yes Antibacterial Soap

## 2022-07-13 NOTE — TELEPHONE ENCOUNTER
Pt called and states that surgery was r/s for Fri 7/15 rather than Thurs 7/14. Pt does not have enough lovenox injections. Pt to continue lovenox with last inj being 7/14 AM until after surgery. Resume lovenox and warfarin 7/15 PM. Continue lovenox and warfarin until RTC. R/s pt to RTC Tues 7/19. Additional lovenox script sent to [address] [place] [address] in [facility].

## 2022-07-15 ENCOUNTER — TELEPHONE (OUTPATIENT)
Dept: CARDIOLOGY CLINIC | Age: 77
End: 2022-07-15

## 2022-07-15 ENCOUNTER — TELEPHONE (OUTPATIENT)
Dept: PHARMACY | Age: 77
End: 2022-07-15

## 2022-07-15 ENCOUNTER — HOSPITAL ENCOUNTER (OUTPATIENT)
Age: 77
Setting detail: OUTPATIENT SURGERY
Discharge: HOME | End: 2022-07-15
Attending: ORTHOPAEDIC SURGERY | Admitting: ORTHOPAEDIC SURGERY
Payer: MEDICARE

## 2022-07-15 ENCOUNTER — ANESTHESIA (OUTPATIENT)
Dept: OPERATING ROOM | Age: 77
End: 2022-07-15
Payer: MEDICARE

## 2022-07-15 VITALS
HEIGHT: 64 IN | HEART RATE: 74 BPM | RESPIRATION RATE: 16 BRPM | WEIGHT: 266 LBS | SYSTOLIC BLOOD PRESSURE: 142 MMHG | DIASTOLIC BLOOD PRESSURE: 68 MMHG | OXYGEN SATURATION: 93 % | BODY MASS INDEX: 45.41 KG/M2 | TEMPERATURE: 97.2 F

## 2022-07-15 DIAGNOSIS — G56.01 CARPAL TUNNEL SYNDROME OF RIGHT WRIST: Primary | ICD-10-CM

## 2022-07-15 DIAGNOSIS — G56.21 CUBITAL TUNNEL SYNDROME ON RIGHT: ICD-10-CM

## 2022-07-15 LAB
APTT: 31 SEC (ref 23–34.3)
GLUCOSE BLD-MCNC: 108 MG/DL (ref 70–99)
INR BLD: 1.1 (ref 0.87–1.14)
PERFORMED ON: ABNORMAL
PROTHROMBIN TIME: 14.1 SEC (ref 11.7–14.5)

## 2022-07-15 PROCEDURE — 2709999900 HC NON-CHARGEABLE SUPPLY: Performed by: ORTHOPAEDIC SURGERY

## 2022-07-15 PROCEDURE — 3700000001 HC ADD 15 MINUTES (ANESTHESIA): Performed by: ORTHOPAEDIC SURGERY

## 2022-07-15 PROCEDURE — 85730 THROMBOPLASTIN TIME PARTIAL: CPT

## 2022-07-15 PROCEDURE — 3600000004 HC SURGERY LEVEL 4 BASE: Performed by: ORTHOPAEDIC SURGERY

## 2022-07-15 PROCEDURE — 2580000003 HC RX 258: Performed by: ORTHOPAEDIC SURGERY

## 2022-07-15 PROCEDURE — 7100000000 HC PACU RECOVERY - FIRST 15 MIN: Performed by: ORTHOPAEDIC SURGERY

## 2022-07-15 PROCEDURE — 2580000003 HC RX 258: Performed by: ANESTHESIOLOGY

## 2022-07-15 PROCEDURE — 2500000003 HC RX 250 WO HCPCS: Performed by: NURSE ANESTHETIST, CERTIFIED REGISTERED

## 2022-07-15 PROCEDURE — 7100000010 HC PHASE II RECOVERY - FIRST 15 MIN: Performed by: ORTHOPAEDIC SURGERY

## 2022-07-15 PROCEDURE — 3700000000 HC ANESTHESIA ATTENDED CARE: Performed by: ORTHOPAEDIC SURGERY

## 2022-07-15 PROCEDURE — 6360000002 HC RX W HCPCS: Performed by: NURSE ANESTHETIST, CERTIFIED REGISTERED

## 2022-07-15 PROCEDURE — 7100000011 HC PHASE II RECOVERY - ADDTL 15 MIN: Performed by: ORTHOPAEDIC SURGERY

## 2022-07-15 PROCEDURE — 6360000002 HC RX W HCPCS: Performed by: ORTHOPAEDIC SURGERY

## 2022-07-15 PROCEDURE — 3600000014 HC SURGERY LEVEL 4 ADDTL 15MIN: Performed by: ORTHOPAEDIC SURGERY

## 2022-07-15 PROCEDURE — 6370000000 HC RX 637 (ALT 250 FOR IP): Performed by: ANESTHESIOLOGY

## 2022-07-15 PROCEDURE — 85610 PROTHROMBIN TIME: CPT

## 2022-07-15 PROCEDURE — A4217 STERILE WATER/SALINE, 500 ML: HCPCS | Performed by: ORTHOPAEDIC SURGERY

## 2022-07-15 PROCEDURE — 7100000001 HC PACU RECOVERY - ADDTL 15 MIN: Performed by: ORTHOPAEDIC SURGERY

## 2022-07-15 PROCEDURE — 2500000003 HC RX 250 WO HCPCS: Performed by: ORTHOPAEDIC SURGERY

## 2022-07-15 PROCEDURE — 6370000000 HC RX 637 (ALT 250 FOR IP): Performed by: ORTHOPAEDIC SURGERY

## 2022-07-15 RX ORDER — SODIUM CHLORIDE 0.9 % (FLUSH) 0.9 %
5-40 SYRINGE (ML) INJECTION PRN
Status: DISCONTINUED | OUTPATIENT
Start: 2022-07-15 | End: 2022-07-15 | Stop reason: HOSPADM

## 2022-07-15 RX ORDER — LIDOCAINE HYDROCHLORIDE 20 MG/ML
INJECTION, SOLUTION EPIDURAL; INFILTRATION; INTRACAUDAL; PERINEURAL PRN
Status: DISCONTINUED | OUTPATIENT
Start: 2022-07-15 | End: 2022-07-15 | Stop reason: SDUPTHER

## 2022-07-15 RX ORDER — FENTANYL CITRATE 50 UG/ML
INJECTION, SOLUTION INTRAMUSCULAR; INTRAVENOUS PRN
Status: DISCONTINUED | OUTPATIENT
Start: 2022-07-15 | End: 2022-07-15 | Stop reason: SDUPTHER

## 2022-07-15 RX ORDER — HYDRALAZINE HYDROCHLORIDE 20 MG/ML
10 INJECTION INTRAMUSCULAR; INTRAVENOUS
Status: DISCONTINUED | OUTPATIENT
Start: 2022-07-15 | End: 2022-07-15 | Stop reason: HOSPADM

## 2022-07-15 RX ORDER — GINSENG 100 MG
CAPSULE ORAL PRN
Status: DISCONTINUED | OUTPATIENT
Start: 2022-07-15 | End: 2022-07-15 | Stop reason: HOSPADM

## 2022-07-15 RX ORDER — MEPERIDINE HYDROCHLORIDE 25 MG/ML
12.5 INJECTION INTRAMUSCULAR; INTRAVENOUS; SUBCUTANEOUS EVERY 5 MIN PRN
Status: DISCONTINUED | OUTPATIENT
Start: 2022-07-15 | End: 2022-07-15 | Stop reason: HOSPADM

## 2022-07-15 RX ORDER — SODIUM CHLORIDE 9 MG/ML
INJECTION, SOLUTION INTRAVENOUS PRN
Status: DISCONTINUED | OUTPATIENT
Start: 2022-07-15 | End: 2022-07-15 | Stop reason: HOSPADM

## 2022-07-15 RX ORDER — ONDANSETRON 2 MG/ML
4 INJECTION INTRAMUSCULAR; INTRAVENOUS
Status: DISCONTINUED | OUTPATIENT
Start: 2022-07-15 | End: 2022-07-15 | Stop reason: HOSPADM

## 2022-07-15 RX ORDER — SODIUM CHLORIDE 0.9 % (FLUSH) 0.9 %
5-40 SYRINGE (ML) INJECTION EVERY 12 HOURS SCHEDULED
Status: DISCONTINUED | OUTPATIENT
Start: 2022-07-15 | End: 2022-07-15 | Stop reason: HOSPADM

## 2022-07-15 RX ORDER — GLYCOPYRROLATE 0.2 MG/ML
INJECTION INTRAMUSCULAR; INTRAVENOUS PRN
Status: DISCONTINUED | OUTPATIENT
Start: 2022-07-15 | End: 2022-07-15 | Stop reason: SDUPTHER

## 2022-07-15 RX ORDER — HYDROCODONE BITARTRATE AND ACETAMINOPHEN 5; 325 MG/1; MG/1
1 TABLET ORAL EVERY 6 HOURS PRN
Qty: 14 TABLET | Refills: 0 | Status: SHIPPED | OUTPATIENT
Start: 2022-07-15 | End: 2022-07-20

## 2022-07-15 RX ORDER — OXYCODONE HYDROCHLORIDE 5 MG/1
5 TABLET ORAL
Status: COMPLETED | OUTPATIENT
Start: 2022-07-15 | End: 2022-07-15

## 2022-07-15 RX ORDER — ONDANSETRON 2 MG/ML
INJECTION INTRAMUSCULAR; INTRAVENOUS PRN
Status: DISCONTINUED | OUTPATIENT
Start: 2022-07-15 | End: 2022-07-15 | Stop reason: SDUPTHER

## 2022-07-15 RX ORDER — MAGNESIUM HYDROXIDE 1200 MG/15ML
LIQUID ORAL CONTINUOUS PRN
Status: DISCONTINUED | OUTPATIENT
Start: 2022-07-15 | End: 2022-07-15 | Stop reason: HOSPADM

## 2022-07-15 RX ORDER — MIDAZOLAM HYDROCHLORIDE 1 MG/ML
INJECTION INTRAMUSCULAR; INTRAVENOUS PRN
Status: DISCONTINUED | OUTPATIENT
Start: 2022-07-15 | End: 2022-07-15 | Stop reason: SDUPTHER

## 2022-07-15 RX ORDER — SODIUM CHLORIDE, SODIUM LACTATE, POTASSIUM CHLORIDE, CALCIUM CHLORIDE 600; 310; 30; 20 MG/100ML; MG/100ML; MG/100ML; MG/100ML
INJECTION, SOLUTION INTRAVENOUS CONTINUOUS
Status: DISCONTINUED | OUTPATIENT
Start: 2022-07-15 | End: 2022-07-15 | Stop reason: HOSPADM

## 2022-07-15 RX ORDER — SUCCINYLCHOLINE/SOD CL,ISO/PF 100 MG/5ML
SYRINGE (ML) INTRAVENOUS PRN
Status: DISCONTINUED | OUTPATIENT
Start: 2022-07-15 | End: 2022-07-15 | Stop reason: SDUPTHER

## 2022-07-15 RX ORDER — PROCHLORPERAZINE EDISYLATE 5 MG/ML
5 INJECTION INTRAMUSCULAR; INTRAVENOUS
Status: DISCONTINUED | OUTPATIENT
Start: 2022-07-15 | End: 2022-07-15 | Stop reason: HOSPADM

## 2022-07-15 RX ORDER — PROPOFOL 10 MG/ML
INJECTION, EMULSION INTRAVENOUS PRN
Status: DISCONTINUED | OUTPATIENT
Start: 2022-07-15 | End: 2022-07-15 | Stop reason: SDUPTHER

## 2022-07-15 RX ORDER — ROCURONIUM BROMIDE 10 MG/ML
INJECTION, SOLUTION INTRAVENOUS PRN
Status: DISCONTINUED | OUTPATIENT
Start: 2022-07-15 | End: 2022-07-15 | Stop reason: SDUPTHER

## 2022-07-15 RX ORDER — LABETALOL HYDROCHLORIDE 5 MG/ML
10 INJECTION, SOLUTION INTRAVENOUS
Status: DISCONTINUED | OUTPATIENT
Start: 2022-07-15 | End: 2022-07-15 | Stop reason: HOSPADM

## 2022-07-15 RX ADMIN — PHENYLEPHRINE HYDROCHLORIDE 200 MCG: 10 INJECTION, SOLUTION INTRAMUSCULAR; INTRAVENOUS; SUBCUTANEOUS at 08:21

## 2022-07-15 RX ADMIN — ROCURONIUM BROMIDE 10 MG: 10 INJECTION INTRAVENOUS at 08:41

## 2022-07-15 RX ADMIN — SODIUM CHLORIDE, POTASSIUM CHLORIDE, SODIUM LACTATE AND CALCIUM CHLORIDE: 600; 310; 30; 20 INJECTION, SOLUTION INTRAVENOUS at 06:45

## 2022-07-15 RX ADMIN — SUGAMMADEX 400 MG: 100 INJECTION, SOLUTION INTRAVENOUS at 09:05

## 2022-07-15 RX ADMIN — ROCURONIUM BROMIDE 40 MG: 10 INJECTION INTRAVENOUS at 08:11

## 2022-07-15 RX ADMIN — CEFAZOLIN 2000 MG: 2 INJECTION, POWDER, FOR SOLUTION INTRAMUSCULAR; INTRAVENOUS at 08:10

## 2022-07-15 RX ADMIN — LIDOCAINE HYDROCHLORIDE 50 MG: 20 INJECTION, SOLUTION EPIDURAL; INFILTRATION; INTRACAUDAL; PERINEURAL at 08:03

## 2022-07-15 RX ADMIN — PROPOFOL 200 MG: 10 INJECTION, EMULSION INTRAVENOUS at 08:03

## 2022-07-15 RX ADMIN — PROPOFOL 30 MG: 10 INJECTION, EMULSION INTRAVENOUS at 08:20

## 2022-07-15 RX ADMIN — ONDANSETRON 4 MG: 2 INJECTION INTRAMUSCULAR; INTRAVENOUS at 09:07

## 2022-07-15 RX ADMIN — GLYCOPYRROLATE 0.2 MG: 0.2 INJECTION INTRAMUSCULAR; INTRAVENOUS at 08:14

## 2022-07-15 RX ADMIN — PHENYLEPHRINE HYDROCHLORIDE 100 MCG: 10 INJECTION, SOLUTION INTRAMUSCULAR; INTRAVENOUS; SUBCUTANEOUS at 08:44

## 2022-07-15 RX ADMIN — GLYCOPYRROLATE 0.2 MG: 0.2 INJECTION INTRAMUSCULAR; INTRAVENOUS at 08:10

## 2022-07-15 RX ADMIN — OXYCODONE 5 MG: 5 TABLET ORAL at 10:22

## 2022-07-15 RX ADMIN — FENTANYL CITRATE 50 MCG: 50 INJECTION, SOLUTION INTRAMUSCULAR; INTRAVENOUS at 08:14

## 2022-07-15 RX ADMIN — PHENYLEPHRINE HYDROCHLORIDE 100 MCG: 10 INJECTION, SOLUTION INTRAMUSCULAR; INTRAVENOUS; SUBCUTANEOUS at 08:49

## 2022-07-15 RX ADMIN — MIDAZOLAM HYDROCHLORIDE 1 MG: 2 INJECTION, SOLUTION INTRAMUSCULAR; INTRAVENOUS at 07:52

## 2022-07-15 RX ADMIN — MIDAZOLAM HYDROCHLORIDE 1 MG: 2 INJECTION, SOLUTION INTRAMUSCULAR; INTRAVENOUS at 07:55

## 2022-07-15 RX ADMIN — Medication 140 MG: at 08:03

## 2022-07-15 RX ADMIN — FENTANYL CITRATE 50 MCG: 50 INJECTION, SOLUTION INTRAMUSCULAR; INTRAVENOUS at 08:03

## 2022-07-15 RX ADMIN — PHENYLEPHRINE HYDROCHLORIDE 100 MCG: 10 INJECTION, SOLUTION INTRAMUSCULAR; INTRAVENOUS; SUBCUTANEOUS at 08:16

## 2022-07-15 RX ADMIN — ROCURONIUM BROMIDE 10 MG: 10 INJECTION INTRAVENOUS at 08:03

## 2022-07-15 RX ADMIN — PROPOFOL 50 MG: 10 INJECTION, EMULSION INTRAVENOUS at 08:12

## 2022-07-15 ASSESSMENT — PAIN DESCRIPTION - PAIN TYPE
TYPE: SURGICAL PAIN
TYPE: SURGICAL PAIN

## 2022-07-15 ASSESSMENT — PAIN DESCRIPTION - FREQUENCY
FREQUENCY: CONTINUOUS
FREQUENCY: CONTINUOUS

## 2022-07-15 ASSESSMENT — PAIN DESCRIPTION - LOCATION
LOCATION: WRIST
LOCATION: WRIST

## 2022-07-15 ASSESSMENT — PAIN SCALES - GENERAL
PAINLEVEL_OUTOF10: 0
PAINLEVEL_OUTOF10: 0
PAINLEVEL_OUTOF10: 7
PAINLEVEL_OUTOF10: 2
PAINLEVEL_OUTOF10: 4

## 2022-07-15 ASSESSMENT — PAIN DESCRIPTION - DESCRIPTORS
DESCRIPTORS: SORE
DESCRIPTORS: SORE

## 2022-07-15 ASSESSMENT — PAIN DESCRIPTION - ORIENTATION
ORIENTATION: RIGHT
ORIENTATION: RIGHT

## 2022-07-15 ASSESSMENT — PAIN - FUNCTIONAL ASSESSMENT: PAIN_FUNCTIONAL_ASSESSMENT: 0-10

## 2022-07-15 NOTE — PROGRESS NOTES
Ambulatory Surgery/Procedure Discharge Note    Vitals:    07/15/22 1045   BP: (!) 142/68   Pulse: 74   Resp: 16   Temp: 97.2 °F (36.2 °C)   SpO2: 93%     Elevated BP meets Jac score Criteria  In: 1590 [P.O.:240; I.V.:1300]  Out: -     Restroom use offered before discharge. Yes  Pt voids QS X1 prior to DC via wheelchair and assist.    Pain assessment:  level of pain (1-10, 10 severe)  Pain Level: 4 Pt satisfied    1125 Drsg to [facility] with <3sec cap refill with sling and ice pack in place. Patient discharged to home/self care. Patient discharged via wheel chair by transporter to waiting family/S.O.     1045 Spoke with Dr. Cynthia Onofre for clarification of when to restart Coumadin and pt questions about Lovenox Bridging with Instructions to [facility] pt to call Cardiologist to confirm when to restart Coumadin or any Lovenox Bridging and Dr. Cynthia Onofre prefers Holding Coumadin for 48 hrs and restarting on 7-17-22\" with verbalization of understanding of pt and spouse. DC instructions given to pt and spouse with verbalization of understanding of pt and spouse and both states 'ready to go home' Pain 4/10. Med effective. 1022 Pt tolerates po well and 5mg Roxicodone given po with pain 7/10. Drsg to [facility] with <3sec cap refill with sling and ice pack in place. Call light in reach with spouse at bedside. Dominic francisco.

## 2022-07-15 NOTE — H&P
I have reviewed the history and physical and examined the patient and find no relevant changes. I have reviewed with the patient and/or family the risks, benefits, and alternatives to the procedure.     Karen Olivares MD  7/15/2022

## 2022-07-15 NOTE — PROGRESS NOTES
PACU Transfer to [facility]    Vitals:    07/15/22 0945   BP: (!) 144/63   Pulse: 74   Resp: 16   Temp: 97.3 °F (36.3 °C)   SpO2: 92%         Intake/Output Summary (Last 24 hours) at 7/15/2022 0955  Last data filed at 7/15/2022 0914  Gross per 24 hour   Intake 1350 ml   Output --   Net 1350 ml       Pain assessment:  level of pain (1-10, 10 severe),   Pain Level: 2    Patient transferred to care of [facility] RN.  Pts  Ludmila Jones called and updated    7/15/2022 9:55 AM

## 2022-07-15 NOTE — ANESTHESIA PRE PROCEDURE
Department of Anesthesiology  Preprocedure Note       Name:  Meena Goddard   Age:  69 y.o.  :  1945                                          MRN:  [id number]         Date:  7/15/2022      Surgeon: Raul Johnson):  Neymar Morgan MD    Procedure: Procedure(s):  CARPAL TUNNEL RELEASE OF RIGHT WRIST, RIGHT IN SITU CUBITAL TUNNEL RELEASE VERSUS ULNAR NERVE TRANSPOSITION AT THE ELBOW  . Medications prior to admission:   Prior to Admission medications    Medication Sig Start Date End Date Taking?  Authorizing Provider   enoxaparin (LOVENOX) 120 MG/0.8ML injection Inject 0.8 mLs into the skin every 12 hours for 5 days 22  Afua Sánchez MD   enoxaparin (LOVENOX) 120 MG/0.8ML injection Inject 0.8 mLs into the skin every 12 hours 22   AIME Moody CNP   propafenone (RYTHMOL) 150 MG tablet TAKE 1 TABLET BY MOUTH  TWICE DAILY 22   AIME Howe CNP   clonazePAM (KLONOPIN) 1 MG tablet TAKE 1 TABLET BY MOUTH IN  THE EVENING 22  Afua Sánchez MD   indapamide (LOZOL) 1.25 MG tablet TAKE 1 TABLET BY MOUTH IN  THE MORNING 22   AIME Moody CNP   pantoprazole (PROTONIX) 40 MG tablet TAKE 1 TABLET BY MOUTH IN  THE MORNING BEFORE  BREAKFAST 22   AIME Moody CNP   gabapentin (NEURONTIN) 300 MG capsule TAKE 2 CAPSULES BY MOUTH 3  TIMES DAILY 22  AIME Moody CNP   montelukast (SINGULAIR) 10 MG tablet TAKE 1 TABLET BY MOUTH AT  NIGHT 22   Afua Sánchez MD   citalopram (CELEXA) 40 MG tablet TAKE 1 TABLET BY MOUTH  DAILY 21   Aren Stinson MD   atorvastatin (LIPITOR) 80 MG tablet TAKE 1 TABLET BY MOUTH  DAILY 21   Aren Stinson MD   allopurinol (ZYLOPRIM) 300 MG tablet TAKE 1 TABLET BY MOUTH  DAILY 21   Aren Stinson MD   trimethoprim (TRIMPEX) 100 MG tablet Take 1 tablet by mouth every 48 hours 21   AIME Moody - CNP   verapamil (CALAN SR) 240 MG extended release tablet TAKE 1 TABLET BY MOUTH AT  NIGHT 9/23/21   Brenda Marrero MD   potassium chloride (KLOR-CON M) 20 MEQ extended release tablet TAKE 1 TABLET BY MOUTH  DAILY WITH BREAKFAST 9/9/21   AIME Rouse CNP   warfarin (COUMADIN) 5 MG tablet TAKE 1 TABLET BY MOUTH ON  MONDAY AND  FRIDAY AND 1 AND 1/2  TABLETS ALL OTHER DAYS 8/30/21   Javier Smith MD   albuterol sulfate A [facility] HFA) 108 (90 Base) MCG/ACT inhaler Inhale 2 puffs into the lungs every 4 hours as needed for Wheezing 7/12/21   Javier Smith MD   fluticasone-salmeterol (ADVAIR) 250-50 MCG/DOSE AEPB Inhale 1 puff into the lungs 2 times daily 7/12/21   Javier Smith MD   furosemide (LASIX) 20 MG tablet Take 1 tablet by mouth daily  Patient taking differently: Take 20 mg by mouth as needed  11/18/20   AIME Rice CNP   potassium chloride (KLOR-CON M) 20 MEQ extended release tablet TAKE 1 TABLET BY MOUTH  DAILY WITH BREAKFAST  Patient taking differently: TAKE 1 TABLET BY MOUTH  DAILY WITH BREAKFAST   Take 2 tablets on the days she takes the furosemide 10/13/20   AIME Rouse CNP   acetaminophen (TYLENOL) 500 MG tablet Take 500 mg by mouth every 6 hours as needed for Pain    Historical Provider, MD   diphenhydrAMINE-APAP, sleep, (TYLENOL PM EXTRA STRENGTH)  MG tablet Take 1 tablet by mouth nightly as needed for Sleep    Historical Provider, MD   b complex vitamins capsule Take 1 capsule by mouth daily    Historical Provider, MD   Ascorbic Acid (VITAMIN C PO) 1 tablet daily    Historical Provider, MD   therapeutic multivitamin-minerals (THERAGRAN-M) tablet Take 1 tablet by mouth nightly     Historical Provider, MD   Cranberry 500 MG CAPS Take 1,000 mg by mouth nightly     Historical Provider, MD       Current medications:    Current Facility-Administered Medications   Medication Dose Route Frequency Provider Last Rate Last Admin    ceFAZolin (ANCEF) 2,000 mg in sodium chloride 0.9 % 50 mL IVPB (mini-bag)  2,000 mg IntraVENous Once Zhanna Kinney MD        lactated ringers infusion   IntraVENous Continuous Jennie Balling, DO        sodium chloride flush 0.9 % injection 5-40 mL  5-40 mL IntraVENous 2 times per day Jennie Balling, DO        sodium chloride flush 0.9 % injection 5-40 mL  5-40 mL IntraVENous PRN Jennie Balling, DO        0.9 % sodium chloride infusion   IntraVENous PRN Jennie Balling, DO         Current Outpatient Medications   Medication Sig Dispense Refill    enoxaparin (LOVENOX) 120 MG/0.8ML injection Inject 0.8 mLs into the skin every 12 hours for 5 days 8 mL 1    enoxaparin (LOVENOX) 120 MG/0.8ML injection Inject 0.8 mLs into the skin every 12 hours 6 each 0    propafenone (RYTHMOL) 150 MG tablet TAKE 1 TABLET BY MOUTH  TWICE DAILY 180 tablet 1    clonazePAM (KLONOPIN) 1 MG tablet TAKE 1 TABLET BY MOUTH IN  THE EVENING 90 tablet 0    indapamide (LOZOL) 1.25 MG tablet TAKE 1 TABLET BY MOUTH IN  THE MORNING 90 tablet 3    pantoprazole (PROTONIX) 40 MG tablet TAKE 1 TABLET BY MOUTH IN  THE MORNING BEFORE  BREAKFAST 90 tablet 3    gabapentin (NEURONTIN) 300 MG capsule TAKE 2 CAPSULES BY MOUTH 3  TIMES DAILY 540 capsule 3    montelukast (SINGULAIR) 10 MG tablet TAKE 1 TABLET BY MOUTH AT  NIGHT 90 tablet 3    citalopram (CELEXA) 40 MG tablet TAKE 1 TABLET BY MOUTH  DAILY 90 tablet 3    atorvastatin (LIPITOR) 80 MG tablet TAKE 1 TABLET BY MOUTH  DAILY 90 tablet 3    allopurinol (ZYLOPRIM) 300 MG tablet TAKE 1 TABLET BY MOUTH  DAILY 90 tablet 3    trimethoprim (TRIMPEX) 100 MG tablet Take 1 tablet by mouth every 48 hours 45 tablet 3    verapamil (CALAN SR) 240 MG extended release tablet TAKE 1 TABLET BY MOUTH AT  NIGHT 90 tablet 3    potassium chloride (KLOR-CON M) 20 MEQ extended release tablet TAKE 1 TABLET BY MOUTH  DAILY WITH BREAKFAST 90 tablet 3    warfarin (COUMADIN) 5 MG tablet TAKE 1 TABLET BY MOUTH ON  MONDAY AND  FRIDAY AND 1 AND 1/2  TABLETS ALL OTHER DAYS 135 tablet 5    albuterol sulfate HFA (PROAIR HFA) 108 (90 Base) MCG/ACT inhaler Inhale 2 puffs into the lungs every 4 hours as needed for Wheezing 8.5 g 2    fluticasone-salmeterol (ADVAIR) 250-50 MCG/DOSE AEPB Inhale 1 puff into the lungs 2 times daily 1 Inhaler 5    furosemide (LASIX) 20 MG tablet Take 1 tablet by mouth daily (Patient taking differently: Take 20 mg by mouth as needed ) 30 tablet 1    acetaminophen (TYLENOL) 500 MG tablet Take 500 mg by mouth every 6 hours as needed for Pain      diphenhydrAMINE-APAP, sleep, (TYLENOL PM EXTRA STRENGTH)  MG tablet Take 1 tablet by mouth nightly as needed for Sleep      b complex vitamins capsule Take 1 capsule by mouth daily      Ascorbic Acid (VITAMIN C PO) 1 tablet daily      therapeutic multivitamin-minerals (THERAGRAN-M) tablet Take 1 tablet by mouth nightly       Cranberry 500 MG CAPS Take 1,000 mg by mouth nightly          Allergies:     Allergies   Allergen Reactions    Belsomra [Suvorexant] Other (See Comments)     Nightmares      Avelox [Moxifloxacin Hcl In Nacl] Rash    Levofloxacin Rash    Sulfa Antibiotics Rash       Problem List:    Patient Active Problem List   Diagnosis Code    Essential hypertension I10    History of pulmonary embolism Z86.711    Osteoarthritis of knee M17.10    PAULA (obstructive sleep apnea) G47.33    Asthma J45.909    Status post gastric banding Z98.84    Coronary artery disease I25.10    Morbid obesity with BMI of 40.0-44.9, adult ([facility] E66.01, Z68.41    Hyperlipidemia, unspecified E78.5    Arthritis M19.90    Anxiety F41.9    Chronic cough R05.3    PAF (paroxysmal atrial fibrillation) ([facility]) I48.0    Nonrheumatic aortic valve stenosis I35.0       Past Medical History:        Diagnosis Date    A-fib ([facility]     Allergic     Anxiety 2/2/2017    Aortic stenosis     Arthritis     Asthma     Back pain     Blood circulation, collateral     legs    CAD (coronary artery disease)     Depression  GERD (gastroesophageal reflux disease)     Gout     Hx of blood clots     Hypercholesteremia     Hypertension     Movement disorder     7 discs ruptured    Movement disorder     knee replacements    Obesity     Pulmonary emboli (HCC)     Unspecified sleep apnea     uses cpap    Urinary tract infection, chronic     UTI (urinary tract infection)        Past Surgical History:        Procedure Laterality Date    ABLATION OF DYSRHYTHMIC FOCUS      ANKLE FUSION  2010    right, [facility]    BACK SURGERY  2012    lumbar fusion L-4, L-5 atrificial disc  [facility] Dr. Goyo Murillo  11    incision, drainage and debridement of left knee and application of wound vac.     BREAST BIOPSY  2018    neg    CARDIAC SURGERY      CARDIOVERSION      ENDOSCOPY, COLON, DIAGNOSTIC      EYE SURGERY      JOINT REPLACEMENT      bilat knees    JOINT REPL[facility]EMENT  2003    [facility]    JOINT REPLACEMENT      right, [facility]    OTHER SURGICAL HISTORY  2011    laparscopic adjustable gastric restrictive procedure    REVISION TOTAL KNEE ARTHROPLASTY      [id number] [facility]    SKIN FULL GRAFT      right, [facility].    [facility]    UPPER GASTROINTESTINAL ENDOSCOPY  10-    VENA CAVA FILTER PLACEMENT         Social History:    Social History     Tobacco Use    Smoking status: Former     Packs/day: 0.50     Years: 4.00     Pack years: 2.00     Types: Cigarettes     Quit date: 1965     Years since quittin.5    Smokeless tobacco: Never   Substance Use Topics    Alcohol use: Not Currently                                Counseling given: Not Answered      Vital Signs (Current):   Vitals:    22 1300   Weight: 267 lb (121.1 kg)   Height: 5' 4\" (1.626 m)                                              BP Readings from Last 3 Encounters:   22 (!) 140/76   22 124/80   22 136/80       NPO Status:                                                                                 BMI:   Wt Readings from Last 3 Encounters:   07/13/22 267 lb (121.1 kg)   06/27/22 271 lb (122.9 kg)   06/20/22 272 lb (123.4 kg)     Body mass index is 45.83 kg/m². CBC:   Lab Results   Component Value Date/Time    WBC 6.0 06/16/2022 11:05 AM    RBC 4.26 06/16/2022 11:05 AM    HGB 13.6 06/16/2022 11:05 AM    HCT 40.5 06/16/2022 11:05 AM    MCV 95.1 06/16/2022 11:05 AM    RDW 14.6 06/16/2022 11:05 AM     06/16/2022 11:05 AM       CMP:   Lab Results   Component Value Date/Time     06/16/2022 11:05 AM    K 4.3 06/16/2022 11:05 AM    K 3.3 10/10/2018 07:53 AM    CL 98 06/16/2022 11:05 AM    CO2 25 06/16/2022 11:05 AM    BUN 12 06/16/2022 11:05 AM    CREATININE 0.9 06/16/2022 11:05 AM    GFRAA >60 06/16/2022 11:05 AM    GFRAA >60 01/04/2013 11:50 AM    AGRATIO 1.5 10/02/2020 10:18 AM    LABGLOM >60 06/16/2022 11:05 AM    GLUCOSE 94 06/16/2022 11:05 AM    PROT 7.2 10/02/2020 10:18 AM    PROT 7.6 01/04/2013 11:50 AM    CALCIUM 9.8 06/16/2022 11:05 AM    BILITOT 0.7 10/02/2020 10:18 AM    ALKPHOS 78 10/02/2020 10:18 AM    AST 32 10/02/2020 10:18 AM    ALT 26 10/02/2020 10:18 AM       POC Tests: No results for input(s): POCGLU, POCNA, POCK, POCCL, POCBUN, POCHEMO, POCHCT in the last 72 hours.     Coags:   Lab Results   Component Value Date/Time    PROTIME 24.9 10/03/2020 06:00 AM    PROTIME 33 09/21/2018 11:01 AM    INR 2.8 07/07/2022 11:07 AM    INR 2.50 10/05/2021 07:11 AM    APTT 27.4 12/28/2011 08:00 AM       HCG (If Applicable): No results found for: PREGTESTUR, PREGSERUM, HCG, HCGQUANT     ABGs: No results found for: PHART, PO2ART, QKG3EOA, HDY7WCJ, BEART, Q0WREZWZ     Type & Screen (If Applicable):  Lab Results   Component Value Date    LABABO A 05/06/2011    [address] Negative 05/06/2011       Drug/Infectious Status (If Applicable):  No results found for: HIV, HEPCAB    COVID-19 Screening (If Applicable):   Lab Results   Component Value Date/Time    COVID19 Not Detected 12/10/2021 09:36 AM    COVID19 NOT DETECTED 09/28/2020 11:21 AM           Anesthesia Evaluation  Patient summary reviewed and Nursing notes reviewed no history of anesthetic complications:   Airway: Mallampati: II  TM distance: >3 FB   Neck ROM: full  Mouth opening: > = 3 FB   Dental: normal exam         Pulmonary:normal exam    (+) COPD:  sleep apnea:  asthma:                            Cardiovascular:    (+) hypertension:, CAD:, dysrhythmias: atrial fibrillation,         Rhythm: irregular                      Neuro/Psych:   (+) psychiatric history:            GI/Hepatic/Renal:   (+) GERD:, morbid obesity          Endo/Other: Negative Endo/Other ROS                    Abdominal:   (+) obese,           Vascular: negative vascular ROS. Other Findings:           Anesthesia Plan      general     ASA 4       Induction: intravenous. MIPS: Postoperative opioids intended and Prophylactic antiemetics administered. Anesthetic plan and risks discussed with patient. Plan discussed with CRNA.     Attending anesthesiologist reviewed and agrees with Preprocedure content                Modesto Peters MD   7/15/2022

## 2022-07-15 NOTE — ANESTHESIA POSTPROCEDURE EVALUATION
Department of Anesthesiology  Postprocedure Note    Patient: Naresh Maurer  MRN: [id number]  YOB: 1945  Date of evaluation: 7/15/2022      Procedure Summary     Date: 07/15/22 Room / Location: [address] [place] [address] [address] [place] [address]    Anesthesia Start: 0851 Anesthesia Stop: 0919    Procedures:       CARPAL TUNNEL RELEASE OF RIGHT WRIST, RIGHT IN SITU CUBITAL TUNNEL RELEASE VERSUS ULNAR NERVE TRANSPOSITION AT THE ELBOW (Right)      . (Right) Diagnosis:       Carpal tunnel syndrome on right      Cubital canal compression syndrome, right      (Carpal tunnel syndrome on right [G56.01] Cubital canal compression syndrome, right [G56.21])    Surgeons: Yifan Aponte MD Responsible Provider: Holly Bronson MD    Anesthesia Type: general ASA Status: 4          Anesthesia Type: No value filed.     Jac Phase I: Jac Score: 10    Jac Phase II:        Anesthesia Post Evaluation    Patient location during evaluation: PACU  Patient participation: complete - patient participated  Level of consciousness: awake and alert  Airway patency: patent  Nausea & Vomiting: no nausea and no vomiting  Complications: no  Cardiovascular status: hemodynamically stable  Respiratory status: acceptable  Hydration status: euvolemic  Multimodal analgesia pain management approach

## 2022-07-15 NOTE — H&P
Chacha Weinberg    [id number]    [facility] Same Day Surgery Update H & P  Department of General Surgery   Surgical Service   Pre-operative History and Physical  Last H & P within the last 30 days. DIAGNOSIS:   Carpal tunnel syndrome on right [G56.01]  Cubital canal compression syndrome, right [G56.21]    Procedure(s):  CARPAL TUNNEL RELEASE OF RIGHT WRIST, RIGHT IN SITU CUBITAL TUNNEL RELEASE VERSUS ULNAR NERVE TRANSPOSITION AT THE ELBOW  . History obtained from: Patient interview and EHR     HISTORY OF PRESENT ILLNESS:   Patient is a morbidly obese (Body mass index is 45.83 kg/m².), 69 y.o. female with c/o right UE pain, numbness and tingling. Their symptoms have been recalcitrant to conservative treatment and the patient presents today for the above procedure. Illness Screening: Patient denies fever, chills, worsening cough, or close contact with sick individuals. Past Medical History:        Diagnosis Date    A-fib [facility])     Allergic     Anxiety 2/2/2017    Aortic stenosis     Arthritis     Asthma     Back pain     Blood circulation, collateral     legs    CAD (coronary artery disease)     Depression     GERD (gastroesophageal reflux disease)     Gout     Hx of blood clots     Hypercholesteremia     Hypertension     Movement disorder     7 discs ruptured    Movement disorder     knee replacements    Obesity     Pulmonary emboli (HCC)     Unspecified sleep apnea     uses cpap    Urinary tract infection, chronic     UTI (urinary tract infection)      Past Surgical History:        Procedure Laterality Date    ABLATION OF DYSRHYTHMIC FOCUS      ANKLE FUSION  2010    right, [place] [facility]    BACK SURGERY  09/14/2012    lumbar fusion L-4, L-5 atrificial disc  [employer] Dr. Julio Lennon DRAINAGE  5-7-11    incision, drainage and debridement of left knee and application of wound vac.     BREAST BIOPSY  08/2018    neg    CARDIAC SURGERY CARDIOVERSION      ENDOSCOPY, COLON, DIAGNOSTIC      EYE SURGERY      JOINT REPLACEMENT      bilat knees    JOINT REPLACEMENT  2003    left, [facility]    JOINT REPLACEMENT  2005    right, [facility]    OTHER SURGICAL HISTORY  12-    laparscopic adjustable gastric restrictive procedure    REVISION TOTAL KNEE ARTHROPLASTY  2006    [facility] FF    SKIN FULL GRAFT  2011    right, [address]    UPPER GASTROINTESTINAL ENDOSCOPY  10-    VENA CAVA FILTER PLACEMENT             Medications Prior to Admission:      Prior to Admission medications    Medication Sig Start Date End Date Taking?  Authorizing Provider   enoxaparin (LOVENOX) 120 MG/0.8ML injection Inject 0.8 mLs into the skin every 12 hours for 5 days 7/13/22 7/18/22  Soren Greene MD   enoxaparin (LOVENOX) 120 MG/0.8ML injection Inject 0.8 mLs into the skin every 12 hours 7/11/22   AIME Arias CNP   propafenone (RYTHMOL) 150 MG tablet TAKE 1 TABLET BY MOUTH  TWICE DAILY 6/27/22   AIME Lara CNP   clonazePAM (KLONOPIN) 1 MG tablet TAKE 1 TABLET BY MOUTH IN  THE EVENING 5/2/22 8/14/22  Soren Gerene MD   indapamide (LOZOL) 1.25 MG tablet TAKE 1 TABLET BY MOUTH IN  THE MORNING 2/25/22   AIME Arias CNP   pantoprazole (PROTONIX) 40 MG tablet TAKE 1 TABLET BY MOUTH IN  THE MORNING BEFORE  BREAKFAST 2/25/22   AIME Arias CNP   gabapentin (NEURONTIN) 300 MG capsule TAKE 2 CAPSULES BY MOUTH 3  TIMES DAILY 2/25/22 8/24/22  AIME Arias CNP   montelukast (SINGULAIR) 10 MG tablet TAKE 1 TABLET BY MOUTH AT  NIGHT 2/7/22   Soren Greene MD   citalopram (CELEXA) 40 MG tablet TAKE 1 TABLET BY MOUTH  DAILY 12/9/21   Zach Wood MD   atorvastatin (LIPITOR) 80 MG tablet TAKE 1 TABLET BY MOUTH  DAILY 12/9/21   Zach Wood MD   allopurinol (ZYLOPRIM) 300 MG tablet TAKE 1 TABLET BY MOUTH  DAILY 12/9/21   Zach Wood MD   trimethoprim (TRIMPEX) 100 MG tablet Take 1 tablet by mouth every 48 hours 9/30/21   AIME Garay CNP   verapamil (CALAN SR) 240 MG extended release tablet TAKE 1 TABLET BY MOUTH AT  NIGHT 9/23/21   Castillo Blount MD   potassium chloride (KLOR-CON M) 20 MEQ extended release tablet TAKE 1 TABLET BY MOUTH  DAILY WITH BREAKFAST 9/9/21   AIME Awad CNP   warfarin (COUMADIN) 5 MG tablet TAKE 1 TABLET BY MOUTH ON  MONDAY AND  FRIDAY AND 1 AND 1/2  TABLETS ALL OTHER DAYS 8/30/21   Farhana Blanco MD   [facility] sulfate [facility]) 108 (90 Base) MCG/ACT inhaler Inhale 2 puffs into the lungs every 4 hours as needed for Wheezing 7/12/21   Farhana Blanco, MD   fluticasone-salmeterol (ADVAIR) 250-50 MCG/DOSE AEPB Inhale 1 puff into the lungs 2 times daily 7/12/21   Farhana Blanco, MD   furosemide (LASIX) 20 MG tablet Take 1 tablet by mouth daily  Patient taking differently: Take 20 mg by mouth as needed 11/18/20   Carmelita AIME Rowley CNP   potassium chloride (KLOR-CON M) 20 MEQ extended release tablet TAKE 1 TABLET BY MOUTH  DAILY WITH BREAKFAST  Patient taking differently: TAKE 1 TABLET BY MOUTH  DAILY WITH BREAKFAST   Take 2 tablets on the days she takes the furosemide 10/13/20   AIME Awad CNP   acetaminophen (TYLENOL) 500 MG tablet Take 500 mg by mouth every 6 hours as needed for Pain    Historical Provider, MD   diphenhydrAMINE-APAP, sleep, (TYLENOL PM EXTRA STRENGTH)  MG tablet Take 1 tablet by mouth nightly as needed for Sleep    Historical Provider, MD   b complex vitamins capsule Take 1 capsule by mouth daily    Historical Provider, MD   Ascorbic Acid (VITAMIN C PO) 1 tablet daily    Historical Provider, MD   therapeutic multivitamin-minerals (THERAGRAN-M) tablet Take 1 tablet by mouth nightly     Historical Provider, MD   Cranberry 500 MG CAPS Take 1,000 mg by mouth nightly     Historical Provider, MD         Allergies:  Belsomra [suvorexant], Avelox [moxifloxacin hcl in nacl], Levofloxacin, and Sulfa antibiotics    PHYSICAL EXAM:      BP (!) 163/70   Pulse 68   Temp 98.1 °F (36.7 °C) (Temporal)   Resp 20   Ht 5' 4\" (1.626 m)   Wt 267 lb (121.1 kg)   BMI 45.83 kg/m²      Airway:  Airway patent with no audible stridor    Heart:  Regular rate and rhythm, Murmur noted    Lungs:  No increased work of breathing, good air exchange, clear to auscultation bilaterally, no crackles or wheezing    Abdomen:  Soft, non-distended, non-tender, no masses palpated    ASSESSMENT AND PLAN    Patient is a 69 y.o. female with above specified procedure planned. 1.  The patients history and physical was obtained and signed off by the pre-admission testing department. Patient seen and focused exam done today- no new changes since last physical exam on 6/16/22    2. Access to ancillary services are available per request of the provider.     AIME Jiang - CNP     7/15/2022

## 2022-07-15 NOTE — TELEPHONE ENCOUNTER
Called patient. She had her procedure this morning and they told her they would prefer it if she did not resume warfarin or lovenox for 48 hours post procedure. Pt confused as was given instructions to resume this evening. Was told to contact referring but they are out of office. Patient will contact cardiology to confirm what they would prefer for her to do. If she doesn't hear back she will call CC tonight for instructions.     Radu Dudley, NidaD, formerly [facility]

## 2022-07-15 NOTE — TELEPHONE ENCOUNTER
----- Message from Myrna Modi sent at 7/15/2022  2:01 PM EDT -----  Pt LVM stating she had surgery on arm this morning, shes confused about when she should restart warfarin. Dr Erasmo Lin would prefer her to wait 48 hours before starting warfarin, or contact her heart doctor. Referring MD Dr Dean Yee is not there W-F wants to know if she should call [place] [facility]es or go with instructions from Carson Cheek to resume evening of surgery ( see notes in chart).  [phone]

## 2022-07-15 NOTE — TELEPHONE ENCOUNTER
Pt called to let you know that cardiology would like her to go with the instructions given by CC. Would like pharmacist to call her back to verifyinstructions. Called patient. She is going to resume warfarin and lovenox tonight. She will follow instructions provided by CC.     Frannie Andrade, PharmD, [facility]

## 2022-07-15 NOTE — TELEPHONE ENCOUNTER
Patient called in for recommendations regarding her INR. Coumadin clinic has given her recommendations, which I suggested she call back to verify. Her INR was 1.10 today, she has afib and Hx of PE. Would recommend she start AC back today to get her back to a therapeutic level as soon as possible.  She verbalized understanding

## 2022-07-15 NOTE — BRIEF OP NOTE
Brief Postoperative Note      Patient: Aidan Spring  YOB: 1945  MRN: [id number]    Date of Procedure: 7/15/2022    Pre-Op Diagnosis: Carpal tunnel syndrome on right [G56.01] Cubital tunnel compression syndrome, right [G56.21]    Post-Op Diagnosis: Same       Procedure:   Right carpal tunnel release  Right in situ cubital tunnel release    Surgeon(s):  Laz Raymond MD    Assistant:  Surgical Assistant: Jair Askew    Anesthesia: General, local     Estimated Blood Loss (mL): 36YC    Complications: None immediate apparent    Specimens:   none    Implants:  none      Drains: none    Findings: see fully dictated operative report     Electronically signed by Lola Maldonado MD on 7/15/2022 at 9:08 AM

## 2022-07-15 NOTE — OP NOTE
Quan Found (1945)    Date of Surgery- 7/15/2022    Pre-Op Diagnoses:  1.   right carpal tunnel syndrome  2.   right cubital tunnel syndrome         Post-op Diagnoses:   1. Same  2. Procedure(s) Performed:  1.  right carpal tunnel release, mini open  2.  right cubital tunnel release, in-situ         Surgeon: Stevie Bishop MD MD    Anesthesia Type:   local/General     Blood Loss:   10ml    Pathology:   None    Complications:   None immediate apparent    Assistant:  [facility]    The right  palm and elbow were marked in preop holding by Dr Vicky Watson after discussing the surgical procedures once again with the patient. All questions and concerns were addressed. Informed consent was on the chart. The patient was brought to the operating and room and placed in the supine position. After the induction of anesthesia a standard time-out was performed. Description of the Procedure: We verified that antibiotics had been given. The right upper extremity was prepped and draped in normal sterile fashion. Final timeout was held. The upper extremity was exsanguinated and the tourniquet was inflated to 250 mmHg. A standard incision was  made using a 15 blade in the palm of the hand, dissection carried down through  skin and subcutaneous tissue as well as palmar fascia. At this point  retractors were used to identify transverse carpal ligament, which was  identified and incised using a 15 blade sharply at  the distal portion of the  carpal tunnel . A [place] elevator was inserted to protect the contents of the  carpal tunnel at all times. The distal release was completed using a  combination of tenotomy scissors, gently releasing the fibers distally as well  as a 15 blade until the yellow fat distally was identified and complete  release of the nerve was directly visualized. We then focused attention  proximally.  Under direct visualization, the transverse carpal ligament was  released using a 15 blade using a [place] elevator for protection of the median  nerve and carpal tunnel contents. Tenotomy scissors were used to incise the  more superficial palmar fascia proximally as well with again direct  visualization noted. Fingertip could be inserted to ensure adequate proximal  and distal decompression. The contents of the carpal tunnel were examined and  found to be consistent with changes of median nerve compression. hemostasis was achieved with bipolar  electrocautery. The wound was irrigated with sterile saline. The skin was  then closed using interrupted 4-0 nylon suture. A separate 5-6 cm curvilinear incision was made just behind the medial epicondyle of the right elbow, through skin only. Meticulous hemostasis. Transversing sensory branches to the medial elbow and medial forearm were identified and protected. Gandhi's ligament and the cubital tunnel were identified and isolated. Gandhi's ligament was released completely under direct visualization while protecting the underlying nerve. It was quite tight at Gandhi's ligament. There was also noted to be anconeus epitrochlearis muscle overlying the nerve at the cubital tunnel, and this was gently cauterized and released while protecting the underlying nerve. There was an hourglass shape to the nerve but the nerve appeared to have normal coloring. Nerve was completely released proximally. Release was taken distally to the FCU fascia. The deep and superficial FCU fascial layers were released under direct visualization carefully protecting the small nerve branches. The nerve was nicely decompressed at this time. A small vessel running along the nerve was well filled. The elbow was placed through a range of motion, there was no crepitance, no instability, no subluxation or perching of the nerve. Transposition was not necessary. Wounds  were copiously irrigated. No other abnormalities noted.   Skin was closed with interrupted nylon suture. Findings:  No aberrant motor branch     Intervention:  1% Xylocaine, 0.25% Marcaine, without epinephrine as local injection     Other Notes: Follow-up in 7-10 days for wound inspection and suture removal.  Progressive range of motion of the elbow, wrist, and hand. Progressive activities. Referral to the hand therapist for a carpal tunnel and cubital tunnel program, including scar treatments.           Bryan Hernandez MD   Hand & Upper Extremity Surgery  [facility]

## 2022-07-15 NOTE — DISCHARGE INSTRUCTIONS
Post op Instructions for Hand and Upper Extremity Surgery      * Keep dressing dry and clean. It is ok to Shower just keep Dressing dry. * Elevate operative arm. * Ice 20 minutes on 20 minutes off. * Follow-up appointment as scheduled by office staff. Check paperwork from office. If no appointment scheduled call the office. *Call Cardiologist to confirm when to start Coumadin. Dr. Sukumar Maria prefers to hold Coumadin for 48hrs and restart on 7-17-22. * If dressing is too tight, you may loosen Ace bandage and leave splint in place. * Sling, as needed  * If you have any questions, refer to the office number listed below. [phone] ([address])       [address] [facility]    There are potential side effects of anesthesia or sedation you may experience for the first 24 hours. These side effects include:    Confusion or Memory loss, Dizziness, or Delayed Reaction Times   [x]A responsible person should be with you for the next 24 hours. Do not operate any vehicles (automobiles, bicycles, motorcycles) or power tools or machinery for 24 hours. Do not sign any legal documents or make any legal decisions for 24 hours. Do not drink alcohol for 24 hours or while taking narcotic pain medication. Nausea    [x]Start with light diet and progress to your normal diet as you feel like eating. However, if you experience nausea or repeated episodes of vomiting which persist beyond 12-24 hours, notify your physician. Once nausea has passed, remember to keep drinking fluids. Difficulty Passing Urine  [x]Drink extra amounts of fluid today. Notify your physician if you have not urinated within 8 hours after your procedure or you feel uncomfortable. Irritated Throat from a Breathing Tube  [x]Drink extra amounts of fluid today. Lozenges may help.     Muscle Aches  [x]You may experience some generalized body aches as your muscles recover from medications used to relax them during surgery. These will gradually subside. MEDICATION INSTRUCTIONS:  [x]Prescription(S) x  1   sent with you. Use as directed. When taking pain medications, you may experience the side effect of dizziness or drowsiness. Do not drink alcohol or drive when taking these medications. []Prescription(S) x          Called to Pharmacy Name and location:    [x]Give the list of your medications to your primary care physician on your next visit. Keep your med list updated and carry it with in case of emergencies. [x] Narcotic pain medications can cause the side effect of significant constipation. You may want to add a stool softener to your postoperative medication schedule or speak to your surgeon on how best to manage this side effect. NARCOTIC SAFETY:  Your pain medicine is only for you to take. Safely store your medicines. Store pills up high and out of reach of children and pets. Ensure safety caps are snapped tightly  Keep track of how many pills you have left    Unused medication can be disposed of by taking them to a drop-off box or take-back program that is authorized by the [facility]. Access to a site near you can be found on the [facility] [employer] website ([address] [facility]. [facility]). If you have a CPAP machine, it is very important that you use it daily during all periods of sleep and daytime rest during your recovery at home. Surgery and Anesthesia place a significant amount of stress on your body. Using your CPAP will help keep you safe and lessen the negative effects of that stress. FOLLOW-UP RECOVERY CARE:  [x]Call the office at [phone]  for follow-up appointment and problems    Watch for these possible complications, symptoms, or side effects of anesthesia.   Call physician if they or any other problems occur:  Signs of INFECTION   > Fever over 101°     > Redness, swelling, hardness or warmth at the operative site   >Foul smelling or cloudy drainage at the operative site   Unrelieved PAIN  Unrelieved NAUSEA  Blood soaked dressing. (Some oozing may be normal)  Inability to urinate      Numb, pale, blue, cold or tingling extremity      Physician:  Vicky Watson    The above instructions were reviewed with patient/significant other. The following additional patient specific information was reviewed with the patient/significant other:  [x]Procedure/physician specific instructions  []Medication information sheet(S) including potential side effects  []Beas egress test  []Pain Ball management  []FAQ Catheter associated blood stream infections  []FAQ Surgical Site Infections  []Other-    I have read and understand the instructions given to me: ____________________________________________   (Patient/S.O. Signature)            Date/time 7/15/2022 9:31 AM         PACU:  [phone]   M-F 700 AM - 7 PM      SAME DAY SERVICES:  [phone] M-F 7AM-6PM        If you smoke STOP. We care about your health!

## 2022-07-19 ENCOUNTER — ANTI-COAG VISIT (OUTPATIENT)
Dept: PHARMACY | Age: 77
End: 2022-07-19
Payer: MEDICARE

## 2022-07-19 DIAGNOSIS — Z86.711 HISTORY OF PULMONARY EMBOLISM: ICD-10-CM

## 2022-07-19 DIAGNOSIS — I48.0 PAF (PAROXYSMAL ATRIAL FIBRILLATION) (HCC): Primary | ICD-10-CM

## 2022-07-19 LAB — INTERNATIONAL NORMALIZATION RATIO, POC: 1.2

## 2022-07-19 PROCEDURE — 99212 OFFICE O/P EST SF 10 MIN: CPT

## 2022-07-19 PROCEDURE — 85610 PROTHROMBIN TIME: CPT

## 2022-07-19 NOTE — PROGRESS NOTES
Ms. Jimmy Lux is a 69 y.o. y/o female with history of DVT, PE, Afib   She presents today for anticoagulation monitoring and adjustment. Pertinent PMH: HTN, Gastric Banding,CAD, diskectomy with an artifical spinal disk implant in September 2012. Patient Reported Findings:  Yes     No  [x]   []       Patient verifies current dosing regimen as listed  []   [x]       S/S bleeding/bruising/swelling/SOB states that she has been wheezing more and had more SOB d/t allergies --> has had a few small nose bleeds recently  --> denies  []   [x]       Blood in urine or stool  []   [x]       Procedures scheduled in the future at this time none upcoming    []   [x]       Missed Dose - denies  []   [x]       Extra Dose - denies   []   [x]       Change in medications  decreased tylenol intake to 1-2 times a day --> still taking propafenone 150 BID --> no changes    [x]   []       Change in health/diet/appetite  Patient states that she feels like she has returned to normal vit k intake --> diet fluctuates causing INR to fluctuate.  Discussed ways to improve consistency with vit k intake  --> states that she has cut back spinach and cabbage intake to twice a week --> no vit k --> had cabbage a few times --> no vit k since was on vacation but plans to return --> has been eating a salad a day-->no changes --> had less vit k acutely -->has been trying to eat more vit k, salads, broccoli --> has had less vit k than normal   []   [x]       Change in alcohol use    []   [x]       Change in activity  []   [x]       Hospital admission  []   [x]       Emergency department visit   []   [x]       Other complaints     Clinical Outcomes:  Yes     No  []   [x]       Major bleeding event  []   [x]       Thromboembolic event  Gets warfarin through MD    Duration of warfarin Therapy: indefinite  INR Range:  2.0-3.0     INR is 1.2 today after resuming warfarin on Fri   Take 10 mg tonight and tomorrow then continue weekly dose of 5 mg Mon

## 2022-07-21 ENCOUNTER — TELEPHONE (OUTPATIENT)
Dept: INTERNAL MEDICINE CLINIC | Age: 77
End: 2022-07-21

## 2022-07-21 DIAGNOSIS — F41.9 ANXIETY: ICD-10-CM

## 2022-07-21 RX ORDER — CLONAZEPAM 1 MG/1
TABLET ORAL
Qty: 90 TABLET | Refills: 0 | Status: SHIPPED | OUTPATIENT
Start: 2022-07-21 | End: 2022-10-12 | Stop reason: SDUPTHER

## 2022-07-21 NOTE — TELEPHONE ENCOUNTER
**Pharmacy change to [facility]**    Patient calling requesting refill of clonazePAM (KLONOPIN) 1 MG tablet. Last written 05/02/22  Last OV 06/16/22  Next OV 08/15/22  Last recommended OV 08/16/22     Please send to [facility].

## 2022-07-22 ENCOUNTER — ANTI-COAG VISIT (OUTPATIENT)
Dept: PHARMACY | Age: 77
End: 2022-07-22
Payer: MEDICARE

## 2022-07-22 DIAGNOSIS — I48.0 PAF (PAROXYSMAL ATRIAL FIBRILLATION) (HCC): Primary | ICD-10-CM

## 2022-07-22 DIAGNOSIS — Z86.711 HISTORY OF PULMONARY EMBOLISM: ICD-10-CM

## 2022-07-22 LAB — INTERNATIONAL NORMALIZATION RATIO, POC: 1.6

## 2022-07-22 PROCEDURE — 85610 PROTHROMBIN TIME: CPT

## 2022-07-22 PROCEDURE — 99211 OFF/OP EST MAY X REQ PHY/QHP: CPT

## 2022-07-22 NOTE — PROGRESS NOTES
Ms. Karen Swan is a 69 y.o. y/o female with history of DVT, PE, Afib   She presents today for anticoagulation monitoring and adjustment. Pertinent PMH: HTN, Gastric Banding,CAD, diskectomy with an artifical spinal disk implant in September 2012. Patient Reported Findings:  Yes     No  [x]   []       Patient verifies current dosing regimen as listed  []   [x]       S/S bleeding/bruising/swelling/SOB states that she has been wheezing more and had more SOB d/t allergies --> has had a few small nose bleeds recently  --> denies  []   [x]       Blood in urine or stool  []   [x]       Procedures scheduled in the future at this time none upcoming    []   [x]       Missed Dose - denies  []   [x]       Extra Dose - denies   []   [x]       Change in medications  decreased tylenol intake to 1-2 times a day --> still taking propafenone 150 BID --> no changes    []   [x]       Change in health/diet/appetite  Patient states that she feels like she has returned to normal vit k intake --> diet fluctuates causing INR to fluctuate.  Discussed ways to improve consistency with vit k intake  --> states that she has cut back spinach and cabbage intake to twice a week --> no vit k --> has been eating a salad a day --> had less vit k acutely -->has been trying to eat more vit k, salads, broccoli --> has had less vit k than normal -->no changes  []   [x]       Change in alcohol use    []   [x]       Change in activity  []   [x]       Hospital admission  []   [x]       Emergency department visit   []   [x]       Other complaints     Clinical Outcomes:  Yes     No  []   [x]       Major bleeding event  []   [x]       Thromboembolic event  Gets warfarin through MD    Duration of warfarin Therapy: indefinite  INR Range:  2.0-3.0     INR is 1.6 today   Take 7.5 mg tonight then continue weekly dose of 5 mg Mon and Fri and 7.5 mg all other days of the week  Continue lovenox injections until return to clinic  Encouraged to maintain a

## 2022-07-26 ENCOUNTER — ANTI-COAG VISIT (OUTPATIENT)
Dept: PHARMACY | Age: 77
End: 2022-07-26
Payer: MEDICARE

## 2022-07-26 DIAGNOSIS — I48.0 PAF (PAROXYSMAL ATRIAL FIBRILLATION) (HCC): Primary | ICD-10-CM

## 2022-07-26 DIAGNOSIS — Z86.711 HISTORY OF PULMONARY EMBOLISM: ICD-10-CM

## 2022-07-26 LAB — INTERNATIONAL NORMALIZATION RATIO, POC: 1.9

## 2022-07-26 PROCEDURE — 85610 PROTHROMBIN TIME: CPT

## 2022-07-26 PROCEDURE — 99211 OFF/OP EST MAY X REQ PHY/QHP: CPT

## 2022-07-26 NOTE — PROGRESS NOTES
Ms. Cassie Lee is a 69 y.o. y/o female with history of DVT, PE, Afib   She presents today for anticoagulation monitoring and adjustment. Pertinent PMH: HTN, Gastric Banding,CAD, diskectomy with an artifical spinal disk implant in September 2012. Patient Reported Findings:  Yes     No  [x]   []       Patient verifies current dosing regimen as listed  [x]   []       S/S bleeding/bruising/swelling/SOB states that she has been wheezing more and had more SOB d/t allergies --> has had a few small nose bleeds recently  --> bruises from lovenox injections   []   [x]       Blood in urine or stool  []   [x]       Procedures scheduled in the future at this time none upcoming    []   [x]       Missed Dose - denies  []   [x]       Extra Dose - denies   []   [x]       Change in medications  decreased tylenol intake to 1-2 times a day --> still taking propafenone 150 BID --> no changes    []   [x]       Change in health/diet/appetite  Patient states that she feels like she has returned to normal vit k intake --> diet fluctuates causing INR to fluctuate.  Discussed ways to improve consistency with vit k intake  --> states that she has cut back spinach and cabbage intake to twice a week --> no vit k --> has been eating a salad a day --> had less vit k acutely -->has been trying to eat more vit k, salads, broccoli --> has had less vit k than normal -->no changes  []   [x]       Change in alcohol use    []   [x]       Change in activity  []   [x]       Hospital admission  []   [x]       Emergency department visit   []   [x]       Other complaints     Clinical Outcomes:  Yes     No  []   [x]       Major bleeding event  []   [x]       Thromboembolic event  Gets warfarin through MD    Duration of warfarin Therapy: indefinite  INR Range:  2.0-3.0     INR is 1.9 today   Take 10 mg tonight then continue weekly dose of  Mon and Fri and 7.5 mg all other days of the week  Pt finished lovenox injections   Encouraged to maintain a consistency of vegetables/salads.   Recheck INR in 1 week, 8/2    Does not need PW printed, only card     Patient consent signed 10/28/21    Referring PCP yoly Coates Fix  INR (no units)   Date Value   07/15/2022 1.10   10/05/2021 2.50 (H)   05/06/2021 2.80 (H)     INR,(POC) (no units)   Date Value   07/22/2022 1.6   07/19/2022 1.2   07/07/2022 2.8         For Pharmacy Admin Tracking Only    Intervention Detail: Dose Adjustment: 1, reason: Therapy Optimization  Total # of Interventions Recommended: 1  Total # of Interventions Accepted: 1  Time Spent (min): 15

## 2022-07-28 RX ORDER — VERAPAMIL HYDROCHLORIDE 240 MG/1
TABLET, FILM COATED, EXTENDED RELEASE ORAL
Qty: 90 TABLET | Refills: 3 | Status: SHIPPED | OUTPATIENT
Start: 2022-07-28

## 2022-08-02 ENCOUNTER — ANTI-COAG VISIT (OUTPATIENT)
Dept: PHARMACY | Age: 77
End: 2022-08-02
Payer: MEDICARE

## 2022-08-02 DIAGNOSIS — Z86.711 HISTORY OF PULMONARY EMBOLISM: ICD-10-CM

## 2022-08-02 DIAGNOSIS — I48.0 PAF (PAROXYSMAL ATRIAL FIBRILLATION) (HCC): Primary | ICD-10-CM

## 2022-08-02 LAB — INTERNATIONAL NORMALIZATION RATIO, POC: 1.6

## 2022-08-02 PROCEDURE — 99212 OFFICE O/P EST SF 10 MIN: CPT

## 2022-08-02 PROCEDURE — 85610 PROTHROMBIN TIME: CPT

## 2022-08-02 RX ORDER — ALBUTEROL SULFATE 90 UG/1
AEROSOL, METERED RESPIRATORY (INHALATION)
Qty: 8.5 G | Refills: 2 | Status: SHIPPED | OUTPATIENT
Start: 2022-08-02

## 2022-08-02 NOTE — PROGRESS NOTES
Ms. Ronnie Bowie is a 69 y.o. y/o female with history of DVT, PE, Afib   She presents today for anticoagulation monitoring and adjustment. Pertinent PMH: HTN, Gastric Banding,CAD, diskectomy with an artifical spinal disk implant in September 2012. Patient Reported Findings:  Yes     No  [x]   []       Patient verifies current dosing regimen as listed  [x]   []       S/S bleeding/bruising/swelling/SOB states that she has been wheezing more and had more SOB d/t allergies --> has had a few small nose bleeds recently  --> bruises from lovenox injections   []   [x]       Blood in urine or stool  []   [x]       Procedures scheduled in the future at this time none upcoming    []   [x]       Missed Dose - denies  []   [x]       Extra Dose - denies   []   [x]       Change in medications  decreased tylenol intake to 1-2 times a day --> still taking propafenone 150 BID --> no changes    []   [x]       Change in health/diet/appetite  Patient states that she feels like she has returned to normal vit k intake --> diet fluctuates causing INR to fluctuate.  Discussed ways to improve consistency with vit k intake  --> states that she has cut back spinach and cabbage intake to twice a week --> no vit k --> has been eating a salad a day --> had less vit k acutely -->has been trying to eat more vit k, salads, broccoli --> has had less vit k than normal -->no changes  []   [x]       Change in alcohol use    []   [x]       Change in activity  []   [x]       Hospital admission  []   [x]       Emergency department visit   []   [x]       Other complaints     Clinical Outcomes:  Yes     No  []   [x]       Major bleeding event  []   [x]       Thromboembolic event  Gets warfarin through MD    Duration of warfarin Therapy: indefinite  INR Range:  2.0-3.0     INR is 1.6 today with no changes   Take 10 mg tonight and tomorrow then increase weekly dose to 7.5 mg daily (10% inc)  Encouraged to maintain a consistency of vegetables/salads.   Recheck INR in 1 week, 8/9    Does not need PW printed, only card     Patient consent signed 10/28/21    Referring PCP is Mali Maldonado  INR (no units)   Date Value   07/15/2022 1.10   10/05/2021 2.50 (H)   05/06/2021 2.80 (H)     INR,(POC) (no units)   Date Value   07/26/2022 1.9   07/22/2022 1.6   07/19/2022 1.2         For Pharmacy Admin Tracking Only    Intervention Detail: Dose Adjustment: 1, reason: Therapy Optimization  Total # of Interventions Recommended: 1  Total # of Interventions Accepted: 1  Time Spent (min): 15

## 2022-08-02 NOTE — TELEPHONE ENCOUNTER
Patient is calling to request refill of albuterol sulfate inhaler. She states she is out of medication and needs as soon as possible.

## 2022-08-09 ENCOUNTER — ANTI-COAG VISIT (OUTPATIENT)
Dept: PHARMACY | Age: 77
End: 2022-08-09
Payer: MEDICARE

## 2022-08-09 DIAGNOSIS — I48.0 PAF (PAROXYSMAL ATRIAL FIBRILLATION) (HCC): Primary | ICD-10-CM

## 2022-08-09 DIAGNOSIS — Z86.711 HISTORY OF PULMONARY EMBOLISM: ICD-10-CM

## 2022-08-09 LAB — INTERNATIONAL NORMALIZATION RATIO, POC: 2.6

## 2022-08-09 PROCEDURE — 85610 PROTHROMBIN TIME: CPT

## 2022-08-09 PROCEDURE — 99211 OFF/OP EST MAY X REQ PHY/QHP: CPT

## 2022-08-09 NOTE — PROGRESS NOTES
Ms. Jonas Pete is a 69 y.o. y/o female with history of DVT, PE, Afib   She presents today for anticoagulation monitoring and adjustment. Pertinent PMH: HTN, Gastric Banding,CAD, diskectomy with an artifical spinal disk implant in September 2012. Patient Reported Findings:  Yes     No  [x]   []       Patient verifies current dosing regimen as listed  [x]   []       S/S bleeding/bruising/swelling/SOB states that she has been wheezing more and had more SOB d/t allergies --> has had a few small nose bleeds recently  --> bruises from lovenox injections   []   [x]       Blood in urine or stool  []   [x]       Procedures scheduled in the future at this time none upcoming    []   [x]       Missed Dose - denies  []   [x]       Extra Dose - denies   []   [x]       Change in medications  decreased tylenol intake to 1-2 times a day --> still taking propafenone 150 BID --> no changes    []   [x]       Change in health/diet/appetite  Patient states that she feels like she has returned to normal vit k intake --> diet fluctuates causing INR to fluctuate. Discussed ways to improve consistency with vit k intake  --> states that she has cut back spinach and cabbage intake to twice a week --> no vit k --> has been eating a salad a day --> had less vit k acutely -->has been trying to eat more vit k, salads, broccoli --> has had less vit k than normal -->no changes   []   [x]       Change in alcohol use    []   [x]       Change in activity  []   [x]       Hospital admission  []   [x]       Emergency department visit   []   [x]       Other complaints     Clinical Outcomes:  Yes     No  []   [x]       Major bleeding event  []   [x]       Thromboembolic event  Gets warfarin through MD    Duration of warfarin Therapy: indefinite  INR Range:  2.0-3.0     INR is 2.6 today    Take 10 mg on Wed and 7.5 mg all other days of the week   Encouraged to maintain a consistency of vegetables/salads.   Recheck INR in 2 weeks, 8/23    Does not need PW printed, only card     Patient consent signed 10/28/21    Referring PCP is Lily Rodrigues  INR (no units)   Date Value   07/15/2022 1.10   10/05/2021 2.50 (H)   05/06/2021 2.80 (H)     INR,(POC) (no units)   Date Value   08/02/2022 1.6   07/26/2022 1.9   07/22/2022 1.6         For Pharmacy Admin Tracking Only    Intervention Detail: Dose Adjustment: 1, reason: Therapy Optimization  Total # of Interventions Recommended: 1  Total # of Interventions Accepted: 1  Time Spent (min): 15

## 2022-08-12 RX ORDER — POTASSIUM CHLORIDE 20 MEQ/1
TABLET, EXTENDED RELEASE ORAL
Qty: 90 TABLET | Refills: 3 | Status: SHIPPED | OUTPATIENT
Start: 2022-08-12

## 2022-08-15 ENCOUNTER — OFFICE VISIT (OUTPATIENT)
Dept: INTERNAL MEDICINE CLINIC | Age: 77
End: 2022-08-15
Payer: MEDICARE

## 2022-08-15 VITALS
HEIGHT: 64 IN | SYSTOLIC BLOOD PRESSURE: 132 MMHG | DIASTOLIC BLOOD PRESSURE: 80 MMHG | WEIGHT: 267 LBS | BODY MASS INDEX: 45.58 KG/M2 | HEART RATE: 64 BPM | OXYGEN SATURATION: 95 %

## 2022-08-15 DIAGNOSIS — F41.9 ANXIETY: ICD-10-CM

## 2022-08-15 DIAGNOSIS — J45.40 MODERATE PERSISTENT ASTHMA WITHOUT COMPLICATION: ICD-10-CM

## 2022-08-15 DIAGNOSIS — I10 ESSENTIAL HYPERTENSION: Primary | ICD-10-CM

## 2022-08-15 PROBLEM — R05.3 CHRONIC COUGH: Status: RESOLVED | Noted: 2018-05-07 | Resolved: 2022-08-15

## 2022-08-15 PROCEDURE — 99214 OFFICE O/P EST MOD 30 MIN: CPT | Performed by: INTERNAL MEDICINE

## 2022-08-15 PROCEDURE — 1123F ACP DISCUSS/DSCN MKR DOCD: CPT | Performed by: INTERNAL MEDICINE

## 2022-08-15 RX ORDER — TIOTROPIUM BROMIDE 18 UG/1
18 CAPSULE ORAL; RESPIRATORY (INHALATION) DAILY
Qty: 90 CAPSULE | Refills: 1 | Status: SHIPPED | OUTPATIENT
Start: 2022-08-15 | End: 2022-10-20 | Stop reason: ALTCHOICE

## 2022-08-15 SDOH — ECONOMIC STABILITY: FOOD INSECURITY: WITHIN THE PAST 12 MONTHS, YOU WORRIED THAT YOUR FOOD WOULD RUN OUT BEFORE YOU GOT MONEY TO BUY MORE.: NEVER TRUE

## 2022-08-15 SDOH — ECONOMIC STABILITY: FOOD INSECURITY: WITHIN THE PAST 12 MONTHS, THE FOOD YOU BOUGHT JUST DIDN'T LAST AND YOU DIDN'T HAVE MONEY TO GET MORE.: NEVER TRUE

## 2022-08-15 ASSESSMENT — SOCIAL DETERMINANTS OF HEALTH (SDOH): HOW HARD IS IT FOR YOU TO PAY FOR THE VERY BASICS LIKE FOOD, HOUSING, MEDICAL CARE, AND HEATING?: PATIENT DECLINED

## 2022-08-15 NOTE — PROGRESS NOTES
Chief Complaint   Patient presents with    Anxiety    Hypertension    Asthma     Pt reports her Asthma has been bothering her more. She seems to be more sob especially when walking distances. HPI:  HTN: The patient is tolerating blood pressure medication well and taking them as directed. BP control outside of the office is reported as 128/78. No symptoms concerning for end organ damage are present. Asthma: She feels her symptoms are worsening especially when walking. Heat and humidity worsen her symptoms. Albuterol helps. Anxiety: she continues with citopram 40mg daily and clonazepam 1mg nightly. Symptoms are stable. She feels the medication helps.         Social History     Tobacco Use    Smoking status: Former     Packs/day: 0.50     Years: 4.00     Pack years: 2.00     Types: Cigarettes     Quit date: 1965     Years since quittin.6    Smokeless tobacco: Never   Vaping Use    Vaping Use: Never used   Substance Use Topics    Alcohol use: Not Currently    Drug use: No         ROS:  Neg for chest pain or leg swelling  Neg for syncope    EXAM  /80   Pulse 64   Ht 5' 4\" (1.626 m)   Wt 267 lb (121.1 kg)   SpO2 95% Comment: at rest  BMI 45.83 kg/m²    GEN: WN/WD, NAD  CV: regular rate and rhythm, AZUL crescendo-decrescendo systolic murmur  Resp: normal effort, clear auscultation bilaterally  No peripheral edema     Lab Results   Component Value Date    LABA1C 5.6 2019     Lab Results   Component Value Date    .9 2011      Lab Results   Component Value Date    CREATININE 0.9 2022    BUN 12 2022     2022    K 4.3 2022    CL 98 (L) 2022    CO2 25 2022       Lab Results   Component Value Date    CHOL 170 2022    CHOL 184 2021    CHOL 150 10/02/2020     Lab Results   Component Value Date    TRIG 97 2022    TRIG 132 2021    TRIG 80 10/02/2020     Lab Results   Component Value Date    HDL 62 (H) 2022 HDL 68 (H) 07/12/2021    HDL 61 (H) 10/02/2020     Lab Results   Component Value Date    LDLCALC 89 06/16/2022    LDLCALC 90 07/12/2021    LDLCALC 73 10/02/2020     Lab Results   Component Value Date    LABVLDL 19 06/16/2022    LABVLDL 26 07/12/2021    LABVLDL 16 10/02/2020     No results found for: CHOLHDLRATIO   A/P  1. Essential hypertension  Chronic with optimal blood pressure control. Continue indapamide 1.25 mg daily and verapamil 240 mg daily. 2. Moderate persistent asthma without complication  Chronic with active symptoms likely provoked by heat and humidity. Add Spiriva 18 mcg daily. Continue Advair twice daily. Continue Singulair 10 mg daily. She will update me on her response to Spiriva in about 2 weeks. 3. Anxiety  Chronic and stable. Continue citalopram and clonazepam.    Return in 3 months for chronic disease management.

## 2022-08-23 ENCOUNTER — ANTI-COAG VISIT (OUTPATIENT)
Dept: PHARMACY | Age: 77
End: 2022-08-23
Payer: MEDICARE

## 2022-08-23 ENCOUNTER — TELEPHONE (OUTPATIENT)
Dept: PHARMACY | Age: 77
End: 2022-08-23

## 2022-08-23 DIAGNOSIS — I48.0 PAF (PAROXYSMAL ATRIAL FIBRILLATION) (HCC): Primary | ICD-10-CM

## 2022-08-23 DIAGNOSIS — Z86.711 HISTORY OF PULMONARY EMBOLISM: ICD-10-CM

## 2022-08-23 LAB — INTERNATIONAL NORMALIZATION RATIO, POC: 2.8

## 2022-08-23 PROCEDURE — 99211 OFF/OP EST MAY X REQ PHY/QHP: CPT

## 2022-08-23 PROCEDURE — 85610 PROTHROMBIN TIME: CPT

## 2022-08-23 NOTE — PROGRESS NOTES
Ms. Navin Stover is a 69 y.o. y/o female with history of DVT, PE, Afib   She presents today for anticoagulation monitoring and adjustment. Pertinent PMH: HTN, Gastric Banding,CAD, diskectomy with an artifical spinal disk implant in September 2012. Patient Reported Findings:  Yes     No  [x]   []       Patient verifies current dosing regimen as listed  [x]   []       S/S bleeding/bruising/swelling/SOB states that she has been wheezing more and had more SOB d/t allergies --> has had a few small nose bleeds recently  --> bruises from lovenox injections   []   [x]       Blood in urine or stool  []   [x]       Procedures scheduled in the future at this time none upcoming    []   [x]       Missed Dose - denies  []   [x]       Extra Dose - denies   [x]   []       Change in medications  decreased tylenol intake to 1-2 times a day --> still taking propafenone 150 BID --> no changes-->starting using Spiriva  []   [x]       Change in health/diet/appetite  Patient states that she feels like she has returned to normal vit k intake --> diet fluctuates causing INR to fluctuate. Discussed ways to improve consistency with vit k intake  --> states that she has cut back spinach and cabbage intake to twice a week --> no vit k --> has been eating a salad a day --> had less vit k acutely -->has been trying to eat more vit k, salads, broccoli --> has had less vit k than normal -->no changes   []   [x]       Change in alcohol use    []   [x]       Change in activity  []   [x]       Hospital admission  []   [x]       Emergency department visit   []   [x]       Other complaints     Clinical Outcomes:  Yes     No  []   [x]       Major bleeding event  []   [x]       Thromboembolic event  Gets warfarin through MD    Duration of warfarin Therapy: indefinite  INR Range:  2.0-3.0     INR is 2.8 today    Take 10 mg on Wed and 7.5 mg all other days of the week   Encouraged to maintain a consistency of vegetables/salads.   Recheck INR in 4 weeks, 9/20    Does not need PW printed, only card     Patient consent signed 10/28/21    Referring PCP is Keira Finch  INR (no units)   Date Value   07/15/2022 1.10   10/05/2021 2.50 (H)   05/06/2021 2.80 (H)     INR,(POC) (no units)   Date Value   08/09/2022 2.6   08/02/2022 1.6   07/26/2022 1.9         For Pharmacy Admin Tracking Only      Total # of Interventions Recommended: 0  Total # of Interventions Accepted: 0  Time Spent (min): 15

## 2022-09-19 NOTE — PROGRESS NOTES
[facility]   Electrophysiology  Nelson Augie, APRN-CNP  Attending EP: Dr. Sylvia Allison   Date: 9/20/2022  I had the privilege of visiting Karen Swan in the office. Chief Complaint: [place] Fibrillation\"  Chief Complaint   Patient presents with    Follow-up     Pt reports no cardiac concerns. History of Present Illness: History obtained from patient and medical record. Karen Swan is 69 y.o. female with a past medical history of HTN, HLD, PAULA, PE, obesity, and atrial fibrillation. She was found to be in afib 2011 after lap-band surgery. She is warfarin for hx of PE and follows with [facility] and s/p abena filter. She had a fall that was precipitated with dizziness and was found to have neck fracture and forehead hematoma. Since then she has had issues with her speech. S/p LHC 12/14/2018 showed normal coronaries. S/p unsuccessful cardioversion 12/14/2018. She uses [employer] mobile device at home. S/p DCCV 5/6/2021    RFCA of atrial fibrillation with PVI, CTI flutter (10/5/2021). Interval history: Today, Karen Swan is being seen for PAF and hypertension. Feeling well from cardiac perspective. She denies known recurrence of atrial fibrillation. No recurrent palpitations. Denies CP, swelling, or dizziness. Admits to chronic SOB and her PCP started her on Spiriva for asthma 2 months ago. She has not had any improvement. She has echo and follow up with Dr. Rosaura Barraza next month for aortic stenosis. Her BP is elevated today, she states it is controlled at home. Started weight watcher and is down 25 lbs over the last couple years. Denies having chest pain, palpitations, shortness of breath, or dizziness at the time of this visit. With regard to medication therapy the patient has been compliant with prescribed regimen. She has tolerated therapy to date.      Assessment:  Paroxysmal Atrial Fibrillation  - ECG today shows SR  - On diltiazem 240 mg daily and symptoms develop    F/U: Follow-up with EP in 6 months   Call [facility] at [phone] with any questions    Allergies: Allergies   Allergen Reactions    Belsomra [Suvorexant] Other (See Comments)     Nightmares      Avelox [Moxifloxacin Hcl In Nacl] Rash    Levofloxacin Rash    Sulfa Antibiotics Rash     Home Medications:  Prior to Visit Medications    Medication Sig Taking? Authorizing Provider   tiotropium (SPIRIVA HANDIHALER) 18 MCG inhalation capsule Inhale 1 capsule into the lungs in the morning.   Anai Lane MD   potassium chloride (KLOR-CON M) 20 MEQ extended release tablet TAKE 1 TABLET BY MOUTH  DAILY WITH BREAKFAST  AIME Harris CNP   albuterol sulfate HFA (PROVENTIL;VENTOLIN;PROAIR) 108 (90 Base) MCG/ACT inhaler INHALE TWO PUFFS BY MOUTH EVERY 4 HOURS AS NEEDED FOR WHEEZING  Jennifer Edmonds MD   verapamil (CALAN SR) 240 MG extended release tablet TAKE 1 TABLET BY MOUTH AT  NIGHT  Anai Lane MD   clonazePAM (KLONOPIN) 1 MG tablet TAKE 1 TABLET BY MOUTH IN  THE EVENING  Anai Lane MD   propafenone (RYTHMOL) 150 MG tablet TAKE 1 TABLET BY MOUTH  TWICE DAILY  AIME Del Rio - CNP   indapamide (LOZOL) 1.25 MG tablet TAKE 1 TABLET BY MOUTH IN  THE MORNING  Rendell Blind, AIME - CNP   pantoprazole (PROTONIX) 40 MG tablet TAKE 1 TABLET BY MOUTH IN  THE MORNING BEFORE  BREAKFAST  Rendell Blind, APRN - CNP   gabapentin (NEURONTIN) 300 MG capsule TAKE 2 CAPSULES BY MOUTH 3  TIMES DAILY  Rendell Blind, AIME - CNP   montelukast (SINGULAIR) 10 MG tablet TAKE 1 TABLET BY MOUTH AT  NIGHT  Anai Lane MD   citalopram (CELEXA) 40 MG tablet TAKE 1 TABLET BY MOUTH  DAILY  Channing Felipe MD   atorvastatin (LIPITOR) 80 MG tablet TAKE 1 TABLET BY MOUTH  DAILY  Channing Felipe MD   allopurinol (ZYLOPRIM) 300 MG tablet TAKE 1 TABLET BY MOUTH  DAILY  Jennifer Edmonds MD   trimethoprim (TRIMPEX) 100 MG tablet Take 1 tablet by mouth every 48 hours  Judah Hughes Total Knee Arthroplasty (2006); Ankle Fusion (2010); Full thickness skin graft (2011); Tubal ligation (1975); Upper gastrointestinal endoscopy (10-); other surgical history (12-); back surgery (09/14/2012); eye surgery; Breast biopsy (08/2018); Endoscopy, colon, diagnostic; Cardioversion; Cardiac surgery; ablation of dysrhythmic focus; Carpal tunnel release (Right, 7/15/2022); and Arm Surgery (Right, 7/15/2022). Social History:  Reviewed. reports that she quit smoking about 57 years ago. Her smoking use included cigarettes. She has a 2.00 pack-year smoking history. She has never used smokeless tobacco. She reports that she does not currently use alcohol. She reports that she does not use drugs. Family History:  Reviewed. family history includes Arthritis in her father; Asthma in her father; Breast Cancer in her sister; Depression in her mother; Heart Disease in her father; High Blood Pressure in her father and mother; Stroke in her father. Denies family history of sudden cardiac death, arrhythmia, premature CAD    Review of System:  Constitutional: No weight changes or weakness  HEENT: No visual changes. No mouth sores or sore throat. Cardiovascular: denies chest pain, admits to dyspnea on exertion, denies palpitations or denies loss of consciousness. No cough, hemoptysis, denies pleuritic pain, or phlebitis. denies dizziness. Respiratory: denies cough or wheezing. Gastrointestinal: Negative, No blood in stools. Genitourinary: No hematuria. Neurological: No focal weakness  Psychiatric: No confusion, anxiety, or depression. Hem/Lymph: Denies abnormal bruising or bleeding. Physical Examination:  There were no vitals filed for this visit.    Wt Readings from Last 3 Encounters:   08/15/22 267 lb (121.1 kg)   07/15/22 266 lb (120.7 kg)   06/27/22 271 lb (122.9 kg)     Constitutional: Cooperative and in no apparent distress, and appears well nourished  Skin: Warm and pink; no cyanosis or bruising  HEENT: Symmetric and normocephalic. Conjunctiva pink with clear sclera. Mucus membranes pink and moist. No visible masses/goiter  Respiratory: Respirations symmetric and unlabored. Lungs clear to auscultation bilaterally, no wheezing, rhonchi, or crackles. Cardiovascular:  regular rate and rhythm. S1 & S2 present, positive for murmur. negative elevation of JVP. No peripheral edema. Musculoskeletal:  No focal weakness. Neurological/Psych: Awake and orientated to person, place and time. Calm affect, appropriate mood. Pertinent labs, diagnostic, device, and imaging results reviewed as a part of this visit    LABS    CBC:   Lab Results   Component Value Date    WBC 6.0 2022    HGB 13.6 2022    HCT 40.5 2022    MCV 95.1 2022     2022     BMP:   Lab Results   Component Value Date    CREATININE 0.9 2022    BUN 12 2022     2022    K 4.3 2022    CL 98 (L) 2022    CO2 25 2022     CrCl cannot be calculated (Unknown ideal weight.). No results found for: BNP    Thyroid:   Lab Results   Component Value Date    TSH 3.53 2011     Lipid Panel:   Lab Results   Component Value Date/Time    CHOL 170 2022 11:05 AM    HDL 62 2022 11:05 AM    HDL 58 2012 08:15 AM    TRIG 97 2022 11:05 AM     LFTs:  Lab Results   Component Value Date    ALT 26 10/02/2020    AST 32 10/02/2020    ALKPHOS 78 10/02/2020    BILITOT 0.7 10/02/2020     Coags:   Lab Results   Component Value Date    PROTIME 14.1 07/15/2022    INR 2.8 2022    APTT 31.0 07/15/2022     EC2022 SR HR 64, , , QTc 425     Echo: 3/3/2022   Summary   *Technically difficult exam due to body habitus.    *Left ventricle - normal size, thickness and function with EF of 65%   *Aortic valve - moderate stenosis with MG of 30mmHg, BERTHA 1.12cm2, mild   regurgitation   *Mitral valve - mild regurgitation   *Tricuspid valve - mild regurgitation with RVSP of 38mmHg  10/5/2021   Summary   Normal left ventricle size, wall thickness, and systolic function with an   estimated ejection fraction of 60%. Mitral valve leaflets appear mildly thickened. Mild mitral regurgitation. There is no evidence of mass or thrombus in the left atrium or appendage. The aortic valve is moderately calcified with decreased leaflet mobility   consistent with at least moderate aortic stenosis. V max 3.03 m/s. Mild aortic regurgitation. The aortic root is normal in size. The thoracic aorta has minimal plaque. 3/13/2018   Summary   Normal left ventricle size, wall thickness and systolic function with an   estimated ejection fraction of 60%. Mild mitral regurgitation. GXT: 4/19/2018  Summary         No EKG evidence for ischemia with exercise         Normal LV size and systolic function. Myocardial perfusion imaging with small area of decreased uptake in the     anteroapical wall with stress with redistribution at rest consistent with     ischemia. Scheduled for cath in 2 weeks. Cath: 5/1/2018  Findings:                      LM       Normal              LAD     Normal              Cx        Normal              RCA     Normal              LVG     Not performed - aorta very tortuous              EDP     Not obtained - aorta very tortuous  Intervention:  None  Recs:              Continued aggressive medical tx and risk factor modification    Diet & Exercise: The patient is counseled to follow a low salt diet to assure blood pressure remains controlled for cardiovascular risk factor modification  The patient is counseled to avoid excess caffeine, and energy drinks as this may exacerbated ectopy and arrhythmia  The patient is counseled to lose weight to control cardiovascular risk factors  Exercise program discussed:  To improve overall cardiovascular health, the patient is instructed to increase cardiovascular related activities with a goal of 150 min/week of moderate level activity or 10,000 steps per day. Encouraged to perform as much activity as tolerated     I have addressed the patient's cardiac risk factors and adjusted pharmacologic treatment as needed. In addition, I have reinforced the need for patient directed risk factor modification. I independently reviewed the ECG    All questions and concerns were addressed with the patient. Alternatives to treatment were discussed. Thank you for allowing to us to participate in the care of Olayinka Ren.     AIME Salazar-CNP  [facility]   Office: [phone]

## 2022-09-20 ENCOUNTER — OFFICE VISIT (OUTPATIENT)
Dept: CARDIOLOGY CLINIC | Age: 77
End: 2022-09-20
Payer: MEDICARE

## 2022-09-20 VITALS
DIASTOLIC BLOOD PRESSURE: 78 MMHG | OXYGEN SATURATION: 95 % | BODY MASS INDEX: 45.58 KG/M2 | SYSTOLIC BLOOD PRESSURE: 144 MMHG | HEART RATE: 66 BPM | HEIGHT: 64 IN | WEIGHT: 267 LBS

## 2022-09-20 DIAGNOSIS — I48.0 PAF (PAROXYSMAL ATRIAL FIBRILLATION) (HCC): Primary | ICD-10-CM

## 2022-09-20 DIAGNOSIS — I10 BENIGN ESSENTIAL HTN: ICD-10-CM

## 2022-09-20 DIAGNOSIS — I35.0 MODERATE AORTIC STENOSIS: ICD-10-CM

## 2022-09-20 DIAGNOSIS — G47.33 OSA (OBSTRUCTIVE SLEEP APNEA): ICD-10-CM

## 2022-09-20 PROCEDURE — 93000 ELECTROCARDIOGRAM COMPLETE: CPT | Performed by: NURSE PRACTITIONER

## 2022-09-20 PROCEDURE — 1123F ACP DISCUSS/DSCN MKR DOCD: CPT | Performed by: NURSE PRACTITIONER

## 2022-09-20 PROCEDURE — 99214 OFFICE O/P EST MOD 30 MIN: CPT | Performed by: NURSE PRACTITIONER

## 2022-09-20 RX ORDER — PROPAFENONE HYDROCHLORIDE 150 MG/1
150 TABLET, FILM COATED ORAL EVERY 8 HOURS PRN
Qty: 180 TABLET | Refills: 1 | Status: SHIPPED | OUTPATIENT
Start: 2022-09-20

## 2022-09-20 NOTE — PATIENT INSTRUCTIONS
- Stop propafenone, OK to take as needed up to 3 times daily for episodes of atrial fibrillation  - Check your blood pressure at home, if your top number blood pressure is >140/90 or <95/50 please call the office  - Check your heart rate at home, if it is <50 or >120 please call the office   - Call office if symptoms develop  - Follow up in 6 months

## 2022-09-21 ENCOUNTER — TELEMEDICINE (OUTPATIENT)
Dept: BARIATRICS/WEIGHT MGMT | Age: 77
End: 2022-09-21
Payer: MEDICARE

## 2022-09-21 DIAGNOSIS — Z71.3 DIETARY COUNSELING AND SURVEILLANCE: ICD-10-CM

## 2022-09-21 DIAGNOSIS — E66.01 MORBID OBESITY WITH BMI OF 45.0-49.9, ADULT (HCC): Primary | ICD-10-CM

## 2022-09-21 PROCEDURE — 99213 OFFICE O/P EST LOW 20 MIN: CPT | Performed by: FAMILY MEDICINE

## 2022-09-21 PROCEDURE — 1123F ACP DISCUSS/DSCN MKR DOCD: CPT | Performed by: FAMILY MEDICINE

## 2022-09-21 ASSESSMENT — ENCOUNTER SYMPTOMS
PHOTOPHOBIA: 0
ABDOMINAL PAIN: 0
ABDOMINAL DISTENTION: 0
WHEEZING: 0
CHEST TIGHTNESS: 0
SHORTNESS OF BREATH: 0
BLOOD IN STOOL: 0
APNEA: 0
CONSTIPATION: 0
VOMITING: 0
COUGH: 0
EYE PAIN: 0
CHOKING: 0
NAUSEA: 0
DIARRHEA: 0

## 2022-09-21 NOTE — PROGRESS NOTES
Patient: Sania Holt                      Encounter Date: 9/21/2022    YOB: 1945               Age: 69 y.o. Chief Complaint   Patient presents with    Weight Management     F/u MWM       Patient identification was verified at the start of the visit. Patient-Reported Vitals 9/19/2022   Patient-Reported Weight 265.2   Patient-Reported Height 54   Patient-Reported Systolic 677   Patient-Reported Diastolic 78   Patient-Reported Pulse 60   Patient-Reported Temperature 97.6   Patient-Reported SpO2 96         BP Readings from Last 1 Encounters:   09/20/22 (!) 144/78       BMI Readings from Last 1 Encounters:   09/20/22 45.83 kg/m²       Pulse Readings from Last 1 Encounters:   09/20/22 66          Wt Readings from Last 3 Encounters:   09/20/22 267 lb (121.1 kg)   08/15/22 267 lb (121.1 kg)   07/15/22 266 lb (120.7 kg)        HPI: 69 y.o. female with a long-standing history of obesity (status post lap band 2011) presents today for a virtual video follow-up. Her weight is stable from her last visit. Current treatment includes low jad diet. Food recall and assessment reviewed. Making better dietary choices. Happy with weight maintenance.      Following Weight Watchers program     Diet: []LCHF/Ketogenic []Modified low-calorie/low carb diet  [x]Low-calorie diet          []Maintenance       []Other:           Exercise: []Cardio     []Resistance/strength training     [x]Other: No intentional exercise, but trying to be physically active     Allergies   Allergen Reactions    Belsomra [Suvorexant] Other (See Comments)     Nightmares      Avelox [Moxifloxacin Hcl In Nacl] Rash    Levofloxacin Rash    Sulfa Antibiotics Rash         Current Outpatient Medications:     propafenone (RYTHMOL) 150 MG tablet, Take 1 tablet by mouth every 8 hours as needed (atrial fibrillation, palpitations), Disp: 180 tablet, Rfl: 1    tiotropium (SPIRIVA HANDIHALER) 18 MCG inhalation capsule, Inhale 1 capsule into the lungs in the morning., Disp: 90 capsule, Rfl: 1    potassium chloride (KLOR-CON M) 20 MEQ extended release tablet, TAKE 1 TABLET BY MOUTH  DAILY WITH BREAKFAST, Disp: 90 tablet, Rfl: 3    albuterol sulfate HFA (PROVENTIL;VENTOLIN;PROAIR) 108 (90 Base) MCG/ACT inhaler, INHALE TWO PUFFS BY MOUTH EVERY 4 HOURS AS NEEDED FOR WHEEZING, Disp: 8.5 g, Rfl: 2    verapamil (CALAN SR) 240 MG extended release tablet, TAKE 1 TABLET BY MOUTH AT  NIGHT, Disp: 90 tablet, Rfl: 3    clonazePAM (KLONOPIN) 1 MG tablet, TAKE 1 TABLET BY MOUTH IN  THE EVENING, Disp: 90 tablet, Rfl: 0    indapamide (LOZOL) 1.25 MG tablet, TAKE 1 TABLET BY MOUTH IN  THE MORNING, Disp: 90 tablet, Rfl: 3    pantoprazole (PROTONIX) 40 MG tablet, TAKE 1 TABLET BY MOUTH IN  THE MORNING BEFORE  BREAKFAST, Disp: 90 tablet, Rfl: 3    gabapentin (NEURONTIN) 300 MG capsule, TAKE 2 CAPSULES BY MOUTH 3  TIMES DAILY, Disp: 540 capsule, Rfl: 3    montelukast (SINGULAIR) 10 MG tablet, TAKE 1 TABLET BY MOUTH AT  NIGHT, Disp: 90 tablet, Rfl: 3    citalopram (CELEXA) 40 MG tablet, TAKE 1 TABLET BY MOUTH  DAILY, Disp: 90 tablet, Rfl: 3    atorvastatin (LIPITOR) 80 MG tablet, TAKE 1 TABLET BY MOUTH  DAILY, Disp: 90 tablet, Rfl: 3    allopurinol (ZYLOPRIM) 300 MG tablet, TAKE 1 TABLET BY MOUTH  DAILY, Disp: 90 tablet, Rfl: 3    trimethoprim (TRIMPEX) 100 MG tablet, Take 1 tablet by mouth every 48 hours, Disp: 45 tablet, Rfl: 3    warfarin (COUMADIN) 5 MG tablet, TAKE 1 TABLET BY MOUTH ON  MONDAY AND  FRIDAY AND 1 AND 1/2  TABLETS ALL OTHER DAYS, Disp: 135 tablet, Rfl: 5    fluticasone-salmeterol (ADVAIR) 250-50 MCG/DOSE AEPB, Inhale 1 puff into the lungs 2 times daily, Disp: 1 Inhaler, Rfl: 5    acetaminophen (TYLENOL) 500 MG tablet, Take 500 mg by mouth every 6 hours as needed for Pain, Disp: , Rfl:     diphenhydrAMINE-APAP, sleep, (TYLENOL PM EXTRA STRENGTH)  MG tablet, Take 1 tablet by mouth nightly as needed for Sleep, Disp: , Rfl:     Ascorbic Acid (VITAMIN C PO), 1 tablet daily, Disp: , Rfl:     therapeutic multivitamin-minerals (THERAGRAN-M) tablet, Take 1 tablet by mouth nightly , Disp: , Rfl:     Cranberry 500 MG CAPS, Take 1,000 mg by mouth nightly , Disp: , Rfl:     Patient Active Problem List   Diagnosis    Essential hypertension    History of pulmonary embolism    Osteoarthritis of knee    PAULA (obstructive sleep apnea)    Asthma    Status post gastric banding    Coronary artery disease    Morbid obesity with BMI of 40.0-44.9, adult ([facility])    Hyperlipidemia, unspecified    Arthritis    Anxiety    PAF (paroxysmal atrial fibrillation) ([facility])    Nonrheumatic aortic valve stenosis       Review of Systems   Constitutional:  Negative for fatigue. Eyes:  Negative for photophobia, pain and visual disturbance. Respiratory:  Negative for apnea, cough, choking, chest tightness, shortness of breath and wheezing. Cardiovascular:  Negative for chest pain, palpitations and leg swelling. Gastrointestinal:  Negative for abdominal distention, abdominal pain, blood in stool, constipation, diarrhea, nausea and vomiting. Endocrine: Negative for cold intolerance and heat intolerance. Musculoskeletal:  Negative for arthralgias and myalgias. Skin:  Negative for rash. Neurological:  Negative for dizziness, tremors, syncope, weakness, numbness and headaches. Psychiatric/Behavioral:  Negative for agitation, confusion, decreased concentration, dysphoric mood, hallucinations, sleep disturbance and suicidal ideas. The patient is not nervous/anxious and is not hyperactive. Physical Exam  Constitutional:       Appearance: She is well-developed. HENT:      Head: Normocephalic. Eyes:      Conjunctiva/sclera: Conjunctivae normal.   Abdominal:      General: Abdomen is protuberant. Musculoskeletal:         General: No swelling. Neurological:      Mental Status: She is alert and oriented to person, place, and time.    Psychiatric:         Mood and Affect: Mood normal.         Behavior: Behavior normal.         Thought Content: Thought content normal.         Judgment: Judgment normal.       Anti-coag visit on 08/23/2022   Component Date Value Ref Range Status    INR,(POC) 08/23/2022 2.8   Final         Assessment and Plan:  1. Morbid obesity with BMI of 45.0-49.9, adult ([address]  Stable, not at goal.  Reviewed available tx options- pt would like to continue with just Weight Watchers. F/u prn.     2. Dietary counseling and surveillance  Avoid skipping meals. Avoid evening/nighttime snacking. Use distraction techniques to avoid boredom/stress eating. Plan/prep meals ahead of time. Avoid soda/juice/sugary drinks. Be mindful of portion sizes. Nutrition plan: [] LCHF/Ketogenic   [x] Modified low-calorie diet (low carb/low-jad)               [] Low-calorie diet    []Maintenance       []Other    Exercise: []Cardio     []Resistance/strength training                       [x]ACSM recommendations (150 minutes/week in active weight loss)                              Behavior: [x]Motivational interviewing performed    [] Referral for counseling                         [x] Discussed strategies to overcome habits/challenges for focus         [x] Stress management   [x] Stimulus control                    [] Sleep hygiene    Reviewed:  [x] Nutrition and the importance of regularprotein intake  [x] Hidden carbohydrate sources  [x] Alcohol use  [x] Tobacco use   [x] Importance of exercise and reducing sedentary time              No orders of the defined types were placed in this encounter. No follow-ups on file. Oswaldo Miguel is a 69 y.o. female being evaluated by a Virtual Visit (video visit) encounter to address concerns as mentioned above. A caregiver was present when appropriate.  Due to this being a TeleHealth encounter (During [facility]-37 public health emergency), evaluation of the following organ systems was limited: Vitals/Constitutional/EENT/Resp/CV/GI//MS/Neuro/Skin/Heme-Lymph-Imm. Pursuant to the emergency declaration under the [address] [place] [address] and the [employer] and [employer] Act, this Virtual Visit was conducted with patient's (and/or legal guardian's) consent, to reduce the patient's risk of exposure to COVID-19 and provide necessary medical care. The patient (and/or legal guardian) has also been advised to contact this office for worsening conditions or problems, and seek emergency medical treatment and/or call 911 if deemed necessary. Services were provided through a video synchronous discussion virtually to substitute for in-person clinic visit. Patient and provider were located at their individual homes. --Michael Cantu MD on 9/21/2022 at 10:19 AM    An electronic signature was used to authenticate this note.

## 2022-09-22 ENCOUNTER — ANTI-COAG VISIT (OUTPATIENT)
Dept: PHARMACY | Age: 77
End: 2022-09-22
Payer: MEDICARE

## 2022-09-22 DIAGNOSIS — I48.0 PAF (PAROXYSMAL ATRIAL FIBRILLATION) (HCC): Primary | ICD-10-CM

## 2022-09-22 DIAGNOSIS — Z86.711 HISTORY OF PULMONARY EMBOLISM: ICD-10-CM

## 2022-09-22 LAB — INTERNATIONAL NORMALIZATION RATIO, POC: 2.8

## 2022-09-22 PROCEDURE — 99211 OFF/OP EST MAY X REQ PHY/QHP: CPT

## 2022-09-22 PROCEDURE — 85610 PROTHROMBIN TIME: CPT

## 2022-09-22 NOTE — PROGRESS NOTES
Ms. Emiliano Jackson is a 69 y.o. y/o female with history of DVT, PE, Afib   She presents today for anticoagulation monitoring and adjustment. Pertinent PMH: HTN, Gastric Banding,CAD, diskectomy with an artifical spinal disk implant in September 2012. Patient Reported Findings:  Yes     No  [x]   []       Patient verifies current dosing regimen as listed- confirms   [x]   []       S/S bleeding/bruising/swelling/SOB states that she has been wheezing more and had more SOB d/t allergies --> has had a few small nose bleeds recently  --> bruises from lovenox injections ---> denies   []   [x]       Blood in urine or stool  []   [x]       Procedures scheduled in the future at this time none upcoming    []   [x]       Missed Dose - denies  []   [x]       Extra Dose - denies   [x]   []       Change in medications  decreased tylenol intake to 1-2 times a day --> still taking propafenone 150 BID --> no changes-->starting using Spiriva---> stopped propafenone 9/20, was told to take 3 times/day for afib prn   []   [x]       Change in health/diet/appetite  Patient states that she feels like she has returned to normal vit k intake --> diet fluctuates causing INR to fluctuate.  Discussed ways to improve consistency with vit k intake  --> states that she has cut back spinach and cabbage intake to twice a week --> no vit k --> has been eating a salad a day --> had less vit k acutely -->has been trying to eat more vit k, salads, broccoli --> has had less vit k than normal -->no changes   []   [x]       Change in alcohol use    []   [x]       Change in activity  []   [x]       Hospital admission  []   [x]       Emergency department visit   []   [x]       Other complaints     Clinical Outcomes:  Yes     No  []   [x]       Major bleeding event  []   [x]       Thromboembolic event  Gets warfarin through MD    Duration of warfarin Therapy: indefinite  INR Range:  2.0-3.0     INR is 2.8 today    Stopped propafenone 9/21 but was told
02-Nov-2021

## 2022-09-29 ENCOUNTER — ANTI-COAG VISIT (OUTPATIENT)
Dept: PHARMACY | Age: 77
End: 2022-09-29
Payer: MEDICARE

## 2022-09-29 DIAGNOSIS — Z86.711 HISTORY OF PULMONARY EMBOLISM: ICD-10-CM

## 2022-09-29 DIAGNOSIS — I48.0 PAF (PAROXYSMAL ATRIAL FIBRILLATION) (HCC): Primary | ICD-10-CM

## 2022-09-29 LAB — INTERNATIONAL NORMALIZATION RATIO, POC: 2.6

## 2022-09-29 PROCEDURE — 85610 PROTHROMBIN TIME: CPT

## 2022-09-29 PROCEDURE — 99211 OFF/OP EST MAY X REQ PHY/QHP: CPT

## 2022-09-29 NOTE — PROGRESS NOTES
Ms. Deb Sexton is a 69 y.o. y/o female with history of DVT, PE, Afib   She presents today for anticoagulation monitoring and adjustment. Pertinent PMH: HTN, Gastric Banding,CAD, diskectomy with an artifical spinal disk implant in September 2012. Patient Reported Findings:  Yes     No  [x]   []       Patient verifies current dosing regimen as listed- confirms   [x]   []       S/S bleeding/bruising/swelling/SOB states that she has been wheezing more and had more SOB d/t allergies --> has had a few small nose bleeds recently  --> bruises from lovenox injections ---> denies   []   [x]       Blood in urine or stool  []   [x]       Procedures scheduled in the future at this time none upcoming    []   [x]       Missed Dose - denies  []   [x]       Extra Dose - denies   [x]   []       Change in medications  decreased tylenol intake to 1-2 times a day --> still taking propafenone 150 BID --> starting using Spiriva---> stopped propafenone 9/20, was told to take 3 times/day for afib prn --> has not used propafenone in the past week. Stopped spiriva   []   [x]       Change in health/diet/appetite  Patient states that she feels like she has returned to normal vit k intake --> diet fluctuates causing INR to fluctuate.  Discussed ways to improve consistency with vit k intake  --> states that she has cut back spinach and cabbage intake to twice a week --> no vit k --> has been eating a salad a day --> had less vit k acutely -->has been trying to eat more vit k, salads, broccoli --> has had less vit k than normal -->no changes   []   [x]       Change in alcohol use    []   [x]       Change in activity  []   [x]       Hospital admission  []   [x]       Emergency department visit   []   [x]       Other complaints     Clinical Outcomes:  Yes     No  []   [x]       Major bleeding event  []   [x]       Thromboembolic event  Gets warfarin through MD    Duration of warfarin Therapy: indefinite  INR Range:  2.0-3.0     INR is 2.6 today    Stopped propafenone 9/21 but was told she could take it prn. Has not used since  Take 10 mg on Wed and 7.5 mg all other days of the week   Encouraged to maintain a consistency of vegetables/salads.   Recheck INR in 2 weeks, 10/12    Does not need PW printed, only card     Patient consent signed 10/28/21    Referring PCP is Wilbur Penn  INR (no units)   Date Value   07/15/2022 1.10   10/05/2021 2.50 (H)   05/06/2021 2.80 (H)     INR,(POC) (no units)   Date Value   09/22/2022 2.8   08/23/2022 2.8   08/09/2022 2.6         For Pharmacy Admin Tracking Only    Total # of Interventions Recommended: 0  Total # of Interventions Accepted: 0  Time Spent (min): 15

## 2022-10-12 ENCOUNTER — ANTI-COAG VISIT (OUTPATIENT)
Dept: PHARMACY | Age: 77
End: 2022-10-12
Payer: MEDICARE

## 2022-10-12 DIAGNOSIS — F41.9 ANXIETY: ICD-10-CM

## 2022-10-12 DIAGNOSIS — Z86.711 HISTORY OF PULMONARY EMBOLISM: ICD-10-CM

## 2022-10-12 DIAGNOSIS — I48.0 PAF (PAROXYSMAL ATRIAL FIBRILLATION) (HCC): Primary | ICD-10-CM

## 2022-10-12 LAB — INTERNATIONAL NORMALIZATION RATIO, POC: 2.9

## 2022-10-12 PROCEDURE — 85610 PROTHROMBIN TIME: CPT

## 2022-10-12 PROCEDURE — 99211 OFF/OP EST MAY X REQ PHY/QHP: CPT

## 2022-10-12 RX ORDER — CLONAZEPAM 1 MG/1
TABLET ORAL
Qty: 90 TABLET | Refills: 0 | Status: SHIPPED | OUTPATIENT
Start: 2022-10-12 | End: 2023-01-24

## 2022-10-12 NOTE — PROGRESS NOTES
Ms. Maureen Fontana is a 69 y.o. y/o female with history of DVT, PE, Afib   She presents today for anticoagulation monitoring and adjustment. Pertinent PMH: HTN, Gastric Banding,CAD, diskectomy with an artifical spinal disk implant in September 2012. Patient Reported Findings:  Yes     No  [x]   []       Patient verifies current dosing regimen as listed- confirms   [x]   []       S/S bleeding/bruising/swelling/SOB states that she has been wheezing more and had more SOB d/t allergies --> has had a few small nose bleeds recently  --> bruises from lovenox injections ---> denies   []   [x]       Blood in urine or stool  []   [x]       Procedures scheduled in the future at this time none upcoming    []   [x]       Missed Dose - denies  []   [x]       Extra Dose - denies   []   [x]       Change in medications  decreased tylenol intake to 1-2 times a day --> still taking propafenone 150 BID --> starting using Spiriva---> stopped propafenone 9/20, was told to take 3 times/day for afib prn --> has not used propafenone in the past week. Stopped spiriva --> no changes   []   [x]       Change in health/diet/appetite  Patient states that she feels like she has returned to normal vit k intake --> diet fluctuates causing INR to fluctuate.  Discussed ways to improve consistency with vit k intake  --> states that she has cut back spinach and cabbage intake to twice a week --> no vit k --> has been eating a salad a day --> had less vit k acutely -->has been trying to eat more vit k, salads, broccoli --> has had less vit k than normal -->no changes   []   [x]       Change in alcohol use    []   [x]       Change in activity  []   [x]       Hospital admission  []   [x]       Emergency department visit   []   [x]       Other complaints     Clinical Outcomes:  Yes     No  []   [x]       Major bleeding event  []   [x]       Thromboembolic event  Gets warfarin through MD    Duration of warfarin Therapy: indefinite  INR Range: 2.0-3.0     INR is 2.9 today    Stopped propafenone 9/21 but was told she could take it prn. Has not used since  Take 10 mg on Wed and 7.5 mg all other days of the week   Encouraged to maintain a consistency of vegetables/salads.   Recheck INR in 3 weeks, 11/2    Does not need PW printed, only card     Patient consent signed 10/28/21    Referring PCP is Scott Weems  INR (no units)   Date Value   07/15/2022 1.10   10/05/2021 2.50 (H)   05/06/2021 2.80 (H)     INR,(POC) (no units)   Date Value   09/29/2022 2.6   09/22/2022 2.8   08/23/2022 2.8         For Pharmacy Admin Tracking Only    Total # of Interventions Recommended: 0  Total # of Interventions Accepted: 0  Time Spent (min): 15

## 2022-10-12 NOTE — TELEPHONE ENCOUNTER
Controlled Substance Monitoring:    Acute and Chronic Pain Monitoring:   RX Monitoring 10/12/2022   Attestation -   Periodic Controlled Substance Monitoring No signs of potential drug abuse or diversion identified.      Sent to mail order

## 2022-10-13 DIAGNOSIS — J45.40 MODERATE PERSISTENT ASTHMA WITHOUT COMPLICATION: ICD-10-CM

## 2022-10-13 RX ORDER — FLUTICASONE PROPIONATE AND SALMETEROL 250; 50 UG/1; UG/1
POWDER RESPIRATORY (INHALATION)
Qty: 60 EACH | Refills: 5 | Status: SHIPPED | OUTPATIENT
Start: 2022-10-13

## 2022-10-18 RX ORDER — WARFARIN SODIUM 5 MG/1
TABLET ORAL
Qty: 124 TABLET | Refills: 3 | Status: SHIPPED | OUTPATIENT
Start: 2022-10-18

## 2022-10-19 NOTE — PROGRESS NOTES
[facility]  H+P  Consult  OP Visit  FU Visit   CC HX HPI   GEN  Doing well. No new concerns. AS  Ø CP, SOB. AF  No palpitations   CHOL  Last lipid reviewed. On max dose/tolerated statin. MED  Compliant with CV meds. Ø reported SA. HISTORY/ALLERGY/ROS   MEDHx  has a past medical history of A-fib ([address] Allergic, Anxiety, Aortic stenosis, Arthritis, Asthma, Back pain, Blood circulation, collateral, CAD (coronary artery disease), Depression, GERD (gastroesophageal reflux disease), Gout, Hx of blood clots, Hypercholesteremia, Hypertension, Movement disorder, Movement disorder, Obesity, Pulmonary emboli (HCC), Unspecified sleep apnea, Urinary tract infection, chronic, and UTI (urinary tract infection). SURGHx  has a past surgical history that includes joint replacement; Vena Cava Filter Placement; bladder suspension; bone incision and drainage (5-7-11); joint replacement (2003); joint replacement (2005); Revision Total Knee Arthroplasty (2006); Ankle Fusion (2010); Full thickness skin graft (2011); Tubal ligation (1975); Upper gastrointestinal endoscopy (10-); other surgical history (12-); back surgery (09/14/2012); eye surgery; Breast biopsy (08/2018); Endoscopy, colon, diagnostic; Cardioversion; Cardiac surgery; ablation of dysrhythmic focus; Carpal tunnel release (Right, 7/15/2022); and Arm Surgery (Right, 7/15/2022). SOCHx  reports that she quit smoking about 57 years ago. Her smoking use included cigarettes. She has a 2.00 pack-year smoking history. She has never used smokeless tobacco. She reports that she does not currently use alcohol. She reports that she does not use drugs. FAMHx family history includes Arthritis in her father; Asthma in her father; Breast Cancer in her sister; Depression in her mother; Heart Disease in her father; High Blood Pressure in her father and mother; Stroke in her father.    ALLERG Belsomra [suvorexant], Avelox [moxifloxacin hcl in nacl], Levofloxacin, and Sulfa antibiotics   ROS Full ROS obtained and negative except as mentioned in HPI   MEDICATIONS   Current Outpatient Medications   Medication Sig Dispense Refill    warfarin (COUMADIN) 5 MG tablet TAKE daily as directed to maintain INR 2-3 124 tablet 3    fluticasone-salmeterol (ADVAIR) 250-50 MCG/ACT AEPB diskus inhaler INHALE ONE PUFF BY MOUTH TWICE A DAY 60 each 5    clonazePAM (KLONOPIN) 1 MG tablet TAKE 1 TABLET BY MOUTH IN  THE EVENING 90 tablet 0    propafenone (RYTHMOL) 150 MG tablet Take 1 tablet by mouth every 8 hours as needed (atrial fibrillation, palpitations) 180 tablet 1    tiotropium (SPIRIVA HANDIHALER) 18 MCG inhalation capsule Inhale 1 capsule into the lungs in the morning.  90 capsule 1    potassium chloride (KLOR-CON M) 20 MEQ extended release tablet TAKE 1 TABLET BY MOUTH  DAILY WITH BREAKFAST 90 tablet 3    albuterol sulfate HFA (PROVENTIL;VENTOLIN;PROAIR) 108 (90 Base) MCG/ACT inhaler INHALE TWO PUFFS BY MOUTH EVERY 4 HOURS AS NEEDED FOR WHEEZING 8.5 g 2    verapamil (CALAN SR) 240 MG extended release tablet TAKE 1 TABLET BY MOUTH AT  NIGHT 90 tablet 3    indapamide (LOZOL) 1.25 MG tablet TAKE 1 TABLET BY MOUTH IN  THE MORNING 90 tablet 3    pantoprazole (PROTONIX) 40 MG tablet TAKE 1 TABLET BY MOUTH IN  THE MORNING BEFORE  BREAKFAST 90 tablet 3    gabapentin (NEURONTIN) 300 MG capsule TAKE 2 CAPSULES BY MOUTH 3  TIMES DAILY 540 capsule 3    montelukast (SINGULAIR) 10 MG tablet TAKE 1 TABLET BY MOUTH AT  NIGHT 90 tablet 3    citalopram (CELEXA) 40 MG tablet TAKE 1 TABLET BY MOUTH  DAILY 90 tablet 3    atorvastatin (LIPITOR) 80 MG tablet TAKE 1 TABLET BY MOUTH  DAILY 90 tablet 3    allopurinol (ZYLOPRIM) 300 MG tablet TAKE 1 TABLET BY MOUTH  DAILY 90 tablet 3    trimethoprim (TRIMPEX) 100 MG tablet Take 1 tablet by mouth every 48 hours 45 tablet 3    acetaminophen (TYLENOL) 500 MG tablet Take 500 mg by mouth every 6 hours as needed for Pain      diphenhydrAMINE-APAP, sleep, (TYLENOL PM EXTRA STRENGTH)  MG tablet Take 1 tablet by mouth nightly as needed for Sleep      Ascorbic Acid (VITAMIN C PO) 1 tablet daily      therapeutic multivitamin-minerals (THERAGRAN-M) tablet Take 1 tablet by mouth nightly       Cranberry 500 MG CAPS Take 1,000 mg by mouth nightly        No current facility-administered medications for this visit. PHYSICAL EXAM   Vitals /70 (Site: Right Upper Arm, Position: Sitting, Cuff Size: Large Adult)   Pulse 75   Ht 5' 4\" (1.626 m)   Wt 266 lb 6.4 oz (120.8 kg)   SpO2 95%   BMI 45.73 kg/m²     Gen Alert, coop, no distress Heart  Rrr,3/6, clear s2   Head NC, AT, no abnorm Abd  Soft, NT, +BS, no mass, no OM   Eyes PER, conj/corn clear Ext  Ext nl, AT, no C/C/E   Nose Nares nl, no drain, NT Pulse 2+ and symmetric   Throat Lips, mucosa, tongue nl Skin Col/text/turg nl, no vis rash/les   Neck S/S, TM, NT, no bruit/JVD Psych Nl mood and affect   Lung CTA-B, unlabored, no DTP Lymph   No cervical or axillary LA   Ch wall NT, no deform Neuro  Nl gross M/S exam      CODING   SCI (51964) - AS, AF,   30-39 minutes preparing to see pt including review hx, tests, consults, perf exam, , educating pt, fam, caregiver, ordering meds/tests/procedures, referring and comm with pcps and other consultants, documenting info in EMR, interpreting results and communicating to fam and coordination of pt care. SCRIBE   Nurse - Scribe Attestation  JANEE, [employer], am scribing for and in the presence of Amy Hinson MD.   Signed, [employer] RN. Doctor - Provider Dick Brooks is working as a scribe for and in the presence of maurisio Hinson MD). Working as a scribe, [employer] may have prepopulated components of this note with my historical  intellectual property under my direct supervision. Any additions to this intellectual property were performed in my presence and at my direction.   Furthermore, the content and accuracy of this note have been reviewed by me Adrianna Lucia MD).   10/20/2022 7:22 AM   ASSESSMENT AND PLAN   *AS    Date EF Detail   Sx     No concerning   DATA   Most recent TTE, LHC reviewed personally   NYHA   I   TTE 12/20  3/22 60%  65% Mod AS MG 23, Mild AI  Mod AS MG 30, mild AI   LHC 5/18  Normal cors, Aorta very tortuous [employer])   MPI 4/18 69% Anteroapical ischemia   Plan     Echo 12 months   *AF  Status parox, Afib ablation 10/20, CV 10/20, CV 10/18, most recent TTE, monitors   Plan Coumadin and management per EP   *CHOL  LDL 89 (6/22)   Plan Counseled on diet/exercise/weight, continue HI/MT statin   *COMPLIANCE  Status Compliant   Plan Discussed compliance with meds/diet/salt/exercise; avoid tob/alc/drugs   *FOLLOWUP  12 months with echo, sooner with sx change

## 2022-10-20 ENCOUNTER — OFFICE VISIT (OUTPATIENT)
Dept: CARDIOLOGY CLINIC | Age: 77
End: 2022-10-20
Payer: MEDICARE

## 2022-10-20 ENCOUNTER — HOSPITAL ENCOUNTER (OUTPATIENT)
Dept: NON INVASIVE DIAGNOSTICS | Age: 77
Discharge: HOME | End: 2022-10-20
Payer: MEDICARE

## 2022-10-20 VITALS
DIASTOLIC BLOOD PRESSURE: 70 MMHG | SYSTOLIC BLOOD PRESSURE: 138 MMHG | WEIGHT: 266.4 LBS | OXYGEN SATURATION: 95 % | BODY MASS INDEX: 45.48 KG/M2 | HEIGHT: 64 IN | HEART RATE: 75 BPM

## 2022-10-20 DIAGNOSIS — I35.0 NONRHEUMATIC AORTIC VALVE STENOSIS: Primary | ICD-10-CM

## 2022-10-20 DIAGNOSIS — Z86.711 HISTORY OF PULMONARY EMBOLISM: ICD-10-CM

## 2022-10-20 DIAGNOSIS — I35.0 NONRHEUMATIC AORTIC VALVE STENOSIS: ICD-10-CM

## 2022-10-20 DIAGNOSIS — I48.91 ATRIAL FIBRILLATION, UNSPECIFIED TYPE (HCC): ICD-10-CM

## 2022-10-20 PROCEDURE — 99214 OFFICE O/P EST MOD 30 MIN: CPT | Performed by: INTERNAL MEDICINE

## 2022-10-20 PROCEDURE — 1123F ACP DISCUSS/DSCN MKR DOCD: CPT | Performed by: INTERNAL MEDICINE

## 2022-10-20 PROCEDURE — 93306 TTE W/DOPPLER COMPLETE: CPT

## 2022-10-31 RX ORDER — CITALOPRAM 40 MG/1
40 TABLET ORAL DAILY
Qty: 90 TABLET | Refills: 3 | Status: SHIPPED | OUTPATIENT
Start: 2022-10-31

## 2022-10-31 RX ORDER — ATORVASTATIN CALCIUM 80 MG/1
80 TABLET, FILM COATED ORAL DAILY
Qty: 90 TABLET | Refills: 3 | Status: SHIPPED | OUTPATIENT
Start: 2022-10-31

## 2022-10-31 RX ORDER — TRIMETHOPRIM 100 MG/1
100 TABLET ORAL
Qty: 45 TABLET | Refills: 3 | Status: SHIPPED | OUTPATIENT
Start: 2022-10-31

## 2022-10-31 RX ORDER — ALLOPURINOL 300 MG/1
300 TABLET ORAL DAILY
Qty: 90 TABLET | Refills: 3 | Status: SHIPPED | OUTPATIENT
Start: 2022-10-31

## 2022-11-02 ENCOUNTER — ANTI-COAG VISIT (OUTPATIENT)
Dept: PHARMACY | Age: 77
End: 2022-11-02
Payer: MEDICARE

## 2022-11-02 DIAGNOSIS — I48.0 PAF (PAROXYSMAL ATRIAL FIBRILLATION) (HCC): Primary | ICD-10-CM

## 2022-11-02 DIAGNOSIS — Z86.711 HISTORY OF PULMONARY EMBOLISM: ICD-10-CM

## 2022-11-02 LAB — INTERNATIONAL NORMALIZATION RATIO, POC: 3.1

## 2022-11-02 PROCEDURE — 85610 PROTHROMBIN TIME: CPT

## 2022-11-02 PROCEDURE — 99211 OFF/OP EST MAY X REQ PHY/QHP: CPT

## 2022-11-02 NOTE — PROGRESS NOTES
Ms. Daxa Villeda is a 69 y.o. y/o female with history of DVT, PE, Afib   She presents today for anticoagulation monitoring and adjustment. Pertinent PMH: HTN, Gastric Banding,CAD, diskectomy with an artifical spinal disk implant in September 2012. Patient Reported Findings:  Yes     No  [x]   []       Patient verifies current dosing regimen as listed- confirms   [x]   []       S/S bleeding/bruising/swelling/SOB states that she has been wheezing more and had more SOB d/t allergies --> has had a few small nose bleeds recently  --> bruises from lovenox injections ---> denies   []   [x]       Blood in urine or stool  []   [x]       Procedures scheduled in the future at this time none upcoming    []   [x]       Missed Dose - denies  []   [x]       Extra Dose - denies   [x]   []       Change in medications  decreased tylenol intake to 1-2 times a day --> still taking propafenone 150 BID --> starting using Spiriva---> stopped propafenone 9/20, was told to take 3 times/day for afib prn --> has not used propafenone in the past week. Stopped spiriva --> has been taking allergy pills recently   [x]   []       Change in health/diet/appetite  Patient states that she feels like she has returned to normal vit k intake --> diet fluctuates causing INR to fluctuate.  Discussed ways to improve consistency with vit k intake  --> states that she has cut back spinach and cabbage intake to twice a week --> no vit k --> has been eating a salad a day --> had less vit k acutely -->has been trying to eat more vit k, salads, broccoli --> has had less vit k than normal -->not much vit k and plans to continue  []   [x]       Change in alcohol use    []   [x]       Change in activity  []   [x]       Hospital admission  []   [x]       Emergency department visit   []   [x]       Other complaints     Clinical Outcomes:  Yes     No  []   [x]       Major bleeding event  []   [x]       Thromboembolic event  Gets warfarin through MD    Duration of warfarin Therapy: indefinite  INR Range:  2.0-3.0     INR is 3.1 today    Since does not plan to resume eating vit k as much, will lower weekly dose   Decrease weekly dose to 7.5 mg daily (4.5% dec)  Encouraged to maintain a consistency of vegetables/salads.   Recheck INR in 3 weeks, 11/23    Does not need PW printed, only card     Patient consent signed 10/28/21    Referring PCP is Santosh Panda  INR (no units)   Date Value   07/15/2022 1.10   10/05/2021 2.50 (H)   05/06/2021 2.80 (H)     INR,(POC) (no units)   Date Value   10/12/2022 2.9   09/29/2022 2.6   09/22/2022 2.8         For Pharmacy Admin Tracking Only    Intervention Detail: Dose Adjustment: 1, reason: Therapy Optimization  Total # of Interventions Recommended: 1  Total # of Interventions Accepted: 1  Time Spent (min): 15

## 2022-11-15 ENCOUNTER — OFFICE VISIT (OUTPATIENT)
Dept: INTERNAL MEDICINE CLINIC | Age: 77
End: 2022-11-15
Payer: MEDICARE

## 2022-11-15 VITALS
DIASTOLIC BLOOD PRESSURE: 80 MMHG | SYSTOLIC BLOOD PRESSURE: 130 MMHG | HEIGHT: 64 IN | HEART RATE: 60 BPM | BODY MASS INDEX: 45.73 KG/M2

## 2022-11-15 DIAGNOSIS — M54.16 LUMBAR RADICULOPATHY: Primary | ICD-10-CM

## 2022-11-15 DIAGNOSIS — F41.9 ANXIETY: ICD-10-CM

## 2022-11-15 DIAGNOSIS — I10 ESSENTIAL HYPERTENSION: ICD-10-CM

## 2022-11-15 PROCEDURE — 3078F DIAST BP <80 MM HG: CPT | Performed by: INTERNAL MEDICINE

## 2022-11-15 PROCEDURE — 99214 OFFICE O/P EST MOD 30 MIN: CPT | Performed by: INTERNAL MEDICINE

## 2022-11-15 PROCEDURE — 3074F SYST BP LT 130 MM HG: CPT | Performed by: INTERNAL MEDICINE

## 2022-11-15 PROCEDURE — 1123F ACP DISCUSS/DSCN MKR DOCD: CPT | Performed by: INTERNAL MEDICINE

## 2022-11-15 NOTE — PROGRESS NOTES
06/16/2022    CO2 25 06/16/2022     Lab Results   Component Value Date    CHOL 170 06/16/2022    CHOL 184 07/12/2021    CHOL 150 10/02/2020     Lab Results   Component Value Date    TRIG 97 06/16/2022    TRIG 132 07/12/2021    TRIG 80 10/02/2020     Lab Results   Component Value Date    HDL 62 (H) 06/16/2022    HDL 68 (H) 07/12/2021    HDL 61 (H) 10/02/2020     Lab Results   Component Value Date    LDLCALC 89 06/16/2022    LDLCALC 90 07/12/2021    LDLCALC 73 10/02/2020     Lab Results   Component Value Date    LABVLDL 19 06/16/2022    LABVLDL 26 07/12/2021    LABVLDL 16 10/02/2020     No results found for: CHOLHDLRATIO   A/P  1. Lumbar radiculopathy  Acute and severe radicular pain with sensory deficit in the right lower extremity. Most likely she has an acute disc herniation with nerve root impingement. An MRI of the lumbar spine will be obtained and if suspicion is confirmed surgical referral will be obtained. - MRI LUMBAR SPINE WO CONTRAST; Future    2. Essential hypertension  Blood pressure is well controlled on current regimen. Continue indapamide 1.25 mg daily and verapamil 240 mg daily. 3. Anxiety  Chronic and stable. Continue clonazepam nightly and citalopram daily.     RTO 3 months

## 2022-11-17 ENCOUNTER — HOSPITAL ENCOUNTER (OUTPATIENT)
Dept: MRI IMAGING | Age: 77
Discharge: HOME | End: 2022-11-17

## 2022-11-17 ENCOUNTER — TELEPHONE (OUTPATIENT)
Dept: INTERNAL MEDICINE CLINIC | Age: 77
End: 2022-11-17

## 2022-11-17 DIAGNOSIS — M54.16 LUMBAR RADICULOPATHY: Primary | ICD-10-CM

## 2022-11-17 DIAGNOSIS — M54.16 LUMBAR RADICULOPATHY: ICD-10-CM

## 2022-11-17 NOTE — TELEPHONE ENCOUNTER
Need to be ordered as STAT. I placed a new order to try to and get patient scheduled. Scheduling dept states the STAT order has to be placed within 24 hours of the testing which is Saturday. This will have to be done on tomorrow because of her insurance. Patient was advised to show on Saturday for MRI and that Dr. Hayden Mansfield will order STAT test tomorrow. Cancelled other 2 tests.

## 2022-11-17 NOTE — TELEPHONE ENCOUNTER
Patient called to see about getting information re: a abena filter that she had placed in 2002. Was told by the facility that they could not do her MRI unless she had maker, , lot number. Patient states this was placed by [facility] and ordered by dr. Tata Luna for a pulmonary embolism in 2002     Catalog number    Lot number [id number]  [facility] location to give them information requested. Was told that this has to be identified somewhere on the card. [facility] and we are not sure how to obtain this information. Was told to contact the MRI dept [phone].

## 2022-11-18 DIAGNOSIS — M54.16 LUMBAR RADICULOPATHY: Primary | ICD-10-CM

## 2022-11-19 ENCOUNTER — HOSPITAL ENCOUNTER (OUTPATIENT)
Dept: MRI IMAGING | Age: 77
Discharge: HOME | End: 2022-11-19
Payer: MEDICARE

## 2022-11-19 DIAGNOSIS — M54.16 LUMBAR RADICULOPATHY: ICD-10-CM

## 2022-11-19 PROCEDURE — 72148 MRI LUMBAR SPINE W/O DYE: CPT

## 2022-11-22 ENCOUNTER — TELEPHONE (OUTPATIENT)
Dept: INTERNAL MEDICINE CLINIC | Age: 77
End: 2022-11-22

## 2022-11-22 NOTE — TELEPHONE ENCOUNTER
----- Message from Oren Arreguin sent at 11/22/2022  9:28 AM EST -----  Subject: Appointment Request    Reason for Call: Established Patient Appointment needed: Routine Existing   Condition Follow Up    QUESTIONS    Reason for appointment request? Available appointments did not meet   patient need     Additional Information for Provider?  Pt needs to schedule an appt with Dr. Arin Gómez to go over her MRI results, she says she does not want to do   injections and would like to see if she can do physical therapy on her own   at the [facility]   ---------------------------------------------------------------------------  --------------  [address]  [phone]; OK to leave message on voicemail  ---------------------------------------------------------------------------  --------------  SCRIPT ANSWERS  JOCE Screen: Keyla Azevedo

## 2022-11-23 ENCOUNTER — ANTI-COAG VISIT (OUTPATIENT)
Dept: PHARMACY | Age: 77
End: 2022-11-23
Payer: MEDICARE

## 2022-11-23 DIAGNOSIS — Z86.711 HISTORY OF PULMONARY EMBOLISM: ICD-10-CM

## 2022-11-23 DIAGNOSIS — I48.0 PAF (PAROXYSMAL ATRIAL FIBRILLATION) (HCC): Primary | ICD-10-CM

## 2022-11-23 LAB — INTERNATIONAL NORMALIZATION RATIO, POC: 2.7

## 2022-11-23 PROCEDURE — 85610 PROTHROMBIN TIME: CPT

## 2022-11-23 PROCEDURE — 99211 OFF/OP EST MAY X REQ PHY/QHP: CPT

## 2022-11-23 NOTE — PROGRESS NOTES
Ms. Juve Gallego is a 69 y.o. y/o female with history of DVT, PE, Afib   She presents today for anticoagulation monitoring and adjustment. Pertinent PMH: HTN, Gastric Banding,CAD, diskectomy with an artifical spinal disk implant in September 2012. Patient Reported Findings:  Yes     No  [x]   []       Patient verifies current dosing regimen as listed- confirms   [x]   []       S/S bleeding/bruising/swelling/SOB states that she has been wheezing more and had more SOB d/t allergies --> has had a few small nose bleeds recently  --> bruises from lovenox injections ---> denies   []   [x]       Blood in urine or stool  []   [x]       Procedures scheduled in the future at this time none upcoming    [x]   []       Missed Dose - only took 5 mg on Fri   []   [x]       Extra Dose - denies   []   [x]       Change in medications  decreased tylenol intake to 1-2 times a day --> still taking propafenone 150 BID --> starting using Spiriva---> stopped propafenone 9/20, was told to take 3 times/day for afib prn --> has not used propafenone in the past week. Stopped spiriva --> has been taking allergy pills recently --> no changes   []   [x]       Change in health/diet/appetite  Patient states that she feels like she has returned to normal vit k intake --> diet fluctuates causing INR to fluctuate.  Discussed ways to improve consistency with vit k intake  --> states that she has cut back spinach and cabbage intake to twice a week --> no vit k --> has been eating a salad a day --> had less vit k acutely -->has been trying to eat more vit k, salads, broccoli --> has had less vit k than normal -->not much vit k and plans to continue --> no changes   []   [x]       Change in alcohol use    []   [x]       Change in activity  []   [x]       Hospital admission  []   [x]       Emergency department visit   []   [x]       Other complaints     Clinical Outcomes:  Yes     No  []   [x]       Major bleeding event  []   [x] Thromboembolic event  Gets warfarin through MD    Duration of warfarin Therapy: indefinite  INR Range:  2.0-3.0     INR is 2.7 today after lowering dose and mistakenly taking 5 mg on fri   Since therapeutic with 5 mg once weekly will lower weekly dose    Decrease weekly dose to 5 mg on Wed and 7.5 mg all other days of the week (4.5% dec)  Encouraged to maintain a consistency of vegetables/salads.   Recheck INR in 3 weeks, 12/14    Does not need PW printed, only card     Patient consent signed 10/28/21    Referring PCP is Kayli Joseph  INR (no units)   Date Value   07/15/2022 1.10   10/05/2021 2.50 (H)   05/06/2021 2.80 (H)     INR,(POC) (no units)   Date Value   11/02/2022 3.1   10/12/2022 2.9   09/29/2022 2.6         For Pharmacy Admin Tracking Only    Intervention Detail: Dose Adjustment: 1, reason: Therapy Optimization  Total # of Interventions Recommended: 1  Total # of Interventions Accepted: 1  Time Spent (min): 15

## 2022-11-28 ENCOUNTER — OFFICE VISIT (OUTPATIENT)
Dept: INTERNAL MEDICINE CLINIC | Age: 77
End: 2022-11-28
Payer: MEDICARE

## 2022-11-28 VITALS
BODY MASS INDEX: 45.14 KG/M2 | OXYGEN SATURATION: 98 % | WEIGHT: 264.4 LBS | HEART RATE: 60 BPM | HEIGHT: 64 IN | SYSTOLIC BLOOD PRESSURE: 138 MMHG | DIASTOLIC BLOOD PRESSURE: 64 MMHG

## 2022-11-28 DIAGNOSIS — M54.16 LUMBAR RADICULOPATHY: Primary | ICD-10-CM

## 2022-11-28 PROCEDURE — 3074F SYST BP LT 130 MM HG: CPT | Performed by: INTERNAL MEDICINE

## 2022-11-28 PROCEDURE — 99213 OFFICE O/P EST LOW 20 MIN: CPT | Performed by: INTERNAL MEDICINE

## 2022-11-28 PROCEDURE — 1123F ACP DISCUSS/DSCN MKR DOCD: CPT | Performed by: INTERNAL MEDICINE

## 2022-11-28 PROCEDURE — 3078F DIAST BP <80 MM HG: CPT | Performed by: INTERNAL MEDICINE

## 2022-11-28 NOTE — PROGRESS NOTES
Chief Complaint   Patient presents with    Other     Discuss treatment plan after MRI   Back pain  HPI  Back pain:  Right side lumbar   Radiates to the knee  She has associated numbness in the right leg. Her leg feels heavy when she walks. Pain has been getting worse since early November. She had a fall in September. +numbness, right leg  Neg for incontinence    EXAM  /64   Pulse 60   Ht 5' 4\" (1.626 m)   Wt 264 lb 6.4 oz (119.9 kg)   SpO2 98%   BMI 45.38 kg/m²    GEN: WN/WD  NEURO: 4+/5 RLE motor function, 5/5 LLE motor function  There is a relative decrease in sensation to light touch in the right lateral thigh      MRI 11/19 showed moderate right L4/5 forminal stenosis and Severe L5/S1 forminal stenosis    A/P  1. Lumbar radiculopathy  Findings are consistent with lumbar radiculopathy affecting L4/5 and L5/S1. She has associated sensory and motor deficit. A referral to neurosurgery has been provided.   If she has trouble getting in soon, she will contact the office so we can expedite her evaluation.  - ISABEL - Ashlee Kessler MD, Neurosurgery (Spine), [facility] - [facility]

## 2022-12-13 ENCOUNTER — ANTI-COAG VISIT (OUTPATIENT)
Dept: PHARMACY | Age: 77
End: 2022-12-13
Payer: MEDICARE

## 2022-12-13 DIAGNOSIS — I48.0 PAF (PAROXYSMAL ATRIAL FIBRILLATION) (HCC): Primary | ICD-10-CM

## 2022-12-13 DIAGNOSIS — Z86.711 HISTORY OF PULMONARY EMBOLISM: ICD-10-CM

## 2022-12-13 LAB — INTERNATIONAL NORMALIZATION RATIO, POC: 2.8

## 2022-12-13 PROCEDURE — 85610 PROTHROMBIN TIME: CPT

## 2022-12-13 PROCEDURE — 99211 OFF/OP EST MAY X REQ PHY/QHP: CPT

## 2022-12-13 NOTE — PROGRESS NOTES
Ms. Magalys Olsen is a 69 y.o. y/o female with history of DVT, PE, Afib   She presents today for anticoagulation monitoring and adjustment. Pertinent PMH: HTN, Gastric Banding,CAD, diskectomy with an artifical spinal disk implant in September 2012. Patient Reported Findings:  Yes     No  [x]   []       Patient verifies current dosing regimen as listed- confirms --> believes that she was taking 10 mg on Wed not 5 mg   [x]   []       S/S bleeding/bruising/swelling/SOB states that she has been wheezing more and had more SOB d/t allergies --> has had a few small nose bleeds recently  --> bruises from lovenox injections ---> denies   []   [x]       Blood in urine or stool  []   [x]       Procedures scheduled in the future at this time none upcoming    []   [x]       Missed Dose - denies  []   [x]       Extra Dose - denies   [x]   []       Change in medications  decreased tylenol intake to 1-2 times a day --> still taking propafenone 150 BID --> starting using Spiriva---> stopped propafenone 9/20, was told to take 3 times/day for afib prn --> has not used propafenone in the past week. Stopped spiriva --> has been taking allergy pills recently --> has been taking more tylenol 6/day   []   [x]       Change in health/diet/appetite  Patient states that she feels like she has returned to normal vit k intake --> diet fluctuates causing INR to fluctuate. Discussed ways to improve consistency with vit k intake  --> states that she has cut back spinach and cabbage intake to twice a week --> no vit k --> has been eating a salad a day --> had less vit k acutely -->has been trying to eat more vit k, salads, broccoli --> has had less vit k than normal -->not much vit k and plans to continue --> no changes   []   [x]       Change in alcohol use    []   [x]       Change in activity  []   [x]       Hospital admission  []   [x]       Emergency department visit   []   [x]       Other complaints     Clinical Outcomes:   Yes No  []   [x]       Major bleeding event  []   [x]       Thromboembolic event  Gets warfarin through MD    Duration of warfarin Therapy: indefinite  INR Range:  2.0-3.0     INR is 2.8 today   Unsure if pt took 10 mg on Wed or 5 mg and has been taking more tylenol   Take 7.5 mg daily   Encouraged to maintain a consistency of vegetables/salads.   Recheck INR in 2 weeks, 12/27    Does not need PW printed, only card --> provided with AVS d/t incorrect dosing    Patient consent signed 10/28/21    Referring PCP is Katharine George  INR (no units)   Date Value   07/15/2022 1.10   10/05/2021 2.50 (H)   05/06/2021 2.80 (H)     INR,(POC) (no units)   Date Value   11/23/2022 2.7   11/02/2022 3.1   10/12/2022 2.9         For Pharmacy Admin Tracking Only    Intervention Detail: Dose Adjustment: 1, reason: Therapy Optimization  Total # of Interventions Recommended: 1  Total # of Interventions Accepted: 1  Time Spent (min): 15

## 2022-12-28 ENCOUNTER — TELEPHONE (OUTPATIENT)
Dept: INTERNAL MEDICINE CLINIC | Age: 77
End: 2022-12-28

## 2022-12-28 ENCOUNTER — APPOINTMENT (OUTPATIENT)
Dept: CT IMAGING | Age: 77
End: 2022-12-28
Payer: MEDICARE

## 2022-12-28 ENCOUNTER — HOSPITAL ENCOUNTER (EMERGENCY)
Age: 77
Discharge: HOME | End: 2022-12-28
Payer: MEDICARE

## 2022-12-28 VITALS
OXYGEN SATURATION: 94 % | TEMPERATURE: 98.2 F | HEIGHT: 64 IN | BODY MASS INDEX: 45.24 KG/M2 | WEIGHT: 265 LBS | SYSTOLIC BLOOD PRESSURE: 150 MMHG | HEART RATE: 61 BPM | DIASTOLIC BLOOD PRESSURE: 88 MMHG

## 2022-12-28 DIAGNOSIS — M79.89 LEG SWELLING: Primary | ICD-10-CM

## 2022-12-28 DIAGNOSIS — Z79.01 ANTICOAGULATED ON COUMADIN: ICD-10-CM

## 2022-12-28 LAB
ANION GAP SERPL CALCULATED.3IONS-SCNC: 10 MMOL/L (ref 3–16)
APTT: 37.3 SEC (ref 23–34.3)
BASOPHILS ABSOLUTE: 0.1 K/UL (ref 0–0.2)
BASOPHILS RELATIVE PERCENT: 0.8 %
BUN BLDV-MCNC: 18 MG/DL (ref 7–20)
CALCIUM SERPL-MCNC: 9.7 MG/DL (ref 8.3–10.6)
CHLORIDE BLD-SCNC: 104 MMOL/L (ref 99–110)
CO2: 27 MMOL/L (ref 21–32)
CREAT SERPL-MCNC: 0.8 MG/DL (ref 0.6–1.2)
EOSINOPHILS ABSOLUTE: 0.4 K/UL (ref 0–0.6)
EOSINOPHILS RELATIVE PERCENT: 4.2 %
GFR SERPL CREATININE-BSD FRML MDRD: >60 ML/MIN/{1.73_M2}
GLUCOSE BLD-MCNC: 115 MG/DL (ref 70–99)
HCT VFR BLD CALC: 37.9 % (ref 36–48)
HEMOGLOBIN: 12.7 G/DL (ref 12–16)
INR BLD: 3.2 (ref 0.87–1.14)
LYMPHOCYTES ABSOLUTE: 3.1 K/UL (ref 1–5.1)
LYMPHOCYTES RELATIVE PERCENT: 29.8 %
MCH RBC QN AUTO: 31.8 PG (ref 26–34)
MCHC RBC AUTO-ENTMCNC: 33.6 G/DL (ref 31–36)
MCV RBC AUTO: 94.7 FL (ref 80–100)
MONOCYTES ABSOLUTE: 0.8 K/UL (ref 0–1.3)
MONOCYTES RELATIVE PERCENT: 8.2 %
NEUTROPHILS ABSOLUTE: 5.9 K/UL (ref 1.7–7.7)
NEUTROPHILS RELATIVE PERCENT: 57 %
PDW BLD-RTO: 14.5 % (ref 12.4–15.4)
PLATELET # BLD: 272 K/UL (ref 135–450)
PMV BLD AUTO: 6.9 FL (ref 5–10.5)
POTASSIUM SERPL-SCNC: 3.8 MMOL/L (ref 3.5–5.1)
PROTHROMBIN TIME: 33 SEC (ref 11.7–14.5)
RBC # BLD: 4 M/UL (ref 4–5.2)
SODIUM BLD-SCNC: 141 MMOL/L (ref 136–145)
WBC # BLD: 10.3 K/UL (ref 4–11)

## 2022-12-28 PROCEDURE — 85025 COMPLETE CBC W/AUTO DIFF WBC: CPT

## 2022-12-28 PROCEDURE — 6370000000 HC RX 637 (ALT 250 FOR IP): Performed by: PHYSICIAN ASSISTANT

## 2022-12-28 PROCEDURE — 36415 COLL VENOUS BLD VENIPUNCTURE: CPT

## 2022-12-28 PROCEDURE — 85610 PROTHROMBIN TIME: CPT

## 2022-12-28 PROCEDURE — 85730 THROMBOPLASTIN TIME PARTIAL: CPT

## 2022-12-28 PROCEDURE — 73700 CT LOWER EXTREMITY W/O DYE: CPT

## 2022-12-28 PROCEDURE — 99284 EMERGENCY DEPT VISIT MOD MDM: CPT

## 2022-12-28 PROCEDURE — 80048 BASIC METABOLIC PNL TOTAL CA: CPT

## 2022-12-28 RX ORDER — ONDANSETRON 4 MG/1
4 TABLET, ORALLY DISINTEGRATING ORAL ONCE
Status: COMPLETED | OUTPATIENT
Start: 2022-12-28 | End: 2022-12-28

## 2022-12-28 RX ORDER — CEPHALEXIN 500 MG/1
500 CAPSULE ORAL 4 TIMES DAILY
Qty: 28 CAPSULE | Refills: 0 | Status: SHIPPED | OUTPATIENT
Start: 2022-12-28 | End: 2023-01-04

## 2022-12-28 RX ORDER — HYDROCODONE BITARTRATE AND ACETAMINOPHEN 5; 325 MG/1; MG/1
2 TABLET ORAL ONCE
Status: COMPLETED | OUTPATIENT
Start: 2022-12-28 | End: 2022-12-28

## 2022-12-28 RX ADMIN — HYDROCODONE BITARTRATE AND ACETAMINOPHEN 2 TABLET: 5; 325 TABLET ORAL at 19:35

## 2022-12-28 RX ADMIN — ONDANSETRON 4 MG: 4 TABLET, ORALLY DISINTEGRATING ORAL at 19:35

## 2022-12-28 ASSESSMENT — LIFESTYLE VARIABLES
HOW OFTEN DO YOU HAVE A DRINK CONTAINING ALCOHOL: MONTHLY OR LESS
HOW MANY STANDARD DRINKS CONTAINING ALCOHOL DO YOU HAVE ON A TYPICAL DAY: 1 OR 2

## 2022-12-28 ASSESSMENT — ENCOUNTER SYMPTOMS
COUGH: 0
DIARRHEA: 0
ABDOMINAL PAIN: 0
NAUSEA: 0
VOMITING: 0
SHORTNESS OF BREATH: 0
RHINORRHEA: 0

## 2022-12-28 ASSESSMENT — PAIN SCALES - GENERAL
PAINLEVEL_OUTOF10: 4
PAINLEVEL_OUTOF10: 7

## 2022-12-28 ASSESSMENT — PAIN DESCRIPTION - LOCATION: LOCATION: LEG

## 2022-12-28 ASSESSMENT — PAIN DESCRIPTION - ORIENTATION: ORIENTATION: RIGHT

## 2022-12-28 NOTE — TELEPHONE ENCOUNTER
Right leg swelling behind her knee. Pt noticed leg pain this morning and later this afternoon felt a sharp pain and feels like a hard lump the size of a softball. Pt is on coumadin and has a hx of pulmonary embolism. No available appts. Please call pt.

## 2022-12-28 NOTE — TELEPHONE ENCOUNTER
Called and let Michelle Kaye know to go to ER. No openings and will most likely need a stat doppler.

## 2022-12-29 ENCOUNTER — TELEPHONE (OUTPATIENT)
Dept: PHARMACY | Age: 77
End: 2022-12-29

## 2022-12-29 ENCOUNTER — APPOINTMENT (OUTPATIENT)
Dept: PHARMACY | Age: 77
End: 2022-12-29
Payer: MEDICARE

## 2022-12-29 NOTE — ED PROVIDER NOTES
[facility]        Pt Name: Maureen Fontana  MRN: [id number]  [place] 1945  Date of evaluation: 12/28/2022  Provider: Amanda Ordaz PA-C  PCP: Soco Tapia MD  Note Started: 7:46 PM EST 12/28/22          ANA. I have evaluated this patient. My supervising physician was available for consultation. CHIEF COMPLAINT       Chief Complaint   Patient presents with    Leg Swelling     Pt woke up this morning and states her right knee was \"hard as a rock\" and was swollen. Pain is 7/10. Pt has hx of PE, and Blood Clots pt currently on Coumadin        HISTORY OF PRESENT ILLNESS   (Location, Timing/Onset, Context/Setting, Quality, Duration, Modifying Factors, Severity, Associated Signs and Symptoms)  Note limiting factors. Chief Complaint: Swelling to the right lower leg    Maureen Fontana is a 69 y.o. female who presents to the emergency department today for evaluation for swelling to the right lower leg. The patient states that she woke up this morning, she states that just below her knee, she states that she noticed a hardened and enlarged area. The patient states that she does have a history of PE, she states that she is currently anticoagulated on Coumadin, and she states that she also has an IVC filter in place. The patient states that she has discomfort with palpation of the area, and she is currently rating her pain as a 7/10, and she otherwise denies any known alleviating or remitting factors. Patient is still able to ambulate without difficulty. She denies falling or injuring herself in any way. She has no chest pain. No shortness of breath. No fever or chills. No nausea or vomiting, no numbness, tingling or weakness, no other complaints or symptoms    Nursing Notes were all reviewed and agreed with or any disagreements were addressed in the HPI.     REVIEW OF SYSTEMS    (2-9 systems for level 4, 10 or more for level 5)     Review of Systems   Constitutional:  Negative for activity change, appetite change, chills and fever. HENT:  Negative for congestion and rhinorrhea. Respiratory:  Negative for cough and shortness of breath. Cardiovascular:  Negative for chest pain. Gastrointestinal:  Negative for abdominal pain, diarrhea, nausea and vomiting. Genitourinary:  Negative for difficulty urinating, dysuria and hematuria. Musculoskeletal:  Positive for myalgias. Neurological:  Negative for weakness and numbness. Positives and Pertinent negatives as per HPI. Except as noted above in the ROS, all other systems were reviewed and negative. PAST MEDICAL HISTORY     Past Medical History:   Diagnosis Date    A-fib [facility])     Allergic     Anxiety 2/2/2017    Aortic stenosis     Arthritis     Asthma     Back pain     Blood circulation, collateral     legs    CAD (coronary artery disease)     Depression     GERD (gastroesophageal reflux disease)     Gout     Hx of blood clots     Hypercholesteremia     Hypertension     Movement disorder     7 discs ruptured    Movement disorder     knee replacements    Obesity     Pulmonary emboli (HCC)     Unspecified sleep apnea     uses cpap    Urinary tract infection, chronic     UTI (urinary tract infection)          SURGICAL HISTORY     Past Surgical History:   Procedure Laterality Date    ABLATION OF DYSRHYTHMIC FOCUS      ANKLE FUSION  2010    right, [facility]    ARM SURGERY Right 7/15/2022    . performed by Sara Moralez MD at [place] [address]  09/14/2012    lumbar fusion L-4, L-5 atrificial disc  [facility] Dr. Cassandra Montano AND DRAINAGE  5-7-11    incision, drainage and debridement of left knee and application of wound vac.     BREAST BIOPSY  08/2018    neg    CARDIAC SURGERY      CARDIOVERSION      CARPAL TUNNEL RELEASE Right 7/15/2022    CARPAL TUNNEL RELEASE OF RIGHT WRIST, RIGHT IN SITU CUBITAL TUNNEL RELEASE VERSUS ULNAR NERVE TRANSPOSITION AT THE ELBOW performed by Maria D Polk MD at [address] [facility]      EYE SURGERY      JOINT REPLACEMENT      bilat knees    JOINT REPLACEMENT  2003    left, [facility]    JOINT REPLACEMENT  2005    right, [facility]    OTHER SURGICAL HISTORY  12-    laparscopic adjustable gastric restrictive procedure    REVISION TOTAL KNEE ARTHROPLASTY  2006    [facility]    SKIN FULL GRAFT  2011    right, [address]    UPPER GASTROINTESTINAL ENDOSCOPY  10-    VENA CAVA FILTER PLACEMENT           [address]       Discharge Medication List as of 12/28/2022  9:33 PM        CONTINUE these medications which have NOT CHANGED    Details   atorvastatin (LIPITOR) 80 MG tablet TAKE 1 TABLET BY MOUTH  DAILY, Disp-90 tablet, R-3Requesting 1 year supplyNormal      allopurinol (ZYLOPRIM) 300 MG tablet TAKE 1 TABLET BY MOUTH  DAILY, Disp-90 tablet, R-3Requesting 1 year supplyNormal      citalopram (CELEXA) 40 MG tablet TAKE 1 TABLET BY MOUTH  DAILY, Disp-90 tablet, R-3Requesting 1 year supplyNormal      trimethoprim (TRIMPEX) 100 MG tablet TAKE 1 TABLET BY MOUTH  EVERY 48 HOURS, Disp-45 tablet, R-3Normal      warfarin (COUMADIN) 5 MG tablet TAKE daily as directed to maintain INR 2-3, Disp-124 tablet, R-3Requesting 1 year supplyNormal      fluticasone-salmeterol (ADVAIR) 250-50 MCG/ACT AEPB diskus inhaler INHALE ONE PUFF BY MOUTH TWICE A DAY, Disp-60 each, R-5Normal      clonazePAM (KLONOPIN) 1 MG tablet TAKE 1 TABLET BY MOUTH IN  THE EVENING, Disp-90 tablet, R-0Normal      propafenone (RYTHMOL) 150 MG tablet Take 1 tablet by mouth every 8 hours as needed (atrial fibrillation, palpitations), Disp-180 tablet, R-1Requesting 1 year supplyNormal      potassium chloride (KLOR-CON M) 20 MEQ extended release tablet TAKE 1 TABLET BY MOUTH  DAILY WITH BREAKFAST, Disp-90 tablet, R-3Requesting 1 year supplyNormal      albuterol sulfate HFA (PROVENTIL;VENTOLIN;PROAIR) 108 (90 Base) MCG/ACT inhaler INHALE TWO PUFFS BY MOUTH EVERY 4 HOURS AS NEEDED FOR WHEEZING, Disp-8.5 g, R-2Normal      verapamil (CALAN SR) 240 MG extended release tablet TAKE 1 TABLET BY MOUTH AT  NIGHT, Disp-90 tablet, R-3Requesting 1 year supplyNormal      indapamide (LOZOL) 1.25 MG tablet TAKE 1 TABLET BY MOUTH IN  THE MORNING, Disp-90 tablet, R-3Requesting 1 year supplyNormal      pantoprazole (PROTONIX) 40 MG tablet TAKE 1 TABLET BY MOUTH IN  THE MORNING BEFORE  BREAKFAST, Disp-90 tablet, R-3Requesting 1 year supplyNormal      gabapentin (NEURONTIN) 300 MG capsule TAKE 2 CAPSULES BY MOUTH 3  TIMES DAILY, Disp-540 capsule, R-3Normal      montelukast (SINGULAIR) 10 MG tablet TAKE 1 TABLET BY MOUTH AT  NIGHT, Disp-90 tablet, R-3Requesting 1 year supplyNormal      acetaminophen (TYLENOL) 500 MG tablet Take 500 mg by mouth every 6 hours as needed for PainHistorical Med      diphenhydrAMINE-APAP, sleep, (TYLENOL PM EXTRA STRENGTH)  MG tablet Take 1 tablet by mouth nightly as needed for SleepHistorical Med      Ascorbic Acid (VITAMIN C PO) 1 tablet dailyHistorical Med      therapeutic multivitamin-minerals (THERAGRAN-M) tablet Take 1 tablet by mouth nightly Historical Med      Cranberry 500 MG CAPS Take 1,000 mg by mouth nightly Historical Med               ALLERGIES     Belsomra [suvorexant], Avelox [moxifloxacin hcl in nacl], Levofloxacin, and Sulfa antibiotics    FAMILYHISTORY       Family History   Problem Relation Age of Onset    High Blood Pressure Mother     Depression Mother     Stroke Father     High Blood Pressure Father     Asthma Father     Arthritis Father     Heart Disease Father     Breast Cancer Sister     High Cholesterol Neg Hx           SOCIAL HISTORY       Social History     Tobacco Use    Smoking status: Former     Packs/day: 0.50     Years: 4.00     Pack years: 2.00     Types: Cigarettes     Quit date: 1965     Years since quittin.0    Smokeless tobacco: Never   Vaping Use    Vaping Use: Never used   Substance Use Topics    Alcohol use: Not Currently    Drug use: No       SCREENINGS        [place] Coma Scale  Eye Opening: Spontaneous  Best Verbal Response: Oriented  Best Motor Response: Obeys commands  [place] Coma Scale Score: 15                CIWA Assessment  BP: (!) 150/88  Heart Rate: 61             PHYSICAL EXAM    (up to 7 for level 4, 8 or more for level 5)     ED Triage Vitals [12/28/22 1851]   BP Temp Temp src Heart Rate Resp SpO2 Height Weight   (!) 180/74 98.2 °F (36.8 °C) -- 65 -- -- 5' 4\" (1.626 m) 265 lb (120.2 kg)       Physical Exam  Vitals and nursing note reviewed. Constitutional:       Appearance: She is well-developed. She is not diaphoretic. HENT:      Head: Normocephalic and atraumatic. Right Ear: External ear normal.      Left Ear: External ear normal.      Nose: Nose normal.   Eyes:      General:         Right eye: No discharge. Left eye: No discharge. Neck:      Trachea: No tracheal deviation. Cardiovascular:      Rate and Rhythm: Normal rate and regular rhythm. Pulses: Normal pulses. Heart sounds: Murmur heard. Pulmonary:      Effort: Pulmonary effort is normal. No respiratory distress. Breath sounds: Normal breath sounds. No wheezing or rales. Musculoskeletal:         General: Normal range of motion. Cervical back: Normal range of motion and neck supple. Comments: There is no tenderness to palpation to the right knee. There is no overlying erythema, ecchymosis or warmth. Distal to the knee, not involving the knee joint there is a area of edema noted, nontender, soft fluid noted, there is overlying ecchymosis. There is no tenderness, edema noted over the posterior calf, negative Homans' sign. Skin:     General: Skin is warm and dry. Neurological:      Mental Status: She is alert and oriented to person, place, and time.    Psychiatric:         Behavior: Behavior normal. DIAGNOSTIC RESULTS   LABS:    Labs Reviewed   BASIC METABOLIC PANEL - Abnormal; Notable for the following components:       Result Value    Glucose 115 (*)     All other components within normal limits   PROTIME-INR - Abnormal; Notable for the following components:    Protime 33.0 (*)     INR 3.20 (*)     All other components within normal limits   APTT - Abnormal; Notable for the following components:    aPTT 37.3 (*)     All other components within normal limits   CBC WITH AUTO DIFFERENTIAL       When ordered only abnormal lab results are displayed. All other labs were within normal range or not returned as of this dictation. EKG: When ordered, EKG's are interpreted by the Emergency Department Physician in the absence of a cardiologist.  Please see their note for interpretation of EKG. RADIOLOGY:   Non-plain film images such as CT, Ultrasound and MRI are read by the radiologist. Plain radiographic images are visualized and preliminarily interpreted by the ED Provider with the below findings:        Interpretation per the Radiologist below, if available at the time of this note:    CT KNEE RIGHT WO CONTRAST   Final Result   1. Nonspecific subcutaneous edema at the level of the proximal tibia most   pronounced along the anteromedial aspect. This may represent reactive edema   or cellulitis. No drainable fluid collection. 2. Status post right total knee arthroplasty with mild interface widening   about the posteromedial aspect of the tibial component suggesting loosening. In the appropriate clinical setting infection is not excluded. 3. No acute osseous abnormality identified. No results found. No results found.     PROCEDURES   Unless otherwise noted below, none     Procedures    CRITICAL CARE TIME       CONSULTS:  None      EMERGENCY DEPARTMENT COURSE and DIFFERENTIAL DIAGNOSIS/MDM:   Vitals:    Vitals:    12/28/22 1851 12/28/22 2130   BP: (!) 180/74 (!) 150/88   Pulse: 65 61   Temp: 98.2 °F (36.8 °C)    SpO2:  94%   Weight: 265 lb (120.2 kg)    Height: 5' 4\" (1.626 m)        Patient was given the following medications:  Medications   HYDROcodone-acetaminophen (NORCO) 5-325 MG per tablet 2 tablet (2 tablets Oral Given 12/28/22 1935)   ondansetron (ZOFRAN-ODT) disintegrating tablet 4 mg (4 mg Oral Given 12/28/22 1935)         Is this patient to be included in the SEP-1 Core Measure due to severe sepsis or septic shock? No   Exclusion criteria - the patient is NOT to be included for SEP-1 Core Measure due to: Infection is not suspected    Briefly, this is a 73-year-old female who presents to the emergency department today for evaluation for swelling below the right knee which was first noticed this morning. No fall or injury. She is on Coumadin due to history of DVT/PE. On examination, she does have some edema noted to the right lower leg, just distal to the right knee not involving the knee joint itself. Fluid does appear to be soft. There is no overlying erythema, edema, ecchymosis or warmth. CBC shows no evidence of leukocytosis or anemia. BMP is unremarkable. INR 3.2. CT of the right knee shows a nonspecific subcutaneous edema at the level of the proximal tibia likely reactive edema, versus cellulitis. Did discuss with the patient that symptoms today are likely due to hematoma/seroma. I do not suspect cellulitis as she has no erythema, warmth over the area. She has no leukocytosis and no fever. However with the upcoming holiday, will prescribe a rescue prescription of Keflex, patient was instructed to start taking this if she noticed increasing pain, redness or warmth. She is otherwise routine follow-up with her primary care physician next week. Ace wrap applied. Strict return precautions given, the patient was understanding is agreeable plan. Stable for discharge.    Results for orders placed or performed during the hospital encounter of 96/31/65   Basic Metabolic Panel   Result Value Ref Range    Sodium 141 136 - 145 mmol/L    Potassium 3.8 3.5 - 5.1 mmol/L    Chloride 104 99 - 110 mmol/L    CO2 27 21 - 32 mmol/L    Anion Gap 10 3 - 16    Glucose 115 (H) 70 - 99 mg/dL    BUN 18 7 - 20 mg/dL    Creatinine 0.8 0.6 - 1.2 mg/dL    Est, Glom Filt Rate >60 >60    Calcium 9.7 8.3 - 10.6 mg/dL   CBC with Auto Differential   Result Value Ref Range    WBC 10.3 4.0 - 11.0 K/uL    RBC 4.00 4.00 - 5.20 M/uL    Hemoglobin 12.7 12.0 - 16.0 g/dL    Hematocrit 37.9 36.0 - 48.0 %    MCV 94.7 80.0 - 100.0 fL    MCH 31.8 26.0 - 34.0 pg    MCHC 33.6 31.0 - 36.0 g/dL    RDW 14.5 12.4 - 15.4 %    Platelets 255 226 - 485 K/uL    MPV 6.9 5.0 - 10.5 fL    Neutrophils % 57.0 %    Lymphocytes % 29.8 %    Monocytes % 8.2 %    Eosinophils % 4.2 %    Basophils % 0.8 %    Neutrophils Absolute 5.9 1.7 - 7.7 K/uL    Lymphocytes Absolute 3.1 1.0 - 5.1 K/uL    Monocytes Absolute 0.8 0.0 - 1.3 K/uL    Eosinophils Absolute 0.4 0.0 - 0.6 K/uL    Basophils Absolute 0.1 0.0 - 0.2 K/uL   Protime-INR   Result Value Ref Range    Protime 33.0 (H) 11.7 - 14.5 sec    INR 3.20 (H) 0.87 - 1.14   APTT   Result Value Ref Range    aPTT 37.3 (H) 23.0 - 34.3 sec       I estimate there is LOW risk for COMPARTMENT SYNDROME, DEEP VENOUS THROMBOSIS, SEPTIC ARTHRITIS, TENDON OR NEUROVASCULAR INJURY, thus I consider the discharge disposition reasonable. Ariana Anderson and I have discussed the diagnosis and risks, and we agree with discharging home to follow-up with their primary doctor or the referral orthopedist. We also discussed returning to the Emergency Department immediately if new or worsening symptoms occur. We have discussed the symptoms which are most concerning (e.g., changing or worsening pain, numbness, weakness) that necessitate immediate return. FINAL IMPRESSION      1. Leg swelling    2.  Anticoagulated on Coumadin          DISPOSITION/PLAN   DISPOSITION Decision To Discharge 12/28/2022 09:34:05 PM      PATIENT REFERRED TO:  Klaudia Jacobs MD  [address]  [place] [address]  [phone]    Schedule an appointment as soon as possible for a visit in 2 days      [facility] Emergency Department  [address] [place] [address]  [phone]    As needed, If symptoms worsen    DISCHARGE MEDICATIONS:  Discharge Medication List as of 12/28/2022  9:33 PM        START taking these medications    Details   cephALEXin (KEFLEX) 500 MG capsule Take 1 capsule by mouth 4 times daily for 7 days, Disp-28 capsule, R-0Normal             DISCONTINUED MEDICATIONS:  Discharge Medication List as of 12/28/2022  9:33 PM                 (Please note that portions of this note were completed with a voice recognition program.  Efforts were made to edit the dictations but occasionally words are mis-transcribed.)    Libertad Villegas PA-C (electronically signed)           Libertad Villegas PA-C  12/29/22 0147

## 2022-12-29 NOTE — TELEPHONE ENCOUNTER
Pt called to cancel apt, was in ER yesterday for leg pain. INR was 3.2. Pt got Keflex for cellulitis. States may not need it unless it gets hot. Pt states the last visit she thought she messed up her dose (took 10mg on Wed not 5mg) so her dose was changed to 7.5mg daily. Pt now believes that she took the right dose and that 7.5mg daily is too high for her now. Wishes to return to taking 5mg on Wed and 7.5mg all other days. Will have her take  5mg tonight then return to taking 5mg Wed and 7.5mg AOD and RTC in 2 weeks.     Doni Duenas, PharmD, [facility]

## 2022-12-30 RX ORDER — INDAPAMIDE 1.25 MG/1
TABLET, FILM COATED ORAL
Qty: 90 TABLET | Refills: 3 | Status: SHIPPED | OUTPATIENT
Start: 2022-12-30

## 2022-12-30 RX ORDER — GABAPENTIN 300 MG/1
CAPSULE ORAL
Qty: 540 CAPSULE | Refills: 3 | Status: SHIPPED | OUTPATIENT
Start: 2022-12-30 | End: 2023-06-28

## 2022-12-30 RX ORDER — PANTOPRAZOLE SODIUM 40 MG/1
TABLET, DELAYED RELEASE ORAL
Qty: 90 TABLET | Refills: 3 | Status: SHIPPED | OUTPATIENT
Start: 2022-12-30

## 2023-01-01 NOTE — TELEPHONE ENCOUNTER
CARDIAC CLEARANCE     What type of procedure are you having? Carpal Tunnel    Which physician is performing your procedure? Dr. Kyra Rodarte    When is your procedure scheduled for? 07/14    Where are you having this procedure? [facility] location    Are you taking Blood Thinners? Yes   If so what? (Name/dose/frequesncy)  Coumadin - 5mg     Does the surgeon want you to stop your blood thinner? If so for how long?      Phone Number and Contact Name for Physicians office:  [phone] -  Yael Junior  Fax number to send information: [phone]

## 2023-01-11 DIAGNOSIS — F41.9 ANXIETY: ICD-10-CM

## 2023-01-11 NOTE — TELEPHONE ENCOUNTER
Medication:   Requested Prescriptions     Pending Prescriptions Disp Refills    montelukast (SINGULAIR) 10 MG tablet [Pharmacy Med Name: Montelukast Sodium 10 MG Oral Tablet] 90 tablet 3     Sig: TAKE 1 TABLET BY MOUTH AT  NIGHT       Last Filled:  11/16/22    Patient Phone Number: [phone] (home)     Last appt: 11/28/2022   Next appt: 3/7/2023

## 2023-01-12 ENCOUNTER — ANTI-COAG VISIT (OUTPATIENT)
Dept: PHARMACY | Age: 78
End: 2023-01-12
Payer: MEDICARE

## 2023-01-12 DIAGNOSIS — Z86.711 HISTORY OF PULMONARY EMBOLISM: ICD-10-CM

## 2023-01-12 DIAGNOSIS — I48.0 PAF (PAROXYSMAL ATRIAL FIBRILLATION) (HCC): Primary | ICD-10-CM

## 2023-01-12 LAB — INTERNATIONAL NORMALIZATION RATIO, POC: 2.5

## 2023-01-12 PROCEDURE — 85610 PROTHROMBIN TIME: CPT

## 2023-01-12 PROCEDURE — 99211 OFF/OP EST MAY X REQ PHY/QHP: CPT

## 2023-01-12 RX ORDER — MONTELUKAST SODIUM 10 MG/1
TABLET ORAL
Qty: 90 TABLET | Refills: 3 | Status: SHIPPED | OUTPATIENT
Start: 2023-01-12

## 2023-01-12 NOTE — PROGRESS NOTES
Ms. Daxa Villeda is a 69 y.o. y/o female with history of DVT, PE, Afib   She presents today for anticoagulation monitoring and adjustment. Pertinent PMH: HTN, Gastric Banding,CAD, diskectomy with an artifical spinal disk implant in September 2012. Patient Reported Findings:  Yes     No  [x]   []       Patient verifies current dosing regimen as listed- confirms --> believes that she was taking 10 mg on Wed not 5 mg --> verified taking 5 mg on Wed and 7.5 mg all other days of the week   [x]   []       S/S bleeding/bruising/swelling/SOB states that she has been wheezing more and had more SOB d/t allergies --> has had a few small nose bleeds recently  --> bruises from lovenox injections ---> denies   []   [x]       Blood in urine or stool  []   [x]       Procedures scheduled in the future at this time none upcoming    []   [x]       Missed Dose - denies  []   [x]       Extra Dose - denies   []   [x]       Change in medications  decreased tylenol intake to 1-2 times a day --> still taking propafenone 150 BID --> starting using Spiriva---> stopped propafenone 9/20, was told to take 3 times/day for afib prn --> has not used propafenone in the past week. Stopped spiriva --> has been taking allergy pills recently --> has been taking more tylenol 6/day --> no changes, continues with tylenol   []   [x]       Change in health/diet/appetite  Patient states that she feels like she has returned to normal vit k intake --> diet fluctuates causing INR to fluctuate.  Discussed ways to improve consistency with vit k intake  --> states that she has cut back spinach and cabbage intake to twice a week --> no vit k --> has been eating a salad a day --> had less vit k acutely -->has been trying to eat more vit k, salads, broccoli --> has had less vit k than normal -->not much vit k and plans to continue --> no changes    []   [x]       Change in alcohol use    []   [x]       Change in activity  []   [x]       Hospital admission  []   [x]       Emergency department visit  ED 12/28 for leg pain, INR 3.2 and was prescribed keflex. []   [x]       Other complaints     Clinical Outcomes:  Yes     No  []   [x]       Major bleeding event  []   [x]       Thromboembolic event  Gets warfarin through MD    Duration of warfarin Therapy: indefinite  INR Range:  2.0-3.0     Pt call 12/29: Pt states the last visit she thought she messed up her dose (took 10mg on Wed not 5mg) so her dose was changed to 7.5mg daily. Pt now believes that she took the right dose and that 7.5mg daily is too high for her now. Wishes to return to taking 5mg on Wed and 7.5mg all other days. Will have her take  5mg tonight then return to taking 5mg Wed and 7.5mg AOD and RTC in 2 weeks. INR is 2.5 today   Continue weekly dose of 5 mg on Wed and 7.5 mg all other days of the week   Encouraged to maintain a consistency of vegetables/salads.   Recheck INR in 4 weeks, 2/9    Does not need PW printed, only card      Patient consent signed 10/28/21    Referring PCP is Charity Meyer  INR (no units)   Date Value   12/28/2022 3.20 (H)   07/15/2022 1.10   10/05/2021 2.50 (H)     INR,(POC) (no units)   Date Value   12/13/2022 2.8   11/23/2022 2.7   11/02/2022 3.1       For Pharmacy Admin Tracking Only    Total # of Interventions Recommended: 0  Total # of Interventions Accepted: 0  Time Spent (min): 15

## 2023-01-13 RX ORDER — CLONAZEPAM 1 MG/1
TABLET ORAL
Qty: 90 TABLET | Refills: 0 | Status: SHIPPED | OUTPATIENT
Start: 2023-01-13 | End: 2023-04-13

## 2023-02-09 ENCOUNTER — ANTI-COAG VISIT (OUTPATIENT)
Dept: PHARMACY | Age: 78
End: 2023-02-09
Payer: MEDICARE

## 2023-02-09 DIAGNOSIS — I48.0 PAF (PAROXYSMAL ATRIAL FIBRILLATION) (HCC): Primary | ICD-10-CM

## 2023-02-09 DIAGNOSIS — Z86.711 HISTORY OF PULMONARY EMBOLISM: ICD-10-CM

## 2023-02-09 LAB — INTERNATIONAL NORMALIZATION RATIO, POC: 2.8

## 2023-02-09 PROCEDURE — 85610 PROTHROMBIN TIME: CPT

## 2023-02-09 PROCEDURE — 99211 OFF/OP EST MAY X REQ PHY/QHP: CPT

## 2023-02-09 NOTE — PROGRESS NOTES
Ms. Madyson Roman is a 69 y.o. y/o female with history of DVT, PE, Afib   She presents today for anticoagulation monitoring and adjustment. Pertinent PMH: HTN, Gastric Banding,CAD, diskectomy with an artifical spinal disk implant in September 2012. Patient Reported Findings:  Yes     No  [x]   []       Patient verifies current dosing regimen as listed- confirms --> believes that she was taking 10 mg on Wed not 5 mg --> verified taking 5 mg on Wed and 7.5 mg all other days of the week   []   [x]       S/S bleeding/bruising/swelling/SOB states that she has been wheezing more and had more SOB d/t allergies --> has had a few small nose bleeds recently  --> bruises from lovenox injections ---> denies   []   [x]       Blood in urine or stool  []   [x]       Procedures scheduled in the future at this time none upcoming    []   [x]       Missed Dose - denies  []   [x]       Extra Dose - denies   [x]   []       Change in medications  decreased tylenol intake to 1-2 times a day --> still taking propafenone 150 BID --> starting using Spiriva---> stopped propafenone 9/20, was told to take 3 times/day for afib prn --> has not used propafenone in the past week. Stopped spiriva --> has been taking allergy pills recently --> has been taking more tylenol 6/day --> no changes, continues with tylenol --> stopped cranberry capsule 3-4 weeks ago   []   [x]       Change in health/diet/appetite  Patient states that she feels like she has returned to normal vit k intake --> diet fluctuates causing INR to fluctuate.  Discussed ways to improve consistency with vit k intake  --> states that she has cut back spinach and cabbage intake to twice a week --> no vit k --> has been eating a salad a day --> had less vit k acutely -->has been trying to eat more vit k, salads, broccoli --> has had less vit k than normal -->not much vit k and plans to continue --> no changes    []   [x]       Change in alcohol use    []   [x]       Change in activity  []   [x]       Hospital admission  []   [x]       Emergency department visit  ED 12/28 for leg pain, INR 3.2 and was prescribed keflex. []   [x]       Other complaints     Clinical Outcomes:  Yes     No  []   [x]       Major bleeding event  []   [x]       Thromboembolic event  Gets warfarin through MD    Duration of warfarin Therapy: indefinite  INR Range:  2.0-3.0     INR is 2.8 today   Continue weekly dose of 5 mg on Wed and 7.5 mg all other days of the week   Encouraged to maintain a consistency of vegetables/salads.   Recheck INR in 4 weeks, 3/9    Does not need PW printed, only card      Patient consent signed 1/12/23    Referring PCP is Mavis Gillespie  INR (no units)   Date Value   12/28/2022 3.20 (H)   07/15/2022 1.10   10/05/2021 2.50 (H)     INR,(POC) (no units)   Date Value   01/12/2023 2.5   12/13/2022 2.8   11/23/2022 2.7       For Pharmacy Admin Tracking Only    Total # of Interventions Recommended: 0  Total # of Interventions Accepted: 0  Time Spent (min): 15

## 2023-02-20 ENCOUNTER — OFFICE VISIT (OUTPATIENT)
Dept: INTERNAL MEDICINE CLINIC | Age: 78
End: 2023-02-20

## 2023-02-20 ENCOUNTER — TELEPHONE (OUTPATIENT)
Dept: PHARMACY | Age: 78
End: 2023-02-20

## 2023-02-20 ENCOUNTER — TELEPHONE (OUTPATIENT)
Dept: INTERNAL MEDICINE CLINIC | Age: 78
End: 2023-02-20

## 2023-02-20 VITALS
BODY MASS INDEX: 45.07 KG/M2 | WEIGHT: 264 LBS | DIASTOLIC BLOOD PRESSURE: 88 MMHG | HEIGHT: 64 IN | HEART RATE: 78 BPM | OXYGEN SATURATION: 95 % | SYSTOLIC BLOOD PRESSURE: 138 MMHG

## 2023-02-20 DIAGNOSIS — R35.0 URINE FREQUENCY: ICD-10-CM

## 2023-02-20 DIAGNOSIS — N30.00 ACUTE CYSTITIS WITHOUT HEMATURIA: Primary | ICD-10-CM

## 2023-02-20 RX ORDER — CIPROFLOXACIN 500 MG/1
500 TABLET, FILM COATED ORAL 2 TIMES DAILY
Qty: 10 TABLET | Refills: 0 | Status: SHIPPED | OUTPATIENT
Start: 2023-02-20 | End: 2023-02-25

## 2023-02-20 SDOH — ECONOMIC STABILITY: FOOD INSECURITY: WITHIN THE PAST 12 MONTHS, THE FOOD YOU BOUGHT JUST DIDN'T LAST AND YOU DIDN'T HAVE MONEY TO GET MORE.: NEVER TRUE

## 2023-02-20 SDOH — ECONOMIC STABILITY: INCOME INSECURITY: HOW HARD IS IT FOR YOU TO PAY FOR THE VERY BASICS LIKE FOOD, HOUSING, MEDICAL CARE, AND HEATING?: SOMEWHAT HARD

## 2023-02-20 SDOH — ECONOMIC STABILITY: FOOD INSECURITY: WITHIN THE PAST 12 MONTHS, YOU WORRIED THAT YOUR FOOD WOULD RUN OUT BEFORE YOU GOT MONEY TO BUY MORE.: NEVER TRUE

## 2023-02-20 SDOH — ECONOMIC STABILITY: HOUSING INSECURITY
IN THE LAST 12 MONTHS, WAS THERE A TIME WHEN YOU DID NOT HAVE A STEADY PLACE TO SLEEP OR SLEPT IN A SHELTER (INCLUDING NOW)?: NO

## 2023-02-20 SDOH — ECONOMIC STABILITY: TRANSPORTATION INSECURITY
IN THE PAST 12 MONTHS, HAS LACK OF TRANSPORTATION KEPT YOU FROM MEETINGS, WORK, OR FROM GETTING THINGS NEEDED FOR DAILY LIVING?: NO

## 2023-02-20 ASSESSMENT — ENCOUNTER SYMPTOMS: BACK PAIN: 1

## 2023-02-20 NOTE — TELEPHONE ENCOUNTER
Pt calling needing to be seen for possible UTI--achy in back--buring when urinates--urine very cloudy---can someone work her int? Please call pt. Thanks.

## 2023-02-20 NOTE — PROGRESS NOTES
SUBJECTIVE:    Patient ID: Daniel Arnold is a 69 y.o. female. CC: concern for UTI    HPI: The patient presents to the office for an acute visit. Burning with urination and cloudiness started 2 weeks ago. Right sided back pain. No fever  No hematuria. She took extra trimpex per her urologist instructions. This helped although urine remained cloudy.       Current Outpatient Medications   Medication Sig Dispense Refill    clonazePAM (KLONOPIN) 1 MG tablet TAKE 1 TABLET BY MOUTH IN  THE EVENING 90 tablet 0    montelukast (SINGULAIR) 10 MG tablet TAKE 1 TABLET BY MOUTH AT  NIGHT 90 tablet 3    gabapentin (NEURONTIN) 300 MG capsule TAKE 2 CAPSULES BY MOUTH 3  TIMES DAILY 540 capsule 3    indapamide (LOZOL) 1.25 MG tablet TAKE 1 TABLET BY MOUTH IN  THE MORNING 90 tablet 3    pantoprazole (PROTONIX) 40 MG tablet TAKE 1 TABLET BY MOUTH IN  THE MORNING BEFORE  BREAKFAST 90 tablet 3    atorvastatin (LIPITOR) 80 MG tablet TAKE 1 TABLET BY MOUTH  DAILY 90 tablet 3    allopurinol (ZYLOPRIM) 300 MG tablet TAKE 1 TABLET BY MOUTH  DAILY 90 tablet 3    citalopram (CELEXA) 40 MG tablet TAKE 1 TABLET BY MOUTH  DAILY 90 tablet 3    trimethoprim (TRIMPEX) 100 MG tablet TAKE 1 TABLET BY MOUTH  EVERY 48 HOURS 45 tablet 3    warfarin (COUMADIN) 5 MG tablet TAKE daily as directed to maintain INR 2-3 124 tablet 3    fluticasone-salmeterol (ADVAIR) 250-50 MCG/ACT AEPB diskus inhaler INHALE ONE PUFF BY MOUTH TWICE A DAY 60 each 5    propafenone (RYTHMOL) 150 MG tablet Take 1 tablet by mouth every 8 hours as needed (atrial fibrillation, palpitations) 180 tablet 1    potassium chloride (KLOR-CON M) 20 MEQ extended release tablet TAKE 1 TABLET BY MOUTH  DAILY WITH BREAKFAST 90 tablet 3    albuterol sulfate HFA (PROVENTIL;VENTOLIN;PROAIR) 108 (90 Base) MCG/ACT inhaler INHALE TWO PUFFS BY MOUTH EVERY 4 HOURS AS NEEDED FOR WHEEZING 8.5 g 2    verapamil (CALAN SR) 240 MG extended release tablet TAKE 1 TABLET BY MOUTH AT  NIGHT 90 tablet 3    acetaminophen (TYLENOL) 500 MG tablet Take 500 mg by mouth every 6 hours as needed for Pain      diphenhydrAMINE-APAP, sleep, (TYLENOL PM EXTRA STRENGTH)  MG tablet Take 1 tablet by mouth nightly as needed for Sleep      Ascorbic Acid (VITAMIN C PO) 1 tablet daily      therapeutic multivitamin-minerals (THERAGRAN-M) tablet Take 1 tablet by mouth nightly       Cranberry 500 MG CAPS Take 1,000 mg by mouth nightly        No current facility-administered medications for this visit. Review of Systems   Constitutional:  Negative for chills and fever. Genitourinary:  Positive for dysuria. Negative for hematuria. Cloudy urine   Musculoskeletal:  Positive for back pain. OBJECTIVE:  Physical Exam  Constitutional:       Appearance: Normal appearance. Abdominal:      General: Abdomen is protuberant. Palpations: Abdomen is soft. Tenderness: There is no abdominal tenderness. There is right CVA tenderness. Skin:     General: Skin is warm and dry. Neurological:      General: No focal deficit present. Mental Status: She is alert and oriented to person, place, and time. Psychiatric:         Mood and Affect: Mood normal.         Behavior: Behavior normal.      /88   Pulse 78   Ht 5' 4\" (1.626 m)   Wt 264 lb (119.7 kg)   SpO2 95%   BMI 45.32 kg/m²      PHQ Scores 5/2/2022 1/12/2021 10/12/2020 2/7/2019 10/16/2018 8/10/2017   PHQ2 Score 0 2 0 1 0 0   PHQ9 Score 0 2 0 1 0 0     Interpretation of Total Score Depression Severity: 1-4 = Minimal depression, 5-9 = Mild depression, 10-14 = Moderate depression, 15-19 = Moderately severe depression, 20-27 =Severe depression        ASSESSMENT/PLAN:  Shahab Jacob was seen today for urinary burning and back pain. Diagnoses and all orders for this visit:    Acute cystitis without hematuria  -     ciprofloxacin (CIPRO) 500 MG tablet;  Take 1 tablet by mouth 2 times daily for 5 days    Urine frequency  -     POCT Urinalysis no Micro    -Unable to process culture urine today, not enough sample size  -UA shows leukocytes  - Could be partially treated with Trimpex used chronically   - Options discussed. She wishes to proceed with treatment for likely acute UTI. If symptoms do not improve, I have asked her to return for resampling for culture.   - Take 1/2 normal amount of warfarin while on antibiotic and notify warfarin clinic      AIME Guerrero - CNP

## 2023-02-20 NOTE — TELEPHONE ENCOUNTER
Returned call to patient. She will be on cipro x 5 d. Advised for patient to take 5 mg tonight then return to normal weekly dose of warfarin 5 mg on Wed and 7.5 mg all other days of the week. Maintain appt for 3/9.

## 2023-02-20 NOTE — TELEPHONE ENCOUNTER
----- Message from Mino Ashton sent at 2/20/2023  2:32 PM EST -----  Regarding: new anticoitic  Contact: patient  Angela Perera, please call patient back. She will be starting Cipro tonight. Her doctor told her to cut her warfarin dose in half, while she is taking this antibiotic.  [phone]

## 2023-03-07 ENCOUNTER — OFFICE VISIT (OUTPATIENT)
Dept: INTERNAL MEDICINE CLINIC | Age: 78
End: 2023-03-07
Payer: MEDICARE

## 2023-03-07 VITALS
BODY MASS INDEX: 44.94 KG/M2 | HEART RATE: 60 BPM | WEIGHT: 263.2 LBS | DIASTOLIC BLOOD PRESSURE: 72 MMHG | SYSTOLIC BLOOD PRESSURE: 132 MMHG | HEIGHT: 64 IN

## 2023-03-07 DIAGNOSIS — F41.9 ANXIETY: ICD-10-CM

## 2023-03-07 DIAGNOSIS — I48.0 PAF (PAROXYSMAL ATRIAL FIBRILLATION) (HCC): ICD-10-CM

## 2023-03-07 DIAGNOSIS — I10 ESSENTIAL HYPERTENSION: Primary | ICD-10-CM

## 2023-03-07 DIAGNOSIS — E78.00 PURE HYPERCHOLESTEROLEMIA: ICD-10-CM

## 2023-03-07 PROBLEM — F13.29 SEDATIVE, HYPNOTIC OR ANXIOLYTIC DEPENDENCE WITH UNSPECIFIED SEDATIVE, HYPNOTIC OR ANXIOLYTIC-INDUCED DISORDER (HCC): Status: ACTIVE | Noted: 2023-03-07

## 2023-03-07 PROBLEM — F13.29 SEDATIVE, HYPNOTIC OR ANXIOLYTIC DEPENDENCE WITH UNSPECIFIED SEDATIVE, HYPNOTIC OR ANXIOLYTIC-INDUCED DISORDER (HCC): Status: RESOLVED | Noted: 2023-03-07 | Resolved: 2023-03-07

## 2023-03-07 PROBLEM — F13.20 SEDATIVE, HYPNOTIC OR ANXIOLYTIC DEPENDENCE, UNCOMPLICATED (HCC): Status: RESOLVED | Noted: 2023-03-07 | Resolved: 2023-03-07

## 2023-03-07 PROBLEM — F13.99 SEDATIVE, HYPNOTIC OR ANXIOLYTIC USE, UNSPECIFIED WITH UNSPECIFIED SEDATIVE, HYPNOTIC OR ANXIOLYTIC-INDUCED DISORDER (HCC): Status: ACTIVE | Noted: 2023-03-07

## 2023-03-07 PROBLEM — F13.99 SEDATIVE, HYPNOTIC OR ANXIOLYTIC USE, UNSPECIFIED WITH UNSPECIFIED SEDATIVE, HYPNOTIC OR ANXIOLYTIC-INDUCED DISORDER (HCC): Status: RESOLVED | Noted: 2023-03-07 | Resolved: 2023-03-07

## 2023-03-07 PROBLEM — F13.20 SEDATIVE, HYPNOTIC OR ANXIOLYTIC DEPENDENCE, UNCOMPLICATED (HCC): Status: ACTIVE | Noted: 2023-03-07

## 2023-03-07 PROCEDURE — 3075F SYST BP GE 130 - 139MM HG: CPT | Performed by: INTERNAL MEDICINE

## 2023-03-07 PROCEDURE — 99214 OFFICE O/P EST MOD 30 MIN: CPT | Performed by: INTERNAL MEDICINE

## 2023-03-07 PROCEDURE — 3078F DIAST BP <80 MM HG: CPT | Performed by: INTERNAL MEDICINE

## 2023-03-07 PROCEDURE — 1123F ACP DISCUSS/DSCN MKR DOCD: CPT | Performed by: INTERNAL MEDICINE

## 2023-03-07 ASSESSMENT — PATIENT HEALTH QUESTIONNAIRE - PHQ9
SUM OF ALL RESPONSES TO PHQ9 QUESTIONS 1 & 2: 0
SUM OF ALL RESPONSES TO PHQ QUESTIONS 1-9: 0
1. LITTLE INTEREST OR PLEASURE IN DOING THINGS: 0
2. FEELING DOWN, DEPRESSED OR HOPELESS: 0
SUM OF ALL RESPONSES TO PHQ QUESTIONS 1-9: 0

## 2023-03-07 NOTE — PROGRESS NOTES
Chief Complaint   Patient presents with    Anxiety    Hypertension    Hyperlipidemia     HPI:  HTN: The patient is tolerating blood pressure medication well and taking them as directed. BP control outside of the office is reported as well controlled. No symptoms concerning for end organ damage are present. HLD: Taking atorvastatin and tolerating well. Anxiety: symptoms are generally well controlled. She feels like her medication helps. PAF: She is taking warfarin as prescribed. She is asymptomatic.     Social History     Tobacco Use    Smoking status: Former     Packs/day: 0.50     Years: 4.00     Pack years: 2.00     Types: Cigarettes     Quit date: 1965     Years since quittin.2    Smokeless tobacco: Never   Vaping Use    Vaping Use: Never used   Substance Use Topics    Alcohol use: Not Currently    Drug use: No           ROS  Prolonged bruise to the right calf with associated itching of the lower leg  CV: Neg for chest pain  RESP: neg for dyspnea  MSK: Chronic back pain    EXAM  /72   Pulse 60   Ht 5' 4\" (1.626 m)   Wt 263 lb 3.2 oz (119.4 kg)   BMI 45.18 kg/m²    GEN: WN/WD, NAD  CV: regular rate and rhythm, no murmurs rubs or gallops  Resp: normal effort, clear auscultation bilaterally  No peripheral edema     Hemoglobin A1C   Date Value Ref Range Status   2019 5.6 % Final      Lab Results   Component Value Date    CREATININE 0.8 2022    BUN 18 2022     2022    K 3.8 2022     2022    CO2 27 2022     Lab Results   Component Value Date    CHOL 170 2022    CHOL 184 2021    CHOL 150 10/02/2020     Lab Results   Component Value Date    TRIG 97 2022    TRIG 132 2021    TRIG 80 10/02/2020     Lab Results   Component Value Date    HDL 62 (H) 2022    HDL 68 (H) 2021    HDL 61 (H) 10/02/2020     Lab Results   Component Value Date    LDLCALC 89 2022    LDLCALC 90 2021    LDLCALC 73 10/02/2020     Lab Results   Component Value Date    LABVLDL 19 06/16/2022    LABVLDL 26 07/12/2021    LABVLDL 16 10/02/2020     No results found for: CHOLHDLRATIO   A/P  1. Essential hypertension  Chronic and well controlled. The current medical regimen is effective;  continue present plan and medications. BW is UTD  Continue indapamide 1.25mg daily, verapamil 1/2 tab daily    2. Pure hypercholesterolemia  Chronic and well controlled. The current medical regimen is effective;  continue present plan and medications. Atorvastatin 80mg daily    3. Anxiety  Chronic and well controlled. The current medical regimen is effective;  continue present plan and medications. Citalopram 40mg daily; clonazepam nightly PRN    4. Body mass index (BMI) 45.0-49.9, adult (HCC)  Chronic, stable  Diet/exercise encouraged    5.  PAF:   Chronic, asymptomatic  Continue anti coagulation for stroke risk reduction      RTO 3 months

## 2023-03-09 ENCOUNTER — ANTI-COAG VISIT (OUTPATIENT)
Dept: PHARMACY | Age: 78
End: 2023-03-09
Payer: MEDICARE

## 2023-03-09 DIAGNOSIS — I48.0 PAF (PAROXYSMAL ATRIAL FIBRILLATION) (HCC): Primary | ICD-10-CM

## 2023-03-09 DIAGNOSIS — Z86.711 HISTORY OF PULMONARY EMBOLISM: ICD-10-CM

## 2023-03-09 LAB — INTERNATIONAL NORMALIZATION RATIO, POC: 1.8

## 2023-03-09 PROCEDURE — 99211 OFF/OP EST MAY X REQ PHY/QHP: CPT

## 2023-03-09 PROCEDURE — 85610 PROTHROMBIN TIME: CPT

## 2023-03-09 NOTE — PROGRESS NOTES
Ms. Mayi Landry is a 69 y.o. y/o female with history of DVT, PE, Afib   She presents today for anticoagulation monitoring and adjustment. Pertinent PMH: HTN, Gastric Banding,CAD, diskectomy with an artifical spinal disk implant in September 2012. Patient Reported Findings:  Yes     No  [x]   []       Patient verifies current dosing regimen as listed- confirms --> believes that she was taking 10 mg on Wed not 5 mg --> verified taking 5 mg on Wed and 7.5 mg all other days of the week   []   [x]       S/S bleeding/bruising/swelling/SOB states that she has been wheezing more and had more SOB d/t allergies --> has had a few small nose bleeds recently  --> bruises from lovenox injections ---> denies   []   [x]       Blood in urine or stool  []   [x]       Procedures scheduled in the future at this time none upcoming    []   [x]       Missed Dose - denies  []   [x]       Extra Dose - denies   [x]   []       Change in medications  decreased tylenol intake to 1-2 times a day --> still taking propafenone 150 BID --> starting using Spiriva---> stopped propafenone 9/20, was told to take 3 times/day for afib prn --> has not used propafenone in the past week. Stopped spiriva --> has been taking allergy pills recently --> has been taking more tylenol 6/day --> no changes, continues with tylenol --> stopped cranberry capsule 3-4 weeks ago --> was on cipro (dose adj). Restarted cranberry supplement   [x]   []       Change in health/diet/appetite  Patient states that she feels like she has returned to normal vit k intake --> diet fluctuates causing INR to fluctuate.  Discussed ways to improve consistency with vit k intake  --> states that she has cut back spinach and cabbage intake to twice a week --> no vit k --> has been eating a salad a day --> had less vit k acutely -->has been trying to eat more vit k, salads, broccoli --> has had less vit k than normal -->not much vit k and plans to continue --> has had more salads, asparagus, and broccoli this week   []   [x]       Change in alcohol use    []   [x]       Change in activity  []   [x]       Hospital admission  []   [x]       Emergency department visit    []   [x]       Other complaints     Clinical Outcomes:  Yes     No  []   [x]       Major bleeding event  []   [x]       Thromboembolic event  Gets warfarin through MD    Duration of warfarin Therapy: indefinite  INR Range:  2.0-3.0     INR is 1.8 today d/t more vit k but does not plan to continue having and does not have anymore at home currently   Continue weekly dose of 5 mg on Wed and 7.5 mg all other days of the week   Encouraged to maintain a consistency of vegetables/salads.   Recheck INR in 4 weeks, 4/6    Does not need PW printed, only card      Patient consent signed 1/12/23    Referring PCP is Laina Girard  INR (no units)   Date Value   12/28/2022 3.20 (H)   07/15/2022 1.10   10/05/2021 2.50 (H)     INR,(POC) (no units)   Date Value   02/09/2023 2.8   01/12/2023 2.5   12/13/2022 2.8     For Pharmacy Admin Tracking Only    Total # of Interventions Recommended: 0  Total # of Interventions Accepted: 0  Time Spent (min): 15

## 2023-03-31 DIAGNOSIS — F41.9 ANXIETY: ICD-10-CM

## 2023-03-31 RX ORDER — CLONAZEPAM 1 MG/1
TABLET ORAL
Qty: 90 TABLET | Refills: 0 | Status: SHIPPED | OUTPATIENT
Start: 2023-03-31 | End: 2023-06-29

## 2023-04-06 ENCOUNTER — ANTI-COAG VISIT (OUTPATIENT)
Dept: PHARMACY | Age: 78
End: 2023-04-06
Payer: MEDICARE

## 2023-04-06 DIAGNOSIS — Z86.711 HISTORY OF PULMONARY EMBOLISM: Chronic | ICD-10-CM

## 2023-04-06 DIAGNOSIS — I48.0 PAF (PAROXYSMAL ATRIAL FIBRILLATION) (HCC): Primary | ICD-10-CM

## 2023-04-06 LAB — INTERNATIONAL NORMALIZATION RATIO, POC: 2.4

## 2023-04-06 PROCEDURE — 85610 PROTHROMBIN TIME: CPT

## 2023-04-06 PROCEDURE — 99211 OFF/OP EST MAY X REQ PHY/QHP: CPT

## 2023-04-06 NOTE — PROGRESS NOTES
complaints     Clinical Outcomes:  Yes     No  []   [x]       Major bleeding event  []   [x]       Thromboembolic event  Gets warfarin through MD    Duration of warfarin Therapy: indefinite  INR Range:  2.0-3.0     INR is 2.4 today   Continue weekly dose of 5 mg on Wed and 7.5 mg all other days of the week   Encouraged to maintain a consistency of vegetables/salads.   Recheck INR in 4 weeks, 5/4    Does not need PW printed, only card      Patient consent signed 1/12/23    Referring PCP is Reji Schaefer  INR (no units)   Date Value   12/28/2022 3.20 (H)   07/15/2022 1.10   10/05/2021 2.50 (H)     INR,(POC) (no units)   Date Value   03/09/2023 1.8   02/09/2023 2.8   01/12/2023 2.5     For Pharmacy Admin Tracking Only    Total # of Interventions Recommended: 0  Total # of Interventions Accepted: 0  Time Spent (min): 15

## 2023-04-10 ENCOUNTER — OFFICE VISIT (OUTPATIENT)
Dept: INTERNAL MEDICINE CLINIC | Age: 78
End: 2023-04-10
Payer: MEDICARE

## 2023-04-10 VITALS
BODY MASS INDEX: 44.73 KG/M2 | DIASTOLIC BLOOD PRESSURE: 78 MMHG | SYSTOLIC BLOOD PRESSURE: 148 MMHG | HEIGHT: 64 IN | OXYGEN SATURATION: 96 % | HEART RATE: 68 BPM | WEIGHT: 262 LBS

## 2023-04-10 DIAGNOSIS — R35.0 URINE FREQUENCY: ICD-10-CM

## 2023-04-10 DIAGNOSIS — N30.00 ACUTE CYSTITIS WITHOUT HEMATURIA: Primary | ICD-10-CM

## 2023-04-10 LAB
BILIRUBIN, POC: NEGATIVE
BLOOD URINE, POC: NEGATIVE
CLARITY, POC: NORMAL
COLOR, POC: NORMAL
GLUCOSE URINE, POC: NEGATIVE
KETONES, POC: NEGATIVE
LEUKOCYTE EST, POC: NORMAL
NITRITE, POC: NEGATIVE
PH, POC: 6
PROTEIN, POC: NEGATIVE
SPECIFIC GRAVITY, POC: 1.01
UROBILINOGEN, POC: 0.2

## 2023-04-10 PROCEDURE — 81002 URINALYSIS NONAUTO W/O SCOPE: CPT | Performed by: NURSE PRACTITIONER

## 2023-04-10 PROCEDURE — 3078F DIAST BP <80 MM HG: CPT | Performed by: NURSE PRACTITIONER

## 2023-04-10 PROCEDURE — 99213 OFFICE O/P EST LOW 20 MIN: CPT | Performed by: NURSE PRACTITIONER

## 2023-04-10 PROCEDURE — 1123F ACP DISCUSS/DSCN MKR DOCD: CPT | Performed by: NURSE PRACTITIONER

## 2023-04-10 PROCEDURE — 3074F SYST BP LT 130 MM HG: CPT | Performed by: NURSE PRACTITIONER

## 2023-04-10 RX ORDER — CIPROFLOXACIN 500 MG/1
500 TABLET, FILM COATED ORAL 2 TIMES DAILY
Qty: 10 TABLET | Refills: 0 | Status: SHIPPED | OUTPATIENT
Start: 2023-04-10 | End: 2023-04-15

## 2023-04-11 LAB
BACTERIA UR CULT: ABNORMAL
ORGANISM: ABNORMAL

## 2023-04-18 ASSESSMENT — ENCOUNTER SYMPTOMS: BACK PAIN: 1

## 2023-05-01 RX ORDER — POTASSIUM CHLORIDE 20 MEQ/1
TABLET, EXTENDED RELEASE ORAL
Qty: 90 TABLET | Refills: 3 | Status: SHIPPED | OUTPATIENT
Start: 2023-05-01

## 2023-05-04 ENCOUNTER — ANTI-COAG VISIT (OUTPATIENT)
Dept: PHARMACY | Age: 78
End: 2023-05-04
Payer: MEDICARE

## 2023-05-04 DIAGNOSIS — Z86.711 HISTORY OF PULMONARY EMBOLISM: Chronic | ICD-10-CM

## 2023-05-04 DIAGNOSIS — I48.0 PAF (PAROXYSMAL ATRIAL FIBRILLATION) (HCC): Primary | ICD-10-CM

## 2023-05-04 LAB — INTERNATIONAL NORMALIZATION RATIO, POC: 1.9

## 2023-05-04 PROCEDURE — 99211 OFF/OP EST MAY X REQ PHY/QHP: CPT

## 2023-05-04 PROCEDURE — 85610 PROTHROMBIN TIME: CPT

## 2023-05-04 NOTE — PROGRESS NOTES
Ms. Terence Runner is a 69 y.o. y/o female with history of DVT, PE, Afib   She presents today for anticoagulation monitoring and adjustment. Pertinent PMH: HTN, Gastric Banding,CAD, diskectomy with an artifical spinal disk implant in September 2012. Patient Reported Findings:  Yes     No  [x]   []       Patient verifies current dosing regimen as listed- confirms --> believes that she was taking 10 mg on Wed not 5 mg --> verified taking 5 mg on Wed and 7.5 mg all other days of the week   []   [x]       S/S bleeding/bruising/swelling/SOB states that she has been wheezing more and had more SOB d/t allergies --> has had a few small nose bleeds recently  --> bruises from lovenox injections ---> denies   []   [x]       Blood in urine or stool  [x]   []       Procedures scheduled in the future at this time having epidural on 6/1, surgeon asked for pt to hold warfarin 7 days. Pt has not obtained cardiac clearance for warfarin yet, will discuss at her cardio appt on 5/18  []   [x]       Missed Dose - denies  []   [x]       Extra Dose - denies   [x]   []       Change in medications  decreased tylenol intake to 1-2 times a day --> has not used propafenone in the past week. Stopped spiriva -->  has been taking more tylenol 6/day --> no changes, continues with tylenol --> stopped cranberry capsule 3-4 weeks ago --> was on cipro (dose adj). Restarted cranberry supplement --> was on cipro x 5 d 4/10   [x]   []       Change in health/diet/appetite  Patient states that she feels like she has returned to normal vit k intake --> diet fluctuates causing INR to fluctuate.  Discussed ways to improve consistency with vit k intake  --> states that she has cut back spinach and cabbage intake to twice a week --> has been trying to eat more vit k, salads, broccoli --> has had less vit k than normal -->not much vit k and plans to continue --> has had more salads, asparagus, and broccoli this week --> had more vit k than normal last

## 2023-05-17 NOTE — PROGRESS NOTES
normal size, mild concentric LVH, normal function with EF   of 60%   *Aortic valve - thickened and calcified, moderate stenosis with PV of   3.1m/s, MG 23mmHg, mild regurgitation   *Mitral valve - mild regurgitation   *Tricuspid valve - mild regurgitation with RVSP of 19mmHg    MAXWELL 10/5/2021   Summary   Normal left ventricle size, wall thickness, and systolic function with an   estimated ejection fraction of 60%. Mitral valve leaflets appear mildly thickened. Mild mitral regurgitation. There is no evidence of mass or thrombus in the left atrium or appendage. The aortic valve is moderately calcified with decreased leaflet mobility   consistent with at least moderate aortic stenosis. V max 3.03 m/s. Mild aortic regurgitation. The aortic root is normal in size. The thoracic aorta has minimal plaque. Echo 12/23/2020  Summary   -Normal left ventricle size, wall thickness, and systolic function with an   estimated ejection fraction of 55-60%. -The aortic valve is thickened/calcified with decreased leaflet mobility   consistent with aortic stenosis. -Moderate aortic stenosis with a peak velocity of 3.32 m/s, a mean pressure   gradient of 23 mmHg, and a valve area estimation 1.12 cm^2. There is mild   aortic insufficiency.   -There is mild tricuspid regurgitation with a RVSP estimation of 30 mmHg. Stress 4/19/2018  Summary       No EKG evidence for ischemia with exercise       Normal LV size and systolic function. Myocardial perfusion imaging with small area of decreased uptake in the    anteroapical wall with stress with redistribution at rest consistent with    ischemia. I independently reviewed the cardiac diagnostic studies, ECG and relevant imaging studies.      Lab Results   Component Value Date    LVEF 60 10/20/2022    LVEFMODE Echo 03/13/2018     Lab Results   Component Value Date    TSH 3.53 09/26/2011         Physical Examination:  Vitals:    05/18/23 1046   BP: 136/74   Pulse:

## 2023-05-18 ENCOUNTER — OFFICE VISIT (OUTPATIENT)
Dept: CARDIOLOGY CLINIC | Age: 78
End: 2023-05-18
Payer: MEDICARE

## 2023-05-18 VITALS
DIASTOLIC BLOOD PRESSURE: 74 MMHG | OXYGEN SATURATION: 95 % | BODY MASS INDEX: 44.22 KG/M2 | HEART RATE: 63 BPM | HEIGHT: 64 IN | SYSTOLIC BLOOD PRESSURE: 136 MMHG | WEIGHT: 259 LBS

## 2023-05-18 DIAGNOSIS — G47.33 OSA (OBSTRUCTIVE SLEEP APNEA): ICD-10-CM

## 2023-05-18 DIAGNOSIS — I48.0 PAF (PAROXYSMAL ATRIAL FIBRILLATION) (HCC): Primary | ICD-10-CM

## 2023-05-18 DIAGNOSIS — E66.01 MORBID OBESITY WITH BMI OF 40.0-44.9, ADULT (HCC): ICD-10-CM

## 2023-05-18 DIAGNOSIS — T46.2X5A ADVERSE EFFECT OF AMIODARONE, INITIAL ENCOUNTER: ICD-10-CM

## 2023-05-18 DIAGNOSIS — I10 ESSENTIAL HYPERTENSION: ICD-10-CM

## 2023-05-18 PROCEDURE — 3075F SYST BP GE 130 - 139MM HG: CPT | Performed by: INTERNAL MEDICINE

## 2023-05-18 PROCEDURE — 93000 ELECTROCARDIOGRAM COMPLETE: CPT | Performed by: INTERNAL MEDICINE

## 2023-05-18 PROCEDURE — 3078F DIAST BP <80 MM HG: CPT | Performed by: INTERNAL MEDICINE

## 2023-05-18 PROCEDURE — 99214 OFFICE O/P EST MOD 30 MIN: CPT | Performed by: INTERNAL MEDICINE

## 2023-05-18 PROCEDURE — 1123F ACP DISCUSS/DSCN MKR DOCD: CPT | Performed by: INTERNAL MEDICINE

## 2023-05-25 ENCOUNTER — ANTI-COAG VISIT (OUTPATIENT)
Dept: PHARMACY | Age: 78
End: 2023-05-25
Payer: MEDICARE

## 2023-05-25 DIAGNOSIS — I48.0 PAF (PAROXYSMAL ATRIAL FIBRILLATION) (HCC): Primary | ICD-10-CM

## 2023-05-25 DIAGNOSIS — Z86.711 HISTORY OF PULMONARY EMBOLISM: Chronic | ICD-10-CM

## 2023-05-25 LAB — INTERNATIONAL NORMALIZATION RATIO, POC: 2.1

## 2023-05-25 PROCEDURE — 85610 PROTHROMBIN TIME: CPT

## 2023-05-25 PROCEDURE — 99212 OFFICE O/P EST SF 10 MIN: CPT

## 2023-05-25 NOTE — PROGRESS NOTES
Ms. Jan Bush is a 69 y.o. y/o female with history of DVT, PE, Afib   She presents today for anticoagulation monitoring and adjustment. Pertinent PMH: HTN, Gastric Banding,CAD, diskectomy with an artifical spinal disk implant in September 2012. Patient Reported Findings:  Yes     No  [x]   []       Patient verifies current dosing regimen as listed- confirms --> believes that she was taking 10 mg on Wed not 5 mg --> verified taking 5 mg on Wed and 7.5 mg all other days of the week   []   [x]       S/S bleeding/bruising/swelling/SOB states that she has been wheezing more and had more SOB d/t allergies --> has had a few small nose bleeds recently  --> bruises from lovenox injections ---> denies   []   [x]       Blood in urine or stool  [x]   []       Procedures scheduled in the future at this time having epidural on 6/1, cleared to hold warfarin 7 days and bridge with lovenox   []   [x]       Missed Dose - denies  []   [x]       Extra Dose - denies   []   [x]       Change in medications  decreased tylenol intake to 1-2 times a day --> has not used propafenone in the past week. Stopped spiriva -->  has been taking more tylenol 6/day --> no changes, continues with tylenol --> stopped cranberry capsule 3-4 weeks ago --> was on cipro (dose adj). Restarted cranberry supplement --> was on cipro x 5 d 4/10 --> no changes   []   [x]       Change in health/diet/appetite  Patient states that she feels like she has returned to normal vit k intake --> diet fluctuates causing INR to fluctuate.  Discussed ways to improve consistency with vit k intake  --> states that she has cut back spinach and cabbage intake to twice a week --> has been trying to eat more vit k, salads, broccoli --> has had less vit k than normal -->not much vit k and plans to continue --> has had more salads, asparagus, and broccoli this week --> had more vit k than normal last week --> no changes   []   [x]       Change in alcohol use    []   [x]

## 2023-05-25 NOTE — TELEPHONE ENCOUNTER
Lovenox prescription phoned into [facility] to [facility] under Dr. Becky Ferrari   Lovenox 120 mg syringes  Inject 120 mg sq BID  20 syringes  1 refill

## 2023-05-31 ENCOUNTER — OFFICE VISIT (OUTPATIENT)
Dept: DERMATOLOGY | Age: 78
End: 2023-05-31
Payer: MEDICARE

## 2023-05-31 DIAGNOSIS — L82.1 SK (SEBORRHEIC KERATOSIS): ICD-10-CM

## 2023-05-31 DIAGNOSIS — D48.5 NEOPLASM OF UNCERTAIN BEHAVIOR OF SKIN: ICD-10-CM

## 2023-05-31 DIAGNOSIS — L81.4 SOLAR LENTIGINOSIS: Primary | ICD-10-CM

## 2023-05-31 PROCEDURE — 99213 OFFICE O/P EST LOW 20 MIN: CPT | Performed by: DERMATOLOGY

## 2023-05-31 PROCEDURE — 11102 TANGNTL BX SKIN SINGLE LES: CPT | Performed by: DERMATOLOGY

## 2023-05-31 PROCEDURE — 1123F ACP DISCUSS/DSCN MKR DOCD: CPT | Performed by: DERMATOLOGY

## 2023-05-31 RX ORDER — ENOXAPARIN SODIUM 150 MG/ML
INJECTION SUBCUTANEOUS
COMMUNITY
Start: 2023-05-25

## 2023-05-31 NOTE — PROGRESS NOTES
[facility]  Cliff Mcclure MD  [address]  1945    69 y.o. female     Date of Visit: 5/31/2023    Chief Complaint: skin lesions    History of Present Illness:    1. She has stable freckling on the chest, shoulders and extremities. 2.  She reports persistent growth on the right upper forehead. 3.  Unknown duration of a dark lesion on the right upper arm. Review of Systems:  Gen: Feels well, good sense of health. Past Medical History, Family History, Surgical History, Medications and Allergies reviewed. Past Medical History:   Diagnosis Date    A-fib [facility])     Allergic     Anxiety 2/2/2017    Aortic stenosis     Arthritis     Asthma     Back pain     Blood circulation, collateral     legs    CAD (coronary artery disease)     Depression     GERD (gastroesophageal reflux disease)     Gout     Hx of blood clots     Hypercholesteremia     Hypertension     Movement disorder     7 discs ruptured    Movement disorder     knee replacements    Obesity     Pulmonary emboli (HCC)     Unspecified sleep apnea     uses cpap    Urinary tract infection, chronic     UTI (urinary tract infection)      Past Surgical History:   Procedure Laterality Date    ABLATION OF DYSRHYTHMIC FOCUS      ANKLE FUSION  2010    right, [facility]    ARM SURGERY Right 7/15/2022    . performed by Ofelia De La Torre MD at [place] [address]  09/14/2012    lumbar fusion L-4, L-5 atrificial disc  [facility] AND DRAINAGE  5-7-11    incision, drainage and debridement of left knee and application of wound vac.     BREAST BIOPSY  08/2018    neg    CARDIAC SURGERY      CARDIOVERSION      CARPAL TUNNEL RELEASE Right 7/15/2022    CARPAL TUNNEL RELEASE OF RIGHT WRIST, RIGHT IN SITU CUBITAL TUNNEL RELEASE VERSUS ULNAR NERVE TRANSPOSITION AT THE ELBOW performed by Ofelia De La Torre MD at [address] [place] [address]

## 2023-05-31 NOTE — PATIENT INSTRUCTIONS

## 2023-06-05 LAB — DERMATOLOGY PATHOLOGY REPORT: NORMAL

## 2023-06-05 SDOH — HEALTH STABILITY: PHYSICAL HEALTH: ON AVERAGE, HOW MANY MINUTES DO YOU ENGAGE IN EXERCISE AT THIS LEVEL?: 120 MIN

## 2023-06-05 SDOH — HEALTH STABILITY: PHYSICAL HEALTH: ON AVERAGE, HOW MANY DAYS PER WEEK DO YOU ENGAGE IN MODERATE TO STRENUOUS EXERCISE (LIKE A BRISK WALK)?: 1 DAY

## 2023-06-06 ENCOUNTER — ANTI-COAG VISIT (OUTPATIENT)
Dept: PHARMACY | Age: 78
End: 2023-06-06
Payer: MEDICARE

## 2023-06-06 DIAGNOSIS — I48.0 PAF (PAROXYSMAL ATRIAL FIBRILLATION) (HCC): Primary | ICD-10-CM

## 2023-06-06 DIAGNOSIS — Z86.711 HISTORY OF PULMONARY EMBOLISM: Chronic | ICD-10-CM

## 2023-06-06 LAB — INTERNATIONAL NORMALIZATION RATIO, POC: 1.2

## 2023-06-06 PROCEDURE — 99212 OFFICE O/P EST SF 10 MIN: CPT

## 2023-06-06 PROCEDURE — 85610 PROTHROMBIN TIME: CPT

## 2023-06-06 NOTE — PROGRESS NOTES
Ms. Jaguar Reddy is a 69 y.o. y/o female with history of DVT, PE, Afib   She presents today for anticoagulation monitoring and adjustment. Pertinent PMH: HTN, Gastric Banding,CAD, diskectomy with an artifical spinal disk implant in September 2012. Patient Reported Findings:  Yes     No  [x]   []       Patient verifies current dosing regimen as listed- confirms --> believes that she was taking 10 mg on Wed not 5 mg --> verified taking 5 mg on Wed and 7.5 mg all other days of the week   []   [x]       S/S bleeding/bruising/swelling/SOB states that she has been wheezing more and had more SOB d/t allergies --> has had a few small nose bleeds recently  --> bruises from lovenox injections ---> denies   []   [x]       Blood in urine or stool  [x]   []       Procedures scheduled in the future at this time having epidural on 6/1, cleared to hold warfarin 7 days and bridge with lovenox   []   [x]       Missed Dose - denies  []   [x]       Extra Dose - denies   []   [x]       Change in medications  decreased tylenol intake to 1-2 times a day --> has not used propafenone in the past week. Stopped spiriva -->  has been taking more tylenol 6/day --> no changes, continues with tylenol --> stopped cranberry capsule 3-4 weeks ago --> was on cipro (dose adj). Restarted cranberry supplement --> was on cipro x 5 d 4/10 --> no changes   [x]   []       Change in health/diet/appetite  Patient states that she feels like she has returned to normal vit k intake --> diet fluctuates causing INR to fluctuate.  Discussed ways to improve consistency with vit k intake  --> states that she has cut back spinach and cabbage intake to twice a week --> has been trying to eat more vit k, salads, broccoli --> has had less vit k than normal -->not much vit k and plans to continue --> has had more salads, asparagus, and broccoli this week --> had more vit k than normal last week --> no changes --> has been losing weight, less appetite   []   [x]

## 2023-06-08 ENCOUNTER — OFFICE VISIT (OUTPATIENT)
Dept: INTERNAL MEDICINE CLINIC | Age: 78
End: 2023-06-08

## 2023-06-08 VITALS
WEIGHT: 255 LBS | HEIGHT: 64 IN | SYSTOLIC BLOOD PRESSURE: 132 MMHG | OXYGEN SATURATION: 97 % | DIASTOLIC BLOOD PRESSURE: 78 MMHG | BODY MASS INDEX: 43.54 KG/M2 | HEART RATE: 57 BPM

## 2023-06-08 DIAGNOSIS — Z00.00 MEDICARE ANNUAL WELLNESS VISIT, SUBSEQUENT: Primary | ICD-10-CM

## 2023-06-08 DIAGNOSIS — E78.00 PURE HYPERCHOLESTEROLEMIA: ICD-10-CM

## 2023-06-08 DIAGNOSIS — J45.40 MODERATE PERSISTENT ASTHMA WITHOUT COMPLICATION: ICD-10-CM

## 2023-06-08 DIAGNOSIS — F41.9 ANXIETY: ICD-10-CM

## 2023-06-08 DIAGNOSIS — I25.10 CORONARY ARTERY DISEASE INVOLVING NATIVE CORONARY ARTERY OF NATIVE HEART WITHOUT ANGINA PECTORIS: ICD-10-CM

## 2023-06-08 DIAGNOSIS — I10 ESSENTIAL HYPERTENSION: ICD-10-CM

## 2023-06-08 LAB
ANION GAP SERPL CALCULATED.3IONS-SCNC: 11 MMOL/L (ref 3–16)
BUN SERPL-MCNC: 12 MG/DL (ref 7–20)
CALCIUM SERPL-MCNC: 9.3 MG/DL (ref 8.3–10.6)
CHLORIDE SERPL-SCNC: 96 MMOL/L (ref 99–110)
CHOLEST SERPL-MCNC: 175 MG/DL (ref 0–199)
CO2 SERPL-SCNC: 27 MMOL/L (ref 21–32)
CREAT SERPL-MCNC: 0.7 MG/DL (ref 0.6–1.2)
GFR SERPLBLD CREATININE-BSD FMLA CKD-EPI: >60 ML/MIN/{1.73_M2}
GLUCOSE SERPL-MCNC: 79 MG/DL (ref 70–99)
HDLC SERPL-MCNC: 84 MG/DL (ref 40–60)
LDLC SERPL CALC-MCNC: 79 MG/DL
POTASSIUM SERPL-SCNC: 3.9 MMOL/L (ref 3.5–5.1)
SODIUM SERPL-SCNC: 134 MMOL/L (ref 136–145)
TRIGL SERPL-MCNC: 61 MG/DL (ref 0–150)
VLDLC SERPL CALC-MCNC: 12 MG/DL

## 2023-06-08 ASSESSMENT — PATIENT HEALTH QUESTIONNAIRE - PHQ9
SUM OF ALL RESPONSES TO PHQ QUESTIONS 1-9: 2
SUM OF ALL RESPONSES TO PHQ9 QUESTIONS 1 & 2: 2
SUM OF ALL RESPONSES TO PHQ QUESTIONS 1-9: 2
SUM OF ALL RESPONSES TO PHQ QUESTIONS 1-9: 2
2. FEELING DOWN, DEPRESSED OR HOPELESS: 0
SUM OF ALL RESPONSES TO PHQ QUESTIONS 1-9: 2
1. LITTLE INTEREST OR PLEASURE IN DOING THINGS: 2

## 2023-06-08 NOTE — PATIENT INSTRUCTIONS
Preventive Recommendations:    A preventive eye exam performed by an eye specialist is recommended every 1-2 years to screen for glaucoma; cataracts, macular degeneration, and other eye disorders. A preventive dental visit is recommended every 6 months. Try to get at least 150 minutes of exercise per week or 10,000 steps per day on a pedometer . Order or download the FREE \"Exercise & Physical Activity: Your Everyday Guide\" from [employer]. Call 0-192.721.3783 or search [employer] online. You need 9829-3290 mg of calcium and 6047-9676 IU of vitamin D per day. It is possible to meet your calcium requirement with diet alone, but a vitamin D supplement is usually necessary to meet this goal.  When exposed to the sun, use a sunscreen that protects against both UVA and UVB radiation with an SPF of 30 or greater. Reapply every 2 to 3 hours or after sweating, drying off with a towel, or swimming. Always wear a seat belt when traveling in a car. Always wear a helmet when riding a bicycle or motorcycle.

## 2023-06-08 NOTE — PROGRESS NOTES
Medicare Annual Wellness Visit    Debora Cardoso is here for Medicare AWV and 3 Month Follow-Up (Anxiety, HTN, HLD - no concerns )    Assessment & Plan   Medicare annual wellness visit, subsequent  AWV today   HM reviewed   Fasting for labs - ordered   Moderate persistent asthma without complication  Chronic, uncontrolled. Discussed options in detail. Will check coverage of trelegy. Stop advair. Starting on high dose and well decrease dose once she has better control. Okay to use albuterol as needed. Discussed steroids - feeling effects of steroids from epidural injection and does not feel she needs them at this time. -     fluticasone-umeclidin-vilant (TRELEGY ELLIPTA) 200-62.5-25 MCG/ACT AEPB inhaler; Inhale 1 puff into the lungs daily, Disp-1 each, R-0Normal  Essential hypertension/ Coronary artery disease involving native coronary artery of native heart without angina pectoris/ Pure hypercholesterolemia/ Anxiety  Chronic, stable, controlled. -     Basic Metabolic Panel; Future  -     Lipid Panel; Future    Controlled Substance Monitoring:    Acute and Chronic Pain Monitoring:   RX Monitoring 6/8/2023   Attestation -   Periodic Controlled Substance Monitoring Possible medication side effects, risk of tolerance/dependence & alternative treatments discussed. ;No signs of potential drug abuse or diversion identified. Recommendations for Preventive Services Due: see orders and patient instructions/AVS.  Recommended screening schedule for the next 5-10 years is provided to the patient in written form: see Patient Instructions/AVS.     Return in about 3 weeks (around 6/29/2023), or if symptoms worsen or fail to improve, for asthma . Subjective   The following acute and/or chronic problems were also addressed today:    HTN: The patient is tolerating blood pressure medication well and taking them as directed. BP control outside of the office is reported as well controlled.  No symptoms concerning for end

## 2023-06-09 ENCOUNTER — ANTI-COAG VISIT (OUTPATIENT)
Dept: PHARMACY | Age: 78
End: 2023-06-09
Payer: MEDICARE

## 2023-06-09 DIAGNOSIS — I48.0 PAF (PAROXYSMAL ATRIAL FIBRILLATION) (HCC): Primary | ICD-10-CM

## 2023-06-09 DIAGNOSIS — Z86.711 HISTORY OF PULMONARY EMBOLISM: Chronic | ICD-10-CM

## 2023-06-09 LAB — INTERNATIONAL NORMALIZATION RATIO, POC: 1.2

## 2023-06-09 PROCEDURE — 85610 PROTHROMBIN TIME: CPT

## 2023-06-09 PROCEDURE — 99212 OFFICE O/P EST SF 10 MIN: CPT

## 2023-06-09 NOTE — PROGRESS NOTES
Ms. Darell Helm is a 69 y.o. y/o female with history of DVT, PE, Afib   She presents today for anticoagulation monitoring and adjustment. Pertinent PMH: HTN, Gastric Banding,CAD, diskectomy with an artifical spinal disk implant in September 2012. Patient Reported Findings:  Yes     No  [x]   []       Patient verifies current dosing regimen as listed- confirms --> believes that she was taking 10 mg on Wed not 5 mg --> verified taking 5 mg on Wed and 7.5 mg all other days of the week   []   [x]       S/S bleeding/bruising/swelling/SOB states that she has been wheezing more and had more SOB d/t allergies --> has had a few small nose bleeds recently  --> bruises from lovenox injections ---> denies   []   [x]       Blood in urine or stool  [x]   []       Procedures scheduled in the future at this time having epidural on 6/1, cleared to hold warfarin 7 days and bridge with lovenox   []   [x]       Missed Dose - denies  []   [x]       Extra Dose - denies   [x]   []       Change in medications  decreased tylenol intake to 1-2 times a day --> has not used propafenone in the past week. Stopped spiriva -->  has been taking more tylenol 6/day --> no changes, continues with tylenol --> stopped cranberry capsule 3-4 weeks ago --> was on cipro (dose adj). Restarted cranberry supplement --> was on cipro x 5 d 4/10 --> started trelegy   []   [x]       Change in health/diet/appetite  Patient states that she feels like she has returned to normal vit k intake --> diet fluctuates causing INR to fluctuate.  Discussed ways to improve consistency with vit k intake  --> states that she has cut back spinach and cabbage intake to twice a week --> has been trying to eat more vit k, salads, broccoli --> has had less vit k than normal -->not much vit k and plans to continue --> has had more salads, asparagus, and broccoli this week --> had more vit k than normal last week --> no changes --> has been losing weight, less appetite --> no

## 2023-06-19 ENCOUNTER — ANTI-COAG VISIT (OUTPATIENT)
Dept: PHARMACY | Age: 78
End: 2023-06-19
Payer: MEDICARE

## 2023-06-19 DIAGNOSIS — I48.0 PAF (PAROXYSMAL ATRIAL FIBRILLATION) (HCC): Primary | ICD-10-CM

## 2023-06-19 DIAGNOSIS — Z86.711 HISTORY OF PULMONARY EMBOLISM: Chronic | ICD-10-CM

## 2023-06-19 LAB — INTERNATIONAL NORMALIZATION RATIO, POC: 2.1

## 2023-06-19 PROCEDURE — 99211 OFF/OP EST MAY X REQ PHY/QHP: CPT

## 2023-06-19 PROCEDURE — 85610 PROTHROMBIN TIME: CPT

## 2023-06-19 NOTE — PROGRESS NOTES
Ms. Michelle Varghese is a 69 y.o. y/o female with history of DVT, PE, Afib   She presents today for anticoagulation monitoring and adjustment. Pertinent PMH: HTN, Gastric Banding,CAD, diskectomy with an artifical spinal disk implant in September 2012. Patient Reported Findings:  Yes     No  [x]   []       Patient verifies current dosing regimen as listed- confirms --> believes that she was taking 10 mg on Wed not 5 mg --> verified taking 5 mg on Wed and 7.5 mg all other days of the week ---> confirmed   []   [x]       S/S bleeding/bruising/swelling/SOB states that she has been wheezing more and had more SOB d/t allergies --> has had a few small nose bleeds recently  --> bruises from lovenox injections ---> does have a lot of bruising  ---> improving   []   [x]       Blood in urine or stool  [x]   []       Procedures scheduled in the future at this time having epidural on 6/1, cleared to hold warfarin 7 days and bridge with lovenox   []   [x]       Missed Dose - denies  []   [x]       Extra Dose - denies   []   [x]       Change in medications  decreased tylenol intake to 1-2 times a day --> has not used propafenone in the past week. Stopped spiriva -->  has been taking more tylenol 6/day --> no changes, continues with tylenol --> stopped cranberry capsule 3-4 weeks ago --> was on cipro (dose adj). Restarted cranberry supplement --> was on cipro x 5 d 4/10 --> started trelegy --> no changes   [x]   []       Change in health/diet/appetite  Patient states that she feels like she has returned to normal vit k intake --> diet fluctuates causing INR to fluctuate.  Discussed ways to improve consistency with vit k intake  --> states that she has cut back spinach and cabbage intake to twice a week --> has been trying to eat more vit k, salads, broccoli --> has had less vit k than normal -->not much vit k and plans to continue --> has had more salads, asparagus, and broccoli this week --> had more vit k than normal last

## 2023-06-20 DIAGNOSIS — F41.9 ANXIETY: ICD-10-CM

## 2023-06-20 NOTE — TELEPHONE ENCOUNTER
Last OV: 6/8/2023  Next OV: 6/29/2023    Next appointment due:(around 6/29/2023    Last fill:3/31/23  Refills:0  90

## 2023-06-21 RX ORDER — CLONAZEPAM 1 MG/1
TABLET ORAL
Qty: 90 TABLET | Refills: 0 | Status: SHIPPED | OUTPATIENT
Start: 2023-06-21 | End: 2023-09-19

## 2023-06-21 NOTE — TELEPHONE ENCOUNTER
Controlled Substance Monitoring:    Acute and Chronic Pain Monitoring:   RX Monitoring 6/21/2023   Attestation -   Periodic Controlled Substance Monitoring No signs of potential drug abuse or diversion identified.

## 2023-06-22 RX ORDER — ALLOPURINOL 300 MG/1
300 TABLET ORAL DAILY
Qty: 100 TABLET | Refills: 2 | Status: SHIPPED | OUTPATIENT
Start: 2023-06-22

## 2023-06-26 ENCOUNTER — ANTI-COAG VISIT (OUTPATIENT)
Dept: PHARMACY | Age: 78
End: 2023-06-26
Payer: MEDICARE

## 2023-06-26 DIAGNOSIS — Z86.711 HISTORY OF PULMONARY EMBOLISM: Chronic | ICD-10-CM

## 2023-06-26 DIAGNOSIS — I48.0 PAF (PAROXYSMAL ATRIAL FIBRILLATION) (HCC): Primary | ICD-10-CM

## 2023-06-26 LAB — INTERNATIONAL NORMALIZATION RATIO, POC: 1.8

## 2023-06-26 PROCEDURE — 99211 OFF/OP EST MAY X REQ PHY/QHP: CPT

## 2023-06-26 PROCEDURE — 85610 PROTHROMBIN TIME: CPT

## 2023-06-29 ENCOUNTER — OFFICE VISIT (OUTPATIENT)
Dept: INTERNAL MEDICINE CLINIC | Age: 78
End: 2023-06-29

## 2023-06-29 VITALS
WEIGHT: 259 LBS | HEART RATE: 56 BPM | OXYGEN SATURATION: 94 % | BODY MASS INDEX: 44.46 KG/M2 | DIASTOLIC BLOOD PRESSURE: 76 MMHG | SYSTOLIC BLOOD PRESSURE: 124 MMHG

## 2023-06-29 DIAGNOSIS — J30.2 SEASONAL ALLERGIES: ICD-10-CM

## 2023-06-29 DIAGNOSIS — J45.40 MODERATE PERSISTENT ASTHMA WITHOUT COMPLICATION: ICD-10-CM

## 2023-06-29 DIAGNOSIS — M54.16 LUMBAR RADICULOPATHY: Primary | ICD-10-CM

## 2023-06-29 RX ORDER — FLUTICASONE PROPIONATE AND SALMETEROL 250; 50 UG/1; UG/1
1 POWDER RESPIRATORY (INHALATION) 2 TIMES DAILY
Qty: 60 EACH | Refills: 5 | Status: SHIPPED | OUTPATIENT
Start: 2023-06-29

## 2023-07-05 RX ORDER — VERAPAMIL HYDROCHLORIDE 240 MG/1
TABLET, FILM COATED, EXTENDED RELEASE ORAL
Qty: 90 TABLET | Refills: 3 | Status: SHIPPED | OUTPATIENT
Start: 2023-07-05

## 2023-07-06 ENCOUNTER — ANTI-COAG VISIT (OUTPATIENT)
Dept: PHARMACY | Age: 78
End: 2023-07-06
Payer: MEDICARE

## 2023-07-06 DIAGNOSIS — I48.0 PAF (PAROXYSMAL ATRIAL FIBRILLATION) (HCC): Primary | ICD-10-CM

## 2023-07-06 DIAGNOSIS — Z86.711 HISTORY OF PULMONARY EMBOLISM: Chronic | ICD-10-CM

## 2023-07-06 LAB — INTERNATIONAL NORMALIZATION RATIO, POC: 2.1

## 2023-07-06 PROCEDURE — 85610 PROTHROMBIN TIME: CPT

## 2023-07-06 PROCEDURE — 99211 OFF/OP EST MAY X REQ PHY/QHP: CPT

## 2023-07-06 NOTE — PROGRESS NOTES
Ms. Kady Barone is a 69 y.o. y/o female with history of DVT, PE, Afib   She presents today for anticoagulation monitoring and adjustment. Pertinent PMH: HTN, Gastric Banding,CAD, diskectomy with an artifical spinal disk implant in September 2012. Patient Reported Findings:  Yes     No  [x]   []       Patient verifies current dosing regimen as listed- confirms --> believes that she was taking 10 mg on Wed not 5 mg --> verified taking 5 mg on Wed and 7.5 mg all other days of the week ---> confirmed   []   [x]       S/S bleeding/bruising/swelling/SOB states that she has been wheezing more and had more SOB d/t allergies --> has had a few small nose bleeds recently  --> bruises from lovenox injections ---> does have a lot of bruising  ---> improving --> denies  []   [x]       Blood in urine or stool  []   [x]       Procedures scheduled in the future at this time having epidural on 6/1, cleared to hold warfarin 7 days and bridge with lovenox --> none upcoming  []   [x]       Missed Dose - denies  []   [x]       Extra Dose - denies   [x]   []       Change in medications  decreased tylenol intake to 1-2 times a day --> has not used propafenone in the past week. Stopped spiriva -->  has been taking more tylenol 6/day --> stopped cranberry capsule 3-4 weeks ago --> was on cipro (dose adj). Restarted cranberry supplement --> was on cipro x 5 d 4/10 --> started trelegy --> switched from trelegy to fluticasone   []   [x]       Change in health/diet/appetite  Patient states that she feels like she has returned to normal vit k intake --> diet fluctuates causing INR to fluctuate.  Discussed ways to improve consistency with vit k intake  --> states that she has cut back spinach and cabbage intake to twice a week --> has been trying to eat more vit k, salads, broccoli --> has had less vit k than normal -->not much vit k and plans to continue --> has had more salads, asparagus, and broccoli this week --> had more vit k than

## 2023-07-08 ENCOUNTER — PATIENT MESSAGE (OUTPATIENT)
Dept: INTERNAL MEDICINE CLINIC | Age: 78
End: 2023-07-08

## 2023-07-12 RX ORDER — WARFARIN SODIUM 5 MG/1
TABLET ORAL
Qty: 150 TABLET | Refills: 2 | Status: SHIPPED | OUTPATIENT
Start: 2023-07-12

## 2023-07-24 ENCOUNTER — OFFICE VISIT (OUTPATIENT)
Dept: INTERNAL MEDICINE CLINIC | Age: 78
End: 2023-07-24

## 2023-07-24 ENCOUNTER — TELEPHONE (OUTPATIENT)
Dept: PHARMACY | Age: 78
End: 2023-07-24

## 2023-07-24 VITALS — OXYGEN SATURATION: 97 % | SYSTOLIC BLOOD PRESSURE: 130 MMHG | DIASTOLIC BLOOD PRESSURE: 82 MMHG | HEART RATE: 72 BPM

## 2023-07-24 DIAGNOSIS — J06.0 ACUTE LARYNGOPHARYNGITIS: Primary | ICD-10-CM

## 2023-07-24 RX ORDER — AMOXICILLIN 500 MG/1
500 CAPSULE ORAL 2 TIMES DAILY
Qty: 14 CAPSULE | Refills: 0 | Status: SHIPPED | OUTPATIENT
Start: 2023-07-24 | End: 2023-07-31

## 2023-07-24 RX ORDER — METHYLPREDNISOLONE 4 MG/1
TABLET ORAL
Qty: 1 KIT | Refills: 0 | Status: SHIPPED | OUTPATIENT
Start: 2023-07-24

## 2023-07-24 NOTE — PROGRESS NOTES
baseline. Psychiatric:         Mood and Affect: Mood normal.         Behavior: Behavior normal.     Assessment/Plan:  1. Acute laryngopharyngitis  Comments:  acute, uncontrolled and worsening. covering with abx. reviewed supportive care with hydration and voice rest. steroids if not improving. Orders:  -     amoxicillin (AMOXIL) 500 MG capsule; Take 1 capsule by mouth 2 times daily for 7 days, Disp-14 capsule, R-0Normal  -     methylPREDNISolone (MEDROL DOSEPACK) 4 MG tablet; Take by mouth., Disp-1 kit, R-0Normal     Discussed medications with patient, who voiced understanding of their use and indications. All questions answered. Return in about 4 months (around 11/24/2023), or if symptoms worsen or fail to improve.       Electronically signed by AIME Nguyen CNP on 7/24/2023 at 1:01 PM

## 2023-07-27 ENCOUNTER — ANTI-COAG VISIT (OUTPATIENT)
Dept: PHARMACY | Age: 78
End: 2023-07-27
Payer: MEDICARE

## 2023-07-27 DIAGNOSIS — I48.0 PAF (PAROXYSMAL ATRIAL FIBRILLATION) (HCC): Primary | ICD-10-CM

## 2023-07-27 DIAGNOSIS — Z86.711 HISTORY OF PULMONARY EMBOLISM: Chronic | ICD-10-CM

## 2023-07-27 LAB — INTERNATIONAL NORMALIZATION RATIO, POC: 1.8

## 2023-07-27 PROCEDURE — 85610 PROTHROMBIN TIME: CPT

## 2023-07-27 PROCEDURE — 99212 OFFICE O/P EST SF 10 MIN: CPT

## 2023-07-27 NOTE — PROGRESS NOTES
boost/ensure---> no greens, no NVD ---> no changes, back to normal diet --> no changes   []   [x]       Change in alcohol use    []   [x]       Change in activity  []   [x]       Hospital admission  []   [x]       Emergency department visit    []   [x]       Other complaints     Clinical Outcomes:  Yes     No  []   [x]       Major bleeding event  []   [x]       Thromboembolic event  Gets warfarin through MD    Duration of warfarin Therapy: indefinite  INR Range:  2.0-3.0     INR is 1.8 today   Increase weekly dose to 10 mg on Mon and Fri and 7.5 mg all other days of the week (4.5% inc)  Encouraged to maintain a consistency of vegetables/salads.   Recheck INR in 2 weeks, 8/10    Does not need PW printed, only card  --> provided with AVS     Patient consent signed 1/12/23    Referring PCP is Garrison Chavez  INR (no units)   Date Value   12/28/2022 3.20 (H)   07/15/2022 1.10   10/05/2021 2.50 (H)     INR,(POC) (no units)   Date Value   07/06/2023 2.1   06/26/2023 1.8   06/19/2023 2.1     For Pharmacy Admin Tracking Only    Intervention Detail: Dose Adjustment: 1, reason: Therapy Optimization  Total # of Interventions Recommended: 1  Total # of Interventions Accepted: 1  Time Spent (min): 15

## 2023-08-10 ENCOUNTER — ANTI-COAG VISIT (OUTPATIENT)
Dept: PHARMACY | Age: 78
End: 2023-08-10
Payer: MEDICARE

## 2023-08-10 DIAGNOSIS — Z86.711 HISTORY OF PULMONARY EMBOLISM: Chronic | ICD-10-CM

## 2023-08-10 DIAGNOSIS — I48.0 PAF (PAROXYSMAL ATRIAL FIBRILLATION) (HCC): Primary | ICD-10-CM

## 2023-08-10 LAB — INTERNATIONAL NORMALIZATION RATIO, POC: 1.5

## 2023-08-10 PROCEDURE — 85610 PROTHROMBIN TIME: CPT

## 2023-08-10 PROCEDURE — 99211 OFF/OP EST MAY X REQ PHY/QHP: CPT

## 2023-08-10 NOTE — PROGRESS NOTES
Ms. Keyur Nolan is a 69 y.o. y/o female with history of DVT, PE, Afib   She presents today for anticoagulation monitoring and adjustment. Pertinent PMH: HTN, Gastric Banding,CAD, diskectomy with an artifical spinal disk implant in September 2012. Patient Reported Findings:  Yes     No  [x]   []       Patient verifies current dosing regimen as listed- confirms --> believes that she was taking 10 mg on Wed not 5 mg --> verified taking 5 mg on Wed and 7.5 mg all other days of the week ---> confirmed   []   [x]       S/S bleeding/bruising/swelling/SOB states that she has been wheezing more and had more SOB d/t allergies --> has had a few small nose bleeds recently  --> bruises from lovenox injections ---> does have a lot of bruising  ---> improving --> denies  []   [x]       Blood in urine or stool  [x]   []       Procedures scheduled in the future at this time having epidural on 6/1, cleared to hold warfarin 7 days and bridge with lovenox --> had cortisone shot in hip last week. INR 1.9 per pt.   []   [x]       Missed Dose - denies  []   [x]       Extra Dose - denies   []   [x]       Change in medications  decreased tylenol intake to 1-2 times a day --> has not used propafenone in the past week. Stopped spiriva -->  has been taking more tylenol 6/day --> stopped cranberry capsule 3-4 weeks ago --> Restarted cranberry supplement --> started trelegy --> switched from trelegy to fluticasone --> amoxicillin and medrol dose vaibhav 7/24. Did not take medrol dose vaibhav since feeling better --> no changes   [x]   []       Change in health/diet/appetite  Patient states that she feels like she has returned to normal vit k intake --> diet fluctuates causing INR to fluctuate.  Discussed ways to improve consistency with vit k intake  --> states that she has cut back spinach and cabbage intake to twice a week --> has been trying to eat more vit k, salads, broccoli --> has been losing weight, less appetite --> no vit k

## 2023-08-17 ENCOUNTER — OFFICE VISIT (OUTPATIENT)
Dept: INTERNAL MEDICINE CLINIC | Age: 78
End: 2023-08-17

## 2023-08-17 VITALS
SYSTOLIC BLOOD PRESSURE: 134 MMHG | DIASTOLIC BLOOD PRESSURE: 80 MMHG | HEART RATE: 58 BPM | WEIGHT: 261.2 LBS | BODY MASS INDEX: 44.83 KG/M2 | OXYGEN SATURATION: 96 %

## 2023-08-17 DIAGNOSIS — M79.89 PAIN AND SWELLING OF LOWER LEG, RIGHT: Primary | ICD-10-CM

## 2023-08-17 DIAGNOSIS — M79.661 PAIN AND SWELLING OF LOWER LEG, RIGHT: ICD-10-CM

## 2023-08-17 DIAGNOSIS — I48.0 PAF (PAROXYSMAL ATRIAL FIBRILLATION) (HCC): ICD-10-CM

## 2023-08-17 DIAGNOSIS — M79.89 PAIN AND SWELLING OF LOWER LEG, RIGHT: ICD-10-CM

## 2023-08-17 DIAGNOSIS — M79.661 PAIN AND SWELLING OF LOWER LEG, RIGHT: Primary | ICD-10-CM

## 2023-08-17 LAB
D DIMER: 0.32 UG/ML FEU (ref 0–0.6)
INR PPP: 1.86 (ref 0.84–1.16)
PROTHROMBIN TIME: 21.4 SEC (ref 11.5–14.8)

## 2023-08-17 ASSESSMENT — ENCOUNTER SYMPTOMS
COUGH: 0
PHOTOPHOBIA: 0
WHEEZING: 0

## 2023-08-17 NOTE — PROGRESS NOTES
Subjective:      Chief Complaint   Patient presents with    Leg Swelling     Right leg and foot swelling - no pain, warmth, redness x 3 weeks. SOB per baseline - some worse since swelling starting. No SOB. Patient ID: Vicky Davis is a 69 y.o. female. HPI  Patient has been complaining of gradual onset of swelling of the right leg as well as foot over the last 3 weeks. Patient reported history of deep venous thrombosis as well as pulmonary embolism. She has been taking warfarin. Recent INR about a week ago was 1.5. Patient reports she has history of Gavin filter  Patient denies any worsening shortness of breath from baseline. Patient denies chest pain dizziness nausea vomiting sweating or systemic symptoms. Wt Readings from Last 3 Encounters:   08/17/23 261 lb 3.2 oz (118.5 kg)   06/29/23 259 lb (117.5 kg)   06/08/23 255 lb (115.7 kg)       Review of Systems   Constitutional:  Negative for diaphoresis, fever and unexpected weight change. Eyes:  Negative for photophobia and visual disturbance. Respiratory:  Negative for cough and wheezing. Cardiovascular:  Positive for leg swelling. Negative for chest pain and palpitations. Neurological:  Negative for dizziness and light-headedness. Vitals:    08/17/23 1430   BP: 134/80   Site: Left Upper Arm   Pulse: 58   SpO2: 96%   Weight: 261 lb 3.2 oz (118.5 kg)       Objective:   Physical Exam  Vitals and nursing note reviewed. Constitutional:       General: She is not in acute distress. Appearance: She is obese. She is not ill-appearing. Cardiovascular:      Rate and Rhythm: Normal rate. Heart sounds: Murmur heard. Comments: History of aortic stenosis murmur. She gets periodic follow-up with cardiologist.  Pulmonary:      Effort: Pulmonary effort is normal.      Breath sounds: No wheezing or rhonchi. Abdominal:      General: Bowel sounds are normal.   Musculoskeletal:      Right lower leg: Edema present.

## 2023-08-18 ENCOUNTER — HOSPITAL ENCOUNTER (OUTPATIENT)
Dept: VASCULAR LAB | Age: 78
Discharge: HOME | End: 2023-08-18
Attending: INTERNAL MEDICINE
Payer: MEDICARE

## 2023-08-18 DIAGNOSIS — I48.0 PAF (PAROXYSMAL ATRIAL FIBRILLATION) (HCC): ICD-10-CM

## 2023-08-18 DIAGNOSIS — M79.661 PAIN AND SWELLING OF LOWER LEG, RIGHT: ICD-10-CM

## 2023-08-18 DIAGNOSIS — M79.89 PAIN AND SWELLING OF LOWER LEG, RIGHT: ICD-10-CM

## 2023-08-18 LAB
ANION GAP SERPL CALCULATED.3IONS-SCNC: 13 MMOL/L (ref 3–16)
BUN SERPL-MCNC: 13 MG/DL (ref 7–20)
CALCIUM SERPL-MCNC: 9.6 MG/DL (ref 8.3–10.6)
CHLORIDE SERPL-SCNC: 92 MMOL/L (ref 99–110)
CO2 SERPL-SCNC: 28 MMOL/L (ref 21–32)
CREAT SERPL-MCNC: 0.7 MG/DL (ref 0.6–1.2)
DEPRECATED RDW RBC AUTO: 14.6 % (ref 12.4–15.4)
GFR SERPLBLD CREATININE-BSD FMLA CKD-EPI: >60 ML/MIN/{1.73_M2}
GLUCOSE SERPL-MCNC: 90 MG/DL (ref 70–99)
HCT VFR BLD AUTO: 39.8 % (ref 36–48)
HGB BLD-MCNC: 14.2 G/DL (ref 12–16)
MCH RBC QN AUTO: 33.3 PG (ref 26–34)
MCHC RBC AUTO-ENTMCNC: 35.6 G/DL (ref 31–36)
MCV RBC AUTO: 93.5 FL (ref 80–100)
PLATELET # BLD AUTO: 259 K/UL (ref 135–450)
PMV BLD AUTO: 7.1 FL (ref 5–10.5)
POTASSIUM SERPL-SCNC: 3.7 MMOL/L (ref 3.5–5.1)
RBC # BLD AUTO: 4.25 M/UL (ref 4–5.2)
SODIUM SERPL-SCNC: 133 MMOL/L (ref 136–145)
WBC # BLD AUTO: 5.8 K/UL (ref 4–11)

## 2023-08-18 PROCEDURE — 93971 EXTREMITY STUDY: CPT

## 2023-08-18 NOTE — RESULT ENCOUNTER NOTE
Pending other labs including Doppler study today. INR is still subtherapeutic. We will forward lab to Coumadin clinic. Slightly low sodium likely related to medication indapamide.   Need to keep eye on sodium level periodically

## 2023-08-24 ENCOUNTER — ANTI-COAG VISIT (OUTPATIENT)
Dept: PHARMACY | Age: 78
End: 2023-08-24
Payer: MEDICARE

## 2023-08-24 DIAGNOSIS — Z86.711 HISTORY OF PULMONARY EMBOLISM: Chronic | ICD-10-CM

## 2023-08-24 DIAGNOSIS — I48.0 PAF (PAROXYSMAL ATRIAL FIBRILLATION) (HCC): Primary | ICD-10-CM

## 2023-08-24 LAB — INTERNATIONAL NORMALIZATION RATIO, POC: 1.8

## 2023-08-24 PROCEDURE — 85610 PROTHROMBIN TIME: CPT

## 2023-08-24 PROCEDURE — 99212 OFFICE O/P EST SF 10 MIN: CPT

## 2023-08-24 NOTE — PROGRESS NOTES
Ms. Farhan Alcantara is a 69 y.o. y/o female with history of DVT, PE, Afib   She presents today for anticoagulation monitoring and adjustment. Pertinent PMH: HTN, Gastric Banding,CAD, diskectomy with an artifical spinal disk implant in September 2012. Patient Reported Findings:  Yes     No  [x]   []       Patient verifies current dosing regimen as listed- confirms --> believes that she was taking 10 mg on Wed not 5 mg --> verified taking 5 mg on Wed and 7.5 mg all other days of the week ---> confirmed   []   [x]       S/S bleeding/bruising/swelling/SOB states that she has been wheezing more and had more SOB d/t allergies --> has had a few small nose bleeds recently  --> bruises from lovenox injections ---> does have a lot of bruising  ---> improving --> denies  []   [x]       Blood in urine or stool  [x]   []       Procedures scheduled in the future at this time having epidural on 6/1, cleared to hold warfarin 7 days and bridge with lovenox --> had cortisone shot in hip last week. INR 1.9 per pt.   []   [x]       Missed Dose - denies  []   [x]       Extra Dose - denies   []   [x]       Change in medications  decreased tylenol intake to 1-2 times a day --> has not used propafenone in the past week. Stopped spiriva -->  has been taking more tylenol 6/day --> stopped cranberry capsule 3-4 weeks ago --> Restarted cranberry supplement --> started trelegy --> switched from trelegy to fluticasone --> amoxicillin and medrol dose vaibhav 7/24. Did not take medrol dose vaibhav since feeling better --> no changes   [x]   []       Change in health/diet/appetite  Patient states that she feels like she has returned to normal vit k intake --> diet fluctuates causing INR to fluctuate.  Discussed ways to improve consistency with vit k intake  --> states that she has cut back spinach and cabbage intake to twice a week --> has been trying to eat more vit k, salads, broccoli --> has been losing weight, less appetite --> no vit k

## 2023-08-28 RX ORDER — ATORVASTATIN CALCIUM 80 MG/1
TABLET, FILM COATED ORAL
Qty: 100 TABLET | Refills: 2 | OUTPATIENT
Start: 2023-08-28

## 2023-09-06 ENCOUNTER — ANTI-COAG VISIT (OUTPATIENT)
Dept: PHARMACY | Age: 78
End: 2023-09-06
Payer: MEDICARE

## 2023-09-06 DIAGNOSIS — Z86.711 HISTORY OF PULMONARY EMBOLISM: Chronic | ICD-10-CM

## 2023-09-06 DIAGNOSIS — I48.0 PAF (PAROXYSMAL ATRIAL FIBRILLATION) (HCC): Primary | ICD-10-CM

## 2023-09-06 LAB — INTERNATIONAL NORMALIZATION RATIO, POC: 2.1

## 2023-09-06 PROCEDURE — 99211 OFF/OP EST MAY X REQ PHY/QHP: CPT

## 2023-09-06 PROCEDURE — 85610 PROTHROMBIN TIME: CPT

## 2023-09-06 RX ORDER — ALBUTEROL SULFATE 90 UG/1
AEROSOL, METERED RESPIRATORY (INHALATION)
Qty: 8.5 G | Refills: 2 | Status: SHIPPED | OUTPATIENT
Start: 2023-09-06

## 2023-09-06 NOTE — TELEPHONE ENCOUNTER
Pt calling requesting refill of ProAir Inhaler. Last OV 7/24/23  Next OV 11/27/23    Please send to [facility] on [address]

## 2023-09-06 NOTE — PROGRESS NOTES
Ms. Keyur Nolan is a 69 y.o. y/o female with history of DVT, PE, Afib   She presents today for anticoagulation monitoring and adjustment. Pertinent PMH: HTN, Gastric Banding,CAD, diskectomy with an artifical spinal disk implant in September 2012. Patient Reported Findings:  Yes     No  []   [x]       Patient verifies current dosing regimen as listed- confirms --> believes that she was taking 10 mg on Wed not 5 mg --> verified taking 5 mg on Wed and 7.5 mg all other days of the week ---> did not increase weekly dose   []   [x]       S/S bleeding/bruising/swelling/SOB states that she has been wheezing more and had more SOB d/t allergies --> has had a few small nose bleeds recently  --> bruises from lovenox injections ---> does have a lot of bruising  ---> improving --> denies  []   [x]       Blood in urine or stool  [x]   []       Procedures scheduled in the future at this time having epidural on 6/1, cleared to hold warfarin 7 days and bridge with lovenox --> had cortisone shot in hip last week. INR 1.9 per pt.   []   [x]       Missed Dose - denies  []   [x]       Extra Dose - denies   []   [x]       Change in medications  decreased tylenol intake to 1-2 times a day --> has not used propafenone in the past week. Stopped spiriva -->  has been taking more tylenol 6/day --> stopped cranberry capsule 3-4 weeks ago --> Restarted cranberry supplement --> started trelegy --> switched from trelegy to fluticasone --> amoxicillin and medrol dose vaibhav 7/24. Did not take medrol dose vaibhav since feeling better --> no changes   []   [x]       Change in health/diet/appetite  Patient states that she feels like she has returned to normal vit k intake --> diet fluctuates causing INR to fluctuate.  Discussed ways to improve consistency with vit k intake  --> states that she has cut back spinach and cabbage intake to twice a week --> has been trying to eat more vit k, salads, broccoli --> has been losing weight, less appetite

## 2023-09-08 ENCOUNTER — TELEPHONE (OUTPATIENT)
Dept: ADMINISTRATIVE | Age: 78
End: 2023-09-08

## 2023-09-08 NOTE — TELEPHONE ENCOUNTER
Submitted PA for Albuterol Sulfate    [facility]ia [facility]: [id number] STATUS: PENDING. Follow up done daily; if no response in three days we will refax for status check. If another three days goes by with no response we will call the insurance for status.

## 2023-09-10 DIAGNOSIS — F41.9 ANXIETY: ICD-10-CM

## 2023-09-11 RX ORDER — CLONAZEPAM 1 MG/1
TABLET ORAL
Qty: 90 TABLET | Refills: 0 | Status: SHIPPED | OUTPATIENT
Start: 2023-09-11 | End: 2023-12-10

## 2023-09-11 RX ORDER — TRIMETHOPRIM 100 MG/1
100 TABLET ORAL
Qty: 40 TABLET | Refills: 1 | Status: SHIPPED | OUTPATIENT
Start: 2023-09-11

## 2023-09-12 NOTE — TELEPHONE ENCOUNTER
The medication was DENIED; DENIAL letter uploaded to [employer]. If you want an APPEAL; please note in this encounter what new information you would like to APPEAL with. Once complete route back to PA POOL. If this requires a response please respond to the pool ( [phone]). Thank you please advise patient.

## 2023-09-20 ENCOUNTER — ANTI-COAG VISIT (OUTPATIENT)
Dept: PHARMACY | Age: 78
End: 2023-09-20
Payer: MEDICARE

## 2023-09-20 DIAGNOSIS — I48.0 PAF (PAROXYSMAL ATRIAL FIBRILLATION) (HCC): Primary | ICD-10-CM

## 2023-09-20 DIAGNOSIS — Z86.711 HISTORY OF PULMONARY EMBOLISM: Chronic | ICD-10-CM

## 2023-09-20 LAB — INTERNATIONAL NORMALIZATION RATIO, POC: 2.4

## 2023-09-20 PROCEDURE — 85610 PROTHROMBIN TIME: CPT

## 2023-09-20 PROCEDURE — 99211 OFF/OP EST MAY X REQ PHY/QHP: CPT

## 2023-09-20 NOTE — PROGRESS NOTES
Labs look fine   Ms. Sedrick Gill is a 69 y.o. y/o female with history of DVT, PE, Afib   She presents today for anticoagulation monitoring and adjustment. Pertinent PMH: HTN, Gastric Banding,CAD, diskectomy with an artifical spinal disk implant in September 2012. Patient Reported Findings:  Yes     No  [x]   []       Patient verifies current dosing regimen as listed- confirms --> believes that she was taking 10 mg on Wed not 5 mg --> verified taking 5 mg on Wed and 7.5 mg all other days of the week ---> did not increase weekly dose --> verified dose   []   [x]       S/S bleeding/bruising/swelling/SOB states that she has been wheezing more and had more SOB d/t allergies --> has had a few small nose bleeds recently  --> bruises from lovenox injections ---> does have a lot of bruising  ---> improving --> denies  []   [x]       Blood in urine or stool  [x]   []       Procedures scheduled in the future at this time having epidural on 6/1, cleared to hold warfarin 7 days and bridge with lovenox --> had cortisone shot in hip last week. INR 1.9 per pt.   []   [x]       Missed Dose - denies  []   [x]       Extra Dose - denies   []   [x]       Change in medications  decreased tylenol intake to 1-2 times a day --> has not used propafenone in the past week. Stopped spiriva -->  has been taking more tylenol 6/day --> stopped cranberry capsule 3-4 weeks ago --> Restarted cranberry supplement --> started trelegy --> switched from trelegy to fluticasone --> amoxicillin and medrol dose vaibhav 7/24. Did not take medrol dose vaibhav since feeling better --> no changes   []   [x]       Change in health/diet/appetite  Patient states that she feels like she has returned to normal vit k intake --> diet fluctuates causing INR to fluctuate.  Discussed ways to improve consistency with vit k intake  --> states that she has cut back spinach and cabbage intake to twice a week --> has been trying to eat more vit k, salads, broccoli --> has been losing

## 2023-09-25 RX ORDER — PANTOPRAZOLE SODIUM 40 MG/1
TABLET, DELAYED RELEASE ORAL
Qty: 100 TABLET | Refills: 2 | Status: SHIPPED | OUTPATIENT
Start: 2023-09-25

## 2023-09-25 RX ORDER — INDAPAMIDE 1.25 MG/1
TABLET ORAL
Qty: 100 TABLET | Refills: 2 | Status: SHIPPED | OUTPATIENT
Start: 2023-09-25

## 2023-10-02 ENCOUNTER — TELEPHONE (OUTPATIENT)
Dept: INTERNAL MEDICINE CLINIC | Age: 78
End: 2023-10-02

## 2023-10-02 RX ORDER — BUSPIRONE HYDROCHLORIDE 7.5 MG/1
7.5 TABLET ORAL 2 TIMES DAILY PRN
Qty: 60 TABLET | Refills: 0 | Status: SHIPPED | OUTPATIENT
Start: 2023-10-02 | End: 2023-11-01

## 2023-10-02 NOTE — TELEPHONE ENCOUNTER
Pt called stating that her son is in [facility] ICU due to having a heart attack. She states that she has been shaking since Friday, \"is a nervous wreck\" and was crying on the phone. She wants to know if Emily Barrera can give her anything to calm her nerves. She states that she has to drive so she doesn't want anything that will make her sleepy. Please advise. [phone].

## 2023-10-02 NOTE — TELEPHONE ENCOUNTER
I am sending over a Rx for buspar to use as needed for anxiety . She is already on celexa and klonopin nightly prn for anxiety / sleep. Recommend an appt in the next 1-2 weeks to discuss further.  Jose De Jesus Camacho

## 2023-10-12 ENCOUNTER — HOSPITAL ENCOUNTER (OUTPATIENT)
Dept: NON INVASIVE DIAGNOSTICS | Age: 78
Discharge: HOME | End: 2023-10-12
Payer: MEDICARE

## 2023-10-12 ENCOUNTER — ANTI-COAG VISIT (OUTPATIENT)
Dept: PHARMACY | Age: 78
End: 2023-10-12
Payer: MEDICARE

## 2023-10-12 DIAGNOSIS — I48.0 PAF (PAROXYSMAL ATRIAL FIBRILLATION) (HCC): Primary | ICD-10-CM

## 2023-10-12 DIAGNOSIS — Z86.711 HISTORY OF PULMONARY EMBOLISM: Chronic | ICD-10-CM

## 2023-10-12 DIAGNOSIS — I35.0 NONRHEUMATIC AORTIC VALVE STENOSIS: ICD-10-CM

## 2023-10-12 LAB — INTERNATIONAL NORMALIZATION RATIO, POC: 5.2

## 2023-10-12 PROCEDURE — 93306 TTE W/DOPPLER COMPLETE: CPT

## 2023-10-12 PROCEDURE — 99212 OFFICE O/P EST SF 10 MIN: CPT

## 2023-10-12 PROCEDURE — 85610 PROTHROMBIN TIME: CPT

## 2023-10-12 NOTE — PROGRESS NOTES
Ms. Lazara Castellanos is a 69 y.o. y/o female with history of DVT, PE, Afib   She presents today for anticoagulation monitoring and adjustment. Pertinent PMH: HTN, Gastric Banding,CAD, diskectomy with an artifical spinal disk implant in September 2012. Patient Reported Findings:  Yes     No  [x]   []       Patient verifies current dosing regimen as listed- confirms --> believes that she was taking 10 mg on Wed not 5 mg --> verified taking 5 mg on Wed and 7.5 mg all other days of the week ---> did not increase weekly dose --> verified dose   []   [x]       S/S bleeding/bruising/swelling/SOB states that she has been wheezing more and had more SOB d/t allergies --> has had a few small nose bleeds recently  --> bruises from lovenox injections ---> does have a lot of bruising  ---> improving --> denies  []   [x]       Blood in urine or stool  [x]   []       Procedures scheduled in the future at this time having epidural on 6/1, cleared to hold warfarin 7 days and bridge with lovenox --> had cortisone shot in hip last week. INR 1.9 per pt.   []   [x]       Missed Dose - denies  []   [x]       Extra Dose - denies   [x]   []       Change in medications  decreased tylenol intake to 1-2 times a day --> has not used propafenone in the past week. Stopped spiriva -->  has been taking more tylenol 6/day --> stopped cranberry capsule 3-4 weeks ago --> Restarted cranberry supplement --> started trelegy --> switched from trelegy to fluticasone --> amoxicillin and medrol dose vaibhav 7/24. Did not take medrol dose vaibhav since feeling better --> was given buspar for acute stress from son (no interaction)  [x]   []       Change in health/diet/appetite  Patient states that she feels like she has returned to normal vit k intake --> diet fluctuates causing INR to fluctuate.  Discussed ways to improve consistency with vit k intake  --> states that she has cut back spinach and cabbage intake to twice a week --> has been trying to eat more

## 2023-10-16 NOTE — TELEPHONE ENCOUNTER
Last OV: 8/17/2023  Next OV: 11/27/2023    Next appointment due:na    Last fill:1/15/23  Refills:3 # [phone]

## 2023-10-18 RX ORDER — MONTELUKAST SODIUM 10 MG/1
TABLET ORAL
Qty: 100 TABLET | Refills: 2 | Status: SHIPPED | OUTPATIENT
Start: 2023-10-18

## 2023-10-26 ENCOUNTER — TELEPHONE (OUTPATIENT)
Dept: PHARMACY | Age: 78
End: 2023-10-26

## 2023-10-26 ENCOUNTER — APPOINTMENT (OUTPATIENT)
Dept: PHARMACY | Age: 78
End: 2023-10-26
Payer: MEDICARE

## 2023-10-27 ENCOUNTER — ANTI-COAG VISIT (OUTPATIENT)
Dept: PHARMACY | Age: 78
End: 2023-10-27
Payer: MEDICARE

## 2023-10-27 DIAGNOSIS — Z86.711 HISTORY OF PULMONARY EMBOLISM: Chronic | ICD-10-CM

## 2023-10-27 DIAGNOSIS — I48.0 PAF (PAROXYSMAL ATRIAL FIBRILLATION) (HCC): Primary | ICD-10-CM

## 2023-10-27 LAB — INTERNATIONAL NORMALIZATION RATIO, POC: 3.6

## 2023-10-27 PROCEDURE — 85610 PROTHROMBIN TIME: CPT

## 2023-10-27 PROCEDURE — 99212 OFFICE O/P EST SF 10 MIN: CPT

## 2023-10-27 NOTE — PROGRESS NOTES
--> has been trying to eat more vit k, salads, broccoli --> has been losing weight, less appetite --> no vit k recently,no boost/ensure---> no greens, no NVD ---> no changes, back to normal diet --> had liver twice recently --> had brussel sprouts last night  --> has lost 22lbs d/t stress. Not eating much --> appetite still diminished d/t stress   []   [x]       Change in alcohol use    []   [x]       Change in activity  []   [x]       Hospital admission  []   [x]       Emergency department visit    []   [x]       Other complaints     Clinical Outcomes:  Yes     No  []   [x]       Major bleeding event  []   [x]       Thromboembolic event  Gets warfarin through MD    Duration of warfarin Therapy: indefinite  INR Range:  2.0-3.0     INR is 3.6 today   Hold dose tonight then decrease weekly dose to 7.5 mg daily (8% dec)  Encouraged to maintain a consistency of vegetables/salads.   Recheck INR in 2 weeks, 11/10    Does not need PW printed, only card --> provided with AVS d/t taking incorrect dose this visit     Patient consent signed 1/12/23    Referring PCP is Romayne Knows  INR (no units)   Date Value   08/17/2023 1.86 (H)   12/28/2022 3.20 (H)   07/15/2022 1.10     INR,(POC) (no units)   Date Value   10/12/2023 5.2   09/20/2023 2.4   09/06/2023 2.1     For Pharmacy Admin Tracking Only    Intervention Detail: Dose Adjustment: 1, reason: Therapy Optimization  Total # of Interventions Recommended: 1  Total # of Interventions Accepted: 1  Time Spent (min): 15

## 2023-10-31 NOTE — PROGRESS NOTES
[facility]  H+P  Consult  OP Visit  FU Visit   CC/HPI   CC Followup visit for cardiac conditions detailed in assessment and plan below. Intervention None   General Doing fair. Concerned with worsening symptoms below over last few months. Cardiac Sx -CP, -syncope, +SOB, +dizziness, +edema, +orthopnea, +pnd, +fatigue   HISTORY/ALLERGY/ROS   M/S/S/F Hx Reviewed in Epic and updated as appropriate   ALLERG Belsomra [suvorexant], Avelox [moxifloxacin hcl in nacl], Levofloxacin, and Sulfa antibiotics   ROS Full ROS obtained and negative except as mentioned in HPI   MEDICATIONS   Cardiac medications reviewed in Epic during visit. PHYSICAL EXAM   Vitals There were no vitals taken for this visit. Gen Alert, coop, no distress Heart  RRR, 3/6   Lung CTA-B, unlabored, no DTP Extrem Edema -Grade 1 (2mm)      COMPLIANCE   Discussed and counseled on diet, exercise, weight loss, smoking, alcohol, drugs. All questions answered. CODING   SCI (82501) - 30-39 mins spent reviewing hx/tests/consults, performing exam, counseling/educating, ordering meds/tests/procedures, referring/communicating w/PCP/consultants, documenting in EMR, interpreting results, communicating medical information and plan with family. SCRIBE ATTESTATION   Nurse Capri Carroll RN, am scribing for and in the presence of Gillian Montanez MD. 10/31/2023   Doctor Susy Hoyt is working as scribe for and in presence of me and may have prepopulated components of note with my historical intellectual property (IP) under my supervision. Any additions to this IP performed in my presence and at my direction. Content and accuracy of this note reviewed by me Gillian Montanez MD).    ASSESSMENT AND PLAN   *AS    Date EF Detail   Sx     No concerning   NYHA     III   TTE 12/20  3/22  10/23 60%  65%  65% AS MG 23, Mild AI  AS MG 30, mild AI  AS MG 41, PV 4.21, mild AI, mild-mod MR   LHC 5/18   Normal cors, Aorta very tortuous Mary Ashwini)   MPI 4/18 69%

## 2023-11-02 ENCOUNTER — OFFICE VISIT (OUTPATIENT)
Dept: CARDIOLOGY CLINIC | Age: 78
End: 2023-11-02
Payer: MEDICARE

## 2023-11-02 VITALS
HEART RATE: 64 BPM | OXYGEN SATURATION: 97 % | HEIGHT: 64 IN | DIASTOLIC BLOOD PRESSURE: 70 MMHG | SYSTOLIC BLOOD PRESSURE: 128 MMHG | BODY MASS INDEX: 43.67 KG/M2 | WEIGHT: 255.8 LBS

## 2023-11-02 DIAGNOSIS — E78.00 HYPERCHOLESTEROLEMIA: ICD-10-CM

## 2023-11-02 DIAGNOSIS — R42 DIZZINESS: ICD-10-CM

## 2023-11-02 DIAGNOSIS — I48.0 PAF (PAROXYSMAL ATRIAL FIBRILLATION) (HCC): ICD-10-CM

## 2023-11-02 DIAGNOSIS — I35.0 AORTIC VALVE STENOSIS, NONRHEUMATIC: Primary | ICD-10-CM

## 2023-11-02 PROCEDURE — 99214 OFFICE O/P EST MOD 30 MIN: CPT | Performed by: INTERNAL MEDICINE

## 2023-11-02 PROCEDURE — 3074F SYST BP LT 130 MM HG: CPT | Performed by: INTERNAL MEDICINE

## 2023-11-02 PROCEDURE — 3078F DIAST BP <80 MM HG: CPT | Performed by: INTERNAL MEDICINE

## 2023-11-02 PROCEDURE — 1123F ACP DISCUSS/DSCN MKR DOCD: CPT | Performed by: INTERNAL MEDICINE

## 2023-11-06 ENCOUNTER — TELEPHONE (OUTPATIENT)
Dept: PHARMACY | Age: 78
End: 2023-11-06

## 2023-11-06 ENCOUNTER — TELEPHONE (OUTPATIENT)
Dept: CARDIOLOGY CLINIC | Age: 78
End: 2023-11-06

## 2023-11-06 NOTE — TELEPHONE ENCOUNTER
Katherine, can you schedule pt for a cath with DCE at [facility]? She has no dye allergy, will need to hold coumadin x 5 days. She will also need a TAVR CTA and carotid US at least a week before or after her cath. She has no port and lives at home. Labs are ordered.  Thanks, ELLIE Morris RN

## 2023-11-06 NOTE — TELEPHONE ENCOUNTER
TAVR testing is scheduled per below:    11-29-23 / 6:45-8:00 am - [facility] w/DCE @ [facility], [place]  PREPS:  Bring a list of your medications. Please notify us before the procedure if you are allergic to anything, especially x-ray contrast dye, iodine, nickel, or any type of jewelry. This is very important. Do not eat or drink anything at all after midnight (or 8 hours) prior to the procedure. Take all morning medications EXCEPT any diuretics (water pills) the day of the procedure with a small amount of water. Hold Coumadin (warfarin) 5 days prior to procedure. Do not take after 11-23-23. You will be instructed on when to resume upon discharge. You MUST have someone to drive you home-NO driving for 24 hours after your procedure. If intervention is preformed, you might stay overnight in the hospital.  Discharge instructions will be given to you at the time of your procedure. 12-6-23  7:30 am - [facility], [place] CTA CHEST ABD PELVIC W/CONTRAST  PREPS:  * NPO 4 hours prior to procedure  * Arrive 30 minutes prior to exam (7:00 am)  * No necklaces, underwire bras, body piercing, no pants with zippers, belt or suspenders  * Bring a complete list of medications or the test cannot be completed. 8:00 am - CAROTID STUDY  * Please wear easy to remove clothing  * Please take all medication, unless otherwise instructed  * You will be here for approximately 1 hour    Lab work was drawn on 11-29-23 at [facility].

## 2023-11-09 ENCOUNTER — ANTI-COAG VISIT (OUTPATIENT)
Dept: PHARMACY | Age: 78
End: 2023-11-09
Payer: MEDICARE

## 2023-11-09 DIAGNOSIS — I48.0 PAF (PAROXYSMAL ATRIAL FIBRILLATION) (HCC): Primary | ICD-10-CM

## 2023-11-09 DIAGNOSIS — Z86.711 HISTORY OF PULMONARY EMBOLISM: Chronic | ICD-10-CM

## 2023-11-09 LAB — INTERNATIONAL NORMALIZATION RATIO, POC: 3.7

## 2023-11-09 PROCEDURE — 99212 OFFICE O/P EST SF 10 MIN: CPT

## 2023-11-09 PROCEDURE — 85610 PROTHROMBIN TIME: CPT

## 2023-11-09 NOTE — PROGRESS NOTES
Ms. Uyen Iniguez is a 69 y.o. y/o female with history of DVT, PE, Afib   She presents today for anticoagulation monitoring and adjustment. Pertinent PMH: HTN, Gastric Banding,CAD, diskectomy with an artifical spinal disk implant in September 2012. Patient Reported Findings:  Yes     No  [x]   []       Patient verifies current dosing regimen as listed- confirms --> believes that she was taking 10 mg on Wed not 5 mg --> verified taking 5 mg on Wed and 7.5 mg all other days of the week ---> did not increase weekly dose --> verified dose   []   [x]       S/S bleeding/bruising/swelling/SOB states that she has been wheezing more and had more SOB d/t allergies --> has had a few small nose bleeds recently  --> bruises from lovenox injections ---> does have a lot of bruising  ---> improving --> denies  []   [x]       Blood in urine or stool  [x]   []       Procedures scheduled in the future at this time having epidural on 6/1, cleared to hold warfarin 7 days and bridge with lovenox --> had cortisone shot in hip last week. INR 1.9 per pt. --> having LHC on 11/29, cleared to hold warfarin 5 days prior. Is going to have aortic valve replacement in near future   []   [x]       Missed Dose - denies  []   [x]       Extra Dose - denies   []   [x]       Change in medications  decreased tylenol intake to 1-2 times a day --> has not used propafenone in the past week. Stopped spiriva -->  has been taking more tylenol 6/day --> stopped cranberry capsule 3-4 weeks ago --> Restarted cranberry supplement --> started trelegy --> switched from trelegy to fluticasone --> amoxicillin and medrol dose vaibhav 7/24. Did not take medrol dose vaibhav since feeling better --> was given buspar for acute stress from son (no interaction) --> no changes   [x]   []       Change in health/diet/appetite  Patient states that she feels like she has returned to normal vit k intake --> diet fluctuates causing INR to fluctuate.  Discussed ways to improve

## 2023-11-13 RX ORDER — ATORVASTATIN CALCIUM 80 MG/1
80 TABLET, FILM COATED ORAL DAILY
Qty: 80 TABLET | Refills: 3 | Status: SHIPPED | OUTPATIENT
Start: 2023-11-13

## 2023-11-15 RX ORDER — CITALOPRAM 40 MG/1
40 TABLET ORAL DAILY
Qty: 90 TABLET | Refills: 3 | Status: SHIPPED | OUTPATIENT
Start: 2023-11-15

## 2023-11-21 ENCOUNTER — APPOINTMENT (OUTPATIENT)
Dept: PHARMACY | Age: 78
End: 2023-11-21
Payer: MEDICARE

## 2023-11-21 ENCOUNTER — TELEPHONE (OUTPATIENT)
Dept: CARDIOTHORACIC SURGERY | Age: 78
End: 2023-11-21

## 2023-11-21 DIAGNOSIS — I48.0 PAF (PAROXYSMAL ATRIAL FIBRILLATION) (HCC): Primary | ICD-10-CM

## 2023-11-21 DIAGNOSIS — Z86.711 HISTORY OF PULMONARY EMBOLISM: Chronic | ICD-10-CM

## 2023-11-21 LAB — INTERNATIONAL NORMALIZATION RATIO, POC: 2.2

## 2023-11-21 PROCEDURE — 85610 PROTHROMBIN TIME: CPT

## 2023-11-21 PROCEDURE — 99212 OFFICE O/P EST SF 10 MIN: CPT

## 2023-11-21 NOTE — PROGRESS NOTES
Ms. Rufino Martinez is a 69 y.o. y/o female with history of DVT, PE, Afib   She presents today for anticoagulation monitoring and adjustment. Pertinent PMH: HTN, Gastric Banding,CAD, diskectomy with an artifical spinal disk implant in September 2012. Patient Reported Findings:  Yes     No  [x]   []       Patient verifies current dosing regimen as listed- confirms --> believes that she was taking 10 mg on Wed not 5 mg --> verified taking 5 mg on Wed and 7.5 mg all other days of the week ---> did not increase weekly dose --> verified dose   []   [x]       S/S bleeding/bruising/swelling/SOB states that she has been wheezing more and had more SOB d/t allergies --> has had a few small nose bleeds recently  --> bruises from lovenox injections ---> does have a lot of bruising  ---> improving --> denies  []   [x]       Blood in urine or stool  [x]   []       Procedures scheduled in the future at this time having epidural on 6/1, cleared to hold warfarin 7 days and bridge with lovenox --> had cortisone shot in hip last week. INR 1.9 per pt. --> having LHC on 11/29, cleared to hold warfarin 5 days prior. Is going to have aortic valve replacement in near future   []   [x]       Missed Dose - denies  []   [x]       Extra Dose - denies   []   [x]       Change in medications  decreased tylenol intake to 1-2 times a day --> has not used propafenone in the past week. Stopped spiriva -->  has been taking more tylenol 6/day --> stopped cranberry capsule 3-4 weeks ago --> Restarted cranberry supplement --> started trelegy --> switched from trelegy to fluticasone --> amoxicillin and medrol dose vaibhav 7/24. Did not take medrol dose vaibhav since feeling better --> was given buspar for acute stress from son (no interaction) --> no changes   []   [x]       Change in health/diet/appetite  Patient states that she feels like she has returned to normal vit k intake --> diet fluctuates causing INR to fluctuate.  Discussed ways to improve

## 2023-11-22 ENCOUNTER — TELEPHONE (OUTPATIENT)
Dept: CARDIOTHORACIC SURGERY | Age: 78
End: 2023-11-22

## 2023-11-27 ENCOUNTER — OFFICE VISIT (OUTPATIENT)
Dept: INTERNAL MEDICINE CLINIC | Age: 78
End: 2023-11-27
Payer: MEDICARE

## 2023-11-27 VITALS
DIASTOLIC BLOOD PRESSURE: 70 MMHG | OXYGEN SATURATION: 98 % | BODY MASS INDEX: 43.54 KG/M2 | HEART RATE: 58 BPM | HEIGHT: 64 IN | SYSTOLIC BLOOD PRESSURE: 122 MMHG | WEIGHT: 255 LBS

## 2023-11-27 DIAGNOSIS — Z86.711 HISTORY OF PULMONARY EMBOLISM: Chronic | ICD-10-CM

## 2023-11-27 DIAGNOSIS — F41.9 ANXIETY: ICD-10-CM

## 2023-11-27 DIAGNOSIS — I25.10 CORONARY ARTERY DISEASE INVOLVING NATIVE CORONARY ARTERY OF NATIVE HEART WITHOUT ANGINA PECTORIS: ICD-10-CM

## 2023-11-27 DIAGNOSIS — M19.90 ARTHRITIS: Primary | ICD-10-CM

## 2023-11-27 DIAGNOSIS — E78.00 PURE HYPERCHOLESTEROLEMIA: ICD-10-CM

## 2023-11-27 DIAGNOSIS — I10 ESSENTIAL HYPERTENSION: ICD-10-CM

## 2023-11-27 DIAGNOSIS — I35.0 NONRHEUMATIC AORTIC VALVE STENOSIS: ICD-10-CM

## 2023-11-27 PROCEDURE — 3078F DIAST BP <80 MM HG: CPT | Performed by: NURSE PRACTITIONER

## 2023-11-27 PROCEDURE — 3074F SYST BP LT 130 MM HG: CPT | Performed by: NURSE PRACTITIONER

## 2023-11-27 PROCEDURE — 99214 OFFICE O/P EST MOD 30 MIN: CPT | Performed by: NURSE PRACTITIONER

## 2023-11-27 PROCEDURE — 1123F ACP DISCUSS/DSCN MKR DOCD: CPT | Performed by: NURSE PRACTITIONER

## 2023-11-27 RX ORDER — GABAPENTIN 300 MG/1
CAPSULE ORAL
Qty: 600 CAPSULE | Refills: 2 | Status: SHIPPED | OUTPATIENT
Start: 2023-11-27 | End: 2024-05-25

## 2023-11-27 RX ORDER — CLONAZEPAM 1 MG/1
1 TABLET ORAL EVERY EVENING
Qty: 90 TABLET | Refills: 0 | Status: SHIPPED | OUTPATIENT
Start: 2023-12-10 | End: 2024-03-09

## 2023-11-27 NOTE — ASSESSMENT & PLAN NOTE
Chronic, controlled. Continue celexa and prn klonopin. Controlled Substance Monitoring:    Acute and Chronic Pain Monitoring:   RX Monitoring Periodic Controlled Substance Monitoring   11/27/2023   9:38 AM No signs of potential drug abuse or diversion identified. ;Possible medication side effects, risk of tolerance/dependence & alternative treatments discussed.

## 2023-11-27 NOTE — ASSESSMENT & PLAN NOTE
Chronic, moderately controlled. Continue gabapentin 600 mg TID. Use prn diclofenac gel on her feet when pain is uncontrolled.

## 2023-11-27 NOTE — ASSESSMENT & PLAN NOTE
Chronic, continue seeing Dr. Mary Maradiaga as scheduled and recommended. Has [facility] scheduled on 11/29. Continue atorvastatin 80 mg daily and verapamil 240 mg daily.

## 2023-11-27 NOTE — PROGRESS NOTES
11/27/23     Chief Complaint   Patient presents with    Follow-up     HPI    Here for follow up of anxiety and medication refill. Mood has been okay. Doing well on celexa and klonopin. Her son had LVAD and has been busy with his appts as well as her own. Seeing Dr. Aidan Radford with cardiology and has [address] later this week. She continues to have SOB, swelling, orthopnea- planning a TAVR with Dr. Khushboo Sims for severe aortic stenosis. She is on coumadin for PAF and history of PE - INR is managed by coumadin clinic. Has been off AC since Thursday for upcoming procedure. Gabapentin is not helping as much as it once did but it does make a big difference in controlling her pain. She forgot it one afternoon this week and her pain was uncontrolled. Allergies   Allergen Reactions    Belsomra [Suvorexant] Other (See Comments)     Nightmares      Avelox [Moxifloxacin Hcl In Nacl] Rash    Levofloxacin Rash    Sulfa Antibiotics Rash       Current Outpatient Medications   Medication Sig Dispense Refill    [START ON 12/10/2023] clonazePAM (KLONOPIN) 1 MG tablet Take 1 tablet by mouth every evening for 90 days.  90 tablet 0    citalopram (CELEXA) 40 MG tablet TAKE 1 TABLET BY MOUTH DAILY 90 tablet 3    atorvastatin (LIPITOR) 80 MG tablet TAKE 1 TABLET BY MOUTH ONCE  DAILY 80 tablet 3    montelukast (SINGULAIR) 10 MG tablet TAKE 1 TABLET BY MOUTH AT NIGHT 100 tablet 2    indapamide (LOZOL) 1.25 MG tablet TAKE 1 TABLET BY MOUTH IN THE  MORNING 100 tablet 2    pantoprazole (PROTONIX) 40 MG tablet TAKE 1 TABLET BY MOUTH IN THE  MORNING BEFORE BREAKFAST 100 tablet 2    trimethoprim (TRIMPEX) 100 MG tablet TAKE 1 TABLET BY MOUTH EVERY 48  HOURS 40 tablet 1    albuterol sulfate HFA (PROVENTIL;VENTOLIN;PROAIR) 108 (90 Base) MCG/ACT inhaler INHALE TWO PUFFS BY MOUTH EVERY 4 HOURS AS NEEDED FOR WHEEZING 8.5 g 2    warfarin (COUMADIN) 5 MG tablet TAKE DAILY AS DIRECTED TO  MAINTAIN INR BETWEEN 2-3 (Patient taking differently: 10

## 2023-11-29 ENCOUNTER — HOSPITAL ENCOUNTER (OUTPATIENT)
Dept: CARDIAC CATH/INVASIVE PROCEDURES | Age: 78
Discharge: HOME | End: 2023-11-29
Attending: INTERNAL MEDICINE | Admitting: INTERNAL MEDICINE
Payer: MEDICARE

## 2023-11-29 VITALS
WEIGHT: 255 LBS | DIASTOLIC BLOOD PRESSURE: 63 MMHG | RESPIRATION RATE: 12 BRPM | HEART RATE: 52 BPM | HEIGHT: 64 IN | OXYGEN SATURATION: 99 % | BODY MASS INDEX: 43.54 KG/M2 | SYSTOLIC BLOOD PRESSURE: 132 MMHG

## 2023-11-29 LAB
ANION GAP SERPL CALCULATED.3IONS-SCNC: 11 MMOL/L (ref 3–16)
BUN SERPL-MCNC: 19 MG/DL (ref 7–20)
CALCIUM SERPL-MCNC: 9.6 MG/DL (ref 8.3–10.6)
CHLORIDE SERPL-SCNC: 104 MMOL/L (ref 99–110)
CO2 SERPL-SCNC: 26 MMOL/L (ref 21–32)
CREAT SERPL-MCNC: 0.8 MG/DL (ref 0.6–1.2)
DEPRECATED RDW RBC AUTO: 14.3 % (ref 12.4–15.4)
EKG ATRIAL RATE: 61 BPM
EKG DIAGNOSIS: NORMAL
EKG P AXIS: 75 DEGREES
EKG P-R INTERVAL: 160 MS
EKG Q-T INTERVAL: 454 MS
EKG QRS DURATION: 98 MS
EKG QTC CALCULATION (BAZETT): 457 MS
EKG R AXIS: 11 DEGREES
EKG T AXIS: 47 DEGREES
EKG VENTRICULAR RATE: 61 BPM
GFR SERPLBLD CREATININE-BSD FMLA CKD-EPI: >60 ML/MIN/{1.73_M2}
GLUCOSE SERPL-MCNC: 100 MG/DL (ref 70–99)
HCT VFR BLD AUTO: 38.5 % (ref 36–48)
HGB BLD-MCNC: 12.7 G/DL (ref 12–16)
INR BLD: 1.2 (ref 0.84–1.16)
MCH RBC QN AUTO: 31.8 PG (ref 26–34)
MCHC RBC AUTO-ENTMCNC: 33 G/DL (ref 31–36)
MCV RBC AUTO: 96.2 FL (ref 80–100)
PLATELET # BLD AUTO: 280 K/UL (ref 135–450)
PMV BLD AUTO: 7.4 FL (ref 5–10.5)
POTASSIUM SERPL-SCNC: 3.8 MMOL/L (ref 3.5–5.1)
RBC # BLD AUTO: 4 M/UL (ref 4–5.2)
SODIUM SERPL-SCNC: 141 MMOL/L (ref 136–145)
WBC # BLD AUTO: 6.6 K/UL (ref 4–11)

## 2023-11-29 PROCEDURE — 85027 COMPLETE CBC AUTOMATED: CPT

## 2023-11-29 PROCEDURE — 6360000002 HC RX W HCPCS

## 2023-11-29 PROCEDURE — 6360000004 HC RX CONTRAST MEDICATION: Performed by: INTERNAL MEDICINE

## 2023-11-29 PROCEDURE — 93458 L HRT ARTERY/VENTRICLE ANGIO: CPT | Performed by: INTERNAL MEDICINE

## 2023-11-29 PROCEDURE — 80048 BASIC METABOLIC PNL TOTAL CA: CPT

## 2023-11-29 PROCEDURE — 93458 L HRT ARTERY/VENTRICLE ANGIO: CPT

## 2023-11-29 PROCEDURE — 85610 PROTHROMBIN TIME: CPT

## 2023-11-29 PROCEDURE — 99153 MOD SED SAME PHYS/QHP EA: CPT

## 2023-11-29 PROCEDURE — C1894 INTRO/SHEATH, NON-LASER: HCPCS

## 2023-11-29 PROCEDURE — 2500000003 HC RX 250 WO HCPCS

## 2023-11-29 PROCEDURE — 93005 ELECTROCARDIOGRAM TRACING: CPT | Performed by: INTERNAL MEDICINE

## 2023-11-29 PROCEDURE — 36415 COLL VENOUS BLD VENIPUNCTURE: CPT

## 2023-11-29 PROCEDURE — 99152 MOD SED SAME PHYS/QHP 5/>YRS: CPT

## 2023-11-29 PROCEDURE — 2709999900 HC NON-CHARGEABLE SUPPLY

## 2023-11-29 PROCEDURE — C1769 GUIDE WIRE: HCPCS

## 2023-11-29 PROCEDURE — 99152 MOD SED SAME PHYS/QHP 5/>YRS: CPT | Performed by: INTERNAL MEDICINE

## 2023-11-29 PROCEDURE — 93010 ELECTROCARDIOGRAM REPORT: CPT | Performed by: INTERNAL MEDICINE

## 2023-11-29 RX ORDER — SODIUM CHLORIDE 0.9 % (FLUSH) 0.9 %
5-40 SYRINGE (ML) INJECTION PRN
Status: DISCONTINUED | OUTPATIENT
Start: 2023-11-29 | End: 2023-11-29 | Stop reason: HOSPADM

## 2023-11-29 RX ADMIN — IOPAMIDOL 35 ML: 755 INJECTION, SOLUTION INTRAVENOUS at 08:54

## 2023-11-29 NOTE — OP NOTE
[address] [facility] Operative Note     PROCEDURE SUMMARY   Procedure [facility]   Indication AORTIC STENOSIS   Consent Obtained   Access RRA   US US guidance used to determine artery patency, size (>2mm), anatomic variations and ideal puncture location. Real-time US utilized concurrent with vascular needle entry into artery. Image(s) permanently recorded and reported in chart. Bleed Risk Low   Sedation Minimal conscious sedation for comfort. Independent trained observer pushed medications at my direction. Level of consciousness and vital signs/physiologic status monitored throughout the procedure (see start and stop times above, as well as medication dosages). Start Time 0814   Stop Time 0849   Versed 5mg   Fentanyl 100mcg   Contrast 35cc   Flouro 3.0min   EBL <58NN   Complicat None   Specimens None   Procedure Detail Patient taken to cath lab in postabsorptive state, informed consent obtained.   RRA prepped and draped in normal sterile fashion  Micro needle used to access artery With US guidance  Micro wire advanced into artery and 5/6 Slender sheath advanced over wire   JL 3.5 advanced over wire to engage LM coronary artery and image in multiple views  JL 3.5 exchanged over a wire for JR4 to engage RCA and image in multiple views  JR4 removed over J wire  Arterial hemostasis obtained with Radial band     FINDINGS   Artery Findings   LM Normal   LAD Normal   Cx 30% ostial OM1   RI N/A   RCA Normal   LVEDP 11mmHg Normal 3-12mmHg   LVG N/A Normal >/= 55%   AVG Abnormal with a P-P gradient of 40mmHg     INTERVENTION(S)   None    POST CATH  RECOMMENDATIONS   Continued aggressive medical therapy and risk factor modification  Continued workup for AVR

## 2023-11-29 NOTE — H&P
[facility]  H+P  Consult  OP Visit  FU Visit   CC/HPI   CC Followup visit for cardiac conditions detailed in assessment and plan below. HPI 69 y.o., female  presents to cath lab for [facility] as part of TAVR workup   Cardiac Hx None   Troponin No results found for: \"TROPHS\"   HISTORY/ALLERGY/ROS   MEDHx  has a past medical history of A-fib ([address]), Allergic, Anxiety, Aortic stenosis, Arthritis, Asthma, Back pain, Blood circulation, collateral, CAD (coronary artery disease), Depression, GERD (gastroesophageal reflux disease), Gout, Hx of blood clots, Hypercholesteremia, Hypertension, Movement disorder, Movement disorder, Obesity, Pulmonary emboli ([address]), Unspecified sleep apnea, Urinary tract infection, chronic, and UTI (urinary tract infection). SURGHx  has a past surgical history that includes joint replacement; Vena Cava Filter Placement; bladder suspension; bone incision and drainage (5-7-11); joint replacement (2003); joint replacement (2005); Revision Total Knee Arthroplasty (2006); Ankle Fusion (2010); Full thickness skin graft (2011); Tubal ligation (1975); Upper gastrointestinal endoscopy (10-); other surgical history (12-); back surgery (09/14/2012); eye surgery; Breast biopsy (08/2018); Endoscopy, colon, diagnostic; Cardioversion; Cardiac surgery; ablation of dysrhythmic focus; Carpal tunnel release (Right, 7/15/2022); and Arm Surgery (Right, 7/15/2022). SOCHx  reports that she quit smoking about 58 years ago. Her smoking use included cigarettes. She has a 2.00 pack-year smoking history. She has never used smokeless tobacco. She reports that she does not currently use alcohol. She reports that she does not use drugs. FAMHx family history includes Arthritis in her father; Asthma in her father; Breast Cancer in her sister; Depression in her mother; Heart Disease in her father; High Blood Pressure in her father and mother; Stroke in her father.    ALLERG Belsomra [suvorexant], Avelox

## 2023-12-06 ENCOUNTER — HOSPITAL ENCOUNTER (OUTPATIENT)
Dept: VASCULAR LAB | Age: 78
Discharge: HOME | End: 2023-12-06
Payer: MEDICARE

## 2023-12-06 ENCOUNTER — HOSPITAL ENCOUNTER (OUTPATIENT)
Dept: CT IMAGING | Age: 78
Discharge: HOME | End: 2023-12-06
Payer: MEDICARE

## 2023-12-06 ENCOUNTER — ANTI-COAG VISIT (OUTPATIENT)
Dept: PHARMACY | Age: 78
End: 2023-12-06
Payer: MEDICARE

## 2023-12-06 DIAGNOSIS — Z86.711 HISTORY OF PULMONARY EMBOLISM: Chronic | ICD-10-CM

## 2023-12-06 DIAGNOSIS — R42 DIZZINESS: ICD-10-CM

## 2023-12-06 DIAGNOSIS — I48.0 PAF (PAROXYSMAL ATRIAL FIBRILLATION) (HCC): Primary | ICD-10-CM

## 2023-12-06 DIAGNOSIS — I35.0 AORTIC VALVE STENOSIS, NONRHEUMATIC: ICD-10-CM

## 2023-12-06 LAB — INTERNATIONAL NORMALIZATION RATIO, POC: 1.5

## 2023-12-06 PROCEDURE — 6360000004 HC RX CONTRAST MEDICATION: Performed by: INTERNAL MEDICINE

## 2023-12-06 PROCEDURE — 74174 CTA ABD&PLVS W/CONTRAST: CPT

## 2023-12-06 PROCEDURE — 99211 OFF/OP EST MAY X REQ PHY/QHP: CPT

## 2023-12-06 PROCEDURE — 85610 PROTHROMBIN TIME: CPT

## 2023-12-06 PROCEDURE — 93880 EXTRACRANIAL BILAT STUDY: CPT

## 2023-12-06 RX ADMIN — IOPAMIDOL 150 ML: 755 INJECTION, SOLUTION INTRAVENOUS at 07:46

## 2023-12-06 NOTE — PROGRESS NOTES
Ms. Uyen Iniguez is a 69 y.o. y/o female with history of DVT, PE, Afib   She presents today for anticoagulation monitoring and adjustment. Pertinent PMH: HTN, Gastric Banding,CAD, diskectomy with an artifical spinal disk implant in September 2012. Patient Reported Findings:  Yes     No  [x]   []       Patient verifies current dosing regimen as listed- confirms --> believes that she was taking 10 mg on Wed not 5 mg --> verified taking 5 mg on Wed and 7.5 mg all other days of the week ---> did not increase weekly dose --> verified dose   []   [x]       S/S bleeding/bruising/swelling/SOB states that she has been wheezing more and had more SOB d/t allergies --> has had a few small nose bleeds recently  --> bruises from lovenox injections ---> does have a lot of bruising  ---> improving --> denies  []   [x]       Blood in urine or stool  [x]   []       Procedures scheduled in the future at this time having epidural on 6/1, cleared to hold warfarin 7 days and bridge with lovenox --> had cortisone shot in hip last week. INR 1.9 per pt. --> having LHC on 11/29, cleared to hold warfarin 5 days prior. Is going to have aortic valve replacement in near future   []   [x]       Missed Dose - denies  []   [x]       Extra Dose - denies   []   [x]       Change in medications  decreased tylenol intake to 1-2 times a day --> has not used propafenone in the past week. Stopped spiriva -->  has been taking more tylenol 6/day --> stopped cranberry capsule 3-4 weeks ago --> Restarted cranberry supplement --> started trelegy --> switched from trelegy to fluticasone --> amoxicillin and medrol dose vaibhav 7/24. Did not take medrol dose vaibhav since feeling better --> was given buspar for acute stress from son (no interaction) --> no changes   []   [x]       Change in health/diet/appetite  Patient states that she feels like she has returned to normal vit k intake --> diet fluctuates causing INR to fluctuate.  Discussed ways to improve

## 2023-12-28 ENCOUNTER — OFFICE VISIT (OUTPATIENT)
Age: 78
End: 2023-12-28
Payer: MEDICARE

## 2023-12-28 VITALS
BODY MASS INDEX: 44.63 KG/M2 | DIASTOLIC BLOOD PRESSURE: 60 MMHG | WEIGHT: 260 LBS | OXYGEN SATURATION: 97 % | SYSTOLIC BLOOD PRESSURE: 128 MMHG | HEART RATE: 66 BPM

## 2023-12-28 DIAGNOSIS — I10 ESSENTIAL HYPERTENSION: ICD-10-CM

## 2023-12-28 DIAGNOSIS — I25.10 CORONARY ARTERY DISEASE INVOLVING NATIVE CORONARY ARTERY OF NATIVE HEART WITHOUT ANGINA PECTORIS: ICD-10-CM

## 2023-12-28 DIAGNOSIS — M19.90 ARTHRITIS: ICD-10-CM

## 2023-12-28 DIAGNOSIS — Z98.84 STATUS POST GASTRIC BANDING: ICD-10-CM

## 2023-12-28 DIAGNOSIS — D68.69 SECONDARY HYPERCOAGULABLE STATE (HCC): ICD-10-CM

## 2023-12-28 DIAGNOSIS — I35.0 SYMPTOMATIC SEVERE AORTIC STENOSIS WITH NORMAL EJECTION FRACTION: Primary | ICD-10-CM

## 2023-12-28 DIAGNOSIS — Z86.711 HISTORY OF PULMONARY EMBOLISM: Chronic | ICD-10-CM

## 2023-12-28 DIAGNOSIS — E66.01 MORBID OBESITY WITH BMI OF 40.0-44.9, ADULT (HCC): ICD-10-CM

## 2023-12-28 DIAGNOSIS — I48.0 PAF (PAROXYSMAL ATRIAL FIBRILLATION) (HCC): ICD-10-CM

## 2023-12-28 PROCEDURE — 3078F DIAST BP <80 MM HG: CPT | Performed by: STUDENT IN AN ORGANIZED HEALTH CARE EDUCATION/TRAINING PROGRAM

## 2023-12-28 PROCEDURE — 3074F SYST BP LT 130 MM HG: CPT | Performed by: STUDENT IN AN ORGANIZED HEALTH CARE EDUCATION/TRAINING PROGRAM

## 2023-12-28 PROCEDURE — 99205 OFFICE O/P NEW HI 60 MIN: CPT | Performed by: STUDENT IN AN ORGANIZED HEALTH CARE EDUCATION/TRAINING PROGRAM

## 2023-12-28 NOTE — PROGRESS NOTES
Review of Systems:  Constitutional: + Fatigue. + weight gain. No night sweats, headaches. Eyes: Wears glasses. No glaucoma, cataracts. ENMT:  No nosebleeds, deviated septum. Cardiac: + Hx Afib-s/p ablation. Vascular:  + Swelling to BLE. + varicosities. GI:  No PUD, heartburn. :  No kidney stones. Hx of frequent UTIs  Musculoskeletal:  No arthritis. +gout. Respiratory: + SOB w/exertion, + asthma. + PAULA-wears CPAP at night. No emphysema. Integumentary:  No dermatitis, itching, rash. Neurological:  + Lightheadedness/dizziness.+ Neuropathy in bilateral legs/feet. No stroke, TIAs, seizures. Psychiatric:  + depression, + anxiety. Endocrine: No diabetes, thyroid issues. Hematologic:  + Coumadin. (Hx of DVT(abena filter)/PE), + bleeding, easy bruising. Immunologic:  No known cancer, steroid therapies.
root 38mm  LVOT calcification: None  MAC: none  Aortic Valve: trileaflet, calcified  Ca score: no AV Ca score provided  Annulus/LVOT: 30mm  Access: minimal atherosclerosis on iliac and femoral arteries, access appropriate for RTF    Assessment/Plan:     I have personally reviewed all pertinent labs, vitals, and images including heart cath, echo, and CTA TAVR protocol. I have personally assessment the patient and discussed my recommendations with the patient. NYHA: III  STS Risk: 3.5%  TAVR Risk: 2.03% (national avg is 4%)    Ms. Antonio Anderson is a 69 y.o. y.o. female with PMHx of severe symptomatic AS, CAD, paroxysmal atrial fibrillation (s/p ablation Oct 2020, and cardioversion 2020 and 2018) on Coumadin, hx of PE and DVT (IVCF present), HTN, HLD, PAULA, obesity BMI 43.8, chronic UTI, s/p gastric banding, GERD, gout, diskectomy with artificial implant 2012. There is a qualifying ECHO, which additionally shows mild AI, mild to mod MR. The CTA demonstrates an expected TAVR valve size of 29mm S3 based on annulus being approx 30mm, and reconstructions for annular sizing are pending. CTA  suggests that RTF access will be appropriate. She is a low risk for TAVR and at least moderate risk for SAVR . I have discussed both the TAVR and SAVR procedures in detail including expectations on the days of procedures and expected recovery course. I have discussed the associated risks of mortality, complication rate, pacemaker requirement, renal failure, and permanent stroke. The patient would like to move forward with TAVR and this is reasonable. There is a [place] IVC filter present, but this should not interfere with temporary pacing wire placement. I think the patient is a good candidate for 29mm S3 RTF TAVR. The patient is anticipated to have a life expectancy over one year. I have also discussed the unlikely event of potential aortic root rupture or perforation at time of TAVR.  I have explained that during these rare

## 2024-01-08 ENCOUNTER — TELEPHONE (OUTPATIENT)
Dept: CARDIOLOGY CLINIC | Age: 79
End: 2024-01-08

## 2024-01-08 NOTE — TELEPHONE ENCOUNTER
Pt aware of TAVR scheduled for 2/6/24 (son recently DC'd from hospital w LVAD w pt being primary caregiver). Aware of PAT on 2/1/24 at 10am. Also aware of possible alternative approach w TAVR due to very tortuous aorta. Sophy MCGUIRE

## 2024-01-18 ENCOUNTER — TELEPHONE (OUTPATIENT)
Dept: PHARMACY | Age: 79
End: 2024-01-18

## 2024-01-19 ENCOUNTER — APPOINTMENT (OUTPATIENT)
Dept: PHARMACY | Age: 79
End: 2024-01-19
Payer: MEDICARE

## 2024-01-24 ENCOUNTER — ANTI-COAG VISIT (OUTPATIENT)
Dept: PHARMACY | Age: 79
End: 2024-01-24
Payer: MEDICARE

## 2024-01-24 DIAGNOSIS — I48.0 PAF (PAROXYSMAL ATRIAL FIBRILLATION) (HCC): Primary | ICD-10-CM

## 2024-01-24 DIAGNOSIS — Z86.711 HISTORY OF PULMONARY EMBOLISM: Chronic | ICD-10-CM

## 2024-01-24 LAB — INTERNATIONAL NORMALIZATION RATIO, POC: 2.1

## 2024-01-24 PROCEDURE — 99211 OFF/OP EST MAY X REQ PHY/QHP: CPT

## 2024-01-24 PROCEDURE — 85610 PROTHROMBIN TIME: CPT

## 2024-01-24 NOTE — PROGRESS NOTES
Ms. Jessica Weeks is a 78 y.o. y/o female with history of DVT, PE, Afib   She presents today for anticoagulation monitoring and adjustment.  Pertinent PMH: HTN, Gastric Banding,CAD, diskectomy with an artifical spinal disk implant in September 2012.   Patient Reported Findings:  Yes     No  [x]   []       Patient verifies current dosing regimen as listed- confirms --> believes that she was taking 10 mg on Wed not 5 mg --> verified taking 5 mg on Wed and 7.5 mg all other days of the week ---> did not increase weekly dose --> verified dose   []   [x]       S/S bleeding/bruising/swelling/SOB states that she has been wheezing more and had more SOB d/t allergies --> has had a few small nose bleeds recently  --> bruises from lovenox injections ---> does have a lot of bruising  ---> improving --> denies  []   [x]       Blood in urine or stool  [x]   []       Procedures scheduled in the future at this time having epidural on 6/1, cleared to hold warfarin 7 days and bridge with lovenox --> had cortisone shot in hip last week. INR 1.9 per pt. --> having LHC on 11/29, cleared to hold warfarin 5 days prior. Is going to have aortic valve replacement in near future --> is going to have TAVR on 2/6, will likely need to hold but has not received instructions yet   []   [x]       Missed Dose - denies  []   [x]       Extra Dose - denies   []   [x]       Change in medications  has not used propafenone in the past week. Stopped spiriva -->  has been taking more tylenol 6/day --> stopped cranberry capsule 3-4 weeks ago --> Restarted cranberry supplement --> started trelegy --> was given buspar for acute stress from son (no interaction) --> no changes   []   [x]       Change in health/diet/appetite  Patient states that she feels like she has returned to normal vit k intake --> diet fluctuates causing INR to fluctuate. Discussed ways to improve consistency with vit k intake  --> states that she has cut back spinach and cabbage

## 2024-01-29 ENCOUNTER — TELEPHONE (OUTPATIENT)
Dept: INTERNAL MEDICINE CLINIC | Age: 79
End: 2024-01-29

## 2024-01-29 DIAGNOSIS — J45.40 MODERATE PERSISTENT ASTHMA WITHOUT COMPLICATION: ICD-10-CM

## 2024-01-29 RX ORDER — FLUTICASONE PROPIONATE AND SALMETEROL 250; 50 UG/1; UG/1
1 POWDER RESPIRATORY (INHALATION) 2 TIMES DAILY
Qty: 180 EACH | Refills: 1 | Status: SHIPPED | OUTPATIENT
Start: 2024-01-29

## 2024-01-29 NOTE — TELEPHONE ENCOUNTER
Pt  calling requesting refill of Fluticasone-Salmeterol       Last written 6/29/23  Last OV 11/27/3  Next OV 2/27/24  Last recommended OV NA     Please send to [address]

## 2024-01-29 NOTE — TELEPHONE ENCOUNTER
Last OV: 11/27/2023  Next OV: 2/27/2024    Next appointment due:02/27//24    Last fill:  06/29/23  Refills:5

## 2024-01-30 ENCOUNTER — TELEPHONE (OUTPATIENT)
Dept: CARDIOLOGY CLINIC | Age: 79
End: 2024-01-30

## 2024-01-30 RX ORDER — POTASSIUM CHLORIDE 20 MEQ/1
TABLET, EXTENDED RELEASE ORAL
Qty: 100 TABLET | Refills: 2 | Status: SHIPPED | OUTPATIENT
Start: 2024-01-30

## 2024-01-30 NOTE — TELEPHONE ENCOUNTER
Patient called back. Per DCE cancel TAVR for next week and reschedule for another date. Patient understandable. Also canceling PAT and OV

## 2024-01-31 ENCOUNTER — TELEPHONE (OUTPATIENT)
Dept: CARDIOLOGY CLINIC | Age: 79
End: 2024-01-31

## 2024-02-01 ENCOUNTER — HOSPITAL ENCOUNTER (OUTPATIENT)
Dept: PREADMISSION TESTING | Age: 79
Discharge: HOME | End: 2024-02-01
Payer: MEDICARE

## 2024-02-01 ENCOUNTER — TELEPHONE (OUTPATIENT)
Dept: PHARMACY | Age: 79
End: 2024-02-01

## 2024-02-01 ENCOUNTER — OFFICE VISIT (OUTPATIENT)
Dept: CARDIOLOGY CLINIC | Age: 79
End: 2024-02-01
Payer: MEDICARE

## 2024-02-01 ENCOUNTER — ANESTHESIA EVENT (OUTPATIENT)
Dept: OPERATING ROOM | Age: 79
DRG: 266 | End: 2024-02-01
Payer: MEDICARE

## 2024-02-01 VITALS
DIASTOLIC BLOOD PRESSURE: 65 MMHG | RESPIRATION RATE: 18 BRPM | HEIGHT: 64 IN | OXYGEN SATURATION: 96 % | HEART RATE: 72 BPM | WEIGHT: 253 LBS | BODY MASS INDEX: 43.19 KG/M2 | SYSTOLIC BLOOD PRESSURE: 152 MMHG | TEMPERATURE: 97 F

## 2024-02-01 VITALS
WEIGHT: 253.6 LBS | SYSTOLIC BLOOD PRESSURE: 122 MMHG | DIASTOLIC BLOOD PRESSURE: 72 MMHG | OXYGEN SATURATION: 99 % | HEIGHT: 64 IN | BODY MASS INDEX: 43.29 KG/M2 | HEART RATE: 73 BPM

## 2024-02-01 DIAGNOSIS — I35.0 AORTIC VALVE STENOSIS, NONRHEUMATIC: Primary | ICD-10-CM

## 2024-02-01 DIAGNOSIS — I48.0 PAF (PAROXYSMAL ATRIAL FIBRILLATION) (HCC): ICD-10-CM

## 2024-02-01 DIAGNOSIS — E78.00 HYPERCHOLESTEROLEMIA: ICD-10-CM

## 2024-02-01 LAB
ABO + RH BLD: NORMAL
ALBUMIN SERPL-MCNC: 3.9 G/DL (ref 3.4–5)
ALP SERPL-CCNC: 68 U/L (ref 40–129)
ALT SERPL-CCNC: 19 U/L (ref 10–40)
ANION GAP SERPL CALCULATED.3IONS-SCNC: 11 MMOL/L (ref 3–16)
AST SERPL-CCNC: 22 U/L (ref 15–37)
BACTERIA URNS QL MICRO: ABNORMAL /HPF
BASOPHILS # BLD: 0 K/UL (ref 0–0.2)
BASOPHILS NFR BLD: 0.5 %
BILIRUB DIRECT SERPL-MCNC: <0.2 MG/DL (ref 0–0.3)
BILIRUB INDIRECT SERPL-MCNC: NORMAL MG/DL (ref 0–1)
BILIRUB SERPL-MCNC: 0.4 MG/DL (ref 0–1)
BILIRUB UR QL STRIP.AUTO: NEGATIVE
BLD GP AB SCN SERPL QL: NORMAL
BUN SERPL-MCNC: 18 MG/DL (ref 7–20)
CALCIUM SERPL-MCNC: 9.9 MG/DL (ref 8.3–10.6)
CHLORIDE SERPL-SCNC: 102 MMOL/L (ref 99–110)
CLARITY UR: CLEAR
CO2 SERPL-SCNC: 26 MMOL/L (ref 21–32)
COLOR UR: YELLOW
CREAT SERPL-MCNC: 0.7 MG/DL (ref 0.6–1.2)
DEPRECATED RDW RBC AUTO: 14.1 % (ref 12.4–15.4)
EKG ATRIAL RATE: 64 BPM
EKG DIAGNOSIS: NORMAL
EKG P AXIS: 53 DEGREES
EKG P-R INTERVAL: 172 MS
EKG Q-T INTERVAL: 428 MS
EKG QRS DURATION: 90 MS
EKG QTC CALCULATION (BAZETT): 441 MS
EKG R AXIS: -7 DEGREES
EKG T AXIS: 46 DEGREES
EKG VENTRICULAR RATE: 64 BPM
EOSINOPHIL # BLD: 0.3 K/UL (ref 0–0.6)
EOSINOPHIL NFR BLD: 5 %
EPI CELLS #/AREA URNS AUTO: 5 /HPF (ref 0–5)
GFR SERPLBLD CREATININE-BSD FMLA CKD-EPI: >60 ML/MIN/{1.73_M2}
GLUCOSE SERPL-MCNC: 112 MG/DL (ref 70–99)
GLUCOSE UR STRIP.AUTO-MCNC: NEGATIVE MG/DL
HCT VFR BLD AUTO: 39.9 % (ref 36–48)
HGB BLD-MCNC: 13 G/DL (ref 12–16)
HGB UR QL STRIP.AUTO: NEGATIVE
HYALINE CASTS #/AREA URNS AUTO: 1 /LPF (ref 0–8)
INR PPP: 2.23 (ref 0.84–1.16)
KETONES UR STRIP.AUTO-MCNC: NEGATIVE MG/DL
LEUKOCYTE ESTERASE UR QL STRIP.AUTO: ABNORMAL
LYMPHOCYTES # BLD: 1.9 K/UL (ref 1–5.1)
LYMPHOCYTES NFR BLD: 28.5 %
MAGNESIUM SERPL-MCNC: 1.8 MG/DL (ref 1.8–2.4)
MCH RBC QN AUTO: 31.3 PG (ref 26–34)
MCHC RBC AUTO-ENTMCNC: 32.6 G/DL (ref 31–36)
MCV RBC AUTO: 96 FL (ref 80–100)
MONOCYTES # BLD: 0.5 K/UL (ref 0–1.3)
MONOCYTES NFR BLD: 7.9 %
NEUTROPHILS # BLD: 3.8 K/UL (ref 1.7–7.7)
NEUTROPHILS NFR BLD: 58.1 %
NITRITE UR QL STRIP.AUTO: NEGATIVE
PH UR STRIP.AUTO: 6.5 [PH] (ref 5–8)
PHOSPHATE SERPL-MCNC: 2.5 MG/DL (ref 2.5–4.9)
PLATELET # BLD AUTO: 271 K/UL (ref 135–450)
PMV BLD AUTO: 7.5 FL (ref 5–10.5)
POTASSIUM SERPL-SCNC: 3.8 MMOL/L (ref 3.5–5.1)
PROT SERPL-MCNC: 6.8 G/DL (ref 6.4–8.2)
PROT UR STRIP.AUTO-MCNC: NEGATIVE MG/DL
PROTHROMBIN TIME: 24.6 SEC (ref 11.5–14.8)
RBC # BLD AUTO: 4.16 M/UL (ref 4–5.2)
RBC CLUMPS #/AREA URNS AUTO: 2 /HPF (ref 0–4)
SODIUM SERPL-SCNC: 139 MMOL/L (ref 136–145)
SP GR UR STRIP.AUTO: 1.02 (ref 1–1.03)
UA DIPSTICK W REFLEX MICRO PNL UR: YES
URN SPEC COLLECT METH UR: ABNORMAL
UROBILINOGEN UR STRIP-ACNC: 1 E.U./DL
WBC # BLD AUTO: 6.6 K/UL (ref 4–11)
WBC #/AREA URNS AUTO: 1 /HPF (ref 0–5)

## 2024-02-01 PROCEDURE — 80069 RENAL FUNCTION PANEL: CPT

## 2024-02-01 PROCEDURE — 83735 ASSAY OF MAGNESIUM: CPT

## 2024-02-01 PROCEDURE — 81001 URINALYSIS AUTO W/SCOPE: CPT

## 2024-02-01 PROCEDURE — 93005 ELECTROCARDIOGRAM TRACING: CPT | Performed by: INTERNAL MEDICINE

## 2024-02-01 PROCEDURE — 80076 HEPATIC FUNCTION PANEL: CPT

## 2024-02-01 PROCEDURE — 85025 COMPLETE CBC W/AUTO DIFF WBC: CPT

## 2024-02-01 PROCEDURE — 36415 COLL VENOUS BLD VENIPUNCTURE: CPT

## 2024-02-01 PROCEDURE — 86900 BLOOD TYPING SEROLOGIC ABO: CPT

## 2024-02-01 PROCEDURE — 93010 ELECTROCARDIOGRAM REPORT: CPT | Performed by: INTERNAL MEDICINE

## 2024-02-01 PROCEDURE — 87086 URINE CULTURE/COLONY COUNT: CPT

## 2024-02-01 PROCEDURE — 99214 OFFICE O/P EST MOD 30 MIN: CPT | Performed by: INTERNAL MEDICINE

## 2024-02-01 PROCEDURE — 1123F ACP DISCUSS/DSCN MKR DOCD: CPT | Performed by: INTERNAL MEDICINE

## 2024-02-01 PROCEDURE — 85610 PROTHROMBIN TIME: CPT

## 2024-02-01 PROCEDURE — 86850 RBC ANTIBODY SCREEN: CPT

## 2024-02-01 PROCEDURE — 86901 BLOOD TYPING SEROLOGIC RH(D): CPT

## 2024-02-01 PROCEDURE — 3078F DIAST BP <80 MM HG: CPT | Performed by: INTERNAL MEDICINE

## 2024-02-01 PROCEDURE — 3074F SYST BP LT 130 MM HG: CPT | Performed by: INTERNAL MEDICINE

## 2024-02-01 NOTE — PRE-PROCEDURE INSTRUCTIONS
1.  EAA Introduced self to pt and family.  2.  EAA Informed about what to expect the day of surgery.       A)  Preop nurse will bring them to pre-op holdingEAA.       B)  PCA will bathe & shave them.EAA       C)  Anesthesiologist will put IV lines in while in preop. sedation for comfort,             during lining, doze off.EAA       D)  Nursing staff will be in to visit.EAA       E)  Operating room will be very cold.  Warming blanket under and on themEAA        F)   In the operating room there will be several people connecting them to               monitors.EAA        3.   EAA Will wake up in the Cath lab recovery area where you will stay for a few hours before being transferred to the floor.  4.   EAA Invite them to ask questions.

## 2024-02-01 NOTE — ANESTHESIA PRE PROCEDURE
11:50 AM    AGRATIO 1.5 10/02/2020 10:18 AM    LABGLOM >60 11/29/2023 07:15 AM    GLUCOSE 100 11/29/2023 07:15 AM    PROT 7.2 10/02/2020 10:18 AM    PROT 7.6 01/04/2013 11:50 AM    CALCIUM 9.6 11/29/2023 07:15 AM    BILITOT 0.7 10/02/2020 10:18 AM    ALKPHOS 78 10/02/2020 10:18 AM    AST 32 10/02/2020 10:18 AM    ALT 26 10/02/2020 10:18 AM       POC Tests: No results for input(s): \"POCGLU\", \"POCNA\", \"POCK\", \"POCCL\", \"POCBUN\", \"POCHEMO\", \"POCHCT\" in the last 72 hours.    Coags:   Lab Results   Component Value Date/Time    PROTIME 21.4 08/17/2023 03:23 PM    PROTIME 33 09/21/2018 11:01 AM    INR 2.1 01/24/2024 01:30 PM    INR 1.20 11/29/2023 07:14 AM    APTT 37.3 12/28/2022 07:40 PM       HCG (If Applicable): No results found for: \"PREGTESTUR\", \"PREGSERUM\", \"HCG\", \"HCGQUANT\"     ABGs: No results found for: \"PHART\", \"PO2ART\", \"AZF8SLJ\", \"UGZ8XXH\", \"BEART\", \"X2GBIFXI\"     Type & Screen (If Applicable):  Lab Results   Component Value Date    LABABO A 05/06/2011    LABRH Negative 05/06/2011       Drug/Infectious Status (If Applicable):  No results found for: \"HIV\", \"HEPCAB\"    COVID-19 Screening (If Applicable):   Lab Results   Component Value Date/Time    COVID19 Not Detected 12/10/2021 09:36 AM    COVID19 NOT DETECTED 09/28/2020 11:21 AM           Anesthesia Evaluation  Patient summary reviewed and Nursing notes reviewed   no history of anesthetic complications:   Airway: Mallampati: III  TM distance: >3 FB   Neck ROM: full  Mouth opening: > = 3 FB   Dental: normal exam         Pulmonary:   (+)     sleep apnea: on CPAP,       asthma (advair daily and albuterol 2 times per day): allergic asthma,     (-) rhonchi and wheezes                           Cardiovascular:    (+) hypertension:, valvular problems/murmurs: AS, CAD:, dysrhythmias: atrial fibrillation, murmur    (-) CABG/stent      Rhythm: regular  Rate: normal                 ROS comment: Transthoracic Echocardiography Report (TTE)      Demographics      Patient

## 2024-02-01 NOTE — TELEPHONE ENCOUNTER
Pt called clinic to let know cardiologist provided clearance and holding instructions for warfarin for TAVR procedure on 2/6. Pt to hold warfarin starting today (2/1) until 2 days post op (2/8). Advised for pt to resume warfarin taking 10 mg for 2 d then return to normal weekly dose. R/s pt to RTC on 2/16

## 2024-02-01 NOTE — PROGRESS NOTES
[facility]  H+P  Consult  OP Visit  FU Visit   CC/HPI   CC Followup visit for cardiac conditions detailed in assessment and plan below.   Intervention None   General Doing fair.  Concerned with worsening symptoms.     Cardiac Sx -CP, -syncope, +SOB, +dizziness, +edema, +orthopnea, +pnd, +fatigue   HISTORY/ALLERGY/ROS   M/S/S/F Hx Reviewed in Epic and updated as appropriate   ALLERG Belsomra [suvorexant], Avelox [moxifloxacin hcl in nacl], Levofloxacin, and Sulfa antibiotics   ROS Full ROS obtained and negative except as mentioned in HPI   MEDICATIONS   Cardiac medications reviewed in [facility] during visit.   PHYSICAL EXAM   Vitals /72 (Site: Left Upper Arm, Position: Sitting, Cuff Size: Medium Adult)   Pulse 73   Ht 1.626 m (5' 4.02\")   Wt 115 kg (253 lb 9.6 oz)   SpO2 99%   BMI 43.51 kg/m²    Gen Alert, coop, no distress Heart  RRR, no MRG   Lung CTA-B, unlabored, no DTP Extrem Edema -Grade 0 (0mm)      COMPLIANCE   Discussed and counseled on diet, exercise, weight loss, smoking, alcohol, drugs.  All questions answered.   CODING   SCI (08983) - 30-39 mins spent reviewing hx/tests/consults, performing exam, counseling/educating, ordering meds/tests/procedures, referring/communicating w/PCP/consultants, documenting in EMR, interpreting results, communicating medical information and plan with family.   SCRIBE ATTESTATION   Nurse I, Alexandrea Connor RN, am scribing for and in the presence of Wally Rivers MD. 1/31/2024   Doctor Alexandrea Connor is working as scribe for and in presence of me and may have prepopulated components of note with my historical intellectual property (IP) under my supervision.  Any additions to this IP performed in my presence and at my direction. Content and accuracy of this note reviewed by me (Wally Rivers MD).   ASSESSMENT AND PLAN   *AS    Date EF Detail   Sx     As above   NYHA     III   TTE 12/20  3/22  10/23 60%  65%  65% AS MG 23, Mild AI  AS MG 30, mild AI  AS MG 41, PV

## 2024-02-01 NOTE — TELEPHONE ENCOUNTER
Called Libia  and left a message to let her know we are going back to original TAVR date. Requesting she come in for PAT and OV tomorrow. OV 10am with DCE and PAT to follow

## 2024-02-01 NOTE — PROGRESS NOTES
Pt here for PAT visit.  Consents for procedure and blood signed by pt and in chart.  Labs drawn and urine collected and sent to lab for testing as ordered.  Pt seen by Dr. Contreras from Anesthesia and questions answered.  Vitals stable and documented in flowsheet.  Pt instructed to be NPO after midnight prior to procedure and states understanding.  Pt provided with hibiclens and instructed to shower with entire bottle the night prior to procedure.    Pt instructed to stop taking Coumadin on 1/31/24 prior to procedure on 2/6/2024 and states understanding.   EKG completed and results in chart.  Pt instructed to arrive to the hospital the morning of procedure at 0600 on 2/6/2024.  At completion of PAT pt taken to outpatient diagnostics for chest x-ray.  Pt in good condition and okay for discharge after completion of x-ray.  Pt denies further questions at time of discharge.  Pt instructed to call Dr. Rivers office with any medical concerns or the heart office at [phone] with any further questions between now and day of procedure.

## 2024-02-02 LAB — BACTERIA UR CULT: NORMAL

## 2024-02-06 ENCOUNTER — HOSPITAL ENCOUNTER (INPATIENT)
Age: 79
LOS: 1 days | Discharge: HOME | DRG: 266 | End: 2024-02-07
Attending: INTERNAL MEDICINE | Admitting: INTERNAL MEDICINE
Payer: MEDICARE

## 2024-02-06 ENCOUNTER — HOSPITAL ENCOUNTER (OUTPATIENT)
Dept: CARDIAC CATH/INVASIVE PROCEDURES | Age: 79
Setting detail: SURGERY ADMIT
Discharge: HOME | DRG: 266 | End: 2024-02-06
Payer: MEDICARE

## 2024-02-06 ENCOUNTER — ANESTHESIA (OUTPATIENT)
Dept: OPERATING ROOM | Age: 79
DRG: 266 | End: 2024-02-06
Payer: MEDICARE

## 2024-02-06 VITALS
SYSTOLIC BLOOD PRESSURE: 129 MMHG | RESPIRATION RATE: 19 BRPM | HEART RATE: 71 BPM | DIASTOLIC BLOOD PRESSURE: 80 MMHG | OXYGEN SATURATION: 95 %

## 2024-02-06 PROBLEM — I35.0 AORTIC STENOSIS, SEVERE: Status: ACTIVE | Noted: 2024-02-06

## 2024-02-06 LAB
ABO + RH BLD: NORMAL
ACTIVATED CLOTTING TIME: 119 SEC (ref 99–130)
ACTIVATED CLOTTING TIME: 229 SEC (ref 99–130)
ACTIVATED CLOTTING TIME: 252 SEC (ref 99–130)
ACTIVATED CLOTTING TIME: 98 SEC (ref 99–130)
BASE EXCESS BLDA CALC-SCNC: 0 MMOL/L (ref -3–3)
BLD GP AB SCN SERPL QL: NORMAL
CA-I BLD-SCNC: 1.27 MMOL/L (ref 1.12–1.32)
CO2 BLDA-SCNC: 28 MMOL/L
EKG ATRIAL RATE: 63 BPM
EKG DIAGNOSIS: NORMAL
EKG P AXIS: 72 DEGREES
EKG P-R INTERVAL: 188 MS
EKG Q-T INTERVAL: 434 MS
EKG QRS DURATION: 100 MS
EKG QTC CALCULATION (BAZETT): 444 MS
EKG R AXIS: 39 DEGREES
EKG T AXIS: 73 DEGREES
EKG VENTRICULAR RATE: 63 BPM
GLUCOSE BLD-MCNC: 104 MG/DL (ref 70–99)
HCO3 BLDA-SCNC: 26.2 MMOL/L (ref 21–29)
HCT VFR BLD AUTO: 35 % (ref 36–48)
HGB BLD CALC-MCNC: 11.8 GM/DL (ref 12–16)
INR PPP: 1.02 (ref 0.84–1.16)
LACTATE BLD-SCNC: 1.39 MMOL/L (ref 0.4–2)
NT-PROBNP SERPL-MCNC: 354 PG/ML (ref 0–449)
PCO2 BLDA: 50.1 MM HG (ref 35–45)
PERFORMED ON: ABNORMAL
PH BLDA: 7.33 [PH] (ref 7.35–7.45)
PO2 BLDA: 138 MM HG (ref 75–108)
POC SAMPLE TYPE: ABNORMAL
POTASSIUM BLD-SCNC: 3.5 MMOL/L (ref 3.5–5.1)
PROTHROMBIN TIME: 13.4 SEC (ref 11.5–14.8)
SAO2 % BLDA: 99 % (ref 93–100)
SODIUM BLD-SCNC: 144 MMOL/L (ref 136–145)
TROPONIN, HIGH SENSITIVITY: 17 NG/L (ref 0–14)

## 2024-02-06 PROCEDURE — 6360000002 HC RX W HCPCS: Performed by: STUDENT IN AN ORGANIZED HEALTH CARE EDUCATION/TRAINING PROGRAM

## 2024-02-06 PROCEDURE — 3700000000 HC ANESTHESIA ATTENDED CARE: Performed by: INTERNAL MEDICINE

## 2024-02-06 PROCEDURE — 2060000000 HC ICU INTERMEDIATE R&B

## 2024-02-06 PROCEDURE — C1894 INTRO/SHEATH, NON-LASER: HCPCS

## 2024-02-06 PROCEDURE — 93005 ELECTROCARDIOGRAM TRACING: CPT | Performed by: INTERNAL MEDICINE

## 2024-02-06 PROCEDURE — 85014 HEMATOCRIT: CPT

## 2024-02-06 PROCEDURE — 82947 ASSAY GLUCOSE BLOOD QUANT: CPT

## 2024-02-06 PROCEDURE — 85610 PROTHROMBIN TIME: CPT

## 2024-02-06 PROCEDURE — 02RF38Z REPLACEMENT OF AORTIC VALVE WITH ZOOPLASTIC TISSUE, PERCUTANEOUS APPROACH: ICD-10-PCS | Performed by: INTERNAL MEDICINE

## 2024-02-06 PROCEDURE — 6370000000 HC RX 637 (ALT 250 FOR IP): Performed by: INTERNAL MEDICINE

## 2024-02-06 PROCEDURE — 2580000003 HC RX 258: Performed by: INTERNAL MEDICINE

## 2024-02-06 PROCEDURE — 83605 ASSAY OF LACTIC ACID: CPT

## 2024-02-06 PROCEDURE — 83880 ASSAY OF NATRIURETIC PEPTIDE: CPT

## 2024-02-06 PROCEDURE — 84132 ASSAY OF SERUM POTASSIUM: CPT

## 2024-02-06 PROCEDURE — 86900 BLOOD TYPING SEROLOGIC ABO: CPT

## 2024-02-06 PROCEDURE — C1889 IMPLANT/INSERT DEVICE, NOC: HCPCS

## 2024-02-06 PROCEDURE — 7100000010 HC PHASE II RECOVERY - FIRST 15 MIN

## 2024-02-06 PROCEDURE — 3700000001 HC ADD 15 MINUTES (ANESTHESIA): Performed by: INTERNAL MEDICINE

## 2024-02-06 PROCEDURE — 33361 REPLACE AORTIC VALVE PERQ: CPT | Performed by: INTERNAL MEDICINE

## 2024-02-06 PROCEDURE — 36415 COLL VENOUS BLD VENIPUNCTURE: CPT

## 2024-02-06 PROCEDURE — 3600000007 HC SURGERY HYBRID BASE

## 2024-02-06 PROCEDURE — 93010 ELECTROCARDIOGRAM REPORT: CPT | Performed by: INTERNAL MEDICINE

## 2024-02-06 PROCEDURE — C1760 CLOSURE DEV, VASC: HCPCS

## 2024-02-06 PROCEDURE — 86923 COMPATIBILITY TEST ELECTRIC: CPT

## 2024-02-06 PROCEDURE — 84295 ASSAY OF SERUM SODIUM: CPT

## 2024-02-06 PROCEDURE — 82330 ASSAY OF CALCIUM: CPT

## 2024-02-06 PROCEDURE — 86901 BLOOD TYPING SEROLOGIC RH(D): CPT

## 2024-02-06 PROCEDURE — 3600000017 HC SURGERY HYBRID ADDL 15MIN

## 2024-02-06 PROCEDURE — 6360000002 HC RX W HCPCS: Performed by: INTERNAL MEDICINE

## 2024-02-06 PROCEDURE — 2709999900 HC NON-CHARGEABLE SUPPLY: Performed by: INTERNAL MEDICINE

## 2024-02-06 PROCEDURE — 85347 COAGULATION TIME ACTIVATED: CPT

## 2024-02-06 PROCEDURE — 6360000004 HC RX CONTRAST MEDICATION: Performed by: INTERNAL MEDICINE

## 2024-02-06 PROCEDURE — 2709999900 HC NON-CHARGEABLE SUPPLY

## 2024-02-06 PROCEDURE — 2500000003 HC RX 250 WO HCPCS: Performed by: STUDENT IN AN ORGANIZED HEALTH CARE EDUCATION/TRAINING PROGRAM

## 2024-02-06 PROCEDURE — 33361 REPLACE AORTIC VALVE PERQ: CPT | Performed by: STUDENT IN AN ORGANIZED HEALTH CARE EDUCATION/TRAINING PROGRAM

## 2024-02-06 PROCEDURE — 7100000011 HC PHASE II RECOVERY - ADDTL 15 MIN

## 2024-02-06 PROCEDURE — 82803 BLOOD GASES ANY COMBINATION: CPT

## 2024-02-06 PROCEDURE — 86850 RBC ANTIBODY SCREEN: CPT

## 2024-02-06 PROCEDURE — 84484 ASSAY OF TROPONIN QUANT: CPT

## 2024-02-06 PROCEDURE — C1769 GUIDE WIRE: HCPCS

## 2024-02-06 RX ORDER — SODIUM CHLORIDE 0.9 % (FLUSH) 0.9 %
5-40 SYRINGE (ML) INJECTION EVERY 12 HOURS SCHEDULED
Status: DISCONTINUED | OUTPATIENT
Start: 2024-02-06 | End: 2024-02-07 | Stop reason: HOSPADM

## 2024-02-06 RX ORDER — ACETAMINOPHEN 500 MG
1000 TABLET ORAL NIGHTLY PRN
Status: DISCONTINUED | OUTPATIENT
Start: 2024-02-06 | End: 2024-02-07 | Stop reason: HOSPADM

## 2024-02-06 RX ORDER — MONTELUKAST SODIUM 10 MG/1
10 TABLET ORAL NIGHTLY
Status: DISCONTINUED | OUTPATIENT
Start: 2024-02-06 | End: 2024-02-07 | Stop reason: HOSPADM

## 2024-02-06 RX ORDER — POTASSIUM CHLORIDE 20 MEQ/1
20 TABLET, EXTENDED RELEASE ORAL DAILY
Status: DISCONTINUED | OUTPATIENT
Start: 2024-02-06 | End: 2024-02-07 | Stop reason: HOSPADM

## 2024-02-06 RX ORDER — HYDRALAZINE HYDROCHLORIDE 20 MG/ML
10 INJECTION INTRAMUSCULAR; INTRAVENOUS EVERY 6 HOURS PRN
Status: DISCONTINUED | OUTPATIENT
Start: 2024-02-06 | End: 2024-02-07 | Stop reason: HOSPADM

## 2024-02-06 RX ORDER — HEPARIN SODIUM 1000 [USP'U]/ML
INJECTION, SOLUTION INTRAVENOUS; SUBCUTANEOUS PRN
Status: DISCONTINUED | OUTPATIENT
Start: 2024-02-06 | End: 2024-02-06 | Stop reason: SDUPTHER

## 2024-02-06 RX ORDER — DEXAMETHASONE SODIUM PHOSPHATE 4 MG/ML
INJECTION, SOLUTION INTRA-ARTICULAR; INTRALESIONAL; INTRAMUSCULAR; INTRAVENOUS; SOFT TISSUE PRN
Status: DISCONTINUED | OUTPATIENT
Start: 2024-02-06 | End: 2024-02-06 | Stop reason: SDUPTHER

## 2024-02-06 RX ORDER — ACETAMINOPHEN 325 MG/1
650 TABLET ORAL EVERY 4 HOURS PRN
Status: CANCELLED | OUTPATIENT
Start: 2024-02-06

## 2024-02-06 RX ORDER — NITROGLYCERIN 20 MG/100ML
5 INJECTION INTRAVENOUS CONTINUOUS
Status: DISCONTINUED | OUTPATIENT
Start: 2024-02-06 | End: 2024-02-07 | Stop reason: HOSPADM

## 2024-02-06 RX ORDER — HYDROCHLOROTHIAZIDE 25 MG/1
25 TABLET ORAL DAILY
Status: DISCONTINUED | OUTPATIENT
Start: 2024-02-06 | End: 2024-02-07 | Stop reason: HOSPADM

## 2024-02-06 RX ORDER — PROTAMINE SULFATE 10 MG/ML
INJECTION, SOLUTION INTRAVENOUS PRN
Status: DISCONTINUED | OUTPATIENT
Start: 2024-02-06 | End: 2024-02-06 | Stop reason: SDUPTHER

## 2024-02-06 RX ORDER — DIPHENHYDRAMINE HCL 25 MG
50 TABLET ORAL NIGHTLY PRN
Status: DISCONTINUED | OUTPATIENT
Start: 2024-02-06 | End: 2024-02-07 | Stop reason: HOSPADM

## 2024-02-06 RX ORDER — ACETAMINOPHEN 325 MG/1
650 TABLET ORAL EVERY 4 HOURS PRN
Status: DISCONTINUED | OUTPATIENT
Start: 2024-02-06 | End: 2024-02-07 | Stop reason: HOSPADM

## 2024-02-06 RX ORDER — CITALOPRAM 20 MG/1
40 TABLET ORAL DAILY
Status: DISCONTINUED | OUTPATIENT
Start: 2024-02-07 | End: 2024-02-07 | Stop reason: HOSPADM

## 2024-02-06 RX ORDER — SODIUM CHLORIDE 0.9 % (FLUSH) 0.9 %
5-40 SYRINGE (ML) INJECTION PRN
Status: DISCONTINUED | OUTPATIENT
Start: 2024-02-06 | End: 2024-02-07 | Stop reason: HOSPADM

## 2024-02-06 RX ORDER — INDAPAMIDE 1.25 MG/1
1.25 TABLET ORAL EVERY MORNING
Status: DISCONTINUED | OUTPATIENT
Start: 2024-02-06 | End: 2024-02-06 | Stop reason: CLARIF

## 2024-02-06 RX ORDER — DOCUSATE SODIUM 100 MG/1
100 CAPSULE, LIQUID FILLED ORAL DAILY
Status: DISCONTINUED | OUTPATIENT
Start: 2024-02-06 | End: 2024-02-07 | Stop reason: HOSPADM

## 2024-02-06 RX ORDER — LIDOCAINE 4 G/G
1 PATCH TOPICAL ONCE
Status: DISCONTINUED | OUTPATIENT
Start: 2024-02-06 | End: 2024-02-07 | Stop reason: HOSPADM

## 2024-02-06 RX ORDER — ASPIRIN 81 MG/1
81 TABLET ORAL DAILY
Status: DISCONTINUED | OUTPATIENT
Start: 2024-02-06 | End: 2024-02-07 | Stop reason: HOSPADM

## 2024-02-06 RX ORDER — PANTOPRAZOLE SODIUM 40 MG/1
40 TABLET, DELAYED RELEASE ORAL
Status: DISCONTINUED | OUTPATIENT
Start: 2024-02-07 | End: 2024-02-07 | Stop reason: HOSPADM

## 2024-02-06 RX ORDER — ALBUTEROL SULFATE 90 UG/1
2 AEROSOL, METERED RESPIRATORY (INHALATION) EVERY 4 HOURS PRN
Status: DISCONTINUED | OUTPATIENT
Start: 2024-02-06 | End: 2024-02-07 | Stop reason: HOSPADM

## 2024-02-06 RX ORDER — ALLOPURINOL 300 MG/1
300 TABLET ORAL DAILY
Status: DISCONTINUED | OUTPATIENT
Start: 2024-02-07 | End: 2024-02-07 | Stop reason: HOSPADM

## 2024-02-06 RX ORDER — ATROPINE SULFATE 1 MG/ML
0.5 INJECTION, SOLUTION INTRAVENOUS
Status: DISPENSED | OUTPATIENT
Start: 2024-02-06 | End: 2024-02-07

## 2024-02-06 RX ORDER — GABAPENTIN 300 MG/1
600 CAPSULE ORAL 3 TIMES DAILY
Status: DISCONTINUED | OUTPATIENT
Start: 2024-02-06 | End: 2024-02-07 | Stop reason: HOSPADM

## 2024-02-06 RX ORDER — WARFARIN SODIUM 5 MG/1
10 TABLET ORAL ONCE
Status: COMPLETED | OUTPATIENT
Start: 2024-02-06 | End: 2024-02-06

## 2024-02-06 RX ORDER — ONDANSETRON 2 MG/ML
4 INJECTION INTRAMUSCULAR; INTRAVENOUS EVERY 6 HOURS PRN
Status: DISCONTINUED | OUTPATIENT
Start: 2024-02-06 | End: 2024-02-07 | Stop reason: HOSPADM

## 2024-02-06 RX ORDER — SODIUM CHLORIDE 9 MG/ML
INJECTION, SOLUTION INTRAVENOUS PRN
Status: DISCONTINUED | OUTPATIENT
Start: 2024-02-06 | End: 2024-02-07 | Stop reason: HOSPADM

## 2024-02-06 RX ORDER — 0.9 % SODIUM CHLORIDE 0.9 %
500 INTRAVENOUS SOLUTION INTRAVENOUS PRN
Status: DISCONTINUED | OUTPATIENT
Start: 2024-02-06 | End: 2024-02-07 | Stop reason: HOSPADM

## 2024-02-06 RX ORDER — ONDANSETRON 2 MG/ML
INJECTION INTRAMUSCULAR; INTRAVENOUS PRN
Status: DISCONTINUED | OUTPATIENT
Start: 2024-02-06 | End: 2024-02-06 | Stop reason: SDUPTHER

## 2024-02-06 RX ORDER — LIDOCAINE HYDROCHLORIDE 20 MG/ML
INJECTION, SOLUTION EPIDURAL; INFILTRATION; INTRACAUDAL; PERINEURAL PRN
Status: DISCONTINUED | OUTPATIENT
Start: 2024-02-06 | End: 2024-02-06 | Stop reason: SDUPTHER

## 2024-02-06 RX ORDER — PROPOFOL 10 MG/ML
INJECTION, EMULSION INTRAVENOUS CONTINUOUS PRN
Status: DISCONTINUED | OUTPATIENT
Start: 2024-02-06 | End: 2024-02-06 | Stop reason: SDUPTHER

## 2024-02-06 RX ORDER — FENTANYL CITRATE 50 UG/ML
INJECTION, SOLUTION INTRAMUSCULAR; INTRAVENOUS PRN
Status: DISCONTINUED | OUTPATIENT
Start: 2024-02-06 | End: 2024-02-06 | Stop reason: SDUPTHER

## 2024-02-06 RX ORDER — CLONAZEPAM 1 MG/1
1 TABLET ORAL EVERY EVENING
Status: DISCONTINUED | OUTPATIENT
Start: 2024-02-06 | End: 2024-02-07 | Stop reason: HOSPADM

## 2024-02-06 RX ORDER — CLOPIDOGREL BISULFATE 75 MG/1
75 TABLET ORAL DAILY
Status: DISCONTINUED | OUTPATIENT
Start: 2024-02-06 | End: 2024-02-07 | Stop reason: HOSPADM

## 2024-02-06 RX ORDER — POLYETHYLENE GLYCOL 3350 17 G/17G
17 POWDER, FOR SOLUTION ORAL DAILY PRN
Status: DISCONTINUED | OUTPATIENT
Start: 2024-02-06 | End: 2024-02-07 | Stop reason: HOSPADM

## 2024-02-06 RX ORDER — FUROSEMIDE 10 MG/ML
10 INJECTION INTRAMUSCULAR; INTRAVENOUS ONCE
Status: COMPLETED | OUTPATIENT
Start: 2024-02-06 | End: 2024-02-06

## 2024-02-06 RX ORDER — ETOMIDATE 2 MG/ML
INJECTION INTRAVENOUS PRN
Status: DISCONTINUED | OUTPATIENT
Start: 2024-02-06 | End: 2024-02-06 | Stop reason: SDUPTHER

## 2024-02-06 RX ADMIN — CEFAZOLIN 2000 MG: 2 INJECTION, POWDER, FOR SOLUTION INTRAMUSCULAR; INTRAVENOUS at 07:15

## 2024-02-06 RX ADMIN — FENTANYL CITRATE 25 MCG: 50 INJECTION, SOLUTION INTRAMUSCULAR; INTRAVENOUS at 07:33

## 2024-02-06 RX ADMIN — GABAPENTIN 600 MG: 300 CAPSULE ORAL at 14:17

## 2024-02-06 RX ADMIN — PROTAMINE SULFATE 50 MG: 10 INJECTION, SOLUTION INTRAVENOUS at 08:28

## 2024-02-06 RX ADMIN — FENTANYL CITRATE 25 MCG: 50 INJECTION, SOLUTION INTRAMUSCULAR; INTRAVENOUS at 07:22

## 2024-02-06 RX ADMIN — FUROSEMIDE 10 MG: 10 INJECTION, SOLUTION INTRAMUSCULAR; INTRAVENOUS at 11:48

## 2024-02-06 RX ADMIN — LIDOCAINE HYDROCHLORIDE 100 MG: 20 INJECTION, SOLUTION EPIDURAL; INFILTRATION; INTRACAUDAL; PERINEURAL at 07:09

## 2024-02-06 RX ADMIN — DIPHENHYDRAMINE HYDROCHLORIDE 50 MG: 25 TABLET ORAL at 23:28

## 2024-02-06 RX ADMIN — DOCUSATE SODIUM 100 MG: 100 CAPSULE, LIQUID FILLED ORAL at 14:01

## 2024-02-06 RX ADMIN — ASPIRIN 81 MG: 81 TABLET, COATED ORAL at 14:19

## 2024-02-06 RX ADMIN — HEPARIN SODIUM 10000 UNITS: 1000 INJECTION INTRAVENOUS; SUBCUTANEOUS at 08:06

## 2024-02-06 RX ADMIN — IOPAMIDOL 73 ML: 755 INJECTION, SOLUTION INTRAVENOUS at 08:34

## 2024-02-06 RX ADMIN — POLYETHYLENE GLYCOL 3350 17 G: 17 POWDER, FOR SOLUTION ORAL at 21:00

## 2024-02-06 RX ADMIN — VERAPAMIL HYDROCHLORIDE 120 MG: 120 TABLET, FILM COATED, EXTENDED RELEASE ORAL at 21:00

## 2024-02-06 RX ADMIN — SODIUM CHLORIDE, PRESERVATIVE FREE 10 ML: 5 INJECTION INTRAVENOUS at 21:00

## 2024-02-06 RX ADMIN — CLOPIDOGREL BISULFATE 75 MG: 75 TABLET ORAL at 14:19

## 2024-02-06 RX ADMIN — ETOMIDATE 2 MG: 20 INJECTION, SOLUTION INTRAVENOUS at 08:17

## 2024-02-06 RX ADMIN — ONDANSETRON 4 MG: 2 INJECTION INTRAMUSCULAR; INTRAVENOUS at 07:09

## 2024-02-06 RX ADMIN — ETOMIDATE 2 MG: 20 INJECTION, SOLUTION INTRAVENOUS at 08:07

## 2024-02-06 RX ADMIN — SODIUM CHLORIDE, PRESERVATIVE FREE 10 ML: 5 INJECTION INTRAVENOUS at 14:19

## 2024-02-06 RX ADMIN — ACETAMINOPHEN 1000 MG: 500 TABLET ORAL at 23:29

## 2024-02-06 RX ADMIN — ETOMIDATE 2 MG: 20 INJECTION, SOLUTION INTRAVENOUS at 08:11

## 2024-02-06 RX ADMIN — GABAPENTIN 600 MG: 300 CAPSULE ORAL at 21:00

## 2024-02-06 RX ADMIN — MONTELUKAST 10 MG: 10 TABLET, FILM COATED ORAL at 21:00

## 2024-02-06 RX ADMIN — CLONAZEPAM 1 MG: 1 TABLET ORAL at 21:00

## 2024-02-06 RX ADMIN — HEPARIN SODIUM 1000 UNITS: 1000 INJECTION INTRAVENOUS; SUBCUTANEOUS at 08:07

## 2024-02-06 RX ADMIN — POTASSIUM CHLORIDE 20 MEQ: 1500 TABLET, EXTENDED RELEASE ORAL at 14:17

## 2024-02-06 RX ADMIN — FENTANYL CITRATE 25 MCG: 50 INJECTION, SOLUTION INTRAMUSCULAR; INTRAVENOUS at 07:40

## 2024-02-06 RX ADMIN — ACETAMINOPHEN 650 MG: 325 TABLET ORAL at 10:27

## 2024-02-06 RX ADMIN — DEXAMETHASONE SODIUM PHOSPHATE 6 MG: 4 INJECTION, SOLUTION INTRAMUSCULAR; INTRAVENOUS at 07:31

## 2024-02-06 RX ADMIN — FENTANYL CITRATE 25 MCG: 50 INJECTION, SOLUTION INTRAMUSCULAR; INTRAVENOUS at 07:45

## 2024-02-06 RX ADMIN — PHENYLEPHRINE HYDROCHLORIDE 20 MCG/MIN: 10 INJECTION INTRAVENOUS at 07:25

## 2024-02-06 RX ADMIN — PROPOFOL 100 MCG/KG/MIN: 10 INJECTION, EMULSION INTRAVENOUS at 07:09

## 2024-02-06 RX ADMIN — WARFARIN SODIUM 10 MG: 5 TABLET ORAL at 18:49

## 2024-02-06 ASSESSMENT — PAIN - FUNCTIONAL ASSESSMENT: PAIN_FUNCTIONAL_ASSESSMENT: 0-10

## 2024-02-06 ASSESSMENT — LIFESTYLE VARIABLES
HOW OFTEN DO YOU HAVE A DRINK CONTAINING ALCOHOL: NEVER
HOW MANY STANDARD DRINKS CONTAINING ALCOHOL DO YOU HAVE ON A TYPICAL DAY: PATIENT DOES NOT DRINK

## 2024-02-06 ASSESSMENT — PAIN SCALES - GENERAL
PAINLEVEL_OUTOF10: 3
PAINLEVEL_OUTOF10: 0

## 2024-02-06 NOTE — PROGRESS NOTES
Pt arrived to pre/post area. Jac 10. EKG obtained. Groin sites WNL. Pacer located in right femoral vein, 70cm length, back up rate of 30bpm, 10MA. Walker catheter inserted, pt unable to urinate using bedpan. Family at bedside, will continue to monitor.

## 2024-02-06 NOTE — OP NOTE
[facility]  TAVR Operative Note     PROCEDURE SUMMARY   Procedure Replacement of aortic valve with zooplastic tissue, percutaneous ([id number])   Valve Type Allison Silvio 3 Ultra 23mm with 2additional cc of volume.   Operators Wally Rivers MD (IC), Omar Rizo MD (IC), Yogi Levi MD ([facility])   Indication Nonrheumatic aortic valve stenosis (I35.0)   Consent Obtained, witnessed, documented in chart.   Start Time 0744   Stop Time 0838   Contrast 73cc   Flouro Time 10.4min   EBL <50mL   Complications None   Specimens None   Bleed Risk Low   Sedation Sedation provided entirely by anesthesia.  See record for details.   TTE Performed preprocedure, intraprocedure and postprocedure.    Access RCFA, LCFA, RCFV   Preclose Delivery side artery preclosed with 2 perclose devices   TVP Inserted via CFV into RV and threshold obtained and acceptable.  Removed after valve implantation.   Angiography Pigtail inserted through CFA into ascending aorta over wire   Delivery Sheath J wire advanced into descending Ao.  JR4 advanced over wire into descending Ao.  Lunderquist wire advanced into descending Ao through JR4.  Delivery sheath advanced over wire into descending aorta.     AV Crossing AL1 and J/Straight wire combo used to cross AV.  Amplatz extra stiff advanced through AL1 and curled in LV apex.   BAV BAV not performed.   Valve Deploy TAVR valve prepped by certified Allison Rep and advanced through the delivery sheath to the level of the aortic annulus.  Valve localized under echo and fluoro guidance.  Rapid pacing employed and valve deployed to profile for 4s.  Balloon deflated and withdrawn into delivery sheath.  Rapid pacing aborted.  TTE reviewed for complications.   Postdilation Post dilation was not performed   PostProcedure Wires removed and delivery catheter withdrawn.  Delivery sheath removed and perclose sutures tied with hemostasis.  Transitioned to postop/CVU.      CHF STATUS   Symptoms Per H&P   CHF Type

## 2024-02-06 NOTE — OP NOTE
Operative Note      Patient: Jessica Weeks  YOB: 1945  MRN: [id number]    Date of Procedure: 2/6/2024    Pre-Op Diagnosis Codes:     * Aortic valve stenosis, etiology of cardiac valve disease unspecified [I35.0]    Post-Op Diagnosis: Same       Procedure(s):  TRANSCATHETER AORTIC VALVE REPLACEMENT FEMORAL APPROACH     Surgeon(s):  Wally Rivers MD Kasten, Michael W, MD    Assistant:   First Assistant: Dimitri Ash RN    Anesthesia: Monitor Anesthesia Care    Estimated Blood Loss (mL): less than 50     Complications: None    Specimens:   * No specimens in log *    Implants:  * No implants in log *      Drains: * No LDAs found *    Findings: 10 gradient, no PVL, completion angio acceptable        Detailed Description of Procedure:   Procedure performed under MAC.  she was prepped and draped in sterile fashion.  After timeout was performed, the right common femoral artery was accessed under fluoroscopic guidance.  Lidocaine had been injected at both sites.  Catheter advanced followed by sheath which was flushed.  A right femoral vein catheter was inserted without difficulty and flushed using seldinger technique.  A left common femoral artery catheter was placed followed by the pigtail in the aortic root.    A temporary pacing wire was advanced through the venous sheath under fluoroscopic guidance through the IVC into the right ventricle and tested.  2 Perclose devices were placed in the right femoral artery.  Using a Amplatz wire the dry seal sheath was then placed.  Heparin was given with satisfactory result of the ACT. The valve was crossed without difficulty.  Wire exchanged for a stiff wire.  The device was then brought into the descending thoracic aorta and prepared as per device recommendations.  The 23+2 mm Allison S3U Resilia valve was then brought across the aortic arch across the valve in one fluid motion.  Fluoroscopic guidance as well as angio confirmed positioning within

## 2024-02-06 NOTE — PROGRESS NOTES
Incentive Spirometry education and demonstration completed by Respiratory Therapy Yes      Response to education: Excellent     Teaching Time: 15 minutes    Minimum Predicted Vital Capacity - 547 mL.  Patient's Actual Vital Capacity - 2000 mL. Turning over to Nursing for routine follow-up Yes.    Comments: IS goal met    Electronically signed by Ankit Alcaraz RCP on 2/6/2024 at 12:21 PM

## 2024-02-06 NOTE — PROGRESS NOTES
Pt verified information regarding surgery, name, birth date, surgeon, procedure and allergies: see mar. Patient transferred to [facility] for surgery. Appropriate antibiotics ordered: ancef. Beta blocker: na.Lab work within normal limits, 4 units of RBC's on call to OR, vital signs stable, Mepilex sacral border applied and 2% chlorhexidine gluconate skin prep given. Patient and family educated about surgery and pain management. To surgery per bed.

## 2024-02-06 NOTE — ANESTHESIA POSTPROCEDURE EVALUATION
Department of Anesthesiology  Postprocedure Note    Patient: Jessica Weeks  MRN: [id number]  YOB: 1945  Date of evaluation: 2/6/2024    Procedure Summary       Date: 02/06/24 Room / Location: [facility]    Anesthesia Start: 0700 Anesthesia Stop: 0855    Procedures:       TRANSCATHETER AORTIC VALVE REPLACEMENT FEMORAL APPROACH VERSUS LEFT SUBCLAVIAN APPROACH      TRANSCATHETER AORTIC VALVE REPLACEMENT FEMORAL APPROACH VERSUS LEFT SUBCLAVIAN APPROACH Diagnosis:       Aortic valve stenosis, etiology of cardiac valve disease unspecified      (Aortic valve stenosis, etiology of cardiac valve disease unspecified [I35.0])    Surgeons: Wally Rivers MD; Yogi Levi MD Responsible Provider: Ernie Dent MD    Anesthesia Type: MAC ASA Status: 4            Anesthesia Type: No value filed.    Jac Phase I: Jac Score: 10    Jac Phase II:      Anesthesia Post Evaluation    Patient location during evaluation: PACU  Patient participation: complete - patient participated  Level of consciousness: awake and alert  Pain score: 3  Airway patency: patent  Nausea & Vomiting: no nausea  Cardiovascular status: blood pressure returned to baseline  Respiratory status: acceptable  Hydration status: euvolemic  Pain management: adequate    No notable events documented.

## 2024-02-06 NOTE — DISCHARGE INSTRUCTIONS
Smoking!!!! If you need help to stop smoking, please call your doctor.  Attempt to achieve and maintain your ideal body weight.  We suggest that you walk as much as you can, but do not exhaust yourself.  Set a goal and work your way up to it at a paced rate.     MEDICATIONS:  DO NOT STOP TAKING PLAVIX OR ANY BLOOD THINNER WITHOUT SPEAKING WITH YOUR CARDIOLOGIST!    Take your pills as ordered at time of discharge.   Do not stop taking any medication without first discussing it with your doctor.   Avoid all over the counter medications, herbal, and natural supplements unless you have discussed them with your doctor.    It is important to follow your doctor's orders, especially if blood thinning drugs are prescribed. Your doctor will monitor your medicine and advise you when or if you can stop taking it.      WHO DO YOU NEED TO TELL ABOUT YOUR IMPLANTED VALVE?   Regular check-ups by your cardiologist are very important.  It is easier for patients with a heart valve replacement to get infections, which could lead to further heart damage.   Before any dental work or surgery is done, tell your dentist or surgeon about your heart valve replacement.  You may need to take antibiotics before and after some medical procedures to reduce risk of infection.    Always tell the doctor (or medical technician) that you have an implanted heart valve.  Failure to do so may result in damage to the heart valve that could result in death.   Before an MRI (magnetic resonance imaging) procedure, always notify the doctor (or medical technician) that you have an implanted heart valve.      What symptoms or health problems do you need to look out for after you leave the hospital? Call your physician if you have any of these symptoms: (Phone Number [phone])  Weight pound gain of 3 pounds in one day or 5 pounds in one week. Weight gain is NOT normal.    Increased swelling or bruising of the groin, legs, ankles, or feet  Increasing shortness of

## 2024-02-06 NOTE — PROGRESS NOTES
Clinical Pharmacy Note: Pharmacy to Dose Warfarin    Pharmacy consulted by Dr. Wally Rivers to dose warfarin.    Jessica Weeks is a 78 y.o. female  is receiving warfarin for indication: PE/DVT.    INR Goal Range: 2-3  Prior to admission warfarin dosing regimen: 5 mg on Monday and Wednesday and 7.5 mg all other days of the week.  INR today: 1.02 (held for TAVR)  Lab Results   Component Value Date/Time    INR 1.02 02/06/2024 06:20 AM       Assessment/Plan:  INR is subtherapeutic after being held for TAVR  Possible concomitant drug-drug interactions include: APAP   Based on today's assessment, dose warfarin 10 mg tonight. Re-dose tomorrow after new INR result.  May need to bolus again with 10 mg before returning to home dosing regimen.  Daily INR is ordered. Pharmacy will continue to monitor and make adjustments to regimen as necessary. Patient is followed in our outpatient Coumadin clinic and has an appointment scheduled for follow up.      Thank you for the consult,     Shanda Villaseñor Rph, [facility]  [phone]

## 2024-02-06 NOTE — RT PROTOCOL NOTE
Frequency and one order with Frequency of every 4 hours PRN wheezing or increased work of breathing using Per Protocol order mode.       11-13 - enter or revise RT Bronchodilator order(s) to one equivalent RT bronchodilator order with QID Frequency and an Albuterol order with Frequency of every 4 hours PRN wheezing or increased work of breathing using Per Protocol order mode.      Greater than 13 - enter or revise RT Bronchodilator order(s) to one equivalent RT bronchodilator order with every 4 hours Frequency and an Albuterol order with Frequency of every 2 hours PRN wheezing or increased work of breathing using Per Protocol order mode.     RT to enter RT Home Evaluation for COPD & MDI Assessment order using Per Protocol order mode.    Electronically signed by Ankit Alcaraz RCP on 2/6/2024 at 12:10 PM

## 2024-02-06 NOTE — PROGRESS NOTES
Pt temp pacer removed.  Sheath removed from Right  Femoral  Vein. Manual pressure held for 10 minutes. No bleeding, hematoma, or other complications noted. Quick clot and dressing applied. Pulses unchanged.

## 2024-02-06 NOTE — H&P
[facility]  H+P  Consult  OP Visit  FU Visit   CC/HPI   CC Presents for TAVR for severe AS.     Intervention None   General Doing fair.  Concerned with worsening symptoms.     Cardiac Sx -CP, -syncope, +SOB, +dizziness, +edema, +orthopnea, +pnd, +fatigue   HISTORY/ALLERGY/ROS   M/S/S/F Hx Reviewed in Epic and updated as appropriate   ALLERG Belsomra [suvorexant], Avelox [moxifloxacin hcl in nacl], Levofloxacin, and Sulfa antibiotics   ROS Full ROS obtained and negative except as mentioned in HPI   MEDICATIONS   Cardiac medications reviewed in [facility] during visit.   PHYSICAL EXAM   Vitals There were no vitals taken for this visit.   Gen Alert, coop, no distress Heart  RRR, no MRG   Lung Decr bases Extrem Edema -Grade 1 (2mm)      ASSESSMENT AND PLAN   *AS    Date EF Detail   Sx     As above   NYHA     III   CHF   Acute on chronic diastolic   TTE 12/20  3/22  10/23 60%  65%  65% AS MG 23, Mild AI  AS MG 30, mild AI  AS MG 41, PV 4.21, mild AI, mild-mod MR   LHC 5/18 11/23   Normal cors, Aorta very tortuous (Silvestre)  Nonobstructive 30% OM1 (Silvestre)   MPI 4/18 69% Anteroapical ischemia   Plan     TAVR today   *AF  Status parox, Afib ablation 10/20, CV 10/20, CV 10/18, most recent TTE, monitors   Plan Coumadin and management per EP, hold coumadin for TAVR   *CHOL  LDL Advice Plan   79, 6/23 Diet/salt/xcise/wt counseling Continue HI/MT statin   *FOLLOWUP  After procedure

## 2024-02-07 ENCOUNTER — TELEPHONE (OUTPATIENT)
Dept: CARDIOLOGY | Age: 79
End: 2024-02-07

## 2024-02-07 VITALS
BODY MASS INDEX: 43.93 KG/M2 | HEIGHT: 64 IN | DIASTOLIC BLOOD PRESSURE: 87 MMHG | RESPIRATION RATE: 17 BRPM | WEIGHT: 257.3 LBS | HEART RATE: 57 BPM | OXYGEN SATURATION: 97 % | TEMPERATURE: 97.2 F | SYSTOLIC BLOOD PRESSURE: 142 MMHG

## 2024-02-07 DIAGNOSIS — Z95.2 S/P TAVR (TRANSCATHETER AORTIC VALVE REPLACEMENT): Primary | ICD-10-CM

## 2024-02-07 LAB
ANION GAP SERPL CALCULATED.3IONS-SCNC: 11 MMOL/L (ref 3–16)
BUN SERPL-MCNC: 16 MG/DL (ref 7–20)
CALCIUM SERPL-MCNC: 9.5 MG/DL (ref 8.3–10.6)
CHLORIDE SERPL-SCNC: 101 MMOL/L (ref 99–110)
CO2 SERPL-SCNC: 25 MMOL/L (ref 21–32)
CREAT SERPL-MCNC: 0.7 MG/DL (ref 0.6–1.2)
DEPRECATED RDW RBC AUTO: 13.8 % (ref 12.4–15.4)
GFR SERPLBLD CREATININE-BSD FMLA CKD-EPI: >60 ML/MIN/{1.73_M2}
GLUCOSE SERPL-MCNC: 131 MG/DL (ref 70–99)
HCT VFR BLD AUTO: 36.3 % (ref 36–48)
HGB BLD-MCNC: 12.1 G/DL (ref 12–16)
INR PPP: 1.03 (ref 0.84–1.16)
MCH RBC QN AUTO: 31.8 PG (ref 26–34)
MCHC RBC AUTO-ENTMCNC: 33.5 G/DL (ref 31–36)
MCV RBC AUTO: 94.8 FL (ref 80–100)
PLATELET # BLD AUTO: 249 K/UL (ref 135–450)
PMV BLD AUTO: 7.3 FL (ref 5–10.5)
POTASSIUM SERPL-SCNC: 4.2 MMOL/L (ref 3.5–5.1)
PROTHROMBIN TIME: 13.5 SEC (ref 11.5–14.8)
RBC # BLD AUTO: 3.82 M/UL (ref 4–5.2)
SODIUM SERPL-SCNC: 137 MMOL/L (ref 136–145)
WBC # BLD AUTO: 13.2 K/UL (ref 4–11)

## 2024-02-07 PROCEDURE — 6370000000 HC RX 637 (ALT 250 FOR IP): Performed by: INTERNAL MEDICINE

## 2024-02-07 PROCEDURE — 85610 PROTHROMBIN TIME: CPT

## 2024-02-07 PROCEDURE — 85027 COMPLETE CBC AUTOMATED: CPT

## 2024-02-07 PROCEDURE — 80048 BASIC METABOLIC PNL TOTAL CA: CPT

## 2024-02-07 PROCEDURE — 36415 COLL VENOUS BLD VENIPUNCTURE: CPT

## 2024-02-07 PROCEDURE — 2580000003 HC RX 258: Performed by: INTERNAL MEDICINE

## 2024-02-07 PROCEDURE — 93306 TTE W/DOPPLER COMPLETE: CPT

## 2024-02-07 RX ORDER — ASPIRIN 81 MG/1
81 TABLET ORAL DAILY
Qty: 30 TABLET | Refills: 3 | Status: SHIPPED | OUTPATIENT
Start: 2024-02-08

## 2024-02-07 RX ORDER — WARFARIN SODIUM 7.5 MG/1
7.5 TABLET ORAL
Status: DISCONTINUED | OUTPATIENT
Start: 2024-02-07 | End: 2024-02-07 | Stop reason: HOSPADM

## 2024-02-07 RX ADMIN — SODIUM CHLORIDE, PRESERVATIVE FREE 10 ML: 5 INJECTION INTRAVENOUS at 10:46

## 2024-02-07 RX ADMIN — CITALOPRAM 40 MG: 20 TABLET, FILM COATED ORAL at 10:44

## 2024-02-07 RX ADMIN — POTASSIUM CHLORIDE 20 MEQ: 1500 TABLET, EXTENDED RELEASE ORAL at 10:43

## 2024-02-07 RX ADMIN — ALLOPURINOL 300 MG: 300 TABLET ORAL at 10:44

## 2024-02-07 RX ADMIN — GABAPENTIN 600 MG: 300 CAPSULE ORAL at 13:45

## 2024-02-07 RX ADMIN — PANTOPRAZOLE SODIUM 40 MG: 40 TABLET, DELAYED RELEASE ORAL at 06:39

## 2024-02-07 RX ADMIN — CLOPIDOGREL BISULFATE 75 MG: 75 TABLET ORAL at 10:41

## 2024-02-07 RX ADMIN — VERAPAMIL HYDROCHLORIDE 120 MG: 120 TABLET, FILM COATED, EXTENDED RELEASE ORAL at 10:39

## 2024-02-07 RX ADMIN — DOCUSATE SODIUM 100 MG: 100 CAPSULE, LIQUID FILLED ORAL at 10:45

## 2024-02-07 RX ADMIN — GABAPENTIN 600 MG: 300 CAPSULE ORAL at 10:41

## 2024-02-07 RX ADMIN — HYDROCHLOROTHIAZIDE 25 MG: 25 TABLET ORAL at 10:43

## 2024-02-07 ASSESSMENT — PAIN SCALES - GENERAL
PAINLEVEL_OUTOF10: 0
PAINLEVEL_OUTOF10: 0

## 2024-02-07 NOTE — TELEPHONE ENCOUNTER
Katherine, can you schedule an echo and OV with Dr. Rivers same day at [facility] in about 3-5 weeks for TAVR FU? No need to call annelise Beckett RN

## 2024-02-07 NOTE — PLAN OF CARE
Problem: Discharge Planning  Goal: Discharge to home or other facility with appropriate resources  Outcome: Progressing  Flowsheets (Taken 2/6/2024 1400 by Tiffani Her, RN)  Discharge to home or other facility with appropriate resources: Identify barriers to discharge with patient and caregiver     Problem: Pain  Goal: Verbalizes/displays adequate comfort level or baseline comfort level  Outcome: Progressing     Problem: ABCDS Injury Assessment  Goal: Absence of physical injury  2/7/2024 0311 by Samra Rogers, RN  Outcome: Progressing     Problem: Safety - Adult  Goal: Free from fall injury  Outcome: Progressing  Note: Fall precautions in place, bed alarm on, nonskid foot wear applied, bed in lowest position, and call light within reach. Will continue to monitor.      Problem: Cardiovascular - Adult  Goal: Maintains optimal cardiac output and hemodynamic stability  Outcome: Progressing  Flowsheets (Taken 2/7/2024 0312)  Maintains optimal cardiac output and hemodynamic stability:   Monitor blood pressure and heart rate   Monitor urine output and notify Licensed Independent Practitioner for values outside of normal range   Assess for signs of decreased cardiac output  Note: S/p TARV. Bilateral groin site soft, no hematoma or bleeding noted. Pedal pulse +2. VSS.  Pt able to void post sher removal. Pt ambulated hallway.  Telemetry monitoring.  Will continue to monitor.   Goal: Absence of cardiac dysrhythmias or at baseline  Outcome: Progressing

## 2024-02-07 NOTE — PROGRESS NOTES
Data- discharge order received, pt verbalized agreement to discharge, disposition to previous residence, no needs for HHC/DME.     Action- discharge instructions prepared and given to Libia, pt verbalized understanding. Medication information packet given r/t NEW and/or CHANGED prescriptions emphasizing name/purpose/side effects, pt verbalized understanding. Discharge instruction summary: Diet- Low sodium, Activity- as tolerated with 5-10 lb weight lifting and post TAVR restrictions, Primary Care Physician as follows: Telma Lo, AIME - [phone] f/u appointment already made with PCP's NP, prescription medications filled at [facility]. Inpatient surgical procedure precautions reviewed: Post TAVR care and restrictions reviewed and Hand outs given. CHF Education reviewed. Pt/ Family has had a total of 60 minutes CHF education this admission encounter.     1. WEIGHT: Admit Weight - Scale: 113.4 kg (250 lb) (02/06/24 0644)        Today  Weight - Scale: 116.7 kg (257 lb 4.8 oz) (02/07/24 0445)       2. O2 SAT.: SpO2: 97 % (02/07/24 1145)    Response- Pt belongings gathered, IV removed. Disposition is home (no HHC/DME needs), [facility] via w/c w/ with belongings, no complications.

## 2024-02-07 NOTE — PROGRESS NOTES
Pharmacy to Dose Warfarin    Pharmacy consulted to dose warfarin for Afib.    INR Goal: 2-6    INR today: 1.03    Assessment/Plan:  - INR is subtherapeutic.   - Will boost and give warfarin 7.5mg tonight  - Possible concomitant drug-drug interactions include: Plavix, allopurinol    Pharmacy will continue to follow.    Zina Paulino, PharmD  PGY-1 Pharmacy Resident  [id number]

## 2024-02-07 NOTE — CARE COORDINATION
Discharge Planning Note:  Chart reviewed for and it appears that patient has minimal needs for discharge at this time.   Risk Score 7 %     Primary Care Physician is Telma Lo APRN - CNP    Primary insurance is [facility] Medicare    Please notify case management if any discharge needs are identified.      Case management will continue to follow progress and update discharge plan as needed.

## 2024-02-07 NOTE — PROGRESS NOTES
Pt up in chair, states feels well. Denies SOB, dizziness, CP. Bilat groins unremarkable. NSR on monitor. She has been up walking in the halls, stable gait and stable groins. Plan to DC later today pending echo results and Dr. Mosley evaluation. Sophy MCGUIRE TAVR Coordinator

## 2024-02-07 NOTE — DISCHARGE SUMMARY
[facility]   TAVR DISCHARGE SUMMARY    Admit Date 2/6/2024   Discharge Date 2/7/2024   Admit MD Wally Rivers   Admit Diagnosis Severe aortic stenosis   Discharge Diagnosis Patient Active Problem List   Diagnosis    Essential hypertension    History of pulmonary embolism    Osteoarthritis of knee    PAULA (obstructive sleep apnea)    Asthma    Status post gastric banding    Coronary artery disease    Morbid obesity with BMI of 40.0-44.9, adult ([facility])    Pure hypercholesterolemia    Arthritis    Anxiety    PAF (paroxysmal atrial fibrillation) ([facility])    Nonrheumatic aortic valve stenosis    Lumbar radiculopathy    Seasonal allergies    Secondary hypercoagulable state ([facility])    Aortic stenosis, severe      Discharge Condition good   Admit CHF Type Acute on chronic diastolic.  See operative note for further detail.   Admit NYHA III   Admit [place] Major: Paroxysmal nocturnal dyspnea  Minor: Ankle edema, Dyspnea on exertion   Hospital CHF Treated with TAVR.  Treated with IV diuretics.   Procedure half-way A/C Diastolic CHF (I50.33)   Hospital Course Admitted for TAVR for severe aortic stenosis.  Patient tolerated procedure and postoperative period well without evidence for complication.  Access site(s) assessed and stable prior to discharge.      Consult None   Subjective Patient seen and examined.  Notes, labs, tele and recent testing reviewed.  No acute issue overnight and patient without concern.     Exam Gen Alert, coop, no distress Heart  RRR, no MRG   Head NC, AT, no abnorm Abd  Soft, NT, +BS, no mass, no OM   Eyes PER, conj/corn clear Ext  No C/C, Grade 1 (2mm)   Nose Nares nl, no drain, NT Pulse 2+ and symmetric   Throat Lips, mucosa, tongue nl Skin Col/text/turg nl, no vis rash/les   Neck S/S, TM, NT, no JVD Psych Nl mood and affect   Lung CTA-B, unlab, no DTP Lymph   No cervical or axillary LA   Ch wall NT, no deform Neuro  Nl gross M/S exam      Studies/Labs Reviewed, please see Epic for specific details

## 2024-02-07 NOTE — PLAN OF CARE
Problem: Discharge Planning  Goal: Discharge to home or other facility with appropriate resources  2/7/2024 1030 by Betsy Siddiqi RN  Outcome: Completed  2/7/2024 0311 by Samra Rogers RN  Outcome: Progressing  Flowsheets (Taken 2/6/2024 1400 by Tiffani Her RN)  Discharge to home or other facility with appropriate resources: Identify barriers to discharge with patient and caregiver     Problem: Pain  Goal: Verbalizes/displays adequate comfort level or baseline comfort level  2/7/2024 1030 by Betsy Siddiqi RN  Outcome: Completed  2/7/2024 0311 by Samra Rogers RN  Outcome: Progressing     Problem: ABCDS Injury Assessment  Goal: Absence of physical injury  2/7/2024 1030 by Betsy Siddiqi RN  Outcome: Completed  2/7/2024 0311 by Samra Rogers RN  Outcome: Progressing     Problem: Safety - Adult  Goal: Free from fall injury  2/7/2024 1030 by Betsy Siddiqi RN  Outcome: Completed  2/7/2024 0311 by Samra Rogers RN  Outcome: Progressing  Note: Fall precautions in place, bed alarm on, nonskid foot wear applied, bed in lowest position, and call light within reach. Will continue to monitor.            Problem: Cardiovascular - Adult  Goal: Maintains optimal cardiac output and hemodynamic stability  2/7/2024 1030 by Betsy Siddiqi RN  Outcome: Completed  2/7/2024 0312 by Samra Rogers RN  Outcome: Progressing  Flowsheets (Taken 2/7/2024 0312)  Maintains optimal cardiac output and hemodynamic stability:   Monitor blood pressure and heart rate   Monitor urine output and notify Licensed Independent Practitioner for values outside of normal range   Assess for signs of decreased cardiac output  Note: S/p TARV. Bilateral groin site soft, no hematoma or bleeding noted. Pedal pulse +2. VSS.  Pt able to void post sher removal. Pt ambulated hallway.  Telemetry monitoring.  Will continue to monitor.   Goal: Absence of cardiac dysrhythmias or at baseline  2/7/2024 1030 by Neeru

## 2024-02-08 ENCOUNTER — CARE COORDINATION (OUTPATIENT)
Dept: CASE MANAGEMENT | Age: 79
End: 2024-02-08

## 2024-02-08 NOTE — CARE COORDINATION
Patient called CTN, left VM indicating that CTN did not need to call her back, she is doing fine, has no complaints and would like to thank the nursing staff for their great care    CTN attempted outreach to explain the focus of a Transitional Care Call and to attempt to get patient engaged in Care Transitions program.  A CTN will make an additional outreach call attempt tomorrow

## 2024-02-08 NOTE — CARE COORDINATION
Care Transitions Initial Follow Up Call    Call within 2 business days of discharge: Yes    First attempt at 24 hour discharge call, no answer, CTN left  with contact information and request for return call.  CTN will continue with outreach call attempts.    Follow Up  Future Appointments   Date Time Provider Department Center   2/16/2024 11:00 AM [facility] ANTICOAGULATION CLINIC [facility]   2/27/2024 11:00 AM Telma Lo APRN - CNP [facility] - DYD   3/14/2024 10:00 AM ECHO ROOM 2 [facility]   3/14/2024 11:15 AM Wally Rivers MD  Cardio [facility]   5/17/2024 11:30 AM Colt Whitehead APRN - CNP  Cardio [facility]   6/3/2024 10:00 AM Héctor Starr MD [facility]     NIKKI ROCHA RN

## 2024-02-09 ENCOUNTER — CARE COORDINATION (OUTPATIENT)
Dept: CASE MANAGEMENT | Age: 79
End: 2024-02-09

## 2024-02-09 NOTE — CARE COORDINATION
Care Transitions Initial Follow Up Call    Call within 2 business days of discharge: Yes      Patient: Jessica Weeks Patient : 1945   MRN: [id number]  Reason for Admission: TAVR  Discharge Date: 24 RARS: Readmission Risk Score: 9.2      Last Discharge Facility       Date Complaint Diagnosis Description Type Department Provider    24   Admission (Discharged) [address] Wally Rivers MD            Care Transitions 24 Hour Call    Care Transitions Interventions       Follow Up : Attempt #2 to make contact with Jessica for an initial follow up call post discharge from the hospital without success.  Unable to leave a message regarding intent of call and call back information.  Will discharge from [facility] services at this time due to inability to make contact.     sent to Gabo Cuevas's inbox.     Future Appointments   Date Time Provider Department Center   2024 11:00 AM [facility] ANTICOAGULATION CLINIC [facility]   2024 11:00 AM Telma Lo APRN - CNP [facility] [place] - DYD   3/14/2024 10:00 AM ECHO ROOM [address] [place] [address]   3/14/2024 11:15 AM Wally Rivers MD [facility] Cardio [facility]   2024 11:30 AM Colt Whitehead APRN - CNP [facility] Card[facility]   6/3/2024 10:00 AM Héctor Starr MD [facility]     Kelle Bryant RN

## 2024-02-10 LAB
BLOOD BANK DISPENSE STATUS: NORMAL
BLOOD BANK PRODUCT CODE: NORMAL
BPU ID: NORMAL
DESCRIPTION BLOOD BANK: NORMAL

## 2024-02-16 ENCOUNTER — ANTI-COAG VISIT (OUTPATIENT)
Dept: PHARMACY | Age: 79
End: 2024-02-16
Payer: MEDICARE

## 2024-02-16 DIAGNOSIS — I48.0 PAF (PAROXYSMAL ATRIAL FIBRILLATION) (HCC): Primary | ICD-10-CM

## 2024-02-16 DIAGNOSIS — Z86.711 HISTORY OF PULMONARY EMBOLISM: Chronic | ICD-10-CM

## 2024-02-16 LAB — INTERNATIONAL NORMALIZATION RATIO, POC: 1.7

## 2024-02-16 PROCEDURE — 99212 OFFICE O/P EST SF 10 MIN: CPT

## 2024-02-16 PROCEDURE — 85610 PROTHROMBIN TIME: CPT

## 2024-02-16 NOTE — PROGRESS NOTES
Ms. Jessica Weeks is a 78 y.o. y/o female with history of DVT, PE, Afib   She presents today for anticoagulation monitoring and adjustment.  Pertinent PMH: HTN, Gastric Banding,CAD, diskectomy with an artifical spinal disk implant in September 2012.   Patient Reported Findings:  Yes     No  [x]   []       Patient verifies current dosing regimen as listed- confirms --> believes that she was taking 10 mg on Wed not 5 mg --> verified taking 5 mg on Wed and 7.5 mg all other days of the week ---> did not increase weekly dose --> verified dose   []   [x]       S/S bleeding/bruising/swelling/SOB states that she has been wheezing more and had more SOB d/t allergies --> has had a few small nose bleeds recently  --> bruises from lovenox injections ---> does have a lot of bruising  ---> improving --> denies  []   [x]       Blood in urine or stool  [x]   []       Procedures scheduled in the future at this time having epidural on 6/1, cleared to hold warfarin 7 days and bridge with lovenox --> had cortisone shot in hip last week. INR 1.9 per pt. --> having LHC on 11/29, cleared to hold warfarin 5 days prior. Is going to have aortic valve replacement in near future --> had TAVR on 2/6, held warfarin starting 2/1  []   [x]       Missed Dose - denies  []   [x]       Extra Dose - denies   [x]   []       Change in medications  has not used propafenone in the past week. Stopped spiriva -->  has been taking more tylenol 6/day --> stopped cranberry capsule 3-4 weeks ago --> Restarted cranberry supplement --> started trelegy --> was given buspar for acute stress from son (no interaction) --> started asa  []   [x]       Change in health/diet/appetite  Patient states that she feels like she has returned to normal vit k intake --> diet fluctuates causing INR to fluctuate. Discussed ways to improve consistency with vit k intake  --> states that she has cut back spinach and cabbage intake to twice a week --> has been trying to eat more

## 2024-02-27 ENCOUNTER — APPOINTMENT (OUTPATIENT)
Dept: PHARMACY | Age: 79
End: 2024-02-27
Payer: MEDICARE

## 2024-02-27 ENCOUNTER — OFFICE VISIT (OUTPATIENT)
Dept: INTERNAL MEDICINE CLINIC | Age: 79
End: 2024-02-27

## 2024-02-27 VITALS
OXYGEN SATURATION: 96 % | BODY MASS INDEX: 44.22 KG/M2 | HEIGHT: 64 IN | DIASTOLIC BLOOD PRESSURE: 78 MMHG | SYSTOLIC BLOOD PRESSURE: 130 MMHG | WEIGHT: 259 LBS | HEART RATE: 67 BPM

## 2024-02-27 DIAGNOSIS — E66.01 OBESITY, CLASS III, BMI 40-49.9 (MORBID OBESITY) (HCC): ICD-10-CM

## 2024-02-27 DIAGNOSIS — Z86.711 HISTORY OF PULMONARY EMBOLISM: Chronic | ICD-10-CM

## 2024-02-27 DIAGNOSIS — I35.0 NONRHEUMATIC AORTIC VALVE STENOSIS: ICD-10-CM

## 2024-02-27 DIAGNOSIS — I10 ESSENTIAL HYPERTENSION: ICD-10-CM

## 2024-02-27 DIAGNOSIS — I48.0 PAF (PAROXYSMAL ATRIAL FIBRILLATION) (HCC): Primary | ICD-10-CM

## 2024-02-27 DIAGNOSIS — D68.69 SECONDARY HYPERCOAGULABLE STATE (HCC): ICD-10-CM

## 2024-02-27 DIAGNOSIS — F41.9 ANXIETY: ICD-10-CM

## 2024-02-27 DIAGNOSIS — Z00.00 MEDICARE ANNUAL WELLNESS VISIT, SUBSEQUENT: Primary | ICD-10-CM

## 2024-02-27 DIAGNOSIS — M19.90 ARTHRITIS: ICD-10-CM

## 2024-02-27 LAB — INTERNATIONAL NORMALIZATION RATIO, POC: 2.5

## 2024-02-27 PROCEDURE — 99211 OFF/OP EST MAY X REQ PHY/QHP: CPT

## 2024-02-27 PROCEDURE — 85610 PROTHROMBIN TIME: CPT

## 2024-02-27 RX ORDER — CLONAZEPAM 1 MG/1
1 TABLET ORAL EVERY EVENING
Qty: 90 TABLET | Refills: 0 | Status: SHIPPED | OUTPATIENT
Start: 2024-02-27 | End: 2024-05-27

## 2024-02-27 RX ORDER — ATORVASTATIN CALCIUM 80 MG/1
80 TABLET, FILM COATED ORAL DAILY
Qty: 90 TABLET | Refills: 3 | Status: SHIPPED | OUTPATIENT
Start: 2024-02-27

## 2024-02-27 SDOH — ECONOMIC STABILITY: FOOD INSECURITY: WITHIN THE PAST 12 MONTHS, THE FOOD YOU BOUGHT JUST DIDN'T LAST AND YOU DIDN'T HAVE MONEY TO GET MORE.: NEVER TRUE

## 2024-02-27 SDOH — ECONOMIC STABILITY: INCOME INSECURITY: HOW HARD IS IT FOR YOU TO PAY FOR THE VERY BASICS LIKE FOOD, HOUSING, MEDICAL CARE, AND HEATING?: SOMEWHAT HARD

## 2024-02-27 SDOH — ECONOMIC STABILITY: FOOD INSECURITY: WITHIN THE PAST 12 MONTHS, YOU WORRIED THAT YOUR FOOD WOULD RUN OUT BEFORE YOU GOT MONEY TO BUY MORE.: NEVER TRUE

## 2024-02-27 ASSESSMENT — LIFESTYLE VARIABLES
HOW MANY STANDARD DRINKS CONTAINING ALCOHOL DO YOU HAVE ON A TYPICAL DAY: PATIENT DOES NOT DRINK
HOW OFTEN DO YOU HAVE A DRINK CONTAINING ALCOHOL: MONTHLY OR LESS

## 2024-02-27 ASSESSMENT — PATIENT HEALTH QUESTIONNAIRE - PHQ9
SUM OF ALL RESPONSES TO PHQ9 QUESTIONS 1 & 2: 0
SUM OF ALL RESPONSES TO PHQ QUESTIONS 1-9: 0
1. LITTLE INTEREST OR PLEASURE IN DOING THINGS: 0
SUM OF ALL RESPONSES TO PHQ QUESTIONS 1-9: 0
2. FEELING DOWN, DEPRESSED OR HOPELESS: 0

## 2024-02-27 NOTE — PROGRESS NOTES
Ms. Jessica Weeks is a 78 y.o. y/o female with history of DVT, PE, Afib   She presents today for anticoagulation monitoring and adjustment.  Pertinent PMH: HTN, Gastric Banding,CAD, diskectomy with an artifical spinal disk implant in September 2012.   Patient Reported Findings:  Yes     No  [x]   []       Patient verifies current dosing regimen as listed- confirms --> believes that she was taking 10 mg on Wed not 5 mg --> verified taking 5 mg on Wed and 7.5 mg all other days of the week ---> did not increase weekly dose --> verified dose   []   [x]       S/S bleeding/bruising/swelling/SOB states that she has been wheezing more and had more SOB d/t allergies --> has had a few small nose bleeds recently  --> bruises from lovenox injections ---> does have a lot of bruising  ---> improving --> denies  []   [x]       Blood in urine or stool  [x]   []       Procedures scheduled in the future at this time having epidural on 6/1, cleared to hold warfarin 7 days and bridge with lovenox --> had cortisone shot in hip last week. INR 1.9 per pt. --> having LHC on 11/29, cleared to hold warfarin 5 days prior. Is going to have aortic valve replacement in near future --> had TAVR on 2/6, held warfarin starting 2/1  []   [x]       Missed Dose - denies  []   [x]       Extra Dose - denies   []   [x]       Change in medications  has not used propafenone in the past week. Stopped spiriva -->  has been taking more tylenol 6/day --> stopped cranberry capsule 3-4 weeks ago --> Restarted cranberry supplement --> started trelegy --> was given buspar for acute stress from son (no interaction) --> started asa --> no changes   []   [x]       Change in health/diet/appetite  Patient states that she feels like she has returned to normal vit k intake --> diet fluctuates causing INR to fluctuate. Discussed ways to improve consistency with vit k intake  --> states that she has cut back spinach and cabbage intake to twice a week --> has been

## 2024-02-27 NOTE — ASSESSMENT & PLAN NOTE
Chronic, controlled. Continue celexa and prn klonopin.     Controlled Substance Monitoring:    Acute and Chronic Pain Monitoring:   RX Monitoring Periodic Controlled Substance Monitoring   2/27/2024  11:01 AM No signs of potential drug abuse or diversion identified.;Possible medication side effects, risk of tolerance/dependence & alternative treatments discussed.

## 2024-02-27 NOTE — PATIENT INSTRUCTIONS
heartbeat.   After you call 911, the  may tell you to chew 1 adult-strength or 2 to 4 low-dose aspirin. Wait for an ambulance. Do not try to drive yourself.  Watch closely for changes in your health, and be sure to contact your doctor if you have any problems.  Where can you learn more?  Go to https://www.[employer].net/patientEd and enter F075 to learn more about \"A Healthy Heart: Care Instructions.\"  Current as of: June 25, 2023               Content Version: 13.9  © 4067-7461 [employer].   Care instructions adapted under license by [employer]. If you have questions about a medical condition or this instruction, always ask your healthcare professional. [employer] disclaims any warranty or liability for your use of this information.      Personalized Preventive Plan for Jessica Weeks - 2/27/2024  Medicare offers a range of preventive health benefits. Some of the tests and screenings are paid in full while other may be subject to a deductible, co-insurance, and/or copay.    Some of these benefits include a comprehensive review of your medical history including lifestyle, illnesses that may run in your family, and various assessments and screenings as appropriate.    After reviewing your medical record and screening and assessments performed today your provider may have ordered immunizations, labs, imaging, and/or referrals for you.  A list of these orders (if applicable) as well as your Preventive Care list are included within your After Visit Summary for your review.    Other Preventive Recommendations:    A preventive eye exam performed by an eye specialist is recommended every 1-2 years to screen for glaucoma; cataracts, macular degeneration, and other eye disorders.  A preventive dental visit is recommended every 6 months.  Try to get at least 150 minutes of exercise per week or 10,000 steps per day on a pedometer .  Order or download the FREE \"Exercise & Physical

## 2024-02-27 NOTE — ASSESSMENT & PLAN NOTE
Chronic, moderately controlled. Continue gabapentin 600 mg TID. Use prn diclofenac gel on her hands when pain is uncontrolled. Use stress balls and compression gloves.

## 2024-02-27 NOTE — PROGRESS NOTES
HENT:      Head: Normocephalic and atraumatic.   Cardiovascular:      Rate and Rhythm: Normal rate and regular rhythm.      Heart sounds: Normal heart sounds.   Pulmonary:      Effort: Pulmonary effort is normal. No respiratory distress.      Breath sounds: Normal breath sounds.   Neurological:      Mental Status: She is alert and oriented to person, place, and time. Mental status is at baseline.   Psychiatric:         Mood and Affect: Mood normal.         Behavior: Behavior normal.             Allergies   Allergen Reactions    Belsomra [Suvorexant] Other (See Comments)     Nightmares      Avelox [Moxifloxacin Hcl In Nacl] Rash    Levofloxacin Rash    Sulfa Antibiotics Rash     Prior to Visit Medications    Medication Sig Taking? Authorizing Provider   clonazePAM (KLONOPIN) 1 MG tablet Take 1 tablet by mouth every evening for 90 days. Yes Telma Lo APRN - CNP   atorvastatin (LIPITOR) 80 MG tablet Take 1 tablet by mouth daily Yes Telma Lo APRN - CNP   aspirin 81 MG EC tablet Take 1 tablet by mouth daily Yes Wally Rivers MD   potassium chloride (KLOR-CON M) 20 MEQ extended release tablet TAKE 1 TABLET BY MOUTH DAILY  WITH BREAKFAST Yes Colt Whitehead APRN - CNP   gabapentin (NEURONTIN) 300 MG capsule TAKE 2 CAPSULES BY MOUTH 3 TIMES DAILY Yes Telma Lo APRN - CNP   citalopram (CELEXA) 40 MG tablet TAKE 1 TABLET BY MOUTH DAILY Yes Telma Lo APRN - CNP   montelukast (SINGULAIR) 10 MG tablet TAKE 1 TABLET BY MOUTH AT NIGHT Yes Telma Lo APRN - CNP   indapamide (LOZOL) 1.25 MG tablet TAKE 1 TABLET BY MOUTH IN THE  MORNING Yes Telma Lo APRN - CNP   pantoprazole (PROTONIX) 40 MG tablet TAKE 1 TABLET BY MOUTH IN THE  MORNING BEFORE BREAKFAST Yes Telma oL APRN - CNP   trimethoprim (TRIMPEX) 100 MG tablet TAKE 1 TABLET BY MOUTH EVERY 48  HOURS Yes Telma Lo APRN - CNP   albuterol sulfate HFA (PROVENTIL;VENTOLIN;PROAIR) 108 (90 Base) MCG/ACT inhaler

## 2024-02-27 NOTE — ASSESSMENT & PLAN NOTE
Chronic, severe AS treated with TVAR and feeling better since surgery. Has follow up scheduled in a few weeks with cardiology.

## 2024-02-28 ENCOUNTER — PATIENT MESSAGE (OUTPATIENT)
Dept: INTERNAL MEDICINE CLINIC | Age: 79
End: 2024-02-28

## 2024-02-28 DIAGNOSIS — E04.2 MULTIPLE THYROID NODULES: Primary | ICD-10-CM

## 2024-03-04 NOTE — TELEPHONE ENCOUNTER
From: Jessica Weeks  To: Telma Lo  Sent: 2/28/2024 1:08 PM EST  Subject: Thyroid     I forgot to ask you yesterday. On my upper body CT scan Dr Miranda did it showed 2 nodules on my thyroid. Is this anything that I should be concerned about??

## 2024-03-14 ENCOUNTER — OFFICE VISIT (OUTPATIENT)
Dept: CARDIOLOGY CLINIC | Age: 79
End: 2024-03-14
Payer: MEDICARE

## 2024-03-14 ENCOUNTER — HOSPITAL ENCOUNTER (OUTPATIENT)
Dept: NON INVASIVE DIAGNOSTICS | Age: 79
Discharge: HOME | End: 2024-03-14
Payer: MEDICARE

## 2024-03-14 ENCOUNTER — ANTI-COAG VISIT (OUTPATIENT)
Dept: PHARMACY | Age: 79
End: 2024-03-14
Payer: MEDICARE

## 2024-03-14 ENCOUNTER — HOSPITAL ENCOUNTER (OUTPATIENT)
Dept: ULTRASOUND IMAGING | Age: 79
Discharge: HOME | End: 2024-03-14
Payer: MEDICARE

## 2024-03-14 VITALS
HEIGHT: 64 IN | DIASTOLIC BLOOD PRESSURE: 68 MMHG | WEIGHT: 261 LBS | HEART RATE: 53 BPM | BODY MASS INDEX: 44.56 KG/M2 | SYSTOLIC BLOOD PRESSURE: 144 MMHG | OXYGEN SATURATION: 97 %

## 2024-03-14 DIAGNOSIS — E04.2 MULTIPLE THYROID NODULES: ICD-10-CM

## 2024-03-14 DIAGNOSIS — I48.0 PAF (PAROXYSMAL ATRIAL FIBRILLATION) (HCC): Primary | ICD-10-CM

## 2024-03-14 DIAGNOSIS — Z95.2 S/P TAVR (TRANSCATHETER AORTIC VALVE REPLACEMENT): Primary | ICD-10-CM

## 2024-03-14 DIAGNOSIS — Z95.2 S/P TAVR (TRANSCATHETER AORTIC VALVE REPLACEMENT): ICD-10-CM

## 2024-03-14 DIAGNOSIS — Z86.711 HISTORY OF PULMONARY EMBOLISM: Chronic | ICD-10-CM

## 2024-03-14 LAB — INTERNATIONAL NORMALIZATION RATIO, POC: 2.4

## 2024-03-14 PROCEDURE — 93306 TTE W/DOPPLER COMPLETE: CPT

## 2024-03-14 PROCEDURE — 99211 OFF/OP EST MAY X REQ PHY/QHP: CPT

## 2024-03-14 PROCEDURE — 99214 OFFICE O/P EST MOD 30 MIN: CPT | Performed by: INTERNAL MEDICINE

## 2024-03-14 PROCEDURE — 85610 PROTHROMBIN TIME: CPT

## 2024-03-14 PROCEDURE — 3078F DIAST BP <80 MM HG: CPT | Performed by: INTERNAL MEDICINE

## 2024-03-14 PROCEDURE — 1123F ACP DISCUSS/DSCN MKR DOCD: CPT | Performed by: INTERNAL MEDICINE

## 2024-03-14 PROCEDURE — 3077F SYST BP >= 140 MM HG: CPT | Performed by: INTERNAL MEDICINE

## 2024-03-14 PROCEDURE — 76536 US EXAM OF HEAD AND NECK: CPT

## 2024-03-14 NOTE — PROGRESS NOTES
[facility]  H+P  Consult  OP Visit  FU Visit   CC/HPI   CC Followup visit for cardiac conditions detailed in assessment and plan below.   Intervention TAVR, ABLATION   General Doing well.  No new concerns.     Cardiac Sx -CP, -SOB, -dizziness, -syncope, -edema, -orthopnea, -pnd, -fatigue   HISTORY/ALLERGY/ROS   M/S/S/F Hx Reviewed in Epic and updated as appropriate   ALLERG Belsomra [suvorexant], Avelox [moxifloxacin hcl in nacl], Levofloxacin, and Sulfa antibiotics   ROS Full ROS obtained and negative except as mentioned in HPI   MEDICATIONS   Cardiac medications reviewed in Epic during visit.   PHYSICAL EXAM   Vitals BP (!) 144/68 (Site: Right Upper Arm, Position: Sitting, Cuff Size: Large Adult)   Pulse 53   Ht 1.626 m (5' 4.02\")   Wt 118.4 kg (261 lb)   SpO2 97%   BMI 44.78 kg/m²    Gen Alert, coop, no distress Heart  RRR, 1/6   Lung CTA-B, unlabored, no DTP Extrem Edema -Grade 0 (0mm)      COMPLIANCE   Discussed and counseled on diet, exercise, weight loss, smoking, alcohol, drugs.  All questions answered.   CODING   SCI (72237) - 30-39 mins spent reviewing hx/tests/consults, performing exam, counseling/educating, ordering meds/tests/procedures, referring/communicating w/PCP/consultants, documenting in EMR, interpreting results, communicating medical information and plan with family.   SCRIBE ATTESTATION   Nurse NA   Doctor NA   ASSESSMENT AND PLAN   *AS    Date EF Detail   Sx     As above   NYHA     II   TTE 10/23  2/24 65%  65% AS MG 41, PV 4.21, mild AI, mild-mod MR  TAVR MG 12, no AI   C 5/18 11/23   Normal cors, Aorta very tortuous (Silvestre)  Nonobstructive 30% OM1 (Silvestre)   MPI 4/18 69% Anteroapical ischemia   Plan     Echo pending today   *AF  Status parox, Afib ablation 10/20, CV 10/20, CV 10/18, most recent TTE, monitors   Plan Coumadin and management per EP   *CHOL  LDL Advice Plan   79, 6/23 Diet/salt/xcise/wt counseling Continue HI/MT statin   *FOLLOWUP  After procedure

## 2024-03-14 NOTE — PROGRESS NOTES
week --> has been trying to eat more vit k, salads, broccoli --> has been losing weight, less appetite --> no vit k recently,no boost/ensure---> no greens, no NVD ---> no changes, back to normal diet --> had liver twice recently --> had brussel sprouts last night  --> has lost 22lbs d/t stress. Not eating much --> appetite still diminished d/t stress--> changes in diet but no changes in greens intake--> no changes  []   [x]       Change in alcohol use    []   [x]       Change in activity  []   [x]       Hospital admission  []   [x]       Emergency department visit    []   [x]       Other complaints     Clinical Outcomes:  Yes     No  []   [x]       Major bleeding event  []   [x]       Thromboembolic event  Gets warfarin through MD    Duration of warfarin Therapy: indefinite  INR Range:  2.0-3.0     INR is 2.4 today    Continue weekly dose of 5 mg on Monday and 7.5 mg on all other days.  Encouraged to maintain a consistency of vegetables/salads.  Recheck INR in 3 weeks, 4/4    Does not need PW printed, only card     Patient consent signed 12/5/23    Referring PCP is Bob Crook  INR (no units)   Date Value   02/07/2024 1.03   02/06/2024 1.02   02/01/2024 2.23 (H)     INR,(POC) (no units)   Date Value   02/27/2024 2.5   02/16/2024 1.7     For Pharmacy Admin Tracking Only    Total # of Interventions Recommended: 0  Total # of Interventions Accepted: 0  Time Spent (min): 15

## 2024-03-15 ENCOUNTER — TELEPHONE (OUTPATIENT)
Dept: CARDIOLOGY CLINIC | Age: 79
End: 2024-03-15

## 2024-04-01 ENCOUNTER — TELEPHONE (OUTPATIENT)
Dept: INTERNAL MEDICINE CLINIC | Age: 79
End: 2024-04-01

## 2024-04-01 RX ORDER — TRIMETHOPRIM 100 MG/1
100 TABLET ORAL
Qty: 40 TABLET | Refills: 1 | Status: SHIPPED | OUTPATIENT
Start: 2024-04-01

## 2024-04-01 NOTE — TELEPHONE ENCOUNTER
Pt calling requesting refill of trimethoprim (TRIMPEX) 100 MG tablet     Last written 9/11/23  Last OV 2/27/24  Next OV 5/28/24    Please send to [facility].

## 2024-04-04 ENCOUNTER — ANTI-COAG VISIT (OUTPATIENT)
Dept: PHARMACY | Age: 79
End: 2024-04-04
Payer: MEDICARE

## 2024-04-04 DIAGNOSIS — I48.0 PAF (PAROXYSMAL ATRIAL FIBRILLATION) (HCC): Primary | ICD-10-CM

## 2024-04-04 DIAGNOSIS — Z86.711 HISTORY OF PULMONARY EMBOLISM: Chronic | ICD-10-CM

## 2024-04-04 LAB — INTERNATIONAL NORMALIZATION RATIO, POC: 2.6

## 2024-04-04 PROCEDURE — 85610 PROTHROMBIN TIME: CPT

## 2024-04-04 PROCEDURE — 99211 OFF/OP EST MAY X REQ PHY/QHP: CPT

## 2024-04-04 NOTE — PROGRESS NOTES
Ms. Jessica Weeks is a 79 y.o. y/o female with history of DVT, PE, Afib   She presents today for anticoagulation monitoring and adjustment.  Pertinent PMH: HTN, Gastric Banding,CAD, diskectomy with an artifical spinal disk implant in September 2012.   Patient Reported Findings:  Yes     No  [x]   []       Patient verifies current dosing regimen as listed- confirms --> believes that she was taking 10 mg on Wed not 5 mg --> verified taking 5 mg on Wed and 7.5 mg all other days of the week ---> did not increase weekly dose --> verified dose   []   [x]       S/S bleeding/bruising/swelling/SOB states that she has been wheezing more and had more SOB d/t allergies --> has had a few small nose bleeds recently  --> bruises from lovenox injections ---> does have a lot of bruising  ---> improving --> denies  []   [x]       Blood in urine or stool  []   [x]       Procedures scheduled in the future at this time having epidural on 6/1, cleared to hold warfarin 7 days and bridge with lovenox --> had cortisone shot in hip last week. INR 1.9 per pt. --> having LHC on 11/29, cleared to hold warfarin 5 days prior. Is going to have aortic valve replacement in near future --> had TAVR on 2/6, held warfarin starting 2/1 --> denies upcoming   []   [x]       Missed Dose - denies  []   [x]       Extra Dose - denies   []   [x]       Change in medications  has not used propafenone in the past week. Stopped spiriva -->  has been taking more tylenol 6/day --> stopped cranberry capsule 3-4 weeks ago --> Restarted cranberry supplement --> started trelegy --> was given buspar for acute stress from son (no interaction) --> started asa --> no changes   []   [x]       Change in health/diet/appetite  Patient states that she feels like she has returned to normal vit k intake --> diet fluctuates causing INR to fluctuate. Discussed ways to improve consistency with vit k intake  --> states that she has cut back spinach and cabbage intake to twice a

## 2024-04-08 RX ORDER — WARFARIN SODIUM 5 MG/1
TABLET ORAL
Qty: 150 TABLET | Refills: 2 | Status: SHIPPED | OUTPATIENT
Start: 2024-04-08

## 2024-04-08 RX ORDER — VERAPAMIL HYDROCHLORIDE 240 MG/1
TABLET, FILM COATED, EXTENDED RELEASE ORAL
Qty: 100 TABLET | Refills: 2 | Status: SHIPPED | OUTPATIENT
Start: 2024-04-08

## 2024-04-08 RX ORDER — ALLOPURINOL 300 MG/1
300 TABLET ORAL DAILY
Qty: 100 TABLET | Refills: 2 | Status: SHIPPED | OUTPATIENT
Start: 2024-04-08

## 2024-04-08 NOTE — TELEPHONE ENCOUNTER
Last OV: 2/27/2024  Next OV: 5/28/2024    Next appointment due:5/27/24    Last fill:6/22/23  Refills:2

## 2024-04-17 NOTE — PROGRESS NOTES
[facility]   Electrophysiology  NIOK Moran  Attending EP: Dr. Regan    Date: 5/17/2024  I had the privilege of visiting Jessica Weeks in the office.     Chief Complaint:   Chief Complaint   Patient presents with    1 Year Follow Up     Intermittent IHR. No other cardiac symptoms.     History of Present Illness: History obtained from patient and medical record.    Jessica Weeks is 79 y.o. female with a past medical history of AS s/p TAVR (2/6/24), HTN, HLD, DVT/PE, AF, PAULA, and obesity. S/p RFCA of atrial fibrillation with PVI, CTI flutter (10/5/2021)    -Interval history: Today, Jessica Weeks is being seen for routine follow up. She is doing well. She has felt much better since her TAVR procedure. She occasionally feels her heart beat is \"off\" when she feels it in her neck. She denies any tachyarrhythmias. She has been under a lot stress as her son has been ill and is on a LVAD.     Denies having chest pain, palpitations, shortness of breath, orthopnea/PND, cough, or dizziness at the time of this visit.    With regard to medication therapy the patient has been compliant with prescribed regimen. They have tolerated therapy to date.     Allergies:  Allergies   Allergen Reactions    Belsomra [Suvorexant] Other (See Comments)     Nightmares      Avelox [Moxifloxacin Hcl In Nacl] Rash    Levofloxacin Rash    Sulfa Antibiotics Rash     Home Medications:  Prior to Visit Medications    Medication Sig Taking? Authorizing Provider   verapamil (CALAN SR) 240 MG extended release tablet TAKE 1 TABLET BY MOUTH AT NIGHT Yes Tlema Lo APRN - CNP   warfarin (COUMADIN) 5 MG tablet TAKE DAILY AS DIRECTED TO  MAINTAIN INR BETWEEN 2 TO 3 Yes Telma Lo APRN - CNP   allopurinol (ZYLOPRIM) 300 MG tablet TAKE 1 TABLET BY MOUTH DAILY Yes Telma Lo APRN - CNP   trimethoprim (TRIMPEX) 100 MG tablet Take 1 tablet by mouth every 48 hours Yes Telma Lo APRN - CNP

## 2024-05-01 ENCOUNTER — ANTI-COAG VISIT (OUTPATIENT)
Dept: PHARMACY | Age: 79
End: 2024-05-01
Payer: MEDICARE

## 2024-05-01 DIAGNOSIS — I48.0 PAF (PAROXYSMAL ATRIAL FIBRILLATION) (HCC): Primary | ICD-10-CM

## 2024-05-01 DIAGNOSIS — Z86.711 HISTORY OF PULMONARY EMBOLISM: Chronic | ICD-10-CM

## 2024-05-01 LAB — INTERNATIONAL NORMALIZATION RATIO, POC: 2.4

## 2024-05-01 PROCEDURE — 99211 OFF/OP EST MAY X REQ PHY/QHP: CPT

## 2024-05-01 PROCEDURE — 85610 PROTHROMBIN TIME: CPT

## 2024-05-01 NOTE — PROGRESS NOTES
week --> has been trying to eat more vit k, salads, broccoli --> has been losing weight, less appetite --> no vit k recently,no boost/ensure---> no greens, no NVD ---> no changes, back to normal diet --> had liver twice recently --> had brussel sprouts last night  --> has lost 22lbs d/t stress. Not eating much --> appetite still diminished d/t stress--> changes in diet but no changes in greens intake--> no changes  []   [x]       Change in alcohol use    []   [x]       Change in activity  []   [x]       Hospital admission  []   [x]       Emergency department visit    []   [x]       Other complaints     Clinical Outcomes:  Yes     No  []   [x]       Major bleeding event  []   [x]       Thromboembolic event  Gets warfarin through MD    Duration of warfarin Therapy: indefinite  INR Range:  2.0-3.0     INR is 2.4 today    Continue weekly dose of 5 mg on Monday and 7.5 mg on all other days.  Encouraged to maintain a consistency of vegetables/salads.  Recheck INR in 4 weeks, 5/28    Does not need PW printed, only card     Patient consent signed 12/5/23    Referring PCP is Bob Crook  INR (no units)   Date Value   02/07/2024 1.03   02/06/2024 1.02   02/01/2024 2.23 (H)     INR,(POC) (no units)   Date Value   04/04/2024 2.6   03/14/2024 2.4   02/27/2024 2.5     For Pharmacy Admin Tracking Only    Total # of Interventions Recommended: 0  Total # of Interventions Accepted: 0  Time Spent (min): 15

## 2024-05-17 ENCOUNTER — OFFICE VISIT (OUTPATIENT)
Dept: CARDIOLOGY CLINIC | Age: 79
End: 2024-05-17
Payer: MEDICARE

## 2024-05-17 VITALS
HEART RATE: 78 BPM | WEIGHT: 250 LBS | DIASTOLIC BLOOD PRESSURE: 80 MMHG | SYSTOLIC BLOOD PRESSURE: 120 MMHG | BODY MASS INDEX: 42.68 KG/M2 | OXYGEN SATURATION: 95 % | HEIGHT: 64 IN

## 2024-05-17 DIAGNOSIS — I25.10 CORONARY ARTERY DISEASE INVOLVING NATIVE CORONARY ARTERY OF NATIVE HEART WITHOUT ANGINA PECTORIS: ICD-10-CM

## 2024-05-17 DIAGNOSIS — G47.33 OSA (OBSTRUCTIVE SLEEP APNEA): ICD-10-CM

## 2024-05-17 DIAGNOSIS — I10 ESSENTIAL HYPERTENSION: ICD-10-CM

## 2024-05-17 DIAGNOSIS — I48.0 PAF (PAROXYSMAL ATRIAL FIBRILLATION) (HCC): Primary | ICD-10-CM

## 2024-05-17 DIAGNOSIS — I35.0 AORTIC STENOSIS, SEVERE: ICD-10-CM

## 2024-05-17 PROCEDURE — 3079F DIAST BP 80-89 MM HG: CPT | Performed by: NURSE PRACTITIONER

## 2024-05-17 PROCEDURE — 93000 ELECTROCARDIOGRAM COMPLETE: CPT | Performed by: NURSE PRACTITIONER

## 2024-05-17 PROCEDURE — 1123F ACP DISCUSS/DSCN MKR DOCD: CPT | Performed by: NURSE PRACTITIONER

## 2024-05-17 PROCEDURE — 3074F SYST BP LT 130 MM HG: CPT | Performed by: NURSE PRACTITIONER

## 2024-05-17 PROCEDURE — 99214 OFFICE O/P EST MOD 30 MIN: CPT | Performed by: NURSE PRACTITIONER

## 2024-05-21 ENCOUNTER — OFFICE VISIT (OUTPATIENT)
Dept: INTERNAL MEDICINE CLINIC | Age: 79
End: 2024-05-21
Payer: MEDICARE

## 2024-05-21 ENCOUNTER — TELEPHONE (OUTPATIENT)
Dept: PHARMACY | Age: 79
End: 2024-05-21

## 2024-05-21 VITALS
OXYGEN SATURATION: 97 % | SYSTOLIC BLOOD PRESSURE: 128 MMHG | HEART RATE: 65 BPM | BODY MASS INDEX: 42.72 KG/M2 | HEIGHT: 64 IN | WEIGHT: 250.2 LBS | DIASTOLIC BLOOD PRESSURE: 70 MMHG

## 2024-05-21 DIAGNOSIS — F41.9 ANXIETY: ICD-10-CM

## 2024-05-21 DIAGNOSIS — I10 ESSENTIAL HYPERTENSION: ICD-10-CM

## 2024-05-21 DIAGNOSIS — N30.00 ACUTE CYSTITIS WITHOUT HEMATURIA: ICD-10-CM

## 2024-05-21 DIAGNOSIS — R39.15 URINARY URGENCY: Primary | ICD-10-CM

## 2024-05-21 LAB
BILIRUBIN, POC: NEGATIVE
BLOOD URINE, POC: NORMAL
CLARITY, POC: NORMAL
COLOR, POC: YELLOW
GLUCOSE URINE, POC: NEGATIVE
KETONES, POC: NEGATIVE
LEUKOCYTE EST, POC: NORMAL
NITRITE, POC: NEGATIVE
PH, POC: 6
PROTEIN, POC: NEGATIVE
SPECIFIC GRAVITY, POC: 1.02
UROBILINOGEN, POC: 0.2

## 2024-05-21 PROCEDURE — 99214 OFFICE O/P EST MOD 30 MIN: CPT | Performed by: NURSE PRACTITIONER

## 2024-05-21 PROCEDURE — 3074F SYST BP LT 130 MM HG: CPT | Performed by: NURSE PRACTITIONER

## 2024-05-21 PROCEDURE — 81002 URINALYSIS NONAUTO W/O SCOPE: CPT | Performed by: NURSE PRACTITIONER

## 2024-05-21 PROCEDURE — 1123F ACP DISCUSS/DSCN MKR DOCD: CPT | Performed by: NURSE PRACTITIONER

## 2024-05-21 PROCEDURE — 3078F DIAST BP <80 MM HG: CPT | Performed by: NURSE PRACTITIONER

## 2024-05-21 RX ORDER — CLONAZEPAM 1 MG/1
1 TABLET ORAL EVERY EVENING
Qty: 90 TABLET | Refills: 0 | Status: SHIPPED | OUTPATIENT
Start: 2024-05-21 | End: 2024-08-19

## 2024-05-21 RX ORDER — CIPROFLOXACIN 500 MG/1
500 TABLET, FILM COATED ORAL 2 TIMES DAILY
Qty: 10 TABLET | Refills: 0 | Status: SHIPPED | OUTPATIENT
Start: 2024-05-21 | End: 2024-05-26

## 2024-05-21 NOTE — TELEPHONE ENCOUNTER
----- Message from Leslie Huertas sent at 5/21/2024  3:53 PM EDT -----  Regarding: New antibiotic  Contact: patient  Adriana, please call patient. Patient started on Cipro 500mg today.  X2 per day, X5 days.  Please call regarding warfarin. [phone]

## 2024-05-21 NOTE — ASSESSMENT & PLAN NOTE
Acute, uncontrolled.  Sending urine for culture.  Covering with antibiotics.  Increase water intake.  Reviewed criteria for follow-up.

## 2024-05-21 NOTE — PROGRESS NOTES
5/21/24     Chief Complaint   Patient presents with    Urinary Tract Infection     Patient has had frequency , urgency, burning for 2 weeks      HPI    Here for UTI   Frequency, urgency, dysuria, cloudy  Having to get up at night and cannot make it to the bathroom   Started 2 weeks ago   Increased her trimethoprim dose without relief   Denies fever, chills, hematuria.     She is also due for medication check.  She is doing well on clonazepam.  Taking nightly for anxiety and sleep.      Allergies   Allergen Reactions    Belsomra [Suvorexant] Other (See Comments)     Nightmares      Avelox [Moxifloxacin Hcl In Nacl] Rash    Levofloxacin Rash    Sulfa Antibiotics Rash       Current Outpatient Medications   Medication Sig Dispense Refill    ciprofloxacin (CIPRO) 500 MG tablet Take 1 tablet by mouth 2 times daily for 5 days 10 tablet 0    clonazePAM (KLONOPIN) 1 MG tablet Take 1 tablet by mouth every evening for 90 days. 90 tablet 0    verapamil (CALAN SR) 240 MG extended release tablet TAKE 1 TABLET BY MOUTH AT NIGHT 100 tablet 2    warfarin (COUMADIN) 5 MG tablet TAKE DAILY AS DIRECTED TO  MAINTAIN INR BETWEEN 2 TO 3 150 tablet 2    allopurinol (ZYLOPRIM) 300 MG tablet TAKE 1 TABLET BY MOUTH DAILY 100 tablet 2    trimethoprim (TRIMPEX) 100 MG tablet Take 1 tablet by mouth every 48 hours 40 tablet 1    atorvastatin (LIPITOR) 80 MG tablet Take 1 tablet by mouth daily 90 tablet 3    aspirin 81 MG EC tablet Take 1 tablet by mouth daily 30 tablet 3    potassium chloride (KLOR-CON M) 20 MEQ extended release tablet TAKE 1 TABLET BY MOUTH DAILY  WITH BREAKFAST 100 tablet 2    gabapentin (NEURONTIN) 300 MG capsule TAKE 2 CAPSULES BY MOUTH 3 TIMES DAILY 600 capsule 2    citalopram (CELEXA) 40 MG tablet TAKE 1 TABLET BY MOUTH DAILY 90 tablet 3    montelukast (SINGULAIR) 10 MG tablet TAKE 1 TABLET BY MOUTH AT NIGHT 100 tablet 2    indapamide (LOZOL) 1.25 MG tablet TAKE 1 TABLET BY MOUTH IN THE  MORNING 100 tablet 2    pantoprazole 
Baby is a 32.5 GA female born to a 25yo  via . Peds called for heavy meconium and  labor. Mother admitted for PPROM on , found to be in PTL. Maternal hx unremarkable. Pregnancy notable for short cervix, s/p cerclage at 16 wks and removed on . Had amnioinfusion prior to cerclage removal. MBT A+, PNL neg/nr/imm. GBS neg . Covid neg . Baby born vigorous and crying spontaneously, DCC x ~20 secs. WDSS. Baby placed on mattress warmer for transfer to NICU. Apgars 9/9. Baby temp 36.7C in L&D. Attended by Dr. Elena Golden attending.   mom wants to breastfeed, wants hepB

## 2024-05-21 NOTE — ASSESSMENT & PLAN NOTE
Chronic, controlled. Continue celexa and prn klonopin.     Controlled Substance Monitoring:    Acute and Chronic Pain Monitoring:   RX Monitoring Periodic Controlled Substance Monitoring   5/21/2024   5:24 PM No signs of potential drug abuse or diversion identified.;Possible medication side effects, risk of tolerance/dependence & alternative treatments discussed.

## 2024-05-21 NOTE — TELEPHONE ENCOUNTER
Returned call to patient. Instructed for her to take 5 mg on Thurs 5/23 then return to normal weekly dose.

## 2024-05-23 LAB
BACTERIA UR CULT: ABNORMAL
ORGANISM: ABNORMAL

## 2024-05-27 NOTE — PATIENT INSTRUCTIONS
Key Low Carb Dietary Points:    - Meats (preferably organic or grass fed) are great sources of protein and are low in carbohydrates. Deann Ino with coconut, olive, avocado, or almond oils. - When buying dairy, choose regular or full fat options. - Choose vegetables that grow above ground as they are generally lower in carbohydrates. - Avoid bread, rice, potatoes, pasta and all sources of simple sugars (desserts, soda, breakfast cereals). - Choose beverages that are calorie and sugar free, such as water or flavored buitrago. - When eating fruit, choose berries as a snack option a few times a week. Patient received dietary handouts and education. MD ATTESTATION NOTE      Brief HPI: 66-year-old male patient sent from the nursing home for a fall and low blood pressure.  The patient denies any complaints.  He denies headache, neck pain, chest pain, shortness of breath, abdominal pain, vomiting, diarrhea, fevers, chills, cough, back pain or history of heart disease.    General : 66-year-old chronically ill-appearing male who is thin and frail appearing but is awake alert and oriented  HEENT: NCAT: No C-spine tenderness: Dry mucous membranes  CV: Heart is regular with no murmurs  Respiratory: CTA bilaterally  Abd: Soft and nontender  Ext: No acute abnormalities  Skin: No rash: Decreased turgor  Neuro: Awake with a nonfocal neuro exam  Psych: Normal mood and affect      Plan: Will obtain EKG, labs, chest x-ray, CTs and treat the patient with IV fluids will place him on the monitor.    The patient's blood pressure has normalized in the emergency room after IV fluids.  His CBC is unremarkable as is urinalysis, chest x-ray and initial troponin.  Patient's creatinine is mildly bumped.  His lactic acid is elevated which think is likely secondary to his hypotension and dehydration.  1521 we are still awaiting the patient's repeat troponin, lactic acid and CT head and C-spine.  We have discussed the case with the patient's PCP-Dr. Aviles who is aware of the above and will admit the patient to a telemetry bed for further evaluation and care.    SHARED VISIT: This visit was performed by BOTH a physician and an APC. The substantive portion of the medical decision making was performed by this attesting physician who made or approved the management plan and takes responsibility for patient management. All studies in the APC note (if performed) were independently interpreted by me.         Harjit Florez MD  05/27/24 1521

## 2024-05-28 ENCOUNTER — ANTI-COAG VISIT (OUTPATIENT)
Dept: PHARMACY | Age: 79
End: 2024-05-28
Payer: MEDICARE

## 2024-05-28 DIAGNOSIS — I48.0 PAF (PAROXYSMAL ATRIAL FIBRILLATION) (HCC): Primary | ICD-10-CM

## 2024-05-28 DIAGNOSIS — Z86.711 HISTORY OF PULMONARY EMBOLISM: Chronic | ICD-10-CM

## 2024-05-28 LAB — INTERNATIONAL NORMALIZATION RATIO, POC: 2.4

## 2024-05-28 PROCEDURE — 99211 OFF/OP EST MAY X REQ PHY/QHP: CPT

## 2024-05-28 PROCEDURE — 85610 PROTHROMBIN TIME: CPT

## 2024-05-28 NOTE — PROGRESS NOTES
Ms. Jessica Weeks is a 79 y.o. y/o female with history of DVT, PE, Afib   She presents today for anticoagulation monitoring and adjustment.  Pertinent PMH: HTN, Gastric Banding,CAD, diskectomy with an artifical spinal disk implant in September 2012.   Patient Reported Findings:  Yes     No  [x]   []       Patient verifies current dosing regimen as listed- confirms --> believes that she was taking 10 mg on Wed not 5 mg --> verified taking 5 mg on Wed and 7.5 mg all other days of the week ---> did not increase weekly dose --> verified dose   []   [x]       S/S bleeding/bruising/swelling/SOB states that she has been wheezing more and had more SOB d/t allergies --> has had a few small nose bleeds recently  --> bruises from lovenox injections ---> does have a lot of bruising  ---> improving --> denies  []   [x]       Blood in urine or stool  []   [x]       Procedures scheduled in the future at this time having epidural on 6/1, cleared to hold warfarin 7 days and bridge with lovenox --> had cortisone shot in hip last week. INR 1.9 per pt. --> having LHC on 11/29, cleared to hold warfarin 5 days prior. Is going to have aortic valve replacement in near future --> had TAVR on 2/6, held warfarin starting 2/1 --> denies upcoming   []   [x]       Missed Dose - denies  []   [x]       Extra Dose - denies   [x]   []       Change in medications  has not used propafenone in the past week. Stopped spiriva -->  has been taking more tylenol 6/day --> stopped cranberry capsule 3-4 weeks ago --> Restarted cranberry supplement --> started trelegy --> was given buspar for acute stress from son (no interaction) --> started asa --> cipro x 5 d 5/21 (finished)  []   [x]       Change in health/diet/appetite  Patient states that she feels like she has returned to normal vit k intake --> diet fluctuates causing INR to fluctuate. Discussed ways to improve consistency with vit k intake  --> states that she has cut back spinach and cabbage

## 2024-06-03 ENCOUNTER — OFFICE VISIT (OUTPATIENT)
Dept: DERMATOLOGY | Age: 79
End: 2024-06-03
Payer: MEDICARE

## 2024-06-03 DIAGNOSIS — L72.0 MILIUM: ICD-10-CM

## 2024-06-03 DIAGNOSIS — L81.4 SOLAR LENTIGINOSIS: Primary | ICD-10-CM

## 2024-06-03 DIAGNOSIS — L82.1 SK (SEBORRHEIC KERATOSIS): ICD-10-CM

## 2024-06-03 PROCEDURE — 1123F ACP DISCUSS/DSCN MKR DOCD: CPT | Performed by: DERMATOLOGY

## 2024-06-03 PROCEDURE — 99213 OFFICE O/P EST LOW 20 MIN: CPT | Performed by: DERMATOLOGY
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
## 2024-06-08 DIAGNOSIS — N30.00 ACUTE CYSTITIS WITHOUT HEMATURIA: ICD-10-CM

## 2024-06-08 RX ORDER — CIPROFLOXACIN 500 MG/1
500 TABLET, FILM COATED ORAL 2 TIMES DAILY
Qty: 10 TABLET | Refills: 0 | Status: SHIPPED | OUTPATIENT
Start: 2024-06-08 | End: 2024-06-13

## 2024-06-25 ENCOUNTER — ANTI-COAG VISIT (OUTPATIENT)
Dept: PHARMACY | Age: 79
End: 2024-06-25
Payer: MEDICARE

## 2024-06-25 DIAGNOSIS — I48.0 PAF (PAROXYSMAL ATRIAL FIBRILLATION) (HCC): Primary | ICD-10-CM

## 2024-06-25 DIAGNOSIS — Z86.711 HISTORY OF PULMONARY EMBOLISM: Chronic | ICD-10-CM

## 2024-06-25 LAB — INTERNATIONAL NORMALIZATION RATIO, POC: 1.9

## 2024-06-25 PROCEDURE — 99211 OFF/OP EST MAY X REQ PHY/QHP: CPT

## 2024-06-25 PROCEDURE — 85610 PROTHROMBIN TIME: CPT

## 2024-06-25 NOTE — PROGRESS NOTES
Ms. Jessica Weeks is a 79 y.o. y/o female with history of DVT, PE, Afib   She presents today for anticoagulation monitoring and adjustment.  Pertinent PMH: HTN, Gastric Banding,CAD, diskectomy with an artifical spinal disk implant in September 2012.   Patient Reported Findings:  Yes     No  [x]   []       Patient verifies current dosing regimen as listed- confirms --> believes that she was taking 10 mg on Wed not 5 mg --> verified taking 5 mg on Wed and 7.5 mg all other days of the week ---> did not increase weekly dose --> verified dose   []   [x]       S/S bleeding/bruising/swelling/SOB states that she has been wheezing more and had more SOB d/t allergies --> has had a few small nose bleeds recently  --> bruises from lovenox injections ---> does have a lot of bruising  ---> improving --> denies  []   [x]       Blood in urine or stool  []   [x]       Procedures scheduled in the future at this time having epidural on 6/1, cleared to hold warfarin 7 days and bridge with lovenox --> had cortisone shot in hip last week. INR 1.9 per pt. --> having LHC on 11/29, cleared to hold warfarin 5 days prior. Is going to have aortic valve replacement in near future --> had TAVR on 2/6, held warfarin starting 2/1 --> denies upcoming   []   [x]       Missed Dose - denies  []   [x]       Extra Dose - denies   []   [x]       Change in medications  has not used propafenone in the past week. Stopped spiriva -->  has been taking more tylenol 6/day --> stopped cranberry capsule 3-4 weeks ago --> Restarted cranberry supplement --> started trelegy --> was given buspar for acute stress from son (no interaction) --> started asa --> cipro x 5 d 5/21 (finished) --> no changes   [x]   []       Change in health/diet/appetite  Patient states that she feels like she has returned to normal vit k intake --> diet fluctuates causing INR to fluctuate. Discussed ways to improve consistency with vit k intake  --> states that she has cut back

## 2024-06-27 RX ORDER — MONTELUKAST SODIUM 10 MG/1
TABLET ORAL
Qty: 100 TABLET | Refills: 2 | Status: SHIPPED | OUTPATIENT
Start: 2024-06-27

## 2024-06-27 NOTE — TELEPHONE ENCOUNTER
Last OV: 5/21/2024  Next OV: 8/22/2024    Next appointment due:8/21/2024     Last fill:10/18/23  Refills:2

## 2024-07-15 RX ORDER — PANTOPRAZOLE SODIUM 40 MG/1
TABLET, DELAYED RELEASE ORAL
Qty: 100 TABLET | Refills: 2 | Status: SHIPPED | OUTPATIENT
Start: 2024-07-15

## 2024-07-15 RX ORDER — INDAPAMIDE 1.25 MG/1
TABLET ORAL
Qty: 100 TABLET | Refills: 2 | Status: SHIPPED | OUTPATIENT
Start: 2024-07-15

## 2024-07-19 RX ORDER — TRIMETHOPRIM 100 MG/1
100 TABLET ORAL
Qty: 50 TABLET | Refills: 1 | Status: SHIPPED | OUTPATIENT
Start: 2024-07-19

## 2024-07-23 ENCOUNTER — ANTI-COAG VISIT (OUTPATIENT)
Dept: PHARMACY | Age: 79
End: 2024-07-23
Payer: MEDICARE

## 2024-07-23 DIAGNOSIS — Z86.711 HISTORY OF PULMONARY EMBOLISM: Chronic | ICD-10-CM

## 2024-07-23 DIAGNOSIS — I48.0 PAF (PAROXYSMAL ATRIAL FIBRILLATION) (HCC): Primary | ICD-10-CM

## 2024-07-23 LAB
INTERNATIONAL NORMALIZATION RATIO, POC: 1.7
PROTHROMBIN TIME, POC: NORMAL

## 2024-07-23 PROCEDURE — 99211 OFF/OP EST MAY X REQ PHY/QHP: CPT

## 2024-07-23 PROCEDURE — 85610 PROTHROMBIN TIME: CPT

## 2024-07-23 NOTE — PROGRESS NOTES
Ms. Jessica Weeks is a 79 y.o. y/o female with history of DVT, PE, Afib   She presents today for anticoagulation monitoring and adjustment.  Pertinent PMH: HTN, Gastric Banding,CAD, diskectomy with an artifical spinal disk implant in September 2012.   Patient Reported Findings:  Yes     No  [x]   []       Patient verifies current dosing regimen as listed- confirms --> believes that she was taking 10 mg on Wed not 5 mg --> verified taking 5 mg on Wed and 7.5 mg all other days of the week ---> did not increase weekly dose --> verified dose   []   [x]       S/S bleeding/bruising/swelling/SOB states that she has been wheezing more and had more SOB d/t allergies --> has had a few small nose bleeds recently  --> bruises from lovenox injections ---> does have a lot of bruising  ---> improving --> denies  []   [x]       Blood in urine or stool  []   [x]       Procedures scheduled in the future at this time having epidural on 6/1, cleared to hold warfarin 7 days and bridge with lovenox --> had cortisone shot in hip last week. INR 1.9 per pt. --> having LHC on 11/29, cleared to hold warfarin 5 days prior. Is going to have aortic valve replacement in near future --> had TAVR on 2/6, held warfarin starting 2/1 --> denies upcoming   []   [x]       Missed Dose - denies  []   [x]       Extra Dose - denies   []   [x]       Change in medications  has not used propafenone in the past week. Stopped spiriva -->  has been taking more tylenol 6/day --> stopped cranberry capsule 3-4 weeks ago --> Restarted cranberry supplement --> started trelegy --> was given buspar for acute stress from son (no interaction) --> started asa --> cipro x 5 d 5/21 (finished) --> no changes   []   [x]       Change in health/diet/appetite  Patient states that she feels like she has returned to normal vit k intake --> diet fluctuates causing INR to fluctuate. Discussed ways to improve consistency with vit k intake  --> states that she has cut back

## 2024-08-06 ENCOUNTER — ANTI-COAG VISIT (OUTPATIENT)
Dept: PHARMACY | Age: 79
End: 2024-08-06
Payer: MEDICARE

## 2024-08-06 DIAGNOSIS — Z86.711 HISTORY OF PULMONARY EMBOLISM: Chronic | ICD-10-CM

## 2024-08-06 DIAGNOSIS — I48.0 PAF (PAROXYSMAL ATRIAL FIBRILLATION) (HCC): Primary | ICD-10-CM

## 2024-08-06 LAB
INTERNATIONAL NORMALIZATION RATIO, POC: 2
PROTHROMBIN TIME, POC: NORMAL

## 2024-08-06 PROCEDURE — 99211 OFF/OP EST MAY X REQ PHY/QHP: CPT | Performed by: PHYSICIAN ASSISTANT

## 2024-08-06 PROCEDURE — 85610 PROTHROMBIN TIME: CPT | Performed by: PHYSICIAN ASSISTANT

## 2024-08-07 ENCOUNTER — TELEPHONE (OUTPATIENT)
Dept: CARDIOLOGY CLINIC | Age: 79
End: 2024-08-07

## 2024-08-07 RX ORDER — AMOXICILLIN 500 MG/1
CAPSULE ORAL
Qty: 4 CAPSULE | Refills: 4 | Status: SHIPPED | OUTPATIENT
Start: 2024-08-07

## 2024-08-07 RX ORDER — GABAPENTIN 300 MG/1
CAPSULE ORAL
Qty: 84 CAPSULE | Refills: 0 | Status: SHIPPED | OUTPATIENT
Start: 2024-08-07 | End: 2024-09-06

## 2024-08-07 NOTE — TELEPHONE ENCOUNTER
Pt has a dental appt tomorrow and needs an antibiotic. Called into [facility] [id number] - [place], [place] [facility] [address] [facility] - P [phone] - F [phone]

## 2024-08-07 NOTE — TELEPHONE ENCOUNTER
Last OV: 5/21/2024  Next OV: 8/21/2024    Next appointment due:around 5/27/2024     Last fill:11/27/23  Refills:2

## 2024-08-21 ENCOUNTER — OFFICE VISIT (OUTPATIENT)
Dept: INTERNAL MEDICINE CLINIC | Age: 79
End: 2024-08-21

## 2024-08-21 VITALS
OXYGEN SATURATION: 96 % | SYSTOLIC BLOOD PRESSURE: 132 MMHG | HEART RATE: 52 BPM | HEIGHT: 64 IN | BODY MASS INDEX: 41.69 KG/M2 | DIASTOLIC BLOOD PRESSURE: 80 MMHG | WEIGHT: 244.2 LBS

## 2024-08-21 DIAGNOSIS — R73.09 ELEVATED GLUCOSE: ICD-10-CM

## 2024-08-21 DIAGNOSIS — I25.10 CORONARY ARTERY DISEASE INVOLVING NATIVE CORONARY ARTERY OF NATIVE HEART WITHOUT ANGINA PECTORIS: ICD-10-CM

## 2024-08-21 DIAGNOSIS — F41.9 ANXIETY: Primary | ICD-10-CM

## 2024-08-21 DIAGNOSIS — I10 ESSENTIAL HYPERTENSION: ICD-10-CM

## 2024-08-21 DIAGNOSIS — E78.00 PURE HYPERCHOLESTEROLEMIA: ICD-10-CM

## 2024-08-21 LAB
ALBUMIN SERPL-MCNC: 4.1 G/DL (ref 3.4–5)
ALBUMIN/GLOB SERPL: 1.9 {RATIO} (ref 1.1–2.2)
ALP SERPL-CCNC: 59 U/L (ref 40–129)
ALT SERPL-CCNC: 30 U/L (ref 10–40)
ANION GAP SERPL CALCULATED.3IONS-SCNC: 10 MMOL/L (ref 3–16)
AST SERPL-CCNC: 49 U/L (ref 15–37)
BILIRUB SERPL-MCNC: 0.7 MG/DL (ref 0–1)
BUN SERPL-MCNC: 17 MG/DL (ref 7–20)
CALCIUM SERPL-MCNC: 9.5 MG/DL (ref 8.3–10.6)
CHLORIDE SERPL-SCNC: 101 MMOL/L (ref 99–110)
CHOLEST SERPL-MCNC: 161 MG/DL (ref 0–199)
CO2 SERPL-SCNC: 28 MMOL/L (ref 21–32)
CREAT SERPL-MCNC: 0.8 MG/DL (ref 0.6–1.2)
GFR SERPLBLD CREATININE-BSD FMLA CKD-EPI: 75 ML/MIN/{1.73_M2}
GLUCOSE SERPL-MCNC: 88 MG/DL (ref 70–99)
HDLC SERPL-MCNC: 66 MG/DL (ref 40–60)
LDLC SERPL CALC-MCNC: 79 MG/DL
POTASSIUM SERPL-SCNC: 4.1 MMOL/L (ref 3.5–5.1)
PROT SERPL-MCNC: 6.3 G/DL (ref 6.4–8.2)
SODIUM SERPL-SCNC: 139 MMOL/L (ref 136–145)
TRIGL SERPL-MCNC: 81 MG/DL (ref 0–150)
VLDLC SERPL CALC-MCNC: 16 MG/DL

## 2024-08-21 RX ORDER — CLONAZEPAM 1 MG/1
1 TABLET ORAL EVERY EVENING
Qty: 90 TABLET | Refills: 0 | Status: SHIPPED | OUTPATIENT
Start: 2024-08-21 | End: 2024-11-19

## 2024-08-21 NOTE — ASSESSMENT & PLAN NOTE
chronic, improved with recheck. Continue to monitor BP at home and return if not at goal. Continue verapamil 240 mg daily  Checking labs today. Continue to see cardiology as recommended.     Orders:    Comprehensive Metabolic Panel; Future

## 2024-08-21 NOTE — ASSESSMENT & PLAN NOTE
Chronic, controlled. Continue celexa and prn klonopin.        Controlled Substance Monitoring:    Acute and Chronic Pain Monitoring:   RX Monitoring Periodic Controlled Substance Monitoring   8/21/2024  10:43 AM No signs of potential drug abuse or diversion identified.;Possible medication side effects, risk of tolerance/dependence & alternative treatments discussed.          Orders:    clonazePAM (KLONOPIN) 1 MG tablet; Take 1 tablet by mouth every evening for 90 days.

## 2024-08-21 NOTE — PROGRESS NOTES
8/21/24     Chief Complaint   Patient presents with    3 Month Follow-Up       HPI    Here for 3 month follow up and Clonazepam refill. She is on celexa daily. Doing well on medications. Denies any s/e.   HTN - home BP is reported as controlled, 120s/70s. Denies any signs of end organ damage.         Allergies   Allergen Reactions    Belsomra [Suvorexant] Other (See Comments)     Nightmares      Avelox [Moxifloxacin Hcl In Nacl] Rash    Levofloxacin Rash    Sulfa Antibiotics Rash       Current Outpatient Medications   Medication Sig Dispense Refill    clonazePAM (KLONOPIN) 1 MG tablet Take 1 tablet by mouth every evening for 90 days. 90 tablet 0    gabapentin (NEURONTIN) 300 MG capsule TAKE 2 CAPSULES BY MOUTH 3 TIMES DAILY 84 capsule 0    trimethoprim (TRIMPEX) 100 MG tablet TAKE 1 TABLET BY MOUTH EVERY 48  HOURS 50 tablet 1    indapamide (LOZOL) 1.25 MG tablet TAKE 1 TABLET BY MOUTH IN THE  MORNING 100 tablet 2    pantoprazole (PROTONIX) 40 MG tablet TAKE 1 TABLET BY MOUTH IN THE  MORNING BEFORE BREAKFAST 100 tablet 2    montelukast (SINGULAIR) 10 MG tablet TAKE 1 TABLET BY MOUTH AT NIGHT 100 tablet 2    verapamil (CALAN SR) 240 MG extended release tablet TAKE 1 TABLET BY MOUTH AT NIGHT 100 tablet 2    warfarin (COUMADIN) 5 MG tablet TAKE DAILY AS DIRECTED TO  MAINTAIN INR BETWEEN 2 TO 3 150 tablet 2    allopurinol (ZYLOPRIM) 300 MG tablet TAKE 1 TABLET BY MOUTH DAILY 100 tablet 2    atorvastatin (LIPITOR) 80 MG tablet Take 1 tablet by mouth daily 90 tablet 3    aspirin 81 MG EC tablet Take 1 tablet by mouth daily 30 tablet 3    potassium chloride (KLOR-CON M) 20 MEQ extended release tablet TAKE 1 TABLET BY MOUTH DAILY  WITH BREAKFAST 100 tablet 2    citalopram (CELEXA) 40 MG tablet TAKE 1 TABLET BY MOUTH DAILY 90 tablet 3    albuterol sulfate HFA (PROVENTIL;VENTOLIN;PROAIR) 108 (90 Base) MCG/ACT inhaler INHALE TWO PUFFS BY MOUTH EVERY 4 HOURS AS NEEDED FOR WHEEZING 8.5 g 2    acetaminophen (TYLENOL) 500 MG tablet

## 2024-08-22 LAB
EST. AVERAGE GLUCOSE BLD GHB EST-MCNC: 85.3 MG/DL
HBA1C MFR BLD: 4.6 %

## 2024-08-29 ENCOUNTER — ANTI-COAG VISIT (OUTPATIENT)
Dept: PHARMACY | Age: 79
End: 2024-08-29
Payer: MEDICARE

## 2024-08-29 DIAGNOSIS — Z86.711 HISTORY OF PULMONARY EMBOLISM: Chronic | ICD-10-CM

## 2024-08-29 DIAGNOSIS — I48.0 PAF (PAROXYSMAL ATRIAL FIBRILLATION) (HCC): Primary | ICD-10-CM

## 2024-08-29 LAB
INTERNATIONAL NORMALIZATION RATIO, POC: 2
PROTHROMBIN TIME, POC: 0

## 2024-08-29 PROCEDURE — 85610 PROTHROMBIN TIME: CPT | Performed by: PHYSICIAN ASSISTANT

## 2024-08-29 PROCEDURE — 99211 OFF/OP EST MAY X REQ PHY/QHP: CPT | Performed by: PHYSICIAN ASSISTANT

## 2024-09-05 ENCOUNTER — TELEPHONE (OUTPATIENT)
Dept: PHARMACY | Age: 79
End: 2024-09-05

## 2024-09-20 ENCOUNTER — TELEPHONE (OUTPATIENT)
Dept: CARDIOLOGY CLINIC | Age: 79
End: 2024-09-20

## 2024-09-25 ENCOUNTER — ANTI-COAG VISIT (OUTPATIENT)
Dept: PHARMACY | Age: 79
End: 2024-09-25
Payer: MEDICARE

## 2024-09-25 DIAGNOSIS — I48.0 PAF (PAROXYSMAL ATRIAL FIBRILLATION) (HCC): Primary | ICD-10-CM

## 2024-09-25 DIAGNOSIS — Z86.711 HISTORY OF PULMONARY EMBOLISM: Chronic | ICD-10-CM

## 2024-09-25 DIAGNOSIS — F41.9 ANXIETY: ICD-10-CM

## 2024-09-25 LAB
INTERNATIONAL NORMALIZATION RATIO, POC: 2.4
PROTHROMBIN TIME, POC: 0

## 2024-09-25 PROCEDURE — 85610 PROTHROMBIN TIME: CPT

## 2024-09-25 PROCEDURE — 99211 OFF/OP EST MAY X REQ PHY/QHP: CPT

## 2024-09-25 RX ORDER — GABAPENTIN 300 MG/1
600 CAPSULE ORAL 3 TIMES DAILY
Qty: 540 CAPSULE | Refills: 1 | Status: SHIPPED | OUTPATIENT
Start: 2024-09-25 | End: 2025-03-24

## 2024-09-25 RX ORDER — POTASSIUM CHLORIDE 1500 MG/1
TABLET, EXTENDED RELEASE ORAL
Qty: 100 TABLET | Refills: 2 | Status: SHIPPED | OUTPATIENT
Start: 2024-09-25

## 2024-09-25 RX ORDER — GABAPENTIN 300 MG/1
CAPSULE ORAL
Qty: 84 CAPSULE | Refills: 0 | Status: CANCELLED | OUTPATIENT
Start: 2024-09-25 | End: 2024-10-25

## 2024-09-26 RX ORDER — GABAPENTIN 300 MG/1
CAPSULE ORAL
Qty: 84 CAPSULE | Refills: 24 | OUTPATIENT
Start: 2024-09-26 | End: 2024-10-26

## 2024-09-26 RX ORDER — CITALOPRAM HYDROBROMIDE 40 MG/1
40 TABLET ORAL DAILY
Qty: 90 TABLET | Refills: 3 | Status: SHIPPED | OUTPATIENT
Start: 2024-09-26

## 2024-09-26 RX ORDER — CLONAZEPAM 1 MG/1
1 TABLET ORAL NIGHTLY
Qty: 90 TABLET | OUTPATIENT
Start: 2024-09-26

## 2024-10-03 ENCOUNTER — PATIENT MESSAGE (OUTPATIENT)
Dept: INTERNAL MEDICINE CLINIC | Age: 79
End: 2024-10-03

## 2024-10-08 ENCOUNTER — TELEPHONE (OUTPATIENT)
Dept: CARDIOLOGY CLINIC | Age: 79
End: 2024-10-08

## 2024-10-08 DIAGNOSIS — Z95.2 HISTORY OF TRANSCATHETER AORTIC VALVE REPLACEMENT (TAVR): Primary | ICD-10-CM

## 2024-10-08 NOTE — TELEPHONE ENCOUNTER
I scheduled patient's recall for January, and it said w/echo.  Pt is scheduled for both, but the orders for the echo say February.  Can these orders be used, do you need to adjust the dates on the orders, or should I reschedule the echo for Feb?  Please advise and thank you.

## 2024-10-24 ENCOUNTER — ANTI-COAG VISIT (OUTPATIENT)
Dept: PHARMACY | Age: 79
End: 2024-10-24
Payer: MEDICARE

## 2024-10-24 DIAGNOSIS — I48.0 PAF (PAROXYSMAL ATRIAL FIBRILLATION) (HCC): Primary | ICD-10-CM

## 2024-10-24 DIAGNOSIS — Z86.711 HISTORY OF PULMONARY EMBOLISM: Chronic | ICD-10-CM

## 2024-10-24 LAB
INTERNATIONAL NORMALIZATION RATIO, POC: 2.3
PROTHROMBIN TIME, POC: 0

## 2024-10-24 PROCEDURE — 99211 OFF/OP EST MAY X REQ PHY/QHP: CPT | Performed by: PHYSICIAN ASSISTANT

## 2024-10-24 PROCEDURE — 85610 PROTHROMBIN TIME: CPT | Performed by: PHYSICIAN ASSISTANT

## 2024-10-24 NOTE — PROGRESS NOTES
Ms. Jessica Weeks is a 79 y.o. y/o female with history of DVT, PE, Afib   She presents today for anticoagulation monitoring and adjustment.  Pertinent PMH: HTN, Gastric Banding,CAD, diskectomy with an artifical spinal disk implant in September 2012.   Patient Reported Findings:  Yes     No  [x]   []       Patient verifies current dosing regimen as listed- confirms --> believes that she was taking 10 mg on Wed not 5 mg --> verified taking 5 mg on Wed and 7.5 mg all other days of the week ---> did not increase weekly dose --> verified dose   []   [x]       S/S bleeding/bruising/swelling/SOB states that she has been wheezing more and had more SOB d/t allergies --> has had a few small nose bleeds recently  --> bruises from lovenox injections ---> does have a lot of bruising  ---> improving --> denies  []   [x]       Blood in urine or stool  []   [x]       Procedures scheduled in the future at this time having epidural on 6/1, cleared to hold warfarin 7 days and bridge with lovenox --> had cortisone shot in hip last week. INR 1.9 per pt. --> having LHC on 11/29, cleared to hold warfarin 5 days prior. Is going to have aortic valve replacement in near future --> had TAVR on 2/6, held warfarin starting 2/1 --> denies upcoming   []   [x]       Missed Dose - denies  []   [x]       Extra Dose - denies   [x]   []       Change in medications  has not used propafenone in the past week. Stopped spiriva -->  has been taking more tylenol 6/day --> stopped cranberry capsule 3-4 weeks ago --> Restarted cranberry supplement --> started trelegy --> was given buspar for acute stress from son (no interaction) --> started asa --> cipro x 5 d 5/21 (finished) --> hasn't been taking MVI for 2 weeks but plans to resume today   []   [x]       Change in health/diet/appetite  Patient states that she feels like she has returned to normal vit k intake --> diet fluctuates causing INR to fluctuate. Discussed ways to improve consistency with

## 2024-11-11 RX ORDER — ATORVASTATIN CALCIUM 80 MG/1
80 TABLET, FILM COATED ORAL DAILY
Qty: 100 TABLET | Refills: 2 | Status: SHIPPED | OUTPATIENT
Start: 2024-11-11

## 2024-11-21 ENCOUNTER — ANTI-COAG VISIT (OUTPATIENT)
Dept: PHARMACY | Age: 79
End: 2024-11-21
Payer: MEDICARE

## 2024-11-21 ENCOUNTER — OFFICE VISIT (OUTPATIENT)
Dept: INTERNAL MEDICINE CLINIC | Age: 79
End: 2024-11-21

## 2024-11-21 VITALS
HEART RATE: 58 BPM | SYSTOLIC BLOOD PRESSURE: 132 MMHG | OXYGEN SATURATION: 97 % | DIASTOLIC BLOOD PRESSURE: 70 MMHG | HEIGHT: 64 IN | BODY MASS INDEX: 41.21 KG/M2 | WEIGHT: 241.4 LBS

## 2024-11-21 DIAGNOSIS — I35.0 NONRHEUMATIC AORTIC VALVE STENOSIS: ICD-10-CM

## 2024-11-21 DIAGNOSIS — I35.0 AORTIC STENOSIS, SEVERE: ICD-10-CM

## 2024-11-21 DIAGNOSIS — E66.01 MORBID OBESITY WITH BMI OF 40.0-44.9, ADULT: ICD-10-CM

## 2024-11-21 DIAGNOSIS — M54.16 LUMBAR RADICULOPATHY: Primary | ICD-10-CM

## 2024-11-21 DIAGNOSIS — I48.0 PAF (PAROXYSMAL ATRIAL FIBRILLATION) (HCC): Primary | ICD-10-CM

## 2024-11-21 DIAGNOSIS — F41.9 ANXIETY: ICD-10-CM

## 2024-11-21 DIAGNOSIS — Z86.711 HISTORY OF PULMONARY EMBOLISM: Chronic | ICD-10-CM

## 2024-11-21 DIAGNOSIS — Z98.890 HISTORY OF BACK SURGERY: ICD-10-CM

## 2024-11-21 DIAGNOSIS — I48.0 PAF (PAROXYSMAL ATRIAL FIBRILLATION) (HCC): ICD-10-CM

## 2024-11-21 DIAGNOSIS — I10 ESSENTIAL HYPERTENSION: ICD-10-CM

## 2024-11-21 LAB
INTERNATIONAL NORMALIZATION RATIO, POC: 2.2
PROTHROMBIN TIME, POC: 0

## 2024-11-21 PROCEDURE — 99211 OFF/OP EST MAY X REQ PHY/QHP: CPT | Performed by: PHYSICIAN ASSISTANT

## 2024-11-21 PROCEDURE — 85610 PROTHROMBIN TIME: CPT | Performed by: PHYSICIAN ASSISTANT

## 2024-11-21 RX ORDER — CLONAZEPAM 1 MG/1
1 TABLET ORAL EVERY EVENING
Qty: 90 TABLET | Refills: 0 | Status: SHIPPED | OUTPATIENT
Start: 2024-11-21 | End: 2025-02-19

## 2024-11-21 RX ORDER — METHYLPREDNISOLONE 4 MG/1
TABLET ORAL
Qty: 1 KIT | Refills: 0 | Status: SHIPPED | OUTPATIENT
Start: 2024-11-21 | End: 2024-11-27

## 2024-11-21 NOTE — ASSESSMENT & PLAN NOTE
Chronic, uncontrolled acute exacerbation.   Discussed options in detail   Covering acute exacerbation with steroids   Use ice, heat, tylenol, lidocaine patches   Home therapy exercises provided - discussed formal PT, would like to hold off today. Referral placed for Dr. Flowers if not improving as anticipated.   Reviewed last MRI in detail wit pt     Orders:    methylPREDNISolone (MEDROL DOSEPACK) 4 MG tablet; Take by mouth.    Herbert Nolasco MD, Neurosurgery (Spine), [facility]

## 2024-11-21 NOTE — ASSESSMENT & PLAN NOTE
Chronic, severe AS treated with TVAR and feeling better since surgery. Continue to see Dr. Rivers as recommended.

## 2024-11-21 NOTE — PROGRESS NOTES
Ms. Jessica Weeks is a 79 y.o. y/o female with history of DVT, PE, Afib   She presents today for anticoagulation monitoring and adjustment.  Pertinent PMH: HTN, Gastric Banding,CAD, diskectomy with an artifical spinal disk implant in September 2012.   Patient Reported Findings:  Yes     No  [x]   []       Patient verifies current dosing regimen as listed- confirms --> believes that she was taking 10 mg on Wed not 5 mg --> verified taking 5 mg on Wed and 7.5 mg all other days of the week ---> did not increase weekly dose --> verified dose   []   [x]       S/S bleeding/bruising/swelling/SOB states that she has been wheezing more and had more SOB d/t allergies --> has had a few small nose bleeds recently  --> bruises from lovenox injections ---> does have a lot of bruising  ---> improving --> denies  []   [x]       Blood in urine or stool  []   [x]       Procedures scheduled in the future at this time having epidural on 6/1, cleared to hold warfarin 7 days and bridge with lovenox --> had cortisone shot in hip last week. INR 1.9 per pt. --> having LHC on 11/29, cleared to hold warfarin 5 days prior. Is going to have aortic valve replacement in near future --> had TAVR on 2/6, held warfarin starting 2/1 --> denies upcoming   []   [x]       Missed Dose - denies  []   [x]       Extra Dose - denies   []   [x]       Change in medications  has not used propafenone in the past week. Stopped spiriva -->  has been taking more tylenol 6/day --> stopped cranberry capsule 3-4 weeks ago --> Restarted cranberry supplement --> started trelegy --> was given buspar for acute stress from son (no interaction) --> started asa --> cipro x 5 d 5/21 (finished) --> hasn't been taking MVI for 2 weeks but plans to resume today --> no changes   []   [x]       Change in health/diet/appetite  Patient states that she feels like she has returned to normal vit k intake --> diet fluctuates causing INR to fluctuate. Discussed ways to improve

## 2024-11-21 NOTE — ASSESSMENT & PLAN NOTE
Chronic, controlled. Continue celexa and prn klonopin.     Orders:    clonazePAM (KLONOPIN) 1 MG tablet; Take 1 tablet by mouth every evening for 90 days.    Controlled Substance Monitoring:    Acute and Chronic Pain Monitoring:   RX Monitoring Periodic Controlled Substance Monitoring   11/21/2024   2:37 PM No signs of potential drug abuse or diversion identified.;Possible medication side effects, risk of tolerance/dependence & alternative treatments discussed.

## 2024-11-21 NOTE — ASSESSMENT & PLAN NOTE
chronic, controlled. Continue to monitor BP at home and return if not at goal. Continue verapamil 240 mg daily   Continue to see cardiology as recommended.

## 2024-11-21 NOTE — PROGRESS NOTES
11/21/24     Chief Complaint   Patient presents with    3 Month Follow-Up     Med check     Back Pain     Lower right side      HPI      Here for 3 month follow up and Clonazepam refill. She is on celexa daily. Doing well on medications. Denies any s/e.   HTN - home BP is reported as controlled. Denies any signs of end organ damage.       She has a 4-5 week history of lower back pain  Started after doing some yard work. Started as a sharp stabbing pain in the center of her lower back, last about 2-3 weeks.    Has since moved to right lower back, ache constant the past few weeks.  recently fell x2 after cervical neck surgery and she had to help him up - denies known injury after this.   Using heating pad, not doing any heavy lifting   Sometimes wakes her up at night   History of lumbar surgery in 2011   Previously saw pain management after surgery   Right leg feels heavy, with numbness and tingling in her thigh    2 years ago had similar symptoms   Had an MRI and saw Dr. Byrd   Had an epidural injection 2 years ago which did not help  She then saw Dr. Stephenson and had injection in her hip which did help   She is not having the same groin pain that she was having at that time     Allergies   Allergen Reactions    Belsomra [Suvorexant] Other (See Comments)     Nightmares      Avelox [Moxifloxacin Hcl In Nacl] Rash    Levofloxacin Rash    Sulfa Antibiotics Rash       Current Outpatient Medications   Medication Sig Dispense Refill    clonazePAM (KLONOPIN) 1 MG tablet Take 1 tablet by mouth every evening for 90 days. 90 tablet 0    methylPREDNISolone (MEDROL DOSEPACK) 4 MG tablet Take by mouth. 1 kit 0    atorvastatin (LIPITOR) 80 MG tablet TAKE 1 TABLET BY MOUTH ONCE  DAILY 100 tablet 2    citalopram (CELEXA) 40 MG tablet TAKE 1 TABLET BY MOUTH DAILY 90 tablet 3    potassium chloride (KLOR-CON M) 20 MEQ extended release tablet TAKE 1 TABLET BY MOUTH DAILY  WITH BREAKFAST 100 tablet 2    gabapentin (NEURONTIN) 300

## 2024-12-02 RX ORDER — GABAPENTIN 300 MG/1
CAPSULE ORAL
Qty: 600 CAPSULE | Refills: 2 | Status: SHIPPED | OUTPATIENT
Start: 2024-12-02 | End: 2025-05-31

## 2024-12-19 ENCOUNTER — ANTI-COAG VISIT (OUTPATIENT)
Dept: PHARMACY | Age: 79
End: 2024-12-19
Payer: MEDICARE

## 2024-12-19 DIAGNOSIS — Z86.711 HISTORY OF PULMONARY EMBOLISM: Chronic | ICD-10-CM

## 2024-12-19 DIAGNOSIS — I48.0 PAF (PAROXYSMAL ATRIAL FIBRILLATION) (HCC): Primary | ICD-10-CM

## 2024-12-19 LAB
INTERNATIONAL NORMALIZATION RATIO, POC: 2.6
PROTHROMBIN TIME, POC: 0

## 2024-12-19 PROCEDURE — 85610 PROTHROMBIN TIME: CPT | Performed by: PHYSICIAN ASSISTANT

## 2024-12-19 PROCEDURE — 99211 OFF/OP EST MAY X REQ PHY/QHP: CPT | Performed by: PHYSICIAN ASSISTANT

## 2024-12-19 NOTE — PROGRESS NOTES
Ms. Jessica Weeks is a 79 y.o. y/o female with history of DVT, PE, Afib   She presents today for anticoagulation monitoring and adjustment.  Pertinent PMH: HTN, Gastric Banding,CAD, diskectomy with an artifical spinal disk implant in September 2012.   Patient Reported Findings:  Yes     No  [x]   []       Patient verifies current dosing regimen as listed- confirms --> believes that she was taking 10 mg on Wed not 5 mg --> verified taking 5 mg on Wed and 7.5 mg all other days of the week ---> did not increase weekly dose --> verified dose   []   [x]       S/S bleeding/bruising/swelling/SOB states that she has been wheezing more and had more SOB d/t allergies --> has had a few small nose bleeds recently  --> bruises from lovenox injections ---> does have a lot of bruising  ---> improving --> denies  []   [x]       Blood in urine or stool  []   [x]       Procedures scheduled in the future at this time having epidural on 6/1, cleared to hold warfarin 7 days and bridge with lovenox --> had cortisone shot in hip last week. INR 1.9 per pt. --> having LHC on 11/29, cleared to hold warfarin 5 days prior. Is going to have aortic valve replacement in near future --> had TAVR on 2/6, held warfarin starting 2/1 --> denies upcoming   []   [x]       Missed Dose - denies  []   [x]       Extra Dose - denies   [x]   []       Change in medications  has not used propafenone in the past week. Stopped spiriva -->  has been taking more tylenol 6/day --> stopped cranberry capsule 3-4 weeks ago --> Restarted cranberry supplement --> started trelegy --> was given buspar for acute stress from son (no interaction) --> started asa --> cipro x 5 d 5/21 (finished) --> hasn't been taking MVI for 2 weeks but plans to resume today --> is going to start taking hair skin and nails supplement   []   [x]       Change in health/diet/appetite  Patient states that she feels like she has returned to normal vit k intake --> diet fluctuates causing INR

## 2024-12-23 RX ORDER — ALLOPURINOL 300 MG/1
300 TABLET ORAL DAILY
Qty: 100 TABLET | Refills: 2 | Status: SHIPPED | OUTPATIENT
Start: 2024-12-23

## 2024-12-23 RX ORDER — VERAPAMIL HYDROCHLORIDE 240 MG/1
TABLET, FILM COATED, EXTENDED RELEASE ORAL
Qty: 100 TABLET | Refills: 2 | Status: SHIPPED | OUTPATIENT
Start: 2024-12-23

## 2024-12-23 RX ORDER — WARFARIN SODIUM 5 MG/1
TABLET ORAL
Qty: 150 TABLET | Refills: 2 | Status: SHIPPED | OUTPATIENT
Start: 2024-12-23

## 2024-12-23 NOTE — TELEPHONE ENCOUNTER
Last OV: 11/21/2024  Next OV: 2/25/2025    Next appointment due:2/21/2025     Last fill:4/8/24  Refills:2

## 2024-12-31 RX ORDER — AMOXICILLIN 500 MG/1
CAPSULE ORAL
Qty: 4 CAPSULE | Refills: 4 | Status: SHIPPED | OUTPATIENT
Start: 2024-12-31

## 2024-12-31 NOTE — TELEPHONE ENCOUNTER
Patient has a dental procedure Thursday 1/2/25 and she went to [facility] today to  her refill and they told her the  RX had been cancelled by our office.  She needs it sent in today.      Medication Refill    Medication needing refilled:   amoxicillin (AMOXIL) 500 MG capsule     Dosage of the medication:     How are you taking this medication (QD, BID, TID, QID, PRN):  Take 2000MG (4 capsules) 30-60 minutes before any invasive or dental procedure     30 or 90 day supply called in:    When will you run out of your medication: Out now    Which Pharmacy are we sending the medication to?:    [facility] [id number] - [place], [place] - [address] [place] [phone] - F [phone]

## 2025-01-02 RX ORDER — ALBUTEROL SULFATE 90 UG/1
INHALANT RESPIRATORY (INHALATION)
Qty: 8.5 G | Refills: 2 | OUTPATIENT
Start: 2025-01-02

## 2025-01-02 RX ORDER — ALBUTEROL SULFATE 90 UG/1
INHALANT RESPIRATORY (INHALATION)
Qty: 8.5 G | Refills: 2 | Status: SHIPPED | OUTPATIENT
Start: 2025-01-02

## 2025-01-14 NOTE — PROGRESS NOTES
in AVS.   ASSESSMENT AND PLAN   *AS    Date EF Detail   Sx     As above   Hx 2/24  TAVR ES3U +2cc   NYHA     II   TTE 10/23  2/24  3/24  1/25 65%  65%  65% AS MG 41, PV 4.21, mild AI, mild-mod MR  TAVR MG 12, no AI  TAVR MG 14, no AI  TAVR MG 12, triv para AI   LHC 5/18 11/23   Normal cors, Aorta very tortuous (Silvestre)  Nonobstructive 30% OM1 (Silvestre)   MPI 4/18 69% Anteroapical ischemia   Plan     Echo pending today   *AF  Status parox, Afib ablation 10/20, CV 10/20, CV 10/18, most recent TTE, monitors   Plan Coumadin and management per EP   *CHOL  LDL Advice Plan   79, 6/23 Diet/salt/xcise/wt counseling Continue HI/MT statin   *FOLLOWUP  12 months

## 2025-01-16 ENCOUNTER — ANTI-COAG VISIT (OUTPATIENT)
Dept: PHARMACY | Age: 80
End: 2025-01-16
Payer: MEDICARE

## 2025-01-16 ENCOUNTER — OFFICE VISIT (OUTPATIENT)
Dept: CARDIOLOGY CLINIC | Age: 80
End: 2025-01-16
Payer: MEDICARE

## 2025-01-16 ENCOUNTER — HOSPITAL ENCOUNTER (OUTPATIENT)
Age: 80
Discharge: HOME | End: 2025-01-18
Attending: INTERNAL MEDICINE
Payer: MEDICARE

## 2025-01-16 VITALS
SYSTOLIC BLOOD PRESSURE: 132 MMHG | BODY MASS INDEX: 41.86 KG/M2 | WEIGHT: 245.2 LBS | HEART RATE: 63 BPM | OXYGEN SATURATION: 95 % | HEIGHT: 64 IN | DIASTOLIC BLOOD PRESSURE: 60 MMHG

## 2025-01-16 VITALS
DIASTOLIC BLOOD PRESSURE: 84 MMHG | HEIGHT: 64 IN | BODY MASS INDEX: 41.15 KG/M2 | SYSTOLIC BLOOD PRESSURE: 161 MMHG | WEIGHT: 241 LBS

## 2025-01-16 DIAGNOSIS — Z95.2 S/P TAVR (TRANSCATHETER AORTIC VALVE REPLACEMENT): ICD-10-CM

## 2025-01-16 DIAGNOSIS — Z86.711 HISTORY OF PULMONARY EMBOLISM: Chronic | ICD-10-CM

## 2025-01-16 DIAGNOSIS — E78.00 HYPERCHOLESTEROLEMIA: ICD-10-CM

## 2025-01-16 DIAGNOSIS — I48.0 PAF (PAROXYSMAL ATRIAL FIBRILLATION) (HCC): ICD-10-CM

## 2025-01-16 DIAGNOSIS — Z95.2 HISTORY OF TRANSCATHETER AORTIC VALVE REPLACEMENT (TAVR): ICD-10-CM

## 2025-01-16 DIAGNOSIS — I35.0 AORTIC VALVE STENOSIS, NONRHEUMATIC: Primary | ICD-10-CM

## 2025-01-16 DIAGNOSIS — I48.0 PAF (PAROXYSMAL ATRIAL FIBRILLATION) (HCC): Primary | ICD-10-CM

## 2025-01-16 LAB
ECHO AO ASC DIAM: 3.9 CM
ECHO AO ASCENDING AORTA INDEX: 1.84 CM/M2
ECHO AV AREA PEAK VELOCITY: 2.7 CM2
ECHO AV AREA VTI: 3 CM2
ECHO AV AREA/BSA PEAK VELOCITY: 1.3 CM2/M2
ECHO AV AREA/BSA VTI: 1.4 CM2/M2
ECHO AV MEAN GRADIENT: 12 MMHG
ECHO AV MEAN VELOCITY: 1.5 M/S
ECHO AV PEAK GRADIENT: 26 MMHG
ECHO AV PEAK VELOCITY: 2.5 M/S
ECHO AV VELOCITY RATIO: 0.6
ECHO AV VTI: 54.1 CM
ECHO BSA: 2.22 M2
ECHO IVC INSP: 1.1 CM
ECHO IVC PROX: 1.8 CM
ECHO LA AREA 2C: 20.5 CM2
ECHO LA AREA 4C: 18.1 CM2
ECHO LA MAJOR AXIS: 5.6 CM
ECHO LA MINOR AXIS: 6.1 CM
ECHO LA VOL BP: 54 ML (ref 22–52)
ECHO LA VOL MOD A2C: 58 ML (ref 22–52)
ECHO LA VOL MOD A4C: 46 ML (ref 22–52)
ECHO LA VOL/BSA BIPLANE: 25 ML/M2 (ref 16–34)
ECHO LA VOLUME INDEX MOD A2C: 27 ML/M2 (ref 16–34)
ECHO LA VOLUME INDEX MOD A4C: 22 ML/M2 (ref 16–34)
ECHO LV E' LATERAL VELOCITY: 8.25 CM/S
ECHO LV E' SEPTAL VELOCITY: 5.7 CM/S
ECHO LV EDV A2C: 73 ML
ECHO LV EDV A4C: 81 ML
ECHO LV EDV INDEX A4C: 38 ML/M2
ECHO LV EDV NDEX A2C: 34 ML/M2
ECHO LV EJECTION FRACTION A2C: 66 %
ECHO LV EJECTION FRACTION A4C: 65 %
ECHO LV EJECTION FRACTION BIPLANE: 66 % (ref 55–100)
ECHO LV ESV A2C: 25 ML
ECHO LV ESV A4C: 29 ML
ECHO LV ESV INDEX A2C: 12 ML/M2
ECHO LV ESV INDEX A4C: 14 ML/M2
ECHO LV FRACTIONAL SHORTENING: 33 % (ref 28–44)
ECHO LV INTERNAL DIMENSION DIASTOLE INDEX: 2.55 CM/M2
ECHO LV INTERNAL DIMENSION DIASTOLIC: 5.4 CM (ref 3.9–5.3)
ECHO LV INTERNAL DIMENSION SYSTOLIC INDEX: 1.7 CM/M2
ECHO LV INTERNAL DIMENSION SYSTOLIC: 3.6 CM
ECHO LV IVSD: 1 CM (ref 0.6–0.9)
ECHO LV MASS 2D: 193.3 G (ref 67–162)
ECHO LV MASS INDEX 2D: 91.2 G/M2 (ref 43–95)
ECHO LV POSTERIOR WALL DIASTOLIC: 0.9 CM (ref 0.6–0.9)
ECHO LV RELATIVE WALL THICKNESS RATIO: 0.33
ECHO LVOT AREA: 4.5 CM2
ECHO LVOT AV VTI INDEX: 0.66
ECHO LVOT DIAM: 2.4 CM
ECHO LVOT MEAN GRADIENT: 4 MMHG
ECHO LVOT PEAK GRADIENT: 9 MMHG
ECHO LVOT PEAK VELOCITY: 1.5 M/S
ECHO LVOT STROKE VOLUME INDEX: 75.9 ML/M2
ECHO LVOT SV: 161 ML
ECHO LVOT VTI: 35.6 CM
ECHO MV A VELOCITY: 0.59 M/S
ECHO MV E DECELERATION TIME (DT): 247 MS
ECHO MV E VELOCITY: 1.14 M/S
ECHO MV E/A RATIO: 1.93
ECHO MV E/E' LATERAL: 13.82
ECHO MV E/E' RATIO (AVERAGED): 16.91
ECHO MV E/E' SEPTAL: 20
ECHO MV REGURGITANT PEAK GRADIENT: 130 MMHG
ECHO MV REGURGITANT PEAK VELOCITY: 5.7 M/S
ECHO MV REGURGITANT VTIA: 212 CM
ECHO PV MAX VELOCITY: 1.3 M/S
ECHO PV PEAK GRADIENT: 7 MMHG
ECHO RA AREA 4C: 14.8 CM2
ECHO RA END SYSTOLIC VOLUME APICAL 4 CHAMBER INDEX BSA: 17 ML/M2
ECHO RA VOLUME: 36 ML
ECHO RV BASAL DIMENSION: 4.4 CM
ECHO RV FREE WALL PEAK S': 12.3 CM/S
ECHO RV LONGITUDINAL DIMENSION: 8 CM
ECHO RV MID DIMENSION: 2.7 CM
ECHO RV TAPSE: 2.3 CM (ref 1.7–?)
ECHO TV REGURGITANT MAX VELOCITY: 3.1 M/S
ECHO TV REGURGITANT PEAK GRADIENT: 38 MMHG
INTERNATIONAL NORMALIZATION RATIO, POC: 1.9
PROTHROMBIN TIME, POC: 0

## 2025-01-16 PROCEDURE — 1123F ACP DISCUSS/DSCN MKR DOCD: CPT | Performed by: INTERNAL MEDICINE

## 2025-01-16 PROCEDURE — 3075F SYST BP GE 130 - 139MM HG: CPT | Performed by: INTERNAL MEDICINE

## 2025-01-16 PROCEDURE — 99211 OFF/OP EST MAY X REQ PHY/QHP: CPT | Performed by: PHYSICIAN ASSISTANT

## 2025-01-16 PROCEDURE — 1159F MED LIST DOCD IN RCRD: CPT | Performed by: INTERNAL MEDICINE

## 2025-01-16 PROCEDURE — 99214 OFFICE O/P EST MOD 30 MIN: CPT | Performed by: INTERNAL MEDICINE

## 2025-01-16 PROCEDURE — 85610 PROTHROMBIN TIME: CPT | Performed by: PHYSICIAN ASSISTANT

## 2025-01-16 PROCEDURE — 3078F DIAST BP <80 MM HG: CPT | Performed by: INTERNAL MEDICINE

## 2025-01-16 PROCEDURE — 93306 TTE W/DOPPLER COMPLETE: CPT

## 2025-01-16 NOTE — PROGRESS NOTES
Ms. Jessica Weeks is a 79 y.o. y/o female with history of DVT, PE, Afib   She presents today for anticoagulation monitoring and adjustment.  Pertinent PMH: HTN, Gastric Banding,CAD, diskectomy with an artifical spinal disk implant in September 2012.   Patient Reported Findings:  Yes     No  [x]   []       Patient verifies current dosing regimen as listed-  verified taking 5 mg on Wed and 7.5 mg all other days of the week ---> did not increase weekly dose --> verified dose   []   [x]       S/S bleeding/bruising/swelling/SOB states that she has been wheezing more and had more SOB d/t allergies --> has had a few small nose bleeds recently  --> bruises from lovenox injections ---> does have a lot of bruising  ---> improving --> denies  []   [x]       Blood in urine or stool  []   [x]       Procedures scheduled in the future at this time having epidural on 6/1, cleared to hold warfarin 7 days and bridge with lovenox --> had cortisone shot in hip last week. INR 1.9 per pt. --> having LHC on 11/29, cleared to hold warfarin 5 days prior. Is going to have aortic valve replacement in near future --> had TAVR on 2/6, held warfarin starting 2/1 --> denies upcoming   []   [x]       Missed Dose - denies  []   [x]       Extra Dose - denies   []   [x]       Change in medications  has not used propafenone in the past week. Stopped spiriva -->  has been taking more tylenol 6/day --> stopped cranberry capsule 3-4 weeks ago --> Restarted cranberry supplement --> started trelegy --> was given buspar for acute stress from son (no interaction) --> started asa --> cipro x 5 d 5/21 (finished) --> hasn't been taking MVI for 2 weeks but plans to resume today --> is going to start taking hair skin and nails supplement --> no changes   [x]   []       Change in health/diet/appetite  Patient states that she feels like she has returned to normal vit k intake --> diet fluctuates causing INR to fluctuate. Discussed ways to improve consistency

## 2025-02-11 ENCOUNTER — TELEPHONE (OUTPATIENT)
Dept: CARDIOLOGY CLINIC | Age: 80
End: 2025-02-11

## 2025-02-11 NOTE — TELEPHONE ENCOUNTER
Yes, pt is supposed to take 2000 mg amoxicillin 30-60 minutes before any dental procedure. DCE sent script with 4 refills to her pharmacy at the end of December so she should be able to  from pharmacy today.

## 2025-02-11 NOTE — TELEPHONE ENCOUNTER
Pt has an appointment tomorrow for the dentist to put a crown back on after a cavity repair.    She is asking if she should take the antibiotics, or if they are not needed for a crown placement.    If pt does not answer, we are okay to leave a message.    Please advise.

## 2025-02-13 ENCOUNTER — ANTI-COAG VISIT (OUTPATIENT)
Dept: PHARMACY | Age: 80
End: 2025-02-13
Payer: MEDICARE

## 2025-02-13 DIAGNOSIS — Z86.711 HISTORY OF PULMONARY EMBOLISM: Chronic | ICD-10-CM

## 2025-02-13 DIAGNOSIS — I48.0 PAF (PAROXYSMAL ATRIAL FIBRILLATION) (HCC): Primary | ICD-10-CM

## 2025-02-13 LAB
INTERNATIONAL NORMALIZATION RATIO, POC: 1.9
PROTHROMBIN TIME, POC: 0

## 2025-02-13 PROCEDURE — 99212 OFFICE O/P EST SF 10 MIN: CPT | Performed by: PHYSICIAN ASSISTANT

## 2025-02-13 PROCEDURE — 85610 PROTHROMBIN TIME: CPT | Performed by: PHYSICIAN ASSISTANT

## 2025-02-13 NOTE — PROGRESS NOTES
Ms. Jessica Weeks is a 79 y.o. y/o female with history of DVT, PE, Afib   She presents today for anticoagulation monitoring and adjustment.  Pertinent PMH: HTN, Gastric Banding,CAD, diskectomy with an artifical spinal disk implant in September 2012.   Patient Reported Findings:  Yes     No  [x]   []       Patient verifies current dosing regimen as listed-  verified taking 5 mg on Wed and 7.5 mg all other days of the week ---> did not increase weekly dose --> verified dose   []   [x]       S/S bleeding/bruising/swelling/SOB states that she has been wheezing more and had more SOB d/t allergies --> has had a few small nose bleeds recently  --> bruises from lovenox injections ---> does have a lot of bruising  ---> improving --> denies  []   [x]       Blood in urine or stool  []   [x]       Procedures scheduled in the future at this time having epidural on 6/1, cleared to hold warfarin 7 days and bridge with lovenox --> had cortisone shot in hip last week. INR 1.9 per pt. --> having LHC on 11/29, cleared to hold warfarin 5 days prior. Is going to have aortic valve replacement in near future --> had TAVR on 2/6, held warfarin starting 2/1 --> denies upcoming   []   [x]       Missed Dose - denies  []   [x]       Extra Dose - denies   [x]   []       Change in medications  has not used propafenone in the past week. Stopped spiriva -->  has been taking more tylenol 6/day --> stopped cranberry capsule 3-4 weeks ago --> Restarted cranberry supplement --> started trelegy --> was given buspar for acute stress from son (no interaction) --> started asa --> cipro x 5 d 5/21 (finished) --> hasn't been taking MVI for 2 weeks but plans to resume today --> is going to start taking hair skin and nails supplement --> was on amoxicillin for dental work   []   [x]       Change in health/diet/appetite  Patient states that she feels like she has returned to normal vit k intake --> diet fluctuates causing INR to fluctuate. Discussed

## 2025-02-28 ENCOUNTER — TELEPHONE (OUTPATIENT)
Dept: INTERNAL MEDICINE CLINIC | Age: 80
End: 2025-02-28

## 2025-02-28 DIAGNOSIS — F41.9 ANXIETY: ICD-10-CM

## 2025-02-28 NOTE — TELEPHONE ENCOUNTER
Pt calling requesting refill of clonazePAM (KLONOPIN) 1 MG tablet     Last written 11/21/24  Last OV 11/21/24  Next OV 3/31/25    Please send to [facility], requesting 3 month supply.

## 2025-03-03 RX ORDER — CLONAZEPAM 1 MG/1
1 TABLET ORAL EVERY EVENING
Qty: 90 TABLET | Refills: 0 | Status: SHIPPED | OUTPATIENT
Start: 2025-03-03 | End: 2025-06-01

## 2025-03-03 NOTE — TELEPHONE ENCOUNTER
Controlled Substance Monitoring:    Acute and Chronic Pain Monitoring:   RX Monitoring Periodic Controlled Substance Monitoring   3/3/2025  11:20 AM No signs of potential drug abuse or diversion identified.

## 2025-03-06 ENCOUNTER — ANTI-COAG VISIT (OUTPATIENT)
Dept: PHARMACY | Age: 80
End: 2025-03-06
Payer: MEDICARE

## 2025-03-06 DIAGNOSIS — Z86.711 HISTORY OF PULMONARY EMBOLISM: Chronic | ICD-10-CM

## 2025-03-06 DIAGNOSIS — I48.0 PAF (PAROXYSMAL ATRIAL FIBRILLATION) (HCC): Primary | ICD-10-CM

## 2025-03-06 LAB
INTERNATIONAL NORMALIZATION RATIO, POC: 1.9
PROTHROMBIN TIME, POC: 0

## 2025-03-06 PROCEDURE — 99212 OFFICE O/P EST SF 10 MIN: CPT | Performed by: PHYSICIAN ASSISTANT

## 2025-03-06 PROCEDURE — 85610 PROTHROMBIN TIME: CPT | Performed by: PHYSICIAN ASSISTANT

## 2025-03-06 NOTE — PROGRESS NOTES
recently --> on WW --> had some broccoli last night in mixed vegetables --> vit k once a week   []   [x]       Change in alcohol use    []   [x]       Change in activity  []   [x]       Hospital admission  []   [x]       Emergency department visit    []   [x]       Other complaints     Clinical Outcomes:  Yes     No  []   [x]       Major bleeding event  []   [x]       Thromboembolic event  Gets warfarin through MD    Duration of warfarin Therapy: indefinite  INR Range:  2.0-3.0     INR is 1.9 again today despite increasing dose    Take 10 mg on Sun and Thurs and 7.5 mg all other days of the week  (4.8% inc)  Encouraged to maintain a consistency of vegetables/salads.  Recheck INR in 2 weeks, 3/20    Does not need PW printed, only card     Patient consent signed 12/18/24    Referring PCP is Telma Lo CNP  INR (no units)   Date Value   02/07/2024 1.03   02/06/2024 1.02   02/01/2024 2.23 (H)     INR,(POC) (no units)   Date Value   02/13/2025 1.9   01/16/2025 1.9   12/19/2024 2.6     For Pharmacy Admin Tracking Only    Intervention Detail: Dose Adjustment: 1, reason: Therapy Optimization  Total # of Interventions Recommended: 1  Total # of Interventions Accepted: 1  Time Spent (min): 15

## 2025-03-10 ENCOUNTER — TELEPHONE (OUTPATIENT)
Dept: PHARMACY | Age: 80
End: 2025-03-10

## 2025-03-10 RX ORDER — TRIMETHOPRIM 100 MG/1
100 TABLET ORAL
Qty: 50 TABLET | Refills: 1 | Status: SHIPPED | OUTPATIENT
Start: 2025-03-10

## 2025-03-10 NOTE — TELEPHONE ENCOUNTER
Last OV: 11/21/2024  Next OV: 3/31/2025    Next appointment due:2/21/2025     Last fill:7/19/24  Refills:1

## 2025-03-14 RX ORDER — MONTELUKAST SODIUM 10 MG/1
10 TABLET ORAL NIGHTLY
Qty: 100 TABLET | Refills: 2 | Status: SHIPPED | OUTPATIENT
Start: 2025-03-14

## 2025-03-21 ENCOUNTER — ANTI-COAG VISIT (OUTPATIENT)
Dept: PHARMACY | Age: 80
End: 2025-03-21
Payer: MEDICARE

## 2025-03-21 DIAGNOSIS — Z86.711 HISTORY OF PULMONARY EMBOLISM: Chronic | ICD-10-CM

## 2025-03-21 DIAGNOSIS — I48.0 PAF (PAROXYSMAL ATRIAL FIBRILLATION) (HCC): Primary | ICD-10-CM

## 2025-03-21 LAB
INTERNATIONAL NORMALIZATION RATIO, POC: 2.2
PROTHROMBIN TIME, POC: 0

## 2025-03-21 PROCEDURE — 85610 PROTHROMBIN TIME: CPT | Performed by: PHYSICIAN ASSISTANT

## 2025-03-21 PROCEDURE — 99211 OFF/OP EST MAY X REQ PHY/QHP: CPT | Performed by: PHYSICIAN ASSISTANT

## 2025-03-21 NOTE — PROGRESS NOTES
3 iceburg salads recently --> on WW --> had some broccoli last night in mixed vegetables --> vit k once a week --> no changes   []   [x]       Change in alcohol use    []   [x]       Change in activity  []   [x]       Hospital admission  []   [x]       Emergency department visit    []   [x]       Other complaints     Clinical Outcomes:  Yes     No  []   [x]       Major bleeding event  []   [x]       Thromboembolic event  Gets warfarin through MD    Duration of warfarin Therapy: indefinite  INR Range:  2.0-3.0     INR is 2.2 today after increasing dose again   Continue to take 10 mg on Sun and Thurs and 7.5 mg all other days of the week    Encouraged to maintain a consistency of vegetables/salads.  Recheck INR in 3 weeks, 4/10    Does not need PW printed, only card     Patient consent signed 12/18/24    Referring PCP is Telma Lo CNP  INR (no units)   Date Value   02/07/2024 1.03   02/06/2024 1.02   02/01/2024 2.23 (H)     INR,(POC) (no units)   Date Value   03/06/2025 1.9   02/13/2025 1.9   01/16/2025 1.9     For Pharmacy Admin Tracking Only    Total # of Interventions Recommended: 0  Total # of Interventions Accepted: 0  Time Spent (min): 15

## 2025-03-24 DIAGNOSIS — Z98.890 HISTORY OF BACK SURGERY: ICD-10-CM

## 2025-03-24 DIAGNOSIS — M54.16 LUMBAR RADICULOPATHY: ICD-10-CM

## 2025-03-24 RX ORDER — METHYLPREDNISOLONE 4 MG/1
TABLET ORAL
Qty: 21 TABLET | OUTPATIENT
Start: 2025-03-24

## 2025-03-24 SDOH — HEALTH STABILITY: PHYSICAL HEALTH: ON AVERAGE, HOW MANY DAYS PER WEEK DO YOU ENGAGE IN MODERATE TO STRENUOUS EXERCISE (LIKE A BRISK WALK)?: 0 DAYS

## 2025-03-24 SDOH — HEALTH STABILITY: PHYSICAL HEALTH: ON AVERAGE, HOW MANY MINUTES DO YOU ENGAGE IN EXERCISE AT THIS LEVEL?: 0 MIN

## 2025-03-24 ASSESSMENT — LIFESTYLE VARIABLES
HOW MANY STANDARD DRINKS CONTAINING ALCOHOL DO YOU HAVE ON A TYPICAL DAY: 1
HOW OFTEN DO YOU HAVE A DRINK CONTAINING ALCOHOL: MONTHLY OR LESS
HOW OFTEN DO YOU HAVE SIX OR MORE DRINKS ON ONE OCCASION: 1
HOW MANY STANDARD DRINKS CONTAINING ALCOHOL DO YOU HAVE ON A TYPICAL DAY: 1 OR 2
HOW OFTEN DO YOU HAVE A DRINK CONTAINING ALCOHOL: 2

## 2025-03-24 ASSESSMENT — PATIENT HEALTH QUESTIONNAIRE - PHQ9
1. LITTLE INTEREST OR PLEASURE IN DOING THINGS: NOT AT ALL
SUM OF ALL RESPONSES TO PHQ QUESTIONS 1-9: 0
2. FEELING DOWN, DEPRESSED OR HOPELESS: NOT AT ALL
SUM OF ALL RESPONSES TO PHQ QUESTIONS 1-9: 0

## 2025-03-24 NOTE — TELEPHONE ENCOUNTER
Last OV: 11/21/2024  Next OV: 3/31/2025    Next appointment due:2/21/2025     Last fill:11/27/24  Refills:0

## 2025-03-31 ENCOUNTER — OFFICE VISIT (OUTPATIENT)
Dept: INTERNAL MEDICINE CLINIC | Age: 80
End: 2025-03-31

## 2025-03-31 VITALS
DIASTOLIC BLOOD PRESSURE: 74 MMHG | HEIGHT: 64 IN | SYSTOLIC BLOOD PRESSURE: 136 MMHG | BODY MASS INDEX: 41.73 KG/M2 | OXYGEN SATURATION: 98 % | WEIGHT: 244.4 LBS | HEART RATE: 61 BPM

## 2025-03-31 DIAGNOSIS — Z00.00 MEDICARE ANNUAL WELLNESS VISIT, SUBSEQUENT: Primary | ICD-10-CM

## 2025-03-31 DIAGNOSIS — M54.16 LUMBAR RADICULOPATHY: ICD-10-CM

## 2025-03-31 DIAGNOSIS — I25.10 CORONARY ARTERY DISEASE INVOLVING NATIVE CORONARY ARTERY OF NATIVE HEART WITHOUT ANGINA PECTORIS: ICD-10-CM

## 2025-03-31 DIAGNOSIS — I10 ESSENTIAL HYPERTENSION: ICD-10-CM

## 2025-03-31 DIAGNOSIS — R42 VERTIGO: ICD-10-CM

## 2025-03-31 DIAGNOSIS — I48.0 PAF (PAROXYSMAL ATRIAL FIBRILLATION) (HCC): ICD-10-CM

## 2025-03-31 DIAGNOSIS — G47.33 OSA (OBSTRUCTIVE SLEEP APNEA): ICD-10-CM

## 2025-03-31 DIAGNOSIS — Z86.711 HISTORY OF PULMONARY EMBOLISM: Chronic | ICD-10-CM

## 2025-03-31 DIAGNOSIS — R23.8 SKIN IRRITATION: ICD-10-CM

## 2025-03-31 DIAGNOSIS — F41.9 ANXIETY: ICD-10-CM

## 2025-03-31 DIAGNOSIS — E66.01 MORBID OBESITY WITH BMI OF 40.0-44.9, ADULT: ICD-10-CM

## 2025-03-31 DIAGNOSIS — E78.00 PURE HYPERCHOLESTEROLEMIA: ICD-10-CM

## 2025-03-31 SDOH — ECONOMIC STABILITY: FOOD INSECURITY: WITHIN THE PAST 12 MONTHS, YOU WORRIED THAT YOUR FOOD WOULD RUN OUT BEFORE YOU GOT MONEY TO BUY MORE.: NEVER TRUE

## 2025-03-31 SDOH — ECONOMIC STABILITY: FOOD INSECURITY: WITHIN THE PAST 12 MONTHS, THE FOOD YOU BOUGHT JUST DIDN'T LAST AND YOU DIDN'T HAVE MONEY TO GET MORE.: NEVER TRUE

## 2025-03-31 NOTE — ASSESSMENT & PLAN NOTE
Chronic, controlled. Continue to monitor BP at home and return if not at goal. Continue verapamil 240 mg daily   Continue to see cardiology as recommended

## 2025-03-31 NOTE — ASSESSMENT & PLAN NOTE
Chronic, intermittently uncontrolled.  Home vertigo exercises given, patient declined formal PT.

## 2025-03-31 NOTE — PROGRESS NOTES
Medicare Annual Wellness Visit    Jessica Weeks is here for Medicare AWV    Assessment & Plan   Assessment & Plan  Medicare annual wellness visit, subsequent     Annual wellness exam.  HM reviewed.       Essential hypertension     Chronic, controlled. Continue to monitor BP at home and return if not at goal. Continue verapamil 240 mg daily   Continue to see cardiology as recommended        PAULA (obstructive sleep apnea)   Chronic, continue using cpap as recommended.          History of pulmonary embolism     Chronic, controlled on coumadin, continue seeing  clinic for INR management.        Anxiety     Chronic, controlled. Continue celexa and prn klonopin        Lumbar radiculopathy     Chronic, uncontrolled, paitent seeing Dr Saha with [facility], continue to follow as recommended.        Pure hypercholesterolemia     Chronic, Continue atorvastatin 80 mg daily.        Coronary artery disease involving native coronary artery of native heart without angina pectoris     Chronic, stable. Continue to follow with cardiology as recommended.        Morbid obesity with BMI of 40.0-44.9, adult      Chronic, recommend working on diet/ exercise        PAF (paroxysmal atrial fibrillation) (HCC)     Chronic, stable. Continue medications and plan per EP as recommended        Vertigo     Chronic, intermittently uncontrolled.  Home vertigo exercises given, patient declined formal PT.         Skin irritation     Acute, slowly improving. skin irritation to right upper shoulder from insect bite.  Apply OTC antibiotic ointment PRN.            Return in about 3 months (around 6/30/2025), or if symptoms worsen or fail to improve, for anxiety - med check .     Subjective   The following acute and/or chronic problems were also addressed today:    AWV today.     HTN- has been 120s/60's at home.  Taking verapamil 240 mg, 1/2 tab in AM and 1/2 tab in PM due to dizziness. Still with intermittent dizziness/lightheaded when standing

## 2025-03-31 NOTE — ASSESSMENT & PLAN NOTE
Chronic, uncontrolled, gurwindert seeing Dr Saha with [facility], continue to follow as recommended.

## 2025-03-31 NOTE — PATIENT INSTRUCTIONS
tend to feel dizzy after you take medicine, avoid activity at that time. Try being active before you take your medicine. This will reduce your risk of falls.  If you plan to be active at home, make sure to clear your space before you get started. Remove things like TV cords, coffee tables, and throw rugs. It's safest to have plenty of space to move freely.  The key to getting more active is to take it slow and steady. Try to improve only a little bit at a time. Pick just one area to improve on at first. And if an activity hurts, stop and talk to your doctor.  Where can you learn more?  Go to https://www.[employer].net/patientEd and enter P600 to learn more about \"Learning About Being Active as an Older Adult.\"  Current as of: July 31, 2024  Content Version: 14.4  © 8396-0854 [employer].   Care instructions adapted under license by [employer]. If you have questions about a medical condition or this instruction, always ask your healthcare professional. [employer], disclaims any warranty or liability for your use of this information.         Starting a Weight-Loss Plan: Care Instructions  Overview    It can be a challenge to lose weight. But your doctor can help you make a weight-loss plan that meets your needs.  You don't have to make a lot of big changes at once. A better idea might be to focus on small changes and stick with them. When those changes become habit, you can add a few more changes.  Some people find it helpful to take an exercise or nutrition class. If you have questions, ask your doctor about seeing a registered dietitian or an exercise specialist. You might also think about joining a weight-loss support group.  If you're not ready to make changes right now, try to pick a date in the future. Then make an appointment with your doctor to talk about when and how you'll get started with a plan.  Follow-up care is a key part of your treatment and safety. Be sure to make and go to

## 2025-03-31 NOTE — ASSESSMENT & PLAN NOTE
Acute, slowly improving. skin irritation to right upper shoulder from insect bite.  Apply OTC antibiotic ointment PRN.

## 2025-04-10 ENCOUNTER — ANTI-COAG VISIT (OUTPATIENT)
Dept: PHARMACY | Age: 80
End: 2025-04-10
Payer: MEDICARE

## 2025-04-10 DIAGNOSIS — Z86.711 HISTORY OF PULMONARY EMBOLISM: Chronic | ICD-10-CM

## 2025-04-10 DIAGNOSIS — I48.0 PAF (PAROXYSMAL ATRIAL FIBRILLATION) (HCC): Primary | ICD-10-CM

## 2025-04-10 LAB
INTERNATIONAL NORMALIZATION RATIO, POC: 2.3
PROTHROMBIN TIME, POC: 0

## 2025-04-10 PROCEDURE — 85610 PROTHROMBIN TIME: CPT | Performed by: PHYSICIAN ASSISTANT

## 2025-04-10 PROCEDURE — 99211 OFF/OP EST MAY X REQ PHY/QHP: CPT | Performed by: PHYSICIAN ASSISTANT

## 2025-04-10 NOTE — PROGRESS NOTES
vegetables--> has had 3 iceburg salads recently --> on WW --> had some broccoli last night in mixed vegetables --> vit k once a week --> had liver 3 d ago     []   [x]       Change in alcohol use    []   [x]       Change in activity  []   [x]       Hospital admission  []   [x]       Emergency department visit    []   [x]       Other complaints     Clinical Outcomes:  Yes     No  []   [x]       Major bleeding event  []   [x]       Thromboembolic event  Gets warfarin through MD    Duration of warfarin Therapy: indefinite  INR Range:  2.0-3.0     INR is 2.3 today  Continue to take 10 mg on Sun and Thurs and 7.5 mg all other days of the week    Encouraged to maintain a consistency of vegetables/salads.  Recheck INR in 3 weeks, 5/1    Does not need PW printed, only card     Patient consent signed 12/18/24    Referring PCP is Telma Lo CNP  INR (no units)   Date Value   02/07/2024 1.03   02/06/2024 1.02   02/01/2024 2.23 (H)     INR,(POC) (no units)   Date Value   03/21/2025 2.2   03/06/2025 1.9   02/13/2025 1.9     For Pharmacy Admin Tracking Only    Total # of Interventions Recommended: 0  Total # of Interventions Accepted: 0  Time Spent (min): 15

## 2025-05-01 ENCOUNTER — ANTI-COAG VISIT (OUTPATIENT)
Dept: PHARMACY | Age: 80
End: 2025-05-01
Payer: MEDICARE

## 2025-05-01 DIAGNOSIS — I48.0 PAF (PAROXYSMAL ATRIAL FIBRILLATION) (HCC): Primary | ICD-10-CM

## 2025-05-01 DIAGNOSIS — Z86.711 HISTORY OF PULMONARY EMBOLISM: Chronic | ICD-10-CM

## 2025-05-01 LAB
INTERNATIONAL NORMALIZATION RATIO, POC: 2
PROTHROMBIN TIME, POC: 0

## 2025-05-01 PROCEDURE — 99211 OFF/OP EST MAY X REQ PHY/QHP: CPT | Performed by: PHYSICIAN ASSISTANT

## 2025-05-01 PROCEDURE — 85610 PROTHROMBIN TIME: CPT | Performed by: PHYSICIAN ASSISTANT

## 2025-05-01 NOTE — PROGRESS NOTES
Ms. Jessica Weeks is a 80 y.o. y/o female with history of DVT, PE, Afib   She presents today for anticoagulation monitoring and adjustment.  Pertinent PMH: HTN, Gastric Banding,CAD, diskectomy with an artifical spinal disk implant in September 2012.   Patient Reported Findings:  Yes     No  [x]   []       Patient verifies current dosing regimen as listed- did not increase weekly dose --> verified dose   []   [x]       S/S bleeding/bruising/swelling/SOB does have a lot of bruising  ---> improving --> denies  []   [x]       Blood in urine or stool  []   [x]       Procedures scheduled in the future at this time having epidural on 6/1, cleared to hold warfarin 7 days and bridge with lovenox -->  having LHC on 11/29, cleared to hold warfarin 5 days prior. Is going to have aortic valve replacement in near future --> had TAVR on 2/6, held warfarin starting 2/1 --> denies upcoming   []   [x]       Missed Dose - denies  []   [x]       Extra Dose - denies   []   [x]       Change in medications  has not used propafenone in the past week. -->  has been taking more tylenol 6/day --> stopped cranberry capsule 3-4 weeks ago --> Restarted cranberry supplement --> started trelegy --> hasn't been taking MVI for 2 weeks but plans to resume today --> is going to start taking hair skin and nails supplement --> was on amoxicillin for dental work --> has been taking mucinex recently --> no changes   [x]   []       Change in health/diet/appetite  Patient states that she feels like she has returned to normal vit k intake --> diet fluctuates causing INR to fluctuate. Discussed ways to improve consistency with vit k intake  --> states that she has cut back spinach and cabbage intake to twice a week --> has lost 22lbs d/t stress. Not eating much --> changes in diet but no changes in greens intake--> less vegetables--> has had 3 iceburg salads recently --> on WW --> had some broccoli last night in mixed vegetables --> vit k once a week

## 2025-05-30 ENCOUNTER — ANTI-COAG VISIT (OUTPATIENT)
Dept: PHARMACY | Age: 80
End: 2025-05-30
Payer: MEDICARE

## 2025-05-30 DIAGNOSIS — Z86.711 HISTORY OF PULMONARY EMBOLISM: Chronic | ICD-10-CM

## 2025-05-30 DIAGNOSIS — I48.0 PAF (PAROXYSMAL ATRIAL FIBRILLATION) (HCC): Primary | ICD-10-CM

## 2025-05-30 LAB
INTERNATIONAL NORMALIZATION RATIO, POC: 2.6
PROTHROMBIN TIME, POC: 0

## 2025-05-30 PROCEDURE — 99211 OFF/OP EST MAY X REQ PHY/QHP: CPT

## 2025-05-30 PROCEDURE — 85610 PROTHROMBIN TIME: CPT

## 2025-06-30 ENCOUNTER — OFFICE VISIT (OUTPATIENT)
Dept: INTERNAL MEDICINE CLINIC | Age: 80
End: 2025-06-30

## 2025-06-30 ENCOUNTER — ANTI-COAG VISIT (OUTPATIENT)
Dept: PHARMACY | Age: 80
End: 2025-06-30
Payer: MEDICARE

## 2025-06-30 VITALS
OXYGEN SATURATION: 97 % | SYSTOLIC BLOOD PRESSURE: 126 MMHG | WEIGHT: 244.6 LBS | HEART RATE: 78 BPM | HEIGHT: 64 IN | DIASTOLIC BLOOD PRESSURE: 72 MMHG | BODY MASS INDEX: 41.76 KG/M2

## 2025-06-30 DIAGNOSIS — E78.00 PURE HYPERCHOLESTEROLEMIA: ICD-10-CM

## 2025-06-30 DIAGNOSIS — I10 ESSENTIAL HYPERTENSION: ICD-10-CM

## 2025-06-30 DIAGNOSIS — R73.09 ELEVATED GLUCOSE: ICD-10-CM

## 2025-06-30 DIAGNOSIS — I35.0 AORTIC STENOSIS, SEVERE: ICD-10-CM

## 2025-06-30 DIAGNOSIS — F41.9 ANXIETY: Primary | ICD-10-CM

## 2025-06-30 DIAGNOSIS — E66.01 MORBID OBESITY WITH BMI OF 40.0-44.9, ADULT (HCC): ICD-10-CM

## 2025-06-30 DIAGNOSIS — Z86.711 HISTORY OF PULMONARY EMBOLISM: Chronic | ICD-10-CM

## 2025-06-30 DIAGNOSIS — I48.0 PAF (PAROXYSMAL ATRIAL FIBRILLATION) (HCC): Primary | ICD-10-CM

## 2025-06-30 DIAGNOSIS — I25.10 CORONARY ARTERY DISEASE INVOLVING NATIVE CORONARY ARTERY OF NATIVE HEART WITHOUT ANGINA PECTORIS: ICD-10-CM

## 2025-06-30 DIAGNOSIS — G47.33 OSA (OBSTRUCTIVE SLEEP APNEA): ICD-10-CM

## 2025-06-30 DIAGNOSIS — J45.40 MODERATE PERSISTENT ASTHMA WITHOUT COMPLICATION: ICD-10-CM

## 2025-06-30 DIAGNOSIS — I48.0 PAF (PAROXYSMAL ATRIAL FIBRILLATION) (HCC): ICD-10-CM

## 2025-06-30 LAB
ALBUMIN SERPL-MCNC: 4.1 G/DL (ref 3.4–5)
ALBUMIN/GLOB SERPL: 2 {RATIO} (ref 1.1–2.2)
ALP SERPL-CCNC: 70 U/L (ref 40–129)
ALT SERPL-CCNC: 28 U/L (ref 10–40)
ANION GAP SERPL CALCULATED.3IONS-SCNC: 11 MMOL/L (ref 3–16)
AST SERPL-CCNC: 53 U/L (ref 15–37)
BILIRUB SERPL-MCNC: 0.6 MG/DL (ref 0–1)
BUN SERPL-MCNC: 13 MG/DL (ref 7–20)
CALCIUM SERPL-MCNC: 9.3 MG/DL (ref 8.3–10.6)
CHLORIDE SERPL-SCNC: 103 MMOL/L (ref 99–110)
CHOLEST SERPL-MCNC: 157 MG/DL (ref 0–199)
CO2 SERPL-SCNC: 27 MMOL/L (ref 21–32)
CREAT SERPL-MCNC: 0.9 MG/DL (ref 0.6–1.2)
GFR SERPLBLD CREATININE-BSD FMLA CKD-EPI: 65 ML/MIN/{1.73_M2}
GLUCOSE SERPL-MCNC: 89 MG/DL (ref 70–99)
HDLC SERPL-MCNC: 63 MG/DL (ref 40–60)
INTERNATIONAL NORMALIZATION RATIO, POC: 3.2
LDLC SERPL CALC-MCNC: 76 MG/DL
POTASSIUM SERPL-SCNC: 4 MMOL/L (ref 3.5–5.1)
PROT SERPL-MCNC: 6.2 G/DL (ref 6.4–8.2)
PROTHROMBIN TIME, POC: 0
SODIUM SERPL-SCNC: 141 MMOL/L (ref 136–145)
TRIGL SERPL-MCNC: 91 MG/DL (ref 0–150)
VLDLC SERPL CALC-MCNC: 18 MG/DL

## 2025-06-30 PROCEDURE — 85610 PROTHROMBIN TIME: CPT | Performed by: PHYSICIAN ASSISTANT

## 2025-06-30 PROCEDURE — 99211 OFF/OP EST MAY X REQ PHY/QHP: CPT | Performed by: PHYSICIAN ASSISTANT

## 2025-06-30 RX ORDER — BUDESONIDE AND FORMOTEROL FUMARATE DIHYDRATE 160; 4.5 UG/1; UG/1
2 AEROSOL RESPIRATORY (INHALATION) 2 TIMES DAILY
Qty: 10.2 G | Refills: 0 | Status: SHIPPED | OUTPATIENT
Start: 2025-06-30

## 2025-06-30 RX ORDER — DOXYCYCLINE HYCLATE 100 MG
100 TABLET ORAL 2 TIMES DAILY
Qty: 14 TABLET | Refills: 0 | Status: SHIPPED | OUTPATIENT
Start: 2025-06-30 | End: 2025-07-07

## 2025-06-30 RX ORDER — AZITHROMYCIN 250 MG/1
TABLET, FILM COATED ORAL
Qty: 6 TABLET | Refills: 0 | Status: SHIPPED | OUTPATIENT
Start: 2025-06-30 | End: 2025-06-30 | Stop reason: ALTCHOICE

## 2025-06-30 RX ORDER — CLONAZEPAM 1 MG/1
1 TABLET ORAL EVERY EVENING
Qty: 90 TABLET | Refills: 0 | Status: SHIPPED | OUTPATIENT
Start: 2025-06-30 | End: 2025-09-28

## 2025-06-30 RX ORDER — PREDNISONE 20 MG/1
20 TABLET ORAL 2 TIMES DAILY
Qty: 10 TABLET | Refills: 0 | Status: SHIPPED | OUTPATIENT
Start: 2025-06-30 | End: 2025-07-05

## 2025-06-30 NOTE — ASSESSMENT & PLAN NOTE
Chronic, exacerbated today   - Symptoms worsened due to environmental factors  - Using albuterol and Singulair; wheezing noted on exam   - restart daily OTC claritin   - Prescribed Symbicort for better control   - Use albuterol as needed   - covering acute exacerbation with prednisone  - Advised morning steroid use; pt will start doxycycline if condition worsens    Orders:    budesonide-formoterol (SYMBICORT) 160-4.5 MCG/ACT AERO; Inhale 2 puffs into the lungs 2 times daily    predniSONE (DELTASONE) 20 MG tablet; Take 1 tablet by mouth 2 times daily for 5 days    doxycycline hyclate (VIBRA-TABS) 100 MG tablet; Take 1 tablet by mouth 2 times daily for 7 days

## 2025-06-30 NOTE — ASSESSMENT & PLAN NOTE
Chronic, due for labs.   Continue atorvastatin 80 mg daily and seeing cardiology as scheduled.     Orders:    Lipid Panel; Future

## 2025-06-30 NOTE — ASSESSMENT & PLAN NOTE
Chronic, controlled.   Continue celexa 40 mg daily and prn klonopin     Controlled Substance Monitoring:    Acute and Chronic Pain Monitoring:   RX Monitoring Periodic Controlled Substance Monitoring   6/30/2025  10:10 AM No signs of potential drug abuse or diversion identified.;Possible medication side effects, risk of tolerance/dependence & alternative treatments discussed.       Orders:    clonazePAM (KLONOPIN) 1 MG tablet; Take 1 tablet by mouth every evening for 90 days.

## 2025-06-30 NOTE — PROGRESS NOTES
--> had liver 3 d ago --> had spinach and broccoli last week ---> less vit k recently      []   [x]       Change in alcohol use    []   [x]       Change in activity  []   [x]       Hospital admission  []   [x]       Emergency department visit    []   [x]       Other complaints     Clinical Outcomes:  Yes     No  []   [x]       Major bleeding event  []   [x]       Thromboembolic event  Gets warfarin through MD    Duration of warfarin Therapy: indefinite  INR Range:  2.0-3.0     INR is 3.2 today after less vit k   Take 5 mg tonight then continue to take 10 mg on Sun and Thurs and 7.5 mg all other days of the week    Encouraged to maintain a consistency of vegetables/salads.  Recheck INR in 4 weeks, 7/28    Does not need PW printed, only card   Patient consent signed 12/18/24    Referring PCP is Telma Lo CNP  INR (no units)   Date Value   02/07/2024 1.03   02/06/2024 1.02   02/01/2024 2.23 (H)     INR,(POC) (no units)   Date Value   05/30/2025 2.6   05/01/2025 2.0   04/10/2025 2.3     For Pharmacy Admin Tracking Only    Intervention Detail: Dose Adjustment: 1, reason: Therapy Optimization  Total # of Interventions Recommended: 1  Total # of Interventions Accepted: 1  Time Spent (min): 15

## 2025-06-30 NOTE — PROGRESS NOTES
6/30/25     Chief Complaint   Patient presents with    3 Month Follow-Up    Anxiety       History of Present Illness  The patient presents for a 3-month follow-up.    Asthma exacerbation  - Reports exacerbation of asthma symptoms over the past few days, attributed to environmental factors such as dampness, wind, and mold exposure  - Symptoms include wheezing and chest congestion  - Manages symptoms with administration of albuterol, which has become more frequent   - Onset of symptoms occurred approximately 2-3 weeks ago, with recent worsening  - Describes cough as raspy, akin to bronchitis  - Denies associated pyrexia, chills, rhinorrhea, or myalgia  - Has not taken any over-the-counter antihistamines    Asthma treatment history  - No recent history of corticosteroid use for wheezing  - Previous asthma treatments included Pulmicort (discontinued due to cost), Trelegy (induced laryngitis), Advair, and Symbicort  - Current regimen consists of albuterol and Singulair    COVID-19 vaccination  - Last COVID-19 vaccination administered in September 2024  - Inquiring about the availability of new vaccines    Coumadin level  - Coumadin level noted to be elevated at 3.2 during the last assessment    Allergies   Allergen Reactions    Belsomra [Suvorexant] Other (See Comments)     Nightmares      Avelox [Moxifloxacin Hcl In Nacl] Rash    Levofloxacin Rash    Sulfa Antibiotics Rash       Current Outpatient Medications   Medication Sig Dispense Refill    clonazePAM (KLONOPIN) 1 MG tablet Take 1 tablet by mouth every evening for 90 days. 90 tablet 0    budesonide-formoterol (SYMBICORT) 160-4.5 MCG/ACT AERO Inhale 2 puffs into the lungs 2 times daily 10.2 g 0    predniSONE (DELTASONE) 20 MG tablet Take 1 tablet by mouth 2 times daily for 5 days 10 tablet 0    doxycycline hyclate (VIBRA-TABS) 100 MG tablet Take 1 tablet by mouth 2 times daily for 7 days 14 tablet 0    montelukast (SINGULAIR) 10 MG tablet TAKE 1 TABLET BY MOUTH AT

## 2025-06-30 NOTE — ASSESSMENT & PLAN NOTE
Chronic, controlled. Continue to monitor BP at home and return if not at goal. Continue verapamil 240 mg daily   Continue to see cardiology as recommended     Orders:    Comprehensive Metabolic Panel; Future

## 2025-07-01 ENCOUNTER — RESULTS FOLLOW-UP (OUTPATIENT)
Dept: INTERNAL MEDICINE CLINIC | Age: 80
End: 2025-07-01

## 2025-07-01 LAB
EST. AVERAGE GLUCOSE BLD GHB EST-MCNC: 88.2 MG/DL
HBA1C MFR BLD: 4.7 %

## 2025-07-08 ENCOUNTER — TELEPHONE (OUTPATIENT)
Dept: INTERNAL MEDICINE CLINIC | Age: 80
End: 2025-07-08

## 2025-07-08 RX ORDER — FLUTICASONE PROPIONATE AND SALMETEROL XINAFOATE 115; 21 UG/1; UG/1
2 AEROSOL, METERED RESPIRATORY (INHALATION) 2 TIMES DAILY
Qty: 3 EACH | Refills: 3 | Status: SHIPPED | OUTPATIENT
Start: 2025-07-08

## 2025-07-08 NOTE — TELEPHONE ENCOUNTER
Rx sent for Advair, if expensive I recommend she call her insurance to determine the best covered inhaler in that class. Thanks, Telma

## 2025-07-08 NOTE — TELEPHONE ENCOUNTER
Pt calling, states budesonide-formoterol (SYMBICORT) 160-4.5 MCG/ACT AERO is over $200 and she can not afford that. Pt asks if there is an alternative. Please advise and call pt. Pt uses [facility] on [address]

## 2025-07-21 ENCOUNTER — TELEPHONE (OUTPATIENT)
Dept: INTERNAL MEDICINE CLINIC | Age: 80
End: 2025-07-21

## 2025-07-21 DIAGNOSIS — J45.40 MODERATE PERSISTENT ASTHMA WITHOUT COMPLICATION: Primary | ICD-10-CM

## 2025-07-21 RX ORDER — POTASSIUM CHLORIDE 1500 MG/1
20 TABLET, EXTENDED RELEASE ORAL DAILY
Qty: 100 TABLET | Refills: 2 | Status: SHIPPED | OUTPATIENT
Start: 2025-07-21

## 2025-07-21 RX ORDER — ATORVASTATIN CALCIUM 80 MG/1
80 TABLET, FILM COATED ORAL DAILY
Qty: 100 TABLET | Refills: 1 | Status: SHIPPED | OUTPATIENT
Start: 2025-07-21

## 2025-07-21 NOTE — TELEPHONE ENCOUNTER
Requested Prescriptions     Pending Prescriptions Disp Refills    potassium chloride (KLOR-CON M) 20 MEQ extended release tablet [Pharmacy Med Name: Potassium Chloride Vandana ER 20 MEQ Oral Tablet Extended Release] 100 tablet 2     Sig: TAKE 1 TABLET BY MOUTH DAILY  WITH BREAKFAST      Last OV:  1/16/2025 DCE    Next OV: 01/16/2026 Recall DCE    Last Labs: 06/30/2025 CMP    Last Filled: 09/25/2024 [facility]

## 2025-07-21 NOTE — TELEPHONE ENCOUNTER
Last OV: 6/30/2025  Next OV: 9/30/2025    Next appointment due:around 9/30/2025     Last fill:11/11/24  Refills:2 # 100

## 2025-07-23 NOTE — TELEPHONE ENCOUNTER
Dulera pended is this ok? Pt states she now only has the rescue inhaler and is starting to wheeze more without a routine HHI

## 2025-07-23 NOTE — TELEPHONE ENCOUNTER
Submitted PA for Fluticasone-Salmeterol 115-21MCG/ACT aerosol   Via M Key: [id number] STATUS: PENDING.    Follow up done daily; if no decision with in three days we will refax.  If another three days goes by with no decision will call the insurance for status.

## 2025-07-23 NOTE — TELEPHONE ENCOUNTER
The medication was DENIED; DENIAL letter is uploaded to MED[employer].    Generic Denial: Patient must complete step therapy, Unless there is a contraindication that you can provide.     Note :    Fluticasonesalmeterol HFA is denied because it is not on your plan's Drug List (formulary). Medication authorization requires the following: (1) You need to try this covered drug: Dulera? OR (2) your doctor needs to give us specific medical reasons why the covered drug is not appropriate for you.    If you want an APPEAL; please note in this encounter what new information you would like to APPEAL with.  Once complete route back to PA POOL.    If this requires a response please respond to the pool ( P [facility]X [facility] MEDICATION PRE-AUTH).      Thank you please advise patient.

## 2025-07-28 ENCOUNTER — ANTI-COAG VISIT (OUTPATIENT)
Dept: PHARMACY | Age: 80
End: 2025-07-28
Payer: MEDICARE

## 2025-07-28 DIAGNOSIS — Z86.711 HISTORY OF PULMONARY EMBOLISM: Chronic | ICD-10-CM

## 2025-07-28 DIAGNOSIS — I48.0 PAF (PAROXYSMAL ATRIAL FIBRILLATION) (HCC): Primary | ICD-10-CM

## 2025-07-28 LAB
INTERNATIONAL NORMALIZATION RATIO, POC: 2.4
PROTHROMBIN TIME, POC: 0

## 2025-07-28 PROCEDURE — 99211 OFF/OP EST MAY X REQ PHY/QHP: CPT | Performed by: PHYSICIAN ASSISTANT

## 2025-07-28 PROCEDURE — 85610 PROTHROMBIN TIME: CPT | Performed by: PHYSICIAN ASSISTANT

## 2025-07-29 RX ORDER — CITALOPRAM HYDROBROMIDE 40 MG/1
40 TABLET ORAL DAILY
Qty: 90 TABLET | Refills: 2 | Status: SHIPPED | OUTPATIENT
Start: 2025-07-29

## 2025-08-12 RX ORDER — PANTOPRAZOLE SODIUM 40 MG/1
40 TABLET, DELAYED RELEASE ORAL
Qty: 100 TABLET | Refills: 1 | Status: SHIPPED | OUTPATIENT
Start: 2025-08-12

## 2025-08-12 RX ORDER — INDAPAMIDE 1.25 MG/1
1.25 TABLET ORAL EVERY MORNING
Qty: 100 TABLET | Refills: 1 | Status: SHIPPED | OUTPATIENT
Start: 2025-08-12

## 2025-08-18 ENCOUNTER — TELEPHONE (OUTPATIENT)
Dept: PHARMACY | Age: 80
End: 2025-08-18

## 2025-08-20 RX ORDER — GABAPENTIN 300 MG/1
CAPSULE ORAL
Qty: 270 CAPSULE | Refills: 1 | Status: SHIPPED | OUTPATIENT
Start: 2025-08-20 | End: 2026-02-16

## 2025-08-25 ENCOUNTER — ANTI-COAG VISIT (OUTPATIENT)
Dept: PHARMACY | Age: 80
End: 2025-08-25
Payer: MEDICARE

## 2025-08-25 ENCOUNTER — TELEPHONE (OUTPATIENT)
Dept: INTERNAL MEDICINE CLINIC | Age: 80
End: 2025-08-25

## 2025-08-25 DIAGNOSIS — I48.0 PAF (PAROXYSMAL ATRIAL FIBRILLATION) (HCC): Primary | ICD-10-CM

## 2025-08-25 DIAGNOSIS — Z86.711 HISTORY OF PULMONARY EMBOLISM: Chronic | ICD-10-CM

## 2025-08-25 LAB
INTERNATIONAL NORMALIZATION RATIO, POC: 1.8
PROTHROMBIN TIME, POC: NORMAL

## 2025-08-25 PROCEDURE — 99211 OFF/OP EST MAY X REQ PHY/QHP: CPT | Performed by: PHYSICIAN ASSISTANT

## 2025-08-25 PROCEDURE — 85610 PROTHROMBIN TIME: CPT | Performed by: PHYSICIAN ASSISTANT

## 2025-08-25 RX ORDER — FLUCONAZOLE 150 MG/1
150 TABLET ORAL ONCE
Qty: 1 TABLET | Refills: 0 | Status: SHIPPED | OUTPATIENT
Start: 2025-08-25 | End: 2025-08-25

## 2025-08-26 ENCOUNTER — TELEPHONE (OUTPATIENT)
Dept: PHARMACY | Age: 80
End: 2025-08-26

## (undated) DEVICE — 3M™ IOBAN™ 2 ANTIMICROBIAL INCISE DRAPE 6651EZ: Brand: IOBAN™ 2

## (undated) DEVICE — GLOVE ORTHO 7 1/2   MSG9475

## (undated) DEVICE — HAND: Brand: MEDLINE INDUSTRIES, INC.

## (undated) DEVICE — GARMENT,MEDLINE,DVT,INT,CALF,MED, GEN2: Brand: MEDLINE

## (undated) DEVICE — PROBE COVER KIT: Brand: MEDLINE INDUSTRIES, INC.

## (undated) DEVICE — SUTURE NONABSORBABLE MONOFILAMENT 6-0 C-1 1X30 IN PROLENE 8706H

## (undated) DEVICE — BANDAGE,GAUZE,CONFORMING,3"X75",STRL,LF: Brand: MEDLINE

## (undated) DEVICE — 3M™ STERI-STRIP™ REINFORCED ADHESIVE SKIN CLOSURES, R1547, 1/2 IN X 4 IN (12 MM X 100 MM), 6 STRIPS/ENVELOPE: Brand: 3M™ STERI-STRIP™

## (undated) DEVICE — STERILE VELCLOSE ELASTIC BANDAGE, 6IN: Brand: VELCLOSE

## (undated) DEVICE — COVER,TABLE,HEAVY DUTY,77"X90",STRL: Brand: MEDLINE

## (undated) DEVICE — INTENDED TO BE USED TO OCCLUDE, RETRACT AND IDENTIFY ARTERIES, VEINS, TENDONS AND NERVES IN SURGICAL PROCEDURES: Brand: STERION®  VESSEL LOOP

## (undated) DEVICE — YANKAUER,BULB TIP,W/O VENT,RIGID,STERILE: Brand: MEDLINE

## (undated) DEVICE — SUTURE MCRYL SZ 4-0 L27IN ABSRB UD L19MM PS-2 1/2 CIR PRIM Y426H

## (undated) DEVICE — SUTURE VCRL SZ 4-0 L27IN ABSRB UD L26MM SH 1/2 CIR J415H

## (undated) DEVICE — TOWEL,STOP FLAG GOLD N-W: Brand: MEDLINE

## (undated) DEVICE — SHEET,DRAPE,53X77,STERILE: Brand: MEDLINE

## (undated) DEVICE — SUTURE VCRL SZ 4-0 L18IN ABSRB UD L19MM PS-2 3/8 CIR PRIM J496H

## (undated) DEVICE — TUBING, SUCTION, 1/4" X 10', STRAIGHT: Brand: MEDLINE

## (undated) DEVICE — SOLUTION IV 1000ML 0.9% SOD CHL

## (undated) DEVICE — BANDAGE COBAN 4 IN COMPR W4INXL5YD FOAM COHESIVE QUIK STK SELF ADH SFT

## (undated) DEVICE — BOWL MED L 32OZ PLAS W/ MOLD GRAD EZ OPN PEEL PCH

## (undated) DEVICE — SUTURE NONABSORBABLE MONOFILAMENT 5-0 C-1 1X24 IN PROLENE 8725H

## (undated) DEVICE — PAD,ABDOMINAL,5"X9",ST,LF,25/BX: Brand: MEDLINE INDUSTRIES, INC.

## (undated) DEVICE — SET TRNQT W/ STD 7IN 12FR 5 TB 1 SNR DLP

## (undated) DEVICE — ELECTRODE PT RET AD L9FT HI MOIST COND ADH HYDRGEL CORDED

## (undated) DEVICE — FAIRFIELD CABG ACCESSARY PK

## (undated) DEVICE — 1010 S-DRAPE TOWEL DRAPE 10/BX: Brand: STERI-DRAPE™

## (undated) DEVICE — 3M™ IOBAN™ 2 ANTIMICROBIAL INCISE DRAPE 6640EZ: Brand: IOBAN™ 2

## (undated) DEVICE — PADDING CAST W4INXL4YD HIGHLY ABSRB THAN COT EZ APPL

## (undated) DEVICE — OPTIFOAM GENTLE LIQUITRAP, SACRUM, 7"X7": Brand: MEDLINE

## (undated) DEVICE — SUTURE PERMAHAND SZ 2-0 L30IN NONABSORBABLE BLK SILK W/O A305H

## (undated) DEVICE — VESSEL LOOPS,MAXI, BLUE: Brand: DEVON

## (undated) DEVICE — BLADE ES ELASTOMERIC COAT INSUL DURABLE BEND UPTO 90DEG

## (undated) DEVICE — GOWN,SIRUS,POLYRNF,BRTHSLV,XLN/XL,20/CS: Brand: MEDLINE

## (undated) DEVICE — TRAY,IRRIGATION,BULBSYRINGE,60ML,CSR,PVP: Brand: MEDLINE

## (undated) DEVICE — EXTENSION SET, 2 INJECTION SITES, MALE LUER LOCK ADAPTER WITH RETRACTABLE COLLAR: Brand: INTERLINK

## (undated) DEVICE — NDL CNTR 40CT FM MAG: Brand: MEDLINE INDUSTRIES, INC.

## (undated) DEVICE — SUTURE ETHLN SZ 4-0 L18IN NONABSORBABLE BLK L19MM PS-2 3/8 1667H

## (undated) DEVICE — PADDING,UNDERCAST,COTTON, 3X4YD STERILE: Brand: MEDLINE

## (undated) DEVICE — GLOVE ORANGE PI 7 1/2   MSG9075

## (undated) DEVICE — UNDERGLOVE SURG SZ 8 BLU LTX FREE SYN POLYISOPRENE POLYMER

## (undated) DEVICE — COVER LT HNDL BLU PLAS

## (undated) DEVICE — ZIMMER® STERILE DISPOSABLE TOURNIQUET CUFF, DUAL PORT, SINGLE BLADDER, 18 IN. (46 CM)